# Patient Record
Sex: MALE | Race: WHITE | Employment: OTHER | URBAN - METROPOLITAN AREA
[De-identification: names, ages, dates, MRNs, and addresses within clinical notes are randomized per-mention and may not be internally consistent; named-entity substitution may affect disease eponyms.]

---

## 2017-01-31 ENCOUNTER — OFFICE VISIT (OUTPATIENT)
Dept: FAMILY MEDICINE CLINIC | Age: 57
End: 2017-01-31

## 2017-01-31 VITALS
WEIGHT: 223.8 LBS | SYSTOLIC BLOOD PRESSURE: 126 MMHG | DIASTOLIC BLOOD PRESSURE: 84 MMHG | HEIGHT: 67 IN | HEART RATE: 70 BPM | BODY MASS INDEX: 35.12 KG/M2

## 2017-01-31 DIAGNOSIS — F32.1 MODERATE SINGLE CURRENT EPISODE OF MAJOR DEPRESSIVE DISORDER (HCC): Primary | ICD-10-CM

## 2017-01-31 DIAGNOSIS — K21.9 GASTROESOPHAGEAL REFLUX DISEASE, ESOPHAGITIS PRESENCE NOT SPECIFIED: ICD-10-CM

## 2017-01-31 PROCEDURE — 99214 OFFICE O/P EST MOD 30 MIN: CPT | Performed by: FAMILY MEDICINE

## 2017-01-31 RX ORDER — CIMETIDINE 800 MG
800 TABLET ORAL 2 TIMES DAILY
Qty: 60 TABLET | Refills: 0 | Status: SHIPPED | OUTPATIENT
Start: 2017-01-31 | End: 2017-02-14

## 2017-02-14 ENCOUNTER — PATIENT MESSAGE (OUTPATIENT)
Dept: FAMILY MEDICINE CLINIC | Age: 57
End: 2017-02-14

## 2017-02-14 ENCOUNTER — OFFICE VISIT (OUTPATIENT)
Dept: FAMILY MEDICINE CLINIC | Age: 57
End: 2017-02-14

## 2017-02-14 VITALS
HEART RATE: 60 BPM | BODY MASS INDEX: 34.91 KG/M2 | SYSTOLIC BLOOD PRESSURE: 100 MMHG | DIASTOLIC BLOOD PRESSURE: 60 MMHG | WEIGHT: 222.4 LBS | HEIGHT: 67 IN

## 2017-02-14 DIAGNOSIS — F32.1 MODERATE SINGLE CURRENT EPISODE OF MAJOR DEPRESSIVE DISORDER (HCC): ICD-10-CM

## 2017-02-14 DIAGNOSIS — I25.10 CORONARY ARTERY DISEASE INVOLVING NATIVE CORONARY ARTERY OF NATIVE HEART, ANGINA PRESENCE UNSPECIFIED: ICD-10-CM

## 2017-02-14 DIAGNOSIS — K21.9 GASTROESOPHAGEAL REFLUX DISEASE, ESOPHAGITIS PRESENCE NOT SPECIFIED: ICD-10-CM

## 2017-02-14 DIAGNOSIS — I10 ESSENTIAL HYPERTENSION: ICD-10-CM

## 2017-02-14 DIAGNOSIS — I25.2 HISTORY OF MYOCARDIAL INFARCTION: ICD-10-CM

## 2017-02-14 PROCEDURE — 99214 OFFICE O/P EST MOD 30 MIN: CPT | Performed by: FAMILY MEDICINE

## 2017-02-14 RX ORDER — QUINAPRIL 40 MG/1
40 TABLET ORAL DAILY
Qty: 90 TABLET | Refills: 1 | Status: SHIPPED | OUTPATIENT
Start: 2017-02-14 | End: 2018-02-01 | Stop reason: DRUGHIGH

## 2017-02-14 RX ORDER — AMLODIPINE BESYLATE 10 MG/1
10 TABLET ORAL DAILY
Qty: 90 TABLET | Refills: 1 | Status: SHIPPED | OUTPATIENT
Start: 2017-02-14 | End: 2018-02-01

## 2017-02-14 RX ORDER — CLOPIDOGREL BISULFATE 75 MG/1
75 TABLET ORAL DAILY
Qty: 90 TABLET | Refills: 1 | Status: SHIPPED | OUTPATIENT
Start: 2017-02-14 | End: 2018-02-01 | Stop reason: SDUPTHER

## 2017-02-14 RX ORDER — ISOSORBIDE MONONITRATE 60 MG/1
60 TABLET, EXTENDED RELEASE ORAL EVERY MORNING
Qty: 90 TABLET | Refills: 1 | Status: SHIPPED | OUTPATIENT
Start: 2017-02-14 | End: 2018-02-01

## 2017-02-14 RX ORDER — METOPROLOL TARTRATE 50 MG/1
50 TABLET, FILM COATED ORAL 2 TIMES DAILY
Qty: 180 TABLET | Refills: 1 | Status: SHIPPED | OUTPATIENT
Start: 2017-02-14 | End: 2018-02-01 | Stop reason: SDUPTHER

## 2017-02-14 RX ORDER — PANTOPRAZOLE SODIUM 40 MG/1
40 TABLET, DELAYED RELEASE ORAL DAILY
Qty: 90 TABLET | Refills: 1 | Status: SHIPPED | OUTPATIENT
Start: 2017-02-14 | End: 2018-02-01 | Stop reason: SDUPTHER

## 2017-02-14 RX ORDER — ATORVASTATIN CALCIUM 40 MG/1
40 TABLET, FILM COATED ORAL DAILY
Qty: 90 TABLET | Refills: 1 | Status: SHIPPED | OUTPATIENT
Start: 2017-02-14 | End: 2018-02-01 | Stop reason: SDUPTHER

## 2017-02-14 ASSESSMENT — ENCOUNTER SYMPTOMS: SHORTNESS OF BREATH: 0

## 2017-04-18 ENCOUNTER — OFFICE VISIT (OUTPATIENT)
Dept: FAMILY MEDICINE CLINIC | Age: 57
End: 2017-04-18

## 2017-04-18 VITALS
HEIGHT: 67 IN | SYSTOLIC BLOOD PRESSURE: 134 MMHG | HEART RATE: 70 BPM | DIASTOLIC BLOOD PRESSURE: 82 MMHG | BODY MASS INDEX: 33.93 KG/M2 | WEIGHT: 216.2 LBS

## 2017-04-18 DIAGNOSIS — I25.10 CORONARY ARTERY DISEASE INVOLVING NATIVE CORONARY ARTERY OF NATIVE HEART, ANGINA PRESENCE UNSPECIFIED: ICD-10-CM

## 2017-04-18 DIAGNOSIS — K21.9 GASTROESOPHAGEAL REFLUX DISEASE, ESOPHAGITIS PRESENCE NOT SPECIFIED: ICD-10-CM

## 2017-04-18 DIAGNOSIS — R20.2 PARESTHESIA OF LEFT LEG: ICD-10-CM

## 2017-04-18 DIAGNOSIS — R68.89 EXERCISE INTOLERANCE: ICD-10-CM

## 2017-04-18 DIAGNOSIS — R20.0 NUMBNESS IN LEFT LEG: ICD-10-CM

## 2017-04-18 DIAGNOSIS — R29.898 WEAKNESS OF LEFT LEG: ICD-10-CM

## 2017-04-18 DIAGNOSIS — M54.32 SCIATICA, LEFT SIDE: Primary | ICD-10-CM

## 2017-04-18 DIAGNOSIS — G61.0 GUILLAIN BARRÉ SYNDROME (HCC): ICD-10-CM

## 2017-04-18 PROCEDURE — 93000 ELECTROCARDIOGRAM COMPLETE: CPT | Performed by: FAMILY MEDICINE

## 2017-04-18 PROCEDURE — 99214 OFFICE O/P EST MOD 30 MIN: CPT | Performed by: FAMILY MEDICINE

## 2017-04-18 RX ORDER — GABAPENTIN 400 MG/1
1 CAPSULE ORAL 3 TIMES DAILY
COMMUNITY
Start: 2017-03-28 | End: 2018-02-01

## 2017-04-18 ASSESSMENT — ENCOUNTER SYMPTOMS: SHORTNESS OF BREATH: 1

## 2017-04-19 PROBLEM — K21.9 GASTROESOPHAGEAL REFLUX DISEASE: Status: ACTIVE | Noted: 2017-04-19

## 2017-04-19 ASSESSMENT — ENCOUNTER SYMPTOMS: WHEEZING: 0

## 2017-04-27 ENCOUNTER — HOSPITAL ENCOUNTER (OUTPATIENT)
Dept: MRI IMAGING | Age: 57
Discharge: HOME | End: 2017-04-27
Attending: FAMILY MEDICINE | Admitting: FAMILY MEDICINE

## 2017-04-27 DIAGNOSIS — R20.0 NUMBNESS IN LEFT LEG: ICD-10-CM

## 2017-04-27 DIAGNOSIS — G61.0 GUILLAIN BARRÉ SYNDROME (HCC): ICD-10-CM

## 2017-04-27 DIAGNOSIS — R20.2 PARESTHESIA OF LEFT LEG: ICD-10-CM

## 2017-04-27 DIAGNOSIS — M54.32 SCIATICA, LEFT SIDE: ICD-10-CM

## 2017-04-27 DIAGNOSIS — R29.898 WEAKNESS OF LEFT LEG: ICD-10-CM

## 2017-04-27 DIAGNOSIS — M51.36 BULGING LUMBAR DISC: Primary | ICD-10-CM

## 2017-05-02 ENCOUNTER — OFFICE VISIT (OUTPATIENT)
Dept: FAMILY MEDICINE CLINIC | Age: 57
End: 2017-05-02

## 2017-05-02 VITALS
SYSTOLIC BLOOD PRESSURE: 140 MMHG | WEIGHT: 219 LBS | BODY MASS INDEX: 34.37 KG/M2 | HEART RATE: 68 BPM | DIASTOLIC BLOOD PRESSURE: 90 MMHG | HEIGHT: 67 IN

## 2017-05-02 DIAGNOSIS — E53.8 B12 DEFICIENCY: ICD-10-CM

## 2017-05-02 DIAGNOSIS — M54.17 LUMBOSACRAL RADICULOPATHY AT S1: Primary | ICD-10-CM

## 2017-05-02 PROCEDURE — 99213 OFFICE O/P EST LOW 20 MIN: CPT | Performed by: FAMILY MEDICINE

## 2017-05-02 RX ORDER — LANOLIN ALCOHOL/MO/W.PET/CERES
1000 CREAM (GRAM) TOPICAL DAILY
Qty: 90 TABLET | Refills: 3 | Status: SHIPPED | OUTPATIENT
Start: 2017-05-02 | End: 2018-04-12 | Stop reason: ALTCHOICE

## 2017-05-02 RX ORDER — TRAMADOL HYDROCHLORIDE 50 MG/1
50 TABLET ORAL EVERY 6 HOURS PRN
Qty: 25 TABLET | Refills: 0 | Status: SHIPPED | OUTPATIENT
Start: 2017-05-02 | End: 2018-02-01

## 2017-05-02 RX ORDER — METHOCARBAMOL 500 MG/1
500 TABLET, FILM COATED ORAL 4 TIMES DAILY
Qty: 60 TABLET | Refills: 1 | Status: SHIPPED | OUTPATIENT
Start: 2017-05-02 | End: 2018-02-01

## 2017-05-02 ASSESSMENT — ENCOUNTER SYMPTOMS: BACK PAIN: 1

## 2017-05-05 ENCOUNTER — HOSPITAL ENCOUNTER (OUTPATIENT)
Dept: OTHER | Age: 57
Discharge: HOME | End: 2017-05-31
Attending: FAMILY MEDICINE | Admitting: FAMILY MEDICINE

## 2017-05-05 ASSESSMENT — PAIN SCALES - GENERAL: PAINLEVEL_OUTOF10: 4

## 2017-05-05 ASSESSMENT — PAIN DESCRIPTION - ORIENTATION: ORIENTATION: LEFT

## 2017-05-05 ASSESSMENT — PAIN DESCRIPTION - PAIN TYPE: TYPE: CHRONIC PAIN

## 2017-05-05 ASSESSMENT — PAIN DESCRIPTION - FREQUENCY: FREQUENCY: CONTINUOUS

## 2017-05-10 ENCOUNTER — HOSPITAL ENCOUNTER (OUTPATIENT)
Dept: PHYSICAL THERAPY | Age: 57
Discharge: HOME | End: 2017-05-10
Admitting: FAMILY MEDICINE

## 2017-05-12 ENCOUNTER — HOSPITAL ENCOUNTER (OUTPATIENT)
Dept: PHYSICAL THERAPY | Age: 57
Discharge: HOME | End: 2017-05-12
Admitting: FAMILY MEDICINE

## 2017-05-15 ENCOUNTER — HOSPITAL ENCOUNTER (OUTPATIENT)
Dept: PHYSICAL THERAPY | Age: 57
Discharge: HOME | End: 2017-05-15
Admitting: FAMILY MEDICINE

## 2017-06-01 ENCOUNTER — HOSPITAL ENCOUNTER (OUTPATIENT)
Dept: OTHER | Age: 57
Discharge: HOME | End: 2017-06-30
Attending: FAMILY MEDICINE | Admitting: FAMILY MEDICINE

## 2017-06-14 ENCOUNTER — TELEPHONE (OUTPATIENT)
Dept: FAMILY MEDICINE CLINIC | Age: 57
End: 2017-06-14

## 2017-08-24 LAB — DIABETIC RETINOPATHY: NORMAL

## 2018-02-01 ENCOUNTER — OFFICE VISIT (OUTPATIENT)
Dept: FAMILY MEDICINE CLINIC | Age: 58
End: 2018-02-01

## 2018-02-01 VITALS
BODY MASS INDEX: 33.43 KG/M2 | HEART RATE: 74 BPM | HEIGHT: 67 IN | SYSTOLIC BLOOD PRESSURE: 152 MMHG | DIASTOLIC BLOOD PRESSURE: 98 MMHG | WEIGHT: 213 LBS | RESPIRATION RATE: 15 BRPM

## 2018-02-01 DIAGNOSIS — K21.9 GASTROESOPHAGEAL REFLUX DISEASE, ESOPHAGITIS PRESENCE NOT SPECIFIED: ICD-10-CM

## 2018-02-01 DIAGNOSIS — I25.2 HISTORY OF MYOCARDIAL INFARCTION: ICD-10-CM

## 2018-02-01 DIAGNOSIS — I10 ESSENTIAL HYPERTENSION: Primary | ICD-10-CM

## 2018-02-01 DIAGNOSIS — R73.02 IMPAIRED GLUCOSE TOLERANCE: ICD-10-CM

## 2018-02-01 DIAGNOSIS — F32.1 MODERATE SINGLE CURRENT EPISODE OF MAJOR DEPRESSIVE DISORDER (HCC): ICD-10-CM

## 2018-02-01 DIAGNOSIS — I25.10 CORONARY ARTERY DISEASE INVOLVING NATIVE CORONARY ARTERY OF NATIVE HEART, ANGINA PRESENCE UNSPECIFIED: ICD-10-CM

## 2018-02-01 PROCEDURE — G8427 DOCREV CUR MEDS BY ELIG CLIN: HCPCS | Performed by: FAMILY MEDICINE

## 2018-02-01 PROCEDURE — G8483 FLU IMM NO ADMIN DOC REA: HCPCS | Performed by: FAMILY MEDICINE

## 2018-02-01 PROCEDURE — 1036F TOBACCO NON-USER: CPT | Performed by: FAMILY MEDICINE

## 2018-02-01 PROCEDURE — G8417 CALC BMI ABV UP PARAM F/U: HCPCS | Performed by: FAMILY MEDICINE

## 2018-02-01 PROCEDURE — 99214 OFFICE O/P EST MOD 30 MIN: CPT | Performed by: FAMILY MEDICINE

## 2018-02-01 PROCEDURE — G8598 ASA/ANTIPLAT THER USED: HCPCS | Performed by: FAMILY MEDICINE

## 2018-02-01 PROCEDURE — 3017F COLORECTAL CA SCREEN DOC REV: CPT | Performed by: FAMILY MEDICINE

## 2018-02-01 RX ORDER — PANTOPRAZOLE SODIUM 40 MG/1
40 TABLET, DELAYED RELEASE ORAL DAILY
Qty: 30 TABLET | Refills: 0 | Status: SHIPPED | OUTPATIENT
Start: 2018-02-01 | End: 2019-01-22 | Stop reason: SDUPTHER

## 2018-02-01 RX ORDER — CLOPIDOGREL BISULFATE 75 MG/1
75 TABLET ORAL DAILY
Qty: 30 TABLET | Refills: 0 | Status: ON HOLD | OUTPATIENT
Start: 2018-02-01 | End: 2018-03-17 | Stop reason: HOSPADM

## 2018-02-01 RX ORDER — ATORVASTATIN CALCIUM 40 MG/1
40 TABLET, FILM COATED ORAL DAILY
Qty: 30 TABLET | Refills: 0 | Status: SHIPPED | OUTPATIENT
Start: 2018-02-01 | End: 2018-04-12 | Stop reason: ALTCHOICE

## 2018-02-01 RX ORDER — METOPROLOL TARTRATE 50 MG/1
50 TABLET, FILM COATED ORAL 2 TIMES DAILY
Qty: 60 TABLET | Refills: 0 | Status: SHIPPED | OUTPATIENT
Start: 2018-02-01 | End: 2018-04-12 | Stop reason: ALTCHOICE

## 2018-02-01 RX ORDER — QUINAPRIL 10 MG/1
10 TABLET ORAL DAILY
Qty: 30 TABLET | Refills: 0 | Status: ON HOLD | OUTPATIENT
Start: 2018-02-01 | End: 2018-03-17 | Stop reason: HOSPADM

## 2018-02-01 ASSESSMENT — PATIENT HEALTH QUESTIONNAIRE - PHQ9
2. FEELING DOWN, DEPRESSED OR HOPELESS: 1
1. LITTLE INTEREST OR PLEASURE IN DOING THINGS: 1
SUM OF ALL RESPONSES TO PHQ9 QUESTIONS 1 & 2: 2
SUM OF ALL RESPONSES TO PHQ QUESTIONS 1-9: 2

## 2018-02-01 ASSESSMENT — ENCOUNTER SYMPTOMS: SHORTNESS OF BREATH: 0

## 2018-02-01 NOTE — PROGRESS NOTES
Left Arm Left Arm   Position: Sitting Sitting   Cuff Size: Large Adult Large Adult   Pulse: 74    Resp: 15    Weight: 213 lb (96.6 kg)    Height: 5' 7\" (1.702 m)      Body mass index is 33.36 kg/m². Wt Readings from Last 3 Encounters:   02/01/18 213 lb (96.6 kg)   05/02/17 219 lb (99.3 kg)   04/18/17 216 lb 3.2 oz (98.1 kg)     BP Readings from Last 3 Encounters:   02/01/18 (!) 152/98   05/02/17 (!) 140/90   04/18/17 134/82          No results found for this visit on 02/01/18. The ASCVD Risk score (Rock Nevarez, et al., 2013) failed to calculate for the following reasons: The patient has a prior MI or stroke diagnosis  Lab Review   Abstract on 08/28/2017   Component Date Value    Diabetic Retinopathy 08/24/2017 none            Assessment:      1. Essential hypertension  metoprolol tartrate (LOPRESSOR) 50 MG tablet    quinapril (ACCUPRIL) 10 MG tablet   2. Moderate single current episode of major depressive disorder (HCC)  sertraline (ZOLOFT) 50 MG tablet   3. Gastroesophageal reflux disease, esophagitis presence not specified  pantoprazole (PROTONIX) 40 MG tablet   4. History of myocardial infarction  metoprolol tartrate (LOPRESSOR) 50 MG tablet    atorvastatin (LIPITOR) 40 MG tablet    clopidogrel (PLAVIX) 75 MG tablet   5. Impaired glucose tolerance     6. Coronary artery disease involving native coronary artery of native heart, angina presence unspecified  atorvastatin (LIPITOR) 40 MG tablet           Plan:      Exercise compliance and returning for his visits. We could've set up a payment program for him so he would not at the missing his medication    I'm restarting him on 2 of his blood pressure pills since his blood pressure is not that high. We'll recheck in 3-4 weeks. Restarting Lipitor for his history of having a myocardial infarction    We'll check labs and blood sugar next time.

## 2018-02-26 ENCOUNTER — TELEPHONE (OUTPATIENT)
Dept: FAMILY MEDICINE CLINIC | Age: 58
End: 2018-02-26

## 2018-02-27 ENCOUNTER — OFFICE VISIT (OUTPATIENT)
Dept: FAMILY MEDICINE CLINIC | Age: 58
End: 2018-02-27

## 2018-02-27 VITALS
HEART RATE: 86 BPM | BODY MASS INDEX: 33.64 KG/M2 | WEIGHT: 214.8 LBS | DIASTOLIC BLOOD PRESSURE: 90 MMHG | SYSTOLIC BLOOD PRESSURE: 150 MMHG

## 2018-02-27 DIAGNOSIS — G61.0 GUILLAIN BARRÉ SYNDROME (HCC): ICD-10-CM

## 2018-02-27 DIAGNOSIS — M51.36 BULGING LUMBAR DISC: ICD-10-CM

## 2018-02-27 DIAGNOSIS — I10 ESSENTIAL HYPERTENSION: ICD-10-CM

## 2018-02-27 DIAGNOSIS — I69.351 HEMIPARESIS AFFECTING RIGHT SIDE AS LATE EFFECT OF CEREBROVASCULAR ACCIDENT (CVA) (HCC): Primary | ICD-10-CM

## 2018-02-27 PROCEDURE — 3017F COLORECTAL CA SCREEN DOC REV: CPT | Performed by: FAMILY MEDICINE

## 2018-02-27 PROCEDURE — G8417 CALC BMI ABV UP PARAM F/U: HCPCS | Performed by: FAMILY MEDICINE

## 2018-02-27 PROCEDURE — G8427 DOCREV CUR MEDS BY ELIG CLIN: HCPCS | Performed by: FAMILY MEDICINE

## 2018-02-27 PROCEDURE — 1036F TOBACCO NON-USER: CPT | Performed by: FAMILY MEDICINE

## 2018-02-27 PROCEDURE — 99214 OFFICE O/P EST MOD 30 MIN: CPT | Performed by: FAMILY MEDICINE

## 2018-02-27 PROCEDURE — G8598 ASA/ANTIPLAT THER USED: HCPCS | Performed by: FAMILY MEDICINE

## 2018-02-27 PROCEDURE — G8483 FLU IMM NO ADMIN DOC REA: HCPCS | Performed by: FAMILY MEDICINE

## 2018-02-27 RX ORDER — QUINAPRIL 20 MG/1
20 TABLET ORAL DAILY
Qty: 30 TABLET | Refills: 3 | Status: SHIPPED | OUTPATIENT
Start: 2018-02-27 | End: 2018-07-25 | Stop reason: DRUGHIGH

## 2018-02-27 NOTE — PROGRESS NOTES
medical therapy and risk factor modicfication, LV dysfunction will be managed medically, 12/07, 5/03    H/O cardiovascular stress test 7/10    7/2/10-EF44%, sev LV dilation w/multiple extensive wall motion abnormalities involving the inferolateral, anteroseptal, anteroapical, and apical regions of LV, changes suggest old MI w/ a mod area of lateral ischemia, 4/09-EF44%    H/O Doppler ultrasound 6/15/09    carotid - 6/15/09-Left ICA stenosis 50%, bilateral ECA stenosis 50%, bilat vertebral flows are norm    H/O Doppler ultrasound 6/15/09    lower extremity doppler - 6/15/09-Rt PAULY 1.18, Lt PAULY 1.17, normal ABIs    H/O Doppler ultrasound 3/08    abdominal aorta - 3/6/08-no aneurysmal dilation present, abd ao is free of atherosclerotic changes    H/O echocardiogram 7/30/13, 6/16/10    7/13-EF 50-55% WNL. 6/10-EF53%, mod LVH, MARCI, RVE, trace PI, mild MR/TR, 6/09    Heart murmur     History of myocardial infarction 2003    History of PTCA 6/03;2/03 6/03-PTCA w/stents x2, 2/03-PTCA w/stent x3    Hyperlipidemia     Hypertension     Impaired glucose tolerance 6/3/2016    Renal cyst 5/23/2014    S/P cardiac catheterization 7/12/13 7/13-EF=55%. PATENT LAD AND RCA STENTS, OM2 50% stenosis, & Normal wall motion.  Stroke (cerebrum) [facility])     May 2016       Past Surgical History:   Procedure Laterality Date    CARDIAC CATHETERIZATION  11/3/2010    CARDIAC CATHETERIZATION  7/13 7/13-EF=55%. PATENT LAD AND RCA STENTS, OM2 50% stenosis, & Normal wall motion.     COLONOSCOPY  8/21/14    colon polyp, diverticulosis, internal hemorrhoids    CORONARY ANGIOPLASTY  6/03 6/14/03- PTCA w stents x2 mid LAD, 2/03-PTCA w stents x3, RCA    ENDOSCOPY, COLON, DIAGNOSTIC  8/21/14    irregular GE junction, gastritis,hiatal hernia, gastric polyps    HAND SURGERY      rt knuckle    SHOULDER SURGERY Right 2011    bone spurs       Family History   Problem Relation Age of Onset    Hypertension Mother    [facility] High Cholesterol Mother     Alcohol Abuse Mother     Depression Mother     Alcohol Abuse Father     Stomach Cancer Father     Depression Sister     Diabetes Brother     Depression Sister     Stomach Cancer Brother     Alcohol Abuse Brother        Current Outpatient Prescriptions on File Prior to Visit   Medication Sig Dispense Refill    sertraline (ZOLOFT) 50 MG tablet Take 1 tablet by mouth daily 30 tablet 0    pantoprazole (PROTONIX) 40 MG tablet Take 1 tablet by mouth daily 30 tablet 0    metoprolol tartrate (LOPRESSOR) 50 MG tablet Take 1 tablet by mouth 2 times daily 60 tablet 0    quinapril (ACCUPRIL) 10 MG tablet Take 1 tablet by mouth daily 30 tablet 0    atorvastatin (LIPITOR) 40 MG tablet Take 1 tablet by mouth daily 30 tablet 0    clopidogrel (PLAVIX) 75 MG tablet Take 1 tablet by mouth daily 30 tablet 0    vitamin B-12 (CYANOCOBALAMIN) 1000 MCG tablet Take 1 tablet by mouth daily 90 tablet 3    Blood Pressure KIT Use prn 1 kit 0     No current facility-administered medications on file prior to visit. Objective:   Physical Exam   Constitutional: He appears well-developed and well-nourished. Obese   HENT:   Head: Normocephalic and atraumatic. Right Ear: No decreased hearing is noted. Left Ear: No decreased hearing is noted. Eyes: Pupils are equal, round, and reactive to light. Left eye exhibits no nystagmus. Cardiovascular: Normal rate, regular rhythm and normal heart sounds. Pulmonary/Chest: Breath sounds normal. No respiratory distress. He has no wheezes. He has no rales. Musculoskeletal:        Right lower leg: He exhibits no edema. Left lower leg: He exhibits no edema.    Right upper and lower extremity about 4/5 strength forward flexion and extension    Left upper and lower extremity abobut 3/5 strength forward flexion and extension   Neurological: Gait abnormal.   Reflex Scores:       Patellar reflexes are 2+ on the right side and 2+ on the left

## 2018-02-28 RX ORDER — GABAPENTIN 300 MG/1
300 CAPSULE ORAL NIGHTLY
Qty: 30 CAPSULE | Refills: 2 | Status: SHIPPED | OUTPATIENT
Start: 2018-02-28 | End: 2019-01-22

## 2018-02-28 ASSESSMENT — ENCOUNTER SYMPTOMS
BACK PAIN: 1
CHEST TIGHTNESS: 0

## 2018-03-14 PROBLEM — I21.3 STEMI (ST ELEVATION MYOCARDIAL INFARCTION) (HCC): Status: ACTIVE | Noted: 2018-03-14

## 2018-03-14 PROBLEM — I16.0 HYPERTENSIVE URGENCY: Status: ACTIVE | Noted: 2018-03-14

## 2018-03-14 PROBLEM — I16.1 HYPERTENSIVE EMERGENCY: Status: ACTIVE | Noted: 2018-03-14

## 2018-03-15 PROBLEM — I20.0 UNSTABLE ANGINA (HCC): Status: ACTIVE | Noted: 2018-03-15

## 2018-03-15 PROBLEM — I21.4 NSTEMI (NON-ST ELEVATED MYOCARDIAL INFARCTION) (HCC): Status: ACTIVE | Noted: 2018-03-15

## 2018-03-19 ENCOUNTER — TELEPHONE (OUTPATIENT)
Dept: CARDIOLOGY CLINIC | Age: 58
End: 2018-03-19

## 2018-03-19 ENCOUNTER — TELEPHONE (OUTPATIENT)
Dept: FAMILY MEDICINE CLINIC | Age: 58
End: 2018-03-19

## 2018-03-19 NOTE — TELEPHONE ENCOUNTER
Called pt to schedule ov with Dr Christa Sullivan when he is back in office, per Dr Christa Sullivan order in patient call back work que.  Left message for pt to call to schedule 3/19/18

## 2018-04-12 ENCOUNTER — OFFICE VISIT (OUTPATIENT)
Dept: CARDIOLOGY CLINIC | Age: 58
End: 2018-04-12

## 2018-04-12 ENCOUNTER — TELEPHONE (OUTPATIENT)
Dept: CARDIOLOGY CLINIC | Age: 58
End: 2018-04-12

## 2018-04-12 VITALS
HEIGHT: 67 IN | WEIGHT: 216.2 LBS | SYSTOLIC BLOOD PRESSURE: 128 MMHG | BODY MASS INDEX: 33.93 KG/M2 | HEART RATE: 64 BPM | DIASTOLIC BLOOD PRESSURE: 88 MMHG

## 2018-04-12 DIAGNOSIS — Z98.61 HISTORY OF PTCA: Primary | ICD-10-CM

## 2018-04-12 DIAGNOSIS — I25.10 CORONARY ARTERY DISEASE INVOLVING NATIVE CORONARY ARTERY OF NATIVE HEART, ANGINA PRESENCE UNSPECIFIED: ICD-10-CM

## 2018-04-12 PROCEDURE — 99214 OFFICE O/P EST MOD 30 MIN: CPT | Performed by: INTERNAL MEDICINE

## 2018-04-12 RX ORDER — CLOPIDOGREL BISULFATE 75 MG/1
75 TABLET ORAL DAILY
COMMUNITY
Start: 2018-03-30 | End: 2018-04-25 | Stop reason: ALTCHOICE

## 2018-04-20 ENCOUNTER — TELEPHONE (OUTPATIENT)
Dept: CARDIOLOGY CLINIC | Age: 58
End: 2018-04-20

## 2018-04-25 ENCOUNTER — PROCEDURE VISIT (OUTPATIENT)
Dept: CARDIOLOGY CLINIC | Age: 58
End: 2018-04-25

## 2018-04-25 DIAGNOSIS — Z98.61 HISTORY OF PTCA: ICD-10-CM

## 2018-04-25 DIAGNOSIS — R07.89 OTHER CHEST PAIN: ICD-10-CM

## 2018-04-25 DIAGNOSIS — I25.10 CORONARY ARTERY DISEASE INVOLVING NATIVE CORONARY ARTERY OF NATIVE HEART, ANGINA PRESENCE UNSPECIFIED: ICD-10-CM

## 2018-04-25 DIAGNOSIS — R06.02 SHORTNESS OF BREATH: Primary | ICD-10-CM

## 2018-04-25 LAB
LV EF: 28 %
LVEF MODALITY: NORMAL

## 2018-04-25 PROCEDURE — 93018 CV STRESS TEST I&R ONLY: CPT | Performed by: INTERNAL MEDICINE

## 2018-04-25 PROCEDURE — A9500 TC99M SESTAMIBI: HCPCS | Performed by: INTERNAL MEDICINE

## 2018-04-25 PROCEDURE — 93017 CV STRESS TEST TRACING ONLY: CPT | Performed by: INTERNAL MEDICINE

## 2018-04-25 PROCEDURE — 78452 HT MUSCLE IMAGE SPECT MULT: CPT | Performed by: INTERNAL MEDICINE

## 2018-04-25 PROCEDURE — 93016 CV STRESS TEST SUPVJ ONLY: CPT | Performed by: INTERNAL MEDICINE

## 2018-04-25 RX ORDER — AMLODIPINE BESYLATE 5 MG/1
10 TABLET ORAL DAILY
Qty: 30 TABLET | Refills: 3 | Status: SHIPPED | OUTPATIENT
Start: 2018-04-25 | End: 2019-01-22 | Stop reason: SDUPTHER

## 2018-04-27 ENCOUNTER — TELEPHONE (OUTPATIENT)
Dept: CARDIOLOGY CLINIC | Age: 58
End: 2018-04-27

## 2018-06-15 ENCOUNTER — TELEPHONE (OUTPATIENT)
Dept: CARDIOLOGY CLINIC | Age: 58
End: 2018-06-15

## 2018-06-20 ENCOUNTER — TELEPHONE (OUTPATIENT)
Dept: CARDIOLOGY CLINIC | Age: 58
End: 2018-06-20

## 2018-06-26 ENCOUNTER — TELEPHONE (OUTPATIENT)
Dept: CARDIOLOGY CLINIC | Age: 58
End: 2018-06-26

## 2018-06-26 ENCOUNTER — OFFICE VISIT (OUTPATIENT)
Dept: CARDIOLOGY CLINIC | Age: 58
End: 2018-06-26

## 2018-06-26 VITALS
WEIGHT: 213.6 LBS | HEIGHT: 67 IN | DIASTOLIC BLOOD PRESSURE: 90 MMHG | SYSTOLIC BLOOD PRESSURE: 160 MMHG | BODY MASS INDEX: 33.53 KG/M2 | HEART RATE: 58 BPM

## 2018-06-26 DIAGNOSIS — R07.9 CHEST PAIN, UNSPECIFIED TYPE: Primary | ICD-10-CM

## 2018-06-26 DIAGNOSIS — I25.5 ISCHEMIC CARDIOMYOPATHY: ICD-10-CM

## 2018-06-26 PROCEDURE — 99214 OFFICE O/P EST MOD 30 MIN: CPT | Performed by: INTERNAL MEDICINE

## 2018-06-26 PROCEDURE — 93000 ELECTROCARDIOGRAM COMPLETE: CPT | Performed by: INTERNAL MEDICINE

## 2018-07-02 ENCOUNTER — TELEPHONE (OUTPATIENT)
Dept: CARDIOLOGY CLINIC | Age: 58
End: 2018-07-02

## 2018-07-02 NOTE — TELEPHONE ENCOUNTER
Called the patient and left a message that Jonh Walker will call her to trouble shoot what is going on with the life vest. If you have any questions, please call our office at [phone]

## 2018-07-03 ENCOUNTER — TELEPHONE (OUTPATIENT)
Dept: CARDIOLOGY CLINIC | Age: 58
End: 2018-07-03

## 2018-07-09 ENCOUNTER — PROCEDURE VISIT (OUTPATIENT)
Dept: CARDIOLOGY CLINIC | Age: 58
End: 2018-07-09

## 2018-07-09 DIAGNOSIS — R07.9 CHEST PAIN, UNSPECIFIED TYPE: ICD-10-CM

## 2018-07-09 DIAGNOSIS — I25.5 ISCHEMIC CARDIOMYOPATHY: ICD-10-CM

## 2018-07-09 LAB
LV EF: 47 %
LVEF MODALITY: NORMAL

## 2018-07-09 PROCEDURE — 78472 GATED HEART PLANAR SINGLE: CPT | Performed by: INTERNAL MEDICINE

## 2018-07-09 PROCEDURE — A9560 TC99M LABELED RBC: HCPCS | Performed by: INTERNAL MEDICINE

## 2018-07-11 ENCOUNTER — TELEPHONE (OUTPATIENT)
Dept: CARDIOLOGY CLINIC | Age: 58
End: 2018-07-11

## 2018-07-25 ENCOUNTER — OFFICE VISIT (OUTPATIENT)
Dept: CARDIOLOGY CLINIC | Age: 58
End: 2018-07-25

## 2018-07-25 VITALS
BODY MASS INDEX: 33.27 KG/M2 | SYSTOLIC BLOOD PRESSURE: 188 MMHG | HEIGHT: 67 IN | WEIGHT: 212 LBS | HEART RATE: 82 BPM | DIASTOLIC BLOOD PRESSURE: 102 MMHG

## 2018-07-25 DIAGNOSIS — I10 ESSENTIAL HYPERTENSION: ICD-10-CM

## 2018-07-25 PROCEDURE — 99214 OFFICE O/P EST MOD 30 MIN: CPT | Performed by: INTERNAL MEDICINE

## 2018-07-25 RX ORDER — QUINAPRIL 20 MG/1
40 TABLET ORAL DAILY
Qty: 30 TABLET | Refills: 3 | Status: SHIPPED
Start: 2018-07-25 | End: 2019-01-22 | Stop reason: SDUPTHER

## 2018-08-01 ENCOUNTER — NURSE ONLY (OUTPATIENT)
Dept: CARDIOLOGY CLINIC | Age: 58
End: 2018-08-01

## 2018-08-01 VITALS
SYSTOLIC BLOOD PRESSURE: 168 MMHG | HEART RATE: 72 BPM | BODY MASS INDEX: 32.93 KG/M2 | WEIGHT: 209.8 LBS | HEIGHT: 67 IN | DIASTOLIC BLOOD PRESSURE: 106 MMHG

## 2018-08-01 DIAGNOSIS — I10 HYPERTENSION, UNSPECIFIED TYPE: ICD-10-CM

## 2018-08-01 PROCEDURE — 99211 OFF/OP EST MAY X REQ PHY/QHP: CPT | Performed by: NURSE PRACTITIONER

## 2018-08-08 ENCOUNTER — TELEPHONE (OUTPATIENT)
Dept: GASTROENTEROLOGY | Age: 58
End: 2018-08-08

## 2018-08-17 ENCOUNTER — NURSE ONLY (OUTPATIENT)
Dept: CARDIOLOGY CLINIC | Age: 58
End: 2018-08-17

## 2018-08-17 VITALS
SYSTOLIC BLOOD PRESSURE: 128 MMHG | DIASTOLIC BLOOD PRESSURE: 86 MMHG | HEIGHT: 67 IN | WEIGHT: 206.4 LBS | BODY MASS INDEX: 32.39 KG/M2 | HEART RATE: 86 BPM

## 2018-08-17 NOTE — PROGRESS NOTES
Dr. Elinor Guzman reviewed Blood pressure and stated the patient should continue present medical management.

## 2018-08-30 ENCOUNTER — TELEPHONE (OUTPATIENT)
Dept: CARDIOLOGY CLINIC | Age: 58
End: 2018-08-30

## 2018-08-30 NOTE — TELEPHONE ENCOUNTER
Faxed records release request to [phone] on 8/30/18 for  [address]  Faxed to [phone]. Call [phone]  Option 2 to check on progress.

## 2018-10-02 ENCOUNTER — TELEPHONE (OUTPATIENT)
Dept: FAMILY MEDICINE CLINIC | Age: 58
End: 2018-10-02

## 2018-10-09 ENCOUNTER — TELEPHONE (OUTPATIENT)
Dept: CARDIOLOGY CLINIC | Age: 58
End: 2018-10-09

## 2018-10-26 ENCOUNTER — HOSPITAL ENCOUNTER (EMERGENCY)
Age: 58
Discharge: HOME | End: 2018-10-26
Attending: EMERGENCY MEDICINE
Payer: COMMERCIAL

## 2018-10-26 VITALS
RESPIRATION RATE: 18 BRPM | DIASTOLIC BLOOD PRESSURE: 94 MMHG | TEMPERATURE: 97.8 F | SYSTOLIC BLOOD PRESSURE: 185 MMHG | OXYGEN SATURATION: 98 % | HEART RATE: 73 BPM

## 2018-10-26 DIAGNOSIS — L03.115 CELLULITIS OF RIGHT LOWER EXTREMITY: Primary | ICD-10-CM

## 2018-10-26 PROCEDURE — 99282 EMERGENCY DEPT VISIT SF MDM: CPT

## 2018-10-26 RX ORDER — DOXYCYCLINE HYCLATE 100 MG
100 TABLET ORAL 2 TIMES DAILY
Qty: 20 TABLET | Refills: 0 | Status: SHIPPED | OUTPATIENT
Start: 2018-10-26 | End: 2018-11-05

## 2018-10-26 ASSESSMENT — PAIN DESCRIPTION - LOCATION: LOCATION: GROIN

## 2018-10-26 ASSESSMENT — PAIN SCALES - GENERAL: PAINLEVEL_OUTOF10: 3

## 2018-10-26 ASSESSMENT — PAIN DESCRIPTION - ORIENTATION: ORIENTATION: RIGHT

## 2018-10-26 ASSESSMENT — PAIN DESCRIPTION - PAIN TYPE: TYPE: ACUTE PAIN

## 2018-10-26 NOTE — ED PROVIDER NOTES
tablet Take 1 tablet by mouth daily (with breakfast) 30 tablet 3    potassium chloride (KLOR-CON M) 20 MEQ extended release tablet Take 1 tablet by mouth daily 60 tablet 3    gabapentin (NEURONTIN) 300 MG capsule Take 1 capsule by mouth nightly for 30 doses. 30 capsule 2    sertraline (ZOLOFT) 50 MG tablet Take 1 tablet by mouth daily 30 tablet 0    pantoprazole (PROTONIX) 40 MG tablet Take 1 tablet by mouth daily 30 tablet 0    Blood Pressure KIT Use prn 1 kit 0     Allergies   Allergen Reactions    Ibuprofen Other (See Comments)     Stomach irritation    Augmentin [Amoxicillin-Pot Clavulanate]     Codeine        Nursing Notes Reviewed    Physical Exam:  ED Triage Vitals [10/26/18 1244]   Enc Vitals Group      BP (!) 185/94      Pulse 73      Resp 18      Temp 97.8 °F (36.6 °C)      Temp Source Oral      SpO2 98 %      Weight       Height       Head Circumference       Peak Flow       Pain Score       Pain Loc       Pain Edu? Excl. in [address]? My pulse ox interpretation is - normal    General appearance:  No acute distress. Skin:  Warm. Dry. Eye:  Extraocular movements intact. Ears, nose, mouth and throat:  Oral mucosa moist   Neck:  Trachea midline. Extremity:  No swelling. Normal ROM     Heart:  Regular rate and rhythm  Perfusion:  intact  Respiratory:    Respirations nonlabored. Abdominal:  Soft. Nontender. Non distended. : right inguinal crease with approximately 3cm area of mild erythema and induration, no other discoloration, there is some skin breakdown in the crease with no open wounds, no drainage or bleeding. No yeast. The area is approximately 2cm wide, does not track further into the groin, No testicular or scrotal involvement, no induration or erythema of these areas. There is no tracking onto the thigh or over the right hip. No tracking onto the suprapubic area. No crepitus.   Very mild tenderness to palpation,    Neurological:  Alert and oriented, normal gait Psychiatric:  Appropriate    I have reviewed and interpreted all of the currently available lab results from this visit (if applicable):  No results found for this visit on 10/26/18. Radiographs (if obtained):  [] The following radiograph was interpreted by myself in the absence of a radiologist:   [] Radiologist's Report Reviewed:  No orders to display         EKG (if obtained): (All EKG's are interpreted by myself in the absence of a cardiologist)    Chart review shows recent radiographs:  No results found. MDM:  70-year-old male history of the present concern for right groin \"knot\". Appears like small area of cellulitis, there is no fluctuance to suggest abscess, he noted it this morning, there is no pain out of proportion with exam, there is no crepitus, there is no discoloration other than some mild erythema with induration. At this time appears like a very localized cellulitis, unclear Celestina Surrett is a 50 colliculi to her may be from the small amount of skin breakdown in the inguinal region. Does not appear to extend onto the scrotum or testicles. Plan for doxycycline for home has he does have an Augmentin allergy that caused tongue swelling and so will not use Keflex at this time. Advised he needs a 24-hour recheck if not improving. He is comfortable with this plan. Given strict return precautions. He will be discharged in stable condition    Clinical Impression:  1. Cellulitis of right lower extremity      Disposition referral (if applicable):  Mellisa Forman MD  [address] [facility],  [address] [phone]    Schedule an appointment as soon as possible for a visit       [facility] Emergency Department  [address] [place] [address]  [phone]    If symptoms worsen    Disposition medications (if applicable):  New Prescriptions    DOXYCYCLINE HYCLATE (VIBRA-TABS) 100 MG TABLET    Take 1 tablet by mouth 2 times daily for

## 2019-01-10 ENCOUNTER — TELEPHONE (OUTPATIENT)
Dept: CARDIOLOGY CLINIC | Age: 59
End: 2019-01-10

## 2019-01-14 ENCOUNTER — OFFICE VISIT (OUTPATIENT)
Dept: CARDIOLOGY CLINIC | Age: 59
End: 2019-01-14
Payer: MEDICARE

## 2019-01-14 VITALS
HEART RATE: 70 BPM | SYSTOLIC BLOOD PRESSURE: 154 MMHG | DIASTOLIC BLOOD PRESSURE: 90 MMHG | WEIGHT: 210.4 LBS | BODY MASS INDEX: 31.89 KG/M2 | HEIGHT: 68 IN

## 2019-01-14 DIAGNOSIS — Z01.818 PRE-OP TESTING: Primary | ICD-10-CM

## 2019-01-14 PROCEDURE — G8427 DOCREV CUR MEDS BY ELIG CLIN: HCPCS | Performed by: INTERNAL MEDICINE

## 2019-01-14 PROCEDURE — G8599 NO ASA/ANTIPLAT THER USE RNG: HCPCS | Performed by: INTERNAL MEDICINE

## 2019-01-14 PROCEDURE — 99214 OFFICE O/P EST MOD 30 MIN: CPT | Performed by: INTERNAL MEDICINE

## 2019-01-14 PROCEDURE — G8417 CALC BMI ABV UP PARAM F/U: HCPCS | Performed by: INTERNAL MEDICINE

## 2019-01-14 PROCEDURE — 93000 ELECTROCARDIOGRAM COMPLETE: CPT | Performed by: INTERNAL MEDICINE

## 2019-01-14 PROCEDURE — 3017F COLORECTAL CA SCREEN DOC REV: CPT | Performed by: INTERNAL MEDICINE

## 2019-01-14 PROCEDURE — G8483 FLU IMM NO ADMIN DOC REA: HCPCS | Performed by: INTERNAL MEDICINE

## 2019-01-14 PROCEDURE — 1036F TOBACCO NON-USER: CPT | Performed by: INTERNAL MEDICINE

## 2019-01-21 ENCOUNTER — TELEPHONE (OUTPATIENT)
Dept: FAMILY MEDICINE CLINIC | Age: 59
End: 2019-01-21

## 2019-01-22 ENCOUNTER — TELEPHONE (OUTPATIENT)
Dept: FAMILY MEDICINE CLINIC | Age: 59
End: 2019-01-22

## 2019-01-22 ENCOUNTER — OFFICE VISIT (OUTPATIENT)
Dept: FAMILY MEDICINE CLINIC | Age: 59
End: 2019-01-22
Payer: MEDICARE

## 2019-01-22 VITALS
DIASTOLIC BLOOD PRESSURE: 110 MMHG | HEIGHT: 67 IN | WEIGHT: 209.4 LBS | TEMPERATURE: 97.9 F | BODY MASS INDEX: 32.87 KG/M2 | SYSTOLIC BLOOD PRESSURE: 170 MMHG | HEART RATE: 72 BPM

## 2019-01-22 DIAGNOSIS — E11.9 TYPE 2 DIABETES MELLITUS WITHOUT COMPLICATION, UNSPECIFIED WHETHER LONG TERM INSULIN USE (HCC): Primary | ICD-10-CM

## 2019-01-22 DIAGNOSIS — I10 ESSENTIAL HYPERTENSION: ICD-10-CM

## 2019-01-22 DIAGNOSIS — J20.9 ACUTE BRONCHITIS, UNSPECIFIED ORGANISM: ICD-10-CM

## 2019-01-22 DIAGNOSIS — K21.9 GASTROESOPHAGEAL REFLUX DISEASE, ESOPHAGITIS PRESENCE NOT SPECIFIED: ICD-10-CM

## 2019-01-22 LAB
CREATININE URINE: 175.7 MG/DL (ref 39–259)
HBA1C MFR BLD: 7.2 %
MICROALBUMIN UR-MCNC: <1.2 MG/DL
MICROALBUMIN/CREAT UR-RTO: NORMAL MG/G (ref 0–30)

## 2019-01-22 PROCEDURE — G8483 FLU IMM NO ADMIN DOC REA: HCPCS | Performed by: FAMILY MEDICINE

## 2019-01-22 PROCEDURE — G8427 DOCREV CUR MEDS BY ELIG CLIN: HCPCS | Performed by: FAMILY MEDICINE

## 2019-01-22 PROCEDURE — 99214 OFFICE O/P EST MOD 30 MIN: CPT | Performed by: FAMILY MEDICINE

## 2019-01-22 PROCEDURE — 3045F PR MOST RECENT HEMOGLOBIN A1C LEVEL 7.0-9.0%: CPT | Performed by: FAMILY MEDICINE

## 2019-01-22 PROCEDURE — 2022F DILAT RTA XM EVC RTNOPTHY: CPT | Performed by: FAMILY MEDICINE

## 2019-01-22 PROCEDURE — 1036F TOBACCO NON-USER: CPT | Performed by: FAMILY MEDICINE

## 2019-01-22 PROCEDURE — 83036 HEMOGLOBIN GLYCOSYLATED A1C: CPT | Performed by: FAMILY MEDICINE

## 2019-01-22 PROCEDURE — G8599 NO ASA/ANTIPLAT THER USE RNG: HCPCS | Performed by: FAMILY MEDICINE

## 2019-01-22 PROCEDURE — 3017F COLORECTAL CA SCREEN DOC REV: CPT | Performed by: FAMILY MEDICINE

## 2019-01-22 PROCEDURE — G8417 CALC BMI ABV UP PARAM F/U: HCPCS | Performed by: FAMILY MEDICINE

## 2019-01-22 RX ORDER — QUINAPRIL 20 MG/1
40 TABLET ORAL DAILY
Qty: 30 TABLET | Refills: 0 | Status: SHIPPED | OUTPATIENT
Start: 2019-01-22 | End: 2019-01-24 | Stop reason: SDUPTHER

## 2019-01-22 RX ORDER — DIPHENHYDRAMINE HYDROCHLORIDE 25 MG/1
CAPSULE, LIQUID FILLED ORAL
Qty: 1 KIT | Refills: 0 | Status: ON HOLD | OUTPATIENT
Start: 2019-01-22 | End: 2022-03-14 | Stop reason: SDUPTHER

## 2019-01-22 RX ORDER — SULFAMETHOXAZOLE AND TRIMETHOPRIM 800; 160 MG/1; MG/1
1 TABLET ORAL 2 TIMES DAILY
Qty: 14 TABLET | Refills: 0 | Status: SHIPPED | OUTPATIENT
Start: 2019-01-22 | End: 2019-01-29

## 2019-01-22 RX ORDER — PANTOPRAZOLE SODIUM 40 MG/1
40 TABLET, DELAYED RELEASE ORAL DAILY
Qty: 30 TABLET | Refills: 0 | Status: SHIPPED | OUTPATIENT
Start: 2019-01-22 | End: 2019-01-22 | Stop reason: SDUPTHER

## 2019-01-22 RX ORDER — PANTOPRAZOLE SODIUM 40 MG/1
40 TABLET, DELAYED RELEASE ORAL DAILY
Qty: 90 TABLET | Refills: 0 | Status: SHIPPED | OUTPATIENT
Start: 2019-01-22 | End: 2019-04-16 | Stop reason: SDUPTHER

## 2019-01-22 RX ORDER — LANCETS 30 GAUGE
1 EACH MISCELLANEOUS 2 TIMES DAILY
Qty: 180 EACH | Refills: 3 | Status: SHIPPED | OUTPATIENT
Start: 2019-01-22 | End: 2021-02-25 | Stop reason: SDUPTHER

## 2019-01-22 RX ORDER — GLUCOSAMINE HCL/CHONDROITIN SU 500-400 MG
1 CAPSULE ORAL 2 TIMES DAILY
Qty: 180 STRIP | Refills: 3 | Status: SHIPPED | OUTPATIENT
Start: 2019-01-22 | End: 2021-02-25 | Stop reason: SDUPTHER

## 2019-01-22 RX ORDER — BLOOD SUGAR DIAGNOSTIC
1 STRIP MISCELLANEOUS 2 TIMES DAILY
Qty: 180 EACH | Refills: 3 | Status: SHIPPED | OUTPATIENT
Start: 2019-01-22 | End: 2021-02-25 | Stop reason: SDUPTHER

## 2019-01-22 RX ORDER — AMLODIPINE BESYLATE 5 MG/1
10 TABLET ORAL DAILY
Qty: 30 TABLET | Refills: 0 | Status: SHIPPED | OUTPATIENT
Start: 2019-01-22 | End: 2019-01-24 | Stop reason: SDUPTHER

## 2019-01-22 ASSESSMENT — ENCOUNTER SYMPTOMS
COUGH: 1
DIARRHEA: 1
BLOOD IN STOOL: 0
CONSTIPATION: 0
SHORTNESS OF BREATH: 0

## 2019-01-23 ENCOUNTER — TELEPHONE (OUTPATIENT)
Dept: FAMILY MEDICINE CLINIC | Age: 59
End: 2019-01-23

## 2019-01-24 DIAGNOSIS — I10 ESSENTIAL HYPERTENSION: ICD-10-CM

## 2019-01-24 RX ORDER — AMLODIPINE BESYLATE 10 MG/1
10 TABLET ORAL DAILY
Qty: 90 TABLET | Refills: 3 | Status: SHIPPED | OUTPATIENT
Start: 2019-01-24 | End: 2019-07-16 | Stop reason: SDUPTHER

## 2019-01-24 RX ORDER — QUINAPRIL 40 MG/1
40 TABLET ORAL DAILY
Qty: 90 TABLET | Refills: 3 | Status: SHIPPED | OUTPATIENT
Start: 2019-01-24 | End: 2020-01-15 | Stop reason: SDUPTHER

## 2019-03-08 ENCOUNTER — OFFICE VISIT (OUTPATIENT)
Dept: FAMILY MEDICINE CLINIC | Age: 59
End: 2019-03-08
Payer: MEDICARE

## 2019-03-08 VITALS
DIASTOLIC BLOOD PRESSURE: 88 MMHG | HEIGHT: 67 IN | SYSTOLIC BLOOD PRESSURE: 138 MMHG | TEMPERATURE: 98.2 F | HEART RATE: 73 BPM | RESPIRATION RATE: 16 BRPM | BODY MASS INDEX: 32.68 KG/M2 | WEIGHT: 208.2 LBS | OXYGEN SATURATION: 97 %

## 2019-03-08 DIAGNOSIS — Z86.69 HISTORY OF GUILLAIN-BARRE SYNDROME: ICD-10-CM

## 2019-03-08 DIAGNOSIS — J10.1 INFLUENZA A: ICD-10-CM

## 2019-03-08 DIAGNOSIS — R68.89 FLU-LIKE SYMPTOMS: Primary | ICD-10-CM

## 2019-03-08 DIAGNOSIS — R06.02 SOB (SHORTNESS OF BREATH): ICD-10-CM

## 2019-03-08 DIAGNOSIS — R05.9 COUGH: ICD-10-CM

## 2019-03-08 LAB
INFLUENZA VIRUS A RNA: POSITIVE
INFLUENZA VIRUS B RNA: NEGATIVE

## 2019-03-08 PROCEDURE — 94640 AIRWAY INHALATION TREATMENT: CPT | Performed by: FAMILY MEDICINE

## 2019-03-08 PROCEDURE — G8417 CALC BMI ABV UP PARAM F/U: HCPCS | Performed by: FAMILY MEDICINE

## 2019-03-08 PROCEDURE — 99213 OFFICE O/P EST LOW 20 MIN: CPT | Performed by: FAMILY MEDICINE

## 2019-03-08 PROCEDURE — G8483 FLU IMM NO ADMIN DOC REA: HCPCS | Performed by: FAMILY MEDICINE

## 2019-03-08 PROCEDURE — G8599 NO ASA/ANTIPLAT THER USE RNG: HCPCS | Performed by: FAMILY MEDICINE

## 2019-03-08 PROCEDURE — G8427 DOCREV CUR MEDS BY ELIG CLIN: HCPCS | Performed by: FAMILY MEDICINE

## 2019-03-08 PROCEDURE — 1036F TOBACCO NON-USER: CPT | Performed by: FAMILY MEDICINE

## 2019-03-08 PROCEDURE — 87502 INFLUENZA DNA AMP PROBE: CPT | Performed by: FAMILY MEDICINE

## 2019-03-08 PROCEDURE — 3017F COLORECTAL CA SCREEN DOC REV: CPT | Performed by: FAMILY MEDICINE

## 2019-03-08 RX ORDER — BENZONATATE 200 MG/1
200 CAPSULE ORAL 3 TIMES DAILY PRN
Qty: 20 CAPSULE | Refills: 0 | Status: SHIPPED | OUTPATIENT
Start: 2019-03-08 | End: 2019-04-16

## 2019-03-08 RX ORDER — AZITHROMYCIN 500 MG/1
500 TABLET, FILM COATED ORAL DAILY
Qty: 1 PACKET | Refills: 0 | Status: SHIPPED | OUTPATIENT
Start: 2019-03-08 | End: 2019-03-11

## 2019-03-08 RX ORDER — ALBUTEROL SULFATE 2.5 MG/3ML
2.5 SOLUTION RESPIRATORY (INHALATION) ONCE
Status: COMPLETED | OUTPATIENT
Start: 2019-03-08 | End: 2019-03-08

## 2019-03-08 RX ADMIN — ALBUTEROL SULFATE 2.5 MG: 2.5 SOLUTION RESPIRATORY (INHALATION) at 12:19

## 2019-03-08 ASSESSMENT — PATIENT HEALTH QUESTIONNAIRE - PHQ9
2. FEELING DOWN, DEPRESSED OR HOPELESS: 0
SUM OF ALL RESPONSES TO PHQ QUESTIONS 1-9: 0
1. LITTLE INTEREST OR PLEASURE IN DOING THINGS: 0
SUM OF ALL RESPONSES TO PHQ9 QUESTIONS 1 & 2: 0
SUM OF ALL RESPONSES TO PHQ QUESTIONS 1-9: 0

## 2019-04-16 ENCOUNTER — OFFICE VISIT (OUTPATIENT)
Dept: FAMILY MEDICINE CLINIC | Age: 59
End: 2019-04-16
Payer: MEDICARE

## 2019-04-16 VITALS
SYSTOLIC BLOOD PRESSURE: 110 MMHG | HEIGHT: 67 IN | WEIGHT: 208.6 LBS | BODY MASS INDEX: 32.74 KG/M2 | DIASTOLIC BLOOD PRESSURE: 70 MMHG | HEART RATE: 66 BPM

## 2019-04-16 DIAGNOSIS — E66.9 OBESITY (BMI 30-39.9): ICD-10-CM

## 2019-04-16 DIAGNOSIS — I69.351 HEMIPARESIS AFFECTING RIGHT SIDE AS LATE EFFECT OF CEREBROVASCULAR ACCIDENT (CVA) (HCC): ICD-10-CM

## 2019-04-16 DIAGNOSIS — E11.9 TYPE 2 DIABETES MELLITUS WITHOUT COMPLICATION, WITHOUT LONG-TERM CURRENT USE OF INSULIN (HCC): Primary | ICD-10-CM

## 2019-04-16 DIAGNOSIS — I25.10 CORONARY ARTERY DISEASE INVOLVING NATIVE CORONARY ARTERY OF NATIVE HEART, ANGINA PRESENCE UNSPECIFIED: ICD-10-CM

## 2019-04-16 DIAGNOSIS — I10 ESSENTIAL HYPERTENSION: ICD-10-CM

## 2019-04-16 DIAGNOSIS — K21.9 GASTROESOPHAGEAL REFLUX DISEASE, ESOPHAGITIS PRESENCE NOT SPECIFIED: ICD-10-CM

## 2019-04-16 LAB
A/G RATIO: 1.7 (ref 1.1–2.2)
ALBUMIN SERPL-MCNC: 4.5 G/DL (ref 3.4–5)
ALP BLD-CCNC: 60 U/L (ref 40–129)
ALT SERPL-CCNC: 19 U/L (ref 10–40)
ANION GAP SERPL CALCULATED.3IONS-SCNC: 16 MMOL/L (ref 3–16)
AST SERPL-CCNC: 17 U/L (ref 15–37)
BILIRUB SERPL-MCNC: 0.3 MG/DL (ref 0–1)
BUN BLDV-MCNC: 16 MG/DL (ref 7–20)
CALCIUM SERPL-MCNC: 9.1 MG/DL (ref 8.3–10.6)
CHLORIDE BLD-SCNC: 106 MMOL/L (ref 99–110)
CHOLESTEROL, TOTAL: 177 MG/DL (ref 0–199)
CO2: 23 MMOL/L (ref 21–32)
CREAT SERPL-MCNC: 1.1 MG/DL (ref 0.9–1.3)
GFR AFRICAN AMERICAN: >60
GFR NON-AFRICAN AMERICAN: >60
GLOBULIN: 2.7 G/DL
GLUCOSE BLD-MCNC: 133 MG/DL (ref 70–99)
HBA1C MFR BLD: 7.1 %
HDLC SERPL-MCNC: 36 MG/DL (ref 40–60)
LDL CHOLESTEROL CALCULATED: 114 MG/DL
POTASSIUM SERPL-SCNC: 4.9 MMOL/L (ref 3.5–5.1)
SODIUM BLD-SCNC: 145 MMOL/L (ref 136–145)
TOTAL PROTEIN: 7.2 G/DL (ref 6.4–8.2)
TRIGL SERPL-MCNC: 135 MG/DL (ref 0–150)
VLDLC SERPL CALC-MCNC: 27 MG/DL

## 2019-04-16 PROCEDURE — 3017F COLORECTAL CA SCREEN DOC REV: CPT | Performed by: FAMILY MEDICINE

## 2019-04-16 PROCEDURE — G8417 CALC BMI ABV UP PARAM F/U: HCPCS | Performed by: FAMILY MEDICINE

## 2019-04-16 PROCEDURE — 2022F DILAT RTA XM EVC RTNOPTHY: CPT | Performed by: FAMILY MEDICINE

## 2019-04-16 PROCEDURE — 83036 HEMOGLOBIN GLYCOSYLATED A1C: CPT | Performed by: FAMILY MEDICINE

## 2019-04-16 PROCEDURE — 99214 OFFICE O/P EST MOD 30 MIN: CPT | Performed by: FAMILY MEDICINE

## 2019-04-16 PROCEDURE — 1036F TOBACCO NON-USER: CPT | Performed by: FAMILY MEDICINE

## 2019-04-16 PROCEDURE — 3045F PR MOST RECENT HEMOGLOBIN A1C LEVEL 7.0-9.0%: CPT | Performed by: FAMILY MEDICINE

## 2019-04-16 PROCEDURE — G8599 NO ASA/ANTIPLAT THER USE RNG: HCPCS | Performed by: FAMILY MEDICINE

## 2019-04-16 PROCEDURE — 36415 COLL VENOUS BLD VENIPUNCTURE: CPT | Performed by: FAMILY MEDICINE

## 2019-04-16 PROCEDURE — G8427 DOCREV CUR MEDS BY ELIG CLIN: HCPCS | Performed by: FAMILY MEDICINE

## 2019-04-16 RX ORDER — PANTOPRAZOLE SODIUM 40 MG/1
40 TABLET, DELAYED RELEASE ORAL DAILY
Qty: 90 TABLET | Refills: 3 | Status: SHIPPED | OUTPATIENT
Start: 2019-04-16 | End: 2020-02-28

## 2019-04-16 ASSESSMENT — ENCOUNTER SYMPTOMS
SHORTNESS OF BREATH: 0
CHEST TIGHTNESS: 0

## 2019-04-16 NOTE — PROGRESS NOTES
 Cerebrovascular accident (CVA) due to thrombosis of left middle cerebral artery (HCC)    Gait disturbance    Essential hypertension    Impaired glucose tolerance    Hemiparesis affecting right side as late effect of cerebrovascular accident (CVA) (HCC)    Shortness of breath    Gastroesophageal reflux disease    [facility]    Hypertensive emergency    Unstable angina ([facility]    NSTEMI (non-ST elevated myocardial infarction) ([facility]    Hypertensive urgency    Pneumonia due to organism    Frequent PVCs    Ischemic cardiomyopathy    History of Guillain-[place] syndrome       Past Medical History:   Diagnosis Date    B12 deficiency     CAD (coronary artery disease)     GERD (gastroesophageal reflux disease)     Guillain Barré syndrome ([facility]     H/O echocardiogram 7/30/13, 6/16/10    7/13-EF 50-55% WNL. 6/10-EF53%, mod LVH, MARCI, RVE, trace PI, mild MR/TR, 6/09    Heart murmur     History of myocardial infarction 2003    History of PTCA 6/03;2/03 6/03-PTCA w/stents x2, 2/03-PTCA w/stent x3    History of PTCA 03/15/2018    LAD EF25%    Hx of cardiovascular stress test 04/25/2018    EF 28% Nuclear scintigraphy demonstartes inferior wall infarct.  HX OTHER MEDICAL 03/14/2018    lifevest  d/c 7/2/2018    Hyperlipidemia     Hypertension     Impaired glucose tolerance 6/3/2016    MUGA 07/09/2018    EF 47%    NSTEMI (non-ST elevated myocardial infarction) ([facility] 03/15/2018    Patient in clinical research study 05/2018    3 year study dalcitripib vs placebo [phone]    Pneumonia due to organism     Renal cyst 5/23/2014    S/P cardiac catheterization 7/12/13 7/13-EF=55%. PATENT LAD AND RCA STENTS, OM2 50% stenosis, & Normal wall motion.  Stroke [facility]rebrum[facility])     May 2016       Past Surgical History:   Procedure Laterality Date    CARDIAC CATHETERIZATION  7/13 7/13-EF=55%.  PATENT LAD AND RCA STENTS, OM2 50% stenosis, & Normal wall 60 tablet 3     No current facility-administered medications on file prior to visit. Objective:     Physical Exam   Constitutional: He is oriented to person, place, and time. He appears well-developed and well-nourished. No distress. Obese   HENT:   Head: Normocephalic. Neck: No tracheal deviation present. No thyromegaly present. Cardiovascular: Normal rate, regular rhythm, S1 normal, S2 normal and intact distal pulses. Exam reveals no gallop. No murmur heard. Pulses:       Dorsalis pedis pulses are 2+ on the right side, and 2+ on the left side. Posterior tibial pulses are 2+ on the right side, and 2+ on the left side. Carotids without bruits   Pulmonary/Chest: Effort normal and breath sounds normal. No respiratory distress. He has no wheezes. He has no rales. Abdominal: Soft. Normal aorta. He exhibits no distension and no mass. There is no tenderness. Musculoskeletal: He exhibits no edema. Right foot: There is normal range of motion and no deformity. Left foot: There is normal range of motion and no deformity. Feet:    Feet without lesions     Feet:   Right Foot:   Protective Sensation: 5 sites tested. 5 sites sensed. Skin Integrity: Positive for callus. Negative for ulcer, blister, skin breakdown, erythema, warmth or dry skin. Left Foot:   Protective Sensation: 5 sites tested. 5 sites sensed. Skin Integrity: Positive for callus. Negative for ulcer, blister, skin breakdown, erythema, warmth or dry skin. Neurological: He is alert and oriented to person, place, and time. Skin: Skin is warm, dry and intact. Psychiatric: He has a normal mood and affect. His behavior is normal.   Nursing note and vitals reviewed. Vitals:    04/16/19 1025   BP: 110/70   Site: Left Upper Arm   Position: Sitting   Cuff Size: Medium Adult   Pulse: 66   Weight: 208 lb 9.6 oz (94.6 kg)   Height: 5' 7\" (1.702 m)     Body mass index is 32.67 kg/m².      Wt Readings

## 2019-05-09 ENCOUNTER — TELEPHONE (OUTPATIENT)
Dept: FAMILY MEDICINE CLINIC | Age: 59
End: 2019-05-09

## 2019-05-09 DIAGNOSIS — I25.2 HISTORY OF MYOCARDIAL INFARCTION: ICD-10-CM

## 2019-05-09 DIAGNOSIS — I25.10 CORONARY ARTERY DISEASE INVOLVING NATIVE CORONARY ARTERY OF NATIVE HEART, ANGINA PRESENCE UNSPECIFIED: ICD-10-CM

## 2019-05-09 RX ORDER — ATORVASTATIN CALCIUM 40 MG/1
40 TABLET, FILM COATED ORAL DAILY
Qty: 90 TABLET | Refills: 0 | Status: SHIPPED | OUTPATIENT
Start: 2019-05-09 | End: 2019-07-16 | Stop reason: SDUPTHER

## 2019-05-09 NOTE — TELEPHONE ENCOUNTER
Let him know that I got a notification from the pharmacy. It looks like he should be on his Lipitor. We somehow missed that. I have sent the Lipitor to his mail away pharmacy. This will help to reduce chance of heart attack or stroke.

## 2019-07-16 ENCOUNTER — OFFICE VISIT (OUTPATIENT)
Dept: CARDIOLOGY CLINIC | Age: 59
End: 2019-07-16
Payer: MEDICARE

## 2019-07-16 VITALS
WEIGHT: 209.6 LBS | BODY MASS INDEX: 32.9 KG/M2 | SYSTOLIC BLOOD PRESSURE: 176 MMHG | HEART RATE: 64 BPM | DIASTOLIC BLOOD PRESSURE: 110 MMHG | HEIGHT: 67 IN

## 2019-07-16 DIAGNOSIS — I25.2 HISTORY OF MYOCARDIAL INFARCTION: ICD-10-CM

## 2019-07-16 DIAGNOSIS — I25.10 CORONARY ARTERY DISEASE INVOLVING NATIVE CORONARY ARTERY OF NATIVE HEART, ANGINA PRESENCE UNSPECIFIED: ICD-10-CM

## 2019-07-16 PROCEDURE — G8417 CALC BMI ABV UP PARAM F/U: HCPCS | Performed by: INTERNAL MEDICINE

## 2019-07-16 PROCEDURE — G8599 NO ASA/ANTIPLAT THER USE RNG: HCPCS | Performed by: INTERNAL MEDICINE

## 2019-07-16 PROCEDURE — 99214 OFFICE O/P EST MOD 30 MIN: CPT | Performed by: INTERNAL MEDICINE

## 2019-07-16 PROCEDURE — G8428 CUR MEDS NOT DOCUMENT: HCPCS | Performed by: INTERNAL MEDICINE

## 2019-07-16 PROCEDURE — 1036F TOBACCO NON-USER: CPT | Performed by: INTERNAL MEDICINE

## 2019-07-16 PROCEDURE — 3017F COLORECTAL CA SCREEN DOC REV: CPT | Performed by: INTERNAL MEDICINE

## 2019-07-16 RX ORDER — AMLODIPINE BESYLATE 10 MG/1
10 TABLET ORAL DAILY
Qty: 90 TABLET | Refills: 3 | Status: SHIPPED | OUTPATIENT
Start: 2019-07-16 | End: 2020-01-15 | Stop reason: SDUPTHER

## 2019-07-16 RX ORDER — FUROSEMIDE 40 MG/1
40 TABLET ORAL DAILY
Qty: 60 TABLET | Refills: 3 | Status: SHIPPED | OUTPATIENT
Start: 2019-07-16 | End: 2020-01-15 | Stop reason: SDUPTHER

## 2019-07-16 RX ORDER — ATORVASTATIN CALCIUM 40 MG/1
40 TABLET, FILM COATED ORAL DAILY
Qty: 90 TABLET | Refills: 0 | Status: SHIPPED | OUTPATIENT
Start: 2019-07-16 | End: 2020-01-15 | Stop reason: SDUPTHER

## 2019-07-16 NOTE — PROGRESS NOTES
or gallop. Extremities - No cyanosis, clubbing, or significant edema. Pulmonary - No respiratory distress. No wheezes or rales. Abdomen - No masses, tenderness, or organomegaly. Musculoskeletal - No significant edema. No joint deformities. No muscle wasting. Neurologic - Cranial nerves II through XII are grossly intact. There were no gross focal neurologic abnormalities. Lab Review   Lab Results   Component Value Date    CKTOTAL 113 05/07/2016    TROPONINT <0.010 04/09/2018     BNP:  No results found for: BNP  PT/INR:    Lab Results   Component Value Date    INR 1.05 03/14/2018     Lab Results   Component Value Date    LABA1C 7.1 04/16/2019    LABA1C 7.2 01/22/2019     Lab Results   Component Value Date    WBC 7.9 04/09/2018    HCT 44.2 04/09/2018    MCV 95.1 04/09/2018     04/09/2018     Lab Results   Component Value Date    CHOL 177 04/16/2019    TRIG 135 04/16/2019    HDL 36 (L) 04/16/2019    LDLCALC 114 (H) 04/16/2019    LDLDIRECT 114 (H) 03/15/2018     Lab Results   Component Value Date    ALT 19 04/16/2019    AST 17 04/16/2019     BMP:    Lab Results   Component Value Date     04/16/2019    K 4.9 04/16/2019    K 3.8 03/16/2018     04/16/2019    CO2 23 04/16/2019    BUN 16 04/16/2019    CREATININE 1.1 04/16/2019     CMP:   Lab Results   Component Value Date     04/16/2019    K 4.9 04/16/2019    K 3.8 03/16/2018     04/16/2019    CO2 23 04/16/2019    BUN 16 04/16/2019    PROT 7.2 04/16/2019     TSH:    Lab Results   Component Value Date    TSHHS 0.723 05/07/2016         Impression:    1. Coronary artery disease involving native coronary artery of native heart, angina presence unspecified    2.  History of myocardial infarction       Patient Active Problem List   Diagnosis Code    CAD (coronary artery disease) I25.10    History of PTCA Z98.61    History of acute inferior wall myocardial infarction I25.2    B12 deficiency E53.8    Fatty liver K76.0    Colon,

## 2019-07-17 ENCOUNTER — TELEPHONE (OUTPATIENT)
Dept: CARDIOLOGY CLINIC | Age: 59
End: 2019-07-17

## 2019-07-17 NOTE — TELEPHONE ENCOUNTER
Late entry   Patient returned IP for Francisco Pena Kit# [id number] on 07/16/19. Educated on the importance of returning the IP when he comes in for his research visits. Patient verbalized understanding.

## 2019-08-12 ENCOUNTER — OFFICE VISIT (OUTPATIENT)
Dept: FAMILY MEDICINE CLINIC | Age: 59
End: 2019-08-12
Payer: MEDICARE

## 2019-08-12 VITALS
HEIGHT: 67 IN | SYSTOLIC BLOOD PRESSURE: 170 MMHG | BODY MASS INDEX: 32.24 KG/M2 | TEMPERATURE: 98.1 F | WEIGHT: 205.4 LBS | HEART RATE: 80 BPM | DIASTOLIC BLOOD PRESSURE: 100 MMHG

## 2019-08-12 DIAGNOSIS — R59.9 SWELLING OF LYMPH NODE: ICD-10-CM

## 2019-08-12 DIAGNOSIS — I10 ESSENTIAL HYPERTENSION: Primary | ICD-10-CM

## 2019-08-12 PROCEDURE — G8427 DOCREV CUR MEDS BY ELIG CLIN: HCPCS | Performed by: FAMILY MEDICINE

## 2019-08-12 PROCEDURE — G8417 CALC BMI ABV UP PARAM F/U: HCPCS | Performed by: FAMILY MEDICINE

## 2019-08-12 PROCEDURE — 1036F TOBACCO NON-USER: CPT | Performed by: FAMILY MEDICINE

## 2019-08-12 PROCEDURE — G8599 NO ASA/ANTIPLAT THER USE RNG: HCPCS | Performed by: FAMILY MEDICINE

## 2019-08-12 PROCEDURE — 3017F COLORECTAL CA SCREEN DOC REV: CPT | Performed by: FAMILY MEDICINE

## 2019-08-12 PROCEDURE — 99213 OFFICE O/P EST LOW 20 MIN: CPT | Performed by: FAMILY MEDICINE

## 2019-08-12 RX ORDER — DOXYCYCLINE HYCLATE 100 MG
100 TABLET ORAL 2 TIMES DAILY
Qty: 14 TABLET | Refills: 0 | Status: SHIPPED | OUTPATIENT
Start: 2019-08-12 | End: 2019-08-19

## 2019-08-12 NOTE — PROGRESS NOTES
Patient ID: Isaura Parikh 1960    Chief Complaint   Patient presents with    Adenopathy     left side of neck    Insect Bite     top of head         HPI     Swollen neck: Ongoing for 3 days. Is getting larger and larger. Is not sure where it is from. Has scratch on top of his head. Nothing is making it better or worse. Review of Systems   Constitutional: Negative for chills, diaphoresis and fever. Patient Active Problem List   Diagnosis    CAD (coronary artery disease)    History of PTCA    History of acute inferior wall myocardial infarction    B12 deficiency    Fatty liver    Colon, diverticulosis    Renal cyst    Adrenal adenoma    Hyperlipidemia LDL goal <70    Obesity (BMI 30-39. 9)    Cerebrovascular accident (CVA) due to thrombosis of left middle cerebral artery (HCC)    Gait disturbance    Essential hypertension    Impaired glucose tolerance    Hemiparesis affecting right side as late effect of [facility]vascular [facility]) ([facility]    Shortness of breath    Gastroesophageal reflux disease    Acute ST elevation myocardial infarction ([facility]I) ([facility] [address]    Hypertensive emergency    Unstable angina (HCC)    NSTEMI (non-ST elevated myocardial infarction) ([facility] [facility]    Hypertensive urgency    Pneumonia due to organism    Frequent PVCs    Ischemic cardiomyopathy    History of Guillain-[place] syndrome       Past Medical History:   Diagnosis Date    B12 deficiency     CAD (coronary artery disease)     GERD (gastroesophageal reflux disease)     Guillain Barré syndrome ([facility]     H/O echocardiogram 7/30/13, 6/16/10    7/13-EF 50-55% WNL.  6/10-EF53%, mod LVH, MARCI, RVE, trace PI, mild MR/TR, 6/09    Heart murmur     History of myocardial infarction 2003    History of PTCA 6/03;2/03 6/03-PTCA w/stents x2, 2/03-PTCA w/stent x3    History of PTCA 03/15/2018    LAD EF25%    Hx of cardiovascular stress test 04/25/2018    EF 28% Nuclear scintigraphy demonstartes inferior wall metFORMIN (GLUCOPHAGE) 500 MG tablet Take 1 tablet by mouth 2 times daily (with meals) 180 tablet 3    Blood Glucose Monitoring Suppl (BLOOD GLUCOSE MONITOR SYSTEM) w/Device KIT Use twice per day 1 kit 0    Alcohol Swabs (ALCOHOL PADS) 70 % PADS 1 Units by Does not apply route 2 times daily 180 each 3    aspirin 81 MG chewable tablet Take 1 tablet by mouth daily 30 tablet 3    Blood Pressure KIT Use prn 1 kit 0    furosemide (LASIX) 40 MG tablet Take 1 tablet by mouth daily (Patient not taking: Reported on 8/12/2019) 60 tablet 3    blood glucose monitor strips 1 strip by Other route 2 times daily for 180 doses 180 strip 3    Lancets MISC 1 each by Does not apply route 2 times daily for 180 doses 180 each 3    nitroGLYCERIN (NITROSTAT) 0.4 MG SL tablet up to max of 3 total doses. If no relief after 1 dose, call 911. (Patient not taking: Reported on 8/12/2019) 25 tablet 3     No current facility-administered medications on file prior to visit. Objective:           Physical Exam   Constitutional: He appears well-developed and well-nourished. No distress. HENT:   Head: Normocephalic and atraumatic. Right Ear: Hearing, tympanic membrane and external ear normal.   Left Ear: Hearing, tympanic membrane and external ear normal.   Nose: No mucosal edema, rhinorrhea or sinus tenderness. Right sinus exhibits no maxillary sinus tenderness and no frontal sinus tenderness. Left sinus exhibits no maxillary sinus tenderness and no frontal sinus tenderness. Mouth/Throat: Oropharynx is clear and moist and mucous membranes are normal. No oropharyngeal exudate, posterior oropharyngeal edema, posterior oropharyngeal erythema or tonsillar abscesses. Neck: No tracheal deviation present. No thyromegaly present. Cardiovascular: Normal rate, regular rhythm, S1 normal, S2 normal and normal heart sounds. Exam reveals no gallop. No murmur heard.   Pulmonary/Chest: Effort normal and breath sounds normal. No

## 2019-08-14 ENCOUNTER — OFFICE VISIT (OUTPATIENT)
Dept: FAMILY MEDICINE CLINIC | Age: 59
End: 2019-08-14
Payer: MEDICARE

## 2019-08-14 VITALS
HEART RATE: 69 BPM | DIASTOLIC BLOOD PRESSURE: 90 MMHG | SYSTOLIC BLOOD PRESSURE: 140 MMHG | WEIGHT: 206 LBS | BODY MASS INDEX: 32.33 KG/M2 | HEIGHT: 67 IN | TEMPERATURE: 98 F

## 2019-08-14 DIAGNOSIS — R59.1 LYMPHADENOPATHY OF HEAD AND NECK: Primary | ICD-10-CM

## 2019-08-14 PROCEDURE — 1036F TOBACCO NON-USER: CPT | Performed by: FAMILY MEDICINE

## 2019-08-14 PROCEDURE — G8428 CUR MEDS NOT DOCUMENT: HCPCS | Performed by: FAMILY MEDICINE

## 2019-08-14 PROCEDURE — 99213 OFFICE O/P EST LOW 20 MIN: CPT | Performed by: FAMILY MEDICINE

## 2019-08-14 PROCEDURE — 3017F COLORECTAL CA SCREEN DOC REV: CPT | Performed by: FAMILY MEDICINE

## 2019-08-14 PROCEDURE — G8599 NO ASA/ANTIPLAT THER USE RNG: HCPCS | Performed by: FAMILY MEDICINE

## 2019-08-14 PROCEDURE — G8417 CALC BMI ABV UP PARAM F/U: HCPCS | Performed by: FAMILY MEDICINE

## 2019-08-15 NOTE — PROGRESS NOTES
Patient ID: Elsy Patel 1960    Chief Complaint   Patient presents with   Eliu Galeazzi     still there and now has one on right side    Hypertension         HPI swollen lymph node: Not any better since getting antibiotics. Has noticed the development of another lump behind his right ear. Denies any fevers chills or sweats. Feels fine. Review of Systems   Constitutional: Negative for activity change. Musculoskeletal: Positive for gait problem. Patient Active Problem List   Diagnosis    CAD (coronary artery disease)    History of PTCA    History of acute inferior wall myocardial infarction    B12 deficiency    Fatty liver    Colon, diverticulosis    Renal cyst    Adrenal adenoma    Hyperlipidemia LDL goal <70    Obesity (BMI 30-39. 9)    Cerebrovascular accident (CVA) due to thrombosis of left middle cerebral artery (HCC)    Gait disturbance    Essential hypertension    Impaired glucose tolerance    Hemiparesis affecting right side as late effect of cerebrovascular accident (CVA) (HCC)    Shortness of breath    Gastroesophageal reflux disease    [facility]    Hypertensive emergency    Unstable angina (HCC)    NSTEMI (non-ST elevated myocardial infarction) ([facility]    Hypertensive urgency    Pneumonia due to organism    Frequent PVCs    Ischemic cardiomyopathy    History of Guillain-[place] syndrome       Past Medical History:   Diagnosis Date    B12 deficiency     CAD (coronary artery disease)     GERD (gastroesophageal reflux disease)     Guillain Barré syndrome ([facility]     H/O echocardiogram 7/30/13, 6/16/10    7/13-EF 50-55% WNL.  6/10-EF53%, mod LVH, MARCI, RVE, trace PI, mild MR/TR, 6/09    Heart murmur     History of myocardial infarction 2003    History of PTCA 6/03;2/03 6/03-PTCA w/stents x2, 2/03-PTCA w/stent x3    History of PTCA 03/15/2018    LAD EF25%    Hx of cardiovascular stress test 04/25/2018    EF 28%

## 2019-08-19 ENCOUNTER — TELEPHONE (OUTPATIENT)
Dept: FAMILY MEDICINE CLINIC | Age: 59
End: 2019-08-19

## 2019-08-19 DIAGNOSIS — R59.1 LYMPHADENOPATHY OF HEAD AND NECK: Primary | ICD-10-CM

## 2019-08-19 NOTE — TELEPHONE ENCOUNTER
[facility] called [phone] the tech would like the CT w/o contrast to be changed to Ct with contrast and please fax to [phone]. Patient appointment is tomorrow.

## 2019-08-20 ENCOUNTER — HOSPITAL ENCOUNTER (OUTPATIENT)
Dept: CT IMAGING | Age: 59
Discharge: HOME | End: 2019-08-20
Payer: MEDICARE

## 2019-08-20 ENCOUNTER — HOSPITAL ENCOUNTER (OUTPATIENT)
Age: 59
Discharge: HOME | End: 2019-08-20
Payer: MEDICARE

## 2019-08-20 DIAGNOSIS — R59.1 LYMPHADENOPATHY OF HEAD AND NECK: ICD-10-CM

## 2019-08-20 LAB
ALBUMIN SERPL-MCNC: 4.4 GM/DL (ref 3.4–5)
ALP BLD-CCNC: 61 IU/L (ref 40–128)
ALT SERPL-CCNC: 16 U/L (ref 10–40)
ANION GAP SERPL CALCULATED.3IONS-SCNC: 12 MMOL/L (ref 4–16)
AST SERPL-CCNC: 18 IU/L (ref 15–37)
BASOPHILS ABSOLUTE: 0.1 K/CU MM
BASOPHILS RELATIVE PERCENT: 0.6 % (ref 0–1)
BILIRUB SERPL-MCNC: 0.5 MG/DL (ref 0–1)
BUN BLDV-MCNC: 13 MG/DL (ref 6–23)
CALCIUM SERPL-MCNC: 9.4 MG/DL (ref 8.3–10.6)
CHLORIDE BLD-SCNC: 101 MMOL/L (ref 99–110)
CO2: 26 MMOL/L (ref 21–32)
CREAT SERPL-MCNC: 1.2 MG/DL (ref 0.9–1.3)
DIFFERENTIAL TYPE: ABNORMAL
EOSINOPHILS ABSOLUTE: 0.2 K/CU MM
EOSINOPHILS RELATIVE PERCENT: 2.5 % (ref 0–3)
ERYTHROCYTE SEDIMENTATION RATE: 10 MM/HR (ref 0–20)
GFR AFRICAN AMERICAN: >60 ML/MIN/1.73M2
GFR AFRICAN AMERICAN: >60 ML/MIN/1.73M2
GFR NON-AFRICAN AMERICAN: >60 ML/MIN/1.73M2
GFR NON-AFRICAN AMERICAN: >60 ML/MIN/1.73M2
GLUCOSE BLD-MCNC: 148 MG/DL (ref 70–99)
HCT VFR BLD CALC: 50.8 % (ref 42–52)
HEMOGLOBIN: 16.5 GM/DL (ref 13.5–18)
IMMATURE NEUTROPHIL %: 0.6 % (ref 0–0.43)
LYMPHOCYTES ABSOLUTE: 2.4 K/CU MM
LYMPHOCYTES RELATIVE PERCENT: 30.1 % (ref 24–44)
MCH RBC QN AUTO: 32.5 PG (ref 27–31)
MCHC RBC AUTO-ENTMCNC: 32.5 % (ref 32–36)
MCV RBC AUTO: 100 FL (ref 78–100)
MONOCYTES ABSOLUTE: 0.5 K/CU MM
MONOCYTES RELATIVE PERCENT: 6.3 % (ref 0–4)
NUCLEATED RBC %: 0 %
PDW BLD-RTO: 13.1 % (ref 11.7–14.9)
PLATELET # BLD: 169 K/CU MM (ref 140–440)
PMV BLD AUTO: 12.9 FL (ref 7.5–11.1)
POC CREATININE: 1.1 MG/DL (ref 0.9–1.3)
POTASSIUM SERPL-SCNC: 4.6 MMOL/L (ref 3.5–5.1)
RBC # BLD: 5.08 M/CU MM (ref 4.6–6.2)
SEGMENTED NEUTROPHILS ABSOLUTE COUNT: 4.8 K/CU MM
SEGMENTED NEUTROPHILS RELATIVE PERCENT: 59.9 % (ref 36–66)
SODIUM BLD-SCNC: 139 MMOL/L (ref 135–145)
TOTAL IMMATURE NEUTOROPHIL: 0.05 K/CU MM
TOTAL NUCLEATED RBC: 0 K/CU MM
TOTAL PROTEIN: 6.9 GM/DL (ref 6.4–8.2)
WBC # BLD: 8 K/CU MM (ref 4–10.5)

## 2019-08-20 PROCEDURE — 80053 COMPREHEN METABOLIC PANEL: CPT

## 2019-08-20 PROCEDURE — 36415 COLL VENOUS BLD VENIPUNCTURE: CPT

## 2019-08-20 PROCEDURE — 70491 CT SOFT TISSUE NECK W/DYE: CPT

## 2019-08-20 PROCEDURE — 6360000004 HC RX CONTRAST MEDICATION: Performed by: FAMILY MEDICINE

## 2019-08-20 PROCEDURE — 85025 COMPLETE CBC W/AUTO DIFF WBC: CPT

## 2019-08-20 PROCEDURE — 2580000003 HC RX 258: Performed by: FAMILY MEDICINE

## 2019-08-20 PROCEDURE — 70460 CT HEAD/BRAIN W/DYE: CPT

## 2019-08-20 PROCEDURE — 71260 CT THORAX DX C+: CPT

## 2019-08-20 PROCEDURE — 85652 RBC SED RATE AUTOMATED: CPT

## 2019-08-20 RX ORDER — SODIUM CHLORIDE 0.9 % (FLUSH) 0.9 %
10 SYRINGE (ML) INJECTION ONCE
Status: COMPLETED | OUTPATIENT
Start: 2019-08-20 | End: 2019-08-20

## 2019-08-20 RX ORDER — SODIUM CHLORIDE 0.9 % (FLUSH) 0.9 %
80 SYRINGE (ML) INJECTION PRN
Status: DISCONTINUED | OUTPATIENT
Start: 2019-08-20 | End: 2019-08-21 | Stop reason: HOSPADM

## 2019-08-20 RX ADMIN — IOPAMIDOL 75 ML: 755 INJECTION, SOLUTION INTRAVENOUS at 13:43

## 2019-08-20 RX ADMIN — Medication 80 ML: at 13:44

## 2019-08-20 RX ADMIN — Medication 10 ML: at 13:43

## 2019-08-23 ENCOUNTER — OFFICE VISIT (OUTPATIENT)
Dept: FAMILY MEDICINE CLINIC | Age: 59
End: 2019-08-23
Payer: MEDICARE

## 2019-08-23 VITALS
SYSTOLIC BLOOD PRESSURE: 122 MMHG | WEIGHT: 204.6 LBS | HEART RATE: 62 BPM | DIASTOLIC BLOOD PRESSURE: 80 MMHG | BODY MASS INDEX: 32.11 KG/M2 | HEIGHT: 67 IN

## 2019-08-23 DIAGNOSIS — Z87.898 HISTORY OF LYMPHADENOPATHY: Primary | ICD-10-CM

## 2019-08-23 PROCEDURE — G8417 CALC BMI ABV UP PARAM F/U: HCPCS | Performed by: FAMILY MEDICINE

## 2019-08-23 PROCEDURE — 99213 OFFICE O/P EST LOW 20 MIN: CPT | Performed by: FAMILY MEDICINE

## 2019-08-23 PROCEDURE — G8427 DOCREV CUR MEDS BY ELIG CLIN: HCPCS | Performed by: FAMILY MEDICINE

## 2019-08-23 PROCEDURE — 3017F COLORECTAL CA SCREEN DOC REV: CPT | Performed by: FAMILY MEDICINE

## 2019-08-23 PROCEDURE — G8599 NO ASA/ANTIPLAT THER USE RNG: HCPCS | Performed by: FAMILY MEDICINE

## 2019-08-23 PROCEDURE — 1036F TOBACCO NON-USER: CPT | Performed by: FAMILY MEDICINE

## 2019-09-12 LAB
DIABETIC RETINOPATHY: NEGATIVE
DIABETIC RETINOPATHY: NEGATIVE

## 2019-12-20 ENCOUNTER — OFFICE VISIT (OUTPATIENT)
Dept: FAMILY MEDICINE CLINIC | Age: 59
End: 2019-12-20
Payer: MEDICARE

## 2019-12-20 VITALS
RESPIRATION RATE: 15 BRPM | DIASTOLIC BLOOD PRESSURE: 80 MMHG | WEIGHT: 213.6 LBS | BODY MASS INDEX: 33.45 KG/M2 | OXYGEN SATURATION: 97 % | HEART RATE: 65 BPM | TEMPERATURE: 97.9 F | SYSTOLIC BLOOD PRESSURE: 140 MMHG

## 2019-12-20 DIAGNOSIS — R45.89 MOODINESS: ICD-10-CM

## 2019-12-20 DIAGNOSIS — L08.9 INFECTED ABRASION OF GROIN, INITIAL ENCOUNTER: ICD-10-CM

## 2019-12-20 DIAGNOSIS — S30.811A INFECTED ABRASION OF GROIN, INITIAL ENCOUNTER: ICD-10-CM

## 2019-12-20 DIAGNOSIS — J20.9 ACUTE BRONCHITIS, UNSPECIFIED ORGANISM: Primary | ICD-10-CM

## 2019-12-20 PROCEDURE — G8427 DOCREV CUR MEDS BY ELIG CLIN: HCPCS | Performed by: FAMILY MEDICINE

## 2019-12-20 PROCEDURE — G8483 FLU IMM NO ADMIN DOC REA: HCPCS | Performed by: FAMILY MEDICINE

## 2019-12-20 PROCEDURE — G8599 NO ASA/ANTIPLAT THER USE RNG: HCPCS | Performed by: FAMILY MEDICINE

## 2019-12-20 PROCEDURE — 1036F TOBACCO NON-USER: CPT | Performed by: FAMILY MEDICINE

## 2019-12-20 PROCEDURE — 99214 OFFICE O/P EST MOD 30 MIN: CPT | Performed by: FAMILY MEDICINE

## 2019-12-20 PROCEDURE — G8417 CALC BMI ABV UP PARAM F/U: HCPCS | Performed by: FAMILY MEDICINE

## 2019-12-20 PROCEDURE — 3017F COLORECTAL CA SCREEN DOC REV: CPT | Performed by: FAMILY MEDICINE

## 2019-12-20 RX ORDER — DOXYCYCLINE HYCLATE 100 MG
100 TABLET ORAL 2 TIMES DAILY
Qty: 20 TABLET | Refills: 0 | Status: SHIPPED | OUTPATIENT
Start: 2019-12-20 | End: 2019-12-30

## 2019-12-20 ASSESSMENT — ENCOUNTER SYMPTOMS
SHORTNESS OF BREATH: 0
COUGH: 1

## 2020-01-15 ENCOUNTER — TELEPHONE (OUTPATIENT)
Dept: CARDIOLOGY CLINIC | Age: 60
End: 2020-01-15

## 2020-01-15 RX ORDER — ATORVASTATIN CALCIUM 40 MG/1
40 TABLET, FILM COATED ORAL DAILY
Qty: 90 TABLET | Refills: 3 | Status: ON HOLD | OUTPATIENT
Start: 2020-01-15 | End: 2020-05-29 | Stop reason: HOSPADM

## 2020-01-15 RX ORDER — QUINAPRIL 40 MG/1
40 TABLET ORAL DAILY
Qty: 90 TABLET | Refills: 3 | Status: ON HOLD | OUTPATIENT
Start: 2020-01-15 | End: 2020-05-29 | Stop reason: HOSPADM

## 2020-01-15 RX ORDER — FUROSEMIDE 40 MG/1
40 TABLET ORAL DAILY
Qty: 90 TABLET | Refills: 3 | Status: ON HOLD | OUTPATIENT
Start: 2020-01-15 | End: 2020-01-29 | Stop reason: HOSPADM

## 2020-01-15 RX ORDER — AMLODIPINE BESYLATE 10 MG/1
10 TABLET ORAL DAILY
Qty: 90 TABLET | Refills: 3 | Status: ON HOLD | OUTPATIENT
Start: 2020-01-15 | End: 2020-01-29 | Stop reason: HOSPADM

## 2020-01-20 ENCOUNTER — OFFICE VISIT (OUTPATIENT)
Dept: FAMILY MEDICINE CLINIC | Age: 60
End: 2020-01-20
Payer: MEDICARE

## 2020-01-20 VITALS
WEIGHT: 208 LBS | HEIGHT: 68 IN | HEART RATE: 72 BPM | DIASTOLIC BLOOD PRESSURE: 96 MMHG | BODY MASS INDEX: 31.52 KG/M2 | SYSTOLIC BLOOD PRESSURE: 158 MMHG

## 2020-01-20 LAB — HBA1C MFR BLD: 7 %

## 2020-01-20 PROCEDURE — 99214 OFFICE O/P EST MOD 30 MIN: CPT | Performed by: FAMILY MEDICINE

## 2020-01-20 PROCEDURE — 83036 HEMOGLOBIN GLYCOSYLATED A1C: CPT | Performed by: FAMILY MEDICINE

## 2020-01-20 RX ORDER — TRIAMTERENE AND HYDROCHLOROTHIAZIDE 37.5; 25 MG/1; MG/1
1 TABLET ORAL DAILY
Qty: 30 TABLET | Refills: 0 | Status: SHIPPED | OUTPATIENT
Start: 2020-01-20 | End: 2020-02-20 | Stop reason: SDUPTHER

## 2020-01-20 ASSESSMENT — ENCOUNTER SYMPTOMS: SHORTNESS OF BREATH: 0

## 2020-01-20 NOTE — PROGRESS NOTES
left middle cerebral artery (HCC)    Hemiparesis of right dominant side as late effect of cerebral infarction (HCC)    Gait disturbance    Essential hypertension    Hemiparesis affecting right side as late effect of cerebrovascular accident (CVA) ([facility])    Shortness of breath    Gastroesophageal reflux disease    Acute [facility] elevation myocardial infarction (STEMI) [facility]    Hypertensive emergency    Unstable angina (HCC)    Non-ST elevation myocardial infarction (NSTEMI) ([facility]    Hypertensive urgency    Frequent PVCs    Ischemic cardiomyopathy    History of Guillain-Buckner syndrome    Acute pain of left shoulder    CVA (cerebral vascular accident) ([facility]    Acute cerebrovascular accident (CVA) ([facility]    Dysarthria due to recent stroke    Aphasia due to acute stroke ([facility]    Complication of coronary artery bypass graft surgery    Uncontrolled pain    Uncontrolled type 2 diabetes mellitus with hyperglycemia ([facility])    S/P CABG (coronary artery bypass graft)       Past Surgical History:   Procedure Laterality Date    CARDIAC CATHETERIZATION  7/13 7/13-EF=55%. PATENT LAD AND RCA STENTS, OM2 50% stenosis, & Normal wall motion.     COLONOSCOPY  8/21/14    colon polyp, diverticulosis, internal hemorrhoids    CORONARY ANGIOPLASTY  6/03 6/14/03- PTCA w stents x2 mid LAD, 2/03-PTCA w stents x3, RCA    CORONARY ARTERY BYPASS GRAFT N/A 5/18/2020    CABG CORONARY ARTERY BYPASS x 2, INTRAOPERATIVE NGA, INDUCED HYPOTHERMIA, LEFT LEG ENDOSCOPIC VEIN HARVEST performed by Bobby Nichols MD at [address] [place] [address] Rd, COLON, DIAGNOSTIC  8/21/14    irregular GE junction, gastritis,hiatal hernia, gastric polyps    HAND SURGERY      rt knuckle    PTCA  03/2018    LAD    SHOULDER SURGERY Right 2011    bone spurs       Family History   Problem Relation Age of Onset    Hypertension Mother     High Cholesterol Mother     Alcohol Abuse Mother     Depression Mother     Alcohol Abuse Father     Stomach Cancer Father     Depression Sister     Diabetes Brother     Depression Sister     Stomach Cancer Brother     Alcohol Abuse Brother        Current Outpatient Medications on File Prior to Visit   Medication Sig Dispense Refill    Blood Glucose Monitoring Suppl (BLOOD GLUCOSE MONITOR SYSTEM) w/Device KIT Use twice per day 1 kit 0    aspirin 81 MG chewable tablet Take 1 tablet by mouth daily 30 tablet 3    Blood Pressure KIT Use prn 1 kit 0    blood glucose monitor strips 1 strip by Other route 2 times daily for 180 doses 180 strip 3    Lancets MISC 1 each by Does not apply route 2 times daily for 180 doses 180 each 3    Alcohol Swabs (ALCOHOL PADS) 70 % PADS 1 Units by Does not apply route 2 times daily 180 each 3     No current facility-administered medications on file prior to visit. Objective:         Physical Exam  Vitals signs and nursing note reviewed. Constitutional:       General: He is not in acute distress. Appearance: He is well-developed. He is obese. HENT:      Head: Normocephalic. Neck:      Thyroid: No thyromegaly. Trachea: No tracheal deviation. Cardiovascular:      Rate and Rhythm: Normal rate and regular rhythm. Pulses:           Dorsalis pedis pulses are 2+ on the right side and 2+ on the left side. Posterior tibial pulses are 2+ on the right side and 2+ on the left side. Heart sounds: S1 normal and S2 normal. No murmur. No gallop. Comments: Carotids without bruits  Pulmonary:      Effort: Pulmonary effort is normal. No respiratory distress. Breath sounds: Normal breath sounds. No wheezing or rales. Abdominal:      General: There is no distension. Palpations: Abdomen is soft. There is no mass. Tenderness: There is no tenderness. Musculoskeletal:      Right foot: Normal range of motion. No deformity. Left foot: Normal range of motion. No deformity.       Comments: Feet without lesions     Feet:      Right foot: not controlled. Adding on Maxide. Recheck in 2 weeks for hypertension    Patient is not taking his Lasix prescribed by his cardiologist    Reminded that he needed to get anoscopy done order placed.     Weight loss recommended

## 2020-01-21 LAB
CREATININE URINE: 107.2 MG/DL (ref 39–259)
MICROALBUMIN UR-MCNC: 18.7 MG/DL
MICROALBUMIN/CREAT UR-RTO: 174.4 MG/G (ref 0–30)

## 2020-01-26 ENCOUNTER — APPOINTMENT (OUTPATIENT)
Dept: ULTRASOUND IMAGING | Age: 60
DRG: 280 | End: 2020-01-26
Payer: MEDICARE

## 2020-01-26 ENCOUNTER — HOSPITAL ENCOUNTER (INPATIENT)
Age: 60
LOS: 1 days | Discharge: HOME | DRG: 280 | End: 2020-01-29
Attending: EMERGENCY MEDICINE | Admitting: INTERNAL MEDICINE
Payer: MEDICARE

## 2020-01-26 ENCOUNTER — APPOINTMENT (OUTPATIENT)
Dept: GENERAL RADIOLOGY | Age: 60
DRG: 280 | End: 2020-01-26
Payer: MEDICARE

## 2020-01-26 ENCOUNTER — APPOINTMENT (OUTPATIENT)
Dept: CT IMAGING | Age: 60
DRG: 280 | End: 2020-01-26
Payer: MEDICARE

## 2020-01-26 LAB
ALBUMIN SERPL-MCNC: 4.2 GM/DL (ref 3.4–5)
ALP BLD-CCNC: 58 IU/L (ref 40–129)
ALT SERPL-CCNC: 28 U/L (ref 10–40)
ANION GAP SERPL CALCULATED.3IONS-SCNC: 14 MMOL/L (ref 4–16)
APTT: 36.5 SECONDS (ref 25.1–37.1)
AST SERPL-CCNC: 26 IU/L (ref 15–37)
BASOPHILS ABSOLUTE: 0.1 K/CU MM
BASOPHILS RELATIVE PERCENT: 0.8 % (ref 0–1)
BILIRUB SERPL-MCNC: 0.6 MG/DL (ref 0–1)
BUN BLDV-MCNC: 44 MG/DL (ref 6–23)
CALCIUM SERPL-MCNC: 9.6 MG/DL (ref 8.3–10.6)
CHLORIDE BLD-SCNC: 98 MMOL/L (ref 99–110)
CO2: 23 MMOL/L (ref 21–32)
CREAT SERPL-MCNC: 2.3 MG/DL (ref 0.9–1.3)
CREATININE URINE: 103 MG/DL
DIFFERENTIAL TYPE: ABNORMAL
EKG ATRIAL RATE: 55 BPM
EKG DIAGNOSIS: NORMAL
EKG P AXIS: 5 DEGREES
EKG P-R INTERVAL: 176 MS
EKG Q-T INTERVAL: 456 MS
EKG QRS DURATION: 112 MS
EKG QTC CALCULATION (BAZETT): 436 MS
EKG R AXIS: 24 DEGREES
EKG T AXIS: 128 DEGREES
EKG VENTRICULAR RATE: 55 BPM
EOSINOPHILS ABSOLUTE: 0.2 K/CU MM
EOSINOPHILS RELATIVE PERCENT: 1.5 % (ref 0–3)
GFR AFRICAN AMERICAN: 35 ML/MIN/1.73M2
GFR NON-AFRICAN AMERICAN: 29 ML/MIN/1.73M2
GLUCOSE BLD-MCNC: 122 MG/DL (ref 70–99)
GLUCOSE BLD-MCNC: 175 MG/DL (ref 70–99)
GLUCOSE BLD-MCNC: 193 MG/DL (ref 70–99)
HCT VFR BLD CALC: 49.6 % (ref 42–52)
HEMOGLOBIN: 16.1 GM/DL (ref 13.5–18)
IMMATURE NEUTROPHIL %: 0.5 % (ref 0–0.43)
LYMPHOCYTES ABSOLUTE: 2 K/CU MM
LYMPHOCYTES RELATIVE PERCENT: 18.2 % (ref 24–44)
MCH RBC QN AUTO: 33.3 PG (ref 27–31)
MCHC RBC AUTO-ENTMCNC: 32.5 % (ref 32–36)
MCV RBC AUTO: 102.7 FL (ref 78–100)
MONOCYTES ABSOLUTE: 0.8 K/CU MM
MONOCYTES RELATIVE PERCENT: 7.1 % (ref 0–4)
NUCLEATED RBC %: 0 %
PDW BLD-RTO: 12.8 % (ref 11.7–14.9)
PLATELET # BLD: 170 K/CU MM (ref 140–440)
PMV BLD AUTO: 12.7 FL (ref 7.5–11.1)
POTASSIUM SERPL-SCNC: 4.5 MMOL/L (ref 3.5–5.1)
RBC # BLD: 4.83 M/CU MM (ref 4.6–6.2)
SEGMENTED NEUTROPHILS ABSOLUTE COUNT: 7.8 K/CU MM
SEGMENTED NEUTROPHILS RELATIVE PERCENT: 71.9 % (ref 36–66)
SODIUM BLD-SCNC: 135 MMOL/L (ref 135–145)
SODIUM URINE: 61 MMOL/L (ref 35–167)
TOTAL IMMATURE NEUTOROPHIL: 0.05 K/CU MM
TOTAL NUCLEATED RBC: 0 K/CU MM
TOTAL PROTEIN: 7.4 GM/DL (ref 6.4–8.2)
TROPONIN T: 0.01 NG/ML
TROPONIN T: 0.01 NG/ML
TROPONIN T: <0.01 NG/ML
WBC # BLD: 10.8 K/CU MM (ref 4–10.5)

## 2020-01-26 PROCEDURE — 93005 ELECTROCARDIOGRAM TRACING: CPT | Performed by: EMERGENCY MEDICINE

## 2020-01-26 PROCEDURE — G0378 HOSPITAL OBSERVATION PER HR: HCPCS

## 2020-01-26 PROCEDURE — 2500000003 HC RX 250 WO HCPCS

## 2020-01-26 PROCEDURE — 96372 THER/PROPH/DIAG INJ SC/IM: CPT

## 2020-01-26 PROCEDURE — 6360000004 HC RX CONTRAST MEDICATION

## 2020-01-26 PROCEDURE — 2709999900 HC NON-CHARGEABLE SUPPLY

## 2020-01-26 PROCEDURE — 2580000003 HC RX 258: Performed by: EMERGENCY MEDICINE

## 2020-01-26 PROCEDURE — 6360000002 HC RX W HCPCS: Performed by: NURSE PRACTITIONER

## 2020-01-26 PROCEDURE — 36415 COLL VENOUS BLD VENIPUNCTURE: CPT

## 2020-01-26 PROCEDURE — 99222 1ST HOSP IP/OBS MODERATE 55: CPT | Performed by: INTERNAL MEDICINE

## 2020-01-26 PROCEDURE — 99285 EMERGENCY DEPT VISIT HI MDM: CPT

## 2020-01-26 PROCEDURE — 82570 ASSAY OF URINE CREATININE: CPT

## 2020-01-26 PROCEDURE — 84300 ASSAY OF URINE SODIUM: CPT

## 2020-01-26 PROCEDURE — 71045 X-RAY EXAM CHEST 1 VIEW: CPT

## 2020-01-26 PROCEDURE — C1894 INTRO/SHEATH, NON-LASER: HCPCS

## 2020-01-26 PROCEDURE — 70450 CT HEAD/BRAIN W/O DYE: CPT

## 2020-01-26 PROCEDURE — 81001 URINALYSIS AUTO W/SCOPE: CPT

## 2020-01-26 PROCEDURE — 84484 ASSAY OF TROPONIN QUANT: CPT

## 2020-01-26 PROCEDURE — 94761 N-INVAS EAR/PLS OXIMETRY MLT: CPT

## 2020-01-26 PROCEDURE — 2580000003 HC RX 258: Performed by: NURSE PRACTITIONER

## 2020-01-26 PROCEDURE — C1769 GUIDE WIRE: HCPCS

## 2020-01-26 PROCEDURE — 6360000002 HC RX W HCPCS

## 2020-01-26 PROCEDURE — 80053 COMPREHEN METABOLIC PANEL: CPT

## 2020-01-26 PROCEDURE — 96361 HYDRATE IV INFUSION ADD-ON: CPT

## 2020-01-26 PROCEDURE — 6370000000 HC RX 637 (ALT 250 FOR IP): Performed by: PHYSICIAN ASSISTANT

## 2020-01-26 PROCEDURE — 82962 GLUCOSE BLOOD TEST: CPT

## 2020-01-26 PROCEDURE — 93010 ELECTROCARDIOGRAM REPORT: CPT | Performed by: INTERNAL MEDICINE

## 2020-01-26 PROCEDURE — 84156 ASSAY OF PROTEIN URINE: CPT

## 2020-01-26 PROCEDURE — 96375 TX/PRO/DX INJ NEW DRUG ADDON: CPT

## 2020-01-26 PROCEDURE — 76775 US EXAM ABDO BACK WALL LIM: CPT

## 2020-01-26 PROCEDURE — 96374 THER/PROPH/DIAG INJ IV PUSH: CPT

## 2020-01-26 PROCEDURE — 6360000002 HC RX W HCPCS: Performed by: EMERGENCY MEDICINE

## 2020-01-26 PROCEDURE — 85730 THROMBOPLASTIN TIME PARTIAL: CPT

## 2020-01-26 PROCEDURE — 85025 COMPLETE CBC W/AUTO DIFF WBC: CPT

## 2020-01-26 RX ORDER — ONDANSETRON 2 MG/ML
4 INJECTION INTRAMUSCULAR; INTRAVENOUS EVERY 6 HOURS PRN
Status: DISCONTINUED | OUTPATIENT
Start: 2020-01-26 | End: 2020-01-29 | Stop reason: HOSPADM

## 2020-01-26 RX ORDER — ASPIRIN 81 MG/1
81 TABLET, CHEWABLE ORAL DAILY
Status: DISCONTINUED | OUTPATIENT
Start: 2020-01-26 | End: 2020-01-29 | Stop reason: HOSPADM

## 2020-01-26 RX ORDER — SODIUM CHLORIDE 9 MG/ML
INJECTION, SOLUTION INTRAVENOUS CONTINUOUS
Status: DISCONTINUED | OUTPATIENT
Start: 2020-01-26 | End: 2020-01-27

## 2020-01-26 RX ORDER — HEPARIN SODIUM 1000 [USP'U]/ML
4000 INJECTION, SOLUTION INTRAVENOUS; SUBCUTANEOUS PRN
Status: DISCONTINUED | OUTPATIENT
Start: 2020-01-26 | End: 2020-01-27

## 2020-01-26 RX ORDER — HEPARIN SODIUM 1000 [USP'U]/ML
2000 INJECTION, SOLUTION INTRAVENOUS; SUBCUTANEOUS PRN
Status: DISCONTINUED | OUTPATIENT
Start: 2020-01-26 | End: 2020-01-27

## 2020-01-26 RX ORDER — SODIUM CHLORIDE 0.9 % (FLUSH) 0.9 %
10 SYRINGE (ML) INJECTION PRN
Status: DISCONTINUED | OUTPATIENT
Start: 2020-01-26 | End: 2020-01-29 | Stop reason: HOSPADM

## 2020-01-26 RX ORDER — HEPARIN SODIUM 10000 [USP'U]/100ML
10 INJECTION, SOLUTION INTRAVENOUS CONTINUOUS
Status: DISCONTINUED | OUTPATIENT
Start: 2020-01-26 | End: 2020-01-27

## 2020-01-26 RX ORDER — ATORVASTATIN CALCIUM 40 MG/1
40 TABLET, FILM COATED ORAL DAILY
Status: DISCONTINUED | OUTPATIENT
Start: 2020-01-26 | End: 2020-01-29 | Stop reason: HOSPADM

## 2020-01-26 RX ORDER — ACETAMINOPHEN 325 MG/1
650 TABLET ORAL EVERY 4 HOURS PRN
Status: DISCONTINUED | OUTPATIENT
Start: 2020-01-26 | End: 2020-01-29 | Stop reason: HOSPADM

## 2020-01-26 RX ORDER — AMLODIPINE BESYLATE 10 MG/1
10 TABLET ORAL DAILY
Status: DISCONTINUED | OUTPATIENT
Start: 2020-01-26 | End: 2020-01-26

## 2020-01-26 RX ORDER — DEXTROSE MONOHYDRATE 50 MG/ML
100 INJECTION, SOLUTION INTRAVENOUS PRN
Status: DISCONTINUED | OUTPATIENT
Start: 2020-01-26 | End: 2020-01-29 | Stop reason: HOSPADM

## 2020-01-26 RX ORDER — NICOTINE POLACRILEX 4 MG
15 LOZENGE BUCCAL PRN
Status: DISCONTINUED | OUTPATIENT
Start: 2020-01-26 | End: 2020-01-29 | Stop reason: HOSPADM

## 2020-01-26 RX ORDER — PANTOPRAZOLE SODIUM 40 MG/1
40 TABLET, DELAYED RELEASE ORAL DAILY
Status: DISCONTINUED | OUTPATIENT
Start: 2020-01-26 | End: 2020-01-29 | Stop reason: HOSPADM

## 2020-01-26 RX ORDER — HEPARIN SODIUM 1000 [USP'U]/ML
60 INJECTION, SOLUTION INTRAVENOUS; SUBCUTANEOUS ONCE
Status: DISCONTINUED | OUTPATIENT
Start: 2020-01-26 | End: 2020-01-26 | Stop reason: ALTCHOICE

## 2020-01-26 RX ORDER — MORPHINE SULFATE 4 MG/ML
4 INJECTION, SOLUTION INTRAMUSCULAR; INTRAVENOUS ONCE
Status: COMPLETED | OUTPATIENT
Start: 2020-01-26 | End: 2020-01-26

## 2020-01-26 RX ORDER — SODIUM CHLORIDE 9 MG/ML
INJECTION, SOLUTION INTRAVENOUS CONTINUOUS PRN
Status: COMPLETED | OUTPATIENT
Start: 2020-01-26 | End: 2020-01-26

## 2020-01-26 RX ORDER — NITROGLYCERIN 0.4 MG/1
0.4 TABLET SUBLINGUAL EVERY 5 MIN PRN
Status: DISCONTINUED | OUTPATIENT
Start: 2020-01-26 | End: 2020-01-29 | Stop reason: HOSPADM

## 2020-01-26 RX ORDER — SODIUM CHLORIDE 0.9 % (FLUSH) 0.9 %
10 SYRINGE (ML) INJECTION EVERY 12 HOURS SCHEDULED
Status: DISCONTINUED | OUTPATIENT
Start: 2020-01-26 | End: 2020-01-29 | Stop reason: HOSPADM

## 2020-01-26 RX ORDER — DEXTROSE MONOHYDRATE 25 G/50ML
12.5 INJECTION, SOLUTION INTRAVENOUS PRN
Status: DISCONTINUED | OUTPATIENT
Start: 2020-01-26 | End: 2020-01-29 | Stop reason: HOSPADM

## 2020-01-26 RX ADMIN — SODIUM CHLORIDE: 9 INJECTION, SOLUTION INTRAVENOUS at 18:30

## 2020-01-26 RX ADMIN — ACETAMINOPHEN 650 MG: 325 TABLET ORAL at 22:56

## 2020-01-26 RX ADMIN — SODIUM CHLORIDE 1000 ML: 9 INJECTION, SOLUTION INTRAVENOUS at 16:04

## 2020-01-26 RX ADMIN — INSULIN LISPRO 1 UNITS: 100 INJECTION, SOLUTION INTRAVENOUS; SUBCUTANEOUS at 21:18

## 2020-01-26 RX ADMIN — SODIUM CHLORIDE, PRESERVATIVE FREE 10 ML: 5 INJECTION INTRAVENOUS at 21:19

## 2020-01-26 RX ADMIN — HEPARIN SODIUM AND DEXTROSE 10 UNITS/KG/HR: 10000; 5 INJECTION INTRAVENOUS at 21:14

## 2020-01-26 RX ADMIN — ENOXAPARIN SODIUM 90 MG: 100 INJECTION SUBCUTANEOUS at 16:18

## 2020-01-26 RX ADMIN — MORPHINE SULFATE 4 MG: 4 INJECTION, SOLUTION INTRAMUSCULAR; INTRAVENOUS at 16:57

## 2020-01-26 ASSESSMENT — ENCOUNTER SYMPTOMS
SHORTNESS OF BREATH: 0
COUGH: 0
ABDOMINAL PAIN: 0
SORE THROAT: 0
NAUSEA: 0
EYE DISCHARGE: 0
BACK PAIN: 0
VOMITING: 0
RHINORRHEA: 0
EYE PAIN: 0

## 2020-01-26 ASSESSMENT — PAIN DESCRIPTION - LOCATION
LOCATION: ARM
LOCATION: SHOULDER

## 2020-01-26 ASSESSMENT — PAIN DESCRIPTION - PAIN TYPE
TYPE: ACUTE PAIN
TYPE: ACUTE PAIN

## 2020-01-26 ASSESSMENT — PAIN SCALES - GENERAL
PAINLEVEL_OUTOF10: 8
PAINLEVEL_OUTOF10: 7
PAINLEVEL_OUTOF10: 0
PAINLEVEL_OUTOF10: 8
PAINLEVEL_OUTOF10: 0

## 2020-01-26 ASSESSMENT — PAIN DESCRIPTION - DESCRIPTORS: DESCRIPTORS: ACHING;DISCOMFORT;SORE

## 2020-01-26 ASSESSMENT — PAIN DESCRIPTION - FREQUENCY: FREQUENCY: CONTINUOUS

## 2020-01-26 ASSESSMENT — PAIN DESCRIPTION - ONSET: ONSET: ON-GOING

## 2020-01-26 ASSESSMENT — PAIN DESCRIPTION - ORIENTATION
ORIENTATION: LEFT
ORIENTATION: LEFT

## 2020-01-26 NOTE — H&P
of Systems   Ten point ROS reviewed and negative, unless as noted below. GENERAL:  Denies fever, chills, night sweats, or changes in weight. EYES:  Denies recent visual changes. ENT:  Denies ear pain, hearing loss or tinnitus  RESP:  Denies any cough, dyspnea, or wheezing. CV:  Denies any chest pain with exertion or at rest, palpitations, syncope, or edema. GI:  Denies any dysphagia, nausea, vomiting, abdominal pain, heartburn, changes in bowel habit, melena or rectal bleeding  MUSCULOSKELETAL:  Denies any joint swelling, Shoulder pain, or loss of range of motion. NEURO:  Denies any headaches, tremors, dizziness, vertigo, memory loss, confusion, weakness,left arm numbness and tingling. PSYCH:  Denies any sleeping problems, history of abuse, marital discord. HEME/LYMPHATIC/IMMUNO:  Denies , bruising, bleeding abnormalities   ENDO:  Denies any heat or cold intolerance, panemiaolyuria or polydipsia. Objective:   No intake or output data in the 24 hours ending 01/26/20 1654   Vitals:   Vitals:    01/26/20 1636   BP: 115/68   Pulse: 55   Resp: 15   Temp:    SpO2: 97%     Physical Exam:   Gen:  awake, alert, cooperative, moderate distress due to pain  EYES:Lids and lashes normal, pupils equal, round ,extra ocular muscles intact, sclera clear, conjunctiva normal  ENT:  Normocephalic, oral pharynx with dry mucus membranes  NECK:  Supple, symmetrical, trachea midline, no adenopathy,  LUNGS:  Clear to auscultate bilaterally, no rales ronchi or wheezing noted. CARDIOVASCULAR:  Bradycardic, normal S1 and S2,no murmur noted, peripheral pulses 2+, no pitting edema  ABDOMEN: Normal BS, Non tender, non distended, no HSM noted. obese  MUSCULOSKELETAL:  ROM of all extremities grossly wnl  NEUROLOGIC: AOx 3,  Cranial nerves II-XII are grossly intact. Motor is 5 out of 5 bilaterally. Sensory is intact, no lateralizing findings.    SKIN:  no bruising or bleeding, normal skin color, turgor, no redness, warmth, or swelling      Past Medical History:      Past Medical History:   Diagnosis Date    B12 deficiency     CAD (coronary artery disease)     GERD (gastroesophageal reflux disease)     Guillain Barr[facility] syndrome ([facility]     H/O echocardiogram 7/30/13, 6/16/10    7/13-EF 50-55% WNL. 6/10-EF53%, mod LVH, MARCI, RVE, trace PI, mild MR/TR, 6/09    Heart murmur     History of myocardial infarction 2003    History of PTCA 6/03;2/03 6/03-PTCA w/stents x2, 2/03-PTCA w/stent x3    History of PTCA 03/15/2018    LAD EF25%    Hx of cardiovascular stress test 04/25/2018    EF 28% Nuclear scintigraphy demonstartes inferior wall infarct.  HX OTHER MEDICAL 03/14/2018    lifevest  d/c 7/2/2018    Hyperlipidemia     Hypertension     Impaired glucose tolerance 6/3/2016    MUGA 07/09/2018    EF 47%    NSTEMI (non-ST elevated myocardial infarction) ([facility] 03/15/2018    Patient in clinical research study 05/2018    3 year study dalcitripib vs placebo [phone]    Pneumonia due to organism     Renal cyst 5/23/2014    S/P cardiac catheterization 7/12/13 7/13-EF=55%. PATENT LAD AND RCA STENTS, OM2 50% stenosis, & Normal wall motion.  Stroke (cerebrum) [facility])     May 2016     PSHX:  has a past surgical history that includes Hand surgery; Coronary angioplasty (6/03); shoulder surgery (Right, 2011); Colonoscopy (8/21/14); Endoscopy, colon, diagnostic (8/21/14); and Cardiac catheterization (7/13). Allergies: Allergies   Allergen Reactions    Ibuprofen Other (See Comments)     Stomach irritation    Augmentin [Amoxicillin-Pot Clavulanate]     Codeine        FAM HX: family history includes Alcohol Abuse in his brother, father, and mother; Depression in his mother, sister, and sister; Diabetes in his brother; High Cholesterol in his mother; Hypertension in his mother; Stomach Cancer in his brother and father. Family history reviewed and is essentially negative unless as stated above.      Soc HX:   Social History

## 2020-01-26 NOTE — CONSULTS
(LOPRESSOR) tablet 25 mg  25 mg Oral BID Keturah Ivory, APRN - CNP        sodium chloride flush 0.9 % injection 10 mL  10 mL Intravenous 2 times per day Keturah Ivory, APRN - CNP        sodium chloride flush 0.9 % injection 10 mL  10 mL Intravenous PRN Keturah Ivory, APRN - CNP        magnesium hydroxide (MILK OF MAGNESIA) 400 MG/5ML suspension 30 mL  30 mL Oral Daily PRN Keturah Ivory, APRN - CNP        ondansetron (ZOFRAN) injection 4 mg  4 mg Intravenous Q6H PRN Keturah Ivory, APRN - CNP        0.9 % sodium chloride infusion   Intravenous Continuous Keturah Ivory, APRN -  mL/hr at 01/26/20 1830      insulin lispro (HUMALOG) injection vial 0-6 Units  0-6 Units Subcutaneous TID WC Cheryl Gutierrezomana, APRN - CNP        insulin lispro (HUMALOG) injection vial 0-3 Units  0-3 Units Subcutaneous Nightly Cheryl Barillas APRN - CNP        glucose (GLUTOSE) 40 % oral gel 15 g  15 g Oral PRN Keturah Ivory, APRN - CNP        dextrose 50 % IV solution  12.5 g Intravenous PRN Keturah Ivory, APRN - CNP        glucagon (rDNA) injection 1 mg  1 mg Intramuscular PRN Keturah Ivory, APRN - CNP        dextrose 5 % solution  100 mL/hr Intravenous PRN Keturah Ivory, APRN - CNP        heparin (porcine) injection 5,660 Units  60 Units/kg Intravenous Once Keturah Ivory, APRN - CNP        heparin (porcine) injection 5,660 Units  60 Units/kg Intravenous PRN Keturah Ivory, APRN - CNP        heparin (porcine) injection 2,830 Units  30 Units/kg Intravenous PRN Keturah Ivory, APRN - CNP        heparin 25,000 units in dextrose 5% 250 mL infusion  12 Units/kg/hr Intravenous Continuous Keturah Ivory, APRN - CNP         Review of Systems:  All 14 systems reviewed, all negative except for  Lt shoulderpain    Physical Examination:    /64   Pulse 59   Temp 97.8 °F (36.6 °C) (Oral)   Resp 9   Ht 5' 7\" (1.702 m)   Wt 208 lb (94.3 kg)   SpO2 96%   BMI 32.58 kg/m²      Wt Readings from Last 3

## 2020-01-26 NOTE — ED PROVIDER NOTES
Negative for abdominal pain, nausea and vomiting. Endocrine: Negative for polydipsia and polyuria. Genitourinary: Negative for dysuria and flank pain. Musculoskeletal: Negative for back pain and neck pain. Left shoulder pain   Skin: Negative for pallor and wound. Neurological: Positive for dizziness and light-headedness. Negative for facial asymmetry, numbness and headaches. Psychiatric/Behavioral: Negative for confusion. Except as noted above the remainder of the review of systems was reviewed and negative. PAST MEDICAL HISTORY     Past Medical History:   Diagnosis Date    B12 deficiency     CAD (coronary artery disease)     GERD (gastroesophageal reflux disease)     [facility]illain Barr[facility]     H/O echocardiogram 7/30/13, 6/16/10    7/13-EF 50-55% WNL. 6/10-EF53%, mod LVH, MARCI, RVE, trace PI, mild MR/TR, 6/09    Heart murmur     History of myocardial infarction 2003    History of PTCA 6/03;2/03 6/03-PTCA w/stents x2, 2/03-PTCA w/stent x3    History of PTCA 03/15/2018    LAD EF25%    Hx of cardiovascular stress test 04/25/2018    EF 28% Nuclear scintigraphy demonstartes inferior wall infarct.  HX OTHER MEDICAL 03/14/2018    lifevest  d/c 7/2/2018    Hyperlipidemia     Hypertension     Impaired glucose tolerance 6/3/2016    MUGA 07/09/2018    EF 47%    NSTEMI (non-ST elevated myocardial infarction) ([facility] 03/15/2018    Patient in clinical research study 05/2018    3 year study dalcitripib vs placebo [phone]    Pneumonia due to organism     Renal cyst 5/23/2014    S/P cardiac catheterization 7/12/13 7/13-EF=55%. PATENT LAD AND RCA STENTS, OM2 50% stenosis, & Normal wall motion.  Stroke (cerebrum) [facility])     May 2016       Prior to Admission medications    Medication Sig Start Date End Date Taking?  Authorizing Provider   triamterene-hydrochlorothiazide (MAXZIDE-25) 37.5-25 MG per tablet Take 1 tablet by mouth daily 1/20/20   Abbey Fortune MD metFORMIN (GLUCOPHAGE) 500 MG tablet Take 1 tablet by mouth 2 times daily (with meals) 1/20/20   Abbey Fortune MD   amLODIPine (NORVASC) 10 MG tablet Take 1 tablet by mouth daily 1/15/20   Kristal Aparicio MD   atorvastatin (LIPITOR) 40 MG tablet Take 1 tablet by mouth daily 1/15/20   Kristal Aparicio MD   metoprolol tartrate (LOPRESSOR) 25 MG tablet Take 1 tablet by mouth 2 times daily 1/15/20   Kristal Aparicio MD   quinapril (ACCUPRIL) 40 MG tablet Take 1 tablet by mouth daily 1/15/20   Kristal Aparicio MD   furosemide (LASIX) 40 MG tablet Take 1 tablet by mouth daily  Patient not taking: Reported on 1/20/2020 1/15/20   Kristal Aparicio MD   pantoprazole (PROTONIX) 40 MG tablet Take 1 tablet by mouth daily 4/16/19   Abbey Fortune MD   Blood Glucose Monitoring Suppl (BLOOD GLUCOSE MONITOR SYSTEM) w/Device KIT Use twice per day 1/22/19   Abbey Fortune MD   blood glucose monitor strips 1 strip by Other route 2 times daily for 180 doses 1/22/19 4/22/19  Abbey Fortune MD   Lancets MISC 1 each by Does not apply route 2 times daily for 180 doses 1/22/19 4/22/19  Abbey Fortune MD   Alcohol Swabs (ALCOHOL PADS) 70 % PADS 1 Units by Does not apply route 2 times daily 1/22/19   Abbey Fortune MD   aspirin 81 MG chewable tablet Take 1 tablet by mouth daily 3/18/18   Tee Melgoza MD   nitroGLYCERIN (NITROSTAT) 0.4 MG SL tablet up to max of 3 total doses. If no relief after 1 dose, call 911. Patient not taking: Reported on 1/20/2020 3/17/18   Tee Melgoza MD   Blood Pressure KIT Use prn 6/3/16   Abbey Fortune MD        Patient Active Problem List   Diagnosis    CAD (coronary artery disease)    History of PTCA    History of acute inferior wall myocardial infarction    B12 deficiency    Fatty liver    Colon, diverticulosis    Renal cyst    Adrenal adenoma    Hyperlipidemia LDL goal <70    Obesity (BMI 30-39. 9)    Cerebrovascular accident (CVA) due to thrombosis of left middle cerebral artery (HCC)    Gait disturbance  Essential hypertension    Hemiparesis affecting right side as late effect of cerebrovascular accident (CVA) ([facility]    Shortness of breath    Gastroesophageal reflux disease    Acute ST elevation myocardial infarction (STEMI) (HCC)    Hypertensive emergency    Unstable angina ([facility]    [facility]    Hypertensive urgency    Pneumonia due to organism    Frequent PVCs    Ischemic cardiomyopathy    History of Guillain-Axson syndrome         SURGICAL HISTORY       Past Surgical History:   Procedure Laterality Date    CARDIAC CATHETERIZATION  7/13 7/13-EF=55%. PATENT LAD AND RCA STENTS, OM2 50% stenosis, & Normal wall motion.     COLONOSCOPY  8/21/14    colon polyp, diverticulosis, internal hemorrhoids    CORONARY ANGIOPLASTY  6/03 6/14/03- PTCA w stents x2 mid LAD, 2/03-PTCA w stents x3, RCA    ENDOSCOPY, COLON, DIAGNOSTIC  8/21/14    irregular GE junction, gastritis,hiatal hernia, gastric polyps    HAND SURGERY      rt knuckle    SHOULDER SURGERY Right 2011    bone spurs         CURRENT MEDICATIONS       Previous Medications    ALCOHOL SWABS (ALCOHOL PADS) 70 % PADS    1 Units by Does not apply route 2 times daily    AMLODIPINE (NORVASC) 10 MG TABLET    Take 1 tablet by mouth daily    ASPIRIN 81 MG CHEWABLE TABLET    Take 1 tablet by mouth daily    ATORVASTATIN (LIPITOR) 40 MG TABLET    Take 1 tablet by mouth daily    BLOOD GLUCOSE MONITOR STRIPS    1 strip by Other route 2 times daily for 180 doses    BLOOD GLUCOSE MONITORING SUPPL (BLOOD GLUCOSE MONITOR SYSTEM) W/DEVICE KIT    Use twice per day    BLOOD PRESSURE KIT    Use prn    FUROSEMIDE (LASIX) 40 MG TABLET    Take 1 tablet by mouth daily    LANCETS MISC    1 each by Does not apply route 2 times daily for 180 doses    METFORMIN (GLUCOPHAGE) 500 MG TABLET    Take 1 tablet by mouth 2 times daily (with meals)    METOPROLOL TARTRATE (LOPRESSOR) 25 MG TABLET    Take 1 tablet by mouth 2 times daily NITROGLYCERIN (NITROSTAT) 0.4 MG SL TABLET    up to max of 3 total doses. If no relief after 1 dose, call 911.     PANTOPRAZOLE (PROTONIX) 40 MG TABLET    Take 1 tablet by mouth daily    QUINAPRIL (ACCUPRIL) 40 MG TABLET    Take 1 tablet by mouth daily    TRIAMTERENE-HYDROCHLOROTHIAZIDE (MAXZIDE-25) 37.5-25 MG PER TABLET    Take 1 tablet by mouth daily       ALLERGIES     Ibuprofen; Augmentin [amoxicillin-pot clavulanate]; and Codeine    FAMILY HISTORY       Family History   Problem Relation Age of Onset    Hypertension Mother     High Cholesterol Mother     Alcohol Abuse Mother     Depression Mother     Alcohol Abuse Father     Stomach Cancer Father     Depression Sister     Diabetes Brother     Depression Sister     Stomach Cancer Brother     Alcohol Abuse Brother           SOCIAL HISTORY       Social History     Socioeconomic History    Marital status:      Spouse name: None    Number of children: None    Years of education: None    Highest education level: None   Occupational History    Occupation:      Comment: full time   Social Needs    Financial resource strain: None    Food insecurity:     Worry: None     Inability: None    Transportation needs:     Medical: None     Non-medical: None   Tobacco Use    Smoking status: Former Smoker     Packs/day: 2.00     Years: 45.00     Pack years: 90.00     Types: Cigarettes     Last attempt to quit: 5/5/2016     Years since quitting: 3.7    Smokeless tobacco: Never Used   Substance and Sexual Activity    Alcohol use: No     Alcohol/week: 0.0 standard drinks    Drug use: No    Sexual activity: Yes     Partners: Female   Lifestyle    Physical activity:     Days per week: None     Minutes per session: None    Stress: None   Relationships    Social connections:     Talks on phone: None     Gets together: None     Attends Hinduism service: None     Active member of club or organization: None     Attends meetings of clubs or

## 2020-01-27 ENCOUNTER — APPOINTMENT (OUTPATIENT)
Dept: NUCLEAR MEDICINE | Age: 60
DRG: 280 | End: 2020-01-27
Payer: MEDICARE

## 2020-01-27 LAB
ANION GAP SERPL CALCULATED.3IONS-SCNC: 10 MMOL/L (ref 4–16)
APTT: 28.2 SECONDS (ref 25.1–37.1)
APTT: 59.8 SECONDS (ref 25.1–37.1)
APTT: 67.1 SECONDS (ref 25.1–37.1)
APTT: 88.1 SECONDS (ref 25.1–37.1)
BACTERIA: NEGATIVE /HPF
BILIRUBIN URINE: NEGATIVE MG/DL
BLOOD, URINE: NEGATIVE
BUN BLDV-MCNC: 41 MG/DL (ref 6–23)
CALCIUM SERPL-MCNC: 8.6 MG/DL (ref 8.3–10.6)
CHLORIDE BLD-SCNC: 103 MMOL/L (ref 99–110)
CHOLESTEROL: 114 MG/DL
CLARITY: ABNORMAL
CO2: 25 MMOL/L (ref 21–32)
COLOR: YELLOW
CREAT SERPL-MCNC: 2.3 MG/DL (ref 0.9–1.3)
CREATININE URINE: 102.8 MG/DL (ref 39–259)
EKG ATRIAL RATE: 55 BPM
EKG DIAGNOSIS: NORMAL
EKG P AXIS: 8 DEGREES
EKG P-R INTERVAL: 176 MS
EKG Q-T INTERVAL: 462 MS
EKG QRS DURATION: 120 MS
EKG QTC CALCULATION (BAZETT): 441 MS
EKG R AXIS: 37 DEGREES
EKG T AXIS: 129 DEGREES
EKG VENTRICULAR RATE: 55 BPM
GFR AFRICAN AMERICAN: 35 ML/MIN/1.73M2
GFR NON-AFRICAN AMERICAN: 29 ML/MIN/1.73M2
GLUCOSE BLD-MCNC: 133 MG/DL (ref 70–99)
GLUCOSE BLD-MCNC: 145 MG/DL (ref 70–99)
GLUCOSE BLD-MCNC: 145 MG/DL (ref 70–99)
GLUCOSE BLD-MCNC: 155 MG/DL (ref 70–99)
GLUCOSE BLD-MCNC: 169 MG/DL (ref 70–99)
GLUCOSE, URINE: NEGATIVE MG/DL
HCT VFR BLD CALC: 46 % (ref 42–52)
HDLC SERPL-MCNC: 28 MG/DL
HEMOGLOBIN: 14.8 GM/DL (ref 13.5–18)
HYALINE CASTS: 0 /LPF
KETONES, URINE: NEGATIVE MG/DL
LDL CHOLESTEROL DIRECT: 74 MG/DL
LEUKOCYTE ESTERASE, URINE: NEGATIVE
LV EF: 23 %
LV EF: 48 %
LVEF MODALITY: NORMAL
LVEF MODALITY: NORMAL
MCH RBC QN AUTO: 32.4 PG (ref 27–31)
MCHC RBC AUTO-ENTMCNC: 32.2 % (ref 32–36)
MCV RBC AUTO: 100.7 FL (ref 78–100)
MUCUS: ABNORMAL HPF
NITRITE URINE, QUANTITATIVE: NEGATIVE
PDW BLD-RTO: 12.8 % (ref 11.7–14.9)
PH, URINE: 5 (ref 5–8)
PLATELET # BLD: 142 K/CU MM (ref 140–440)
PLATELET # BLD: 146 K/CU MM (ref 140–440)
PMV BLD AUTO: 12 FL (ref 7.5–11.1)
POTASSIUM SERPL-SCNC: 4.1 MMOL/L (ref 3.5–5.1)
PRO-BNP: 121.1 PG/ML
PROT/CREAT RATIO, UR: ABNORMAL
PROTEIN UA: NEGATIVE MG/DL
RBC # BLD: 4.57 M/CU MM (ref 4.6–6.2)
RBC URINE: <1 /HPF (ref 0–3)
SODIUM BLD-SCNC: 138 MMOL/L (ref 135–145)
SPECIFIC GRAVITY UA: 1.01 (ref 1–1.03)
SQUAMOUS EPITHELIAL: 1 /HPF
TRICHOMONAS: ABNORMAL /HPF
TRIGL SERPL-MCNC: 113 MG/DL
URINE TOTAL PROTEIN: 24.9 MG/DL
UROBILINOGEN, URINE: NORMAL MG/DL (ref 0.2–1)
WBC # BLD: 5.7 K/CU MM (ref 4–10.5)
WBC UA: 1 /HPF (ref 0–2)

## 2020-01-27 PROCEDURE — 85027 COMPLETE CBC AUTOMATED: CPT

## 2020-01-27 PROCEDURE — 85049 AUTOMATED PLATELET COUNT: CPT

## 2020-01-27 PROCEDURE — 83880 ASSAY OF NATRIURETIC PEPTIDE: CPT

## 2020-01-27 PROCEDURE — 83721 ASSAY OF BLOOD LIPOPROTEIN: CPT

## 2020-01-27 PROCEDURE — 80061 LIPID PANEL: CPT

## 2020-01-27 PROCEDURE — APPSS60 APP SPLIT SHARED TIME 46-60 MINUTES: Performed by: NURSE PRACTITIONER

## 2020-01-27 PROCEDURE — 93010 ELECTROCARDIOGRAM REPORT: CPT | Performed by: INTERNAL MEDICINE

## 2020-01-27 PROCEDURE — 85730 THROMBOPLASTIN TIME PARTIAL: CPT

## 2020-01-27 PROCEDURE — 6360000002 HC RX W HCPCS: Performed by: NURSE PRACTITIONER

## 2020-01-27 PROCEDURE — 99232 SBSQ HOSP IP/OBS MODERATE 35: CPT | Performed by: INTERNAL MEDICINE

## 2020-01-27 PROCEDURE — 96365 THER/PROPH/DIAG IV INF INIT: CPT

## 2020-01-27 PROCEDURE — 96361 HYDRATE IV INFUSION ADD-ON: CPT

## 2020-01-27 PROCEDURE — G0378 HOSPITAL OBSERVATION PER HR: HCPCS

## 2020-01-27 PROCEDURE — 2580000003 HC RX 258: Performed by: NURSE PRACTITIONER

## 2020-01-27 PROCEDURE — 78452 HT MUSCLE IMAGE SPECT MULT: CPT

## 2020-01-27 PROCEDURE — 6360000002 HC RX W HCPCS: Performed by: INTERNAL MEDICINE

## 2020-01-27 PROCEDURE — 36415 COLL VENOUS BLD VENIPUNCTURE: CPT

## 2020-01-27 PROCEDURE — 80048 BASIC METABOLIC PNL TOTAL CA: CPT

## 2020-01-27 PROCEDURE — 82962 GLUCOSE BLOOD TEST: CPT

## 2020-01-27 PROCEDURE — 93306 TTE W/DOPPLER COMPLETE: CPT

## 2020-01-27 PROCEDURE — A9500 TC99M SESTAMIBI: HCPCS | Performed by: NURSE PRACTITIONER

## 2020-01-27 PROCEDURE — 3430000000 HC RX DIAGNOSTIC RADIOPHARMACEUTICAL: Performed by: NURSE PRACTITIONER

## 2020-01-27 PROCEDURE — 93005 ELECTROCARDIOGRAM TRACING: CPT | Performed by: NURSE PRACTITIONER

## 2020-01-27 PROCEDURE — 93017 CV STRESS TEST TRACING ONLY: CPT

## 2020-01-27 PROCEDURE — 96366 THER/PROPH/DIAG IV INF ADDON: CPT

## 2020-01-27 PROCEDURE — 6370000000 HC RX 637 (ALT 250 FOR IP): Performed by: NURSE PRACTITIONER

## 2020-01-27 RX ORDER — AMINOPHYLLINE DIHYDRATE 25 MG/ML
75 INJECTION, SOLUTION INTRAVENOUS ONCE
Status: COMPLETED | OUTPATIENT
Start: 2020-01-27 | End: 2020-01-27

## 2020-01-27 RX ADMIN — AMINOPHYLLINE 75 MG: 25 INJECTION, SOLUTION INTRAVENOUS at 12:43

## 2020-01-27 RX ADMIN — INSULIN LISPRO 1 UNITS: 100 INJECTION, SOLUTION INTRAVENOUS; SUBCUTANEOUS at 22:02

## 2020-01-27 RX ADMIN — METOPROLOL TARTRATE 25 MG: 25 TABLET ORAL at 21:56

## 2020-01-27 RX ADMIN — PANTOPRAZOLE SODIUM 40 MG: 40 TABLET, DELAYED RELEASE ORAL at 09:06

## 2020-01-27 RX ADMIN — ASPIRIN 81 MG 81 MG: 81 TABLET ORAL at 09:06

## 2020-01-27 RX ADMIN — SODIUM CHLORIDE: 9 INJECTION, SOLUTION INTRAVENOUS at 06:19

## 2020-01-27 RX ADMIN — Medication 10 MILLICURIE: at 14:10

## 2020-01-27 RX ADMIN — Medication 30 MILLICURIE: at 14:10

## 2020-01-27 RX ADMIN — ATORVASTATIN CALCIUM 40 MG: 40 TABLET, FILM COATED ORAL at 14:12

## 2020-01-27 RX ADMIN — SODIUM CHLORIDE, PRESERVATIVE FREE 10 ML: 5 INJECTION INTRAVENOUS at 21:58

## 2020-01-27 RX ADMIN — REGADENOSON 0.4 MG: 0.08 INJECTION, SOLUTION INTRAVENOUS at 12:38

## 2020-01-27 RX ADMIN — METOPROLOL TARTRATE 25 MG: 25 TABLET ORAL at 09:06

## 2020-01-27 RX ADMIN — HEPARIN SODIUM AND DEXTROSE 12 UNITS/KG/HR: 10000; 5 INJECTION INTRAVENOUS at 16:55

## 2020-01-27 ASSESSMENT — PAIN SCALES - GENERAL
PAINLEVEL_OUTOF10: 0

## 2020-01-27 NOTE — PROGRESS NOTES
preliminary results of stress test: EF-23% inferior MI, apical /lateral wall MI non-reversable ischemia. Will evaluate the needed for heart catheterization with patient.

## 2020-01-27 NOTE — PROGRESS NOTES
Cardiology Progress Note     Today's Plan: Stress test    Admit Date:  1/26/2020    Consult reason/ Seen today for: Chest pain    Subjective and  Overnight Events:  No chest pain at this time. Assessment / Plan / Recommendation:     1. Chest pain:  Troponin trend negative- Check NM stress. Check echo. Further treatment and testing pending clinical course. 2. HFrEF :Acute on chronic decompensated heart failure. Appears to be volume overloaded. Fluid status is +341. Not on home Lasix 40 mg will check BNP in am.Please continue Gudelines recommended medical thearpy including b- blocker, no ACEI or Aldactone due to CKD. Daily weights, strict Is and Os   3. HTN:controlled, continue (lopressor 25 mg) can titrate accordingly   4. Dyslipidemia: continue statins   5. CKD nephrology following   6. DVT prophylaxis if not contraindicated. History of Presenting Illness:    Chief complain on admission : 60 y. o.year old who is admitted for  Chief Complaint   Patient presents with    Shoulder Pain     left shoulder numbness/ pain         Past medical history:    has a past medical history of B12 deficiency, CAD (coronary artery disease), GERD (gastroesophageal reflux disease), Guillain Barré syndrome ([facility] H/O echocardiogram, Heart murmur, History of myocardial infarction, History of PTCA, History of PTCA, Hx of cardiovascular stress test, HX OTHER MEDICAL, Hyperlipidemia, Hypertension, Impaired glucose tolerance, MUGA, NSTEMI (non-ST elevated myocardial infarction) [facility]), Patient in clinical research study, Pneumonia due to organism, Renal cyst, S/P cardiac catheterization, and Stroke (cerebrum) ([facility] Past surgical history:   has a past surgical history that includes Hand surgery; Coronary angioplasty (6/03); shoulder surgery (Right, 2011); Colonoscopy (8/21/14);  Endoscopy, colon, diagnostic (8/21/14); and Cardiac catheterization (7/13). Social History:   reports that he quit smoking about 3 years ago. His smoking use included cigarettes. He has a 90.00 pack-year smoking history. He has never used smokeless tobacco. He reports that he does not drink alcohol or use drugs. Family history:  family history includes Alcohol Abuse in his brother, father, and mother; Depression in his mother, sister, and sister; Diabetes in his brother; High Cholesterol in his mother; Hypertension in his mother; Stomach Cancer in his brother and father. Allergies   Allergen Reactions    Ibuprofen Other (See Comments)     Stomach irritation    Augmentin [Amoxicillin-Pot Clavulanate]     Codeine        Review of Systems:   All 14 systems were reviewed and are negative  Except for the positive findings which are documented     /71   Pulse 56   Temp 97.9 °F (36.6 °C) (Oral)   Resp 15   Ht 5' 7\" (1.702 m)   Wt 208 lb (94.3 kg)   SpO2 95%   BMI 32.58 kg/m²       Intake/Output Summary (Last 24 hours) at 1/27/2020 9489  Last data filed at 1/27/2020 7336  Gross per 24 hour   Intake 1241.92 ml   Output 300 ml   Net 941.92 ml       Physical Exam:  Constitutional:  Well developed, Well nourished, No acute distress  HENT:  Normocephalic, Atraumatic, Bilateral external ears normal,  Nose normal.   Neck- trachea is midline  Eyes:  PERRL, Conjunctiva normal  Respiratory:  Normal breath sounds, No respiratory distress, No wheezing, No chest tenderness. Cardiovascular:  Normal heart rate, Normal rhythm, yes murmurs appreciated, No rubs appreciated, No gallops appreciated, JVP not elevated  Abdomen/GI:  Soft, No tenderness  Musculoskeletal:  Intact distal pulses, no edema, No tenderness, No cyanosis, No clubbing. Integument:  Warm, Dry  Lymphatic:  No lymphadenopathy noted.    Neurologic:  Alert & oriented  Psychiatric:  Affect and Mood :pleasant     Medications:    aspirin  81 mg Oral Daily    atorvastatin  40 mg Oral Daily    pantoprazole  40 mg Oral JERED Arenas - CNP on 1/27/2020 at 9:04 AM   I have seen ,spoken to  and examined this patient personally, independently of the nurse practitioner. I have reviewed the hospital care given to date and reviewed all pertinent labs and imaging. The plan was developed mutually at the time of the visit with the patient, Clinical Nurse Practitioner  and myself. I have spoken with patient, nursing staff and provided written and verbal instructions . The above note has been reviewed and I agree with the assessment, diagnosis, and treatment plan with changes made by me as follows     CARDIOLOGY ATTENDING ADDENDUM    HPI:  I have reviewed the above HPI  And agree with above   Liza De La Cruz is a 60 y. o.year old who and presents with had concerns including Shoulder Pain (left shoulder numbness/ pain ). Chief Complaint   Patient presents with    Shoulder Pain     left shoulder numbness/ pain      Interval history:  No significant change    Physical Exam:  General:  Awake, alert, NAD  Head:normal  Eye:normal  Neck:  No JVD   Chest:  Clear to auscultation, respiration easy  Cardiovascular:  RRR S1S2  Abdomen:   nontender  Extremities:  1+ edema  Pulses; palpable  Neuro: grossly normal      MEDICAL DECISION MAKING;    I agree with the above plan, which was planned by myself and discussed with NP.     Ivan Villatoro MD [facility] [place]

## 2020-01-27 NOTE — CONSULTS
from  underlying diuretic therapy and cardiomyopathy. Otherwise, kidney  function at least by creatinine criteria had been stable since 2010. He denied taking any antiinflammatory over-the-counter and I did check  his outpatient medication. Does not look like there is any overt  nephrotoxic drug. He was of course on ACE inhibitors which is an  appropriate treatment along with Maxzide. As I mentioned, he is also on  furosemide at home 40 mg daily. PAST MEDICAL HISTORY:  1.  CKD stage 3A/A2 looks he had little more than 150 mg albumin in the  urine that was done not too long ago. 2.  Coronary artery disease. He claimed that he has seven stents. His  last intervention was about three years ago when he apparently had an  acute decompensated heart failure and his EF is also low about 35% or  so. 3.  He also had Guillain-Barré syndrome apparently three years ago. Details is unknown to me, but I can review the chart and see. He  remained weak since then and on disability. 4.  He also claims that he has CVA, again I need to figure out what  exactly he has. 5.  Diabetes mellitus diagnosed about four years ago type 2. No known  proliferative retinopathy. 6.  Hypertension. He was on amlodipine as well as quinapril, Maxzide,  furosemide, and of course beta blocker. 7.  Gastroesophageal reflux disorder. Although he does not have any  symptoms, but he is on PPI and this is my assumption though. 8.  Hyperlipidemia. HABITS:  He quit smoking four years ago. Has 40-pack-year history. Used to drink a lot, but quit. No history of illicit drug abuse. PAST SURGICAL HISTORY:  Seven stenting of coronary arteries apparently  and also had some kind of orthopedic surgery in the past for his finger  injury. SOCIAL HISTORY:  The patient is  for 19 years, living with his  wife. He does have three biological children. He is on disability for  the last three years, used to be a .   He does have some  activity of daily living. He can drive _____. He cannot do too much  strenuous physical activity though. FAMILY MEDICAL HISTORY:  Apparently cancer runs in the family. Stomach  cancer in the family. ALLERGIES:  He is listed allergic to IBUPROFEN, AUGMENTIN, and CODEINE. Precise reaction is unknown, it could have been adverse effect. HOME MEDICATIONS:  As I mentioned, he was on quinapril 40 mg daily along  with amlodipine 10 mg a day, and metoprolol 25 mg b.i.d. Those are his  antihypertensive medication. He was also on Maxzide 37.5/25 mg once a  day and Lasix 40 mg daily along with atorvastatin 40 mg at bedtime. Protonix 40 mg daily, aspirin 81 mg daily. He was on metformin 500 mg  twice a day. CURRENT MEDICATIONS:  He is on normal saline, of course his  antihypertensive is on hold. He was given one dose of morphine. He is  on heparin drip and also on insulin just a sliding scale and beta  blocker along with statins and aspirin. REVIEW OF SYSTEMS:  Left shoulder pain radiating to the arm associated  with nausea about two to three hours it lasted. No shortness of breath  now. No urinary symptoms. Rest of the review of systems is negative  other than previous paragraph. PHYSICAL EXAMINATION:  VITAL SIGNS:  At the time of examination reveal, his temperature,  afebrile. Blood pressure did come up to 136/71, saturating 95%, pulse  in 50s, respiratory rate is 14. GENERAL:  The patient is without any acute distress. He is lying flat. HEAD AND NECK:  Normocephalic, atraumatic. EYES:  No conjunctival pallor. EAR, MOUTH, AND THROAT:  Dry oral mucosa. CARDIOVASCULAR:  Bradycardia. RESPIRATORY:  I did not hear any crackles. ABDOMEN:  Soft. No abdominal bruits. EXTREMITIES:  No edema. LABORATORY VALUES AND ANCILLARY SERVICES:  As I mentioned earlier. IMPRESSION:  A 71-year-old male with acute kidney injury. 1.  Acute kidney injury on top of CKD stage G3/A2.   Available data  suggest secondary to cardiorenal syndrome type 1 and he was rather  hypertensive all things consider. Probably accentuated by his ACE  inhibitors therapy. We will make sure there is no intrinsic additional  disease by plain UA and his ultrasound does not show any obstruction. 2.  Coronary artery disease, ischemic cardiomyopathy that has a high  risk for acute decompensated heart failure. 3.  History of hypertension, but blood pressure is rather low which  raise a suspicion for cardiac event. 4.  Diabetes mellitus, history of Guillain-Barré syndrome, stroke etc.    PLAN:  Start with plain UA. It was sent but it is pending. We will  review when it is done to make sure there is any active sediment. If so  then we will broaden the differential diagnosis. I will go ahead and  stop the IV fluid after one liter. Should he needs cath we will only  give periprocedural IV fluid. We will discuss and coordinate with the  Cardiology. Watch for acute decompensated heart failure. Since his  blood pressure come up, we probably do not have to look for any other  etiology, but we will keep our eyes open just in case if there are other  additional etiology for hypertension. Further plan will be dictated by  his clinical course.         John Bui MD    D: 01/27/2020 7:45:11       T: 01/27/2020 9:16:35     [email or web address]  Job#: [id number]     Doc#: [id number]    CC:

## 2020-01-28 LAB
ANION GAP SERPL CALCULATED.3IONS-SCNC: 10 MMOL/L (ref 4–16)
BUN BLDV-MCNC: 33 MG/DL (ref 6–23)
CALCIUM SERPL-MCNC: 9 MG/DL (ref 8.3–10.6)
CHLORIDE BLD-SCNC: 100 MMOL/L (ref 99–110)
CO2: 28 MMOL/L (ref 21–32)
CREAT SERPL-MCNC: 2 MG/DL (ref 0.9–1.3)
GFR AFRICAN AMERICAN: 41 ML/MIN/1.73M2
GFR NON-AFRICAN AMERICAN: 34 ML/MIN/1.73M2
GLUCOSE BLD-MCNC: 135 MG/DL (ref 70–99)
GLUCOSE BLD-MCNC: 138 MG/DL (ref 70–99)
GLUCOSE BLD-MCNC: 164 MG/DL (ref 70–99)
GLUCOSE BLD-MCNC: 183 MG/DL (ref 70–99)
GLUCOSE BLD-MCNC: 224 MG/DL (ref 70–99)
POTASSIUM SERPL-SCNC: 4.4 MMOL/L (ref 3.5–5.1)
SODIUM BLD-SCNC: 138 MMOL/L (ref 135–145)

## 2020-01-28 PROCEDURE — 99221 1ST HOSP IP/OBS SF/LOW 40: CPT | Performed by: INTERNAL MEDICINE

## 2020-01-28 PROCEDURE — 36415 COLL VENOUS BLD VENIPUNCTURE: CPT

## 2020-01-28 PROCEDURE — 6370000000 HC RX 637 (ALT 250 FOR IP): Performed by: NURSE PRACTITIONER

## 2020-01-28 PROCEDURE — 96366 THER/PROPH/DIAG IV INF ADDON: CPT

## 2020-01-28 PROCEDURE — 80048 BASIC METABOLIC PNL TOTAL CA: CPT

## 2020-01-28 PROCEDURE — 2140000000 HC CCU INTERMEDIATE R&B

## 2020-01-28 PROCEDURE — APPNB45 APP NON BILLABLE 31-45 MINUTES: Performed by: NURSE PRACTITIONER

## 2020-01-28 PROCEDURE — 94761 N-INVAS EAR/PLS OXIMETRY MLT: CPT

## 2020-01-28 PROCEDURE — 99233 SBSQ HOSP IP/OBS HIGH 50: CPT | Performed by: INTERNAL MEDICINE

## 2020-01-28 PROCEDURE — 82962 GLUCOSE BLOOD TEST: CPT

## 2020-01-28 PROCEDURE — 6370000000 HC RX 637 (ALT 250 FOR IP): Performed by: INTERNAL MEDICINE

## 2020-01-28 PROCEDURE — 2580000003 HC RX 258: Performed by: NURSE PRACTITIONER

## 2020-01-28 PROCEDURE — APPSS60 APP SPLIT SHARED TIME 46-60 MINUTES: Performed by: NURSE PRACTITIONER

## 2020-01-28 RX ORDER — HYDRALAZINE HYDROCHLORIDE 10 MG/1
10 TABLET, FILM COATED ORAL EVERY 8 HOURS SCHEDULED
Status: DISCONTINUED | OUTPATIENT
Start: 2020-01-28 | End: 2020-01-29 | Stop reason: HOSPADM

## 2020-01-28 RX ORDER — METOPROLOL SUCCINATE 25 MG/1
25 TABLET, EXTENDED RELEASE ORAL DAILY
Status: DISCONTINUED | OUTPATIENT
Start: 2020-01-28 | End: 2020-01-29 | Stop reason: HOSPADM

## 2020-01-28 RX ORDER — ISOSORBIDE MONONITRATE 30 MG/1
30 TABLET, EXTENDED RELEASE ORAL DAILY
Status: DISCONTINUED | OUTPATIENT
Start: 2020-01-28 | End: 2020-01-29 | Stop reason: HOSPADM

## 2020-01-28 RX ADMIN — ISOSORBIDE MONONITRATE 30 MG: 30 TABLET, EXTENDED RELEASE ORAL at 11:58

## 2020-01-28 RX ADMIN — HYDRALAZINE HYDROCHLORIDE 10 MG: 10 TABLET ORAL at 14:22

## 2020-01-28 RX ADMIN — HYDRALAZINE HYDROCHLORIDE 10 MG: 10 TABLET ORAL at 20:08

## 2020-01-28 RX ADMIN — ATORVASTATIN CALCIUM 40 MG: 40 TABLET, FILM COATED ORAL at 08:59

## 2020-01-28 RX ADMIN — SODIUM CHLORIDE, PRESERVATIVE FREE 10 ML: 5 INJECTION INTRAVENOUS at 09:48

## 2020-01-28 RX ADMIN — PANTOPRAZOLE SODIUM 40 MG: 40 TABLET, DELAYED RELEASE ORAL at 09:00

## 2020-01-28 RX ADMIN — ASPIRIN 81 MG 81 MG: 81 TABLET ORAL at 08:58

## 2020-01-28 RX ADMIN — METOPROLOL SUCCINATE 25 MG: 25 TABLET, FILM COATED, EXTENDED RELEASE ORAL at 11:58

## 2020-01-28 RX ADMIN — INSULIN LISPRO 1 UNITS: 100 INJECTION, SOLUTION INTRAVENOUS; SUBCUTANEOUS at 20:10

## 2020-01-28 ASSESSMENT — ENCOUNTER SYMPTOMS
NAUSEA: 0
VOMITING: 0
CHEST TIGHTNESS: 0
CONSTIPATION: 0
DIARRHEA: 0
PHOTOPHOBIA: 0
WHEEZING: 0
ABDOMINAL PAIN: 0
SHORTNESS OF BREATH: 0
COLOR CHANGE: 0
BACK PAIN: 0
COUGH: 0
EYE PAIN: 0
BLOOD IN STOOL: 0

## 2020-01-28 ASSESSMENT — PAIN SCALES - GENERAL
PAINLEVEL_OUTOF10: 0

## 2020-01-28 NOTE — PROGRESS NOTES
Nephrology Progress Note  1/28/2020 10:10 AM        Subjective:   Admit Date: 1/26/2020  PCP: Destiny Adame MD    Interval History: no CP_     Diet: eating breakfast    ROS:  No edema , no sob    Data:     Current med's:    metoprolol succinate  25 mg Oral Daily    aspirin  81 mg Oral Daily    atorvastatin  40 mg Oral Daily    pantoprazole  40 mg Oral Daily    sodium chloride flush  10 mL Intravenous 2 times per day    insulin lispro  0-6 Units Subcutaneous TID WC    insulin lispro  0-3 Units Subcutaneous Nightly      dextrose           I/O last 3 completed shifts: In: 120 [P.O.:120]  Out: 2000 [Urine:2000]    CBC:   Recent Labs     01/26/20  1411 01/27/20  0734 01/27/20  2219   WBC 10.8* 5.7  --    HGB 16.1 14.8  --     142 146          Recent Labs     01/26/20  1411 01/27/20  0734 01/28/20  0851    138 138   K 4.5 4.1 4.4   CL 98* 103 100   CO2 23 25 28   BUN 44* 41* 33*   CREATININE 2.3* 2.3* 2.0*   GLUCOSE 193* 155* 224*       Lab Results   Component Value Date    CALCIUM 9.0 01/28/2020    PHOS 3.4 05/11/2016       Objective:     Vitals: BP (!) 150/79   Pulse 51   Temp 98 °F (36.7 °C) (Oral)   Resp 19   Ht 5' 7\" (1.702 m)   Wt 207 lb (93.9 kg)   SpO2 97%   BMI 32.42 kg/m²     General appearance:  No ac distress  HEENT:  No conj pallor  Neck:  supple  Lungs:  No gross crackles  Heart:  Seems RRr  Abdomen: soft  Extremities:  No gross LE edema       Problem List :         Impression :     1. KARUNA/ CKD stage G 3 a - his urine was bland - so likely all from hemodynamic changes  2. CAD no ac MI-  3. His LVEF better  4. underlying DM/HTn    Recommendation/Plan  :     1. Need manual BP  2. May d/cancer from renal standing T  3. Low salt  4. May restart acei/ thiazide in 2-3 days as an out pt   5. Goal /70-80  6. BMP in 1-2 wk's  7. F/U with me in 2-3 wk's   8. No loop for now   9.  Good BS control       Thad Adorno MD

## 2020-01-28 NOTE — PROGRESS NOTES
Hospitalist Progress Note      Name:  Avni Sheth /Age/Sex: 1960  (60 y.o. male)   MRN & CSN:  [id number] & [id number] Admission Date/Time: 2020  2:06 PM   [facility] PCP: Lesli Salinas MD         Hospital Day: 3    Assessment and Plan:     Avni Sheth is a 60 y.o.  male  who presents with chest pain     1) NSTEMI  -Troponin mildly elevated  -EKG: TWI in lateral leads (V4-V6)  -Last Echo with EF 35-40%, grade II DD, Moderate mitral regurgitation (3/2018)  -Cardiology on board; for NM stress test done revealed large area of inferior and apical-lateral infarction with no significant reversible ischemia and LVEF of 23%  -Will need AICD eval  -Continue ASA, statin, Nitro, IV heparin and BB        2) KARUNA on CKD 3 likely prerenal  -Creat 2.3 and GFR 29 (baseline creat 1.1-1.2)  -Nephrology on board; dced IVF after 1L of NS     3) DMII  -Hold oral medications while inpatient  -Monitor FSBS and start SSI     4) Chronic HFpEF & HFrEF, Compensated  -Hold Lasix  -Continue Metoprolol     5) HTN  -Continue Amlodipine  -Hold Quinapril     6) GERD  -Continue Protonix     7) CAD s/p Stents  -Continue ASA, statin, Nitro, and BB     8) Obesity class 1 with BMI 32.6  -Educated about lifestyle modifications    Diet DIET CARDIAC; Low Sodium (2 GM); No Caffeine   DVT Prophylaxis [] Lovenox, []  Heparin, [] SCDs, [] Ambulation   GI Prophylaxis [] PPI,  [] H2 Blocker,  [] Carafate,  [] Diet/Tube Feeds   Code Status Full Code   Disposition Home   MDM      History of Present Illness:     Patient was seen and examined  Denied any further CP  No SOB, palpitations, dizziness  No fever, chills, N/V/D       Ten point ROS reviewed negative, unless as noted above    Objective:        Intake/Output Summary (Last 24 hours) at 2020 0901  Last data filed at 2020 0809  Gross per 24 hour   Intake 300 ml   Output 2000 ml   Net -1700 ml      Vitals:   Vitals:    20 0445   BP: (!) 150/79   Pulse: 58 Resp: 18   Temp: 98.5 °F (36.9 °C)   SpO2: 94%     Physical Exam:   GEN Awake male, sitting upright in bed in no apparent distress. Appears given age. EYES Pupils are equally round. No scleral erythema, discharge, or conjunctivitis. HENT Mucous membranes are moist. Oral pharynx without exudates, no evidence of thrush. NECK Supple, no apparent thyromegaly or masses. RESP Clear to auscultation, no wheezes, rales or rhonchi. Symmetric chest movement while on room air. CARDIO/VASC S1/S2 auscultated. Regular rate without appreciable murmurs, rubs, or gallops. No JVD or carotid bruits. Peripheral pulses equal bilaterally and palpable. No peripheral edema. GI Abdomen is soft without significant tenderness, masses, or guarding. Bowel sounds are normoactive. Rectal exam deferred.  No costovertebral angle tenderness. Normal appearing external genitalia. York catheter is not present. HEME/LYMPH No palpable cervical lymphadenopathy and no hepatosplenomegaly. No petechiae or ecchymoses. MSK No gross joint deformities. SKIN Normal coloration, warm, dry. NEURO Cranial nerves appear grossly intact, normal speech, no lateralizing weakness. PSYCH Awake, alert, oriented x 4. Affect appropriate.     Medications:   Medications:    aspirin  81 mg Oral Daily    atorvastatin  40 mg Oral Daily    pantoprazole  40 mg Oral Daily    metoprolol tartrate  25 mg Oral BID    sodium chloride flush  10 mL Intravenous 2 times per day    insulin lispro  0-6 Units Subcutaneous TID WC    insulin lispro  0-3 Units Subcutaneous Nightly      Infusions:    dextrose       PRN Meds: nitroGLYCERIN, 0.4 mg, Q5 Min PRN  sodium chloride flush, 10 mL, PRN  magnesium hydroxide, 30 mL, Daily PRN  ondansetron, 4 mg, Q6H PRN  glucose, 15 g, PRN  dextrose, 12.5 g, PRN  glucagon (rDNA), 1 mg, PRN  dextrose, 100 mL/hr, PRN  acetaminophen, 650 mg, Q4H PRN          Electronically signed by Adilene Severino MD on 1/28/2020 at 9:01 AM

## 2020-01-28 NOTE — PROGRESS NOTES
Cardiology Progress Note     Today's Plan: add hydralazine and Imdur    Admit Date:  1/26/2020    Consult reason/ Seen today for: Chest pain     Subjective and  Overnight Events:  No chest pain at this time. Patient up to chair, fatigue noted. Assessment / Plan / Recommendation:     1. Chest pain:  Resolved. Stress test showed non-reversible ischemia. Further treatment and testing pending clinical course. 2. HFrEF :Acute on chronic decompensated heart failure. Appears to be euvolemic. Fluid status is -878. BNP WNL. Please continue Gudelines recommended medical thearpy including b- blocker, no ACEI or Aldactone due to CKD. Daily weights, strict Is and Os Will start hydralazine and Imdur. Will get MUGA as outpatient, stress test EF 23% and echo-EF 45 to  50%  3. HTN:controlled, continue (lopressor 25 mg) can titrate accordingly   4. Dyslipidemia: continue statins   5. CKD nephrology following   6. DVT prophylaxis if not contraindicated. History of Presenting Illness:    Chief complain on admission : 60 y. o.year old who is admitted for  Chief Complaint   Patient presents with    Shoulder Pain     left shoulder numbness/ pain         Past medical history:    has a past medical history of B12 deficiency, CAD (coronary artery disease), GERD (gastroesophageal reflux disease), Guillain Barré syndrome ([facility] [address] H/O echocardiogram, Heart murmur, History of myocardial infarction, History of PTCA, History of PTCA, Hx of cardiovascular stress test, HX OTHER MEDICAL, Hyperlipidemia, Hypertension, Impaired glucose tolerance, MUGA, NSTEMI (non-ST elevated myocardial infarction) [facility]), Patient in clinical research study, Pneumonia due to organism, Renal cyst, S/P cardiac catheterization, and Stroke (cerebrum) ([facility] [address]   Past surgical history:   has a past surgical history that includes Hand surgery; Coronary angioplasty (6/03); shoulder surgery leathargic    Medications:    metoprolol succinate  25 mg Oral Daily    hydrALAZINE  10 mg Oral 3 times per day    isosorbide mononitrate  30 mg Oral Daily    aspirin  81 mg Oral Daily    atorvastatin  40 mg Oral Daily    pantoprazole  40 mg Oral Daily    sodium chloride flush  10 mL Intravenous 2 times per day    insulin lispro  0-6 Units Subcutaneous TID WC    insulin lispro  0-3 Units Subcutaneous Nightly      dextrose       nitroGLYCERIN, sodium chloride flush, magnesium hydroxide, ondansetron, glucose, dextrose, glucagon (rDNA), dextrose, acetaminophen    Lab Data:  CBC:   Recent Labs     01/26/20  1411 01/27/20  0734 01/27/20  2219   WBC 10.8* 5.7  --    HGB 16.1 14.8  --    HCT 49.6 46.0  --    .7* 100.7*  --     142 146     BMP:   Recent Labs     01/26/20  1411 01/27/20  0734 01/28/20  0851    138 138   K 4.5 4.1 4.4   CL 98* 103 100   CO2 23 25 28   BUN 44* 41* 33*   CREATININE 2.3* 2.3* 2.0*     PT/INR: No results for input(s): PROTIME, INR in the last 72 hours. BNP:    Recent Labs     01/27/20  2219   PROBNP 121.1     TROPONIN:   Recent Labs     01/26/20  1411 01/26/20  1847 01/26/20  2139   TROPONINT 0.011* <0.010 0.012*        ECHO : 3/15/18   Left ventricular systolic function is moderately to severely depressed. Ejection fraction is visually estimated at 35-40%. Moderate concentric left ventricular hypertrophy. Grade II diastolic dysfunction. No regional wall motion abnormalities were detected. Mildly dilated left atrium. Sclerotic, but non-stenotic aortic valve   Moderate mitral regurgitation is present. Normal tricuspid valve structure and function. No evidence of any pericardial effusion. NM Myocardial Spect:  1/27/20  Large area of Inferior & apical-lateral infarction.    No significant reversible ischemia.    Severely reduced LV function, LVEF is 23 %.            Impression:  Active Problems:    NSTEMI (non-ST elevated myocardial infarction) ([facility] [address]    Acute pain of left shoulder  Resolved Problems:    * No resolved hospital problems. *       All labs, medications and tests reviewed by myself, continue all other medications of all above medical condition listed as is except for changes mentioned above. Thank you   Please call with questions. Electronically signed by Ilene Willard. Roman SrideviJERED CNP on 1/28/2020 at 1:45 PM   I have seen ,spoken to  and examined this patient personally, independently of the nurse practitioner. I have reviewed the hospital care given to date and reviewed all pertinent labs and imaging. The plan was developed mutually at the time of the visit with the patient, Clinical Nurse Practitioner  and myself. I have spoken with patient, nursing staff and provided written and verbal instructions . The above note has been reviewed and I agree with the assessment, diagnosis, and treatment plan with changes made by me as follows     CARDIOLOGY ATTENDING ADDENDUM    HPI:  I have reviewed the above HPI  And agree with above   Daquan Becerra is a 60 y. o.year old who and presents with had concerns including Shoulder Pain (left shoulder numbness/ pain ). Chief Complaint   Patient presents with    Shoulder Pain     left shoulder numbness/ pain      Interval history:  Very sleepy    Physical Exam:  General:  Awake, alert, NAD  Head:normal  Eye:normal  Neck:  No JVD   Chest:  Clear to auscultation, respiration easy  Cardiovascular:  RRR S1S2  Abdomen:   nontender  Extremities:  1+ edema  Pulses; palpable  Neuro: grossly normal      MEDICAL DECISION MAKING;    I agree with the above plan, which was planned by myself and discussed with NP.       Hakeem Ruelas MD [facility]

## 2020-01-28 NOTE — CONSULTS
Electrophysiology Consult Note      Reason for consultation:  Cardiomyopathy    Chief complaint : Left arm pain    Referring physician:       Primary care physician: Andria Salinas MD      History of Present Illness:     Patient is a 27-year-old male with a history of CVA, hypertension, coronary artery disease, hyperlipidemia, ischemic cardiomyopathy presents with complaints of left shoulder pain. He reports that the symptoms started suddenly on the Saturday night. Given his history of previously had a heart attack so he wanted to make sure this is not something another heart attack. Did not have any chest pain or palpitations. Denies shortness of breath at rest denies edema dizziness or syncope    Patient had a stress test this admission showing large area of inferior and apical lateral infarction with no reversible ischemia. EF on the stress test was 23% but on the echo it was 45%    He also was noted to have acute kidney injury with chronic kidney disease stage III. Nephrology is following the patient    Patient is now on hydralazine and Imdur this admission. Also on metoprolol XL. Patient reports his symptoms have improved and he does not have any symptoms currently      Pastmedical history:   Past Medical History:   Diagnosis Date    B12 deficiency     CAD (coronary artery disease)     GERD (gastroesophageal reflux disease)     Guillain Barré syndrome ([facility] [address]     H/O echocardiogram 7/30/13, 6/16/10    7/13-EF 50-55% WNL. 6/10-EF53%, mod LVH, MARCI, RVE, trace PI, mild MR/TR, 6/09    Heart murmur     History of myocardial infarction 2003    History of PTCA 6/03;2/03 6/03-PTCA w/stents x2, 2/03-PTCA w/stent x3    History of PTCA 03/15/2018    LAD EF25%    Hx of cardiovascular stress test 04/25/2018    EF 28% Nuclear scintigraphy demonstartes inferior wall infarct.     HX OTHER MEDICAL 03/14/2018    lifevest  d/c 7/2/2018    Hyperlipidemia  Hypertension     Impaired glucose tolerance 6/3/2016    MUGA 07/09/2018    EF 47%    NSTEMI (non-ST elevated myocardial infarction) ([facility] [address] 03/15/2018    Patient in clinical research study 05/2018    3 year study dalcitripib vs placebo [phone]    Pneumonia due to organism     Renal cyst 5/23/2014    S/P cardiac catheterization 7/12/13 7/13-EF=55%. PATENT LAD AND RCA STENTS, OM2 50% stenosis, & Normal wall motion.  Stroke (cerebrum) [facility])     May 2016       Surgical history :   Past Surgical History:   Procedure Laterality Date    CARDIAC CATHETERIZATION  7/13 7/13-EF=55%. PATENT LAD AND RCA STENTS, OM2 50% stenosis, & Normal wall motion.  COLONOSCOPY  8/21/14    colon polyp, diverticulosis, internal hemorrhoids    CORONARY ANGIOPLASTY  6/03 6/14/03- PTCA w stents x2 mid LAD, 2/03-PTCA w stents x3, RCA    ENDOSCOPY, COLON, DIAGNOSTIC  8/21/14    irregular GE junction, gastritis,hiatal hernia, gastric polyps    HAND SURGERY      rt knuckle    SHOULDER SURGERY Right 2011    bone spurs       Family history:   Family History   Problem Relation Age of Onset    Hypertension Mother     High Cholesterol Mother     Alcohol Abuse Mother     Depression Mother     Alcohol Abuse Father     Stomach Cancer Father     Depression Sister     Diabetes Brother     Depression Sister     Stomach Cancer Brother     Alcohol Abuse Brother        Social history :  reports that he quit smoking about 3 years ago. His smoking use included cigarettes. He has a 90.00 pack-year smoking history. He has never used smokeless tobacco. He reports that he does not drink alcohol or use drugs. Allergies   Allergen Reactions    Ibuprofen Other (See Comments)     Stomach irritation    Augmentin [Amoxicillin-Pot Clavulanate]     Codeine        No current facility-administered medications on file prior to encounter.       Current Outpatient Medications on File Prior to Encounter   Medication Sig Dispense Refill intolerance. Genitourinary: Negative for dysuria, flank pain and hematuria. Musculoskeletal: Negative for arthralgias, back pain, myalgias and neck stiffness. Skin: Negative for color change and rash. Allergic/Immunologic: Negative for food allergies. Neurological: Negative for dizziness, light-headedness, numbness and headaches. Hematological: Does not bruise/bleed easily. Psychiatric/Behavioral: Negative for agitation, behavioral problems and confusion. Examination:      Vitals:    01/28/20 1402 01/28/20 1422 01/28/20 1502 01/28/20 1506   BP: 125/64 125/64 129/64 (!) 123/57   Pulse: (!) 49  50 52   Resp: 16  14 19   Temp:    98.1 °F (36.7 °C)   TempSrc:    Oral   SpO2:    95%   Weight:       Height:            Body mass index is 32.42 kg/m². Physical Exam  Constitutional:       General: He is not in acute distress. Appearance: He is well-developed. HENT:      Head: Normocephalic and atraumatic. Eyes:      Pupils: Pupils are equal, round, and reactive to light. Neck:      Musculoskeletal: Normal range of motion. Vascular: No JVD. Cardiovascular:      Rate and Rhythm: Normal rate and regular rhythm. Heart sounds: Murmur (grade 2/6 systolic murmur) present. No friction rub. Pulmonary:      Effort: Pulmonary effort is normal. No respiratory distress. Breath sounds: Normal breath sounds. No wheezing or rales. Abdominal:      General: Bowel sounds are normal. There is no distension. Palpations: Abdomen is soft. Tenderness: There is no abdominal tenderness. Musculoskeletal:         General: No tenderness. Skin:     General: Skin is warm and dry. Neurological:      Mental Status: He is alert and oriented to person, place, and time. Cranial Nerves: No cranial nerve deficit.          CBC:   Lab Results   Component Value Date    WBC 5.7 01/27/2020    HGB 14.8 01/27/2020    HCT 46.0 01/27/2020     01/27/2020     Lipids:  Lab Results

## 2020-01-29 VITALS
OXYGEN SATURATION: 96 % | SYSTOLIC BLOOD PRESSURE: 155 MMHG | DIASTOLIC BLOOD PRESSURE: 72 MMHG | BODY MASS INDEX: 32.49 KG/M2 | TEMPERATURE: 97.7 F | HEART RATE: 48 BPM | HEIGHT: 67 IN | RESPIRATION RATE: 16 BRPM | WEIGHT: 207 LBS

## 2020-01-29 LAB
ANION GAP SERPL CALCULATED.3IONS-SCNC: 11 MMOL/L (ref 4–16)
BUN BLDV-MCNC: 29 MG/DL (ref 6–23)
CALCIUM SERPL-MCNC: 9 MG/DL (ref 8.3–10.6)
CHLORIDE BLD-SCNC: 101 MMOL/L (ref 99–110)
CO2: 29 MMOL/L (ref 21–32)
CREAT SERPL-MCNC: 1.9 MG/DL (ref 0.9–1.3)
GFR AFRICAN AMERICAN: 44 ML/MIN/1.73M2
GFR NON-AFRICAN AMERICAN: 36 ML/MIN/1.73M2
GLUCOSE BLD-MCNC: 141 MG/DL (ref 70–99)
GLUCOSE BLD-MCNC: 143 MG/DL (ref 70–99)
POTASSIUM SERPL-SCNC: 4.4 MMOL/L (ref 3.5–5.1)
SODIUM BLD-SCNC: 141 MMOL/L (ref 135–145)

## 2020-01-29 PROCEDURE — 82962 GLUCOSE BLOOD TEST: CPT

## 2020-01-29 PROCEDURE — 80048 BASIC METABOLIC PNL TOTAL CA: CPT

## 2020-01-29 PROCEDURE — 6370000000 HC RX 637 (ALT 250 FOR IP): Performed by: NURSE PRACTITIONER

## 2020-01-29 PROCEDURE — 6370000000 HC RX 637 (ALT 250 FOR IP): Performed by: INTERNAL MEDICINE

## 2020-01-29 PROCEDURE — 36415 COLL VENOUS BLD VENIPUNCTURE: CPT

## 2020-01-29 PROCEDURE — 2580000003 HC RX 258: Performed by: NURSE PRACTITIONER

## 2020-01-29 RX ORDER — ISOSORBIDE MONONITRATE 30 MG/1
30 TABLET, EXTENDED RELEASE ORAL DAILY
Qty: 30 TABLET | Refills: 3 | Status: ON HOLD | OUTPATIENT
Start: 2020-01-30 | End: 2020-05-29 | Stop reason: HOSPADM

## 2020-01-29 RX ORDER — HYDRALAZINE HYDROCHLORIDE 10 MG/1
10 TABLET, FILM COATED ORAL EVERY 8 HOURS SCHEDULED
Qty: 90 TABLET | Refills: 3 | Status: ON HOLD | OUTPATIENT
Start: 2020-01-29 | End: 2020-05-29 | Stop reason: HOSPADM

## 2020-01-29 RX ORDER — METOPROLOL SUCCINATE 25 MG/1
25 TABLET, EXTENDED RELEASE ORAL DAILY
Qty: 30 TABLET | Refills: 3 | Status: ON HOLD | OUTPATIENT
Start: 2020-01-30 | End: 2020-05-29 | Stop reason: HOSPADM

## 2020-01-29 RX ADMIN — METOPROLOL SUCCINATE 25 MG: 25 TABLET, FILM COATED, EXTENDED RELEASE ORAL at 08:44

## 2020-01-29 RX ADMIN — ATORVASTATIN CALCIUM 40 MG: 40 TABLET, FILM COATED ORAL at 08:40

## 2020-01-29 RX ADMIN — SODIUM CHLORIDE, PRESERVATIVE FREE 10 ML: 5 INJECTION INTRAVENOUS at 08:44

## 2020-01-29 RX ADMIN — ASPIRIN 81 MG 81 MG: 81 TABLET ORAL at 08:39

## 2020-01-29 RX ADMIN — ISOSORBIDE MONONITRATE 30 MG: 30 TABLET, EXTENDED RELEASE ORAL at 08:40

## 2020-01-29 RX ADMIN — HYDRALAZINE HYDROCHLORIDE 10 MG: 10 TABLET ORAL at 06:33

## 2020-01-29 RX ADMIN — PANTOPRAZOLE SODIUM 40 MG: 40 TABLET, DELAYED RELEASE ORAL at 08:40

## 2020-01-29 ASSESSMENT — PAIN SCALES - GENERAL: PAINLEVEL_OUTOF10: 0

## 2020-01-29 NOTE — DISCHARGE SUMMARY
physician:  within 2 weeks    Diet:  cardiac diet   Activity: activity as tolerated  Disposition: Discharged to:   [x]Home, []C, []SNF, []Acute Rehab, []Hospice   Condition on discharge: Stable    Discharge Medications:      [facility] Medication Instructions MERLIN:[id number]    Printed on:01/29/20 1771   Medication Information                      Alcohol Swabs (ALCOHOL PADS) 70 % PADS  1 Units by Does not apply route 2 times daily             aspirin 81 MG chewable tablet  Take 1 tablet by mouth daily             atorvastatin (LIPITOR) 40 MG tablet  Take 1 tablet by mouth daily             blood glucose monitor strips  1 strip by Other route 2 times daily for 180 doses             Blood Glucose Monitoring Suppl (BLOOD GLUCOSE MONITOR SYSTEM) w/Device KIT  Use twice per day             Blood Pressure KIT  Use prn             hydrALAZINE (APRESOLINE) 10 MG tablet  Take 1 tablet by mouth every 8 hours             isosorbide mononitrate (IMDUR) 30 MG extended release tablet  Take 1 tablet by mouth daily             Lancets MISC  1 each by Does not apply route 2 times daily for 180 doses             metFORMIN (GLUCOPHAGE) 500 MG tablet  Take 1 tablet by mouth 2 times daily (with meals)             metoprolol succinate (TOPROL XL) 25 MG extended release tablet  Take 1 tablet by mouth daily             nitroGLYCERIN (NITROSTAT) 0.4 MG SL tablet  up to max of 3 total doses. If no relief after 1 dose, call 911.              pantoprazole (PROTONIX) 40 MG tablet  Take 1 tablet by mouth daily             quinapril (ACCUPRIL) 40 MG tablet  Take 1 tablet by mouth daily             triamterene-hydrochlorothiazide (MAXZIDE-25) 37.5-25 MG per tablet  Take 1 tablet by mouth daily                 Objective Findings at Discharge:   BP (!) 155/72   Pulse (!) 48   Temp 97.7 °F (36.5 °C) (Oral)   Resp 16   Ht 5' 7\" (1.702 m)   Wt 207 lb (93.9 kg)   SpO2 96%   BMI 32.42 kg/m²            PHYSICAL EXAM   GEN Awake male,

## 2020-01-30 ENCOUNTER — TELEPHONE (OUTPATIENT)
Dept: CARDIOLOGY CLINIC | Age: 60
End: 2020-01-30

## 2020-01-30 ENCOUNTER — TELEPHONE (OUTPATIENT)
Dept: FAMILY MEDICINE CLINIC | Age: 60
End: 2020-01-30

## 2020-01-31 NOTE — TELEPHONE ENCOUNTER
Left message on voice mail for patient tor return call. Stated that he did not need a follow up with Dr. Josef Sauer at this time. Stated that he should schedule a follow up with Dr. Roseline Wade.

## 2020-02-04 ENCOUNTER — OFFICE VISIT (OUTPATIENT)
Dept: FAMILY MEDICINE CLINIC | Age: 60
End: 2020-02-04
Payer: MEDICARE

## 2020-02-04 VITALS
HEART RATE: 72 BPM | BODY MASS INDEX: 32.24 KG/M2 | OXYGEN SATURATION: 98 % | DIASTOLIC BLOOD PRESSURE: 80 MMHG | SYSTOLIC BLOOD PRESSURE: 128 MMHG | WEIGHT: 205.4 LBS | HEIGHT: 67 IN

## 2020-02-04 PROCEDURE — 36415 COLL VENOUS BLD VENIPUNCTURE: CPT | Performed by: FAMILY MEDICINE

## 2020-02-04 PROCEDURE — 1111F DSCHRG MED/CURRENT MED MERGE: CPT | Performed by: FAMILY MEDICINE

## 2020-02-04 PROCEDURE — 99496 TRANSJ CARE MGMT HIGH F2F 7D: CPT | Performed by: FAMILY MEDICINE

## 2020-02-04 PROCEDURE — 99214 OFFICE O/P EST MOD 30 MIN: CPT | Performed by: FAMILY MEDICINE

## 2020-02-04 RX ORDER — TAMSULOSIN HYDROCHLORIDE 0.4 MG/1
0.4 CAPSULE ORAL DAILY
Qty: 30 CAPSULE | Refills: 0 | Status: SHIPPED | OUTPATIENT
Start: 2020-02-04 | End: 2020-02-20 | Stop reason: SDUPTHER

## 2020-02-04 NOTE — PROGRESS NOTES
Post-Discharge Transitional Care Management Services or Hospital Follow Up      Celi Garay   YOB: 1960    Date of Office Visit:  2/4/2020  Date of Hospital Admission: 1/26/20  Date of Hospital Discharge: 1/29/20  Risk of hospital readmission (high >=14%. Medium >=10%) :Readmission Risk Score: 13      Care management risk score Rising risk (score 2-5) and Complex Care (Scores >=6): 5     Non face to face  following discharge, date last encounter closed (first attempt may have been earlier): 1/30/2020  5:28 PM    Call initiated 2 business days of discharge: Yes    Patient Active Problem List   Diagnosis    CAD (coronary artery disease)    History of PTCA    History of acute inferior wall myocardial infarction    B12 deficiency    Fatty liver    Colon, diverticulosis    Renal cyst    Adrenal adenoma    Hyperlipidemia LDL goal <70    Obesity (BMI 30-39. 9)    Cerebrovascular accident (CVA) due to thrombosis of left middle cerebral artery (HCC)    Gait disturbance    Essential hypertension    Hemiparesis affecting right side as late effect of cerebrovascular accident (CVA) (HCC)    Shortness of breath    Gastroesophageal reflux disease    Acute ST elevation myocardial infarction (STEMI) (HCC)    Hypertensive emergency    Unstable angina ([facility] [address]    NSTEMI (non-ST elevated myocardial infarction) ([facility] [address]    Hypertensive urgency    Pneumonia due to organism    Frequent PVCs    Ischemic cardiomyopathy    History of Guillain-[place] syndrome    Acute pain of left shoulder       Allergies   Allergen Reactions    Ibuprofen Other (See Comments)     Stomach irritation    Augmentin [Amoxicillin-Pot Clavulanate]     Codeine        Medications listed as ordered at the time of discharge from hospital   Michael, [address] Medication Instructions MERLIN:    Printed on:02/04/20 6447   Medication Information                      Alcohol Swabs (ALCOHOL PADS) 70 % PADS  1 Units by Does not apply route 2 times daily             aspirin 81 MG chewable tablet  Take 1 tablet by mouth daily             atorvastatin (LIPITOR) 40 MG tablet  Take 1 tablet by mouth daily             blood glucose monitor strips  1 strip by Other route 2 times daily for 180 doses             Blood Glucose Monitoring Suppl (BLOOD GLUCOSE MONITOR SYSTEM) w/Device KIT  Use twice per day             Blood Pressure KIT  Use prn             hydrALAZINE (APRESOLINE) 10 MG tablet  Take 1 tablet by mouth every 8 hours             isosorbide mononitrate (IMDUR) 30 MG extended release tablet  Take 1 tablet by mouth daily             Lancets MISC  1 each by Does not apply route 2 times daily for 180 doses             metFORMIN (GLUCOPHAGE) 500 MG tablet  Take 1 tablet by mouth 2 times daily (with meals)             metoprolol succinate (TOPROL XL) 25 MG extended release tablet  Take 1 tablet by mouth daily             nitroGLYCERIN (NITROSTAT) 0.4 MG SL tablet  up to max of 3 total doses. If no relief after 1 dose, call 911.              pantoprazole (PROTONIX) 40 MG tablet  Take 1 tablet by mouth daily             quinapril (ACCUPRIL) 40 MG tablet  Take 1 tablet by mouth daily             sertraline (ZOLOFT) 50 MG tablet  Take 1 tablet by mouth daily             tamsulosin (FLOMAX) 0.4 MG capsule  Take 1 capsule by mouth daily             triamterene-hydrochlorothiazide (MAXZIDE-25) 37.5-25 MG per tablet  Take 1 tablet by mouth daily                   Medications marked \"taking\" at this time  Outpatient Medications Marked as Taking for the 2/4/20 encounter (Office Visit) with Cortez Craig MD   Medication Sig Dispense Refill    sertraline (ZOLOFT) 50 MG tablet Take 1 tablet by mouth daily 30 tablet 0    tamsulosin (FLOMAX) 0.4 MG capsule Take 1 capsule by mouth daily 30 capsule 0    isosorbide mononitrate (IMDUR) 30 MG extended release tablet Take 1 tablet by mouth daily 30 tablet 3    hydrALAZINE (APRESOLINE) 10 MG tablet Take 1 tablet by mouth every 8 hours 90 tablet 3    metoprolol succinate (TOPROL XL) 25 MG extended release tablet Take 1 tablet by mouth daily 30 tablet 3    triamterene-hydrochlorothiazide (MAXZIDE-25) 37.5-25 MG per tablet Take 1 tablet by mouth daily 30 tablet 0    metFORMIN (GLUCOPHAGE) 500 MG tablet Take 1 tablet by mouth 2 times daily (with meals) 180 tablet 1    atorvastatin (LIPITOR) 40 MG tablet Take 1 tablet by mouth daily 90 tablet 3    quinapril (ACCUPRIL) 40 MG tablet Take 1 tablet by mouth daily 90 tablet 3    pantoprazole (PROTONIX) 40 MG tablet Take 1 tablet by mouth daily 90 tablet 3    Blood Glucose Monitoring Suppl (BLOOD GLUCOSE MONITOR SYSTEM) w/Device KIT Use twice per day 1 kit 0    blood glucose monitor strips 1 strip by Other route 2 times daily for 180 doses 180 strip 3    Alcohol Swabs (ALCOHOL PADS) 70 % PADS 1 Units by Does not apply route 2 times daily 180 each 3    aspirin 81 MG chewable tablet Take 1 tablet by mouth daily 30 tablet 3    Blood Pressure KIT Use prn 1 kit 0        Medications patient taking as of now reconciled against medications ordered at time of hospital discharge: Yes    Chief Complaint   Patient presents with    Follow-Up from Hospital     non stemi and acute kidney injury       History of Present illness - Follow up of Hospital diagnosis(es): NSTEMI, KARUNA, low EF    Inpatient course: Discharge summary reviewed- see chart. Interval history/Current status:arm pain is better. He and wife are concerned about his mood. He is very irritable and short with his wife. He thinks it is because of depression. He also sleeps a lot. He is frustrated because he is not able to do things that he used to do. He is not able to work. He also has to take care of his wife who is very ill. Urinary frequency: Frequently gets up at night to use the restroom. His wife hears him urinating and then it stops quickly and then he has to return to the bathroom to urinate again.   Denies any blood in the urine. Does not have a difficult time getting the urine started. Does not have any dribbling. Erectile dysfunction: this does not bother him--perhaps more his wife. He has no desire. No morning erections      A comprehensive review of systems was negative except for what was noted in the HPI. Vitals:    02/04/20 1130   BP: 128/80   Site: Left Upper Arm   Position: Sitting   Cuff Size: Medium Adult   Pulse: 72   SpO2: 98%   Weight: 205 lb 6.4 oz (93.2 kg)   Height: 5' 7\" (1.702 m)     Body mass index is 32.17 kg/m². Wt Readings from Last 3 Encounters:   02/04/20 205 lb 6.4 oz (93.2 kg)   01/28/20 207 lb (93.9 kg)   01/20/20 208 lb (94.3 kg)     BP Readings from Last 3 Encounters:   02/04/20 128/80   01/29/20 (!) 155/72   01/20/20 (!) 158/96        Physical Exam:  General Appearance: alert and oriented to person, place and time, well developed and well- nourished, in no acute distress  Skin: warm and dry, no rash or erythema  Head: normocephalic and atraumatic  Eyes: pupils equal, round, and reactive to light, extraocular eye movements intact, conjunctivae normal  ENT: tympanic membrane, external ear and ear canal normal bilaterally, nose without deformity  Neck: supple and non-tender without mass, no thyromegaly or thyroid nodules, no cervical lymphadenopathy  Pulmonary/Chest: clear to auscultation bilaterally- no wheezes, rales or rhonchi, normal air movement, no respiratory distress  Cardiovascular: normal rate, regular rhythm, normal S1 and S2, no murmurs, rubs, clicks, or gallops, distal pulses intact, no carotid bruits  Abdomen: soft, non-tender, non-distended, normal bowel sounds, no masses or organomegaly  Extremities: no cyanosis, clubbing or edema  Musculoskeletal: antalgic gait  Neurologic: reflexes normal and symmetric, no cranial nerve deficit, antalgic gait, peech normal  Rectal exam: prostate smooth, not hard. Slightly enlarged  Mood: poor eye contact.   Seems

## 2020-02-05 LAB
ANION GAP SERPL CALCULATED.3IONS-SCNC: 15 MMOL/L (ref 3–16)
BUN BLDV-MCNC: 29 MG/DL (ref 7–20)
CALCIUM SERPL-MCNC: 9.7 MG/DL (ref 8.3–10.6)
CHLORIDE BLD-SCNC: 98 MMOL/L (ref 99–110)
CO2: 27 MMOL/L (ref 21–32)
CREAT SERPL-MCNC: 2.5 MG/DL (ref 0.8–1.3)
FOLATE: 5.68 NG/ML (ref 4.78–24.2)
GFR AFRICAN AMERICAN: 32
GFR NON-AFRICAN AMERICAN: 26
GLUCOSE BLD-MCNC: 230 MG/DL (ref 70–99)
LUTEINIZING HORMONE: 13.2 MIU/ML
POTASSIUM SERPL-SCNC: 4.7 MMOL/L (ref 3.5–5.1)
SODIUM BLD-SCNC: 140 MMOL/L (ref 136–145)
TSH REFLEX: 0.98 UIU/ML (ref 0.27–4.2)
VITAMIN B-12: 595 PG/ML (ref 211–911)

## 2020-02-06 LAB
SEX HORMONE BINDING GLOBULIN: 36 NMOL/L (ref 11–80)
TESTOSTERONE FREE-NONMALE: 99.9 PG/ML (ref 47–244)
TESTOSTERONE TOTAL: 501 NG/DL (ref 220–1000)

## 2020-02-07 LAB
PROSTATE SPECIFIC ANTIGEN FREE: 0.3 UG/L
PROSTATE SPECIFIC ANTIGEN PERCENT FREE: 33.3 %
PROSTATE SPECIFIC ANTIGEN: 0.9 UG/L (ref 0–4)

## 2020-02-07 RX ORDER — GLIMEPIRIDE 2 MG/1
2 TABLET ORAL EVERY MORNING
Qty: 90 TABLET | Refills: 3 | Status: SHIPPED | OUTPATIENT
Start: 2020-02-07 | End: 2020-11-25 | Stop reason: SDUPTHER

## 2020-02-20 ENCOUNTER — TELEPHONE (OUTPATIENT)
Dept: CARDIOLOGY CLINIC | Age: 60
End: 2020-02-20

## 2020-02-20 ENCOUNTER — OFFICE VISIT (OUTPATIENT)
Dept: FAMILY MEDICINE CLINIC | Age: 60
End: 2020-02-20
Payer: MEDICARE

## 2020-02-20 VITALS
BODY MASS INDEX: 32.02 KG/M2 | SYSTOLIC BLOOD PRESSURE: 128 MMHG | HEIGHT: 67 IN | WEIGHT: 204 LBS | DIASTOLIC BLOOD PRESSURE: 80 MMHG | HEART RATE: 70 BPM

## 2020-02-20 PROCEDURE — 99213 OFFICE O/P EST LOW 20 MIN: CPT | Performed by: FAMILY MEDICINE

## 2020-02-20 RX ORDER — TAMSULOSIN HYDROCHLORIDE 0.4 MG/1
0.4 CAPSULE ORAL DAILY
Qty: 90 CAPSULE | Refills: 3 | Status: ON HOLD | OUTPATIENT
Start: 2020-02-20 | End: 2020-10-14

## 2020-02-20 RX ORDER — TRIAMTERENE AND HYDROCHLOROTHIAZIDE 37.5; 25 MG/1; MG/1
1 TABLET ORAL DAILY
Qty: 90 TABLET | Refills: 3 | Status: SHIPPED | OUTPATIENT
Start: 2020-02-20 | End: 2020-02-28

## 2020-02-20 NOTE — PROGRESS NOTES
Patient ID: Jewell Holt 1960    Chief Complaint   Patient presents with    Depression    Urinary Frequency         Urinary Frequency    The problem has been gradually improving. Associated symptoms include frequency. Treatments tried: flomax. The treatment provided mild relief. Mental Health Problem   The primary symptoms include dysphoric mood. Primary symptoms comment: irritable, angry. much better since taking the zoloft. is willing to continue taking. Review of Systems   Genitourinary: Positive for frequency. Psychiatric/Behavioral: Positive for dysphoric mood. Patient Active Problem List   Diagnosis    CAD (coronary artery disease)    History of PTCA    History of acute inferior wall myocardial infarction    B12 deficiency    Fatty liver    Colon, diverticulosis    Renal cyst    Adrenal adenoma    Hyperlipidemia LDL goal <70    Obesity (BMI 30-39. 9)    Cerebrovascular accident (CVA) due to thrombosis of left middle cerebral artery ([facility])    Gait disturbance    Essential hypertension    Hemiparesis affecting right side as late effect of cerebrovascular accident (CVA) ([facility])    Shortness of breath    Gastroesophageal reflux disease    Acute ST elevation myocardial infarction (STEMI) ([facility])    Hypertensive emergency    Unstable angina ([facility] [address]    NSTEMI (non-ST elevated myocardial infarction) ([facility] [address]    Hypertensive urgency    Pneumonia due to organism    Frequent PVCs    Ischemic cardiomyopathy    History of Guillain-[place] syndrome    Acute pain of left shoulder       Past Surgical History:   Procedure Laterality Date    CARDIAC CATHETERIZATION  7/13 7/13-EF=55%. PATENT LAD AND RCA STENTS, OM2 50% stenosis, & Normal wall motion.     COLONOSCOPY  8/21/14    colon polyp, diverticulosis, internal hemorrhoids    CORONARY ANGIOPLASTY  6/03 6/14/03- PTCA w stents x2 mid LAD, 2/03-PTCA w stents x3, RCA    ENDOSCOPY, COLON, DIAGNOSTIC  8/21/14    irregular GE

## 2020-02-20 NOTE — LETTER
[facility]  [address]  [facility], [address]  Phone: [phone]    Fax [phone]                  Serge Alves MD, Alec Cisneros MD, [address], MD, Carlota Lefevre, MD Sherie Latino, MD Belva Mutters, MD Doreen Hodgkin, APRN-CNP   Esperanza Pham, APRN-CNP  Rona Nava, APRN-CNP    Manuel Lauren, APRN-CNP    Cardiac Risk Assessment   3/4/2020        Surgery Date: 3/27/20  Surgery: Colonosocpy  Anesthesia Type: Propofol  Fax Number: [phone]    To: Dr. Darby Hernandez  From : Serge Alves MD, [facility]    Patient Name: Michael Meade     YOB: 1960  MRN: [id number]    Michael Meade was evaluated from a cardiac standpoint for his surgery or procedure and based on his history, diagnosis, recent cardiac testing, he is considered a moderate risk candidate for any freddie-operative cardiac complications. Antiplatelet/anticoagulant therapy can be held prior to the surgery or procedure at the discretion of the surgeon to be resumed as soon as possible if held. Please call with any further questions.     Respectfully,     Serge Alves MD, [facility]  FA/cjs

## 2020-02-20 NOTE — PATIENT INSTRUCTIONS
VOTE ON MARCH [facility]    PLEASE BRING YOUR MEDICATIONS TO ALL APPOINTMENTS    The diagnoses and medications listed in this after visit summary may not be accurate at the time of check out. Please check MY CHART in 28-48 hours for possible corrections. Late cancellation policy: So that we can better accommodate people who are sick, please give our office 24 hour notice for an appointment cancellation. Thank you. Missed appointments: Your care is very important to us. It is important that you keep your scheduled appointments. Multiple missed appointments may lead to a dismissal from the office. Later arrival policy: If you are more than 10 minutes late for your appointment, you will be asked to reschedule. Please allow 5-7 business days for paperwork to be processed. It is important that you check your MY Chart messages, as they include appointment reminders, test results, and other important information. If you have forgotten your password, please call [phone].

## 2020-02-24 ENCOUNTER — TELEPHONE (OUTPATIENT)
Dept: CARDIOLOGY CLINIC | Age: 60
End: 2020-02-24

## 2020-02-24 NOTE — TELEPHONE ENCOUNTER
Cardiologist: Dr. Sawant Brain  Surgeon: Dr. Hawk Bourne  Surgery: Colonoscopy  Anesthesia: Propofol  Date: 3/27/2020  FAX# [phone]  # [phone]    Last OV 7/19/2019 w/Raymond    Needs appt-OV and order for a MUGA to check EF. (per Jamar Silveira)        Echo- 1/26/20   Left ventricular systolic function is abnormal.   Ejection fraction is visually estimated at 45 to 50%. Mild inferior hypokinesis. Mild to moderate eccentric mitral regurgitation. No evidence of any pericardial effusion.       NM 1/26/20  Abnormal Study.   Karly Sabot area of Inferior & apical-lateral infarction.    No significant reversible ischemia.    Severely reduced LV function, LVEF is 23 %.           CATH-7/2013    Aspirin

## 2020-02-26 ENCOUNTER — TELEPHONE (OUTPATIENT)
Dept: CARDIOLOGY CLINIC | Age: 60
End: 2020-02-26

## 2020-03-03 ENCOUNTER — PROCEDURE VISIT (OUTPATIENT)
Dept: CARDIOLOGY CLINIC | Age: 60
End: 2020-03-03
Payer: MEDICARE

## 2020-03-03 LAB
LV EF: 53 %
LVEF MODALITY: NORMAL

## 2020-03-03 PROCEDURE — 78472 GATED HEART PLANAR SINGLE: CPT | Performed by: INTERNAL MEDICINE

## 2020-03-03 PROCEDURE — A9560 TC99M LABELED RBC: HCPCS | Performed by: INTERNAL MEDICINE

## 2020-03-04 ENCOUNTER — TELEPHONE (OUTPATIENT)
Dept: CARDIOLOGY CLINIC | Age: 60
End: 2020-03-04

## 2020-05-15 ENCOUNTER — APPOINTMENT (OUTPATIENT)
Dept: GENERAL RADIOLOGY | Age: 60
DRG: 233 | End: 2020-05-15
Payer: MEDICARE

## 2020-05-15 ENCOUNTER — HOSPITAL ENCOUNTER (INPATIENT)
Age: 60
LOS: 6 days | Discharge: NURSING, REHAB OR LONG-TERM CARE | DRG: 233 | End: 2020-05-22
Attending: EMERGENCY MEDICINE | Admitting: INTERNAL MEDICINE
Payer: MEDICARE

## 2020-05-15 LAB
ALBUMIN SERPL-MCNC: 3.3 GM/DL (ref 3.4–5)
ALP BLD-CCNC: 64 IU/L (ref 40–129)
ALT SERPL-CCNC: 16 U/L (ref 10–40)
ANION GAP SERPL CALCULATED.3IONS-SCNC: 12 MMOL/L (ref 4–16)
AST SERPL-CCNC: 20 IU/L (ref 15–37)
BASOPHILS ABSOLUTE: 0.1 K/CU MM
BASOPHILS RELATIVE PERCENT: 1.4 % (ref 0–1)
BILIRUB SERPL-MCNC: 0.1 MG/DL (ref 0–1)
BUN BLDV-MCNC: 17 MG/DL (ref 6–23)
CALCIUM SERPL-MCNC: 7.6 MG/DL (ref 8.3–10.6)
CHLORIDE BLD-SCNC: 105 MMOL/L (ref 99–110)
CO2: 22 MMOL/L (ref 21–32)
CREAT SERPL-MCNC: 1.2 MG/DL (ref 0.9–1.3)
DIFFERENTIAL TYPE: ABNORMAL
EOSINOPHILS ABSOLUTE: 0.2 K/CU MM
EOSINOPHILS RELATIVE PERCENT: 3.4 % (ref 0–3)
GFR AFRICAN AMERICAN: >60 ML/MIN/1.73M2
GFR NON-AFRICAN AMERICAN: >60 ML/MIN/1.73M2
GLUCOSE BLD-MCNC: 215 MG/DL (ref 70–99)
HCT VFR BLD CALC: 45.2 % (ref 42–52)
HEMOGLOBIN: 15.2 GM/DL (ref 13.5–18)
IMMATURE NEUTROPHIL %: 0.2 % (ref 0–0.43)
LYMPHOCYTES ABSOLUTE: 2.6 K/CU MM
LYMPHOCYTES RELATIVE PERCENT: 40.5 % (ref 24–44)
MCH RBC QN AUTO: 33.4 PG (ref 27–31)
MCHC RBC AUTO-ENTMCNC: 33.6 % (ref 32–36)
MCV RBC AUTO: 99.3 FL (ref 78–100)
MONOCYTES ABSOLUTE: 0.5 K/CU MM
MONOCYTES RELATIVE PERCENT: 7.1 % (ref 0–4)
NUCLEATED RBC %: 0 %
PDW BLD-RTO: 13.1 % (ref 11.7–14.9)
PLATELET # BLD: 153 K/CU MM (ref 140–440)
PMV BLD AUTO: 11.9 FL (ref 7.5–11.1)
POTASSIUM SERPL-SCNC: 3.1 MMOL/L (ref 3.5–5.1)
PRO-BNP: 1962 PG/ML
RBC # BLD: 4.55 M/CU MM (ref 4.6–6.2)
SEGMENTED NEUTROPHILS ABSOLUTE COUNT: 3.1 K/CU MM
SEGMENTED NEUTROPHILS RELATIVE PERCENT: 47.4 % (ref 36–66)
SODIUM BLD-SCNC: 139 MMOL/L (ref 135–145)
TOTAL IMMATURE NEUTOROPHIL: 0.01 K/CU MM
TOTAL NUCLEATED RBC: 0 K/CU MM
TOTAL PROTEIN: 5.6 GM/DL (ref 6.4–8.2)
WBC # BLD: 6.5 K/CU MM (ref 4–10.5)

## 2020-05-15 PROCEDURE — 85025 COMPLETE CBC W/AUTO DIFF WBC: CPT

## 2020-05-15 PROCEDURE — 2580000003 HC RX 258: Performed by: EMERGENCY MEDICINE

## 2020-05-15 PROCEDURE — 83735 ASSAY OF MAGNESIUM: CPT

## 2020-05-15 PROCEDURE — 80053 COMPREHEN METABOLIC PANEL: CPT

## 2020-05-15 PROCEDURE — 84484 ASSAY OF TROPONIN QUANT: CPT

## 2020-05-15 PROCEDURE — 83880 ASSAY OF NATRIURETIC PEPTIDE: CPT

## 2020-05-15 PROCEDURE — 93005 ELECTROCARDIOGRAM TRACING: CPT | Performed by: INTERNAL MEDICINE

## 2020-05-15 PROCEDURE — 71045 X-RAY EXAM CHEST 1 VIEW: CPT

## 2020-05-15 PROCEDURE — 99285 EMERGENCY DEPT VISIT HI MDM: CPT

## 2020-05-15 RX ORDER — 0.9 % SODIUM CHLORIDE 0.9 %
1000 INTRAVENOUS SOLUTION INTRAVENOUS ONCE
Status: COMPLETED | OUTPATIENT
Start: 2020-05-15 | End: 2020-05-16

## 2020-05-15 RX ADMIN — SODIUM CHLORIDE 1000 ML: 9 INJECTION, SOLUTION INTRAVENOUS at 23:23

## 2020-05-16 ENCOUNTER — APPOINTMENT (OUTPATIENT)
Dept: ULTRASOUND IMAGING | Age: 60
DRG: 233 | End: 2020-05-16
Payer: MEDICARE

## 2020-05-16 LAB
ANION GAP SERPL CALCULATED.3IONS-SCNC: 9 MMOL/L (ref 4–16)
APTT: 30.2 SECONDS (ref 25.1–37.1)
APTT: 55.5 SECONDS (ref 25.1–37.1)
BASE EXCESS: 2 (ref 0–3.3)
BUN BLDV-MCNC: 18 MG/DL (ref 6–23)
CALCIUM SERPL-MCNC: 8.4 MG/DL (ref 8.3–10.6)
CARBON MONOXIDE, BLOOD: 2.3 % (ref 0–5)
CHLORIDE BLD-SCNC: 108 MMOL/L (ref 99–110)
CHOLESTEROL: 150 MG/DL
CO2 CONTENT: 23.5 MMOL/L (ref 19–24)
CO2: 24 MMOL/L (ref 21–32)
COMMENT: ABNORMAL
CREAT SERPL-MCNC: 1.4 MG/DL (ref 0.9–1.3)
EKG ATRIAL RATE: 78 BPM
EKG DIAGNOSIS: NORMAL
EKG P AXIS: 64 DEGREES
EKG P-R INTERVAL: 166 MS
EKG Q-T INTERVAL: 396 MS
EKG QRS DURATION: 120 MS
EKG QTC CALCULATION (BAZETT): 451 MS
EKG R AXIS: 65 DEGREES
EKG T AXIS: 152 DEGREES
EKG VENTRICULAR RATE: 78 BPM
ESTIMATED AVERAGE GLUCOSE: 146 MG/DL
GFR AFRICAN AMERICAN: >60 ML/MIN/1.73M2
GFR NON-AFRICAN AMERICAN: 52 ML/MIN/1.73M2
GLUCOSE BLD-MCNC: 106 MG/DL (ref 70–99)
GLUCOSE BLD-MCNC: 116 MG/DL (ref 70–99)
GLUCOSE BLD-MCNC: 121 MG/DL (ref 70–99)
GLUCOSE BLD-MCNC: 128 MG/DL (ref 70–99)
GLUCOSE BLD-MCNC: 128 MG/DL (ref 70–99)
HBA1C MFR BLD: 6.7 % (ref 4.2–6.3)
HCO3 ARTERIAL: 22.4 MMOL/L (ref 18–23)
HCT VFR BLD CALC: 44.1 % (ref 42–52)
HDLC SERPL-MCNC: 33 MG/DL
HEMOGLOBIN: 14.2 GM/DL (ref 13.5–18)
LDL CHOLESTEROL DIRECT: 111 MG/DL
MAGNESIUM: 1.8 MG/DL (ref 1.8–2.4)
MAGNESIUM: 2.2 MG/DL (ref 1.8–2.4)
MCH RBC QN AUTO: 32.8 PG (ref 27–31)
MCHC RBC AUTO-ENTMCNC: 32.2 % (ref 32–36)
MCV RBC AUTO: 101.8 FL (ref 78–100)
METHEMOGLOBIN ARTERIAL: 1 %
O2 SATURATION: 92.7 % (ref 96–97)
PCO2 ARTERIAL: 37 MMHG (ref 32–45)
PDW BLD-RTO: 13 % (ref 11.7–14.9)
PH BLOOD: 7.39 (ref 7.34–7.45)
PLATELET # BLD: 146 K/CU MM (ref 140–440)
PMV BLD AUTO: 12.1 FL (ref 7.5–11.1)
PO2 ARTERIAL: 64 MMHG (ref 75–100)
POTASSIUM SERPL-SCNC: 4.3 MMOL/L (ref 3.5–5.1)
RBC # BLD: 4.33 M/CU MM (ref 4.6–6.2)
SODIUM BLD-SCNC: 141 MMOL/L (ref 135–145)
TRIGL SERPL-MCNC: 104 MG/DL
TROPONIN T: 0.11 NG/ML
TROPONIN T: 0.13 NG/ML
TROPONIN T: 0.15 NG/ML
TROPONIN T: 0.2 NG/ML
WBC # BLD: 6.4 K/CU MM (ref 4–10.5)

## 2020-05-16 PROCEDURE — 83735 ASSAY OF MAGNESIUM: CPT

## 2020-05-16 PROCEDURE — B2151ZZ FLUOROSCOPY OF LEFT HEART USING LOW OSMOLAR CONTRAST: ICD-10-PCS | Performed by: INTERNAL MEDICINE

## 2020-05-16 PROCEDURE — 2709999900 HC NON-CHARGEABLE SUPPLY

## 2020-05-16 PROCEDURE — C1769 GUIDE WIRE: HCPCS

## 2020-05-16 PROCEDURE — 93458 L HRT ARTERY/VENTRICLE ANGIO: CPT

## 2020-05-16 PROCEDURE — C1887 CATHETER, GUIDING: HCPCS

## 2020-05-16 PROCEDURE — 93971 EXTREMITY STUDY: CPT

## 2020-05-16 PROCEDURE — 80061 LIPID PANEL: CPT

## 2020-05-16 PROCEDURE — 6360000002 HC RX W HCPCS: Performed by: INTERNAL MEDICINE

## 2020-05-16 PROCEDURE — 6370000000 HC RX 637 (ALT 250 FOR IP): Performed by: SURGERY

## 2020-05-16 PROCEDURE — 85730 THROMBOPLASTIN TIME PARTIAL: CPT

## 2020-05-16 PROCEDURE — 6370000000 HC RX 637 (ALT 250 FOR IP): Performed by: INTERNAL MEDICINE

## 2020-05-16 PROCEDURE — 4A023N7 MEASUREMENT OF CARDIAC SAMPLING AND PRESSURE, LEFT HEART, PERCUTANEOUS APPROACH: ICD-10-PCS | Performed by: INTERNAL MEDICINE

## 2020-05-16 PROCEDURE — 93010 ELECTROCARDIOGRAM REPORT: CPT | Performed by: INTERNAL MEDICINE

## 2020-05-16 PROCEDURE — 85610 PROTHROMBIN TIME: CPT

## 2020-05-16 PROCEDURE — 83721 ASSAY OF BLOOD LIPOPROTEIN: CPT

## 2020-05-16 PROCEDURE — 2000000000 HC ICU R&B

## 2020-05-16 PROCEDURE — C1894 INTRO/SHEATH, NON-LASER: HCPCS

## 2020-05-16 PROCEDURE — 80048 BASIC METABOLIC PNL TOTAL CA: CPT

## 2020-05-16 PROCEDURE — 36415 COLL VENOUS BLD VENIPUNCTURE: CPT

## 2020-05-16 PROCEDURE — 93458 L HRT ARTERY/VENTRICLE ANGIO: CPT | Performed by: INTERNAL MEDICINE

## 2020-05-16 PROCEDURE — 82962 GLUCOSE BLOOD TEST: CPT

## 2020-05-16 PROCEDURE — 83036 HEMOGLOBIN GLYCOSYLATED A1C: CPT

## 2020-05-16 PROCEDURE — 6360000002 HC RX W HCPCS

## 2020-05-16 PROCEDURE — 94761 N-INVAS EAR/PLS OXIMETRY MLT: CPT

## 2020-05-16 PROCEDURE — 82803 BLOOD GASES ANY COMBINATION: CPT

## 2020-05-16 PROCEDURE — 85027 COMPLETE CBC AUTOMATED: CPT

## 2020-05-16 PROCEDURE — 6360000004 HC RX CONTRAST MEDICATION

## 2020-05-16 PROCEDURE — 93880 EXTRACRANIAL BILAT STUDY: CPT

## 2020-05-16 PROCEDURE — B2111ZZ FLUOROSCOPY OF MULTIPLE CORONARY ARTERIES USING LOW OSMOLAR CONTRAST: ICD-10-PCS | Performed by: INTERNAL MEDICINE

## 2020-05-16 PROCEDURE — 2500000003 HC RX 250 WO HCPCS

## 2020-05-16 PROCEDURE — 99223 1ST HOSP IP/OBS HIGH 75: CPT | Performed by: INTERNAL MEDICINE

## 2020-05-16 PROCEDURE — 84484 ASSAY OF TROPONIN QUANT: CPT

## 2020-05-16 RX ORDER — ATORVASTATIN CALCIUM 40 MG/1
40 TABLET, FILM COATED ORAL DAILY
Status: DISCONTINUED | OUTPATIENT
Start: 2020-05-16 | End: 2020-05-16 | Stop reason: DRUGHIGH

## 2020-05-16 RX ORDER — METOPROLOL SUCCINATE 25 MG/1
25 TABLET, EXTENDED RELEASE ORAL DAILY
Status: DISCONTINUED | OUTPATIENT
Start: 2020-05-16 | End: 2020-05-18

## 2020-05-16 RX ORDER — NICOTINE POLACRILEX 4 MG
15 LOZENGE BUCCAL PRN
Status: DISCONTINUED | OUTPATIENT
Start: 2020-05-16 | End: 2020-05-18

## 2020-05-16 RX ORDER — SODIUM CHLORIDE 0.9 % (FLUSH) 0.9 %
10 SYRINGE (ML) INJECTION PRN
Status: DISCONTINUED | OUTPATIENT
Start: 2020-05-16 | End: 2020-05-18

## 2020-05-16 RX ORDER — POTASSIUM CHLORIDE 20 MEQ/1
40 TABLET, EXTENDED RELEASE ORAL ONCE
Status: COMPLETED | OUTPATIENT
Start: 2020-05-16 | End: 2020-05-16

## 2020-05-16 RX ORDER — CHLORTHALIDONE 25 MG/1
25 TABLET ORAL DAILY
Status: DISCONTINUED | OUTPATIENT
Start: 2020-05-16 | End: 2020-05-18

## 2020-05-16 RX ORDER — QUINAPRIL 40 MG/1
40 TABLET ORAL DAILY
Status: DISCONTINUED | OUTPATIENT
Start: 2020-05-16 | End: 2020-05-16 | Stop reason: CLARIF

## 2020-05-16 RX ORDER — SODIUM CHLORIDE 0.9 % (FLUSH) 0.9 %
10 SYRINGE (ML) INJECTION EVERY 12 HOURS SCHEDULED
Status: DISCONTINUED | OUTPATIENT
Start: 2020-05-16 | End: 2020-05-18

## 2020-05-16 RX ORDER — TAMSULOSIN HYDROCHLORIDE 0.4 MG/1
0.8 CAPSULE ORAL DAILY
Status: DISCONTINUED | OUTPATIENT
Start: 2020-05-16 | End: 2020-05-22 | Stop reason: HOSPADM

## 2020-05-16 RX ORDER — SODIUM CHLORIDE 0.9 % (FLUSH) 0.9 %
10 SYRINGE (ML) INJECTION EVERY 12 HOURS SCHEDULED
Status: DISCONTINUED | OUTPATIENT
Start: 2020-05-16 | End: 2020-05-16 | Stop reason: SDUPTHER

## 2020-05-16 RX ORDER — MORPHINE SULFATE 4 MG/ML
4 INJECTION, SOLUTION INTRAMUSCULAR; INTRAVENOUS
Status: DISCONTINUED | OUTPATIENT
Start: 2020-05-16 | End: 2020-05-18

## 2020-05-16 RX ORDER — HEPARIN SODIUM 1000 [USP'U]/ML
4000 INJECTION, SOLUTION INTRAVENOUS; SUBCUTANEOUS ONCE
Status: COMPLETED | OUTPATIENT
Start: 2020-05-16 | End: 2020-05-16

## 2020-05-16 RX ORDER — LISINOPRIL 20 MG/1
20 TABLET ORAL DAILY
Status: DISCONTINUED | OUTPATIENT
Start: 2020-05-16 | End: 2020-05-18

## 2020-05-16 RX ORDER — MORPHINE SULFATE 4 MG/ML
2 INJECTION, SOLUTION INTRAMUSCULAR; INTRAVENOUS
Status: DISCONTINUED | OUTPATIENT
Start: 2020-05-16 | End: 2020-05-18

## 2020-05-16 RX ORDER — ASPIRIN 81 MG/1
81 TABLET, CHEWABLE ORAL DAILY
Status: DISCONTINUED | OUTPATIENT
Start: 2020-05-17 | End: 2020-05-16 | Stop reason: SDUPTHER

## 2020-05-16 RX ORDER — HEPARIN SODIUM 1000 [USP'U]/ML
2000 INJECTION, SOLUTION INTRAVENOUS; SUBCUTANEOUS PRN
Status: DISCONTINUED | OUTPATIENT
Start: 2020-05-16 | End: 2020-05-18

## 2020-05-16 RX ORDER — ACETAMINOPHEN 325 MG/1
650 TABLET ORAL EVERY 4 HOURS PRN
Status: DISCONTINUED | OUTPATIENT
Start: 2020-05-16 | End: 2020-05-18

## 2020-05-16 RX ORDER — PROMETHAZINE HYDROCHLORIDE 25 MG/1
12.5 TABLET ORAL EVERY 6 HOURS PRN
Status: DISCONTINUED | OUTPATIENT
Start: 2020-05-16 | End: 2020-05-18

## 2020-05-16 RX ORDER — SODIUM CHLORIDE 0.9 % (FLUSH) 0.9 %
10 SYRINGE (ML) INJECTION PRN
Status: DISCONTINUED | OUTPATIENT
Start: 2020-05-16 | End: 2020-05-16 | Stop reason: SDUPTHER

## 2020-05-16 RX ORDER — HYDRALAZINE HYDROCHLORIDE 20 MG/ML
10 INJECTION INTRAMUSCULAR; INTRAVENOUS EVERY 10 MIN PRN
Status: DISCONTINUED | OUTPATIENT
Start: 2020-05-16 | End: 2020-05-18

## 2020-05-16 RX ORDER — HEPARIN SODIUM 1000 [USP'U]/ML
4000 INJECTION, SOLUTION INTRAVENOUS; SUBCUTANEOUS PRN
Status: DISCONTINUED | OUTPATIENT
Start: 2020-05-16 | End: 2020-05-18

## 2020-05-16 RX ORDER — POLYETHYLENE GLYCOL 3350 17 G/17G
17 POWDER, FOR SOLUTION ORAL DAILY PRN
Status: DISCONTINUED | OUTPATIENT
Start: 2020-05-16 | End: 2020-05-18

## 2020-05-16 RX ORDER — 0.9 % SODIUM CHLORIDE 0.9 %
500 INTRAVENOUS SOLUTION INTRAVENOUS ONCE
Status: DISCONTINUED | OUTPATIENT
Start: 2020-05-16 | End: 2020-05-18

## 2020-05-16 RX ORDER — DEXTROSE MONOHYDRATE 25 G/50ML
12.5 INJECTION, SOLUTION INTRAVENOUS PRN
Status: DISCONTINUED | OUTPATIENT
Start: 2020-05-16 | End: 2020-05-18

## 2020-05-16 RX ORDER — ISOSORBIDE MONONITRATE 30 MG/1
30 TABLET, EXTENDED RELEASE ORAL DAILY
Status: DISCONTINUED | OUTPATIENT
Start: 2020-05-16 | End: 2020-05-18

## 2020-05-16 RX ORDER — HEPARIN SODIUM 10000 [USP'U]/100ML
10 INJECTION, SOLUTION INTRAVENOUS CONTINUOUS
Status: DISCONTINUED | OUTPATIENT
Start: 2020-05-16 | End: 2020-05-18

## 2020-05-16 RX ORDER — ATORVASTATIN CALCIUM 40 MG/1
80 TABLET, FILM COATED ORAL NIGHTLY
Status: DISCONTINUED | OUTPATIENT
Start: 2020-05-16 | End: 2020-05-18

## 2020-05-16 RX ORDER — ACETAMINOPHEN 650 MG/1
650 SUPPOSITORY RECTAL EVERY 6 HOURS PRN
Status: DISCONTINUED | OUTPATIENT
Start: 2020-05-16 | End: 2020-05-18

## 2020-05-16 RX ORDER — ACETAMINOPHEN 325 MG/1
650 TABLET ORAL EVERY 6 HOURS PRN
Status: DISCONTINUED | OUTPATIENT
Start: 2020-05-16 | End: 2020-05-16 | Stop reason: SDUPTHER

## 2020-05-16 RX ORDER — DEXTROSE MONOHYDRATE 50 MG/ML
100 INJECTION, SOLUTION INTRAVENOUS PRN
Status: DISCONTINUED | OUTPATIENT
Start: 2020-05-16 | End: 2020-05-18

## 2020-05-16 RX ORDER — ONDANSETRON 2 MG/ML
4 INJECTION INTRAMUSCULAR; INTRAVENOUS EVERY 6 HOURS PRN
Status: DISCONTINUED | OUTPATIENT
Start: 2020-05-16 | End: 2020-05-18

## 2020-05-16 RX ORDER — ASPIRIN 81 MG/1
81 TABLET, CHEWABLE ORAL DAILY
Status: DISCONTINUED | OUTPATIENT
Start: 2020-05-16 | End: 2020-05-18

## 2020-05-16 RX ADMIN — ISOSORBIDE MONONITRATE 30 MG: 30 TABLET, EXTENDED RELEASE ORAL at 10:20

## 2020-05-16 RX ADMIN — HEPARIN SODIUM AND DEXTROSE 12 UNITS/KG/HR: 10000; 5 INJECTION INTRAVENOUS at 05:18

## 2020-05-16 RX ADMIN — HEPARIN SODIUM 4000 UNITS: 1000 INJECTION, SOLUTION INTRAVENOUS; SUBCUTANEOUS at 05:19

## 2020-05-16 RX ADMIN — POTASSIUM CHLORIDE 40 MEQ: 1500 TABLET, EXTENDED RELEASE ORAL at 03:47

## 2020-05-16 RX ADMIN — TAMSULOSIN HYDROCHLORIDE 0.8 MG: 0.4 CAPSULE ORAL at 10:20

## 2020-05-16 RX ADMIN — ATORVASTATIN CALCIUM 40 MG: 40 TABLET, FILM COATED ORAL at 10:16

## 2020-05-16 RX ADMIN — ASPIRIN 81 MG 81 MG: 81 TABLET ORAL at 10:20

## 2020-05-16 RX ADMIN — HEPARIN SODIUM AND DEXTROSE 14.98 UNITS/KG/HR: 10000; 5 INJECTION INTRAVENOUS at 18:05

## 2020-05-16 RX ADMIN — CHLORTHALIDONE 25 MG: 25 TABLET ORAL at 10:20

## 2020-05-16 RX ADMIN — LISINOPRIL 20 MG: 20 TABLET ORAL at 10:15

## 2020-05-16 RX ADMIN — METOPROLOL SUCCINATE 25 MG: 25 TABLET, EXTENDED RELEASE ORAL at 10:15

## 2020-05-16 RX ADMIN — ATORVASTATIN CALCIUM 80 MG: 40 TABLET, FILM COATED ORAL at 21:03

## 2020-05-16 RX ADMIN — SERTRALINE HYDROCHLORIDE 50 MG: 50 TABLET ORAL at 10:16

## 2020-05-16 ASSESSMENT — ENCOUNTER SYMPTOMS
VOMITING: 0
CONSTIPATION: 0
COLOR CHANGE: 0
SHORTNESS OF BREATH: 1
WHEEZING: 0
CHEST TIGHTNESS: 0
EYE PAIN: 0
ABDOMINAL PAIN: 0
NAUSEA: 0
BACK PAIN: 0
PHOTOPHOBIA: 0
COUGH: 0
BLOOD IN STOOL: 0
DIARRHEA: 0

## 2020-05-16 NOTE — ED PROVIDER NOTES
Normal      Alcohol Swabs (ALCOHOL PADS) 70 % PADS 2 TIMES DAILY Starting 2019, Disp-180 each, R-3, Normal      nitroGLYCERIN (NITROSTAT) 0.4 MG SL tablet up to max of 3 total doses.  If no relief after 1 dose, call 911., Disp-25 tablet, R-3Normal      Blood Pressure KIT Disp-1 kit, R-0, NormalUse prn             ALLERGIES     Ibuprofen; Augmentin [amoxicillin-pot clavulanate]; and Codeine    FAMILY HISTORY       Family History   Problem Relation Age of Onset    Hypertension Mother     High Cholesterol Mother     Alcohol Abuse Mother     Depression Mother     Alcohol Abuse Father     Stomach Cancer Father     Depression Sister     Diabetes Brother     Depression Sister     Stomach Cancer Brother     Alcohol Abuse Brother           SOCIAL HISTORY       Social History     Socioeconomic History    Marital status:      Spouse name: None    Number of children: None    Years of education: None    Highest education level: None   Occupational History    Occupation:      Comment: full time   Social Needs    Financial resource strain: None    Food insecurity     Worry: None     Inability: None    Transportation needs     Medical: None     Non-medical: None   Tobacco Use    Smoking status: Former Smoker     Packs/day: 2.00     Years: 45.00     Pack years: 90.00     Types: Cigarettes     Last attempt to quit: 2016     Years since quittin.0    Smokeless tobacco: Never Used   Substance and Sexual Activity    Alcohol use: No     Alcohol/week: 0.0 standard drinks    Drug use: No    Sexual activity: Yes     Partners: Female   Lifestyle    Physical activity     Days per week: None     Minutes per session: None    Stress: None   Relationships    Social connections     Talks on phone: None     Gets together: None     Attends Advent service: None     Active member of club or organization: None     Attends meetings of clubs or organizations: None     Relationship status: None    Intimate partner violence     Fear of current or ex partner: None     Emotionally abused: None     Physically abused: None     Forced sexual activity: None   Other Topics Concern    None   Social History Narrative    Wears glasses       SCREENINGS   NIH Stroke Scale  Interval: Reassessment  Level of Consciousness (1a. ): Alert  LOC Questions (1b. ): Answers both correctly  LOC Commands (1c. ): Performs both tasks correctly  Best Gaze (2. ): Normal  Visual (3. ): No visual loss  Facial Palsy (4. ): (!) Minor paralysis  Motor Arm, Left (5a. ): Some effort against gravity  Motor Arm, Right (5b. ): Some effort against gravity  Motor Leg, Left (6a. ): Some effort against gravity  Motor Leg, Right (6b. ): Some effort against gravity  Limb Ataxia (7. ): (!) Present in two limbs  Sensory (8. ): Normal  Best Language (9. ): Mild to moderate aphasia  Dysarthria (10. ): Normal  Extinction and Inattention (11): No abnormality  Total: 12Glasgow Coma Scale  Eye Opening: Spontaneous  Best Verbal Response: Oriented  Best Motor Response: Obeys commands  Chuy Coma Scale Score: 15          PHYSICAL EXAM    (up to 7 for level 4, 8 or more for level 5)     ED Triage Vitals [05/15/20 2250]   BP Temp Temp Source Pulse Resp SpO2 Height Weight   (!) 182/101 97.9 °F (36.6 °C) Oral 84 18 99 % 5' 7\" (1.702 m) 206 lb (93.4 kg)       Physical Exam  Vitals signs reviewed. Constitutional:       Appearance: He is not ill-appearing or toxic-appearing. HENT:      Head: Normocephalic and atraumatic. Nose: No congestion or rhinorrhea. Mouth/Throat:      Mouth: Mucous membranes are moist.      Pharynx: No oropharyngeal exudate or posterior oropharyngeal erythema. Eyes:      General:         Right eye: No discharge. Left eye: No discharge. Extraocular Movements: Extraocular movements intact. Pupils: Pupils are equal, round, and reactive to light. Neck:      Musculoskeletal: Normal range of motion.  No muscular tenderness. Cardiovascular:      Rate and Rhythm: Normal rate. Heart sounds: No friction rub. No gallop. Pulmonary:      Effort: Pulmonary effort is normal. No respiratory distress. Comments: Respirations unlabored  No retractions, no increased work of breathing  No reproducible chest wall tenderness to palpaiton  Chest:      Chest wall: No tenderness. Abdominal:      Palpations: Abdomen is soft. Tenderness: There is no abdominal tenderness. There is no guarding. Comments: Abdomen soft, non-tender, non-peritoneal  No guarding on abdominal exam   Musculoskeletal:         General: No signs of injury. Right lower leg: No edema. Left lower leg: No edema. Comments: Extremities atraumatic   Lymphadenopathy:      Cervical: No cervical adenopathy. Skin:     General: Skin is warm. Capillary Refill: Capillary refill takes less than 2 seconds. Findings: No erythema, lesion or rash. Neurological:      General: No focal deficit present. Mental Status: He is alert and oriented to person, place, and time.          DIAGNOSTIC RESULTS     Labs Reviewed   COMPREHENSIVE METABOLIC PANEL W/ REFLEX TO MG FOR LOW K - Abnormal; Notable for the following components:       Result Value    Potassium 3.1 (*)     Glucose 215 (*)     Calcium 7.6 (*)     Alb 3.3 (*)     Total Protein 5.6 (*)     All other components within normal limits   CBC WITH AUTO DIFFERENTIAL - Abnormal; Notable for the following components:    RBC 4.55 (*)     MCH 33.4 (*)     MPV 11.9 (*)     Monocytes % 7.1 (*)     Eosinophils % 3.4 (*)     Basophils % 1.4 (*)     All other components within normal limits   TROPONIN - Abnormal; Notable for the following components:    Troponin T 0.127 (*)     All other components within normal limits   BRAIN NATRIURETIC PEPTIDE - Abnormal; Notable for the following components:    Pro-BNP 1,962 (*)     All other components within normal limits   TROPONIN - Abnormal; HCO3, Arterial 23.5 (*)     Base Exc, Mixed 3.0 (*)     O2 Sat 97.4 (*)     POC Glucose 170 (*)     Hematocrit 36.0 (*)     Hemoglobin 12.3 (*)     POC Creatinine 1.4 (*)     All other components within normal limits   POC CRITICAL CARE PROFILE - Abnormal; Notable for the following components:    pH, Bld 7.30 (*)     pCO2, Arterial 47.4 (*)     pO2, Arterial 69.5 (*)     HCO3, Arterial 23.3 (*)     Base Exc, Mixed 3.4 (*)     O2 Sat 91.6 (*)     POC Glucose 160 (*)     Hematocrit 39.0 (*)     Hemoglobin 13.1 (*)     POC Creatinine 1.4 (*)     All other components within normal limits   POC CRITICAL CARE PROFILE - Abnormal; Notable for the following components:    pH, Bld 7.30 (*)     pCO2, Arterial 47.6 (*)     pO2, Arterial 100.3 (*)     HCO3, Arterial 23.2 (*)     Base Exc, Mixed 3.7 (*)     POC Glucose 132 (*)     POC Chloride 110 (*)     POC Creatinine 1.4 (*)     All other components within normal limits   BASIC METABOLIC PANEL - Abnormal; Notable for the following components:    CREATININE 1.7 (*)     Glucose 134 (*)     GFR Non- 41 (*)     GFR  50 (*)     All other components within normal limits   CBC - Abnormal; Notable for the following components:    WBC 12.1 (*)     RBC 4.09 (*)     Hemoglobin 13.4 (*)     Hematocrit 41.9 (*)     .4 (*)     MCH 32.8 (*)     Platelets 033 (*)     MPV 12.3 (*)     All other components within normal limits   POC CRITICAL CARE PROFILE - Abnormal; Notable for the following components:    pH, Bld 7.33 (*)     pO2, Arterial 126.5 (*)     HCO3, Arterial 23.2 (*)     Base Exc, Mixed 2.8 (*)     O2 Sat 98.6 (*)     POC Glucose 137 (*)     POC Chloride 110 (*)     POC Creatinine 1.4 (*)     All other components within normal limits   POC CRITICAL CARE PROFILE - Abnormal; Notable for the following components:    pO2, Arterial 111.4 (*)     Base Exc, Mixed 2.8 (*)     O2 Sat 98.0 (*)     POC Glucose 141 (*)     Hematocrit 40.0 (*)     POC Creatinine 1.4 (*)     All other components within normal limits   CBC - Abnormal; Notable for the following components:    WBC 12.1 (*)     RBC 3.93 (*)     Hemoglobin 13.0 (*)     Hematocrit 40.5 (*)     .1 (*)     MCH 33.1 (*)     Platelets 346 (*)     MPV 12.9 (*)     All other components within normal limits   COMPREHENSIVE METABOLIC PANEL - Abnormal; Notable for the following components:    CREATININE 1.6 (*)     Glucose 129 (*)     Alb 3.3 (*)     Total Protein 5.0 (*)      (*)     Alkaline Phosphatase 33 (*)     GFR Non- 44 (*)     GFR  54 (*)     All other components within normal limits   BLOOD GAS, ARTERIAL - Abnormal; Notable for the following components:    pH, Bld 7.33 (*)     pO2, Arterial 60 (*)     HCO3, Arterial 23.2 (*)     CO2 Content 24.6 (*)     O2 Sat 91.3 (*)     All other components within normal limits   POC CRITICAL CARE PROFILE - Abnormal; Notable for the following components:    pCO2, Arterial 45.9 (*)     pO2, Arterial 74.0 (*)     HCO3, Arterial 24.8 (*)     Base Exc, Mixed 1.3 (*)     O2 Sat 93.6 (*)     POC CALCIUM 1.36 (*)     POC Glucose 127 (*)     Hematocrit 40.0 (*)     POC Creatinine 1.9 (*)     All other components within normal limits   POC CRITICAL CARE PROFILE - Abnormal; Notable for the following components:    pH, Bld 7.31 (*)     pCO2, Arterial 47.6 (*)     pO2, Arterial 132.9 (*)     HCO3, Arterial 24.1 (*)     Base Exc, Mixed 2.5 (*)     O2 Sat 98.7 (*)     POC CALCIUM 1.35 (*)     POC Glucose 133 (*)     Hematocrit 40.0 (*)     POC Creatinine 1.8 (*)     All other components within normal limits   BASIC METABOLIC PANEL - Abnormal; Notable for the following components:    BUN 24 (*)     CREATININE 1.6 (*)     Glucose 127 (*)     Calcium 8.2 (*)     GFR Non- 44 (*)     GFR  54 (*)     All other components within normal limits   CBC - Abnormal; Notable for the following components:    WBC 13.1 components:    POC Glucose 121 (*)     All other components within normal limits   POCT GLUCOSE - Abnormal; Notable for the following components:    POC Glucose 128 (*)     All other components within normal limits   POCT GLUCOSE - Abnormal; Notable for the following components:    POC Glucose 116 (*)     All other components within normal limits   POCT GLUCOSE - Abnormal; Notable for the following components:    POC Glucose 106 (*)     All other components within normal limits   POCT GLUCOSE - Abnormal; Notable for the following components:    POC Glucose 169 (*)     All other components within normal limits   POCT GLUCOSE - Abnormal; Notable for the following components:    POC Glucose 180 (*)     All other components within normal limits   POCT GLUCOSE - Abnormal; Notable for the following components:    POC Glucose 123 (*)     All other components within normal limits   POCT GLUCOSE - Abnormal; Notable for the following components:    POC Glucose 128 (*)     All other components within normal limits   POCT GLUCOSE - Abnormal; Notable for the following components:    POC Glucose 158 (*)     All other components within normal limits   POCT GLUCOSE - Abnormal; Notable for the following components:    POC Glucose 162 (*)     All other components within normal limits   POCT GLUCOSE - Abnormal; Notable for the following components:    POC Glucose 170 (*)     All other components within normal limits   POCT GLUCOSE - Abnormal; Notable for the following components:    POC Glucose 157 (*)     All other components within normal limits   POCT GLUCOSE - Abnormal; Notable for the following components:    POC Glucose 159 (*)     All other components within normal limits   POCT GLUCOSE - Abnormal; Notable for the following components:    POC Glucose 163 (*)     All other components within normal limits   POCT GLUCOSE - Abnormal; Notable for the following components:    POC Glucose 135 (*)     All other components within normal POCT GLUCOSE - Abnormal; Notable for the following components:    POC Glucose 106 (*)     All other components within normal limits   POCT GLUCOSE - Abnormal; Notable for the following components:    POC Glucose 125 (*)     All other components within normal limits   POCT GLUCOSE - Abnormal; Notable for the following components:    POC Glucose 138 (*)     All other components within normal limits   POCT GLUCOSE - Abnormal; Notable for the following components:    POC Glucose 218 (*)     All other components within normal limits   MAGNESIUM   MAGNESIUM   APTT   MAGNESIUM   COVID-19   MAGNESIUM   PLATELET COUNT   PROTIME-INR   APTT   MAGNESIUM   CALCIUM, IONIZED   PROTIME/INR & PTT   MAGNESIUM   CALCIUM, IONIZED   HOMOCYSTEINE, SERUM   MAGNESIUM   CALCIUM, IONIZED   MAGNESIUM   POC ACT PLUS   POC ACT PLUS   POC ACT PLUS   POC ACT PLUS   POC ACT PLUS   POCT GLUCOSE   POCT GLUCOSE   POCT GLUCOSE   POCT GLUCOSE   POCT GLUCOSE   POCT GLUCOSE   POCT GLUCOSE   POCT GLUCOSE   POCT GLUCOSE   POCT GLUCOSE   POCT GLUCOSE   POCT GLUCOSE   POCT GLUCOSE   POCT GLUCOSE   POCT GLUCOSE   POCT GLUCOSE   POCT GLUCOSE   POCT GLUCOSE   POCT GLUCOSE   POCT GLUCOSE   POCT GLUCOSE   POCT GLUCOSE   POCT GLUCOSE   POCT GLUCOSE   POCT GLUCOSE   POCT GLUCOSE   POCT GLUCOSE   POCT GLUCOSE   POCT GLUCOSE   POCT GLUCOSE   POCT GLUCOSE   POCT GLUCOSE   POCT GLUCOSE   POCT GLUCOSE   POCT GLUCOSE   POCT GLUCOSE   POCT GLUCOSE   POCT GLUCOSE   POCT GLUCOSE   POCT GLUCOSE   POCT GLUCOSE   POCT GLUCOSE   POCT GLUCOSE   POCT GLUCOSE   POCT GLUCOSE   POCT GLUCOSE   POCT GLUCOSE   POCT GLUCOSE   POCT GLUCOSE   POCT GLUCOSE   POCT GLUCOSE   POCT GLUCOSE   POCT GLUCOSE   POCT GLUCOSE   POCT GLUCOSE   TYPE AND SCREEN            RADIOLOGY:     Non-plain film images such as CT, Ultrasound and MRI are read by the radiologist. Plain radiographic images are visualized and preliminarily interpreted by the emergency physician.        Interpretation per the Trace right apical pneumothorax. CT HEAD WO CONTRAST   Final Result   1. No acute intracranial abnormality. These findings were discussed with Dr. Roly Herrera at 10:05 a.m. 05/19/2020.   2. Chronic small vessel ischemic disease. XR CHEST PORTABLE   Final Result   Pulmonary vascular congestion. XR CHEST PORTABLE   Final Result   Mild bilateral perihilar opacification, likely mild edema. No pneumothorax. Satisfactory position of endotracheal tube. XR CHEST STANDARD (2 VW)   Final Result   No acute process. US LOWER EXTREMITY UNILATERAL VEIN MAPPING   Final Result   Left greater saphenous vein is patent with measurements as detailed above. Vascular carotid duplex bilateral   Final Result   The right internal carotid artery demonstrates 0-50% stenosis . The left internal carotid artery demonstrates 0-50% stenosis . Bilateral vertebral arteries are patent with flow in the normal direction. Stable greater than 50% stenosis of the right external carotid artery. No significant change from the prior study of 05/10/2016. XR CHEST 1 VW   Final Result   No acute abnormality.                ED BEDSIDE ULTRASOUND:   Performed by ED Physician Bri Chandler MD       LABS:  Labs Reviewed   COMPREHENSIVE METABOLIC PANEL W/ REFLEX TO MG FOR LOW K - Abnormal; Notable for the following components:       Result Value    Potassium 3.1 (*)     Glucose 215 (*)     Calcium 7.6 (*)     Alb 3.3 (*)     Total Protein 5.6 (*)     All other components within normal limits   CBC WITH AUTO DIFFERENTIAL - Abnormal; Notable for the following components:    RBC 4.55 (*)     MCH 33.4 (*)     MPV 11.9 (*)     Monocytes % 7.1 (*)     Eosinophils % 3.4 (*)     Basophils % 1.4 (*)     All other components within normal limits   TROPONIN - Abnormal; Notable for the following components:    Troponin T 0.127 (*)     All other components within normal limits   BRAIN normal limits   PROTIME/INR & PTT - Abnormal; Notable for the following components:    aPTT 62.9 (*)     All other components within normal limits   URINALYSIS - Abnormal; Notable for the following components:    Mucus, UA RARE (*)     All other components within normal limits   APTT - Abnormal; Notable for the following components:    aPTT 81.5 (*)     All other components within normal limits   BASIC METABOLIC PANEL W/ REFLEX TO MG FOR LOW K - Abnormal; Notable for the following components:    CREATININE 1.4 (*)     Glucose 130 (*)     GFR Non- 52 (*)     All other components within normal limits   PROTIME/INR & PTT - Abnormal; Notable for the following components:    aPTT 56.8 (*)     All other components within normal limits   POC CRITICAL CARE PROFILE - Abnormal; Notable for the following components:    pO2, Arterial 113.0 (*)     HCO3, Arterial 25.1 (*)     O2 Sat 98.2 (*)     POC Glucose 124 (*)     Hematocrit 37.0 (*)     Hemoglobin 12.6 (*)     POC Creatinine 1.5 (*)     All other components within normal limits   POC CRITICAL CARE PROFILE - Abnormal; Notable for the following components:    pO2, Arterial 619.3 (*)     HCO3, Arterial 25.1 (*)     O2 Sat 100.0 (*)     POC CALCIUM 1.08 (*)     POC Glucose 127 (*)     Hematocrit 33.0 (*)     Hemoglobin 11.2 (*)     All other components within normal limits   BASIC METABOLIC PANEL - Abnormal; Notable for the following components:    CREATININE 1.4 (*)     Glucose 156 (*)     Calcium 8.1 (*)     GFR Non- 52 (*)     All other components within normal limits   CBC - Abnormal; Notable for the following components:    WBC 15.2 (*)     RBC 3.89 (*)     Hemoglobin 13.0 (*)     Hematocrit 39.9 (*)     .6 (*)     MCH 33.4 (*)     Platelets 206 (*)     MPV 12.4 (*)     All other components within normal limits   MAGNESIUM - Abnormal; Notable for the following components:    Magnesium 2.9 (*)     All other components within normal

## 2020-05-16 NOTE — PLAN OF CARE
Problem: Infection:  Goal: Will remain free from infection  Description: Will remain free from infection  Outcome: Ongoing     Problem: Safety:  Goal: Free from accidental physical injury  Description: Free from accidental physical injury  Outcome: Ongoing  Goal: Free from intentional harm  Description: Free from intentional harm  Outcome: Ongoing     Problem: Daily Care:  Goal: Daily care needs are met  Description: Daily care needs are met  Outcome: Ongoing     Problem: Pain:  Goal: Patient's pain/discomfort is manageable  Description: Patient's pain/discomfort is manageable  Outcome: Ongoing     Problem: Skin Integrity:  Goal: Skin integrity will stabilize  Description: Skin integrity will stabilize  Outcome: Ongoing     Problem: Discharge Planning:  Goal: Patients continuum of care needs are met  Description: Patients continuum of care needs are met  Outcome: Ongoing

## 2020-05-16 NOTE — CONSULTS
Cardiology Consult Note      Reason for consultation: Chest pain    Chief complaint : Chest pain    Referring physician: Humphrey Hernandez      Primary care physician: Bao Cesar MD      History of Present Illness:     Patient is a 79-year-old male with history of ischemic cardiomyopathy, hypertension, hyperlipidemia presented to the hospital by EMS with chest pain. Patient reports that while mowing the yard yesterday he developed chest pain and it was midsternal crushing kind of chest pain which was similar to his previous episodes of chest pain when he needed intervention, and he had to stop mowing. Chest pain relieved with rest.  So he did not come to the hospital.  He went to bed last night and he woke up again with chest pain with more severe intensity 8 out of 10 crushing radiating to both arms with diaphoresis and shortness of breath. At this time his wife called the EMS and when EMS came his blood pressure was high per patient. Patient was given sublingual nitro and aspirin which relieved the chest pain and then patient was brought to the hospital.  Patient denies any active chest pain now. He is currently on heparin. Patient was fairly his normal self until yesterday when all of his symptoms started    Patient denies any dizziness, syncope, palpitations  He has some shortness of breath on exertion but that is chronic    Patient reports that he is on blood pressure medication which he forgot to take over last 3 days    Patient denies any bleeding, nausea or vomiting  Patient reports he is maintaining physical distancing and has no recent sick contacts  Patient has no cough or fever    Pastmedical history:   Past Medical History:   Diagnosis Date    B12 deficiency     CAD (coronary artery disease)     GERD (gastroesophageal reflux disease)     [facility] syndrome ([facility]     H/O echocardiogram 7/30/13, 6/16/10    7/13-EF 50-55% WNL.  6/10-EF53%, mod LVH,  Depression Sister     Diabetes Brother     Depression Sister     Stomach Cancer Brother     Alcohol Abuse Brother        Social history :  reports that he quit smoking about 4 years ago. His smoking use included cigarettes. He has a 90.00 pack-year smoking history. He has never used smokeless tobacco. He reports that he does not drink alcohol or use drugs. Allergies   Allergen Reactions    Ibuprofen Other (See Comments)     Stomach irritation    Augmentin [Amoxicillin-Pot Clavulanate]     Codeine        No current facility-administered medications on file prior to encounter.       Current Outpatient Medications on File Prior to Encounter   Medication Sig Dispense Refill    chlorthalidone (HYGROTON) 25 MG tablet Take 1 tablet by mouth daily 30 tablet 3    sertraline (ZOLOFT) 50 MG tablet Take 1 tablet by mouth daily 90 tablet 3    tamsulosin (FLOMAX) 0.4 MG capsule Take 1 capsule by mouth daily (Patient taking differently: Take 0.8 mg by mouth daily ) 90 capsule 3    glimepiride (AMARYL) 2 MG tablet Take 1 tablet by mouth every morning 90 tablet 3    isosorbide mononitrate (IMDUR) 30 MG extended release tablet Take 1 tablet by mouth daily 30 tablet 3    hydrALAZINE (APRESOLINE) 10 MG tablet Take 1 tablet by mouth every 8 hours 90 tablet 3    metoprolol succinate (TOPROL XL) 25 MG extended release tablet Take 1 tablet by mouth daily 30 tablet 3    atorvastatin (LIPITOR) 40 MG tablet Take 1 tablet by mouth daily 90 tablet 3    quinapril (ACCUPRIL) 40 MG tablet Take 1 tablet by mouth daily 90 tablet 3    Blood Glucose Monitoring Suppl (BLOOD GLUCOSE MONITOR SYSTEM) w/Device KIT Use twice per day 1 kit 0    blood glucose monitor strips 1 strip by Other route 2 times daily for 180 doses 180 strip 3    Lancets MISC 1 each by Does not apply route 2 times daily for 180 doses 180 each 3    Alcohol Swabs (ALCOHOL PADS) 70 % PADS 1 Units by Does not apply route 2 times daily 180 each 3    aspirin 81

## 2020-05-16 NOTE — PROGRESS NOTES
05/16/20 0406   BP: (!) 154/95   Pulse: 60   Resp: 16   Temp: 98.3 °F (36.8 °C)   SpO2: 99%     Physical Exam:   GEN: Awake male, laying flat in bed. Nontoxic-appearing. Pleasant. Answers questions appropriate. EYES: No eye discharge. HENT: Membranes moist.  NECK:  RESP: Clear to auscultation bilaterally. No wheezing. CV: RRR. No murmur. No peripheral edema. GI: Obese abdomen. Soft. Nontender. : No [place] in place. MSK: No bony fractures. No gross deformities. SKIN: warm, dry, no rashes  NEURO: Cranial nerves appear grossly intact, normal speech, no lateralizing weakness.   PSYCH: Awake, alert, oriented     Medications:   Medications:    sodium chloride flush  10 mL Intravenous 2 times per day    aspirin  81 mg Oral Daily    atorvastatin  40 mg Oral Daily    chlorthalidone  25 mg Oral Daily    isosorbide mononitrate  30 mg Oral Daily    metoprolol succinate  25 mg Oral Daily    sertraline  50 mg Oral Daily    tamsulosin  0.8 mg Oral Daily    insulin lispro  0-6 Units Subcutaneous TID WC    insulin lispro  0-3 Units Subcutaneous Nightly    insulin lispro  0-6 Units Subcutaneous Q4H    lisinopril  20 mg Oral Daily      Infusions:    heparin (porcine) 12 Units/kg/hr (05/16/20 0518)    dextrose       PRN Meds: sodium chloride flush, 10 mL, PRN  acetaminophen, 650 mg, Q6H PRN    Or  acetaminophen, 650 mg, Q6H PRN  polyethylene glycol, 17 g, Daily PRN  promethazine, 12.5 mg, Q6H PRN    Or  ondansetron, 4 mg, Q6H PRN  heparin (porcine), 4,000 Units, PRN  heparin (porcine), 2,000 Units, PRN  glucose, 15 g, PRN  dextrose, 12.5 g, PRN  glucagon (rDNA), 1 mg, PRN  dextrose, 100 mL/hr, PRN  morphine, 2 mg, Q2H PRN    Or  morphine, 4 mg, Q2H PRN      Electronically signed by Megan Mora MD on 5/16/2020 at 8:03 AM

## 2020-05-16 NOTE — H&P
nitroglycerin and aspirin by EMS with subsequent improvement of chest pain. By time he arrived ER the chest pain was almost gone. He did not take his medications including aspirin over the past 2 days. Ten point ROS reviewed negative, unless as noted above    Objective:   No intake or output data in the 24 hours ending 05/16/20 0256   Vitals:   Vitals:    05/15/20 2323   BP: (!) 168/99   Pulse: 75   Resp: 14   Temp:    SpO2: 98%     Physical Exam:   GEN Awake male, sitting upright in bed in no apparent distress. Appears given age. EYES Pupils are equally round. No scleral erythema, discharge, or conjunctivitis. HENT Mucous membranes are moist. Oral pharynx without exudates, no evidence of thrush. NECK Supple, no apparent thyromegaly or masses. RESP Clear to auscultation, no wheezes, rales or rhonchi. Symmetric chest movement while on room air. CARDIO/VASC S1/S2 auscultated. Regular rate without appreciable murmurs, rubs, or gallops. No JVD or carotid bruits. Peripheral pulses equal bilaterally and palpable. No peripheral edema. GI Abdomen is soft without significant tenderness, masses, or guarding. Bowel sounds are normoactive. Rectal exam deferred.  No costovertebral angle tenderness. York catheter is not present. HEME/LYMPH No palpable cervical lymphadenopathy and no hepatosplenomegaly. No petechiae or ecchymoses. MSK No gross joint deformities. SKIN Normal coloration, warm, dry. NEURO Cranial nerves appear grossly intact, normal speech, no lateralizing weakness. PSYCH Awake, alert, oriented x 4. Affect appropriate. Past Medical History:      Past Medical History:   Diagnosis Date    B12 deficiency     CAD (coronary artery disease)     GERD (gastroesophageal reflux disease)     [facility]illain Barr[facility] syndrome ([facility]     H/O echocardiogram 7/30/13, 6/16/10    7/13-EF 50-55% WNL.  6/10-EF53%, mod LVH, MARCI, RVE, trace PI, mild MR/TR, 6/09    Heart murmur     History of myocardial infarction 2003    History of nuclear MUGA test 03/03/2020    EF 53%, Normal, No ICD needed    History of PTCA 6/03;2/03 6/03-PTCA w/stents x2, 2/03-PTCA w/stent x3    History of PTCA 03/15/2018    LAD EF25%    Hx of cardiovascular stress test 04/25/2018    EF 28% Nuclear scintigraphy demonstartes inferior wall infarct.  Hx of cardiovascular stress test 01/27/2020    Abnormal Study. Large area of inferior & apical-lateral infarct. No significant reversible ischemia. Severly reduced LVEF 23%    HX OTHER MEDICAL 03/14/2018    lifevest  d/c 7/2/2018    Hyperlipidemia     Hypertension     Impaired glucose tolerance 6/3/2016    MUGA 07/09/2018    EF 47%    NSTEMI (non-ST elevated myocardial infarction) ([facility] [facility] 03/15/2018    Patient in clinical research study 05/2018    3 year study dalcitripib vs placebo [phone]    Pneumonia due to organism     Renal cyst 5/23/2014    S/P cardiac catheterization 7/12/13 7/13-EF=55%. PATENT LAD AND RCA STENTS, OM2 50% stenosis, & Normal wall motion.  Stroke (cerebrum) [facility])     May 2016     PSHX:  has a past surgical history that includes Hand surgery; Coronary angioplasty (6/03); shoulder surgery (Right, 2011); Colonoscopy (8/21/14); Endoscopy, colon, diagnostic (8/21/14); Cardiac catheterization (7/13); and Percutaneous Transluminal Coronary Angio (03/2018). Allergies: Allergies   Allergen Reactions    Ibuprofen Other (See Comments)     Stomach irritation    Augmentin [Amoxicillin-Pot Clavulanate]     Codeine        FAM HX: family history includes Alcohol Abuse in his brother, father, and mother; Depression in his mother, sister, and sister; Diabetes in his brother; High Cholesterol in his mother; Hypertension in his mother; Stomach Cancer in his brother and father.   Soc HX:   Social History     Socioeconomic History    Marital status:      Spouse name: None    Number of children: None    Years of education: None    Highest education level: None Occupational History    Occupation:      Comment: full time   Social Needs    Financial resource strain: None    Food insecurity     Worry: None     Inability: None    Transportation needs     Medical: None     Non-medical: None   Tobacco Use    Smoking status: Former Smoker     Packs/day: 2.00     Years: 45.00     Pack years: 90.00     Types: Cigarettes     Last attempt to quit: 2016     Years since quittin.0    Smokeless tobacco: Never Used   Substance and Sexual Activity    Alcohol use: No     Alcohol/week: 0.0 standard drinks    Drug use: No    Sexual activity: Yes     Partners: Female   Lifestyle    Physical activity     Days per week: None     Minutes per session: None    Stress: None   Relationships    Social connections     Talks on phone: None     Gets together: None     Attends Jehovah's witness service: None     Active member of club or organization: None     Attends meetings of clubs or organizations: None     Relationship status: None    Intimate partner violence     Fear of current or ex partner: None     Emotionally abused: None     Physically abused: None     Forced sexual activity: None   Other Topics Concern    None   Social History Narrative    Wears glasses       Medications:   Medications:    Infusions:   PRN Meds:   Prior to Admission medications    Medication Sig Start Date End Date Taking?  Authorizing Provider   chlorthalidone (HYGROTON) 25 MG tablet Take 1 tablet by mouth daily 20   Randall Osei MD   sertraline (ZOLOFT) 50 MG tablet Take 1 tablet by mouth daily 20   Terrance Tracey MD   tamsulosin [facility]) 0.4 MG capsule Take 1 capsule by mouth daily  Patient taking differently: Take 0.8 mg by mouth daily  20   Terrance Tracey MD   glimepiride (AMARYL) 2 MG tablet Take 1 tablet by mouth every morning 20   Terrance Tracey MD   isosorbide mononitrate (IMDUR) 30 MG extended release tablet Take 1 tablet by mouth daily 20   [facility] MD JC   hydrALAZINE (APRESOLINE) 10 MG tablet Take 1 tablet by mouth every 8 hours 1/29/20   Radha Renee MD   metoprolol succinate (TOPROL XL) 25 MG extended release tablet Take 1 tablet by mouth daily 1/30/20   Radha Renee MD   atorvastatin (LIPITOR) 40 MG tablet Take 1 tablet by mouth daily 1/15/20   Kane Delacruz MD   quinapril (ACCUPRIL) 40 MG tablet Take 1 tablet by mouth daily 1/15/20   Kane Delacruz MD   Blood Glucose Monitoring Suppl (BLOOD GLUCOSE MONITOR SYSTEM) w/Device KIT Use twice per day 1/22/19   Nadia Wisdom MD   blood glucose monitor strips 1 strip by Other route 2 times daily for 180 doses 1/22/19 2/4/20  Nadia Wisdom MD   Lancets MISC 1 each by Does not apply route 2 times daily for 180 doses 1/22/19 4/22/19  Nadia Wisdom MD   Alcohol Swabs (ALCOHOL PADS) 70 % PADS 1 Units by Does not apply route 2 times daily 1/22/19   Nadia Wisdom MD   aspirin 81 MG chewable tablet Take 1 tablet by mouth daily 3/18/18   Bryan Camara MD   nitroGLYCERIN (NITROSTAT) 0.4 MG SL tablet up to max of 3 total doses.  If no relief after 1 dose, call 911. 3/17/18   Bryan Camara MD   Blood Pressure KIT Use prn 6/3/16   Nadia Wisdom MD         Electronically signed by Salima Mann MD on 5/16/2020 at 2:56 AM

## 2020-05-16 NOTE — ED NOTES
Report received from [facility], care assumed at this time     Ijeoma Chiang, [facility]  05/16/20 2243

## 2020-05-17 ENCOUNTER — APPOINTMENT (OUTPATIENT)
Dept: GENERAL RADIOLOGY | Age: 60
DRG: 233 | End: 2020-05-17
Payer: MEDICARE

## 2020-05-17 LAB
ALBUMIN SERPL-MCNC: 3.6 GM/DL (ref 3.4–5)
ALP BLD-CCNC: 47 IU/L (ref 40–129)
ALT SERPL-CCNC: 16 U/L (ref 10–40)
ANION GAP SERPL CALCULATED.3IONS-SCNC: 10 MMOL/L (ref 4–16)
APTT: 62.9 SECONDS (ref 25.1–37.1)
APTT: 81.5 SECONDS (ref 25.1–37.1)
AST SERPL-CCNC: 21 IU/L (ref 15–37)
BACTERIA: NEGATIVE /HPF
BASOPHILS ABSOLUTE: 0.1 K/CU MM
BASOPHILS RELATIVE PERCENT: 1 % (ref 0–1)
BILIRUB SERPL-MCNC: 0.3 MG/DL (ref 0–1)
BILIRUBIN URINE: NEGATIVE MG/DL
BLOOD, URINE: NEGATIVE
BUN BLDV-MCNC: 15 MG/DL (ref 6–23)
CALCIUM SERPL-MCNC: 8.2 MG/DL (ref 8.3–10.6)
CHLORIDE BLD-SCNC: 106 MMOL/L (ref 99–110)
CLARITY: CLEAR
CO2: 22 MMOL/L (ref 21–32)
COLOR: YELLOW
CREAT SERPL-MCNC: 1.3 MG/DL (ref 0.9–1.3)
DIFFERENTIAL TYPE: ABNORMAL
EOSINOPHILS ABSOLUTE: 0.3 K/CU MM
EOSINOPHILS RELATIVE PERCENT: 4.3 % (ref 0–3)
GFR AFRICAN AMERICAN: >60 ML/MIN/1.73M2
GFR NON-AFRICAN AMERICAN: 56 ML/MIN/1.73M2
GLUCOSE BLD-MCNC: 123 MG/DL (ref 70–99)
GLUCOSE BLD-MCNC: 128 MG/DL (ref 70–99)
GLUCOSE BLD-MCNC: 129 MG/DL (ref 70–99)
GLUCOSE BLD-MCNC: 169 MG/DL (ref 70–99)
GLUCOSE BLD-MCNC: 180 MG/DL (ref 70–99)
GLUCOSE, URINE: NEGATIVE MG/DL
HCT VFR BLD CALC: 45.5 % (ref 42–52)
HEMOGLOBIN: 14.6 GM/DL (ref 13.5–18)
IMMATURE NEUTROPHIL %: 0.2 % (ref 0–0.43)
INR BLD: 1 INDEX
KETONES, URINE: NEGATIVE MG/DL
LEUKOCYTE ESTERASE, URINE: NEGATIVE
LV EF: 38 %
LVEF MODALITY: NORMAL
LYMPHOCYTES ABSOLUTE: 2.4 K/CU MM
LYMPHOCYTES RELATIVE PERCENT: 39.1 % (ref 24–44)
MAGNESIUM: 2.1 MG/DL (ref 1.8–2.4)
MCH RBC QN AUTO: 32.9 PG (ref 27–31)
MCHC RBC AUTO-ENTMCNC: 32.1 % (ref 32–36)
MCV RBC AUTO: 102.5 FL (ref 78–100)
MONOCYTES ABSOLUTE: 0.5 K/CU MM
MONOCYTES RELATIVE PERCENT: 8.2 % (ref 0–4)
MUCUS: ABNORMAL HPF
NITRITE URINE, QUANTITATIVE: NEGATIVE
NUCLEATED RBC %: 0 %
PDW BLD-RTO: 13.1 % (ref 11.7–14.9)
PH, URINE: 5 (ref 5–8)
PLATELET # BLD: 146 K/CU MM (ref 140–440)
PMV BLD AUTO: 12 FL (ref 7.5–11.1)
POTASSIUM SERPL-SCNC: 4.2 MMOL/L (ref 3.5–5.1)
PROTEIN UA: NEGATIVE MG/DL
PROTHROMBIN TIME: 12.1 SECONDS (ref 11.7–14.5)
RBC # BLD: 4.44 M/CU MM (ref 4.6–6.2)
RBC URINE: ABNORMAL /HPF (ref 0–3)
SARS-COV-2, NAAT: NOT DETECTED
SEGMENTED NEUTROPHILS ABSOLUTE COUNT: 2.9 K/CU MM
SEGMENTED NEUTROPHILS RELATIVE PERCENT: 47.2 % (ref 36–66)
SODIUM BLD-SCNC: 138 MMOL/L (ref 135–145)
SOURCE: NORMAL
SPECIFIC GRAVITY UA: 1.02 (ref 1–1.03)
SQUAMOUS EPITHELIAL: <1 /HPF
TOTAL IMMATURE NEUTOROPHIL: 0.01 K/CU MM
TOTAL NUCLEATED RBC: 0 K/CU MM
TOTAL PROTEIN: 5.8 GM/DL (ref 6.4–8.2)
TOTAL RETICULOCYTE COUNT: 0.08 K/CU MM
TRICHOMONAS: ABNORMAL /HPF
UROBILINOGEN, URINE: NORMAL MG/DL (ref 0.2–1)
WBC # BLD: 6.1 K/CU MM (ref 4–10.5)
WBC UA: 2 /HPF (ref 0–2)

## 2020-05-17 PROCEDURE — 85730 THROMBOPLASTIN TIME PARTIAL: CPT

## 2020-05-17 PROCEDURE — 71046 X-RAY EXAM CHEST 2 VIEWS: CPT

## 2020-05-17 PROCEDURE — 99231 SBSQ HOSP IP/OBS SF/LOW 25: CPT | Performed by: INTERNAL MEDICINE

## 2020-05-17 PROCEDURE — P9045 ALBUMIN (HUMAN), 5%, 250 ML: HCPCS

## 2020-05-17 PROCEDURE — 86900 BLOOD TYPING SEROLOGIC ABO: CPT

## 2020-05-17 PROCEDURE — 2000000000 HC ICU R&B

## 2020-05-17 PROCEDURE — 6360000002 HC RX W HCPCS: Performed by: INTERNAL MEDICINE

## 2020-05-17 PROCEDURE — 6370000000 HC RX 637 (ALT 250 FOR IP): Performed by: SURGERY

## 2020-05-17 PROCEDURE — P9016 RBC LEUKOCYTES REDUCED: HCPCS

## 2020-05-17 PROCEDURE — 94761 N-INVAS EAR/PLS OXIMETRY MLT: CPT

## 2020-05-17 PROCEDURE — 83735 ASSAY OF MAGNESIUM: CPT

## 2020-05-17 PROCEDURE — 85610 PROTHROMBIN TIME: CPT

## 2020-05-17 PROCEDURE — 80053 COMPREHEN METABOLIC PANEL: CPT

## 2020-05-17 PROCEDURE — 86901 BLOOD TYPING SEROLOGIC RH(D): CPT

## 2020-05-17 PROCEDURE — 86850 RBC ANTIBODY SCREEN: CPT

## 2020-05-17 PROCEDURE — 85025 COMPLETE CBC W/AUTO DIFF WBC: CPT

## 2020-05-17 PROCEDURE — 6360000002 HC RX W HCPCS

## 2020-05-17 PROCEDURE — 86922 COMPATIBILITY TEST ANTIGLOB: CPT

## 2020-05-17 PROCEDURE — 93306 TTE W/DOPPLER COMPLETE: CPT

## 2020-05-17 PROCEDURE — 81001 URINALYSIS AUTO W/SCOPE: CPT

## 2020-05-17 PROCEDURE — U0002 COVID-19 LAB TEST NON-CDC: HCPCS

## 2020-05-17 PROCEDURE — 6370000000 HC RX 637 (ALT 250 FOR IP): Performed by: INTERNAL MEDICINE

## 2020-05-17 PROCEDURE — 82962 GLUCOSE BLOOD TEST: CPT

## 2020-05-17 PROCEDURE — 2500000003 HC RX 250 WO HCPCS

## 2020-05-17 RX ORDER — CHLORHEXIDINE GLUCONATE 0.12 MG/ML
15 RINSE ORAL 2 TIMES DAILY
Status: DISCONTINUED | OUTPATIENT
Start: 2020-05-17 | End: 2020-05-18

## 2020-05-17 RX ORDER — CHLORHEXIDINE GLUCONATE 4 G/100ML
SOLUTION TOPICAL SEE ADMIN INSTRUCTIONS
Status: DISCONTINUED | OUTPATIENT
Start: 2020-05-17 | End: 2020-05-18 | Stop reason: HOSPADM

## 2020-05-17 RX ORDER — CARVEDILOL 6.25 MG/1
6.25 TABLET ORAL ONCE
Status: DISCONTINUED | OUTPATIENT
Start: 2020-05-18 | End: 2020-05-18

## 2020-05-17 RX ORDER — CEFAZOLIN SODIUM 2 G/50ML
2 SOLUTION INTRAVENOUS
Status: COMPLETED | OUTPATIENT
Start: 2020-05-18 | End: 2020-05-18

## 2020-05-17 RX ORDER — METOPROLOL TARTRATE 5 MG/5ML
5 INJECTION INTRAVENOUS ONCE
Status: DISCONTINUED | OUTPATIENT
Start: 2020-05-17 | End: 2020-05-18 | Stop reason: HOSPADM

## 2020-05-17 RX ORDER — SODIUM PHOSPHATE, DIBASIC AND SODIUM PHOSPHATE, MONOBASIC 7; 19 G/133ML; G/133ML
1 ENEMA RECTAL ONCE
Status: COMPLETED | OUTPATIENT
Start: 2020-05-17 | End: 2020-05-17

## 2020-05-17 RX ADMIN — ATORVASTATIN CALCIUM 80 MG: 40 TABLET, FILM COATED ORAL at 20:44

## 2020-05-17 RX ADMIN — LISINOPRIL 20 MG: 20 TABLET ORAL at 08:31

## 2020-05-17 RX ADMIN — Medication: at 20:44

## 2020-05-17 RX ADMIN — ASPIRIN 81 MG 81 MG: 81 TABLET ORAL at 08:31

## 2020-05-17 RX ADMIN — METOPROLOL TARTRATE 12.5 MG: 25 TABLET, FILM COATED ORAL at 20:44

## 2020-05-17 RX ADMIN — TAMSULOSIN HYDROCHLORIDE 0.8 MG: 0.4 CAPSULE ORAL at 08:30

## 2020-05-17 RX ADMIN — SODIUM PHOSPHATE, DIBASIC AND SODIUM PHOSPHATE, MONOBASIC 1 ENEMA: 7; 19 ENEMA RECTAL at 20:45

## 2020-05-17 RX ADMIN — METOPROLOL SUCCINATE 25 MG: 25 TABLET, EXTENDED RELEASE ORAL at 08:31

## 2020-05-17 RX ADMIN — PROMETHAZINE HYDROCHLORIDE 12.5 MG: 25 TABLET ORAL at 20:51

## 2020-05-17 RX ADMIN — SERTRALINE HYDROCHLORIDE 50 MG: 50 TABLET ORAL at 08:31

## 2020-05-17 RX ADMIN — ISOSORBIDE MONONITRATE 30 MG: 30 TABLET, EXTENDED RELEASE ORAL at 08:31

## 2020-05-17 RX ADMIN — HEPARIN SODIUM AND DEXTROSE 14.88 UNITS/KG/HR: 10000; 5 INJECTION INTRAVENOUS at 13:00

## 2020-05-17 RX ADMIN — CHLORTHALIDONE 25 MG: 25 TABLET ORAL at 08:31

## 2020-05-17 RX ADMIN — CHLORHEXIDINE GLUCONATE 0.12% ORAL RINSE 15 ML: 1.2 LIQUID ORAL at 20:44

## 2020-05-17 RX ADMIN — INSULIN LISPRO 1 UNITS: 100 INJECTION, SOLUTION INTRAVENOUS; SUBCUTANEOUS at 12:49

## 2020-05-17 ASSESSMENT — PAIN SCALES - GENERAL
PAINLEVEL_OUTOF10: 0

## 2020-05-17 NOTE — PROGRESS NOTES
questions. Thank you      Chana Abad MD, 5/17/2020 10:45 AM     Please note this report has been partially produced using speech recognition software and may contain errors related to that system including errors in grammar, punctuation, and spelling, as well as words and phrases that may be inappropriate. If there are any questions or concerns please feel free to contact the dictating provider for clarification.

## 2020-05-17 NOTE — PROGRESS NOTES
Spoke to [facility] regarding PFT testing. Pt surgery second case. She will attempt to do testing today. If unable to complete today she will page staff and have pt done first case in morning. PFT come in at 0700.

## 2020-05-17 NOTE — PROGRESS NOTES
Dr. Samantha Heaton sent perfect serve message regarding rapid covid testing. Need approval for test to be done.  Order placed in [address] per MD.

## 2020-05-17 NOTE — CONSULTS
PRN  dextrose 5 % solution, 100 mL/hr, Intravenous, PRN  insulin lispro (HUMALOG) injection vial 0-6 Units, 0-6 Units, Subcutaneous, TID WC  insulin lispro (HUMALOG) injection vial 0-3 Units, 0-3 Units, Subcutaneous, Nightly  morphine injection 2 mg, 2 mg, Intravenous, Q2H PRN **OR** morphine injection 4 mg, 4 mg, Intravenous, Q2H PRN  lisinopril (PRINIVIL;ZESTRIL) tablet 20 mg, 20 mg, Oral, Daily  0.9 % sodium chloride bolus, 500 mL, Intravenous, Once  sodium chloride flush 0.9 % injection 10 mL, 10 mL, Intravenous, 2 times per day  sodium chloride flush 0.9 % injection 10 mL, 10 mL, Intravenous, PRN  acetaminophen (TYLENOL) tablet 650 mg, 650 mg, Oral, Q4H PRN  magnesium hydroxide (MILK OF MAGNESIA) 400 MG/5ML suspension 30 mL, 30 mL, Oral, Daily PRN  hydrALAZINE (APRESOLINE) injection 10 mg, 10 mg, Intravenous, Q10 Min PRN  sodium chloride flush 0.9 % injection 10 mL, 10 mL, Intravenous, 2 times per day  sodium chloride flush 0.9 % injection 10 mL, 10 mL, Intravenous, PRN  atorvastatin (LIPITOR) tablet 80 mg, 80 mg, Oral, Nightly  Allergies:     Allergies   Allergen Reactions    Ibuprofen Other (See Comments)     Stomach irritation    Augmentin [Amoxicillin-Pot Clavulanate]     Codeine        Social History:   Social History     Socioeconomic History    Marital status:      Spouse name: Not on file    Number of children: Not on file    Years of education: Not on file    Highest education level: Not on file   Occupational History    Occupation:      Comment: full time   Social Needs    Financial resource strain: Not on file    Food insecurity     Worry: Not on file     Inability: Not on file   [employer] needs     Medical: Not on file     Non-medical: Not on file   Tobacco Use    Smoking status: Former Smoker     Packs/day: 2.00     Years: 45.00     Pack years: 90.00     Types: Cigarettes     Last attempt to quit: 2016     Years since quittin.0    Smokeless tobacco: Never Used   Substance and Sexual Activity    Alcohol use: No     Alcohol/week: 0.0 standard drinks    Drug use: No    Sexual activity: Yes     Partners: Female   Lifestyle    Physical activity     Days per week: Not on file     Minutes per session: Not on file    Stress: Not on file   Relationships    Social connections     Talks on phone: Not on file     Gets together: Not on file     Attends [employer] service: Not on file     Active member of club or organization: Not on file     Attends meetings of clubs or organizations: Not on file     Relationship status: Not on file    Intimate partner violence     Fear of current or ex partner: Not on file     Emotionally abused: Not on file     Physically abused: Not on file     Forced sexual activity: Not on file   Other Topics Concern    Not on file   Social History Narrative    Wears glasses       Family History:        Problem Relation Age of Onset    Hypertension Mother     High Cholesterol Mother     Alcohol Abuse Mother     Depression Mother     Alcohol Abuse Father     Stomach Cancer Father     Depression Sister     Diabetes Brother     Depression Sister     Stomach Cancer Brother     Alcohol Abuse Brother        REVIEW OF SYSTEMS:  Constitutional: - fatigue, - fever, - chills, - night sweats  Eyes: - vision loss  Cardiovascular: -  chest pain, - palpitations, - leg swelling, - leg pain   Respiratory: - cough, - shortness of breath, - wheezing   GI: - nausea, - vomiting, - abdominal pain, - constipation, - diarrhea   : - dysuria   MSK: - joint pain, - muscle pain  Integument: - rash, - skin color change   Heme: - easy bruising or bleeding  Neurologic: - headache, - weakness, - dizziness, - paresthesias       EXAM:  Constitutional: Blood pressure (!) 161/91, pulse 61, temperature 97.9 °F (36.6 °C), temperature source Oral, resp. rate 18, height 5' 7\" (1.702 m), weight 207 lb 11.2 oz (94.2 kg), SpO2 97 %.  No apparent distress, appears stated age and cooperative. Neurologic: follows commands, no focal weakness noted   Lungs: Good respiratory effort. Clear to auscultation,   CV: Regular rate/ rhythm , no peripheral edema, feet warm and well perfused  GI: Soft, non-tender in all four quadrants, non-distended, + bowel sounds, liver and spleen no palpable masses  : bladder nondistended   MSK: no obvious deformity   Skin: warm, pink and dry       DATA:  Cardiac catheterization films reviewed independently  The right coronary arteries occluded with a faintly collateralizing a small posterior descending artery  LAD and circumflex have multiple significant stenosis including in-stent stenosis of the LAD  Left renal function moderately depressed with probable old inferior wall infarct    IMPRESSION  Patient Active Problem List   Diagnosis    CAD (coronary artery disease)    History of PTCA    History of acute inferior wall myocardial infarction    Chest pain    B12 deficiency    Fatty liver    Colon, diverticulosis    Renal cyst    Adrenal adenoma    Hyperlipidemia LDL goal <70    Obesity (BMI 30-39. 9)    Cerebrovascular accident (CVA) due to thrombosis of left middle cerebral artery (HCC)    Gait disturbance    Essential hypertension    Hemiparesis affecting right side as late effect of cerebrovascular accident (CVA) (HCC)    Shortness of breath    Gastroesophageal reflux disease    Acute ST elevation myocardial infarction (STEMI) (HCC)    Hypertensive emergency    Unstable angina ([address]    NSTEMI (non-ST elevated myocardial infarction) ([address]    Hypertensive urgency    Pneumonia due to organism    Frequent PVCs    Ischemic cardiomyopathy    History of Guillain-Buckfield syndrome    Acute pain of left shoulder             RECOMMENDATIONS:  Per review of these clinical presentation and cardiac cath and findings discussed option of coronary revascularization by surgery in extended detail with the patient  Upon review of his options and review of

## 2020-05-17 NOTE — PROGRESS NOTES
Anesthesia called via perfect serve. Dr. Lorenza Briseno taking call. VM left informing MD of consult for CABG: Date 5/17/20, second case. [address] Dr. Lorenza Briseno returned call. Will see patient in morning.

## 2020-05-17 NOTE — PROGRESS NOTES
Hospitalist Progress Note      Name:  Aaliyah Sousa /Age/Sex: 1960  (60 y.o. male)   MRN & CSN:  [id number] & [id number] Admission Date/Time: 5/15/2020 10:48 PM   Location:  -A PCP: Scott Gomez MD         Hospital Day: 3    Assessment and Plan:   Aaliyah Sousa is a 60 y.o.  male  who presents with <principal problem not specified>    >Non-ST elevation MI had typical anginal pain elevated troponin T wave inversion lateral leads twelve-lead EKG. - Cardiology consulted, [facility] with LAD has multiple tandem lesions of 80-90   % each of in stent Restenosis in proximal and mid, distal LAD, OM 2 has 90 % disease, RCA is  100 % occluded  - IV, Heparin drip, ASA, statin, B-Blocker  - CABG recommended, CTS consulted, CABG planned tomorrow    >Diabetes mellitus type 2 poorly controlled-Insulin sliding scale  >Hypertension poorly controlled; chronic.- chlorthalidone, lisinopril, toprol  >Hyperlipidemia  >Morbid obesity    Diet Diet NPO Time Specified Exceptions are: Ice Chips, Sips with Meds  DIET CARDIAC; Carb Control: 4 carb choices (60 gms)/meal; Low Sodium (2 GM)   DVT Prophylaxis ? Heparin drip   Code Status Full Code   Disposition  pending completing treatment      History of Present Illness:     Pt S&E. No chest pain, no dyspnea, no abd pain, no N/V, no F/C.     10-14 point ROS reviewed negative, unless as noted above    Objective: Intake/Output Summary (Last 24 hours) at 2020 0906  Last data filed at 2020 0835  Gross per 24 hour   Intake 959 ml   Output 1275 ml   Net -316 ml      Vitals:   Vitals:    20 0835   BP:    Pulse: 61   Resp:    Temp:    SpO2:      Physical Exam:      GEN Awake male, cooperative, no apparent distress. RESP Clear to auscultation, no wheezes, rales or rhonchi. Symmetric chest movement . CARDIO/VASC S1/S2 auscultated. Regular rate. GI Abdomen is soft without significant tenderness, Bowel sounds are normoactive.    MSK No gross joint

## 2020-05-17 NOTE — PROGRESS NOTES
Lab called regarding missing thermal amplitude result. Lab confirmed order but thermal was not done. Will need purple top tube and lab will process thermals.

## 2020-05-18 ENCOUNTER — ANESTHESIA EVENT (OUTPATIENT)
Dept: OPERATING ROOM | Age: 60
DRG: 233 | End: 2020-05-18
Payer: MEDICARE

## 2020-05-18 ENCOUNTER — APPOINTMENT (OUTPATIENT)
Dept: GENERAL RADIOLOGY | Age: 60
DRG: 233 | End: 2020-05-18
Payer: MEDICARE

## 2020-05-18 ENCOUNTER — ANESTHESIA (OUTPATIENT)
Dept: OPERATING ROOM | Age: 60
DRG: 233 | End: 2020-05-18
Payer: MEDICARE

## 2020-05-18 VITALS
TEMPERATURE: 96.8 F | SYSTOLIC BLOOD PRESSURE: 158 MMHG | OXYGEN SATURATION: 96 % | RESPIRATION RATE: 10 BRPM | DIASTOLIC BLOOD PRESSURE: 88 MMHG

## 2020-05-18 LAB
ANION GAP SERPL CALCULATED.3IONS-SCNC: 10 MMOL/L (ref 4–16)
ANION GAP SERPL CALCULATED.3IONS-SCNC: 11 MMOL/L (ref 4–16)
APTT: 27.2 SECONDS (ref 25.1–37.1)
APTT: 56.8 SECONDS (ref 25.1–37.1)
BASE EXCESS MIXED: 0.4 (ref 0–1.2)
BASE EXCESS MIXED: 0.4 (ref 0–1.2)
BASE EXCESS MIXED: 0.6 (ref 0–1.2)
BASE EXCESS MIXED: 2.8 (ref 0–1.2)
BASE EXCESS MIXED: 2.8 (ref 0–1.2)
BASE EXCESS MIXED: 3 (ref 0–1.2)
BASE EXCESS MIXED: 3.4 (ref 0–1.2)
BASE EXCESS MIXED: 3.5 (ref 0–1.2)
BASE EXCESS MIXED: 3.7 (ref 0–1.2)
BASE EXCESS MIXED: 4 (ref 0–1.2)
BASE EXCESS: ABNORMAL (ref 0–3.3)
BUN BLDV-MCNC: 15 MG/DL (ref 6–23)
BUN BLDV-MCNC: 16 MG/DL (ref 6–23)
CALCIUM SERPL-MCNC: 8.1 MG/DL (ref 8.3–10.6)
CALCIUM SERPL-MCNC: 8.6 MG/DL (ref 8.3–10.6)
CHLORIDE BLD-SCNC: 104 MMOL/L (ref 99–110)
CHLORIDE BLD-SCNC: 110 MMOL/L (ref 99–110)
CO2: 22 MMOL/L (ref 21–32)
CO2: 23 MMOL/L (ref 21–32)
CO2: 24 MMOL/L (ref 21–32)
CO2: 24 MMOL/L (ref 21–32)
CO2: 25 MMOL/L (ref 21–32)
CO2: 27 MMOL/L (ref 21–32)
CO2: 27 MMOL/L (ref 21–32)
CREAT SERPL-MCNC: 1.4 MG/DL (ref 0.9–1.3)
CREAT SERPL-MCNC: 1.4 MG/DL (ref 0.9–1.3)
GFR AFRICAN AMERICAN: >60 ML/MIN/1.73M2
GFR AFRICAN AMERICAN: >60 ML/MIN/1.73M2
GFR NON-AFRICAN AMERICAN: 52 ML/MIN/1.73M2
GFR NON-AFRICAN AMERICAN: 52 ML/MIN/1.73M2
GLUCOSE BLD-MCNC: 108 MG/DL (ref 70–99)
GLUCOSE BLD-MCNC: 115 MG/DL (ref 70–99)
GLUCOSE BLD-MCNC: 124 MG/DL (ref 70–99)
GLUCOSE BLD-MCNC: 127 MG/DL (ref 70–99)
GLUCOSE BLD-MCNC: 130 MG/DL (ref 70–99)
GLUCOSE BLD-MCNC: 132 MG/DL (ref 70–99)
GLUCOSE BLD-MCNC: 137 MG/DL (ref 70–99)
GLUCOSE BLD-MCNC: 141 MG/DL (ref 70–99)
GLUCOSE BLD-MCNC: 156 MG/DL (ref 70–99)
GLUCOSE BLD-MCNC: 158 MG/DL (ref 70–99)
GLUCOSE BLD-MCNC: 160 MG/DL (ref 70–99)
GLUCOSE BLD-MCNC: 162 MG/DL (ref 70–99)
GLUCOSE BLD-MCNC: 170 MG/DL (ref 70–99)
HCO3 ARTERIAL: 22.7 MMOL/L (ref 18–23)
HCO3 ARTERIAL: 23 MMOL/L (ref 18–23)
HCO3 ARTERIAL: 23 MMOL/L (ref 18–23)
HCO3 ARTERIAL: 23.2 MMOL/L (ref 18–23)
HCO3 ARTERIAL: 23.2 MMOL/L (ref 18–23)
HCO3 ARTERIAL: 23.3 MMOL/L (ref 18–23)
HCO3 ARTERIAL: 23.5 MMOL/L (ref 18–23)
HCO3 ARTERIAL: 24 MMOL/L (ref 18–23)
HCO3 ARTERIAL: 25.1 MMOL/L (ref 18–23)
HCO3 ARTERIAL: 25.1 MMOL/L (ref 18–23)
HCT VFR BLD CALC: 31 % (ref 42–52)
HCT VFR BLD CALC: 32 % (ref 42–52)
HCT VFR BLD CALC: 33 % (ref 42–52)
HCT VFR BLD CALC: 36 % (ref 42–52)
HCT VFR BLD CALC: 37 % (ref 42–52)
HCT VFR BLD CALC: 39 % (ref 42–52)
HCT VFR BLD CALC: 39.9 % (ref 42–52)
HCT VFR BLD CALC: 40 % (ref 42–52)
HCT VFR BLD CALC: 42 % (ref 42–52)
HCT VFR BLD CALC: 42 % (ref 42–52)
HCT VFR BLD CALC: 46 % (ref 42–52)
HEMOGLOBIN: 10.4 GM/DL (ref 13.5–18)
HEMOGLOBIN: 10.9 GM/DL (ref 13.5–18)
HEMOGLOBIN: 11.2 GM/DL (ref 13.5–18)
HEMOGLOBIN: 12.3 GM/DL (ref 13.5–18)
HEMOGLOBIN: 12.6 GM/DL (ref 13.5–18)
HEMOGLOBIN: 13 GM/DL (ref 13.5–18)
HEMOGLOBIN: 13.1 GM/DL (ref 13.5–18)
HEMOGLOBIN: 13.7 GM/DL (ref 13.5–18)
HEMOGLOBIN: 14.2 GM/DL (ref 13.5–18)
HEMOGLOBIN: 14.3 GM/DL (ref 13.5–18)
HEMOGLOBIN: 15.5 GM/DL (ref 13.5–18)
INR BLD: 0.99 INDEX
INR BLD: 1.12 INDEX
MAGNESIUM: 2.1 MG/DL (ref 1.8–2.4)
MAGNESIUM: 2.9 MG/DL (ref 1.8–2.4)
MCH RBC QN AUTO: 33.4 PG (ref 27–31)
MCHC RBC AUTO-ENTMCNC: 32.6 % (ref 32–36)
MCV RBC AUTO: 102.6 FL (ref 78–100)
O2 SATURATION: 100 % (ref 96–97)
O2 SATURATION: 100 % (ref 96–97)
O2 SATURATION: 91.6 % (ref 96–97)
O2 SATURATION: 94.7 % (ref 96–97)
O2 SATURATION: 96.9 % (ref 96–97)
O2 SATURATION: 97.4 % (ref 96–97)
O2 SATURATION: 98 % (ref 96–97)
O2 SATURATION: 98.2 % (ref 96–97)
O2 SATURATION: 98.6 % (ref 96–97)
O2 SATURATION: 99.4 % (ref 96–97)
PCO2 ARTERIAL: 38.3 MMHG (ref 32–45)
PCO2 ARTERIAL: 42.9 MMHG (ref 32–45)
PCO2 ARTERIAL: 43.7 MMHG (ref 32–45)
PCO2 ARTERIAL: 43.8 MMHG (ref 32–45)
PCO2 ARTERIAL: 43.9 MMHG (ref 32–45)
PCO2 ARTERIAL: 44.7 MMHG (ref 32–45)
PCO2 ARTERIAL: 47 MMHG (ref 32–45)
PCO2 ARTERIAL: 47.4 MMHG (ref 32–45)
PCO2 ARTERIAL: 47.6 MMHG (ref 32–45)
PCO2 ARTERIAL: 48.1 MMHG (ref 32–45)
PDW BLD-RTO: 13 % (ref 11.7–14.9)
PH BLOOD: 7.29 (ref 7.34–7.45)
PH BLOOD: 7.3 (ref 7.34–7.45)
PH BLOOD: 7.3 (ref 7.34–7.45)
PH BLOOD: 7.31 (ref 7.34–7.45)
PH BLOOD: 7.31 (ref 7.34–7.45)
PH BLOOD: 7.33 (ref 7.34–7.45)
PH BLOOD: 7.34 (ref 7.34–7.45)
PH BLOOD: 7.37 (ref 7.34–7.45)
PH BLOOD: 7.37 (ref 7.34–7.45)
PH BLOOD: 7.4 (ref 7.34–7.45)
PLATELET # BLD: 112 K/CU MM (ref 140–440)
PLATELET # BLD: 145 K/CU MM (ref 140–440)
PMV BLD AUTO: 12.4 FL (ref 7.5–11.1)
PO2 ARTERIAL: 100.3 MMHG (ref 75–100)
PO2 ARTERIAL: 105 MMHG (ref 75–100)
PO2 ARTERIAL: 111.4 MMHG (ref 75–100)
PO2 ARTERIAL: 113 MMHG (ref 75–100)
PO2 ARTERIAL: 126.5 MMHG (ref 75–100)
PO2 ARTERIAL: 174.7 MMHG (ref 75–100)
PO2 ARTERIAL: 543.4 MMHG (ref 75–100)
PO2 ARTERIAL: 619.3 MMHG (ref 75–100)
PO2 ARTERIAL: 69.5 MMHG (ref 75–100)
PO2 ARTERIAL: 83.1 MMHG (ref 75–100)
POC ACT PLUS: 103 SEC
POC ACT PLUS: 114 SEC
POC ACT PLUS: 419 SEC
POC ACT PLUS: 427 SEC
POC ACT PLUS: 483 SEC
POC CALCIUM: 1.08 MMOL/L (ref 1.12–1.32)
POC CALCIUM: 1.1 MMOL/L (ref 1.12–1.32)
POC CALCIUM: 1.17 MMOL/L (ref 1.12–1.32)
POC CALCIUM: 1.17 MMOL/L (ref 1.12–1.32)
POC CALCIUM: 1.2 MMOL/L (ref 1.12–1.32)
POC CALCIUM: 1.22 MMOL/L (ref 1.12–1.32)
POC CALCIUM: 1.23 MMOL/L (ref 1.12–1.32)
POC CALCIUM: 1.23 MMOL/L (ref 1.12–1.32)
POC CALCIUM: 1.25 MMOL/L (ref 1.12–1.32)
POC CALCIUM: 1.37 MMOL/L (ref 1.12–1.32)
POC CHLORIDE: 103 MMOL/L (ref 98–109)
POC CHLORIDE: 104 MMOL/L (ref 98–109)
POC CHLORIDE: 106 MMOL/L (ref 98–109)
POC CHLORIDE: 107 MMOL/L (ref 98–109)
POC CHLORIDE: 108 MMOL/L (ref 98–109)
POC CHLORIDE: 108 MMOL/L (ref 98–109)
POC CHLORIDE: 109 MMOL/L (ref 98–109)
POC CHLORIDE: 109 MMOL/L (ref 98–109)
POC CHLORIDE: 110 MMOL/L (ref 98–109)
POC CHLORIDE: 110 MMOL/L (ref 98–109)
POC CREATININE: 1.1 MG/DL (ref 0.9–1.3)
POC CREATININE: 1.1 MG/DL (ref 0.9–1.3)
POC CREATININE: 1.4 MG/DL (ref 0.9–1.3)
POC CREATININE: 1.5 MG/DL (ref 0.9–1.3)
POC CREATININE: 1.5 MG/DL (ref 0.9–1.3)
POTASSIUM SERPL-SCNC: 3.6 MMOL/L (ref 3.5–4.5)
POTASSIUM SERPL-SCNC: 3.8 MMOL/L (ref 3.5–4.5)
POTASSIUM SERPL-SCNC: 3.9 MMOL/L (ref 3.5–4.5)
POTASSIUM SERPL-SCNC: 4 MMOL/L (ref 3.5–4.5)
POTASSIUM SERPL-SCNC: 4 MMOL/L (ref 3.5–4.5)
POTASSIUM SERPL-SCNC: 4.1 MMOL/L (ref 3.5–4.5)
POTASSIUM SERPL-SCNC: 4.2 MMOL/L (ref 3.5–4.5)
POTASSIUM SERPL-SCNC: 4.3 MMOL/L (ref 3.5–4.5)
POTASSIUM SERPL-SCNC: 4.3 MMOL/L (ref 3.5–5.1)
POTASSIUM SERPL-SCNC: 4.3 MMOL/L (ref 3.5–5.1)
POTASSIUM SERPL-SCNC: 4.4 MMOL/L (ref 3.5–4.5)
POTASSIUM SERPL-SCNC: 4.6 MMOL/L (ref 3.5–4.5)
PROTHROMBIN TIME: 12 SECONDS (ref 11.7–14.5)
PROTHROMBIN TIME: 13.6 SECONDS (ref 11.7–14.5)
RBC # BLD: 3.89 M/CU MM (ref 4.6–6.2)
SODIUM BLD-SCNC: 138 MMOL/L (ref 135–145)
SODIUM BLD-SCNC: 141 MMOL/L (ref 138–146)
SODIUM BLD-SCNC: 142 MMOL/L (ref 135–145)
SODIUM BLD-SCNC: 142 MMOL/L (ref 138–146)
SOURCE, BLOOD GAS: ABNORMAL
WBC # BLD: 15.2 K/CU MM (ref 4–10.5)

## 2020-05-18 PROCEDURE — 5A1935Z RESPIRATORY VENTILATION, LESS THAN 24 CONSECUTIVE HOURS: ICD-10-PCS | Performed by: SURGERY

## 2020-05-18 PROCEDURE — 3700000000 HC ANESTHESIA ATTENDED CARE: Performed by: SURGERY

## 2020-05-18 PROCEDURE — 85027 COMPLETE CBC AUTOMATED: CPT

## 2020-05-18 PROCEDURE — 85610 PROTHROMBIN TIME: CPT

## 2020-05-18 PROCEDURE — 2580000003 HC RX 258

## 2020-05-18 PROCEDURE — 02100Z9 BYPASS CORONARY ARTERY, ONE ARTERY FROM LEFT INTERNAL MAMMARY, OPEN APPROACH: ICD-10-PCS | Performed by: SURGERY

## 2020-05-18 PROCEDURE — 2700000000 HC OXYGEN THERAPY PER DAY

## 2020-05-18 PROCEDURE — 71045 X-RAY EXAM CHEST 1 VIEW: CPT

## 2020-05-18 PROCEDURE — 2580000003 HC RX 258: Performed by: NURSE ANESTHETIST, CERTIFIED REGISTERED

## 2020-05-18 PROCEDURE — 2500000003 HC RX 250 WO HCPCS

## 2020-05-18 PROCEDURE — 3600000018 HC SURGERY OHS ADDTL 15MIN: Performed by: SURGERY

## 2020-05-18 PROCEDURE — 85347 COAGULATION TIME ACTIVATED: CPT

## 2020-05-18 PROCEDURE — 2580000003 HC RX 258: Performed by: SURGERY

## 2020-05-18 PROCEDURE — 5A1221Z PERFORMANCE OF CARDIAC OUTPUT, CONTINUOUS: ICD-10-PCS | Performed by: SURGERY

## 2020-05-18 PROCEDURE — 2500000003 HC RX 250 WO HCPCS: Performed by: SURGERY

## 2020-05-18 PROCEDURE — C1751 CATH, INF, PER/CENT/MIDLINE: HCPCS | Performed by: SURGERY

## 2020-05-18 PROCEDURE — 6360000002 HC RX W HCPCS: Performed by: SURGERY

## 2020-05-18 PROCEDURE — 2500000003 HC RX 250 WO HCPCS: Performed by: PHYSICIAN ASSISTANT

## 2020-05-18 PROCEDURE — 3600000008 HC SURGERY OHS BASE: Performed by: SURGERY

## 2020-05-18 PROCEDURE — 80048 BASIC METABOLIC PNL TOTAL CA: CPT

## 2020-05-18 PROCEDURE — 6360000002 HC RX W HCPCS: Performed by: INTERNAL MEDICINE

## 2020-05-18 PROCEDURE — 2500000003 HC RX 250 WO HCPCS: Performed by: NURSE ANESTHETIST, CERTIFIED REGISTERED

## 2020-05-18 PROCEDURE — 6370000000 HC RX 637 (ALT 250 FOR IP): Performed by: SURGERY

## 2020-05-18 PROCEDURE — C1894 INTRO/SHEATH, NON-LASER: HCPCS | Performed by: SURGERY

## 2020-05-18 PROCEDURE — 6360000002 HC RX W HCPCS

## 2020-05-18 PROCEDURE — 06BQ3ZZ EXCISION OF LEFT SAPHENOUS VEIN, PERCUTANEOUS APPROACH: ICD-10-PCS | Performed by: SURGERY

## 2020-05-18 PROCEDURE — 021009W BYPASS CORONARY ARTERY, ONE ARTERY FROM AORTA WITH AUTOLOGOUS VENOUS TISSUE, OPEN APPROACH: ICD-10-PCS | Performed by: SURGERY

## 2020-05-18 PROCEDURE — 6360000002 HC RX W HCPCS: Performed by: PHYSICIAN ASSISTANT

## 2020-05-18 PROCEDURE — 6370000000 HC RX 637 (ALT 250 FOR IP)

## 2020-05-18 PROCEDURE — 7100000000 HC PACU RECOVERY - FIRST 15 MIN

## 2020-05-18 PROCEDURE — 2709999900 HC NON-CHARGEABLE SUPPLY: Performed by: SURGERY

## 2020-05-18 PROCEDURE — 6370000000 HC RX 637 (ALT 250 FOR IP): Performed by: PHYSICIAN ASSISTANT

## 2020-05-18 PROCEDURE — 94010 BREATHING CAPACITY TEST: CPT

## 2020-05-18 PROCEDURE — 94002 VENT MGMT INPAT INIT DAY: CPT

## 2020-05-18 PROCEDURE — 83735 ASSAY OF MAGNESIUM: CPT

## 2020-05-18 PROCEDURE — P9047 ALBUMIN (HUMAN), 25%, 50ML: HCPCS

## 2020-05-18 PROCEDURE — 85730 THROMBOPLASTIN TIME PARTIAL: CPT

## 2020-05-18 PROCEDURE — 94761 N-INVAS EAR/PLS OXIMETRY MLT: CPT

## 2020-05-18 PROCEDURE — 85049 AUTOMATED PLATELET COUNT: CPT

## 2020-05-18 PROCEDURE — 2720000010 HC SURG SUPPLY STERILE: Performed by: SURGERY

## 2020-05-18 PROCEDURE — 6360000002 HC RX W HCPCS: Performed by: NURSE ANESTHETIST, CERTIFIED REGISTERED

## 2020-05-18 PROCEDURE — C1729 CATH, DRAINAGE: HCPCS | Performed by: SURGERY

## 2020-05-18 PROCEDURE — 2580000003 HC RX 258: Performed by: PHYSICIAN ASSISTANT

## 2020-05-18 PROCEDURE — 2000000000 HC ICU R&B

## 2020-05-18 PROCEDURE — 82962 GLUCOSE BLOOD TEST: CPT

## 2020-05-18 PROCEDURE — 3700000001 HC ADD 15 MINUTES (ANESTHESIA): Performed by: SURGERY

## 2020-05-18 RX ORDER — HYDROCODONE BITARTRATE AND ACETAMINOPHEN 5; 325 MG/1; MG/1
1 TABLET ORAL EVERY 4 HOURS PRN
Status: DISCONTINUED | OUTPATIENT
Start: 2020-05-18 | End: 2020-05-19

## 2020-05-18 RX ORDER — GLYCOPYRROLATE 1 MG/5 ML
SYRINGE (ML) INTRAVENOUS PRN
Status: DISCONTINUED | OUTPATIENT
Start: 2020-05-18 | End: 2020-05-18 | Stop reason: SDUPTHER

## 2020-05-18 RX ORDER — FENTANYL CITRATE 50 UG/ML
25 INJECTION, SOLUTION INTRAMUSCULAR; INTRAVENOUS
Status: DISCONTINUED | OUTPATIENT
Start: 2020-05-18 | End: 2020-05-19

## 2020-05-18 RX ORDER — AMIODARONE HYDROCHLORIDE 200 MG/1
200 TABLET ORAL DAILY
Status: DISCONTINUED | OUTPATIENT
Start: 2020-05-22 | End: 2020-05-22 | Stop reason: HOSPADM

## 2020-05-18 RX ORDER — NITROGLYCERIN 20 MG/100ML
INJECTION INTRAVENOUS CONTINUOUS PRN
Status: DISCONTINUED | OUTPATIENT
Start: 2020-05-18 | End: 2020-05-18 | Stop reason: SDUPTHER

## 2020-05-18 RX ORDER — DEXTROSE MONOHYDRATE 25 G/50ML
12.5 INJECTION, SOLUTION INTRAVENOUS PRN
Status: DISCONTINUED | OUTPATIENT
Start: 2020-05-18 | End: 2020-05-22 | Stop reason: HOSPADM

## 2020-05-18 RX ORDER — HYDRALAZINE HYDROCHLORIDE 20 MG/ML
5 INJECTION INTRAMUSCULAR; INTRAVENOUS EVERY 5 MIN PRN
Status: DISCONTINUED | OUTPATIENT
Start: 2020-05-18 | End: 2020-05-22 | Stop reason: HOSPADM

## 2020-05-18 RX ORDER — PHENYLEPHRINE HYDROCHLORIDE 10 MG/ML
INJECTION INTRAVENOUS PRN
Status: DISCONTINUED | OUTPATIENT
Start: 2020-05-18 | End: 2020-05-18 | Stop reason: SDUPTHER

## 2020-05-18 RX ORDER — NITROGLYCERIN 20 MG/100ML
10 INJECTION INTRAVENOUS CONTINUOUS PRN
Status: DISCONTINUED | OUTPATIENT
Start: 2020-05-18 | End: 2020-05-22 | Stop reason: HOSPADM

## 2020-05-18 RX ORDER — SODIUM CHLORIDE 9 MG/ML
INJECTION, SOLUTION INTRAVENOUS CONTINUOUS PRN
Status: DISCONTINUED | OUTPATIENT
Start: 2020-05-18 | End: 2020-05-18 | Stop reason: SDUPTHER

## 2020-05-18 RX ORDER — ROCURONIUM BROMIDE 10 MG/ML
INJECTION, SOLUTION INTRAVENOUS PRN
Status: DISCONTINUED | OUTPATIENT
Start: 2020-05-18 | End: 2020-05-18 | Stop reason: SDUPTHER

## 2020-05-18 RX ORDER — FENTANYL CITRATE 50 UG/ML
INJECTION, SOLUTION INTRAMUSCULAR; INTRAVENOUS
Status: DISCONTINUED
Start: 2020-05-18 | End: 2020-05-19

## 2020-05-18 RX ORDER — NICOTINE POLACRILEX 4 MG
15 LOZENGE BUCCAL PRN
Status: DISCONTINUED | OUTPATIENT
Start: 2020-05-18 | End: 2020-05-22 | Stop reason: HOSPADM

## 2020-05-18 RX ORDER — SODIUM CHLORIDE 450 MG/100ML
INJECTION, SOLUTION INTRAVENOUS CONTINUOUS
Status: DISCONTINUED | OUTPATIENT
Start: 2020-05-18 | End: 2020-05-20

## 2020-05-18 RX ORDER — SODIUM PHOSPHATE, DIBASIC AND SODIUM PHOSPHATE, MONOBASIC 7; 19 G/133ML; G/133ML
1 ENEMA RECTAL DAILY PRN
Status: DISCONTINUED | OUTPATIENT
Start: 2020-05-18 | End: 2020-05-22 | Stop reason: HOSPADM

## 2020-05-18 RX ORDER — LIDOCAINE HYDROCHLORIDE 20 MG/ML
INJECTION, SOLUTION INTRAVENOUS PRN
Status: DISCONTINUED | OUTPATIENT
Start: 2020-05-18 | End: 2020-05-18 | Stop reason: SDUPTHER

## 2020-05-18 RX ORDER — IPRATROPIUM BROMIDE AND ALBUTEROL SULFATE 2.5; .5 MG/3ML; MG/3ML
1 SOLUTION RESPIRATORY (INHALATION)
Status: DISCONTINUED | OUTPATIENT
Start: 2020-05-18 | End: 2020-05-21

## 2020-05-18 RX ORDER — CEFAZOLIN SODIUM 2 G/100ML
2 INJECTION, SOLUTION INTRAVENOUS EVERY 8 HOURS
Status: COMPLETED | OUTPATIENT
Start: 2020-05-18 | End: 2020-05-19

## 2020-05-18 RX ORDER — M-VIT,TX,IRON,MINS/CALC/FOLIC 27MG-0.4MG
1 TABLET ORAL
Status: DISCONTINUED | OUTPATIENT
Start: 2020-05-19 | End: 2020-05-22 | Stop reason: HOSPADM

## 2020-05-18 RX ORDER — CARVEDILOL 3.12 MG/1
3.12 TABLET ORAL ONCE
Status: COMPLETED | OUTPATIENT
Start: 2020-05-18 | End: 2020-05-18

## 2020-05-18 RX ORDER — ONDANSETRON 2 MG/ML
4 INJECTION INTRAMUSCULAR; INTRAVENOUS EVERY 8 HOURS PRN
Status: DISCONTINUED | OUTPATIENT
Start: 2020-05-18 | End: 2020-05-22 | Stop reason: HOSPADM

## 2020-05-18 RX ORDER — PROPOFOL 10 MG/ML
INJECTION, EMULSION INTRAVENOUS PRN
Status: DISCONTINUED | OUTPATIENT
Start: 2020-05-18 | End: 2020-05-18 | Stop reason: SDUPTHER

## 2020-05-18 RX ORDER — SODIUM CHLORIDE, SODIUM LACTATE, POTASSIUM CHLORIDE, CALCIUM CHLORIDE 600; 310; 30; 20 MG/100ML; MG/100ML; MG/100ML; MG/100ML
INJECTION, SOLUTION INTRAVENOUS CONTINUOUS PRN
Status: DISCONTINUED | OUTPATIENT
Start: 2020-05-18 | End: 2020-05-18 | Stop reason: SDUPTHER

## 2020-05-18 RX ORDER — ACETAMINOPHEN 325 MG/1
650 TABLET ORAL EVERY 4 HOURS PRN
Status: DISCONTINUED | OUTPATIENT
Start: 2020-05-18 | End: 2020-05-19

## 2020-05-18 RX ORDER — SODIUM CHLORIDE 0.9 % (FLUSH) 0.9 %
10 SYRINGE (ML) INJECTION EVERY 12 HOURS SCHEDULED
Status: DISCONTINUED | OUTPATIENT
Start: 2020-05-18 | End: 2020-05-22 | Stop reason: HOSPADM

## 2020-05-18 RX ORDER — MAGNESIUM SULFATE IN WATER 40 MG/ML
2 INJECTION, SOLUTION INTRAVENOUS PRN
Status: DISCONTINUED | OUTPATIENT
Start: 2020-05-18 | End: 2020-05-22 | Stop reason: HOSPADM

## 2020-05-18 RX ORDER — NITROGLYCERIN 20 MG/100ML
INJECTION INTRAVENOUS PRN
Status: DISCONTINUED | OUTPATIENT
Start: 2020-05-18 | End: 2020-05-18 | Stop reason: SDUPTHER

## 2020-05-18 RX ORDER — AMIODARONE HYDROCHLORIDE 200 MG/1
200 TABLET ORAL 3 TIMES DAILY
Status: DISPENSED | OUTPATIENT
Start: 2020-05-18 | End: 2020-05-22

## 2020-05-18 RX ORDER — ASPIRIN 81 MG/1
81 TABLET ORAL DAILY
Status: DISCONTINUED | OUTPATIENT
Start: 2020-05-18 | End: 2020-05-22 | Stop reason: HOSPADM

## 2020-05-18 RX ORDER — DEXTROSE MONOHYDRATE 50 MG/ML
100 INJECTION, SOLUTION INTRAVENOUS PRN
Status: DISCONTINUED | OUTPATIENT
Start: 2020-05-18 | End: 2020-05-22 | Stop reason: HOSPADM

## 2020-05-18 RX ORDER — EPHEDRINE SULFATE 50 MG/ML
INJECTION INTRAVENOUS PRN
Status: DISCONTINUED | OUTPATIENT
Start: 2020-05-18 | End: 2020-05-18 | Stop reason: SDUPTHER

## 2020-05-18 RX ORDER — PANTOPRAZOLE SODIUM 40 MG/1
40 TABLET, DELAYED RELEASE ORAL DAILY
Status: DISCONTINUED | OUTPATIENT
Start: 2020-05-18 | End: 2020-05-22 | Stop reason: HOSPADM

## 2020-05-18 RX ORDER — FENTANYL CITRATE 0.05 MG/ML
INJECTION, SOLUTION INTRAMUSCULAR; INTRAVENOUS PRN
Status: DISCONTINUED | OUTPATIENT
Start: 2020-05-18 | End: 2020-05-18 | Stop reason: SDUPTHER

## 2020-05-18 RX ORDER — POLYETHYLENE GLYCOL 3350 17 G/17G
17 POWDER, FOR SOLUTION ORAL DAILY
Status: DISCONTINUED | OUTPATIENT
Start: 2020-05-18 | End: 2020-05-22 | Stop reason: HOSPADM

## 2020-05-18 RX ORDER — ONDANSETRON 2 MG/ML
INJECTION INTRAMUSCULAR; INTRAVENOUS PRN
Status: DISCONTINUED | OUTPATIENT
Start: 2020-05-18 | End: 2020-05-18 | Stop reason: SDUPTHER

## 2020-05-18 RX ORDER — FUROSEMIDE 10 MG/ML
20 INJECTION INTRAMUSCULAR; INTRAVENOUS
Status: ACTIVE | OUTPATIENT
Start: 2020-05-18 | End: 2020-05-18

## 2020-05-18 RX ORDER — HEPARIN SODIUM 1000 [USP'U]/ML
INJECTION, SOLUTION INTRAVENOUS; SUBCUTANEOUS PRN
Status: DISCONTINUED | OUTPATIENT
Start: 2020-05-18 | End: 2020-05-18 | Stop reason: SDUPTHER

## 2020-05-18 RX ORDER — VECURONIUM BROMIDE 1 MG/ML
INJECTION, POWDER, LYOPHILIZED, FOR SOLUTION INTRAVENOUS PRN
Status: DISCONTINUED | OUTPATIENT
Start: 2020-05-18 | End: 2020-05-18 | Stop reason: SDUPTHER

## 2020-05-18 RX ORDER — METOPROLOL TARTRATE 5 MG/5ML
2.5 INJECTION INTRAVENOUS EVERY 10 MIN PRN
Status: DISCONTINUED | OUTPATIENT
Start: 2020-05-18 | End: 2020-05-22 | Stop reason: HOSPADM

## 2020-05-18 RX ORDER — ATORVASTATIN CALCIUM 20 MG/1
20 TABLET, FILM COATED ORAL NIGHTLY
Status: DISCONTINUED | OUTPATIENT
Start: 2020-05-19 | End: 2020-05-22 | Stop reason: HOSPADM

## 2020-05-18 RX ORDER — POTASSIUM CHLORIDE 29.8 MG/ML
20 INJECTION INTRAVENOUS PRN
Status: DISCONTINUED | OUTPATIENT
Start: 2020-05-18 | End: 2020-05-22 | Stop reason: HOSPADM

## 2020-05-18 RX ORDER — MIDAZOLAM HYDROCHLORIDE 5 MG/ML
INJECTION INTRAMUSCULAR; INTRAVENOUS PRN
Status: DISCONTINUED | OUTPATIENT
Start: 2020-05-18 | End: 2020-05-18 | Stop reason: SDUPTHER

## 2020-05-18 RX ORDER — CARVEDILOL 3.12 MG/1
3.12 TABLET ORAL 2 TIMES DAILY
Status: DISCONTINUED | OUTPATIENT
Start: 2020-05-18 | End: 2020-05-19

## 2020-05-18 RX ORDER — ALBUMIN, HUMAN INJ 5% 5 %
25 SOLUTION INTRAVENOUS PRN
Status: DISCONTINUED | OUTPATIENT
Start: 2020-05-18 | End: 2020-05-22 | Stop reason: HOSPADM

## 2020-05-18 RX ORDER — AMIODARONE HYDROCHLORIDE 200 MG/1
200 TABLET ORAL 3 TIMES DAILY
Status: DISCONTINUED | OUTPATIENT
Start: 2020-05-18 | End: 2020-05-18 | Stop reason: SDUPTHER

## 2020-05-18 RX ORDER — SODIUM CHLORIDE 0.9 % (FLUSH) 0.9 %
10 SYRINGE (ML) INJECTION PRN
Status: DISCONTINUED | OUTPATIENT
Start: 2020-05-18 | End: 2020-05-22 | Stop reason: HOSPADM

## 2020-05-18 RX ORDER — BISACODYL 10 MG
10 SUPPOSITORY, RECTAL RECTAL DAILY PRN
Status: DISCONTINUED | OUTPATIENT
Start: 2020-05-18 | End: 2020-05-22 | Stop reason: HOSPADM

## 2020-05-18 RX ORDER — ACETAMINOPHEN 650 MG/1
650 SUPPOSITORY RECTAL ONCE
Status: COMPLETED | OUTPATIENT
Start: 2020-05-18 | End: 2020-05-18

## 2020-05-18 RX ORDER — HYDROCODONE BITARTRATE AND ACETAMINOPHEN 5; 325 MG/1; MG/1
2 TABLET ORAL EVERY 4 HOURS PRN
Status: DISCONTINUED | OUTPATIENT
Start: 2020-05-18 | End: 2020-05-19

## 2020-05-18 RX ORDER — PROTAMINE SULFATE 10 MG/ML
INJECTION, SOLUTION INTRAVENOUS PRN
Status: DISCONTINUED | OUTPATIENT
Start: 2020-05-18 | End: 2020-05-18 | Stop reason: SDUPTHER

## 2020-05-18 RX ADMIN — EPHEDRINE SULFATE 20 MG: 50 INJECTION INTRAVENOUS at 11:21

## 2020-05-18 RX ADMIN — AMINOCAPROIC ACID 1 G/HR: 250 INJECTION, SOLUTION INTRAVENOUS at 11:20

## 2020-05-18 RX ADMIN — PROPOFOL 10 MG: 10 INJECTION, EMULSION INTRAVENOUS at 10:54

## 2020-05-18 RX ADMIN — MIDAZOLAM 2 MG: 5 INJECTION INTRAMUSCULAR; INTRAVENOUS at 11:34

## 2020-05-18 RX ADMIN — NITROGLYCERIN 40 MCG: 20 INJECTION INTRAVENOUS at 13:41

## 2020-05-18 RX ADMIN — FENTANYL CITRATE 100 MCG: 50 INJECTION, SOLUTION INTRAMUSCULAR; INTRAVENOUS at 11:40

## 2020-05-18 RX ADMIN — SODIUM CHLORIDE: 900 INJECTION INTRAVENOUS at 11:08

## 2020-05-18 RX ADMIN — SODIUM CHLORIDE, POTASSIUM CHLORIDE, SODIUM LACTATE AND CALCIUM CHLORIDE: 600; 310; 30; 20 INJECTION, SOLUTION INTRAVENOUS at 10:43

## 2020-05-18 RX ADMIN — SODIUM CHLORIDE, PRESERVATIVE FREE 10 ML: 5 INJECTION INTRAVENOUS at 21:16

## 2020-05-18 RX ADMIN — LIDOCAINE HYDROCHLORIDE 100 MG: 20 INJECTION, SOLUTION INTRAVENOUS at 11:00

## 2020-05-18 RX ADMIN — FENTANYL CITRATE 25 MCG: 50 INJECTION INTRAMUSCULAR; INTRAVENOUS at 15:29

## 2020-05-18 RX ADMIN — EPINEPHRINE 5 MCG/MIN: 1 INJECTION, SOLUTION, CONCENTRATE INTRAVENOUS at 13:27

## 2020-05-18 RX ADMIN — PROPOFOL 20 MG: 10 INJECTION, EMULSION INTRAVENOUS at 11:34

## 2020-05-18 RX ADMIN — Medication: at 21:31

## 2020-05-18 RX ADMIN — FENTANYL CITRATE 100 MCG: 50 INJECTION, SOLUTION INTRAMUSCULAR; INTRAVENOUS at 14:28

## 2020-05-18 RX ADMIN — HEPARIN SODIUM 30000 UNITS: 1000 INJECTION INTRAVENOUS; SUBCUTANEOUS at 12:10

## 2020-05-18 RX ADMIN — FENTANYL CITRATE 150 MCG: 50 INJECTION, SOLUTION INTRAMUSCULAR; INTRAVENOUS at 11:00

## 2020-05-18 RX ADMIN — VECURONIUM BROMIDE FOR INJECTION 5 MG: 1 INJECTION, POWDER, LYOPHILIZED, FOR SOLUTION INTRAVENOUS at 12:22

## 2020-05-18 RX ADMIN — ONDANSETRON 4 MG: 2 INJECTION INTRAMUSCULAR; INTRAVENOUS at 10:55

## 2020-05-18 RX ADMIN — PROTAMINE SULFATE 350 MG: 10 INJECTION, SOLUTION INTRAVENOUS at 13:39

## 2020-05-18 RX ADMIN — ACETAMINOPHEN 650 MG: 650 SUPPOSITORY RECTAL at 17:54

## 2020-05-18 RX ADMIN — DEXMEDETOMIDINE HYDROCHLORIDE 0.2 MCG/KG/HR: 100 INJECTION, SOLUTION, CONCENTRATE INTRAVENOUS at 21:43

## 2020-05-18 RX ADMIN — SODIUM CHLORIDE 1 UNITS/HR: 9 INJECTION, SOLUTION INTRAVENOUS at 23:11

## 2020-05-18 RX ADMIN — NITROGLYCERIN 20 MCG/MIN: 20 INJECTION INTRAVENOUS at 11:50

## 2020-05-18 RX ADMIN — FENTANYL CITRATE 250 MCG: 50 INJECTION, SOLUTION INTRAMUSCULAR; INTRAVENOUS at 11:34

## 2020-05-18 RX ADMIN — PROPOFOL 20 MG: 10 INJECTION, EMULSION INTRAVENOUS at 11:40

## 2020-05-18 RX ADMIN — NITROGLYCERIN 5 MCG/MIN: 20 INJECTION INTRAVENOUS at 15:28

## 2020-05-18 RX ADMIN — Medication 50 MEQ: at 21:11

## 2020-05-18 RX ADMIN — VECURONIUM BROMIDE FOR INJECTION 5 MG: 1 INJECTION, POWDER, LYOPHILIZED, FOR SOLUTION INTRAVENOUS at 11:36

## 2020-05-18 RX ADMIN — SODIUM CHLORIDE, PRESERVATIVE FREE 10 ML: 5 INJECTION INTRAVENOUS at 08:03

## 2020-05-18 RX ADMIN — MIDAZOLAM 2 MG: 5 INJECTION INTRAMUSCULAR; INTRAVENOUS at 10:46

## 2020-05-18 RX ADMIN — AMINOCAPROIC ACID 5 G: 250 INJECTION, SOLUTION INTRAVENOUS at 11:18

## 2020-05-18 RX ADMIN — CEFAZOLIN SODIUM 2 G: 2 INJECTION, SOLUTION INTRAVENOUS at 18:28

## 2020-05-18 RX ADMIN — CARVEDILOL 3.12 MG: 3.12 TABLET, FILM COATED ORAL at 05:46

## 2020-05-18 RX ADMIN — SODIUM CHLORIDE: 4.5 INJECTION, SOLUTION INTRAVENOUS at 15:22

## 2020-05-18 RX ADMIN — PHENYLEPHRINE HYDROCHLORIDE 100 MCG: 10 INJECTION INTRAVENOUS at 11:12

## 2020-05-18 RX ADMIN — Medication 35 MEQ: at 14:00

## 2020-05-18 RX ADMIN — ONDANSETRON HYDROCHLORIDE 4 MG: 2 SOLUTION INTRAMUSCULAR; INTRAVENOUS at 22:49

## 2020-05-18 RX ADMIN — CEFAZOLIN SODIUM 2 G: 2 SOLUTION INTRAVENOUS at 11:15

## 2020-05-18 RX ADMIN — ROCURONIUM BROMIDE 50 MG: 10 INJECTION INTRAVENOUS at 11:00

## 2020-05-18 RX ADMIN — FENTANYL CITRATE 100 MCG: 50 INJECTION, SOLUTION INTRAMUSCULAR; INTRAVENOUS at 11:46

## 2020-05-18 RX ADMIN — Medication: at 05:47

## 2020-05-18 RX ADMIN — EPHEDRINE SULFATE 10 MG: 50 INJECTION INTRAVENOUS at 12:02

## 2020-05-18 RX ADMIN — PHENYLEPHRINE HYDROCHLORIDE 200 MCG: 10 INJECTION INTRAVENOUS at 11:09

## 2020-05-18 RX ADMIN — HEPARIN SODIUM AND DEXTROSE 14.88 UNITS/KG/HR: 10000; 5 INJECTION INTRAVENOUS at 05:48

## 2020-05-18 RX ADMIN — FENTANYL CITRATE 100 MCG: 50 INJECTION, SOLUTION INTRAMUSCULAR; INTRAVENOUS at 14:39

## 2020-05-18 RX ADMIN — PROPOFOL 90 MG: 10 INJECTION, EMULSION INTRAVENOUS at 11:00

## 2020-05-18 RX ADMIN — Medication 0.2 MG: at 11:57

## 2020-05-18 RX ADMIN — CHLORHEXIDINE GLUCONATE 0.12% ORAL RINSE 15 ML: 1.2 LIQUID ORAL at 05:47

## 2020-05-18 RX ADMIN — SODIUM CHLORIDE: 900 INJECTION INTRAVENOUS at 13:58

## 2020-05-18 RX ADMIN — PHENYLEPHRINE HYDROCHLORIDE 100 MCG: 10 INJECTION INTRAVENOUS at 11:08

## 2020-05-18 RX ADMIN — Medication 0.2 MG: at 12:02

## 2020-05-18 RX ADMIN — SODIUM CHLORIDE, PRESERVATIVE FREE 10 ML: 5 INJECTION INTRAVENOUS at 22:52

## 2020-05-18 RX ADMIN — SODIUM CHLORIDE, PRESERVATIVE FREE 50 ML: 5 INJECTION INTRAVENOUS at 16:38

## 2020-05-18 RX ADMIN — EPHEDRINE SULFATE 20 MG: 50 INJECTION INTRAVENOUS at 11:12

## 2020-05-18 RX ADMIN — FENTANYL CITRATE 100 MCG: 50 INJECTION, SOLUTION INTRAMUSCULAR; INTRAVENOUS at 13:57

## 2020-05-18 RX ADMIN — MIDAZOLAM 2 MG: 5 INJECTION INTRAMUSCULAR; INTRAVENOUS at 12:22

## 2020-05-18 ASSESSMENT — PULMONARY FUNCTION TESTS
PIF_VALUE: 1
PIF_VALUE: 21
PIF_VALUE: 19
PIF_VALUE: 0
PIF_VALUE: 16
PIF_VALUE: 19
PIF_VALUE: 0
PIF_VALUE: 0
PIF_VALUE: 1
PIF_VALUE: 1
PIF_VALUE: 19
PIF_VALUE: 13
PIF_VALUE: 18
PIF_VALUE: 20
PIF_VALUE: 19
PIF_VALUE: 20
PIF_VALUE: 20
PIF_VALUE: 12
PIF_VALUE: 18
PIF_VALUE: 20
PIF_VALUE: 17
PIF_VALUE: 19
PIF_VALUE: 0
PIF_VALUE: 20
PIF_VALUE: 16
PIF_VALUE: 0
PIF_VALUE: 20
PIF_VALUE: 0
PIF_VALUE: 12
PIF_VALUE: 13
PIF_VALUE: 16
PIF_VALUE: 12
PIF_VALUE: 0
PIF_VALUE: 0
PIF_VALUE: 18
PIF_VALUE: 17
PIF_VALUE: 13
PIF_VALUE: 19
PIF_VALUE: 18
PIF_VALUE: 0
PIF_VALUE: 18
PIF_VALUE: 20
PIF_VALUE: 0
PIF_VALUE: 19
PIF_VALUE: 20
PIF_VALUE: 25
PIF_VALUE: 17
PIF_VALUE: 3
PIF_VALUE: 13
PIF_VALUE: 16
PIF_VALUE: 20
PIF_VALUE: 0
PIF_VALUE: 0
PIF_VALUE: 19
PIF_VALUE: 18
PIF_VALUE: 18
PIF_VALUE: 0
PIF_VALUE: 20
PIF_VALUE: 18
PIF_VALUE: 16
PIF_VALUE: 20
PIF_VALUE: 0
PIF_VALUE: 17
PIF_VALUE: 13
PIF_VALUE: 20
PIF_VALUE: 19
PIF_VALUE: 1
PIF_VALUE: 15
PIF_VALUE: 20
PIF_VALUE: 0
PIF_VALUE: 19
PIF_VALUE: 15
PIF_VALUE: 13
PIF_VALUE: 19
PIF_VALUE: 0
PIF_VALUE: 18
PIF_VALUE: 20
PIF_VALUE: 1
PIF_VALUE: 20
PIF_VALUE: 23
PIF_VALUE: 20
PIF_VALUE: 18
PIF_VALUE: 18
PIF_VALUE: 19
PIF_VALUE: 20
PIF_VALUE: 20
PIF_VALUE: 16
PIF_VALUE: 1
PIF_VALUE: 20
PIF_VALUE: 19
PIF_VALUE: 18
PIF_VALUE: 20
PIF_VALUE: 11
PIF_VALUE: 20
PIF_VALUE: 18
PIF_VALUE: 18
PIF_VALUE: 20
PIF_VALUE: 0
PIF_VALUE: 18
PIF_VALUE: 18
PIF_VALUE: 0
PIF_VALUE: 0
PIF_VALUE: 15
PIF_VALUE: 19
PIF_VALUE: 4
PIF_VALUE: 0
PIF_VALUE: 0
PIF_VALUE: 17
PIF_VALUE: 19
PIF_VALUE: 16
PIF_VALUE: 20
PIF_VALUE: 0
PIF_VALUE: 12
PIF_VALUE: 15
PIF_VALUE: 2
PIF_VALUE: 18
PIF_VALUE: 19
PIF_VALUE: 19
PIF_VALUE: 1
PIF_VALUE: 1
PIF_VALUE: 17
PIF_VALUE: 0
PIF_VALUE: 19
PIF_VALUE: 0
PIF_VALUE: 19
PIF_VALUE: 20
PIF_VALUE: 1
PIF_VALUE: 17
PIF_VALUE: 16
PIF_VALUE: 19
PIF_VALUE: 0
PIF_VALUE: 15
PIF_VALUE: 20
PIF_VALUE: 19
PIF_VALUE: 1
PIF_VALUE: 18
PIF_VALUE: 20
PIF_VALUE: 19
PIF_VALUE: 19
PIF_VALUE: 0
PIF_VALUE: 0
PIF_VALUE: 13
PIF_VALUE: 0
PIF_VALUE: 17
PIF_VALUE: 19
PIF_VALUE: 19
PIF_VALUE: 18
PIF_VALUE: 13
PIF_VALUE: 19
PIF_VALUE: 1
PIF_VALUE: 13
PIF_VALUE: 17
PIF_VALUE: 19
PIF_VALUE: 0
PIF_VALUE: 16
PIF_VALUE: 13
PIF_VALUE: 2
PIF_VALUE: 17
PIF_VALUE: 16
PIF_VALUE: 21
PIF_VALUE: 18
PIF_VALUE: 0
PIF_VALUE: 15
PIF_VALUE: 20
PIF_VALUE: 19
PIF_VALUE: 2
PIF_VALUE: 18
PIF_VALUE: 1
PIF_VALUE: 13
PIF_VALUE: 19
PIF_VALUE: 20
PIF_VALUE: 16
PIF_VALUE: 12
PIF_VALUE: 17
PIF_VALUE: 2
PIF_VALUE: 13
PIF_VALUE: 17
PIF_VALUE: 18
PIF_VALUE: 0
PIF_VALUE: 20
PIF_VALUE: 18
PIF_VALUE: 13
PIF_VALUE: 19
PIF_VALUE: 12
PIF_VALUE: 19
PIF_VALUE: 0
PIF_VALUE: 0
PIF_VALUE: 16
PIF_VALUE: 22
PIF_VALUE: 19
PIF_VALUE: 0
PIF_VALUE: 20
PIF_VALUE: 18
PIF_VALUE: 19
PIF_VALUE: 0
PIF_VALUE: 0
PIF_VALUE: 23
PIF_VALUE: 18
PIF_VALUE: 13
PIF_VALUE: 14
PIF_VALUE: 18
PIF_VALUE: 19
PIF_VALUE: 13
PIF_VALUE: 0
PIF_VALUE: 18
PIF_VALUE: 0
PIF_VALUE: 19
PIF_VALUE: 23
PIF_VALUE: 19
PIF_VALUE: 0
PIF_VALUE: 19
PIF_VALUE: 18
PIF_VALUE: 13
PIF_VALUE: 18
PIF_VALUE: 19
PIF_VALUE: 16
PIF_VALUE: 19
PIF_VALUE: 19
PIF_VALUE: 18
PIF_VALUE: 19
PIF_VALUE: 0
PIF_VALUE: 17
PIF_VALUE: 19
PIF_VALUE: 0
PIF_VALUE: 19
PIF_VALUE: 2
PIF_VALUE: 18
PIF_VALUE: 18
PIF_VALUE: 0
PIF_VALUE: 0
PIF_VALUE: 18
PIF_VALUE: 0
PIF_VALUE: 1
PIF_VALUE: 1
PIF_VALUE: 18
PIF_VALUE: 16
PIF_VALUE: 12
PIF_VALUE: 0
PIF_VALUE: 12
PIF_VALUE: 19
PIF_VALUE: 17
PIF_VALUE: 18
PIF_VALUE: 2
PIF_VALUE: 19
PIF_VALUE: 18
PIF_VALUE: 1
PIF_VALUE: 1
PIF_VALUE: 0
PIF_VALUE: 16
PIF_VALUE: 12
PIF_VALUE: 17
PIF_VALUE: 0
PIF_VALUE: 20
PIF_VALUE: 15
PIF_VALUE: 0
PIF_VALUE: 18

## 2020-05-18 ASSESSMENT — PAIN SCALES - GENERAL
PAINLEVEL_OUTOF10: 0

## 2020-05-18 ASSESSMENT — ENCOUNTER SYMPTOMS
VOMITING: 0
RHINORRHEA: 0
COUGH: 0
NAUSEA: 0
SHORTNESS OF BREATH: 0
SHORTNESS OF BREATH: 1
EYE PAIN: 0
SORE THROAT: 0
ABDOMINAL PAIN: 0
EYE DISCHARGE: 0
BACK PAIN: 0

## 2020-05-18 ASSESSMENT — LIFESTYLE VARIABLES: SMOKING_STATUS: 0

## 2020-05-18 NOTE — PROGRESS NOTES
Placed a scale, 2nd pair of Rodrigo Hose and Dial Soap in a bag in pt's room for him to take home for discharge.

## 2020-05-18 NOTE — ANESTHESIA POSTPROCEDURE EVALUATION
Department of Anesthesiology  Postprocedure Note    Patient: Jorge Alberto Newton  MRN: [id number]  YOB: 1960  Date of evaluation: 5/18/2020  Time:  2:45 PM     Procedure Summary     Date:  05/18/20 Room / Location:  [address]    Anesthesia Start:  3292 Anesthesia Stop:  8371    Procedure:  CABG CORONARY ARTERY BYPASS x 2, INTRAOPERATIVE NGA, INDUCED HYPOTHERMIA, LEFT LEG ENDOSCOPIC VEIN HARVEST (N/A Chest) Diagnosis:  (stemi)    Surgeon:  Davey Arellano MD Responsible Provider:  JERED Vides - CRNA    Anesthesia Type:  Not recorded ASA Status:  Not recorded          Anesthesia Type: No value filed. Suri Phase I:      Suri Phase II:      Last vitals: Reviewed and per EMR flowsheets.        Anesthesia Post Evaluation    Patient location during evaluation: ICU  Patient participation: waiting for patient participation  Level of consciousness: sedated and ventilated  Airway patency: patent  Nausea & Vomiting: no vomiting and no nausea  Complications: no  Cardiovascular status: hemodynamically stable and blood pressure returned to baseline  Respiratory status: ventilator and intubated  Hydration status: stable

## 2020-05-18 NOTE — OP NOTE
Operative Note      Patient: Li Mason  YOB: 1960  MRN: [id number]    Date: 05/18/20  Patient:  Li Mason     Op Note    Pre-op Diagnosis:    Active Hospital Problems    Diagnosis Date Noted    NSTEMI (non-ST elevated myocardial infarction) ([facility] [I21.4] 03/15/2018     Priority: High    Chest pain [R07.9] 07/30/2013       Past Medical History:   Diagnosis Date    B12 deficiency     CAD (coronary artery disease)     GERD (gastroesophageal reflux disease)     Guillain Barré syndrome ([facility]     H/O echocardiogram 7/30/13, 6/16/10    7/13-EF 50-55% WNL. 6/10-EF53%, mod LVH, MARCI, RVE, trace PI, mild MR/TR, 6/09    Heart murmur     History of myocardial infarction 2003    History of nuclear MUGA test 03/03/2020    EF 53%, Normal, No ICD needed    History of PTCA 6/03;2/03 6/03-PTCA w/stents x2, 2/03-PTCA w/stent x3    History of PTCA 03/15/2018    LAD EF25%    Hx of cardiovascular stress test 04/25/2018    EF 28% Nuclear scintigraphy demonstartes inferior wall infarct.  Hx of cardiovascular stress test 01/27/2020    Abnormal Study. Large area of inferior & apical-lateral infarct. No significant reversible ischemia. Severly reduced LVEF 23%    HX OTHER MEDICAL 03/14/2018    lifevest  d/c 7/2/2018    Hyperlipidemia     Hypertension     Impaired glucose tolerance 6/3/2016    MUGA 07/09/2018    EF 47%    NSTEMI (non-ST elevated myocardial infarction) ([facility] 03/15/2018    Patient in clinical research study 05/2018    3 year study dalcitripib vs placebo [phone]    Pneumonia due to organism     Renal cyst 5/23/2014    S/P cardiac catheterization 7/12/13 7/13-EF=55%. PATENT LAD AND RCA STENTS, OM2 50% stenosis, & Normal wall motion.     Stroke (cerebrum) [facility])     May 2016    Mild to Mod MR  Inferior and Septal Hypokinesis        Post-op Diagnosis and Findings: As above     Active Hospital Problems    Diagnosis Date Noted    [facility]STEMI (non-ST elevated [facility]) [facility] [I21.4] 03/15/2018     Priority: High    Chest pain [R07.9] 07/30/2013     Inferior Infarct   SEVERE LVH quality of the coronaries small OM,  No Right target,   Quality of POLLY excellent  Quality of saphanous vein thick but acceptable  Septal hypokinesis improved    Procedure:  CABG X 2  LIMA to LAD  SVG to OM2    Additional procedure:  Triple lumen CVP  Tanja Raymon catheter  Vein Mapping    Surgeon: Ozzie Jc MD/Fran Pineda MD    Assistant(s):  Jovita Prince PA-C    Anesthesia:  General      Drains:  Two chest tubes and Pacer wires      Complications:  None    Details of the Procedure:     Patient brought to the operating room after discussion with patient, family and consultation with cardiology. Patient was prepared for surgery and risk stratification was performed. Ultrasound was used to identify and map the course of GSV. Time out per check list confirmed    Placement of CVP and [place] Ale:  Patient was prepared and draped exposing the left subclavian area with complete barrier precautions. A needle was placed in the subclavian vein and a guidewire was placed through the needle. The needle was placed a second time in the subclavian vein and a subsequent guidewire was placed. The first guidewire was dilated and a triple lumen catheter was placed over the guidewire. The catheter was flushed and secured with suture. An introductory sheath was placed over the second guidewire and then a Tanja Raymon catheter was floated with pressure monitoring guidance until wedged. Hemodynamic obtained and catheter was anchored to the skin with suture. CABG:  Patient was prepared exposing chest and both lower extremities. Midline chest incision was made and sternum was opened at midline. Pericardium was opened and coronary arteries were evaluated. The left mammary artery was harvested. Simultaneously the left great saphenous vein was harvested by endoscopic technique.      Patient was placed

## 2020-05-18 NOTE — PROGRESS NOTES
extremities  SKIN Normal coloration, warm, dry. NEURO normal speech, no lateralizing weakness. PSYCH Awake, alert, oriented x 4. Affect appropriate.      Medications:   Medications:    ceFAZolin (ANCEF) IVPB  2 g Intravenous On Call to OR    metoprolol  5 mg Intravenous Once    chlorhexidine   Topical See Admin Instructions    aminocaproic acid (AMICAR) IV infusion  1 g/hr Intravenous On Call to OR    heparin (porcine)  30,000 Units Intravenous On Call to OR    norepinephrine  2 mcg/min Intravenous On Call to [address] [place] [address] IV infusion builder   Other On Call to OR    chlorhexidine  15 mL Mouth/Throat BID    aspirin  81 mg Oral Daily    chlorthalidone  25 mg Oral Daily    isosorbide mononitrate  30 mg Oral Daily    metoprolol succinate  25 mg Oral Daily    sertraline  50 mg Oral Daily    tamsulosin  0.8 mg Oral Daily    insulin lispro  0-6 Units Subcutaneous TID WC    insulin lispro  0-3 Units Subcutaneous Nightly    lisinopril  20 mg Oral Daily    sodium chloride  500 mL Intravenous Once    sodium chloride flush  10 mL Intravenous 2 times per day    sodium chloride flush  10 mL Intravenous 2 times per day    atorvastatin  80 mg Oral Nightly      Infusions:    EPINEPHrine infusion      insulin (HUMAN R) non-weight based infusion      heparin (porcine) 14.882 Units/kg/hr (05/18/20 0548)    dextrose       PRN Meds: acetaminophen, 650 mg, Q6H PRN  polyethylene glycol, 17 g, Daily PRN  promethazine, 12.5 mg, Q6H PRN    Or  ondansetron, 4 mg, Q6H PRN  heparin (porcine), 4,000 Units, PRN  heparin (porcine), 2,000 Units, PRN  glucose, 15 g, PRN  dextrose, 12.5 g, PRN  glucagon (rDNA), 1 mg, PRN  dextrose, 100 mL/hr, PRN  morphine, 2 mg, Q2H PRN    Or  morphine, 4 mg, Q2H PRN  sodium chloride flush, 10 mL, PRN  acetaminophen, 650 mg, Q4H PRN  magnesium hydroxide, 30 mL, Daily PRN  hydrALAZINE, 10 mg, Q10 Min PRN  sodium chloride flush, 10 mL, PRN          Electronically signed by Awilda Bernard MD on 5/18/2020 at 7:26 AM

## 2020-05-18 NOTE — ANESTHESIA PRE PROCEDURE
mg Oral Q6H PRN Marly Winslow MD   12.5 mg at 05/17/20 2051    Or    ondansetron (ZOFRAN) injection 4 mg  4 mg Intravenous Q6H PRN Marly Winslow MD        heparin (porcine) injection 4,000 Units  4,000 Units Intravenous PRN Marly Winslow MD        heparin (porcine) injection 2,000 Units  2,000 Units Intravenous PRN Marly Winslow MD        aspirin chewable tablet 81 mg  81 mg Oral Daily Marly Winlsow MD   81 mg at 05/17/20 0831    chlorthalidone (HYGROTON) tablet 25 mg  25 mg Oral Daily Marly Winslow MD   25 mg at 05/17/20 0831    isosorbide mononitrate (IMDUR) extended release tablet 30 mg  30 mg Oral Daily Marly Winslow MD   30 mg at 05/17/20 0831    metoprolol succinate (TOPROL XL) extended release tablet 25 mg  25 mg Oral Daily Marly Winslow MD   25 mg at 05/17/20 0831    sertraline (ZOLOFT) tablet 50 mg  50 mg Oral Daily Marly Winslow MD   50 mg at 05/17/20 0831    tamsulosin (FLOMAX) capsule 0.8 mg  0.8 mg Oral Daily Marly Winslow MD   0.8 mg at 05/17/20 0830    glucose (GLUTOSE) 40 % oral gel 15 g  15 g Oral PRN Marly Winslow MD        dextrose 50 % IV solution  12.5 g Intravenous PRN Marly Winslow MD        glucagon (rDNA) injection 1 mg  1 mg Intramuscular PRN Marly Winslow MD        dextrose 5 % solution  100 mL/hr Intravenous PRN Marly Winslow MD        insulin lispro (HUMALOG) injection vial 0-6 Units  0-6 Units Subcutaneous TID WC Marly Winslow MD   1 Units at 05/17/20 1249    insulin lispro (HUMALOG) injection vial 0-3 Units  0-3 Units Subcutaneous Nightly Marly Winslow MD        morphine injection 2 mg  2 mg Intravenous Q2H PRN Marly Winslow MD        Or    morphine injection 4 mg  4 mg Intravenous Q2H PRN Marly Winslow MD        lisinopril (PRINIVIL;ZESTRIL) tablet 20 mg  20 mg Oral Daily Marly Winslow MD   20 mg at 05/17/20 0831    0.9 % sodium chloride bolus  500 mL Intravenous Once Matt Mckeon MD        sodium chloride flush 0.9 % injection 10 mL  10 mL Intravenous 2 times per day Matt Mckeon MD        sodium chloride flush 0.9 % injection 10 mL  10 mL Intravenous PRN Matt Mckeon MD        acetaminophen (TYLENOL) tablet 650 mg  650 mg Oral Q4H PRN Matt Mckeon MD        magnesium hydroxide (MILK OF MAGNESIA) 400 MG/5ML suspension 30 mL  30 mL Oral Daily PRN Matt Mckeon MD        hydrALAZINE (APRESOLINE) injection 10 mg  10 mg Intravenous Q10 Min PRN Matt Mckeon MD        sodium chloride flush 0.9 % injection 10 mL  10 mL Intravenous 2 times per day Fran Langley MD        sodium chloride flush 0.9 % injection 10 mL  10 mL Intravenous PRN Fran Langley MD        atorvastatin (LIPITOR) tablet 80 mg  80 mg Oral Nightly Fran Langley MD   80 mg at 05/17/20 2044       Allergies: Allergies   Allergen Reactions    Ibuprofen Other (See Comments)     Stomach irritation    Augmentin [Amoxicillin-Pot Clavulanate]     Codeine        Problem List:    Patient Active Problem List   Diagnosis Code    CAD (coronary artery disease) I25.10    History of PTCA Z98.61    History of acute inferior wall myocardial infarction I25.2    Chest pain R07.9    B12 deficiency E53.8    Fatty liver K76.0    Colon, diverticulosis K57.30    Renal cyst N28.1    Adrenal adenoma D35.00    Hyperlipidemia LDL goal <70 E78.5    Obesity (BMI 30-39. 9) E66.9    Cerebrovascular accident (CVA) due to thrombosis of left middle cerebral artery (HCC) I63.312    Gait disturbance R26.9    Essential hypertension I10    Hemiparesis affecting right side as late effect of cerebrovascular accident (CVA) ([facility]) I69.351    Shortness of breath R06.02    Gastroesophageal reflux disease K21.9    Acute ST elevation myocardial infarction (STEMI) ([facility]) I21.3    Hypertensive emergency I16.1    Unstable angina ([facility]) I20.0    NSTEMI (non-ST elevated myocardial w stents x2 mid LAD, -PTCA w stents x3, RCA    ENDOSCOPY, COLON, DIAGNOSTIC  14    irregular GE junction, gastritis,hiatal hernia, gastric polyps    HAND SURGERY      rt knuckle    PTCA  2018    LAD    SHOULDER SURGERY Right 2011    bone spurs       Social History:    Social History     Tobacco Use    Smoking status: Former Smoker     Packs/day: 2.00     Years: 45.00     Pack years: 90.00     Types: Cigarettes     Last attempt to quit: 2016     Years since quittin.0    Smokeless tobacco: Never Used   Substance Use Topics    Alcohol use: No     Alcohol/week: 0.0 standard drinks                                Counseling given: Not Answered      Vital Signs (Current):   Vitals:    20 0300 20 0400 20 0500 20 0600   BP: (!) 143/82 (!) 140/88 (!) 146/89 (!) 150/98   Pulse: 51 54 51 59   Resp: 16 16 15 (!) 7   Temp:  36.6 °C (97.8 °F)     TempSrc:  Oral     SpO2: 97% 96% 95% 97%   Weight:  207 lb 3.2 oz (94 kg)     Height:                                                  BP Readings from Last 3 Encounters:   20 (!) 150/98   20 (!) 150/80   20 128/80       NPO Status: Time of last liquid consumption:                         Time of last solid consumption: 1800                        Date of last liquid consumption: 20                        Date of last solid food consumption: 20    BMI:   Wt Readings from Last 3 Encounters:   20 207 lb 3.2 oz (94 kg)   20 206 lb 12.8 oz (93.8 kg)   20 204 lb (92.5 kg)     Body mass index is 32.45 kg/m².     CBC:   Lab Results   Component Value Date    WBC 6.1 2020    RBC 4.44 2020    HGB 14.6 2020    HCT 45.5 2020    .5 2020    RDW 13.1 2020     2020       CMP:   Lab Results   Component Value Date     2020    K 4.3 2020    K 3.8 2018     2020    CO2 23 2020    BUN 16 2020    CREATININE multiple tandem lesions of 80-90 % each of in stent Restenosis in proximal and mid, distal LAD   2. OM 2 has 90 % disease   3. RCA is  100 % occluded   4. LVEDP was 15 mmHG      Echo 5/15/2020  EF 35-40%   Left ventricular function is moderately reduced,    Mild left ventricular hypertrophy. Grade II diastolic dysfunction. Mild inferior hypokinesis noted. Mild mitral and tricuspid regurgitation is present. No evidence of pericardial effusion. MUGA scan  3/2020  Left ventricular ejection fraction of 53 %. EK/15/2020  Normal sinus rhythm   Inferior infarct (cited on or before 14-MAR-2018)   ST & T wave abnormality, consider lateral ischemia   Abnormal ECG   When compared with ECG of 2020 06:58,   ST now depressed in Lateral leads   Nonspecific T wave abnormality now evident in Inferior leads      Neuro/Psych:   (+) CVA ( 2016, 2/2 thrombosis of left middle cerebral artery. Residucal Hemiparesis affecting right side ):, neuromuscular disease (hx Guillain-Monroe syndrome,peripheral neuropathy):, depression/anxiety             GI/Hepatic/Renal:   (+) GERD:, PUD, liver disease (hepatic steatosis):, renal disease (Adrenal adenoma, BPH, on flomax):,          ROS comment:  diverticulosis. Endo/Other:    (+) Diabetes, . Pt had no PAT visit       Abdominal:           Vascular:           ROS comment: Carotid US 2020  The right internal carotid artery demonstrates 0-50% stenosis .     The left internal carotid artery demonstrates 0-50% stenosis .     Bilateral vertebral arteries are patent with flow in the normal direction.     Stable greater than 50% stenosis of the right external carotid artery.     No significant change from the prior study of 05/10/2016. Syl Mckenzie Anesthesia Plan      general     ASA 4       Induction: intravenous.   arterial line, BIS, central line, PA catheter, NGA and CVP  MIPS: Postoperative opioids intended and Postoperative

## 2020-05-18 NOTE — PROGRESS NOTES
2nd bath done with Chlorhexidene Wipes. Bed Linens changed, pt ready for OR- scheduled for 11 AM. Pt denies any questions at present time, stated he was just ready for it to be over with. Emotional support given.

## 2020-05-19 ENCOUNTER — APPOINTMENT (OUTPATIENT)
Dept: CT IMAGING | Age: 60
DRG: 233 | End: 2020-05-19
Payer: MEDICARE

## 2020-05-19 ENCOUNTER — APPOINTMENT (OUTPATIENT)
Dept: GENERAL RADIOLOGY | Age: 60
DRG: 233 | End: 2020-05-19
Payer: MEDICARE

## 2020-05-19 LAB
ALBUMIN SERPL-MCNC: 3.3 GM/DL (ref 3.4–5)
ALP BLD-CCNC: 33 IU/L (ref 40–129)
ALT SERPL-CCNC: 33 U/L (ref 10–40)
ANION GAP SERPL CALCULATED.3IONS-SCNC: 10 MMOL/L (ref 4–16)
ANION GAP SERPL CALCULATED.3IONS-SCNC: 10 MMOL/L (ref 4–16)
APTT: 29.3 SECONDS (ref 25.1–37.1)
AST SERPL-CCNC: 147 IU/L (ref 15–37)
BASE EXCESS MIXED: 1.1 (ref 0–1.2)
BASE EXCESS MIXED: 1.1 (ref 0–1.2)
BASE EXCESS MIXED: 1.3 (ref 0–1.2)
BASE EXCESS MIXED: 2.5 (ref 0–1.2)
BASE EXCESS: 3 (ref 0–3.3)
BASE EXCESS: ABNORMAL (ref 0–3.3)
BILIRUB SERPL-MCNC: 0.4 MG/DL (ref 0–1)
BUN BLDV-MCNC: 21 MG/DL (ref 6–23)
BUN BLDV-MCNC: 21 MG/DL (ref 6–23)
CALCIUM IONIZED: 4.72 MG/DL (ref 4.48–5.28)
CALCIUM SERPL-MCNC: 8.7 MG/DL (ref 8.3–10.6)
CALCIUM SERPL-MCNC: 8.8 MG/DL (ref 8.3–10.6)
CARBON MONOXIDE, BLOOD: 2.3 % (ref 0–5)
CHLORIDE BLD-SCNC: 103 MMOL/L (ref 99–110)
CHLORIDE BLD-SCNC: 107 MMOL/L (ref 99–110)
CO2 CONTENT: 24.6 MMOL/L (ref 19–24)
CO2: 22 MMOL/L (ref 21–32)
CO2: 23 MMOL/L (ref 21–32)
CO2: 26 MMOL/L (ref 21–32)
COMMENT: ABNORMAL
CREAT SERPL-MCNC: 1.6 MG/DL (ref 0.9–1.3)
CREAT SERPL-MCNC: 1.7 MG/DL (ref 0.9–1.3)
EKG ATRIAL RATE: 92 BPM
EKG DIAGNOSIS: NORMAL
EKG P AXIS: 52 DEGREES
EKG P-R INTERVAL: 170 MS
EKG Q-T INTERVAL: 366 MS
EKG QRS DURATION: 122 MS
EKG QTC CALCULATION (BAZETT): 452 MS
EKG R AXIS: 1 DEGREES
EKG T AXIS: 107 DEGREES
EKG VENTRICULAR RATE: 92 BPM
GFR AFRICAN AMERICAN: 50 ML/MIN/1.73M2
GFR AFRICAN AMERICAN: 54 ML/MIN/1.73M2
GFR NON-AFRICAN AMERICAN: 41 ML/MIN/1.73M2
GFR NON-AFRICAN AMERICAN: 44 ML/MIN/1.73M2
GLUCOSE BLD-MCNC: 104 MG/DL (ref 70–99)
GLUCOSE BLD-MCNC: 111 MG/DL (ref 70–99)
GLUCOSE BLD-MCNC: 115 MG/DL (ref 70–99)
GLUCOSE BLD-MCNC: 121 MG/DL (ref 70–99)
GLUCOSE BLD-MCNC: 127 MG/DL (ref 70–99)
GLUCOSE BLD-MCNC: 129 MG/DL (ref 70–99)
GLUCOSE BLD-MCNC: 131 MG/DL (ref 70–99)
GLUCOSE BLD-MCNC: 133 MG/DL (ref 70–99)
GLUCOSE BLD-MCNC: 134 MG/DL (ref 70–99)
GLUCOSE BLD-MCNC: 135 MG/DL (ref 70–99)
GLUCOSE BLD-MCNC: 135 MG/DL (ref 70–99)
GLUCOSE BLD-MCNC: 140 MG/DL (ref 70–99)
GLUCOSE BLD-MCNC: 142 MG/DL (ref 70–99)
GLUCOSE BLD-MCNC: 144 MG/DL (ref 70–99)
GLUCOSE BLD-MCNC: 145 MG/DL (ref 70–99)
GLUCOSE BLD-MCNC: 147 MG/DL (ref 70–99)
GLUCOSE BLD-MCNC: 150 MG/DL (ref 70–99)
GLUCOSE BLD-MCNC: 154 MG/DL (ref 70–99)
GLUCOSE BLD-MCNC: 157 MG/DL (ref 70–99)
GLUCOSE BLD-MCNC: 159 MG/DL (ref 70–99)
GLUCOSE BLD-MCNC: 163 MG/DL (ref 70–99)
GLUCOSE BLD-MCNC: 185 MG/DL (ref 70–99)
GLUCOSE BLD-MCNC: 195 MG/DL (ref 70–99)
HCO3 ARTERIAL: 23.2 MMOL/L (ref 18–23)
HCO3 ARTERIAL: 24.1 MMOL/L (ref 18–23)
HCO3 ARTERIAL: 24.4 MMOL/L (ref 18–23)
HCO3 ARTERIAL: 24.8 MMOL/L (ref 18–23)
HCO3 ARTERIAL: 25 MMOL/L (ref 18–23)
HCT VFR BLD CALC: 40 % (ref 42–52)
HCT VFR BLD CALC: 40 % (ref 42–52)
HCT VFR BLD CALC: 40.5 % (ref 42–52)
HCT VFR BLD CALC: 41.9 % (ref 42–52)
HCT VFR BLD CALC: 42 % (ref 42–52)
HCT VFR BLD CALC: 43 % (ref 42–52)
HEMOGLOBIN: 13 GM/DL (ref 13.5–18)
HEMOGLOBIN: 13.4 GM/DL (ref 13.5–18)
HEMOGLOBIN: 13.7 GM/DL (ref 13.5–18)
HEMOGLOBIN: 13.8 GM/DL (ref 13.5–18)
HEMOGLOBIN: 14.2 GM/DL (ref 13.5–18)
HEMOGLOBIN: 14.5 GM/DL (ref 13.5–18)
INR BLD: 1.13 INDEX
IONIZED CA: 1.18 MMOL/L (ref 1.12–1.32)
MAGNESIUM: 2.3 MG/DL (ref 1.8–2.4)
MCH RBC QN AUTO: 32.8 PG (ref 27–31)
MCH RBC QN AUTO: 33.1 PG (ref 27–31)
MCHC RBC AUTO-ENTMCNC: 32 % (ref 32–36)
MCHC RBC AUTO-ENTMCNC: 32.1 % (ref 32–36)
MCV RBC AUTO: 102.4 FL (ref 78–100)
MCV RBC AUTO: 103.1 FL (ref 78–100)
METHEMOGLOBIN ARTERIAL: 1.1 %
O2 SATURATION: 91.3 % (ref 96–97)
O2 SATURATION: 93.4 % (ref 96–97)
O2 SATURATION: 93.6 % (ref 96–97)
O2 SATURATION: 94.5 % (ref 96–97)
O2 SATURATION: 98.7 % (ref 96–97)
PCO2 ARTERIAL: 42.8 MMHG (ref 32–45)
PCO2 ARTERIAL: 44 MMHG (ref 32–45)
PCO2 ARTERIAL: 45.4 MMHG (ref 32–45)
PCO2 ARTERIAL: 45.9 MMHG (ref 32–45)
PCO2 ARTERIAL: 47.6 MMHG (ref 32–45)
PDW BLD-RTO: 13.2 % (ref 11.7–14.9)
PDW BLD-RTO: 13.2 % (ref 11.7–14.9)
PH BLOOD: 7.31 (ref 7.34–7.45)
PH BLOOD: 7.33 (ref 7.34–7.45)
PH BLOOD: 7.34 (ref 7.34–7.45)
PH BLOOD: 7.35 (ref 7.34–7.45)
PH BLOOD: 7.36 (ref 7.34–7.45)
PLATELET # BLD: 103 K/CU MM (ref 140–440)
PLATELET # BLD: 120 K/CU MM (ref 140–440)
PMV BLD AUTO: 12.3 FL (ref 7.5–11.1)
PMV BLD AUTO: 12.9 FL (ref 7.5–11.1)
PO2 ARTERIAL: 132.9 MMHG (ref 75–100)
PO2 ARTERIAL: 60 MMHG (ref 75–100)
PO2 ARTERIAL: 71 MMHG (ref 75–100)
PO2 ARTERIAL: 74 MMHG (ref 75–100)
PO2 ARTERIAL: 77.2 MMHG (ref 75–100)
POC CALCIUM: 1.17 MMOL/L (ref 1.12–1.32)
POC CALCIUM: 1.29 MMOL/L (ref 1.12–1.32)
POC CALCIUM: 1.35 MMOL/L (ref 1.12–1.32)
POC CALCIUM: 1.36 MMOL/L (ref 1.12–1.32)
POC CHLORIDE: 105 MMOL/L (ref 98–109)
POC CHLORIDE: 106 MMOL/L (ref 98–109)
POC CHLORIDE: 106 MMOL/L (ref 98–109)
POC CHLORIDE: 107 MMOL/L (ref 98–109)
POC CREATININE: 1.6 MG/DL (ref 0.9–1.3)
POC CREATININE: 1.8 MG/DL (ref 0.9–1.3)
POC CREATININE: 1.8 MG/DL (ref 0.9–1.3)
POC CREATININE: 1.9 MG/DL (ref 0.9–1.3)
POTASSIUM SERPL-SCNC: 3.8 MMOL/L (ref 3.5–4.5)
POTASSIUM SERPL-SCNC: 4 MMOL/L (ref 3.5–4.5)
POTASSIUM SERPL-SCNC: 4.1 MMOL/L (ref 3.5–4.5)
POTASSIUM SERPL-SCNC: 4.1 MMOL/L (ref 3.5–5.1)
POTASSIUM SERPL-SCNC: 4.1 MMOL/L (ref 3.5–5.1)
POTASSIUM SERPL-SCNC: 4.4 MMOL/L (ref 3.5–4.5)
PROTHROMBIN TIME: 13.7 SECONDS (ref 11.7–14.5)
RBC # BLD: 3.93 M/CU MM (ref 4.6–6.2)
RBC # BLD: 4.09 M/CU MM (ref 4.6–6.2)
SODIUM BLD-SCNC: 136 MMOL/L (ref 135–145)
SODIUM BLD-SCNC: 139 MMOL/L (ref 135–145)
SODIUM BLD-SCNC: 139 MMOL/L (ref 138–146)
SODIUM BLD-SCNC: 140 MMOL/L (ref 138–146)
SODIUM BLD-SCNC: 141 MMOL/L (ref 138–146)
SODIUM BLD-SCNC: 142 MMOL/L (ref 138–146)
SOURCE, BLOOD GAS: ABNORMAL
TOTAL PROTEIN: 5 GM/DL (ref 6.4–8.2)
WBC # BLD: 12.1 K/CU MM (ref 4–10.5)
WBC # BLD: 12.1 K/CU MM (ref 4–10.5)

## 2020-05-19 PROCEDURE — 2000000000 HC ICU R&B

## 2020-05-19 PROCEDURE — 71045 X-RAY EXAM CHEST 1 VIEW: CPT

## 2020-05-19 PROCEDURE — 6370000000 HC RX 637 (ALT 250 FOR IP): Performed by: PHYSICIAN ASSISTANT

## 2020-05-19 PROCEDURE — 93010 ELECTROCARDIOGRAM REPORT: CPT | Performed by: INTERNAL MEDICINE

## 2020-05-19 PROCEDURE — 80053 COMPREHEN METABOLIC PANEL: CPT

## 2020-05-19 PROCEDURE — 82962 GLUCOSE BLOOD TEST: CPT

## 2020-05-19 PROCEDURE — 83735 ASSAY OF MAGNESIUM: CPT

## 2020-05-19 PROCEDURE — 2580000003 HC RX 258: Performed by: PHYSICIAN ASSISTANT

## 2020-05-19 PROCEDURE — 36620 INSERTION CATHETER ARTERY: CPT

## 2020-05-19 PROCEDURE — 94761 N-INVAS EAR/PLS OXIMETRY MLT: CPT

## 2020-05-19 PROCEDURE — 85027 COMPLETE CBC AUTOMATED: CPT

## 2020-05-19 PROCEDURE — 80048 BASIC METABOLIC PNL TOTAL CA: CPT

## 2020-05-19 PROCEDURE — 84132 ASSAY OF SERUM POTASSIUM: CPT

## 2020-05-19 PROCEDURE — 6360000002 HC RX W HCPCS: Performed by: PHYSICIAN ASSISTANT

## 2020-05-19 PROCEDURE — C1894 INTRO/SHEATH, NON-LASER: HCPCS

## 2020-05-19 PROCEDURE — 82330 ASSAY OF CALCIUM: CPT

## 2020-05-19 PROCEDURE — 82803 BLOOD GASES ANY COMBINATION: CPT

## 2020-05-19 PROCEDURE — 2500000003 HC RX 250 WO HCPCS: Performed by: INTERNAL MEDICINE

## 2020-05-19 PROCEDURE — 70450 CT HEAD/BRAIN W/O DYE: CPT

## 2020-05-19 PROCEDURE — 85730 THROMBOPLASTIN TIME PARTIAL: CPT

## 2020-05-19 PROCEDURE — 70498 CT ANGIOGRAPHY NECK: CPT

## 2020-05-19 PROCEDURE — 94640 AIRWAY INHALATION TREATMENT: CPT

## 2020-05-19 PROCEDURE — 6360000004 HC RX CONTRAST MEDICATION: Performed by: INTERNAL MEDICINE

## 2020-05-19 PROCEDURE — 93005 ELECTROCARDIOGRAM TRACING: CPT | Performed by: SURGERY

## 2020-05-19 PROCEDURE — 2500000003 HC RX 250 WO HCPCS

## 2020-05-19 PROCEDURE — 85610 PROTHROMBIN TIME: CPT

## 2020-05-19 PROCEDURE — 2700000000 HC OXYGEN THERAPY PER DAY

## 2020-05-19 PROCEDURE — 2500000003 HC RX 250 WO HCPCS: Performed by: SURGERY

## 2020-05-19 RX ORDER — ACETAMINOPHEN 650 MG/1
650 SUPPOSITORY RECTAL EVERY 4 HOURS PRN
Status: DISCONTINUED | OUTPATIENT
Start: 2020-05-19 | End: 2020-05-22 | Stop reason: HOSPADM

## 2020-05-19 RX ORDER — LABETALOL 20 MG/4 ML (5 MG/ML) INTRAVENOUS SYRINGE
10 ONCE
Status: COMPLETED | OUTPATIENT
Start: 2020-05-19 | End: 2020-05-19

## 2020-05-19 RX ORDER — ACETAMINOPHEN 650 MG/1
650 SUPPOSITORY RECTAL ONCE
Status: COMPLETED | OUTPATIENT
Start: 2020-05-19 | End: 2020-05-19

## 2020-05-19 RX ORDER — CARVEDILOL 6.25 MG/1
6.25 TABLET ORAL 2 TIMES DAILY
Status: DISCONTINUED | OUTPATIENT
Start: 2020-05-19 | End: 2020-05-21

## 2020-05-19 RX ORDER — LABETALOL 20 MG/4 ML (5 MG/ML) INTRAVENOUS SYRINGE
5 ONCE
Status: COMPLETED | OUTPATIENT
Start: 2020-05-19 | End: 2020-05-19

## 2020-05-19 RX ADMIN — CARVEDILOL 3.12 MG: 3.12 TABLET, FILM COATED ORAL at 07:00

## 2020-05-19 RX ADMIN — SODIUM CHLORIDE, PRESERVATIVE FREE 10 ML: 5 INJECTION INTRAVENOUS at 09:00

## 2020-05-19 RX ADMIN — LABETALOL 20 MG/4 ML (5 MG/ML) INTRAVENOUS SYRINGE 5 MG: at 09:14

## 2020-05-19 RX ADMIN — ACETAMINOPHEN 650 MG: 650 SUPPOSITORY RECTAL at 13:14

## 2020-05-19 RX ADMIN — HYDROCODONE BITARTRATE AND ACETAMINOPHEN 2 TABLET: 5; 325 TABLET ORAL at 07:01

## 2020-05-19 RX ADMIN — LABETALOL HYDROCHLORIDE 5 MG: 5 INJECTION, SOLUTION INTRAVENOUS at 09:40

## 2020-05-19 RX ADMIN — CEFAZOLIN SODIUM 2 G: 2 INJECTION, SOLUTION INTRAVENOUS at 03:30

## 2020-05-19 RX ADMIN — IPRATROPIUM BROMIDE AND ALBUTEROL SULFATE 1 AMPULE: .5; 3 SOLUTION RESPIRATORY (INHALATION) at 11:41

## 2020-05-19 RX ADMIN — Medication: at 20:58

## 2020-05-19 RX ADMIN — ACETAMINOPHEN 650 MG: 650 SUPPOSITORY RECTAL at 20:58

## 2020-05-19 RX ADMIN — IPRATROPIUM BROMIDE AND ALBUTEROL SULFATE 1 AMPULE: .5; 3 SOLUTION RESPIRATORY (INHALATION) at 07:43

## 2020-05-19 RX ADMIN — CALCIUM GLUCONATE 2 G: 98 INJECTION, SOLUTION INTRAVENOUS at 01:40

## 2020-05-19 RX ADMIN — SODIUM CHLORIDE, PRESERVATIVE FREE 10 ML: 5 INJECTION INTRAVENOUS at 20:58

## 2020-05-19 RX ADMIN — CALCIUM GLUCONATE 2 G: 98 INJECTION, SOLUTION INTRAVENOUS at 08:31

## 2020-05-19 RX ADMIN — IPRATROPIUM BROMIDE AND ALBUTEROL SULFATE 1 AMPULE: .5; 3 SOLUTION RESPIRATORY (INHALATION) at 15:32

## 2020-05-19 RX ADMIN — AMIODARONE HYDROCHLORIDE 200 MG: 200 TABLET ORAL at 07:00

## 2020-05-19 RX ADMIN — IPRATROPIUM BROMIDE AND ALBUTEROL SULFATE 1 AMPULE: .5; 3 SOLUTION RESPIRATORY (INHALATION) at 20:05

## 2020-05-19 RX ADMIN — IOPAMIDOL 80 ML: 755 INJECTION, SOLUTION INTRAVENOUS at 10:01

## 2020-05-19 RX ADMIN — SODIUM CHLORIDE: 4.5 INJECTION, SOLUTION INTRAVENOUS at 07:05

## 2020-05-19 ASSESSMENT — PULMONARY FUNCTION TESTS
PIF_VALUE: 16
PIF_VALUE: 16
PIF_VALUE: 15
PIF_VALUE: 16
PIF_VALUE: 16
PIF_VALUE: 15
PIF_VALUE: 16

## 2020-05-19 ASSESSMENT — PAIN DESCRIPTION - PAIN TYPE
TYPE: SURGICAL PAIN

## 2020-05-19 ASSESSMENT — PAIN DESCRIPTION - ORIENTATION
ORIENTATION: MID

## 2020-05-19 ASSESSMENT — PAIN SCALES - GENERAL
PAINLEVEL_OUTOF10: 0
PAINLEVEL_OUTOF10: 0
PAINLEVEL_OUTOF10: 8
PAINLEVEL_OUTOF10: 3
PAINLEVEL_OUTOF10: 8

## 2020-05-19 ASSESSMENT — PAIN - FUNCTIONAL ASSESSMENT
PAIN_FUNCTIONAL_ASSESSMENT: PREVENTS OR INTERFERES SOME ACTIVE ACTIVITIES AND ADLS
PAIN_FUNCTIONAL_ASSESSMENT: ACTIVITIES ARE NOT PREVENTED
PAIN_FUNCTIONAL_ASSESSMENT: ACTIVITIES ARE NOT PREVENTED

## 2020-05-19 ASSESSMENT — PAIN DESCRIPTION - LOCATION
LOCATION: STERNUM
LOCATION: STERNUM
LOCATION: CHEST

## 2020-05-19 ASSESSMENT — PAIN DESCRIPTION - FREQUENCY
FREQUENCY: CONTINUOUS
FREQUENCY: INTERMITTENT
FREQUENCY: INTERMITTENT

## 2020-05-19 ASSESSMENT — PAIN DESCRIPTION - DESCRIPTORS
DESCRIPTORS: ACHING;SORE
DESCRIPTORS: SHARP;SHOOTING
DESCRIPTORS: ACHING;SORE

## 2020-05-19 ASSESSMENT — PAIN DESCRIPTION - PROGRESSION
CLINICAL_PROGRESSION: GRADUALLY WORSENING
CLINICAL_PROGRESSION: GRADUALLY WORSENING
CLINICAL_PROGRESSION: NOT CHANGED

## 2020-05-19 ASSESSMENT — PAIN DESCRIPTION - ONSET
ONSET: GRADUAL
ONSET: GRADUAL
ONSET: ON-GOING

## 2020-05-19 NOTE — FLOWSHEET NOTE
Attempted to call patient's wife Frank Salinas for an update following rapid response, no answer, message left.      Belen Morris RN 9:35 AM

## 2020-05-19 NOTE — PROGRESS NOTES
PATIENT NAME: Geovani Pelaez    TODAY'S DATE: 05/19/20    SUBJECTIVE:    Pt is POD # 1 s/p CABG x 2. Pt had episode of speech difficulty, R sided weakness, and vision changes this am and stroke alert was called. His mental status has returned to baseline now but he still has R arm weakness     OBJECTIVE:   VITALS:    Vitals:    05/19/20 1003   BP: 131/82   Pulse: 82   Resp: 27   Temp:    SpO2: 100%     INTAKE/OUTPUT:    Date 05/19/20 0000 - 05/19/20 2359   Shift 3500-7012 5073-3596 4080-2707 24 Hour Total   INTAKE   P.O. 60   60   I. V.(mL/kg/hr) 634.3(0.8)   634.3   IV Piggyback 200   200   Shift Total(mL/kg) 894.3(9.2)   894.3(9.2)   OUTPUT   Urine(mL/kg/hr) 220(0.3)   220   Chest Tube 80   80   Shift Total(mL/kg) 300(3.1)   300(3.1)   Weight (kg) 97.3 97.3 97.3 97.3      Patient Vitals for the past 96 hrs (Last 3 readings):   Weight   05/19/20 0515 214 lb 8.1 oz (97.3 kg)   05/18/20 0400 207 lb 3.2 oz (94 kg)   05/17/20 0403 207 lb 11.2 oz (94.2 kg)       EXAM:  Blood pressure 131/82, pulse 82, temperature 99.2 °F (37.3 °C), temperature source Core, resp. rate 27, height 5' 7\" (1.702 m), weight 214 lb 8.1 oz (97.3 kg), SpO2 100 %. General appearance: No apparent distress, appears stated age and cooperative. Skin: unremarkable  HEENT Normocephalic, atraumatic without obvious deformity. Neck: Supple, Trachea midline   Lungs: Good respiratory effort.  Clear to auscultation, bilaterally  Heart: Regular rate/ rhythm inc c/d/i  Abdomen: Soft, non-tender or non-distended   Extremities: minedema warm well perfused  Neurologic: Alert, grossly intact  Mental status: normal affect      Data:  CBC:   Recent Labs     05/17/20  0530 05/18/20  0430  05/18/20  1500  05/19/20  0605 05/19/20  0929 05/19/20  1026   WBC 6.1  --   --  15.2*  --  12.1*  --   --    HGB 14.6  --    < > 13.0*  12.3*   < > 13.4* 13.8 13.7   HCT 45.5  --    < > 39.9*  36.0*   < > 41.9* 40.0* 40.0*    145  --  112*  --  120*  --   --     < > =

## 2020-05-19 NOTE — PROGRESS NOTES
Rapid response called patient has blank stare, states unable to breathe, decreased LOC. Patient continues to have delays in speech.

## 2020-05-19 NOTE — PROGRESS NOTES
Occupational Therapy    Attempted to see pt today. Stroke alert and rapid response called before this therapist entered room. Will re-attempt when pt is medically nicanor.      Traci Mendenhall OTR/L [id number]  9:40 AM,5/19/2020

## 2020-05-19 NOTE — PROGRESS NOTES
Hospitalist Progress Note      Name:  Lauren Chandler /Age/Sex: 1960  (60 y.o. male)   MRN & CSN:  [id number] & [id number] Admission Date/Time: 5/15/2020 10:48 PM   Location:  -A PCP: Terrance Tracey MD         Hospital Day: 5    Assessment and Plan:   Lauren Chandler is a 60 y.o.  male  who presents with <principal problem not specified>    --- CAD- LAD 80-90% stenosis  and OM2 90% stenosis and % .   --- NSTEMI    S/p CABG 20. LIMA to LAD and SVG to OM2. Plan   -  ASA, statin, coreg  - CTS surgery to assume primary responsibility  - Hospitalist will sign off. Please call us back if you need any help. >Diabetes mellitus type 2 poorly controlled- on insulin drip. >Hypertension - well controlled; currently on coreg  >Hyperlipidemia - statin  >Obesity (BMI 33)    Diet DIET CLEAR LIQUID; Low Sodium (2 GM)   DVT Prophylaxis ? Heparin drip   Code Status Full Code   Disposition Per primary team     History of Present Illness:     Pt S&E.      POD1 after 2V CABG (LIMA to LAD and SVG to OM2). No chest pain, no dyspnea. 10-14 point ROS reviewed negative, unless as noted above    Objective: Intake/Output Summary (Last 24 hours) at 2020 0936  Last data filed at 2020 0630  Gross per 24 hour   Intake 4052.26 ml   Output 2380 ml   Net 1672.26 ml      Vitals:   Vitals:    20 0929   BP: (!) 143/77   Pulse: 78   Resp: 27   Temp:    SpO2: 95%     Physical Exam:      GEN Awake male, cooperative. RESP Clear to auscultation, no wheezes, rales or rhonchi. Symmetric chest movement . CARDIO/VASC S1/S2 auscultated. Regular rate. Chest tube present. GI Abdomen is soft without significant tenderness, Bowel sounds are normoactive. MSK No gross joint deformities. Spontaneous movement of all extremities  SKIN Normal coloration, warm, dry. NEURO normal speech, no lateralizing weakness. PSYCH Awake, alert, oriented x 3.     Medications:   Medications:    sodium chloride flush  10 mL Intravenous 2 times per day    polyethylene glycol  17 g Oral Daily    pantoprazole  40 mg Oral Daily    carvedilol  3.125 mg Oral BID    mupirocin   Nasal BID    therapeutic multivitamin-minerals  1 tablet Oral Daily with breakfast    atorvastatin  20 mg Oral Nightly    aspirin  81 mg Oral Daily    ipratropium-albuterol  1 ampule Inhalation Q4H WA    amiodarone  200 mg Oral TID    Followed by   Mi Riley ON 5/22/2020] amiodarone  200 mg Oral Daily    sertraline  50 mg Oral Daily    tamsulosin  0.8 mg Oral Daily      Infusions:    sodium chloride 75 mL/hr at 05/19/20 0705    norepinephrine      EPINEPHrine infusion Stopped (05/18/20 1525)    nitroGLYCERIN 66.667 mcg/min (05/19/20 0919)    insulin 2.7 Units/hr (05/19/20 0824)    dextrose      dexmedetomidine (PRECEDEX) IV infusion Stopped (05/19/20 0706)     PRN Meds: sodium chloride flush, 10 mL, PRN  potassium chloride, 20 mEq, PRN  magnesium sulfate, 2 g, PRN  calcium gluconate IVPB, 2 g, PRN  acetaminophen, 650 mg, Q4H PRN  HYDROcodone 5 mg - acetaminophen, 1 tablet, Q4H PRN    Or  HYDROcodone 5 mg - acetaminophen, 2 tablet, Q4H PRN  bisacodyl, 10 mg, Daily PRN  fleet, 1 enema, Daily PRN  ondansetron, 4 mg, Q8H PRN  hydrALAZINE, 5 mg, Q5 Min PRN  metoprolol, 2.5 mg, Q10 Min PRN  albumin human, 25 g, PRN  norepinephrine, 2 mcg/min, Continuous PRN  EPINEPHrine infusion, 1 mcg/min, Continuous PRN  nitroGLYCERIN, 10 mcg/min, Continuous PRN  glucose, 15 g, PRN  dextrose, 12.5 g, PRN  glucagon (rDNA), 1 mg, PRN  dextrose, 100 mL/hr, PRN      Imaging and labs reviewed    Electronically signed by Asher Santos MD on 5/19/2020 at 9:36 AM

## 2020-05-19 NOTE — CONSULTS
Jey Bo MD.  Section of General Neurology - Adult  Consult Note        Reason for Consult:    Requesting Physician:  No referring provider defined for this encounter. Thank you for your kind referral.    CHIEF COMPLAINT:  unresponsiveness         HISTORY OF PRESENT ILLNESS:              The patient is a 60 y.o. male with a history of  Unresponsiveness. pt became unresponsive this am with .pt could not talk for a few min with return back to normal.CT brain was neg. CTA head neck was + for bilateral proximal vertebral artery stenosis. pt is on asa. pt is day 1 post CABG. Past Medical History:        Diagnosis Date    B12 deficiency     CAD (coronary artery disease)     GERD (gastroesophageal reflux disease)     Guillain Barré syndrome ([facility] [address]     H/O echocardiogram 7/30/13, 6/16/10    7/13-EF 50-55% WNL. 6/10-EF53%, mod LVH, MARCI, RVE, trace PI, mild MR/TR, 6/09    Heart murmur     History of myocardial infarction 2003    History of nuclear MUGA test 03/03/2020    EF 53%, Normal, No ICD needed    History of PTCA 6/03;2/03 6/03-PTCA w/stents x2, 2/03-PTCA w/stent x3    History of PTCA 03/15/2018    LAD EF25%    Hx of cardiovascular stress test 04/25/2018    EF 28% Nuclear scintigraphy demonstartes inferior wall infarct.  Hx of cardiovascular stress test 01/27/2020    Abnormal Study. Large area of inferior & apical-lateral infarct. No significant reversible ischemia. Severly reduced LVEF 23%    HX OTHER MEDICAL 03/14/2018    lifevest  d/c 7/2/2018    Hyperlipidemia     Hypertension     Impaired glucose tolerance 6/3/2016    MUGA 07/09/2018    EF 47%    NSTEMI (non-ST elevated myocardial infarction) ([facility] [address] 03/15/2018    Patient in clinical research study 05/2018    3 year study dalcitripib vs placebo [phone]    Pneumonia due to organism     Renal cyst 5/23/2014    S/P cardiac catheterization 7/12/13 7/13-EF=55%.  PATENT LAD AND RCA STENTS, OM2 50% stenosis, & Normal wall negative  RESPIRATORY:  negative  CARDIOVASCULAR:  negative  GASTROINTESTINAL:  negative  GENITOURINARY:  negative  MUSCULOSKELETAL:  negative  BEHAVIOR/PSYCH:  Negative    ROS neg    Family hx neg    PHYSICAL EXAM  ------------------------  Vitals:  /73   Pulse 78   Temp 97.8 °F (36.6 °C) (Core)   Resp 27   Ht 5' 7\" (1.702 m)   Wt 214 lb 8.1 oz (97.3 kg)   SpO2 92%   BMI 33.60 kg/m²      General:  Awake, alert, oriented X 3. Well developed, well nourished, well groomed. No apparent distress. HEENT:  Normocephalic, atraumatic. Pupils equal, round, reactive to light. No scleral icterus. No conjunctival injection. Normal lips, teeth, and gums. No nasal discharge. Neck:  Supple  Heart:  RRR, no murmurs, gallops, rubs  Lungs:  CTA bilaterally, bilat symmetrical expansion, no wheeze, rales, or rhonchi  Abdomen: Bowel sounds present, soft, nontender, no masses, no organomegaly, no peritoneal signs  Extremities:  No clubbing, cyanosis, or edema  Skin:  Warm and dry, no open lesions or rash  Breast: deferred  Rectal: deferred  Genitalia:  deferred    NEUROLOGICAL EXAM  ---------------------------------    Mental Status Exam:             Alert and oriented times three,follows commands,speech and language intact    Cranial [id number]-[id number]-LBQ Intact.         Cranial nerve II           Visual acuity:  normal                 Cranial nerve III           Pupils:  equal, round, reactive to light      Cranial nerves III, IV, VI           Extraocular Movements: intact      Cranial nerve V           Facial sensation:  intact      Cranial nerve VII           Facial strength: intact      Cranial nerve VIII           Hearing:  intact      Cranial nerve IX           Palate:  intact      Cranial nerve XI         Shoulder shrug:  intact      Cranial nerve XII          Tongue movement:  normal    Motor:    Drift:  absent  Motor exam is symmetrical 5 out of 5 all extremities bilaterally  Tone:  normal  Abnormal Movements: Absent    DTRs-1+ biceps,triceps,brachioradialis,knee jerks and ankle jerks bilaterally symmetrical.  Toes-downgoing bilaterally            Sensory:normal sensation              CBC with Differential:    Lab Results   Component Value Date    WBC 12.1 05/19/2020    RBC 3.93 05/19/2020    HGB 13.7 05/19/2020    HCT 40.0 05/19/2020     05/19/2020    .1 05/19/2020    MCH 33.1 05/19/2020    MCHC 32.1 05/19/2020    RDW 13.2 05/19/2020    SEGSPCT 47.2 05/17/2020    LYMPHOPCT 39.1 05/17/2020    MONOPCT 8.2 05/17/2020    BASOPCT 1.0 05/17/2020    MONOSABS 0.5 05/17/2020    LYMPHSABS 2.4 05/17/2020    EOSABS 0.3 05/17/2020    BASOSABS 0.1 05/17/2020    DIFFTYPE AUTOMATED DIFFERENTIAL 05/17/2020     CMP:    Lab Results   Component Value Date     05/19/2020    K 4.0 05/19/2020    K 3.8 03/16/2018     05/19/2020    CO2 26 05/19/2020    BUN 21 05/19/2020    CREATININE 1.8 05/19/2020    CREATININE 1.6 05/19/2020    GFRAA 54 05/19/2020    AGRATIO 1.7 04/16/2019    LABGLOM 44 05/19/2020    GLUCOSE 129 05/19/2020    PROT 5.0 05/19/2020    LABALBU 3.3 05/19/2020    CALCIUM 8.7 05/19/2020    BILITOT 0.4 05/19/2020    ALKPHOS 33 05/19/2020     05/19/2020    ALT 33 05/19/2020     BMP:    Lab Results   Component Value Date     05/19/2020    K 4.0 05/19/2020    K 3.8 03/16/2018     05/19/2020    CO2 26 05/19/2020    BUN 21 05/19/2020    LABALBU 3.3 05/19/2020    CREATININE 1.8 05/19/2020    CREATININE 1.6 05/19/2020    CALCIUM 8.7 05/19/2020    GFRAA 54 05/19/2020    LABGLOM 44 05/19/2020    GLUCOSE 129 05/19/2020     PT/INR:    Lab Results   Component Value Date    PROTIME 13.7 05/19/2020    PROTIME 11.4 10/29/2010    INR 1.13 05/19/2020     PTT:    Lab Results   Component Value Date    APTT 29.3 05/19/2020   [APTT  U/A:    Lab Results   Component Value Date    COLORU YELLOW 05/17/2020    WBCUA 2 05/17/2020    RBCUA NONE SEEN 05/17/2020    MUCUS RARE 05/17/2020    TRICHOMONAS NONE SEEN 05/17/2020    BACTERIA NEGATIVE 05/17/2020    CLARITYU CLEAR 05/17/2020    SPECGRAV 1.016 05/17/2020    LEUKOCYTESUR NEGATIVE 05/17/2020    UROBILINOGEN NORMAL 05/17/2020    BILIRUBINUR NEGATIVE 05/17/2020    BLOODU NEGATIVE 05/17/2020     TSH:  No results found for: TSH  VITAMIN B12: No components found for: B12  FOLATE:    Lab Results   Component Value Date    FOLATE 5.68 02/04/2020     RPR:  No results found for: RPR  JOSE:    Lab Results   Component Value Date    ANATITER 1:40 05/07/2016    JOSE Detected 05/07/2016     Urine Toxicology:  No components found for: IAMMENTA, IBARBIT, IBENZO, ICOCAINE, IMARTHC, IOPIATES, IPHENCYC     IMPRESSION:    Hypertensive encephalopathy    TIA r/o cardio carotid embolic event    Bilateral severe Vertebral artery stenosis-proximal    S/p CABG    Hx HTN DM    PLAN:    CT brain CTA head neck as above    Repeat Echo    Mri brain when possible post CABG    Plavix when OK with CT    Homocysteine level    Continue asa    Discussed dx prognosis meds side effects and above with pt and answered all questions. Discussed plan of care with pts nurse. Lexy Arce MD  BOARD CERTIFIED-NEUROLOGY.

## 2020-05-19 NOTE — CARE COORDINATION
Stroke Alert called; patient unable to participate in PT/OT today. CM willl follow- anticipate need for SNF after care.  Lebron Beebe RN

## 2020-05-19 NOTE — PROCEDURES
Preprocedure diagnosis: Severe hypertension and need for ABG monitoring. Postprocedure diagnosis same    Procedure: Ultrasound-guided right radial artery catheter placement    Performed by Alta JOYNER / Judson Moore MD    This was in the emergency and consent was not obtained. EBL less than 20 cc    Ultrasound was used to identify the right radial artery. The Micro-Stick needle was used to access the artery and guidewire was advanced without difficulty. The needle was removed and the Micro-Stick catheter was advanced over the wire without difficulty. There was excellent return of blood this was sutured in place. There was an excellent waveform noted. No immediate complications were identified.

## 2020-05-19 NOTE — CONSULTS
Will defer PT eval this morning d/t medical status and recent stroke alert. Intend to f/u for eval once medically stable.   Terell, [address] [facility] [address]  10:12 AM 5/19/2020

## 2020-05-20 ENCOUNTER — APPOINTMENT (OUTPATIENT)
Dept: GENERAL RADIOLOGY | Age: 60
DRG: 233 | End: 2020-05-20
Payer: MEDICARE

## 2020-05-20 LAB
ANION GAP SERPL CALCULATED.3IONS-SCNC: 9 MMOL/L (ref 4–16)
BUN BLDV-MCNC: 24 MG/DL (ref 6–23)
CALCIUM IONIZED: 4.68 MG/DL (ref 4.48–5.28)
CALCIUM SERPL-MCNC: 8.2 MG/DL (ref 8.3–10.6)
CHLORIDE BLD-SCNC: 104 MMOL/L (ref 99–110)
CO2: 25 MMOL/L (ref 21–32)
CREAT SERPL-MCNC: 1.6 MG/DL (ref 0.9–1.3)
GFR AFRICAN AMERICAN: 54 ML/MIN/1.73M2
GFR NON-AFRICAN AMERICAN: 44 ML/MIN/1.73M2
GLUCOSE BLD-MCNC: 107 MG/DL (ref 70–99)
GLUCOSE BLD-MCNC: 111 MG/DL (ref 70–99)
GLUCOSE BLD-MCNC: 127 MG/DL (ref 70–99)
GLUCOSE BLD-MCNC: 129 MG/DL (ref 70–99)
GLUCOSE BLD-MCNC: 129 MG/DL (ref 70–99)
GLUCOSE BLD-MCNC: 131 MG/DL (ref 70–99)
GLUCOSE BLD-MCNC: 131 MG/DL (ref 70–99)
GLUCOSE BLD-MCNC: 132 MG/DL (ref 70–99)
GLUCOSE BLD-MCNC: 133 MG/DL (ref 70–99)
GLUCOSE BLD-MCNC: 133 MG/DL (ref 70–99)
GLUCOSE BLD-MCNC: 135 MG/DL (ref 70–99)
GLUCOSE BLD-MCNC: 136 MG/DL (ref 70–99)
GLUCOSE BLD-MCNC: 152 MG/DL (ref 70–99)
GLUCOSE BLD-MCNC: 154 MG/DL (ref 70–99)
GLUCOSE BLD-MCNC: 162 MG/DL (ref 70–99)
GLUCOSE BLD-MCNC: 171 MG/DL (ref 70–99)
HCT VFR BLD CALC: 36.4 % (ref 42–52)
HEMOGLOBIN: 11.6 GM/DL (ref 13.5–18)
HOMOCYSTEINE: 9 UMOL/L (ref 0–10)
IONIZED CA: 1.17 MMOL/L (ref 1.12–1.32)
MAGNESIUM: 2.1 MG/DL (ref 1.8–2.4)
MCH RBC QN AUTO: 33 PG (ref 27–31)
MCHC RBC AUTO-ENTMCNC: 31.9 % (ref 32–36)
MCV RBC AUTO: 103.4 FL (ref 78–100)
PDW BLD-RTO: 13.3 % (ref 11.7–14.9)
PLATELET # BLD: 97 K/CU MM (ref 140–440)
PMV BLD AUTO: 12.8 FL (ref 7.5–11.1)
POTASSIUM SERPL-SCNC: 4.2 MMOL/L (ref 3.5–5.1)
RBC # BLD: 3.52 M/CU MM (ref 4.6–6.2)
SODIUM BLD-SCNC: 138 MMOL/L (ref 135–145)
WBC # BLD: 13.1 K/CU MM (ref 4–10.5)

## 2020-05-20 PROCEDURE — 6370000000 HC RX 637 (ALT 250 FOR IP): Performed by: PHYSICIAN ASSISTANT

## 2020-05-20 PROCEDURE — 80048 BASIC METABOLIC PNL TOTAL CA: CPT

## 2020-05-20 PROCEDURE — 2580000003 HC RX 258: Performed by: PHYSICIAN ASSISTANT

## 2020-05-20 PROCEDURE — 94761 N-INVAS EAR/PLS OXIMETRY MLT: CPT

## 2020-05-20 PROCEDURE — 93308 TTE F-UP OR LMTD: CPT

## 2020-05-20 PROCEDURE — 85027 COMPLETE CBC AUTOMATED: CPT

## 2020-05-20 PROCEDURE — 83735 ASSAY OF MAGNESIUM: CPT

## 2020-05-20 PROCEDURE — 97530 THERAPEUTIC ACTIVITIES: CPT

## 2020-05-20 PROCEDURE — 94640 AIRWAY INHALATION TREATMENT: CPT

## 2020-05-20 PROCEDURE — 71045 X-RAY EXAM CHEST 1 VIEW: CPT

## 2020-05-20 PROCEDURE — 6360000002 HC RX W HCPCS: Performed by: SURGERY

## 2020-05-20 PROCEDURE — 2580000003 HC RX 258: Performed by: SURGERY

## 2020-05-20 PROCEDURE — 97166 OT EVAL MOD COMPLEX 45 MIN: CPT

## 2020-05-20 PROCEDURE — 82962 GLUCOSE BLOOD TEST: CPT

## 2020-05-20 PROCEDURE — 2500000003 HC RX 250 WO HCPCS: Performed by: PHYSICIAN ASSISTANT

## 2020-05-20 PROCEDURE — 92610 EVALUATE SWALLOWING FUNCTION: CPT

## 2020-05-20 PROCEDURE — 97116 GAIT TRAINING THERAPY: CPT

## 2020-05-20 PROCEDURE — 94150 VITAL CAPACITY TEST: CPT

## 2020-05-20 PROCEDURE — 97162 PT EVAL MOD COMPLEX 30 MIN: CPT

## 2020-05-20 PROCEDURE — 83090 ASSAY OF HOMOCYSTEINE: CPT

## 2020-05-20 PROCEDURE — 2000000000 HC ICU R&B

## 2020-05-20 PROCEDURE — 82330 ASSAY OF CALCIUM: CPT

## 2020-05-20 PROCEDURE — 2700000000 HC OXYGEN THERAPY PER DAY

## 2020-05-20 PROCEDURE — 6360000002 HC RX W HCPCS: Performed by: PHYSICIAN ASSISTANT

## 2020-05-20 RX ORDER — TRAMADOL HYDROCHLORIDE 50 MG/1
50 TABLET ORAL EVERY 6 HOURS PRN
Status: DISCONTINUED | OUTPATIENT
Start: 2020-05-20 | End: 2020-05-22 | Stop reason: HOSPADM

## 2020-05-20 RX ORDER — FUROSEMIDE 10 MG/ML
20 INJECTION INTRAMUSCULAR; INTRAVENOUS 2 TIMES DAILY
Status: DISCONTINUED | OUTPATIENT
Start: 2020-05-20 | End: 2020-05-22 | Stop reason: HOSPADM

## 2020-05-20 RX ORDER — TRAMADOL HYDROCHLORIDE 50 MG/1
100 TABLET ORAL EVERY 6 HOURS PRN
Status: DISCONTINUED | OUTPATIENT
Start: 2020-05-20 | End: 2020-05-22 | Stop reason: HOSPADM

## 2020-05-20 RX ORDER — 0.9 % SODIUM CHLORIDE 0.9 %
500 INTRAVENOUS SOLUTION INTRAVENOUS PRN
Status: DISCONTINUED | OUTPATIENT
Start: 2020-05-20 | End: 2020-05-22 | Stop reason: HOSPADM

## 2020-05-20 RX ADMIN — TAMSULOSIN HYDROCHLORIDE 0.8 MG: 0.4 CAPSULE ORAL at 12:12

## 2020-05-20 RX ADMIN — SODIUM CHLORIDE, PRESERVATIVE FREE 10 ML: 5 INJECTION INTRAVENOUS at 20:53

## 2020-05-20 RX ADMIN — MULTIPLE VITAMINS W/ MINERALS TAB 1 TABLET: TAB at 12:12

## 2020-05-20 RX ADMIN — ATORVASTATIN CALCIUM 20 MG: 20 TABLET, FILM COATED ORAL at 20:53

## 2020-05-20 RX ADMIN — SODIUM CHLORIDE 2.8 UNITS/HR: 9 INJECTION, SOLUTION INTRAVENOUS at 16:44

## 2020-05-20 RX ADMIN — SODIUM CHLORIDE: 4.5 INJECTION, SOLUTION INTRAVENOUS at 03:47

## 2020-05-20 RX ADMIN — FUROSEMIDE 20 MG: 10 INJECTION, SOLUTION INTRAVENOUS at 17:46

## 2020-05-20 RX ADMIN — NITROGLYCERIN 140 MCG/MIN: 20 INJECTION INTRAVENOUS at 12:32

## 2020-05-20 RX ADMIN — CARVEDILOL 6.25 MG: 6.25 TABLET, FILM COATED ORAL at 20:52

## 2020-05-20 RX ADMIN — IPRATROPIUM BROMIDE AND ALBUTEROL SULFATE 1 AMPULE: .5; 3 SOLUTION RESPIRATORY (INHALATION) at 12:21

## 2020-05-20 RX ADMIN — CARVEDILOL 6.25 MG: 6.25 TABLET, FILM COATED ORAL at 12:13

## 2020-05-20 RX ADMIN — Medication: at 08:46

## 2020-05-20 RX ADMIN — IPRATROPIUM BROMIDE AND ALBUTEROL SULFATE 1 AMPULE: .5; 3 SOLUTION RESPIRATORY (INHALATION) at 07:30

## 2020-05-20 RX ADMIN — SERTRALINE HYDROCHLORIDE 50 MG: 50 TABLET ORAL at 12:12

## 2020-05-20 RX ADMIN — TRAMADOL HYDROCHLORIDE 100 MG: 50 TABLET, FILM COATED ORAL at 20:52

## 2020-05-20 RX ADMIN — TRAMADOL HYDROCHLORIDE 100 MG: 50 TABLET, FILM COATED ORAL at 14:28

## 2020-05-20 RX ADMIN — IPRATROPIUM BROMIDE AND ALBUTEROL SULFATE 1 AMPULE: .5; 3 SOLUTION RESPIRATORY (INHALATION) at 15:05

## 2020-05-20 RX ADMIN — NITROGLYCERIN 140 MCG/MIN: 20 INJECTION INTRAVENOUS at 00:39

## 2020-05-20 RX ADMIN — Medication: at 20:52

## 2020-05-20 RX ADMIN — SODIUM CHLORIDE, PRESERVATIVE FREE 10 ML: 5 INJECTION INTRAVENOUS at 08:46

## 2020-05-20 RX ADMIN — AMIODARONE HYDROCHLORIDE 200 MG: 200 TABLET ORAL at 20:53

## 2020-05-20 RX ADMIN — CALCIUM GLUCONATE 2 G: 98 INJECTION, SOLUTION INTRAVENOUS at 08:46

## 2020-05-20 RX ADMIN — AMIODARONE HYDROCHLORIDE 200 MG: 200 TABLET ORAL at 12:11

## 2020-05-20 RX ADMIN — PANTOPRAZOLE SODIUM 40 MG: 40 TABLET, DELAYED RELEASE ORAL at 12:12

## 2020-05-20 RX ADMIN — SODIUM CHLORIDE 500 ML: 9 INJECTION, SOLUTION INTRAVENOUS at 02:38

## 2020-05-20 RX ADMIN — FUROSEMIDE 20 MG: 10 INJECTION, SOLUTION INTRAVENOUS at 08:46

## 2020-05-20 ASSESSMENT — PAIN SCALES - GENERAL
PAINLEVEL_OUTOF10: 4
PAINLEVEL_OUTOF10: 7
PAINLEVEL_OUTOF10: 2
PAINLEVEL_OUTOF10: 8
PAINLEVEL_OUTOF10: 0
PAINLEVEL_OUTOF10: 3
PAINLEVEL_OUTOF10: 0

## 2020-05-20 ASSESSMENT — PAIN DESCRIPTION - PAIN TYPE
TYPE: SURGICAL PAIN
TYPE: SURGICAL PAIN

## 2020-05-20 ASSESSMENT — PAIN - FUNCTIONAL ASSESSMENT
PAIN_FUNCTIONAL_ASSESSMENT: ACTIVITIES ARE NOT PREVENTED
PAIN_FUNCTIONAL_ASSESSMENT: PREVENTS OR INTERFERES SOME ACTIVE ACTIVITIES AND ADLS

## 2020-05-20 ASSESSMENT — PAIN DESCRIPTION - FREQUENCY
FREQUENCY: CONTINUOUS
FREQUENCY: INTERMITTENT

## 2020-05-20 ASSESSMENT — PAIN DESCRIPTION - ONSET: ONSET: GRADUAL

## 2020-05-20 ASSESSMENT — PULMONARY FUNCTION TESTS: PEFR_L/MIN: 28

## 2020-05-20 ASSESSMENT — PAIN DESCRIPTION - LOCATION
LOCATION: CHEST
LOCATION: CHEST

## 2020-05-20 ASSESSMENT — PAIN DESCRIPTION - PROGRESSION
CLINICAL_PROGRESSION: GRADUALLY WORSENING
CLINICAL_PROGRESSION: GRADUALLY WORSENING

## 2020-05-20 ASSESSMENT — PAIN DESCRIPTION - DESCRIPTORS
DESCRIPTORS: ACHING
DESCRIPTORS: ACHING

## 2020-05-20 ASSESSMENT — PAIN DESCRIPTION - ORIENTATION: ORIENTATION: MID

## 2020-05-20 NOTE — PROGRESS NOTES
CREATININE 1.7*   < > 1.6* 1.8* 1.6*   GLUCOSE 134*  --  129*  --  127*    < > = values in this interval not displayed. Hepatic:   Recent Labs     05/19/20  1015   *   ALT 33   BILITOT 0.4   ALKPHOS 33*     Mag:      Recent Labs     05/18/20  1500 05/19/20  0605 05/20/20  0515   MG 2.9* 2.3 2.1      Phos:   No results for input(s): PHOS in the last 72 hours. INR:   Recent Labs     05/18/20  0430 05/18/20  1500 05/19/20  1015   INR 0.99 1.12 1.13       Radiology Review:  CXR  Impression:     Increasing interstitial edema with small bilateral pleural effusions. Interval removal of [place] catheter. ASSESSMENT AND PLAN:    Patient Active Problem List   Diagnosis    CAD (coronary artery disease)    History of PTCA    History of acute inferior wall myocardial infarction    Chest pain    B12 deficiency    Fatty liver    Colon, diverticulosis    Renal cyst    Adrenal adenoma    Hyperlipidemia LDL goal <70    Obesity (BMI 30-39. 9)    Cerebrovascular accident (CVA) due to thrombosis of left middle cerebral artery (HCC)    Gait disturbance    Essential hypertension    Hemiparesis affecting right side as late effect of cerebrovascular accident (CVA) (HCC)    Shortness of breath    Gastroesophageal reflux disease    Acute ST elevation myocardial infarction (STEMI) (HCC)    Hypertensive emergency    [facility]    NSTEMI (non-ST elevated myocardial infarction) ([facility]    Hypertensive urgency    Pneumonia due to organism    Frequent PVCs    Ischemic cardiomyopathy    History of Guillain-McGuffey syndrome    Acute pain of left shoulder       S/P CABG x 2    Cardio: HTN on nitro, start coreg and PO amio, wean nitro as tolerated. Pulm: on 5L NC, pulm edema on CXR, wean O2 as tolerated. GI: stable  Renal: creatinine stable 1.6, wt up, start lasix BID. Tubes/Lines: DC CT, anna later today. DC art line when down on nitro.    Wound: stable  Neuro: appreciate neuro input. Going for MRI today. Stable R sided weakness.      Maritza Puga PA-C

## 2020-05-20 NOTE — ADT AUTH CERT
showed sinus tachycardia.       Progressively as his blood pressure decreased he began answering questions though still was not 100% functional.       Right radial A-line was placed using ultrasound guidance.  There will be a separate note for this.       The patient continue to follow commands and was moving all 4 extremities and regained his conversational speech. [facility] was not moving his eyes when asked to follow my finger. [facility] stated that his vision was normal however he would not look at me.       Patient's creatinine is 1.7 and will start with a noncontrast CT of the head to rule out an intracranial bleed.                            Myocardial Infarction - Care Day 3 (5/18/2020) by Edilson Barrientos RN         Review Status Review Entered   Completed 5/19/2020 12:23       Criteria Review      Care Day: 3 Care Date: 5/18/2020 Level of Care:    Guideline Day 2    Level Of Care    ( ) Intermediate care or telemetry    5/19/2020 12:23 PM EDT by [facility] ICU post op CABG today    Clinical Status    (X) * Hemodynamic stability    ( ) * Chest pain, dyspnea, or anginal equivalent absent    5/19/2020 12:23 PM EDT by [facility]      CABG today    Routes    ( ) * Oral hydration, medications    (X) Parenteral medications    (X) Heart-healthy diet as tolerated    Interventions    (X) * Oxygen absent    (X) ECG, cardiac biomarkers    (X) PT/PTT and platelet count    (X) Possible noninvasive diagnostic testing (eg, stress test) [L]    (X) Possible cardiac angiography, with PCI if indicated [M]    Medications    (X) * IV nitroglycerin absent    (X) Anticoagulation    (X) Antiplatelet agents (eg, aspirin, clopidogrel, ticagrelor)    (X) Possible ACE inhibitor or angiotensin receptor blocker    (X) Statin medication    * Milestone   Additional Notes   5/18/2020- ICU Vent post op CABG      BP:   (!) 150/98   Pulse:   59   Resp:   (!) 7   Temp:    99.5   SpO2:   97%      Per IM      Hospital Day: 4     Assessment and Plan:   Trish Rodriguez is a 60 y.o.  male  who presents with <principal problem not specified>       >Non-ST elevation MI had typical anginal pain elevated troponin T wave inversion lateral leads twelve-lead EKG.     - Cardiology consulted, [facility] with LAD has multiple tandem lesions of 80-90    % each of in stent Restenosis in proximal and mid, distal LAD, OM 2 has 90 % disease, RCA is  100 % occluded   - IV, Heparin drip, ASA, statin, B-Blocker   - CABG recommended, CTS consulted, CABG today       >Diabetes mellitus type 2 poorly controlled-Insulin sliding scale   >Hypertension poorly controlled; chronic.- chlorthalidone, lisinopril, toprol   >Hyperlipidemia   >Morbid obesity       Op Note       Pre-op Diagnosis:        Active Hospital Problems     Diagnosis Date Noted   · NSTEMI (non-ST elevated myocardial infarction) [facility] [I21.4] 03/15/2018       Priority: High   · Chest pain [R07.9] 07/30/2013           Past Medical History       Past Medical History:   Diagnosis Date   · B12 deficiency     · CAD (coronary artery disease)     · GERD (gastroesophageal reflux disease)     · Guillain Barré syndrome ([facility]     · H/O echocardiogram 7/30/13, 6/16/10     7/13-EF 50-55% WNL. 6/10-EF53%, mod LVH, MARCI, RVE, trace PI, mild MR/TR, 6/09   · Heart murmur     · History of myocardial infarction 2003   · History of nuclear MUGA test 03/03/2020     EF 53%, Normal, No ICD needed   · History of PTCA 6/03;2/03 6/03-PTCA w/stents x2, 2/03-PTCA w/stent x3   · History of PTCA 03/15/2018     LAD EF25%   · Hx of cardiovascular stress test 04/25/2018     EF 28% Nuclear scintigraphy demonstartes inferior wall infarct. · Hx of cardiovascular stress test 01/27/2020     Abnormal Study.  Large area of inferior & apical-lateral infarct.  No significant reversible ischemia.  Severly reduced LVEF 23%   · HX OTHER MEDICAL 03/14/2018     lifevest  d/c 7/2/2018   · Hyperlipidemia     · Hypertension     · Impaired glucose

## 2020-05-20 NOTE — PROGRESS NOTES
Regular  Liquid Consistency Recommendation: Thin  Recommended Form of Meds: PO          Compensatory Swallowing Strategies  Compensatory Swallowing Strategies: Upright as possible for all oral intake;Assist feed    Treatment/Goals  Short-term Goals  Timeframe for Short-term Goals: N/A    General  Chart Reviewed: Yes  Behavior/Cognition: Alert; Cooperative  Follows Directions: Simple  Dentition: Adequate; Some missing teeth  Patient Positioning: Upright in bed  Baseline Vocal Quality: Dysphonic(mild, reduced volume)  Prior Dysphagia History: none known prior to admission  Consistencies Administered: Reg solid; Dysphagia Pureed (Dysphagia I); Thin - teaspoon; Thin - cup; Thin - straw; Ice Chips           Vision/Hearing  Vision  Vision: Impaired  Vision Exceptions: Wears glasses at all times  Hearing  Hearing: Within functional limits    Oral Motor Deficits  Oral/Motor  Oral Motor: Exceptions to [facility]    Oral Phase Dysfunction  Oral Phase  Oral Phase: WFL     Indicators of Pharyngeal Phase Dysfunction   Pharyngeal Phase  Pharyngeal Phase: WFL    Prognosis  Individuals consulted  Consulted and agree with results and recommendations: Patient;RN    Education  Patient Education: results, recommendations  Patient Education Response: Verbalizes understanding  Safety Devices in place: Yes  Type of devices:  All fall risk precautions in place       Therapy Time  SLP Individual Minutes  Time In: 6415  Time Out: [address]  Minutes: [address] [facility], [address] [place] [address] Road  5/20/2020 10:41 AM

## 2020-05-20 NOTE — FLOWSHEET NOTE
Chest tubes meet criteria to remove per open heart protocol. If chest tubes do not meet criteria, a specific order has been entered to remove by Vashti JOYNER. No air leak and no crepitus noted. Pt instructed on procedure. Dressings removed. Incision within normal limits. Site cleansed and prepped per protocol. Chest tubes X 2 removed without difficulty. Vaseline gauze and dry sterile dressing applied. Bilateral breath sounds audible. O2Sats 100% on 3L. V wires secured to dressing. Patient tolerated well.

## 2020-05-20 NOTE — CONSULTS
Fishing    Examination of body systems (includes body structures/functions, activity/participation limitations):  · Observation:  Supine in bed upon arrival   · Vision:  [facility]  · Hearing:  [facility]  · Cardiopulmonary: On 5L of O2  · Cognition: WFL, see OT/SLP note for further evaluation. Musculoskeletal  · ROM R/L:  WFL. · Strength R/L:  4/5, weakness in function and endurance, R>L. · Neuro:  Noted significant RUE weakness    Mobility:  · Supine to sit:  Mod A x2  · Transfers: Min A  · Sitting balance:  SBA. · Standing balance:  CGA. · Gait: CGA    [facility] 6 Clicks Inpatient Mobility:  AM-PAC Inpatient Mobility Raw Score : 17    Treatment:  Sup to sit: cues for sternal precautions, min A at RLE, mod A x2 at hips and trunk. Sitting balance: good, educated on sternal precautions. Transfers: STS from bed min A, effortful and slow for patient. Cues for upright posture, assisted with RUE with cardiac walker. Gait: ambulated x65ft with cardiac walker, cues for upright posture, demonstrates forward flexion. Limited by pain and fatigue by patient. Safety: patient left in chair with chair alarm, call light within reach, RN notified, gait belt used. Assessment:  Patient is a 59 yo who presents s/p CABG x2. Demonstrates impaired mobility and endurance, rec d/c to ARU. Complexity: Moderate  Prognosis: Good, no significant barriers to participation at this time. Plan Times per week: 6+/week, 1 week,   Discharge Recommendations: IP Rehab  Equipment: TBD    Goals:  Short term goals  Time Frame for Short term goals: 1 week  Short term goal 1: Patient will perform supine to sit with min A. Short term goal 2: Patient will perform STS with SBA and LRAD. Short term goal 3: Patient will ambulate x200ft with SBA and LRAD. Treatment plan:  Bed mobility, transfers, balance, gait, TA, TX. Recommendations for NURSING mobility: ambulate 4x per day.     Time:   Time in: 1339  Time out: 1410  Timed treatment minutes: 15  Total time: 31    Electronically signed by:    Rod Buck, PT  5/20/2020, 2:50 PM

## 2020-05-20 NOTE — CARE COORDINATION
Received referral for ARU. Will review patients clinicals and PT/OT notes.   Thank you for the referral.

## 2020-05-21 ENCOUNTER — APPOINTMENT (OUTPATIENT)
Dept: GENERAL RADIOLOGY | Age: 60
DRG: 233 | End: 2020-05-21
Payer: MEDICARE

## 2020-05-21 ENCOUNTER — APPOINTMENT (OUTPATIENT)
Dept: MRI IMAGING | Age: 60
DRG: 233 | End: 2020-05-21
Payer: MEDICARE

## 2020-05-21 LAB
ABO/RH: NORMAL
ANION GAP SERPL CALCULATED.3IONS-SCNC: 11 MMOL/L (ref 4–16)
ANTIBODY SCREEN: NEGATIVE
BUN BLDV-MCNC: 27 MG/DL (ref 6–23)
CALCIUM IONIZED: 4.52 MG/DL (ref 4.48–5.28)
CALCIUM SERPL-MCNC: 8.3 MG/DL (ref 8.3–10.6)
CHLORIDE BLD-SCNC: 101 MMOL/L (ref 99–110)
CO2: 27 MMOL/L (ref 21–32)
COMPONENT: NORMAL
CREAT SERPL-MCNC: 1.5 MG/DL (ref 0.9–1.3)
CROSSMATCH RESULT: NORMAL
GFR AFRICAN AMERICAN: 58 ML/MIN/1.73M2
GFR NON-AFRICAN AMERICAN: 48 ML/MIN/1.73M2
GLUCOSE BLD-MCNC: 103 MG/DL (ref 70–99)
GLUCOSE BLD-MCNC: 106 MG/DL (ref 70–99)
GLUCOSE BLD-MCNC: 125 MG/DL (ref 70–99)
GLUCOSE BLD-MCNC: 138 MG/DL (ref 70–99)
GLUCOSE BLD-MCNC: 86 MG/DL (ref 70–99)
GLUCOSE BLD-MCNC: 89 MG/DL (ref 70–99)
HCT VFR BLD CALC: 35.9 % (ref 42–52)
HEMOGLOBIN: 11.7 GM/DL (ref 13.5–18)
IONIZED CA: 1.13 MMOL/L (ref 1.12–1.32)
MAGNESIUM: 2.2 MG/DL (ref 1.8–2.4)
MCH RBC QN AUTO: 33.5 PG (ref 27–31)
MCHC RBC AUTO-ENTMCNC: 32.6 % (ref 32–36)
MCV RBC AUTO: 102.9 FL (ref 78–100)
PDW BLD-RTO: 13.1 % (ref 11.7–14.9)
PLATELET # BLD: 91 K/CU MM (ref 140–440)
PMV BLD AUTO: 12.8 FL (ref 7.5–11.1)
POTASSIUM SERPL-SCNC: 4 MMOL/L (ref 3.5–5.1)
RBC # BLD: 3.49 M/CU MM (ref 4.6–6.2)
SODIUM BLD-SCNC: 139 MMOL/L (ref 135–145)
STATUS: NORMAL
THERMAL AMPLITUDE STUDY: NORMAL
TRANSFUSION STATUS: NORMAL
UNIT DIVISION: 0
UNIT NUMBER: NORMAL
WBC # BLD: 9.5 K/CU MM (ref 4–10.5)

## 2020-05-21 PROCEDURE — 6370000000 HC RX 637 (ALT 250 FOR IP): Performed by: PHYSICIAN ASSISTANT

## 2020-05-21 PROCEDURE — 97116 GAIT TRAINING THERAPY: CPT

## 2020-05-21 PROCEDURE — 94761 N-INVAS EAR/PLS OXIMETRY MLT: CPT

## 2020-05-21 PROCEDURE — 2580000003 HC RX 258: Performed by: PHYSICIAN ASSISTANT

## 2020-05-21 PROCEDURE — 94640 AIRWAY INHALATION TREATMENT: CPT

## 2020-05-21 PROCEDURE — 71046 X-RAY EXAM CHEST 2 VIEWS: CPT

## 2020-05-21 PROCEDURE — 85027 COMPLETE CBC AUTOMATED: CPT

## 2020-05-21 PROCEDURE — 71045 X-RAY EXAM CHEST 1 VIEW: CPT

## 2020-05-21 PROCEDURE — 97110 THERAPEUTIC EXERCISES: CPT

## 2020-05-21 PROCEDURE — 2700000000 HC OXYGEN THERAPY PER DAY

## 2020-05-21 PROCEDURE — 94150 VITAL CAPACITY TEST: CPT

## 2020-05-21 PROCEDURE — 82962 GLUCOSE BLOOD TEST: CPT

## 2020-05-21 PROCEDURE — 2000000000 HC ICU R&B

## 2020-05-21 PROCEDURE — 6370000000 HC RX 637 (ALT 250 FOR IP): Performed by: PSYCHIATRY & NEUROLOGY

## 2020-05-21 PROCEDURE — 83735 ASSAY OF MAGNESIUM: CPT

## 2020-05-21 PROCEDURE — 70551 MRI BRAIN STEM W/O DYE: CPT

## 2020-05-21 PROCEDURE — 6360000002 HC RX W HCPCS: Performed by: SURGERY

## 2020-05-21 PROCEDURE — 6360000002 HC RX W HCPCS: Performed by: PHYSICIAN ASSISTANT

## 2020-05-21 PROCEDURE — 80048 BASIC METABOLIC PNL TOTAL CA: CPT

## 2020-05-21 PROCEDURE — 82330 ASSAY OF CALCIUM: CPT

## 2020-05-21 RX ORDER — CLOPIDOGREL BISULFATE 75 MG/1
75 TABLET ORAL DAILY
Status: DISCONTINUED | OUTPATIENT
Start: 2020-05-21 | End: 2020-05-22 | Stop reason: HOSPADM

## 2020-05-21 RX ORDER — NICOTINE POLACRILEX 4 MG
15 LOZENGE BUCCAL PRN
Status: DISCONTINUED | OUTPATIENT
Start: 2020-05-21 | End: 2020-05-22 | Stop reason: HOSPADM

## 2020-05-21 RX ORDER — DEXTROSE MONOHYDRATE 50 MG/ML
100 INJECTION, SOLUTION INTRAVENOUS PRN
Status: DISCONTINUED | OUTPATIENT
Start: 2020-05-21 | End: 2020-05-22 | Stop reason: HOSPADM

## 2020-05-21 RX ORDER — CARVEDILOL 12.5 MG/1
12.5 TABLET ORAL 2 TIMES DAILY
Status: DISCONTINUED | OUTPATIENT
Start: 2020-05-21 | End: 2020-05-22 | Stop reason: HOSPADM

## 2020-05-21 RX ORDER — CARVEDILOL 6.25 MG/1
6.25 TABLET ORAL ONCE
Status: COMPLETED | OUTPATIENT
Start: 2020-05-21 | End: 2020-05-21

## 2020-05-21 RX ORDER — DEXTROSE MONOHYDRATE 25 G/50ML
12.5 INJECTION, SOLUTION INTRAVENOUS PRN
Status: DISCONTINUED | OUTPATIENT
Start: 2020-05-21 | End: 2020-05-22 | Stop reason: HOSPADM

## 2020-05-21 RX ORDER — IPRATROPIUM BROMIDE AND ALBUTEROL SULFATE 2.5; .5 MG/3ML; MG/3ML
1 SOLUTION RESPIRATORY (INHALATION) EVERY 4 HOURS PRN
Status: DISCONTINUED | OUTPATIENT
Start: 2020-05-21 | End: 2020-05-22 | Stop reason: HOSPADM

## 2020-05-21 RX ADMIN — CALCIUM GLUCONATE 2 G: 98 INJECTION, SOLUTION INTRAVENOUS at 07:54

## 2020-05-21 RX ADMIN — FUROSEMIDE 20 MG: 10 INJECTION, SOLUTION INTRAVENOUS at 17:26

## 2020-05-21 RX ADMIN — Medication: at 09:13

## 2020-05-21 RX ADMIN — FUROSEMIDE 20 MG: 10 INJECTION, SOLUTION INTRAVENOUS at 09:12

## 2020-05-21 RX ADMIN — AMIODARONE HYDROCHLORIDE 200 MG: 200 TABLET ORAL at 09:12

## 2020-05-21 RX ADMIN — ATORVASTATIN CALCIUM 20 MG: 20 TABLET, FILM COATED ORAL at 20:00

## 2020-05-21 RX ADMIN — CARVEDILOL 6.25 MG: 6.25 TABLET, FILM COATED ORAL at 12:57

## 2020-05-21 RX ADMIN — ASPIRIN 81 MG: 81 TABLET, COATED ORAL at 09:12

## 2020-05-21 RX ADMIN — IPRATROPIUM BROMIDE AND ALBUTEROL SULFATE 1 AMPULE: .5; 3 SOLUTION RESPIRATORY (INHALATION) at 07:25

## 2020-05-21 RX ADMIN — TRAMADOL HYDROCHLORIDE 100 MG: 50 TABLET, FILM COATED ORAL at 13:48

## 2020-05-21 RX ADMIN — AMIODARONE HYDROCHLORIDE 200 MG: 200 TABLET ORAL at 20:00

## 2020-05-21 RX ADMIN — MULTIPLE VITAMINS W/ MINERALS TAB 1 TABLET: TAB at 13:00

## 2020-05-21 RX ADMIN — AMIODARONE HYDROCHLORIDE 200 MG: 200 TABLET ORAL at 12:56

## 2020-05-21 RX ADMIN — CLOPIDOGREL BISULFATE 75 MG: 75 TABLET ORAL at 17:26

## 2020-05-21 RX ADMIN — PANTOPRAZOLE SODIUM 40 MG: 40 TABLET, DELAYED RELEASE ORAL at 09:12

## 2020-05-21 RX ADMIN — POLYETHYLENE GLYCOL (3350) 17 G: 17 POWDER, FOR SOLUTION ORAL at 09:11

## 2020-05-21 RX ADMIN — SODIUM CHLORIDE, PRESERVATIVE FREE 10 ML: 5 INJECTION INTRAVENOUS at 20:00

## 2020-05-21 RX ADMIN — SODIUM CHLORIDE, PRESERVATIVE FREE 10 ML: 5 INJECTION INTRAVENOUS at 09:12

## 2020-05-21 RX ADMIN — IPRATROPIUM BROMIDE AND ALBUTEROL SULFATE 1 AMPULE: .5; 3 SOLUTION RESPIRATORY (INHALATION) at 11:15

## 2020-05-21 RX ADMIN — SERTRALINE HYDROCHLORIDE 50 MG: 50 TABLET ORAL at 09:12

## 2020-05-21 RX ADMIN — CARVEDILOL 12.5 MG: 12.5 TABLET, FILM COATED ORAL at 20:00

## 2020-05-21 RX ADMIN — CARVEDILOL 6.25 MG: 6.25 TABLET, FILM COATED ORAL at 09:12

## 2020-05-21 RX ADMIN — TAMSULOSIN HYDROCHLORIDE 0.8 MG: 0.4 CAPSULE ORAL at 09:12

## 2020-05-21 RX ADMIN — TRAMADOL HYDROCHLORIDE 50 MG: 50 TABLET, FILM COATED ORAL at 05:49

## 2020-05-21 RX ADMIN — Medication: at 20:00

## 2020-05-21 RX ADMIN — TRAMADOL HYDROCHLORIDE 100 MG: 50 TABLET, FILM COATED ORAL at 20:15

## 2020-05-21 ASSESSMENT — PAIN DESCRIPTION - DESCRIPTORS
DESCRIPTORS: ACHING;DISCOMFORT
DESCRIPTORS: DISCOMFORT
DESCRIPTORS: ACHING
DESCRIPTORS: DISCOMFORT
DESCRIPTORS: ACHING

## 2020-05-21 ASSESSMENT — PAIN SCALES - GENERAL
PAINLEVEL_OUTOF10: 3
PAINLEVEL_OUTOF10: 5
PAINLEVEL_OUTOF10: 3
PAINLEVEL_OUTOF10: 0
PAINLEVEL_OUTOF10: 3
PAINLEVEL_OUTOF10: 0
PAINLEVEL_OUTOF10: 0
PAINLEVEL_OUTOF10: 10
PAINLEVEL_OUTOF10: 10
PAINLEVEL_OUTOF10: 5
PAINLEVEL_OUTOF10: 7
PAINLEVEL_OUTOF10: 0
PAINLEVEL_OUTOF10: 3

## 2020-05-21 ASSESSMENT — PAIN DESCRIPTION - LOCATION
LOCATION: CHEST

## 2020-05-21 ASSESSMENT — PAIN DESCRIPTION - PAIN TYPE
TYPE: SURGICAL PAIN

## 2020-05-21 ASSESSMENT — PAIN DESCRIPTION - FREQUENCY
FREQUENCY: INTERMITTENT
FREQUENCY: CONTINUOUS
FREQUENCY: INTERMITTENT

## 2020-05-21 ASSESSMENT — PAIN DESCRIPTION - ORIENTATION
ORIENTATION: MID

## 2020-05-21 ASSESSMENT — PAIN DESCRIPTION - PROGRESSION
CLINICAL_PROGRESSION: NOT CHANGED
CLINICAL_PROGRESSION: NOT CHANGED
CLINICAL_PROGRESSION: GRADUALLY WORSENING
CLINICAL_PROGRESSION: NOT CHANGED
CLINICAL_PROGRESSION: GRADUALLY WORSENING
CLINICAL_PROGRESSION: NOT CHANGED
CLINICAL_PROGRESSION: NOT CHANGED
CLINICAL_PROGRESSION: GRADUALLY WORSENING
CLINICAL_PROGRESSION: NOT CHANGED

## 2020-05-21 ASSESSMENT — PAIN DESCRIPTION - ONSET
ONSET: ON-GOING
ONSET: GRADUAL
ONSET: AWAKENED FROM SLEEP
ONSET: ON-GOING
ONSET: ON-GOING

## 2020-05-21 ASSESSMENT — PAIN - FUNCTIONAL ASSESSMENT
PAIN_FUNCTIONAL_ASSESSMENT: PREVENTS OR INTERFERES SOME ACTIVE ACTIVITIES AND ADLS
PAIN_FUNCTIONAL_ASSESSMENT: PREVENTS OR INTERFERES SOME ACTIVE ACTIVITIES AND ADLS
PAIN_FUNCTIONAL_ASSESSMENT: ACTIVITIES ARE NOT PREVENTED
PAIN_FUNCTIONAL_ASSESSMENT: ACTIVITIES ARE NOT PREVENTED

## 2020-05-21 NOTE — PLAN OF CARE
Problem: Infection:  Goal: Will remain free from infection  Description: Will remain free from infection  5/21/2020 1016 by Janay Coffman. Li Calle RN  Outcome: Ongoing  5/20/2020 2032 by Mi Hua RN  Outcome: Ongoing     Problem: Safety:  Goal: Free from accidental physical injury  Description: Free from accidental physical injury  5/21/2020 1016 by Janay Coffman. Li Calle RN  Outcome: Ongoing  5/20/2020 2032 by Mi Hua RN  Outcome: Ongoing  Goal: Free from intentional harm  Description: Free from intentional harm  5/21/2020 1016 by Janay Coffman. Li Calle RN  Outcome: Ongoing  5/20/2020 2032 by Mi Hua RN  Outcome: Ongoing     Problem: Daily Care:  Goal: Daily care needs are met  Description: Daily care needs are met  5/21/2020 1016 by Janay Coffman. Li Calle RN  Outcome: Ongoing  5/20/2020 2032 by Mi Hua RN  Outcome: Ongoing     Problem: Pain:  Goal: Patient's pain/discomfort is manageable  Description: Patient's pain/discomfort is manageable  5/21/2020 1016 by Janay Coffman. Li Calle RN  Outcome: Ongoing  5/20/2020 2032 by Mi Hua RN  Outcome: Ongoing  Goal: Pain level will decrease  Description: Pain level will decrease  5/21/2020 1016 by Janay Coffman. Li aClle RN  Outcome: Ongoing  5/20/2020 2032 by Mi Hua RN  Outcome: Ongoing  Goal: Control of acute pain  Description: Control of acute pain  5/21/2020 1016 by Janay Coffman. Li Calle RN  Outcome: Ongoing  5/20/2020 2032 by Mi Hua RN  Outcome: Ongoing  Goal: Control of chronic pain  Description: Control of chronic pain  5/21/2020 1016 by Janay Coffman. Li Calle RN  Outcome: Ongoing  5/20/2020 2032 by Mi Hua RN  Outcome: Ongoing     Problem: Activity Intolerance:  Goal: Able to perform prescribed physical activity  Description: Able to perform prescribed physical activity  5/21/2020 1016 by Janay Coffman. Li Calle RN  Outcome: Ongoing  5/20/2020 2032 by Mi Sutton.  GOLDEN Hua  Outcome: Ongoing  Goal: Ability to tolerate increased activity will

## 2020-05-21 NOTE — PROGRESS NOTES
05/19/20  1015   *   ALT 33   BILITOT 0.4   ALKPHOS 33*     Mag:      Recent Labs     05/19/20  0605 05/20/20  0515 05/21/20  0436   MG 2.3 2.1 2.2      Phos:   No results for input(s): PHOS in the last 72 hours. INR:   Recent Labs     05/18/20  1500 05/19/20  1015   INR 1.12 1.13       Radiology Review:  CXR  Impression:     1. Stable pulmonary vascular congestion with small bilateral pleural  effusions and associated bibasilar atelectasis. ASSESSMENT AND PLAN:    Patient Active Problem List   Diagnosis    CAD (coronary artery disease)    History of PTCA    History of acute inferior wall myocardial infarction    Chest pain    B12 deficiency    Fatty liver    Colon, diverticulosis    Renal cyst    Adrenal adenoma    Hyperlipidemia LDL goal <70    Obesity (BMI 30-39. 9)    Cerebrovascular accident (CVA) due to thrombosis of left middle cerebral artery (HCC)    Gait disturbance    Essential hypertension    Hemiparesis affecting right side as late effect of cerebrovascular accident ([facility]A) ([facility])    Shortness of breath    Gastroesophageal reflux disease    Acute ST elevation myocardial infarction (STEMI) ([facility])    Hypertensive emergency    Unstable angina ([facility]    NSTEMI (non-ST elevated myocardial infarction) ([facility]    Hypertensive urgency    Pneumonia due to organism    Frequent PVCs    Ischemic cardiomyopathy    History of Guillain-Tappen syndrome    Acute pain of left shoulder       S/P CABG x 2    Cardio: off nitro, increase coreg. On PO amio. Pulm: on 3L NC, wean O2 as tolerated. Encourage IS. GI: suppository today  Renal: creatinine stable 1.5, wt stable, continue lasix BID. Tubes/Lines: DC pacing wires  Wound: stable  Neuro: appreciate neuro input. Going for MRI today. Okay to start plavix tomorrow.      To ARU tomorrow    Kasey Cruz PA-C

## 2020-05-21 NOTE — PROGRESS NOTES
Kaylee Fish MD.  Section of General Neurology - Adult  Consult Note        Reason for Consult:    Requesting Physician:  No referring provider defined for this encounter. Thank you for your kind referral.      Pt is doing better today and sitting in chair    Denies any new symptoms    Past Medical History:        Diagnosis Date    B12 deficiency     CAD (coronary artery disease)     GERD (gastroesophageal reflux disease)     Guillain Barré syndrome ([facility]     H/O echocardiogram 7/30/13, 6/16/10    7/13-EF 50-55% WNL. 6/10-EF53%, mod LVH, MARCI, RVE, trace PI, mild MR/TR, 6/09    Heart murmur     History of myocardial infarction 2003    History of nuclear MUGA test 03/03/2020    EF 53%, Normal, No ICD needed    History of PTCA 6/03;2/03 6/03-PTCA w/stents x2, 2/03-PTCA w/stent x3    History of PTCA 03/15/2018    LAD EF25%    Hx of cardiovascular stress test 04/25/2018    EF 28% Nuclear scintigraphy demonstartes inferior wall infarct.  Hx of cardiovascular stress test 01/27/2020    Abnormal Study. Large area of inferior & apical-lateral infarct. No significant reversible ischemia. Severly reduced LVEF 23%    HX OTHER MEDICAL 03/14/2018    lifevest  d/c 7/2/2018    Hyperlipidemia     Hypertension     Impaired glucose tolerance 6/3/2016    MUGA 07/09/2018    EF 47%    NSTEMI (non-ST elevated myocardial infarction) ([facility] 03/15/2018    Patient in clinical research study 05/2018    3 year study dalcitripib vs placebo [phone]    Pneumonia due to organism     Renal cyst 5/23/2014    S/P cardiac catheterization 7/12/13 7/13-EF=55%. PATENT LAD AND RCA STENTS, OM2 50% stenosis, & Normal wall motion.  Stroke (cerebrum) [facility])     May 2016     Past Surgical History:        Procedure Laterality Date    CARDIAC CATHETERIZATION  7/13 7/13-EF=55%. PATENT LAD AND RCA STENTS, OM2 50% stenosis, & Normal wall motion.     COLONOSCOPY  8/21/14    colon polyp, diverticulosis, internal mg, Oral, Nightly  aspirin EC tablet 81 mg, 81 mg, Oral, Daily  ipratropium-albuterol (DUONEB) nebulizer solution 1 ampule, 1 ampule, Inhalation, Q4H WA  albumin human 5 % IV solution 25 g, 25 g, Intravenous, PRN  norepinephrine (LEVOPHED) 16 mg in dextrose 5% 250 mL infusion, 2 mcg/min, Intravenous, Continuous PRN  EPINEPHrine (EPINEPHrine HCL) 5 mg in dextrose 5 % 250 mL infusion, 1 mcg/min, Intravenous, Continuous PRN  nitroGLYCERIN 50 mg in dextrose 5% 250 mL infusion, 10 mcg/min, Intravenous, Continuous PRN  insulin regular (HUMULIN R;NOVOLIN R) 100 Units in sodium chloride 0.9 % 100 mL infusion, 1 Units/hr, Intravenous, Continuous  glucose (GLUTOSE) 40 % oral gel 15 g, 15 g, Oral, PRN  dextrose 50 % IV solution, 12.5 g, Intravenous, PRN  glucagon (rDNA) injection 1 mg, 1 mg, Intramuscular, PRN  dextrose 5 % solution, 100 mL/hr, Intravenous, PRN  amiodarone (CORDARONE) tablet 200 mg, 200 mg, Oral, TID **FOLLOWED BY** [START ON 5/22/2020] amiodarone (CORDARONE) tablet 200 mg, 200 mg, Oral, Daily  sertraline (ZOLOFT) tablet 50 mg, 50 mg, Oral, Daily  tamsulosin (FLOMAX) capsule 0.8 mg, 0.8 mg, Oral, Daily  Allergies:  Ibuprofen; Augmentin [amoxicillin-pot clavulanate]; and Codeine    Social History:  TOBACCO:   reports that he quit smoking about 4 years ago. His smoking use included cigarettes. He has a 90.00 pack-year smoking history. He has never used smokeless tobacco.  ETOH:   reports no history of alcohol use. DRUGS:   reports no history of drug use.   Family History:       Problem Relation Age of Onset    Hypertension Mother     High Cholesterol Mother     Alcohol Abuse Mother     Depression Mother     Alcohol Abuse Father     Stomach Cancer Father     Depression Sister     Diabetes Brother     Depression Sister     Stomach Cancer Brother     Alcohol Abuse Brother        REVIEW OF SYSTEMS:  CONSTITUTIONAL:  negative  HEENT:  negative  RESPIRATORY:  negative  CARDIOVASCULAR: NEGATIVE 05/17/2020    UROBILINOGEN NORMAL 05/17/2020    BILIRUBINUR NEGATIVE 05/17/2020    BLOODU NEGATIVE 05/17/2020     TSH:  No results found for: TSH  VITAMIN B12: No components found for: B12  FOLATE:    Lab Results   Component Value Date    FOLATE 5.68 02/04/2020     RPR:  No results found for: RPR  JOSE:    Lab Results   Component Value Date    ANATITER 1:40 05/07/2016    JOSE Detected 05/07/2016     Urine Toxicology:  No components found for: IAMMENTA, IBARBIT, IBENZO, ICOCAINE, IMARTHC, IOPIATES, IPHENCYC     IMPRESSION:    Hypertensive encephalopathy    TIA r/o cardio carotid embolic event    Bilateral severe Vertebral artery stenosis-proximal    S/p CABG    Hx HTN DM    PLAN:    CT brain CTA head neck as above    Repeat Echo neg    Mri brain    Plavix in 1-2 days after pacemaker leads pulled out    Homocysteine level borderline high add foltx    Continue asa    Discussed dx prognosis meds side effects and above with pt and answered all questions. Discussed plan of care with pts nurse. Sydnie Skinner MD  BOARD CERTIFIED-NEUROLOGY.

## 2020-05-21 NOTE — PROGRESS NOTES
Physical Therapy    Physical Therapy Treatment Note  Name: Héctor Mcgregor MRN: [id number] :   1960   Date:  2020   Admission Date: 5/15/2020 Room:  [address] [place] [address]   Restrictions/Precautions:          Communication with other providers:  Per nurse ok to tx  Subjective:  Patient states:  Agreeable to tx. Pt reports up since 6 am and very tired requesting to return to bed at end of tx session. Pain:   Location, Type, Intensity (0/10 to 10/10): no c/o during tx session  Objective:    Observation:  Alert and oriented   Treatment, including education/measures:    Education:  Pt was instructed in Sternal Precautions, Purpose of Exercise Program, Rosalie Scale and Signs and Symptoms of Exercise Intolerance per Cardiac Protocol  Pt was given Initial Cardiac Exercises in Supine: Ankle Pumps x 10  Hip Abduction x 10  Heel Slides x 10  Shoulder Rolls x 10  Head Turns x 10  Front Arm Raises x 10   Side Arm Raises x 10  Arm Crosses x 10  Sit<=>stand min assist and cues for sternal precations  amb with cardiac walker 160' with cga and cues for safety  Sit to sup min assist and cues with bed flat  Safety  Patient left safely in the chair, with call light/phone in reach. Gait belt was used for transfers and gait. Assessment / Impression:       Patient's tolerance of treatment:  good  Adverse Reaction: na  Significant change in status and impact:  na  Barriers to improvement:  Strength and safety  Plan for Next Session:    Will cont to progress ex's and gt per cardiac protocol and educate pt on sternal precautions, S & S of ex intolerance, purpose of ex's, progression of ex's and gt at home.    Time in:  0920  Time out:  1000  Timed treatment minutes:  40  Total treatment time:  40    Previously filed items:  Social/Functional History  Lives With: Spouse(who is in a w/c)  Type of Home: House  Home Layout: One level  Home Access: Ramped entrance  Bathroom Shower/Tub: Walk-in shower  Bathroom Toilet: [address] [place] [address]

## 2020-05-21 NOTE — ACP (ADVANCE CARE PLANNING)
Transported to  [address] [place] [address] for 2 view cxr/ wheelchair. Patient on monitor ant O2. Tolerated well. Attempted to do MRI but machine is down at present.   Will try later

## 2020-05-21 NOTE — PROGRESS NOTES
Radiologist called MRI. Dr Mary London called, states will look at results.   Results showing several infarcts

## 2020-05-21 NOTE — PROGRESS NOTES
motion.     COLONOSCOPY  8/21/14    colon polyp, diverticulosis, internal hemorrhoids    CORONARY ANGIOPLASTY  6/03 6/14/03- PTCA w stents x2 mid LAD, 2/03-PTCA w stents x3, RCA    CORONARY ARTERY BYPASS GRAFT N/A 5/18/2020    CABG CORONARY ARTERY BYPASS x 2, INTRAOPERATIVE NGA, INDUCED HYPOTHERMIA, LEFT LEG ENDOSCOPIC VEIN HARVEST performed by Ozzie Jc MD at [facility], COLON, DIAGNOSTIC  8/21/14    irregular GE junction, gastritis,hiatal hernia, gastric polyps    HAND SURGERY      rt knuckle    PTCA  03/2018    LAD    SHOULDER SURGERY Right 2011    bone spurs     Current Medications:   Current Facility-Administered Medications: glucose (GLUTOSE) 40 % oral gel 15 g, 15 g, Oral, PRN  dextrose 50 % IV solution, 12.5 g, Intravenous, PRN  glucagon (rDNA) injection 1 mg, 1 mg, Intramuscular, PRN  dextrose 5 % solution, 100 mL/hr, Intravenous, PRN  insulin lispro (HUMALOG) injection vial 0-6 Units, 0-6 Units, Subcutaneous, TID WC  insulin lispro (HUMALOG) injection vial 0-3 Units, 0-3 Units, Subcutaneous, Nightly  carvedilol (COREG) tablet 12.5 mg, 12.5 mg, Oral, BID  ipratropium-albuterol (DUONEB) nebulizer solution 1 ampule, 1 ampule, Inhalation, Q4H PRN  0.9 % sodium chloride bolus, 500 mL, Intravenous, PRN  furosemide (LASIX) injection 20 mg, 20 mg, Intravenous, BID  traMADol (ULTRAM) tablet 50 mg, 50 mg, Oral, Q6H PRN **OR** traMADol (ULTRAM) tablet 100 mg, 100 mg, Oral, Q6H PRN  acetaminophen (TYLENOL) suppository 650 mg, 650 mg, Rectal, Q4H PRN  sodium chloride flush 0.9 % injection 10 mL, 10 mL, Intravenous, 2 times per day  sodium chloride flush 0.9 % injection 10 mL, 10 mL, Intravenous, PRN  potassium chloride 20 mEq/50 mL IVPB (Central Line), 20 mEq, Intravenous, PRN  magnesium sulfate 2 g in 50 mL IVPB premix, 2 g, Intravenous, PRN  calcium gluconate 2 g in dextrose 5 % 100 mL IVPB, 2 g, Intravenous, PRN  polyethylene glycol (GLYCOLAX) packet 17 g, 17 g, Oral, Daily  bisacodyl Mother     Depression Mother     Alcohol Abuse Father     Stomach Cancer Father     Depression Sister     Diabetes Brother     Depression Sister     Stomach Cancer Brother     Alcohol Abuse Brother        REVIEW OF SYSTEMS:  CONSTITUTIONAL:  negative  HEENT:  negative  RESPIRATORY:  negative  CARDIOVASCULAR:  negative  GASTROINTESTINAL:  negative  GENITOURINARY:  negative  MUSCULOSKELETAL:  negative  BEHAVIOR/PSYCH:  Negative    ROS neg    Family hx neg    PHYSICAL EXAM  ------------------------  Vitals:  BP (!) 147/81   Pulse 65   Temp 98.9 °F (37.2 °C) (Oral)   Resp 16   Ht 5' 7\" (1.702 m)   Wt 217 lb 13 oz (98.8 kg)   SpO2 99%   BMI 34.11 kg/m²      General:  Awake, alert, oriented X 3. Well developed, well nourished, well groomed. No apparent distress. HEENT:  Normocephalic, atraumatic. Pupils equal, round, reactive to light. No scleral icterus. No conjunctival injection. Normal lips, teeth, and gums. No nasal discharge. Neck:  Supple  Heart:  RRR, no murmurs, gallops, rubs  Lungs:  CTA bilaterally, bilat symmetrical expansion, no wheeze, rales, or rhonchi  Abdomen: Bowel sounds present, soft, nontender, no masses, no organomegaly, no peritoneal signs  Extremities:  No clubbing, cyanosis, or edema  Skin:  Warm and dry, no open lesions or rash  Breast: deferred  Rectal: deferred  Genitalia:  deferred    NEUROLOGICAL EXAM  ---------------------------------    Mental Status Exam:             Alert and oriented times three,follows commands,speech and language intact    Cranial [id number] Intact.         Cranial nerve II           Visual acuity:  normal                 Cranial nerve III           Pupils:  equal, round, reactive to light      Cranial nerves III, IV, VI           Extraocular Movements: intact      Cranial nerve V           Facial sensation:  intact      Cranial nerve VII           Facial strength: intact      Cranial nerve VIII           Hearing:  intact      Cranial nerve APTT 29.3 05/19/2020   [APTT  U/A:    Lab Results   Component Value Date    COLORU YELLOW 05/17/2020    WBCUA 2 05/17/2020    RBCUA NONE SEEN 05/17/2020    MUCUS RARE 05/17/2020    TRICHOMONAS NONE SEEN 05/17/2020    BACTERIA NEGATIVE 05/17/2020    CLARITYU CLEAR 05/17/2020    SPECGRAV 1.016 05/17/2020    LEUKOCYTESUR NEGATIVE 05/17/2020    UROBILINOGEN NORMAL 05/17/2020    BILIRUBINUR NEGATIVE 05/17/2020    BLOODU NEGATIVE 05/17/2020     TSH:  No results found for: TSH  VITAMIN B12: No components found for: B12  FOLATE:    Lab Results   Component Value Date    FOLATE 5.68 02/04/2020     RPR:  No results found for: RPR  JOSE:    Lab Results   Component Value Date    ANATITER 1:40 05/07/2016    JOSE Detected 05/07/2016     Urine Toxicology:  No components found for: IAMMENTA, IBARBIT, IBENZO, ICOCAINE, IMARTHC, IOPIATES, IPHENCYC     IMPRESSION:    Hypertensive encephalopathy    TIA r/o cardio carotid embolic event    Bilateral severe Vertebral artery stenosis-proximal    S/p CABG    Hx HTN DM    PLAN:    CT brain CTA head neck as above    Repeat Echo neg    Mri brain pend    Start Plavix    Homocysteine level borderline high add foltx    Continue asa    Discussed dx prognosis meds side effects and above with pt and answered all questions. Discussed plan of care with pts nurse. Fran Hdez MD  BOARD CERTIFIED-NEUROLOGY.

## 2020-05-22 ENCOUNTER — HOSPITAL ENCOUNTER (INPATIENT)
Age: 60
LOS: 7 days | Discharge: HOME HEALTH | DRG: 056 | End: 2020-05-29
Attending: PHYSICAL MEDICINE & REHABILITATION | Admitting: PHYSICAL MEDICINE & REHABILITATION
Payer: MEDICARE

## 2020-05-22 VITALS
HEART RATE: 58 BPM | SYSTOLIC BLOOD PRESSURE: 127 MMHG | WEIGHT: 217.81 LBS | OXYGEN SATURATION: 97 % | TEMPERATURE: 97.9 F | BODY MASS INDEX: 34.19 KG/M2 | RESPIRATION RATE: 11 BRPM | DIASTOLIC BLOOD PRESSURE: 73 MMHG | HEIGHT: 67 IN

## 2020-05-22 PROBLEM — I63.9 ACUTE CEREBROVASCULAR ACCIDENT (CVA) (HCC): Status: ACTIVE | Noted: 2020-05-22

## 2020-05-22 PROBLEM — I63.9 CVA (CEREBRAL VASCULAR ACCIDENT) (HCC): Status: ACTIVE | Noted: 2020-05-22

## 2020-05-22 LAB
ANION GAP SERPL CALCULATED.3IONS-SCNC: 9 MMOL/L (ref 4–16)
BUN BLDV-MCNC: 30 MG/DL (ref 6–23)
CALCIUM IONIZED: 4.4 MG/DL (ref 4.48–5.28)
CALCIUM SERPL-MCNC: 8.3 MG/DL (ref 8.3–10.6)
CHLORIDE BLD-SCNC: 97 MMOL/L (ref 99–110)
CO2: 31 MMOL/L (ref 21–32)
CREAT SERPL-MCNC: 1.5 MG/DL (ref 0.9–1.3)
GFR AFRICAN AMERICAN: 58 ML/MIN/1.73M2
GFR NON-AFRICAN AMERICAN: 48 ML/MIN/1.73M2
GLUCOSE BLD-MCNC: 124 MG/DL (ref 70–99)
GLUCOSE BLD-MCNC: 183 MG/DL (ref 70–99)
GLUCOSE BLD-MCNC: 218 MG/DL (ref 70–99)
GLUCOSE BLD-MCNC: 93 MG/DL (ref 70–99)
IONIZED CA: 1.1 MMOL/L (ref 1.12–1.32)
MAGNESIUM: 2.2 MG/DL (ref 1.8–2.4)
POTASSIUM SERPL-SCNC: 4 MMOL/L (ref 3.5–5.1)
SODIUM BLD-SCNC: 137 MMOL/L (ref 135–145)

## 2020-05-22 PROCEDURE — 97116 GAIT TRAINING THERAPY: CPT

## 2020-05-22 PROCEDURE — 99223 1ST HOSP IP/OBS HIGH 75: CPT | Performed by: PHYSICAL MEDICINE & REHABILITATION

## 2020-05-22 PROCEDURE — 97163 PT EVAL HIGH COMPLEX 45 MIN: CPT

## 2020-05-22 PROCEDURE — 6360000002 HC RX W HCPCS: Performed by: PHYSICIAN ASSISTANT

## 2020-05-22 PROCEDURE — 6360000002 HC RX W HCPCS: Performed by: SURGERY

## 2020-05-22 PROCEDURE — 6370000000 HC RX 637 (ALT 250 FOR IP): Performed by: PHYSICIAN ASSISTANT

## 2020-05-22 PROCEDURE — 83735 ASSAY OF MAGNESIUM: CPT

## 2020-05-22 PROCEDURE — 80048 BASIC METABOLIC PNL TOTAL CA: CPT

## 2020-05-22 PROCEDURE — 97542 WHEELCHAIR MNGMENT TRAINING: CPT

## 2020-05-22 PROCEDURE — 94150 VITAL CAPACITY TEST: CPT

## 2020-05-22 PROCEDURE — 2700000000 HC OXYGEN THERAPY PER DAY

## 2020-05-22 PROCEDURE — 82962 GLUCOSE BLOOD TEST: CPT

## 2020-05-22 PROCEDURE — 6370000000 HC RX 637 (ALT 250 FOR IP): Performed by: PSYCHIATRY & NEUROLOGY

## 2020-05-22 PROCEDURE — 82330 ASSAY OF CALCIUM: CPT

## 2020-05-22 PROCEDURE — 94761 N-INVAS EAR/PLS OXIMETRY MLT: CPT

## 2020-05-22 PROCEDURE — 97110 THERAPEUTIC EXERCISES: CPT

## 2020-05-22 PROCEDURE — 1280000000 HC REHAB R&B

## 2020-05-22 PROCEDURE — 6370000000 HC RX 637 (ALT 250 FOR IP): Performed by: PHYSICAL MEDICINE & REHABILITATION

## 2020-05-22 PROCEDURE — 2580000003 HC RX 258: Performed by: PHYSICIAN ASSISTANT

## 2020-05-22 PROCEDURE — 97530 THERAPEUTIC ACTIVITIES: CPT

## 2020-05-22 RX ORDER — FUROSEMIDE 20 MG/1
20 TABLET ORAL 2 TIMES DAILY
Status: CANCELLED | OUTPATIENT
Start: 2020-05-22

## 2020-05-22 RX ORDER — AMIODARONE HYDROCHLORIDE 200 MG/1
200 TABLET ORAL DAILY
Status: DISCONTINUED | OUTPATIENT
Start: 2020-05-23 | End: 2020-05-29 | Stop reason: HOSPADM

## 2020-05-22 RX ORDER — ONDANSETRON 4 MG/1
4 TABLET, ORALLY DISINTEGRATING ORAL 4 TIMES DAILY PRN
Status: DISCONTINUED | OUTPATIENT
Start: 2020-05-22 | End: 2020-05-29 | Stop reason: HOSPADM

## 2020-05-22 RX ORDER — DEXTROSE MONOHYDRATE 50 MG/ML
100 INJECTION, SOLUTION INTRAVENOUS PRN
Status: DISCONTINUED | OUTPATIENT
Start: 2020-05-22 | End: 2020-05-29 | Stop reason: HOSPADM

## 2020-05-22 RX ORDER — ASPIRIN 81 MG/1
81 TABLET ORAL DAILY
Status: DISCONTINUED | OUTPATIENT
Start: 2020-05-23 | End: 2020-05-29 | Stop reason: HOSPADM

## 2020-05-22 RX ORDER — CLOPIDOGREL BISULFATE 75 MG/1
75 TABLET ORAL DAILY
Status: DISCONTINUED | OUTPATIENT
Start: 2020-05-23 | End: 2020-05-29 | Stop reason: HOSPADM

## 2020-05-22 RX ORDER — GLIMEPIRIDE 2 MG/1
2 TABLET ORAL EVERY MORNING
Status: DISCONTINUED | OUTPATIENT
Start: 2020-05-23 | End: 2020-05-29 | Stop reason: HOSPADM

## 2020-05-22 RX ORDER — IPRATROPIUM BROMIDE AND ALBUTEROL SULFATE 2.5; .5 MG/3ML; MG/3ML
1 SOLUTION RESPIRATORY (INHALATION) EVERY 4 HOURS PRN
Status: CANCELLED | OUTPATIENT
Start: 2020-05-22

## 2020-05-22 RX ORDER — DEXTROSE MONOHYDRATE 25 G/50ML
12.5 INJECTION, SOLUTION INTRAVENOUS PRN
Status: DISCONTINUED | OUTPATIENT
Start: 2020-05-22 | End: 2020-05-29 | Stop reason: HOSPADM

## 2020-05-22 RX ORDER — ACETAMINOPHEN 325 MG/1
650 TABLET ORAL EVERY 4 HOURS PRN
Status: DISCONTINUED | OUTPATIENT
Start: 2020-05-22 | End: 2020-05-29 | Stop reason: HOSPADM

## 2020-05-22 RX ORDER — IPRATROPIUM BROMIDE AND ALBUTEROL SULFATE 2.5; .5 MG/3ML; MG/3ML
1 SOLUTION RESPIRATORY (INHALATION) EVERY 4 HOURS PRN
Status: DISCONTINUED | OUTPATIENT
Start: 2020-05-22 | End: 2020-05-29 | Stop reason: HOSPADM

## 2020-05-22 RX ORDER — ATORVASTATIN CALCIUM 20 MG/1
20 TABLET, FILM COATED ORAL NIGHTLY
Status: DISCONTINUED | OUTPATIENT
Start: 2020-05-22 | End: 2020-05-29 | Stop reason: HOSPADM

## 2020-05-22 RX ORDER — FUROSEMIDE 20 MG/1
20 TABLET ORAL 2 TIMES DAILY
Status: DISCONTINUED | OUTPATIENT
Start: 2020-05-22 | End: 2020-05-29 | Stop reason: HOSPADM

## 2020-05-22 RX ORDER — CLOPIDOGREL BISULFATE 75 MG/1
75 TABLET ORAL DAILY
Status: CANCELLED | OUTPATIENT
Start: 2020-05-23

## 2020-05-22 RX ORDER — ACETAMINOPHEN 325 MG/1
650 TABLET ORAL EVERY 4 HOURS PRN
Status: DISCONTINUED | OUTPATIENT
Start: 2020-05-22 | End: 2020-05-22

## 2020-05-22 RX ORDER — NICOTINE POLACRILEX 4 MG
15 LOZENGE BUCCAL PRN
Status: DISCONTINUED | OUTPATIENT
Start: 2020-05-22 | End: 2020-05-29 | Stop reason: HOSPADM

## 2020-05-22 RX ORDER — LISINOPRIL 2.5 MG/1
2.5 TABLET ORAL DAILY
Status: DISCONTINUED | OUTPATIENT
Start: 2020-05-22 | End: 2020-05-29 | Stop reason: HOSPADM

## 2020-05-22 RX ORDER — TRAMADOL HYDROCHLORIDE 50 MG/1
50 TABLET ORAL EVERY 6 HOURS PRN
Status: DISCONTINUED | OUTPATIENT
Start: 2020-05-22 | End: 2020-05-29 | Stop reason: HOSPADM

## 2020-05-22 RX ORDER — ASPIRIN 81 MG/1
81 TABLET ORAL DAILY
Status: CANCELLED | OUTPATIENT
Start: 2020-05-23

## 2020-05-22 RX ORDER — TAMSULOSIN HYDROCHLORIDE 0.4 MG/1
0.8 CAPSULE ORAL DAILY
Status: DISCONTINUED | OUTPATIENT
Start: 2020-05-23 | End: 2020-05-29 | Stop reason: HOSPADM

## 2020-05-22 RX ORDER — ATORVASTATIN CALCIUM 20 MG/1
20 TABLET, FILM COATED ORAL NIGHTLY
Status: CANCELLED | OUTPATIENT
Start: 2020-05-22

## 2020-05-22 RX ORDER — BISACODYL 10 MG
10 SUPPOSITORY, RECTAL RECTAL DAILY PRN
Status: DISCONTINUED | OUTPATIENT
Start: 2020-05-22 | End: 2020-05-29 | Stop reason: HOSPADM

## 2020-05-22 RX ORDER — TAMSULOSIN HYDROCHLORIDE 0.4 MG/1
0.8 CAPSULE ORAL DAILY
Status: CANCELLED | OUTPATIENT
Start: 2020-05-23

## 2020-05-22 RX ORDER — POLYETHYLENE GLYCOL 3350 17 G/17G
17 POWDER, FOR SOLUTION ORAL DAILY PRN
Status: DISCONTINUED | OUTPATIENT
Start: 2020-05-22 | End: 2020-05-29 | Stop reason: HOSPADM

## 2020-05-22 RX ORDER — GLIMEPIRIDE 2 MG/1
2 TABLET ORAL EVERY MORNING
Status: CANCELLED | OUTPATIENT
Start: 2020-05-22

## 2020-05-22 RX ORDER — ACETAMINOPHEN 325 MG/1
650 TABLET ORAL EVERY 4 HOURS PRN
Status: CANCELLED | OUTPATIENT
Start: 2020-05-22

## 2020-05-22 RX ORDER — CARVEDILOL 12.5 MG/1
12.5 TABLET ORAL 2 TIMES DAILY
Status: CANCELLED | OUTPATIENT
Start: 2020-05-22

## 2020-05-22 RX ORDER — LISINOPRIL 2.5 MG/1
2.5 TABLET ORAL DAILY
Status: CANCELLED | OUTPATIENT
Start: 2020-05-22

## 2020-05-22 RX ORDER — DOCUSATE SODIUM 100 MG/1
100 CAPSULE, LIQUID FILLED ORAL DAILY
Status: DISCONTINUED | OUTPATIENT
Start: 2020-05-22 | End: 2020-05-29 | Stop reason: HOSPADM

## 2020-05-22 RX ORDER — CARVEDILOL 12.5 MG/1
12.5 TABLET ORAL 2 TIMES DAILY
Status: DISCONTINUED | OUTPATIENT
Start: 2020-05-22 | End: 2020-05-29 | Stop reason: HOSPADM

## 2020-05-22 RX ADMIN — LISINOPRIL 2.5 MG: 2.5 TABLET ORAL at 17:56

## 2020-05-22 RX ADMIN — ATORVASTATIN CALCIUM 20 MG: 20 TABLET, FILM COATED ORAL at 22:00

## 2020-05-22 RX ADMIN — FUROSEMIDE 20 MG: 10 INJECTION, SOLUTION INTRAVENOUS at 08:18

## 2020-05-22 RX ADMIN — DOCUSATE SODIUM 100 MG: 100 CAPSULE, LIQUID FILLED ORAL at 17:56

## 2020-05-22 RX ADMIN — TRAMADOL HYDROCHLORIDE 50 MG: 50 TABLET, FILM COATED ORAL at 08:27

## 2020-05-22 RX ADMIN — AMIODARONE HYDROCHLORIDE 200 MG: 200 TABLET ORAL at 08:21

## 2020-05-22 RX ADMIN — SERTRALINE HYDROCHLORIDE 50 MG: 50 TABLET ORAL at 08:19

## 2020-05-22 RX ADMIN — SODIUM CHLORIDE, PRESERVATIVE FREE 10 ML: 5 INJECTION INTRAVENOUS at 08:25

## 2020-05-22 RX ADMIN — ASPIRIN 81 MG: 81 TABLET, COATED ORAL at 08:18

## 2020-05-22 RX ADMIN — PANTOPRAZOLE SODIUM 40 MG: 40 TABLET, DELAYED RELEASE ORAL at 08:18

## 2020-05-22 RX ADMIN — TAMSULOSIN HYDROCHLORIDE 0.8 MG: 0.4 CAPSULE ORAL at 08:19

## 2020-05-22 RX ADMIN — Medication: at 08:19

## 2020-05-22 RX ADMIN — CLOPIDOGREL BISULFATE 75 MG: 75 TABLET ORAL at 08:19

## 2020-05-22 RX ADMIN — SODIUM CHLORIDE, PRESERVATIVE FREE 10 ML: 5 INJECTION INTRAVENOUS at 08:22

## 2020-05-22 RX ADMIN — MULTIPLE VITAMINS W/ MINERALS TAB 1 TABLET: TAB at 08:27

## 2020-05-22 RX ADMIN — CALCIUM GLUCONATE 2 G: 98 INJECTION, SOLUTION INTRAVENOUS at 09:50

## 2020-05-22 RX ADMIN — FUROSEMIDE 20 MG: 20 TABLET ORAL at 17:56

## 2020-05-22 RX ADMIN — POLYETHYLENE GLYCOL (3350) 17 G: 17 POWDER, FOR SOLUTION ORAL at 08:27

## 2020-05-22 ASSESSMENT — PAIN DESCRIPTION - PAIN TYPE: TYPE: SURGICAL PAIN

## 2020-05-22 ASSESSMENT — PAIN DESCRIPTION - PROGRESSION: CLINICAL_PROGRESSION: NOT CHANGED

## 2020-05-22 ASSESSMENT — PAIN DESCRIPTION - ORIENTATION
ORIENTATION: MID
ORIENTATION: MID

## 2020-05-22 ASSESSMENT — PAIN SCALES - GENERAL
PAINLEVEL_OUTOF10: 0
PAINLEVEL_OUTOF10: 4
PAINLEVEL_OUTOF10: 0
PAINLEVEL_OUTOF10: 0
PAINLEVEL_OUTOF10: 3
PAINLEVEL_OUTOF10: 0

## 2020-05-22 ASSESSMENT — PAIN DESCRIPTION - ONSET
ONSET: SUDDEN
ONSET: ON-GOING

## 2020-05-22 ASSESSMENT — PAIN DESCRIPTION - LOCATION
LOCATION: CHEST
LOCATION: CHEST

## 2020-05-22 ASSESSMENT — PAIN - FUNCTIONAL ASSESSMENT: PAIN_FUNCTIONAL_ASSESSMENT: ACTIVITIES ARE NOT PREVENTED

## 2020-05-22 ASSESSMENT — PAIN DESCRIPTION - DESCRIPTORS
DESCRIPTORS: ACHING
DESCRIPTORS: ACHING

## 2020-05-22 ASSESSMENT — PAIN DESCRIPTION - FREQUENCY
FREQUENCY: INTERMITTENT
FREQUENCY: INTERMITTENT

## 2020-05-22 NOTE — PROGRESS NOTES
Updated daughter with security code [phone]. Informed of possible move to ARU today. Patient speaks to wife and lets her know of move sometime today.

## 2020-05-22 NOTE — PROGRESS NOTES
2 nurse skin assessment completed with Carline Mcmahan. Pt had blanchable redness to buttocks, glued surgical sites to chest and left thigh open to air.

## 2020-05-22 NOTE — CARE COORDINATION
Contacted ARU liaison [employer] RN. Patient willl be discharged to ARU today. ARU staff will call ICU RN Dorina Patel when bed is available. Spoke to patient; he remains agreeable to ARU stay.  Isaiah Cohen RN

## 2020-05-22 NOTE — PLAN OF CARE
Problem: Infection:  Goal: Will remain free from infection  Description: Will remain free from infection  5/21/2020 2132 by Wade Rodas RN  Outcome: Ongoing  5/21/2020 1016 by Jonathan Block. GOLDEN Felix  Outcome: Ongoing     Problem: Safety:  Goal: Free from accidental physical injury  Description: Free from accidental physical injury  5/21/2020 2132 by Wade Rodas RN  Outcome: Ongoing  5/21/2020 1016 by Jonathan Block. GOLDEN Felix  Outcome: Ongoing  Goal: Free from intentional harm  Description: Free from intentional harm  5/21/2020 2132 by Wade Rodas, RN  Outcome: Ongoing  5/21/2020 1016 by Jonathan Block. GOLDEN Felix  Outcome: Ongoing     Problem: Daily Care:  Goal: Daily care needs are met  Description: Daily care needs are met  5/21/2020 2132 by Wade Rodas RN  Outcome: Ongoing  5/21/2020 1016 by Jonathan Block. GOLDEN Felix  Outcome: Ongoing     Problem: Pain:  Goal: Patient's pain/discomfort is manageable  Description: Patient's pain/discomfort is manageable  5/21/2020 2132 by Wade Rodas RN  Outcome: Ongoing  5/21/2020 1016 by Jonathan Block. GOLDEN Felix  Outcome: Ongoing  Goal: Pain level will decrease  Description: Pain level will decrease  5/21/2020 2132 by Wade Rodas, RN  Outcome: Ongoing  5/21/2020 1016 by Jonathan Block. GOLDEN Felix  Outcome: Ongoing  Goal: Control of acute pain  Description: Control of acute pain  5/21/2020 2132 by Wade Rodas, RN  Outcome: Ongoing  5/21/2020 1016 by Jonathan Block. GOLDEN Felix  Outcome: Ongoing  Goal: Control of chronic pain  Description: Control of chronic pain  5/21/2020 2132 by Wade Rodas, RN  Outcome: Ongoing  5/21/2020 1016 by Jonathan Block. GOLDEN Felix  Outcome: Ongoing     Problem: Skin Integrity:  Goal: Skin integrity will stabilize  Description: Skin integrity will stabilize  5/21/2020 2132 by Wade Rodas, RN  Outcome: Ongoing  5/21/2020 1016 by Jonathan Block.  GOLDEN Felix  Outcome: Ongoing     Problem: Discharge Planning:  Goal: Patients continuum of care needs are met  Description: Patients continuum of care needs are met  5/21/2020 2132 by Yobany Kent RN  Outcome: Ongoing  5/21/2020 1016 by Ana Felix RN  Outcome: Ongoing     Problem: Discharge Planning:  Goal: Discharged to appropriate level of care  Description: Discharged to appropriate level of care  5/21/2020 2132 by Yobany Kent RN  Outcome: Ongoing  5/21/2020 1016 by Ana Felix RN  Outcome: Ongoing     Problem: Activity Intolerance:  Goal: Able to perform prescribed physical activity  Description: Able to perform prescribed physical activity  5/21/2020 2132 by Yobany Kent RN  Outcome: Ongoing  5/21/2020 1016 by Ana Felix RN  Outcome: Ongoing  Goal: Ability to tolerate increased activity will improve  Description: Ability to tolerate increased activity will improve  5/21/2020 2132 by Yobany Kent RN  Outcome: Ongoing  5/21/2020 1016 by Ana Felix RN  Outcome: Ongoing     Problem: Anxiety:  Goal: Level of anxiety will decrease  Description: Level of anxiety will decrease  5/21/2020 2132 by Yobany Kent RN  Outcome: Ongoing  5/21/2020 1016 by Ana Park. GOLDEN Felix  Outcome: Ongoing     Problem: Cardiac Output - Decreased:  Goal: Cardiac output within specified parameters  Description: Cardiac output within specified parameters  5/21/2020 2132 by Yobany Kent RN  Outcome: Ongoing  5/21/2020 1016 by Ana Park. GOLDEN Felix  Outcome: Ongoing  Goal: Hemodynamic stability will improve  Description: Hemodynamic stability will improve  5/21/2020 2132 by Yobany Kent RN  Outcome: Ongoing  5/21/2020 1016 by Ana Park. GOLDEN Felix  Outcome: Ongoing     Problem: Fluid Volume - Imbalance:  Goal: Ability to achieve a balanced intake and output will improve  Description: Ability to achieve a balanced intake and output will improve  5/21/2020 2132 by Yobany Kent RN  Outcome: Ongoing  5/21/2020 1016 by Ana Park.  GOLDEN Felix  Outcome: Ongoing  Goal: Chest tube drainage is within specified parameters  Description: Chest tube drainage is within specified parameters  5/21/2020 2132 by Wade Rodas RN  Outcome: Ongoing  5/21/2020 1016 by Jonathan Block. GOLDEN Felix  Outcome: Ongoing     Problem: Gas Exchange - Impaired:  Goal: Levels of oxygenation will improve  Description: Levels of oxygenation will improve  5/21/2020 2132 by Wade Rodas RN  Outcome: Ongoing  5/21/2020 1016 by Jonathan Block. GOLDEN Felix  Outcome: Ongoing  Goal: Ability to maintain adequate ventilation will improve  Description: Ability to maintain adequate ventilation will improve  5/21/2020 2132 by Wade Rodas RN  Outcome: Ongoing  5/21/2020 1016 by Jonathan Block. GOLDEN Felix  Outcome: Ongoing     Problem: Pain:  Goal: Pain level will decrease  Description: Pain level will decrease  5/21/2020 2132 by Wade Rodas RN  Outcome: Ongoing  5/21/2020 1016 by Jonathan Block. GOLDEN Felix  Outcome: Ongoing  Goal: Control of acute pain  Description: Control of acute pain  5/21/2020 2132 by Wade Rodas RN  Outcome: Ongoing  5/21/2020 1016 by Jonathan Block. GOLDEN Felix  Outcome: Ongoing  Goal: Control of chronic pain  Description: Control of chronic pain  5/21/2020 2132 by Wade Rodas, RN  Outcome: Ongoing  5/21/2020 1016 by Jonathan Block. GOLDEN Felix  Outcome: Ongoing     Problem: Tissue Perfusion - Cardiopulmonary, Altered:  Goal: Absence of angina  Description: Absence of angina  5/21/2020 2132 by Wade Rodas RN  Outcome: Ongoing  5/21/2020 1016 by Jonathan Block. GOLDEN Felix  Outcome: Ongoing  Goal: Hemodynamic stability will improve  Description: Hemodynamic stability will improve  5/21/2020 2132 by Wade Rodas RN  Outcome: Ongoing  5/21/2020 1016 by Jonathan Block. GOLDEN Felix  Outcome: Ongoing  Goal: Will show no evidence of cardiac arrhythmias  Description: Will show no evidence of cardiac arrhythmias  5/21/2020 2132 by Wade Rodas RN  Outcome: Ongoing  5/21/2020 1016 by Jonathan Block.  Elvira RN  Outcome: Ongoing     Problem: Tobacco Use:  Goal: Will participate in inpatient tobacco-use cessation counseling  Description: Will participate in inpatient tobacco-use cessation counseling  5/21/2020 2132 by Mihaela Locke RN  Outcome: Ongoing  5/21/2020 1016 by Haris House.  Romel Garcia RN  Outcome: Ongoing

## 2020-05-22 NOTE — PROGRESS NOTES
expectations for participation, plan of care, safety training,  Mobility training, adaptive device training  Treatment Initiated:  Functional mobility training, gait training, patient education, Stair and balance training, T Act  Barriers to Improvement:  Safety, multiple complexities  Discharge Recommendations:  Home with HHA  Equipment Recommendations:  TBD    Goals:  Patient Goals   Patient goals : Return to home with wife and HHA  Short term goals  Time Frame for Short term goals: 7 days  Short term goal 1: Patient to perform supine<>Sit, Rolling L<>R I  Short term goal 2: Patient to perform sit<>Stand 2ww I  Short term goal 3: Patient to pickup object off ground I with 2ww  Short term goal 4: I wc mobility >150ft 2 turns  Short term goal 5: Patient to ambulate even/uneven surfaces >350ft 2ww I   Short term goal 6: Patient to descend/ascend 12 steps I with rails for return to community      Plan:    REQUIRES PT FOLLOW UP: Yes  Pt will be seen at least 60 minutes per day for a minimum of 5 days per week, plus group therapy as appropriate       PT Individual Minutes  Time In: 0416  Time Out: 0531  Minutes: 75        Timed Code Treatment Minutes: 75 Minutes    Number of Minutes/Billable Intervention  PT Evaluation 15   Gait Training 25   Therapeutic Exercise    Neuro Re-Ed    Therapeutic Activity 20   Wheelchair Propulsion 15   Group    Other:    TOTAL 75       Electronically signed by:    Gera Adler PT   5/22/2020, 6:43 PM

## 2020-05-22 NOTE — PLAN OF CARE
ARU Interdisciplinary Plan of Care (IPOC)  [facility] Dr[facility] Mara Keith [facility], [address]  [phone]  Fax: [phone]        Jennifer Darlingix    : 1960  Acct #: [[id number]]  MRN: [id number]   PHYSICIAN:  Martinez Riley MD  Primary Active Problems:   Active Hospital Problems    Diagnosis Date Noted    Acute cerebrovascular accident (CVA) [facility]) [I63.9] 2020       Rehabilitation Diagnosis:     Acute cerebrovascular accident (CVA) [facility]) [I63.9]       ADMIT DATE:2020   CARE PLAN     NURSING:  Jennifer Palencia while on this unit will:      Bowel and Bladder   [x] Be continent of bowel and bladder      [x] Have an adequate number of bowel movements   [] Urinate with no urinary retention >300ml in bladder   [] Bladder Scan: (details)   [] Complete bladder protocol with anna removal   [] Initiate Bladder Program to toilet every ___ hours   [] Initiate Bowel Program to toilet every ___hours   [] Bladder training    [] Bowel training  Pulmonary   [x] Maintain O2 SATs at 92% or greater  Pain Management   [x] Have pain managed while on ARU        [x] Be pain free by discharge    [x] Medication Management and Education  Maintenance of Skin Integrity/Wound Management   [x] Have no skin breakdown while on ARU   [x] Have improved skin integrity via wound measurements   [x] Have no signs/symptoms of infection via infection protection and monitoring at the          wound site  Fall Prevention   [x] Be free from injury during hospitalization via fall prevention measures     [x] Disease management and Education  Precautions   [] Weight Bearing Precautions   [] Swallowing Precautions   [x] Monitoring of Risks of Complications   [x] DVT Prophylaxis    [x] Fluid/electrolyte/Nutrition Management    [x] Complete education with patient/family with understanding demonstrated for          in-room safety with transfers to bed, toilet, wheelchair, shower as well as                bathroom activities and hygiene. [] Adjustment   [] Other:   Nursing interventions may include bowel/bladder training, education for medical assistive devices, medication education, O2 saturation management, energy conservation, stress management techniques, fall prevention, alarms protocol, seating and positioning, skin/wound care, pressure relief instruction,dressing changes,  infection protection, DVT prophylaxis, and/or assistance with in room safety with transfers to bed, toilet, wheelchair, shower as well as bathroom activities and hygiene. Patient/caregiver education for:   [x] Disease/sustained injury/management      [x] Medication Use   [] Surgical intervention   [x] Safety/Precautions   [x] Body mechanics and or joint protection   [x] Health maintenance         PHYSICAL THERAPY:  Goals:                  Short term goals  Time Frame for Short term goals: 7 days  Short term goal 1: Patient to perform supine<>Sit, Rolling L<>R I  Short term goal 2: Patient to perform sit<>Stand 2ww I  Short term goal 3: Patient to pickup object off ground I with 2ww  Short term goal 4: I wc mobility >150ft 2 turns  Short term goal 5: Patient to ambulate even/uneven surfaces >350ft 2ww I   Short term goal 6: Patient to descend/ascend 12 steps I with rails for return to community                These goals were reviewed with this patient at the time of assessment and Marcus Child is in agreement. Plan of Care: Pt to be seen 5 days per week for a minimum of 60 minutes for 14 days. community reintegration,animal assisted therapy, and concurrent/group therapy.       OCCUPATIONAL THERAPY:  Goals:             Short term goals  Time Frame for Short term goals: STG=LTG :  Long term goals  Time Frame for Long term goals : 5-7 days  Long term goal 1: Pt will complete grooming tasks with Mod I.  Long term goal 2: Pt will complete bathing tasks with Mod I.  Long term goal 3: Pt will

## 2020-05-23 LAB
GLUCOSE BLD-MCNC: 104 MG/DL (ref 70–99)
GLUCOSE BLD-MCNC: 129 MG/DL (ref 70–99)
GLUCOSE BLD-MCNC: 133 MG/DL (ref 70–99)
GLUCOSE BLD-MCNC: 149 MG/DL (ref 70–99)

## 2020-05-23 PROCEDURE — 94664 DEMO&/EVAL PT USE INHALER: CPT

## 2020-05-23 PROCEDURE — 2700000000 HC OXYGEN THERAPY PER DAY

## 2020-05-23 PROCEDURE — 97166 OT EVAL MOD COMPLEX 45 MIN: CPT

## 2020-05-23 PROCEDURE — 97530 THERAPEUTIC ACTIVITIES: CPT

## 2020-05-23 PROCEDURE — 6370000000 HC RX 637 (ALT 250 FOR IP): Performed by: PHYSICIAN ASSISTANT

## 2020-05-23 PROCEDURE — 94150 VITAL CAPACITY TEST: CPT

## 2020-05-23 PROCEDURE — 97535 SELF CARE MNGMENT TRAINING: CPT

## 2020-05-23 PROCEDURE — 97116 GAIT TRAINING THERAPY: CPT

## 2020-05-23 PROCEDURE — 1280000000 HC REHAB R&B

## 2020-05-23 PROCEDURE — 6370000000 HC RX 637 (ALT 250 FOR IP): Performed by: PHYSICAL MEDICINE & REHABILITATION

## 2020-05-23 PROCEDURE — 94761 N-INVAS EAR/PLS OXIMETRY MLT: CPT

## 2020-05-23 PROCEDURE — 97110 THERAPEUTIC EXERCISES: CPT

## 2020-05-23 PROCEDURE — 82962 GLUCOSE BLOOD TEST: CPT

## 2020-05-23 RX ADMIN — CARVEDILOL 12.5 MG: 12.5 TABLET, FILM COATED ORAL at 08:08

## 2020-05-23 RX ADMIN — LISINOPRIL 2.5 MG: 2.5 TABLET ORAL at 08:08

## 2020-05-23 RX ADMIN — GLIMEPIRIDE 2 MG: 2 TABLET ORAL at 08:08

## 2020-05-23 RX ADMIN — DOCUSATE SODIUM 100 MG: 100 CAPSULE, LIQUID FILLED ORAL at 08:08

## 2020-05-23 RX ADMIN — FUROSEMIDE 20 MG: 20 TABLET ORAL at 08:08

## 2020-05-23 RX ADMIN — AMIODARONE HYDROCHLORIDE 200 MG: 200 TABLET ORAL at 08:08

## 2020-05-23 RX ADMIN — CLOPIDOGREL BISULFATE 75 MG: 75 TABLET ORAL at 08:08

## 2020-05-23 RX ADMIN — FUROSEMIDE 20 MG: 20 TABLET ORAL at 17:15

## 2020-05-23 RX ADMIN — ATORVASTATIN CALCIUM 20 MG: 20 TABLET, FILM COATED ORAL at 21:58

## 2020-05-23 RX ADMIN — ASPIRIN 81 MG: 81 TABLET, COATED ORAL at 08:08

## 2020-05-23 RX ADMIN — TAMSULOSIN HYDROCHLORIDE 0.8 MG: 0.4 CAPSULE ORAL at 08:08

## 2020-05-23 RX ADMIN — SERTRALINE HYDROCHLORIDE 50 MG: 50 TABLET ORAL at 08:08

## 2020-05-23 ASSESSMENT — PAIN SCALES - GENERAL
PAINLEVEL_OUTOF10: 0
PAINLEVEL_OUTOF10: 0

## 2020-05-23 NOTE — PATIENT CARE CONFERENCE
ACUTE REHAB TEAM CONFERENCE SUMMARY   [address]    NAME: Jennifer Palencia  : 1960 ADMIT DATE: 2020    Rehab Admitting Dx:Acute cerebrovascular accident (CVA) [facility]) [I63.9]  Patient Comorbid Conditions: Active Hospital Problems    Diagnosis Date Noted    Non-ST elevation myocardial infarction (NSTEMI) ([facility] [address] [I21.4] 03/15/2018     Priority: High    Dysarthria due to recent stroke [I69.322]     Aphasia due to acute stroke ([facility] [I63.9, L10.21]     Complication of coronary artery bypass graft surgery [T82. 9XXA]     Uncontrolled pain [R52]     Uncontrolled type 2 diabetes mellitus with hyperglycemia (HCC) [E11.65]     Acute cerebrovascular accident (CVA) ([facility] [I63.9] 2020    Hemiparesis of right dominant side as late effect of cerebral infarction [facility]) [I69.351] 2016     Date: 2020    CASE MANAGEMENT  Current issues/needs regarding patient and family discharge status: Patient lives with his wife in a single level home. 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 A daughter is staying with her while he is in the hospital.      PHYSICAL THERAPY   Short term goals  Time Frame for Short term goals: 7 days  Short term goal 1: Patient to perform supine<>Sit, Rolling L<>R I Part met: CG   Short term goal 2: Patient to perform sit<>Stand 2ww I Part met: supervision due to need for one cue for sternal precautions over several transfers  Short term goal 3: Patient to pickup object off ground I with 2ww Part met: supervision  Short term goal 4: I wc mobility >150ft 2 turns Part met: could not perform distance on eval and has not attempted since due to focus on gait  Short term goal 5: Patient to ambulate even/uneven surfaces >350ft 2ww I Part met: 56' with 2ww and supervision  Short term goal 6: Patient to descend/ascend 12 steps I with rails for return to community Part met: 12 steps with CG with B rails          Impairments/deficits, barriers:     Body structures, Functions, Activity limitations: Decreased functional mobility , Decreased strength, Decreased endurance, Decreased coordination, Decreased ADL status, Decreased high-level IADLs, Decreased balance  Treatment Diagnosis: CVA, Right sided weakness, unsteady gait     Decision Making: High Complexity(multiple medical complexities, unstable with fall risk due to CVA with CABG)     Equipment needed at discharge:Pt has rolling walker      PT IRF-MARGARITA scores and goals since initial assessment:   Roll Left and Right  Assistance Needed: Partial/moderate assistance  CARE Score: 3  Discharge Goal: Independent  Sit to Lying  Assistance Needed: Partial/moderate assistance  CARE Score: 3  Discharge Goal: Independent  Lying to Sitting on Side of Bed  Assistance Needed: Partial/moderate assistance  CARE Score: 3  Discharge Goal: Independent  Sit to Stand  Assistance Needed: Supervision or touching assistance  CARE Score: 4  Discharge Goal: Independent  Chair/Bed-to-Chair Transfer  Assistance Needed: Supervision or touching assistance  CARE Score: 4  Discharge Goal: Independent  Toilet Transfer  Assistance Needed: Supervision or touching assistance  Reason if not Attempted: Not attempted due to medical condition or safety concerns  CARE Score: 4  Discharge Goal: Independent  Car Transfer  Assistance Needed: Supervision or touching assistance  CARE Score: 4  Discharge Goal: Independent  Walk 10 Feet?   Walk 10 Feet?: Yes  1 Step  1 Step?: Yes  Picking Up Object  Assistance Needed: Supervision or touching assistance  CARE Score: 4  Discharge Goal: Independent  Wheelchair Ability  Uses a Wheelchair and/or Scooter?: Yes  Wheel 50 Feet with Two Turns  Assistance Needed: Supervision or touching assistance  CARE Score: 4  Discharge Goal: Independent  Wheel 150 Feet  Assistance Needed: Substantial/maximal assistance  CARE Score: 2  Discharge Goal: Independent            Fall Risk: [x]  Yes  []  No    OCCUPATIONAL THERAPY   Short balance, Decreased high-level IADLs, Decreased ADL status, Decreased strength  Prognosis: Good  Decision Making: Medium Complexity  Discharge Recommendations: Home with assist PRN  Equipment needed at discharge: None      COGNITIVE FUNCTION/SPEECH THERAPY (AS INDICATED)  LTG                         Short-term Goals  Timeframe for Short-term Goals: No therapy recommended. Nursing Current Medical Status:   [x] Is continent of bowel and bladder     [] Is incontinent of bowel and bladder    [x] Has had an adequate number of bowel movements   [] Urinates with no urinary retention >300ml in bladder   [] Targeting bladder protocol with anna removal   [x] Maintaining O2 SATs at 92% or greater   [x] Has pain managed while on ARU         [x] Has had no skin breakdown while on ARU   [x] Has improved skin integrity via wound measurements   [] Has no signs/symptoms of infection at the wound site    [] Pressure wounds Stage/Location:         [] Arrived on unit with pressure wound  [] Has been free from injury during hospitalization   [x] Has experienced a fall during hospitalization  [] Ongoing education with patient/family with understanding demonstrated for:  [] Receives IV Fluids  [] Other:        NUTRITION  Weight: 196 lb (88.9 kg) / Body mass index is 30.24 kg/m². Current diet: DIET LOW SODIUM 2 GM;  Dietary Nutrition Supplements: Low Calorie High Protein Supplement  Intake: Meal intake slowly improving, recent meals %, agreeable to try ensure. Medical improvements/barriers: Cardiac precautions, rhonchi    Team goals for next treatment period/Intervention for current barriers:   [x] Pt will increase activity tolerance for daily tasks.   [x] Pt will improve bed mobility with reduced assist.  [] Pt will improve safety in fx tasks with reduced cues/assist  [x] Pt will improve transfers with reduced assist  [x] Pt will improve toileting with reduced assist  [x] Pt will improve ADL's with use

## 2020-05-23 NOTE — PROGRESS NOTES
BYPASS x 2, INTRAOPERATIVE NGA, INDUCED HYPOTHERMIA, LEFT LEG ENDOSCOPIC VEIN HARVEST performed by Sangeetha Calvo MD at [facility], COLON, DIAGNOSTIC  8/21/14    irregular GE junction, gastritis,hiatal hernia, gastric polyps    HAND SURGERY      rt knuckle    PTCA  03/2018    LAD    SHOULDER SURGERY Right 2011    bone spurs     Social History:  Social/Functional History  Lives With: Spouse  Type of Home: House  Home Layout: One level  Home Access: Ramped entrance  Bathroom Shower/Tub: Walk-in shower, Shower chair with back  Bathroom Toilet: Standard  Bathroom Equipment: Grab bars in shower, Grab bars around toilet, Shower chair  Bathroom Accessibility: Accessible  Home Equipment: Grab bars, Rolling walker, Cane(Does not use walker/cane for ambulation)  ADL Assistance: [address] [place] [address] Avenue: Independent  Homemaking Responsibilities: Yes  Ambulation Assistance: Independent  Transfer Assistance: Independent  Active : Yes  Mode of Transportation: Truck  Education: High school  Occupation: Retired  Type of occupation: , built cranes  Leisure & Hobbies: [address],   IADL Comments: Pt manages own medications. Additional Comments: Pt sleeps on flat bed. 2 falls in past year. Restrictions:  Restrictions/Precautions  Restrictions/Precautions: General Precautions, Fall Risk  Required Braces or Orthoses?: No        Position Activity Restriction  Sternal Precautions: No pushing/pulling, internal rotation, lifting arms above 90, lifting >10 lb    Subjective  Subjective: Pt states he is \"feeling good\" this AM    Pain Level: 0  Pain Location: Chest    Objective:  Observation/Palpation  Posture: Good    Orientation  Overall Orientation Status: Within Normal Limits        Vision  Vision: Impaired  Vision Exceptions: Wears glasses at all times  Vision - Basic Assessment  Prior Vision: Wears glasses all the time  Patient Visual Report: No visual complaint reported.   Hearing  Hearing: Exceptions to [facility]  Hearing Exceptions: Bilateral hearing aid    ROM:      LUE AROM (degrees)  LUE AROM : WNL  LUE General AROM: Within sternal precautions     Left Hand AROM (degrees)  Left Hand AROM: WNL     RUE AROM (degrees)  RUE AROM : WNL  RUE General AROM: Within sternal precautions     Right Hand AROM (degrees)  Right Hand AROM: WNL    Strength:    LUE Strength  Gross LUE Strength: Exceptions to [facility]  L Shoulder Flex: 4-/5  L Elbow Flex: 4-/5  L Elbow Ext: 4-/5  L Hand General: 3+/5  RUE Strength  Gross RUE Strength: Exceptions to [facility]  R Shoulder Flex: 4-/5  R Elbow Flex: 4-/5  R Elbow Ext: 4-/5  R Hand General: 3+/5    Quality of Movement: Tone RUE  RUE Tone: Normotonic  Tone LUE  LUE Tone: Normotonic  Coordination  Movements Are Fluid And Coordinated: No  Coordination and Movement description: Decreased speed       Sensation:    Sensation  Overall Sensation Status: WNL    ADL's:    ADL  Feeding: Modified independent (Per pt report)  Grooming: Stand by assistance, Supervision(Pt completed grooming in stance with SUP/SBA)  UE Bathing: Supervision(Pt washed UB seated with SUP.)  LE Bathing: Contact guard assistance(CGA to wash freddie area in stance)  UE Dressing: Contact guard assistance(CGA to don shirt in stance)  LE Dressing: Contact guard assistance, Minimal assistance(Thread BLE and don socks using figure four method. Pt Req Min A to adjust R sock. CGA for stance to pull up pants.)  Toileting: Contact guard assistance(CGA for freddie care in stance)    Bed Mobility:    Bed mobility  Rolling to Left: Minimal assistance  Rolling to Right: Minimal assistance  Supine to Sit: Minimal assistance  Sit to Supine: Minimal assistance  Comment: Pt received OOB    Transfers:    Transfers  Stand Step Transfers: Contact guard assistance  Stand Pivot Transfers: Contact guard assistance  Sit to stand: Contact guard assistance  Stand to sit: Contact guard assistance  Transfer Comments: Provided min verbal cues for technique. Pt used RW for stability with good adherence to sternal precautions. Toilet Transfers  Toilet - Technique: Stand step  Equipment Used: Standard toilet  Toilet Transfer: Contact guard assistance     Shower Transfers  Shower - Transfer From: Other(Toilet)  Shower - Transfer Type: To  Shower - Transfer To: Shower seat with back  Shower - Technique: To left, Ambulating  Shower Transfers: Contact Guard  Shower Transfers Comments: Pt used RW       Functional Mobility:    Balance  Sitting Balance: Modified independent   Standing Balance: Contact guard assistance(SUP/SBA static stance. CGA dynamic stance.)     Functional Mobility  Functional - Mobility Device: Standard Walker  Activity: To/from bathroom  Assist Level: Contact guard assistance  Functional Mobility Comments: Pt req CGA during fxl mobility with RW for balance and safety. No LOB.      Cognition:  Cognition  Overall Cognitive Status: WNL    Perception:  Perception  Overall Perceptual Status: WFL      OT IRF-MARGARITA scores and goals for initial assessment:    Eating  Assistance Needed: Independent  CARE Score: 6  Discharge Goal: Independent  Oral Hygiene  Assistance Needed: Supervision or touching assistance  CARE Score: 4  Discharge Goal: Independent  Toileting Hygiene  Assistance Needed: Supervision or touching assistance  CARE Score: 4  Discharge Goal: Independent  Shower/Bathe Self  Assistance Needed: Supervision or touching assistance  CARE Score: 4  Discharge Goal: Independent  Upper Body Dressing  Assistance Needed: Supervision or touching assistance  CARE Score: 4  Discharge Goal: Independent  Lower Body Dressing  Assistance Needed: Supervision or touching assistance  CARE Score: 4  Discharge Goal: Independent  Putting On/Taking Off Footwear  Assistance Needed: Partial/moderate assistance  CARE Score: 3  Discharge Goal: Independent    Assessment:         The patient is a 60year old male admitted onto ARU after hospitalization on 5/18 for Non-STEMI and underwent CABGx2. On 5/19 pt had elevated /101, right sided weakness, and speech difficulties. Pt found to have hypertensive encephalopathy. Pt was living with wife and Ind with ADLs, IADLs, and fxl transfers PTA. Pt now requires Min-CGA for ADLs in stance and fxl mobility using a walker. Pt would benefit from skilled OT services at an acute rehab level to maximize safety and Ind with ADLs/IADLs and transfers to return to least restrictive environment. Prognosis: Good  Decision Making: Medium Complexity  Clinical Presentation:  Performance deficits / Impairments: Decreased functional mobility , Decreased ROM, Decreased endurance, Decreased safe awareness, Decreased balance, Decreased high-level IADLs, Decreased ADL status, Decreased strength  History:  See \"Social/Functional\" sections for complete Occupational Profile. Pt's co-morbidities include Guillan Boyd, HTN, CAD, prior CVA which all impact function and ability to progress through plan of care. Assistance/Modification:  Use of DME, providing verbal cues, providing physical assistance for mobility and ADL's, providing set-up of supplies during ADL's  Patient education:   ARU procotol, Role of O.T., O.T. plan of care, sternal precautions  []   Patient goal was established and reviewed in Rehabtracker with patient and/or family this date. Treatment Initiated:  Patient education, ADL re-training, transfer training  Barriers to Improvement:  Endurance, strength, balance, sternal precautions     Discharge Recommendations:  Home with assist PRN in 5-7 days  Equipment Recommendations:  TBD pending progress    Goals:  Patient Goals   Patient goals :  \"To get home\"  Short term goals  Time Frame for Short term goals: STG=LTG  Long term goals  Time Frame for Long term goals : 5-7 days  Long term goal 1: Pt will complete grooming tasks with Mod I.  Long term goal 2: Pt will complete bathing tasks with Mod I.  Long term goal 3: Pt will complete UB dressing with Mod I.  Long term goal 4: Pt will complete LB dressing with Mod I, AE PRN. Long term goal 5: Pt will don/doff footwear with Mod I, AE PRN. Long term goals 6: Pt will complete toileting tasks with Mod I, DME PRN. Long term goal 7: Pt will complete all fxl transfers (shower, toilet) with Mod I, DME PRN. Long term goal 8: Pt will complete ther ex/ther act with emphasis on dynamic stance >15 min for improved endurance and balance needed for ADLs/IADLs.     Plan:    Pt will be seen at least 60 minutes per day for a minimum of 5 days per week, plus group therapy as appropriate  Current Treatment Recommendations: Strengthening, Balance Training, Endurance Training, Gait Training, Patient/Caregiver Education & Training, Pain Management, Home Management Training, ROM, Functional Mobility Training, Stair training, Safety Education & Training, Equipment Evaluation, Education, & procurement, Self-Care / ADL                     Number of Minutes/Billable Intervention  OT Evaluation 15   Therapeutic Exercise    ADL Self-care 60   Neuro Re-Ed    Therapeutic Activity    Group    Other:    TOTAL 75     Electronically signed by:    SARA Bedolla, OTR/L  5/23/2020, 9:37 AM

## 2020-05-23 NOTE — PLAN OF CARE
Problem: Pain:  Goal: Pain level will decrease  Description: Pain level will decrease  5/23/2020 1328 by Nohemy Chandler RN  Outcome: Ongoing  5/23/2020 0221 by Karla Yancey RN  Outcome: Ongoing  Goal: Control of acute pain  Description: Control of acute pain  5/23/2020 1328 by Nohemy Chandler RN  Outcome: Ongoing  5/23/2020 0221 by Karla Yancey RN  Outcome: Ongoing  Goal: Control of chronic pain  Description: Control of chronic pain  5/23/2020 1328 by Nohemy Chandler RN  Outcome: Ongoing  5/23/2020 0221 by Karla Yancey RN  Outcome: Ongoing     Problem: Falls - Risk of:  Goal: Will remain free from falls  Description: Will remain free from falls  5/23/2020 1328 by Nohemy Chandler RN  Outcome: Ongoing  5/23/2020 0221 by Karla Yancey RN  Outcome: Ongoing  Goal: Absence of physical injury  Description: Absence of physical injury  5/23/2020 1328 by Nohemy Chandler RN  Outcome: Ongoing  5/23/2020 0221 by Karla Yancey RN  Outcome: Ongoing     Problem: Infection:  Goal: Will remain free from infection  Description: Will remain free from infection  5/23/2020 1328 by Nohemy Chandler RN  Outcome: Ongoing  5/23/2020 0221 by Karla Yancey RN  Outcome: Ongoing     Problem: Daily Care:  Goal: Daily care needs are met  Description: Daily care needs are met  5/23/2020 1328 by Nohemy Chandler RN  Outcome: Ongoing  5/23/2020 0221 by Karla Yancey RN  Outcome: Ongoing     Problem: Skin Integrity:  Goal: Skin integrity will stabilize  Description: Skin integrity will stabilize  5/23/2020 1328 by Nohemy Chandler RN  Outcome: Ongoing  5/23/2020 0221 by Karla Yancey RN  Outcome: Ongoing     Problem: Discharge Planning:  Goal: Patients continuum of care needs are met  Description: Patients continuum of care needs are met  5/23/2020 1328 by Nohemy Chandler RN  Outcome: Ongoing  5/23/2020 0221 by Karla Yancey RN  Outcome: Ongoing

## 2020-05-23 NOTE — H&P
Janes Arango    : 1960  [facility]  [facility]N: [id number]              History and physical      Admitting diagnosis: Cerebrovascular accident    Comorbid diagnoses impacting rehabilitation: Right hemiparesis, non-ST elevation myocardial infarction, status post coronary artery bypass graft surgery on 2020, uncontrolled pain, dysphagia, essential hypertension, uncontrolled diabetes with hyperglycemia    Chief complaint: Sternal pain and fatigue. History of present illness: Patient is a 18-year-old right-hand-dominant male who developed acute chest pain while mowing the grass on 2020. His symptoms were helped by rest but recurred when he went back to finish the mowing. Through the night he had further chest pain and was urgently brought to our ED. His history, physical exam and troponins identified a non-ST elevation myocardial infarction. He also had significant hypertension. He underwent a cardiac catheterization which identified surgical disease. On 2020 he underwent a coronary artery bypass graft surgery. The next morning he was extubated. He developed significant hypertension and mental status changes. He had slurred speech, right-sided weakness and clumsy swallowing. Stroke alert was called but he did not qualify for thrombolytics due to the recent surgery. Initial brain imaging was negative for acute hemorrhage, mass or edema. A follow-up MRI showed significant left cerebral infarctions and some right-sided changes as well. His clinical presentation was that of right hemiparesis with dysarthria and some word finding difficulties. His swallowing screening did not show aspiration. He has been following directions and participating in therapies but struggles with pain, right-sided weakness and quick fatigue. He has not able to do his own toileting, transfers or ambulation cannot return home. He requires inpatient rehabilitation.     Review of systems: Chest wall stress test 01/27/2020    Abnormal Study. Large area of inferior & apical-lateral infarct. No significant reversible ischemia. Severly reduced LVEF 23%    HX OTHER MEDICAL 03/14/2018    lifevest  d/c 7/2/2018    Hyperlipidemia     Hypertension     Impaired glucose tolerance 6/3/2016    MUGA 07/09/2018    EF 47%    NSTEMI (non-ST elevated myocardial infarction) ([facility] [address] 03/15/2018    Patient in clinical research study 05/2018    3 year study dalcitripib vs placebo [phone]    Pneumonia due to organism     Renal cyst 5/23/2014    S/P cardiac catheterization 7/12/13 7/13-EF=55%. PATENT LAD AND RCA STENTS, OM2 50% stenosis, & Normal wall motion.  Stroke (cerebrum) [facility])     May 2016        Past Surgical History:     Past Surgical History:   Procedure Laterality Date    CARDIAC CATHETERIZATION  7/13 7/13-EF=55%. PATENT LAD AND RCA STENTS, OM2 50% stenosis, & Normal wall motion.     COLONOSCOPY  8/21/14    colon polyp, diverticulosis, internal hemorrhoids    CORONARY ANGIOPLASTY  6/03 6/14/03- PTCA w stents x2 mid LAD, 2/03-PTCA w stents x3, RCA    CORONARY ARTERY BYPASS GRAFT N/A 5/18/2020    CABG CORONARY ARTERY BYPASS x 2, INTRAOPERATIVE NGA, INDUCED HYPOTHERMIA, LEFT LEG ENDOSCOPIC VEIN HARVEST performed by Nate Jesus MD at [address] [place] [address] Rd, COLON, DIAGNOSTIC  8/21/14    irregular GE junction, gastritis,hiatal hernia, gastric polyps    HAND SURGERY      rt knuckle    PTCA  03/2018    LAD    SHOULDER SURGERY Right 2011    bone spurs       Current Medications:     Current Facility-Administered Medications:     aspirin EC tablet 81 mg, 81 mg, Oral, Daily, Alma Mandy, PA-C, 81 mg at 05/24/20 0825    atorvastatin (LIPITOR) tablet 20 mg, 20 mg, Oral, Nightly, Alma Mandy, PA-C, 20 mg at 05/23/20 2158    carvedilol (COREG) tablet 12.5 mg, 12.5 mg, Oral, BID, Alma Mandy, PA-C, 12.5 mg at 05/24/20 0825    ipratropium-albuterol (DUONEB) nebulizer solution 1 ampule, 1 ampule,

## 2020-05-24 LAB
GLUCOSE BLD-MCNC: 147 MG/DL (ref 70–99)
GLUCOSE BLD-MCNC: 177 MG/DL (ref 70–99)
GLUCOSE BLD-MCNC: 179 MG/DL (ref 70–99)
GLUCOSE BLD-MCNC: 91 MG/DL (ref 70–99)

## 2020-05-24 PROCEDURE — 97110 THERAPEUTIC EXERCISES: CPT

## 2020-05-24 PROCEDURE — 97530 THERAPEUTIC ACTIVITIES: CPT

## 2020-05-24 PROCEDURE — 6370000000 HC RX 637 (ALT 250 FOR IP): Performed by: PHYSICAL MEDICINE & REHABILITATION

## 2020-05-24 PROCEDURE — 6370000000 HC RX 637 (ALT 250 FOR IP): Performed by: PHYSICIAN ASSISTANT

## 2020-05-24 PROCEDURE — 82962 GLUCOSE BLOOD TEST: CPT

## 2020-05-24 PROCEDURE — 97535 SELF CARE MNGMENT TRAINING: CPT

## 2020-05-24 PROCEDURE — 94761 N-INVAS EAR/PLS OXIMETRY MLT: CPT

## 2020-05-24 PROCEDURE — 94150 VITAL CAPACITY TEST: CPT

## 2020-05-24 PROCEDURE — 1280000000 HC REHAB R&B

## 2020-05-24 PROCEDURE — 97116 GAIT TRAINING THERAPY: CPT

## 2020-05-24 RX ADMIN — CARVEDILOL 12.5 MG: 12.5 TABLET, FILM COATED ORAL at 08:25

## 2020-05-24 RX ADMIN — FUROSEMIDE 20 MG: 20 TABLET ORAL at 08:25

## 2020-05-24 RX ADMIN — LISINOPRIL 2.5 MG: 2.5 TABLET ORAL at 08:25

## 2020-05-24 RX ADMIN — INSULIN LISPRO 1 UNITS: 100 INJECTION, SOLUTION INTRAVENOUS; SUBCUTANEOUS at 12:51

## 2020-05-24 RX ADMIN — GLIMEPIRIDE 2 MG: 2 TABLET ORAL at 08:25

## 2020-05-24 RX ADMIN — AMIODARONE HYDROCHLORIDE 200 MG: 200 TABLET ORAL at 08:25

## 2020-05-24 RX ADMIN — SERTRALINE HYDROCHLORIDE 50 MG: 50 TABLET ORAL at 08:25

## 2020-05-24 RX ADMIN — ASPIRIN 81 MG: 81 TABLET, COATED ORAL at 08:25

## 2020-05-24 RX ADMIN — DOCUSATE SODIUM 100 MG: 100 CAPSULE, LIQUID FILLED ORAL at 08:25

## 2020-05-24 RX ADMIN — FUROSEMIDE 20 MG: 20 TABLET ORAL at 17:22

## 2020-05-24 RX ADMIN — INSULIN LISPRO 1 UNITS: 100 INJECTION, SOLUTION INTRAVENOUS; SUBCUTANEOUS at 08:26

## 2020-05-24 RX ADMIN — ATORVASTATIN CALCIUM 20 MG: 20 TABLET, FILM COATED ORAL at 22:29

## 2020-05-24 RX ADMIN — TAMSULOSIN HYDROCHLORIDE 0.8 MG: 0.4 CAPSULE ORAL at 08:25

## 2020-05-24 RX ADMIN — CLOPIDOGREL BISULFATE 75 MG: 75 TABLET ORAL at 08:25

## 2020-05-24 ASSESSMENT — PAIN SCALES - GENERAL
PAINLEVEL_OUTOF10: 0
PAINLEVEL_OUTOF10: 0

## 2020-05-24 NOTE — PROGRESS NOTES
Physical Therapy  [x] daily progress note       [] discharge       Patient Name:  Angel Arredondo   :  1960 MRN: [id number]  Room:  [address] [place] [address] Date of Admission: 2020  Rehabilitation Diagnosis:   Acute cerebrovascular accident (CVA) [facility]) [I63.9]       Date 2020       Day of ARU Week:  3   Time IN/OUT 4343-9900, [id number]   Individual Tx Minutes 120   Group Tx Minutes    Co-Treat Minutes    Concurrent Tx Minutes    TOTAL Tx Time Mins 120   Variance Time    Variance Time []   Refusal due to:     []   Medical hold/reason:    []   Illness   []   Off Unit for test/procedure  []   Extra time needed to complete task  []   Therapeutic need  []   Other (specify):   Restrictions Restrictions/Precautions  Restrictions/Precautions: General Precautions, Fall Risk  Required Braces or Orthoses?: No  Position Activity Restriction  Sternal Precautions: No pushing/pulling, internal rotation, lifting arms above 90, lifting >10 lb   Interdisciplinary communication [x]   Cleared for therapy per nursing     []   RN notified about issues during session  []   RN updated on pt performance  []   Spoke with   []   Spoke with OT  []   Spoke with MD  []   Other:    Subjective observations and cognitive status: Fatigue with functional mobility     Pain level/location:   0 /10       Location:    Discharge recommendations  Anticipated discharge date:  TBD  Destination: []home alone   []home alone with assist PRN     [x] home w/ family      [] Continuous supervision  []SNF    [] Assisted living     [] Other:  Continued therapy: [x]HHC PT  []OUTPATIENT  PT   [] No Further PT  []SNF PT  Caregiver training recommended: []Yes  [] No   Equipment needs: TBD     Bed Mobility:           []   Pt received out of bed   Rolling R/L:  cga  Scooting:  cga  Lying --> Sit:  cga  Sit --> lying:  DNT  Bed features used: [x] Yes  [] No       Transfers:    Sit--> Stand:  Patient sit<>Stand with 2ww and verbal cues for hand placement and safety  Stand --> Sit: Patient sit<>Stand with 2ww and verbal cues for hand placement and safety    Toilet Transfer (if applicable): cga for verbal cues Bue placement with 2ww    Assistive device required for transfer:   2ww    Gait:    Distance:  166ft, 116ft, 10ft, 10ft   Assistance:  cga  Device:  2ww  Gait Quality:  Patient amb 2ww decreased bulmaro and knee/hip flexion with fatigue during functional mobility requiring verbal cues to rest    Stairs   # Completed:  12  Assistance:  Close CGA for verbal cueing for hand placement and safety  Supportive Device:  B hand rails          Additional Therapeutic activities/exercises completed this date:     [x]   Nu-step:  Time:    15min/15min    Level:     1    #Steps:  DNT/681     []   Rebounder:    []  Seated     []  Standing        [x]   Balance training         [x]   Postural training    []   Supine ther ex (reps/sets):     []   Seated ther ex (reps/sets):     []   Standing ther ex (reps/sets):     []   Picking up object from floor (standing):                   []   Reacher used   [x]   Other:Patient required verbal cueing to use BLE only and not UEs with Nu Step per precautions   []   Other:    Comments:  Nustep performed to increase strength and endurance for safe transfers and functional mobility.     Patient/Caregiver Education and Training:   [x]   Role of PT  [x]   Education about Dx  [x]   Use of call light for assist   []   Wheelchair mobility/management  []   HEP provided and explained   []   Treatment plan reviewed  []   Home safety  [x]   Body mechanics  []   Positioning  [x]   Bed Mobility/Transfer technique  [x]   Gait technique/sequencing  [x]   Proper use of assistive device/adaptive equipment  [x]   Stair training/Advanced mobility safety and technique  [x]   Reinforced patient's precautions/mobility while maintaining precautions  [x]   Postural awareness  []   Family/caregiver training  []   Progress was updated and reviewed in Rehabtracker with built [place]: [place],   IADL Comments: Pt manages own medications. Additional Comments: Pt sleeps on flat bed. 2 falls in past year. Objective                                                                                    Goals:  (Update in navigator)  Short term goals  Time Frame for Short term goals: 7 days  Short term goal 1: Patient to perform supine<>Sit, Rolling L<>R I  Short term goal 2: Patient to perform sit<>Stand 2ww I  Short term goal 3: Patient to pickup object off ground I with 2ww  Short term goal 4: I wc mobility >150ft 2 turns  Short term goal 5: Patient to ambulate even/uneven surfaces >350ft 2ww I   Short term goal 6: Patient to descend/ascend 12 steps I with rails for return to community :   :        Plan of Care                                                                              Times per week: 5 days per week for a minimum of 60 minutes/day plus group as appropriate for 60 minutes.   Treatment to include      Electronically signed by   Hiwot Go,  5/24/2020, 10:49 AM

## 2020-05-24 NOTE — PROGRESS NOTES
Occupational Therapy  Physical Rehabilitation: OCCUPATIONAL THERAPY     [x] daily progress note       [] discharge       Patient Name:  Cong Tovar   :  1960 MRN: [id number]  Room:  [address] [place] [address] Date of Admission: 2020  Rehabilitation Diagnosis:   Acute cerebrovascular accident (CVA) ([facility] [address] [I63.9]       Date 2020       Day of ARU Week:  3   Time IN/OUT [id number]   Individual Tx Minutes 60   Group Tx Minutes    Co-Treat Minutes    Concurrent Tx Minutes    TOTAL Tx Time Mins 60   Variance Time    Variance Time []   Refusal due to:     []   Medical hold/reason:    []   Illness   []   Off Unit for test/procedure  []   Extra time needed to complete task  []   Therapeutic need  []   Other (specify):   Restrictions Restrictions/Precautions: General Precautions, Fall Risk         Communication with other providers: [x]   OK to see per nursing:     []   Spoke with team member regarding:      Subjective observations and cognitive status: Pt agreeable to OT therapy session.       Pain level/location:   1 /10       Location: however after PT session pt reported pain up 8/10    Discharge recommendations  Anticipated discharge date:  TBD  Destination: []home alone   []home alone w assist prn   [] home w/ family    [] Continuous supervision       []SNF    [] Assisted living     [] Other:   Continued therapy: []HHC OT  []OUTPATIENT  OT   [] No Further OT  Equipment needs: TBD     Toileting:   CGA with clothing mgmt       Toilet Transfers:   CGA  Device Used:    [x]   Standard Toilet         []   Grab Bars           []  Bedside Commode       []   Elevated Toilet          []   Other:        Bed Mobility:           [x]   Pt received out of bed   Rolling R/L:    Scooting:    Supine --> Sit:    Sit --> Supine:      Transfers:    Sit--> Stand:  CGA  Stand --> Sit:   CGA  Stand-Pivot:   CGA  Other:    Assistive device required for transfer:   RW      Functional Mobility:  Within room <> bathroom,   Assistance:  CGA emphasis on dynamic stance >15 min for improved endurance and balance needed for ADLs/IADLs. :        Plan of Care                                                                              Times per week: 5 days per week for a minimum of 60 minutes/day plus group as appropriate for 60 minutes.   Treatment to include Plan  Times per week: 6x  Times per day: Daily  Plan weeks: 5-7 days  Current Treatment Recommendations: Strengthening, Balance Training, Endurance Training, Gait Training, Patient/Caregiver Education & Training, Pain Management, Home Management Training, ROM, Functional Mobility Training, Stair training, Safety Education & Training, Equipment Evaluation, Education, & procurement, Self-Care / ADL    Electronically signed by   [employer], ORELLANA/L  5/24/2020, 8:21 AM

## 2020-05-25 LAB
GLUCOSE BLD-MCNC: 113 MG/DL (ref 70–99)
GLUCOSE BLD-MCNC: 137 MG/DL (ref 70–99)
GLUCOSE BLD-MCNC: 148 MG/DL (ref 70–99)
GLUCOSE BLD-MCNC: 93 MG/DL (ref 70–99)

## 2020-05-25 PROCEDURE — 99232 SBSQ HOSP IP/OBS MODERATE 35: CPT | Performed by: PHYSICAL MEDICINE & REHABILITATION

## 2020-05-25 PROCEDURE — 6370000000 HC RX 637 (ALT 250 FOR IP): Performed by: PHYSICAL MEDICINE & REHABILITATION

## 2020-05-25 PROCEDURE — 82962 GLUCOSE BLOOD TEST: CPT

## 2020-05-25 PROCEDURE — 94761 N-INVAS EAR/PLS OXIMETRY MLT: CPT

## 2020-05-25 PROCEDURE — 94150 VITAL CAPACITY TEST: CPT

## 2020-05-25 PROCEDURE — 2700000000 HC OXYGEN THERAPY PER DAY

## 2020-05-25 PROCEDURE — 6370000000 HC RX 637 (ALT 250 FOR IP): Performed by: PHYSICIAN ASSISTANT

## 2020-05-25 PROCEDURE — 1280000000 HC REHAB R&B

## 2020-05-25 RX ADMIN — CARVEDILOL 12.5 MG: 12.5 TABLET, FILM COATED ORAL at 07:45

## 2020-05-25 RX ADMIN — ATORVASTATIN CALCIUM 20 MG: 20 TABLET, FILM COATED ORAL at 22:47

## 2020-05-25 RX ADMIN — CARVEDILOL 12.5 MG: 12.5 TABLET, FILM COATED ORAL at 22:47

## 2020-05-25 RX ADMIN — LISINOPRIL 2.5 MG: 2.5 TABLET ORAL at 07:44

## 2020-05-25 RX ADMIN — SERTRALINE HYDROCHLORIDE 50 MG: 50 TABLET ORAL at 07:45

## 2020-05-25 RX ADMIN — AMIODARONE HYDROCHLORIDE 200 MG: 200 TABLET ORAL at 07:45

## 2020-05-25 RX ADMIN — GLIMEPIRIDE 2 MG: 2 TABLET ORAL at 07:45

## 2020-05-25 RX ADMIN — FUROSEMIDE 20 MG: 20 TABLET ORAL at 17:43

## 2020-05-25 RX ADMIN — FUROSEMIDE 20 MG: 20 TABLET ORAL at 07:44

## 2020-05-25 RX ADMIN — DOCUSATE SODIUM 100 MG: 100 CAPSULE, LIQUID FILLED ORAL at 07:45

## 2020-05-25 RX ADMIN — ASPIRIN 81 MG: 81 TABLET, COATED ORAL at 07:44

## 2020-05-25 RX ADMIN — CLOPIDOGREL BISULFATE 75 MG: 75 TABLET ORAL at 07:44

## 2020-05-25 RX ADMIN — TAMSULOSIN HYDROCHLORIDE 0.8 MG: 0.4 CAPSULE ORAL at 07:45

## 2020-05-25 ASSESSMENT — PAIN SCALES - GENERAL: PAINLEVEL_OUTOF10: 0

## 2020-05-25 NOTE — PROGRESS NOTES
Tolerance, Pertinent Labs, Weight      Electronically signed by Yudelka Blair RD, AMEE on 5/25/20 at 4:45 PM EDT    Contact Number: [phone]

## 2020-05-25 NOTE — PROGRESS NOTES
Needed: Partial/moderate assistance  CARE Score: 3  Discharge Goal: Independent  Sit to Stand  Assistance Needed: Supervision or touching assistance  CARE Score: 4  Discharge Goal: Independent  Chair/Bed-to-Chair Transfer  Assistance Needed: Supervision or touching assistance  CARE Score: 4  Discharge Goal: Independent  Toilet Transfer  Assistance Needed: Supervision or touching assistance  Reason if not Attempted: Not attempted due to medical condition or safety concerns  CARE Score: 4  Discharge Goal: Independent  Car Transfer  Assistance Needed: Supervision or touching assistance  CARE Score: 4  Discharge Goal: Independent   Walk 10 Feet? Walk 10 Feet?: Yes  1 Step  1 Step?: Yes  Picking Up Object  Assistance Needed: Supervision or touching assistance  CARE Score: 4  Discharge Goal: Independent  Wheelchair Ability  Uses a Wheelchair and/or Scooter?: Yes  Wheel 50 Feet with Two Turns  Assistance Needed: Supervision or touching assistance  CARE Score: 4  Discharge Goal: Independent  Wheel 150 Feet  Assistance Needed: Substantial/maximal assistance  CARE Score: 2  Discharge Goal: Independent            I      Exam:    Blood pressure (!) 162/88, pulse 66, temperature 98.7 °F (37.1 °C), temperature source Oral, resp. rate 16, height 5' 7.5\" (1.715 m), weight 200 lb 12.8 oz (91.1 kg), SpO2 92 %. General: Sitting up in bed. Alert and talkative. In good spirits. HEENT: Full visual field. Minor right facial droop. Minor dysarthria. No word finding difficulties. Neck supple. No JVD.  MMM. Pulmonary: Blunted breath sounds in the bases. No wheezes or rales. Cardiac: Sternal incision was tender but not nonswollen and dry. Regular rate and rhythm. Abdomen: Patient's abdomen is soft and nondistended. Bowel sounds were present throughout. There was no rebound, guarding or masses noted. Upper extremities: Slowly raise his arms up overhead.   Right shoulder abduction and power  lags behind the

## 2020-05-26 ENCOUNTER — APPOINTMENT (OUTPATIENT)
Dept: GENERAL RADIOLOGY | Age: 60
DRG: 056 | End: 2020-05-26
Attending: PHYSICAL MEDICINE & REHABILITATION
Payer: MEDICARE

## 2020-05-26 LAB
GLUCOSE BLD-MCNC: 104 MG/DL (ref 70–99)
GLUCOSE BLD-MCNC: 147 MG/DL (ref 70–99)
GLUCOSE BLD-MCNC: 162 MG/DL (ref 70–99)
GLUCOSE BLD-MCNC: 169 MG/DL (ref 70–99)
GLUCOSE BLD-MCNC: 94 MG/DL (ref 70–99)

## 2020-05-26 PROCEDURE — 94640 AIRWAY INHALATION TREATMENT: CPT

## 2020-05-26 PROCEDURE — 1280000000 HC REHAB R&B

## 2020-05-26 PROCEDURE — 97116 GAIT TRAINING THERAPY: CPT

## 2020-05-26 PROCEDURE — 97530 THERAPEUTIC ACTIVITIES: CPT

## 2020-05-26 PROCEDURE — 92523 SPEECH SOUND LANG COMPREHEN: CPT

## 2020-05-26 PROCEDURE — 99233 SBSQ HOSP IP/OBS HIGH 50: CPT | Performed by: PHYSICAL MEDICINE & REHABILITATION

## 2020-05-26 PROCEDURE — 97110 THERAPEUTIC EXERCISES: CPT

## 2020-05-26 PROCEDURE — 6370000000 HC RX 637 (ALT 250 FOR IP): Performed by: PHYSICIAN ASSISTANT

## 2020-05-26 PROCEDURE — 71045 X-RAY EXAM CHEST 1 VIEW: CPT

## 2020-05-26 PROCEDURE — 6370000000 HC RX 637 (ALT 250 FOR IP): Performed by: SURGERY

## 2020-05-26 PROCEDURE — 82962 GLUCOSE BLOOD TEST: CPT

## 2020-05-26 PROCEDURE — 6370000000 HC RX 637 (ALT 250 FOR IP): Performed by: PHYSICAL MEDICINE & REHABILITATION

## 2020-05-26 PROCEDURE — 94761 N-INVAS EAR/PLS OXIMETRY MLT: CPT

## 2020-05-26 RX ORDER — GUAIFENESIN 600 MG/1
600 TABLET, EXTENDED RELEASE ORAL 2 TIMES DAILY
Status: DISCONTINUED | OUTPATIENT
Start: 2020-05-26 | End: 2020-05-29 | Stop reason: HOSPADM

## 2020-05-26 RX ADMIN — GUAIFENESIN 600 MG: 600 TABLET, EXTENDED RELEASE ORAL at 13:55

## 2020-05-26 RX ADMIN — Medication 2 PUFF: at 19:53

## 2020-05-26 RX ADMIN — ATORVASTATIN CALCIUM 20 MG: 20 TABLET, FILM COATED ORAL at 21:03

## 2020-05-26 RX ADMIN — FUROSEMIDE 20 MG: 20 TABLET ORAL at 08:46

## 2020-05-26 RX ADMIN — CLOPIDOGREL BISULFATE 75 MG: 75 TABLET ORAL at 08:47

## 2020-05-26 RX ADMIN — LISINOPRIL 2.5 MG: 2.5 TABLET ORAL at 08:46

## 2020-05-26 RX ADMIN — AMIODARONE HYDROCHLORIDE 200 MG: 200 TABLET ORAL at 08:47

## 2020-05-26 RX ADMIN — GLIMEPIRIDE 2 MG: 2 TABLET ORAL at 08:45

## 2020-05-26 RX ADMIN — TRAMADOL HYDROCHLORIDE 50 MG: 50 TABLET, FILM COATED ORAL at 18:20

## 2020-05-26 RX ADMIN — TAMSULOSIN HYDROCHLORIDE 0.8 MG: 0.4 CAPSULE ORAL at 08:47

## 2020-05-26 RX ADMIN — GUAIFENESIN 600 MG: 600 TABLET, EXTENDED RELEASE ORAL at 21:03

## 2020-05-26 RX ADMIN — CARVEDILOL 12.5 MG: 12.5 TABLET, FILM COATED ORAL at 08:45

## 2020-05-26 RX ADMIN — CARVEDILOL 12.5 MG: 12.5 TABLET, FILM COATED ORAL at 21:03

## 2020-05-26 RX ADMIN — FUROSEMIDE 20 MG: 20 TABLET ORAL at 16:47

## 2020-05-26 RX ADMIN — INSULIN LISPRO 1 UNITS: 100 INJECTION, SOLUTION INTRAVENOUS; SUBCUTANEOUS at 07:46

## 2020-05-26 RX ADMIN — DOCUSATE SODIUM 100 MG: 100 CAPSULE, LIQUID FILLED ORAL at 08:47

## 2020-05-26 RX ADMIN — SERTRALINE HYDROCHLORIDE 50 MG: 50 TABLET ORAL at 08:47

## 2020-05-26 RX ADMIN — ASPIRIN 81 MG: 81 TABLET, COATED ORAL at 08:46

## 2020-05-26 ASSESSMENT — PAIN SCALES - GENERAL: PAINLEVEL_OUTOF10: 5

## 2020-05-26 NOTE — PROGRESS NOTES
Occupational Therapy  Physical Rehabilitation: OCCUPATIONAL THERAPY     [x] daily progress note       [] discharge       Patient Name:  Domo Levi   :  1960 MRN: [id number]  Room:  [address] [place] [address] Date of Admission: 2020  Rehabilitation Diagnosis:   Acute cerebrovascular accident (CVA) ([facility] [I63.9]       Date 2020       Day of ARU Week:  5   Time IN/OUT 1410/1510   Individual Tx Minutes 60   Group Tx Minutes    Co-Treat Minutes    Concurrent Tx Minutes    TOTAL Tx Time Mins 60   Variance Time    Variance Time []   Refusal due to:     []   Medical hold/reason:    []   Illness   []   Off Unit for test/procedure  []   Extra time needed to complete task  []   Therapeutic need  []   Other (specify):   Restrictions Restrictions/Precautions: General Precautions, Fall Risk         Communication with other providers: [x]   OK to see per nursing:     []   Spoke with team member regarding:      Subjective observations and cognitive status: Pt in recliner on approach, agreeable to tx session. Pain level/location:    210       Location: Chest incisional, also triggered by cough.   Cued to splint chest when coughing; pt likes to have cardiac pillow secured to chest at all times also   Discharge recommendations  Anticipated discharge date:  2020  Destination: []home alone   []home alone w assist prn   [x] home w/ family    [] Continuous supervision       []SNF    [] Assisted living     [] Other:   Continued therapy: [x]HHC OT  []OUTPATIENT  OT   [] No Further OT  Equipment needs: None     Toileting:   Denied need    Bed Mobility:           [x]   Pt received out of bed     Transfers:    Sit--> Stand:  Supervision  Stand --> Sit:   Supervision, 1 cue for hand placement  Stand-Pivot:   Supervision  Other:    Assistive device required for transfer:   RW      Functional Mobility:    Assistance:  Supervision ~325 ft + ~50 ft  Device:   [x]   Rolling Walker     []   Standard Walker []   Wheelchair        [] Postural Awareness  [x]   Safety During Functional Activities  []   Reinforced Patient's Precautions   []   Progress was updated and reviewed in Rehabtracker with patient and/or family this         date. Treatment Plan for Next Session: Continue OT POC, R shoulder strengthening/overhead work    Assessment:  Pt demo'ed good coordination during RUE neuro re-ed ax; R shoulder does fatigue quickly during overhead therex. Treatment/Activity Tolerance:   [x] Tolerated treatment with no adverse effects    [] Patient limited by fatigue  [] Patient limited by pain   [] Patient limited by medical complications:    [] Adverse reaction to Tx:   [] Significant change in status    Safety:       []  bed alarm set    [x]  chair alarm set    []  Pt refused alarms                []  Telesitter activated      [x]  Gait belt used during tx session      []other:       Number of Minutes/Billable Intervention  Therapeutic Exercise 15   ADL Self-care    Neuro Re-Ed    Therapeutic Activity 45   Group    Other:    TOTAL 60       Social History  Social/Functional History  Lives With: Spouse  Type of Home: House  Home Layout: One level  Home Access: Ramped entrance  Bathroom Shower/Tub: Walk-in shower, Shower chair with back  Bathroom Toilet: Standard  Bathroom Equipment: Grab bars in shower, Grab bars around toilet, Shower chair  Bathroom Accessibility: Accessible  Home Equipment: Grab bars, Rolling walker, Cane(Does not use walker/cane for ambulation)  ADL Assistance: Independent  Homemaking Assistance: Independent  Homemaking Responsibilities: Yes  Ambulation Assistance: Independent  Transfer Assistance: Independent  Active : Yes  Mode of Transportation: Truck  Education: High school  Occupation: Retired  Type of occupation: , built cranes  Leisure & Hobbies: [address],   IADL Comments: Pt manages own medications. Additional Comments: Pt sleeps on flat bed. 2 falls in past year.     Objective

## 2020-05-26 NOTE — PROGRESS NOTES
Physical Therapy  [x] daily progress note       [] discharge       Patient Name:  Di Dudley   :  1960 MRN: [id number]  Room:  [address] [place] [address] Date of Admission: 2020  Rehabilitation Diagnosis:   Acute cerebrovascular accident (CVA) ([facility] [address] [I63.9]       Date 2020       Day of ARU Week:  5   Time IN/OUT 1514/1544   Individual Tx Minutes 30   Group Tx Minutes    Co-Treat Minutes    Concurrent Tx Minutes    TOTAL Tx Time Mins 30   Variance Time    Variance Time []   Refusal due to:     []   Medical hold/reason:    []   Illness   []   Off Unit for test/procedure  []   Extra time needed to complete task  []   Therapeutic need  []   Other (specify):   Restrictions Restrictions/Precautions  Restrictions/Precautions: General Precautions, Fall Risk  Required Braces or Orthoses?: No  Position Activity Restriction  Sternal Precautions: No pushing/pulling, internal rotation, lifting arms above 90, lifting >10 lb   Interdisciplinary communication [x]   Cleared for therapy per nursing     []   RN notified about issues during session  []   RN updated on pt performance  []   Spoke with   []   Spoke with OT  []   Spoke with MD  []   Other:    Subjective observations and cognitive status: Pt had just finished with OT and was very tired   Pain level/location:  0  /10       Location:    Discharge recommendations  Anticipated discharge date:    Destination: []home alone   []home alone with assist PRN     [x] home w/ family      [] Continuous supervision  []SNF    [] Assisted living     [] Other:  Continued therapy: [x]HHC PT  []OUTPATIENT  PT   [] No Further PT  []SNF PT  Caregiver training recommended: []Yes  [x] No   Equipment needs: Pt has needed equipment     Bed Mobility:           [x]   Pt received out of bed       Transfers:    Sit--> Stand:  SBA from recliner x2  Stand --> Sit:   SBA    Gait:    Distance:  48' including turns  Assistance:  CGA  Device:  Instructed in use of straight cane  Gait Quality: fair consistency with gait pattern and placement of cane, but no LOB          Additional Therapeutic activities/exercises completed this date:     []   Nu-step:  Time:        Level:         #Steps:       []   Rebounder:    []  Seated     []  Standing        []   Balance training         []   Postural training    []   Supine ther ex (reps/sets):     []   Seated ther ex (reps/sets):     []   Standing ther ex (reps/sets):     []   Picking up object from floor (standing):                   []   Reacher used   [x]   Other: Pt very fatigued. After gait, focus on post cardiac surgery education of KELLY level, signs of activity intolerance, energy conservation including use of rw vs cane . Pt verbalized understanding and has written handouts   []   Other:    Comments:      Patient/Caregiver Education and Training:   []   Role of PT  [x]   Education about Dx  []   Use of call light for assist   []   Wheelchair mobility/management  []   HEP provided and explained   []   Treatment plan reviewed  []   Home safety  []   Body mechanics  []   Positioning  [x]   Bed Mobility/Transfer technique  [x]   Gait technique/sequencing  []   Proper use of assistive device/adaptive equipment  []   Stair training/Advanced mobility safety and technique  [x]   Reinforced patient's precautions/mobility while maintaining precautions  []   Postural awareness  []   Family/caregiver training  []   Progress was updated and reviewed in Rehabtracker with patient and/or family this date. []   Other:      Treatment Plan for Next Session: continue training with cane, cardiopulmonary ex      Assessment: Pt progressing in balance and gait, though fatigued.      Treatment/Activity Tolerance:   [] Tolerated treatment with no adverse effects    [x] Patient limited by fatigue  [] Patient limited by pain   [] Patient limited by medical complications:    [] Adverse reaction to Tx:   [] Significant change in status    Barrier/s to progress/learning:   [] None  [x]   Cognition  []   Hearing deficit  []   Pre-morbid mental/psychological status   []   Motivation  []   Communication  []   Anxiety  []   Vision deficit  []   Attention  []   Other:      Safety:       []  bed alarm set    []  chair alarm set    []  Pt refused alarms                []  Telesitter activated      [x]  Gait belt used during tx session      []other:         Number of Minutes/Billable Intervention  Gait Training 15   Therapeutic Exercise    Neuro Re-Ed    Therapeutic Activity 15   Wheelchair Propulsion    Group    Other:    TOTAL 30         Social History  Social/Functional History  Lives With: Spouse  Type of Home: House  Home Layout: One level  Home Access: Ramped entrance  Bathroom Shower/Tub: Walk-in shower, Shower chair with back  Bathroom Toilet: Standard  Bathroom Equipment: Grab bars in shower, Grab bars around toilet, Shower chair  Bathroom Accessibility: Accessible  Home Equipment: Grab bars, Rolling walker, Cane(Does not use walker/cane for ambulation)  ADL Assistance: Independent  Homemaking Assistance: Independent  Homemaking Responsibilities: Yes  Ambulation Assistance: Independent  Transfer Assistance: Independent  Active : Yes  Mode of Transportation: Truck  Education: High school  Occupation: On disability  Type of occupation: , built cranes  Leisure & Hobbies: [address], [address], [facility] [address]. Wife sets up pill box and manages finances. 2 dogs. Out to eat. IADL Comments: Pt manages own medications. Additional Comments: Pt sleeps on flat bed. 2 falls in past year with elbow scraped up.     Objective                                                                                    Goals:  (Update in navigator)  Short term goals  Time Frame for Short term goals: 7 days  Short term goal 1: Patient to perform supine<>Sit, Rolling L<>R I  Short term goal 2: Patient to perform sit<>Stand 2ww I  Short term goal 3: Patient to pickup object off ground I with 2ww  Short term goal

## 2020-05-26 NOTE — PROGRESS NOTES
Speech Language Pathology  Facility/Department: [facility] ARU  Initial Speech/Language/Cognitive Assessment    NAME: Elba Schneider  : 1960   MRN: [id number]  ADMISSION DATE: 2020  ADMITTING DIAGNOSIS: has CAD (coronary artery disease); History of PTCA; History of acute inferior wall myocardial infarction; Chest pain; B12 deficiency; Fatty liver; Colon, diverticulosis; Renal cyst; Adrenal adenoma; Hyperlipidemia LDL goal <70; Obesity (BMI 30-39.9); Cerebrovascular accident (CVA) due to thrombosis of left middle cerebral artery ([address] Hemiparesis of right dominant side as late effect of cerebral infarction ([address] Gait disturbance; Essential hypertension; Hemiparesis affecting right side as late effect of cerebrovascular accident (CVA) ([address] Shortness of breath; Gastroesophageal reflux disease; Acute ST elevation myocardial infarction (STEMI) ([address] Hypertensive emergency; Unstable angina ([address] Non-ST elevation myocardial infarction (NSTEMI) ([address] Hypertensive urgency; Pneumonia due to organism; Frequent PVCs; Ischemic cardiomyopathy; History of Guillain-Boston syndrome; Acute pain of left shoulder; CVA (cerebral vascular accident) ([address] Acute cerebrovascular accident (CVA) ([address] Dysarthria due to recent stroke; Aphasia due to acute stroke ([address] Complication of coronary artery bypass graft surgery; Uncontrolled pain; and Uncontrolled type 2 diabetes mellitus with hyperglycemia ([address] on their problem list.  DATE ONSET: 2020    Date of Eval: 2020   Evaluating Therapist: BK Douglas    RECENT RESULTS  CT OF HEAD/MRI: Radiology Findings  MRI of Brain: Impression 1. Acute 1.5 cm infarct within the deep and subcortical white matter of the left frontal lobe. 2. No acute intracranial hemorrhage. 3. Remote lacunar infarcts within the left basal ganglia. 4. Suspected punctate subacute infarct within the subcortical white matter of the right frontal lobe.     Primary Complaint: I can't wait to see my family. Pain:  Pain Assessment  Pain Assessment: 0-10  Pain Level: 0  Pain Location: Chest  Pain Orientation: Mid  Pain Descriptors: Aching  Pain Frequency: Intermittent  Pain Onset: On-going    Assessment:History of Present Illness  Per PAS 61 yo male with h/o DM, HTN, obesity, and Tia Mike (within the last couple of years per daughter)who presented on 5/18 with non-STEMI and underwent CABGx2. On 5/19 patient had high BP (204/101), right sided weakness, and speech difficulties. Per neuro hypertensive encephalopathy. MRI showed \"Acute 1.5 cm infarct within the deep and subcortical white matter of the left frontal lobe. \" Patient tested negative for COVID on 5/17. Lives with spouse and is IND with adls and ambulation. Currently is min-maxA with adls and Emeka with CW for ambulation. Medical/Surgical History   B12 deficiency    CAD (coronary artery disease)    GERD (gastroesophageal reflux disease)    Guillain Barré syndrome ([facility]    H/O echocardiogram 7/30/13, 6/16/10   7/13-EF 50-55% WNL. 6/10-EF53%, mod LVH, MARCI, RVE, trace PI, mild MR/TR, 6/09  Heart murmur    History of myocardial infarction 2003  History of nuclear MUGA test 03/03/2020   EF 53%, Normal, No ICD needed  History of PTCA 6/03;2/03 6/03-PTCA w/stents x2, 2/03-PTCA w/stent x3  History of PTCA 03/15/2018   LAD EF25%  Hx of cardiovascular stress test 04/25/2018   EF 28% Nuclear scintigraphy demonstartes inferior wall infarct.  Hx of cardiovascular stress test 01/27/2020   Abnormal Study. Large area of inferior & apical-lateral infarct. No significant reversible ischemia.  Severly reduced LVEF 23%  HX OTHER MEDICAL 03/14/2018   lifevest d/c 7/2/2018  Hyperlipidemia    Hypertension    Impaired glucose tolerance 6/3/2016  MUGA 07/09/2018   EF 47%  NSTEMI (non-ST elevated myocardial infarction) ([facility] 03/15/2018  Patient in clinical research study 05/2018   3 year study dalcitripib vs placebo [phone]  Pneumonia due Concurrent Group Co-treatment   Time In 1005         Time Out 1035         Minutes [address], [address] [place], CCC-SLP  5/26/2020 10:40 AM

## 2020-05-26 NOTE — PROGRESS NOTES
[] Continuous supervision  []SNF    [] Assisted living     [] Other:  Continued therapy: []HHC PT  []OUTPATIENT  PT   [] No Further PT  []SNF PT  Caregiver training recommended: []Yes  [] No   Equipment needs: none anticipated, pt has RW and cane     Bed Mobility:           [x]   Pt received out of bed       Transfers:      Sit--> Stand:  Supervision  Stand --> Sit:   Supervision  Req cue only 1 trial for sternal precautions with stand-sit  Toilet Transfer (if applicable): Supervision; Toileting Supervision  Other:    Assistive device required for transfer:   RW     Gait:    Distance:  860'+868'+503', 20' x 2   Assistance:  SB-close supervision  Device:  RW  Gait Quality:  recip pattern, decreased stance time R with slight L lateral lean with WB. Additional Therapeutic activities/exercises completed this date:     []   Nu-step:  Time:        Level:         #Steps:       []   Rebounder:    []  Seated     []  Standing        []   Balance training         []   Postural training    []   Supine ther ex (reps/sets):     [x]   Seated/standing ther ex (reps/sets):  S/p CABG therx x 10 reps each, able to demo correct technique using handout.  Min cues for PLB;  performed 4/8 ex's in standing   []   Standing ther ex (reps/sets):     []   Picking up object from floor (standing):                  []   Reacher used   []   Other:   []   Other:      Patient/Caregiver Education and Training:   [x]   Role of PT  [x]   Education about Dx  [x]   Use of call light for assist   []   Wheelchair mobility/management  [x]   HEP provided and explained   [x]   Treatment plan reviewed  [x]   Home safety  []   Body mechanics  []   Positioning  []   Bed Mobility/Transfer technique  [x]   Gait technique/sequencing  [x]   Proper use of assistive device/adaptive equipment  []   Stair training/Advanced mobility safety and technique  [x]   Reinforced patient's precautions/mobility while maintaining precautions  []   Postural awareness  [] Family/caregiver training  [x]   Progress was updated and reviewed in Rehabtracker with patient and/or family this date. []   Other:      Treatment Plan for Next Session: balance ax's, amb short distances without AD or with SC       Treatment/Activity Tolerance:   [x] Tolerated treatment with no adverse effects    [] Patient limited by fatigue  [] Patient limited by pain   [] Patient limited by medical complications:    [] Adverse reaction to Tx:   [] Significant change in status    Barrier/s to progress/learning:   [x]   None  []   Cognition  []   Hearing deficit  []   Pre-morbid mental/psychological status   []   Motivation  []   Communication  []   Anxiety  []   Vision deficit  []   Attention  []   Other:      Safety:       []  bed alarm set    [x]  chair alarm set    []  Pt refused alarms                []  Telesitter activated      [x]  Gait belt used during tx session      []other:         Number of Minutes/Billable Intervention  Gait Training 25   Therapeutic Exercise 20   Neuro Re-Ed    Therapeutic Activity 15   Wheelchair Propulsion    Group    Other:    TOTAL 60         Social History  Social/Functional History  Lives With: Spouse  Type of Home: House  Home Layout: One level  Home Access: Ramped entrance  Bathroom Shower/Tub: Walk-in shower, Shower chair with back  Bathroom Toilet: Standard  Bathroom Equipment: Grab bars in shower, Grab bars around toilet, Shower chair  Bathroom Accessibility: Accessible  Home Equipment: Grab bars, Rolling walker, Cane(Does not use walker/cane for ambulation)  ADL Assistance: Independent  Homemaking Assistance: Independent  Homemaking Responsibilities: Yes  Ambulation Assistance: Independent  Transfer Assistance: Independent  Active : Yes  Mode of Transportation: Truck  Education: High school  Occupation: Retired  Type of occupation: , built cranes  Leisure & Hobbies: [address],   IADL Comments: Pt manages own medications.   Additional Comments: Pt sleeps on flat bed. 2 falls in past year. Objective                                                                                    Goals:  (Update in navigator)  Short term goals  Time Frame for Short term goals: 7 days  Short term goal 1: Patient to perform supine<>Sit, Rolling L<>R I  Short term goal 2: Patient to perform sit<>Stand 2ww I  Short term goal 3: Patient to pickup object off ground I with 2ww  Short term goal 4: I wc mobility >150ft 2 turns  Short term goal 5: Patient to ambulate even/uneven surfaces >350ft 2ww I   Short term goal 6: Patient to descend/ascend 12 steps I with rails for return to community :   :        Plan of Care                                                                              Times per week: 5 days per week for a minimum of 60 minutes/day plus group as appropriate for 60 minutes.   Treatment to include      Electronically signed by   Ash Lerner, PTA #[id number]  5/26/2020, 8:36 AM

## 2020-05-27 LAB
GLUCOSE BLD-MCNC: 111 MG/DL (ref 70–99)
GLUCOSE BLD-MCNC: 139 MG/DL (ref 70–99)
GLUCOSE BLD-MCNC: 154 MG/DL (ref 70–99)
GLUCOSE BLD-MCNC: 63 MG/DL (ref 70–99)
GLUCOSE BLD-MCNC: 77 MG/DL (ref 70–99)

## 2020-05-27 PROCEDURE — 6370000000 HC RX 637 (ALT 250 FOR IP): Performed by: PHYSICAL MEDICINE & REHABILITATION

## 2020-05-27 PROCEDURE — 94640 AIRWAY INHALATION TREATMENT: CPT

## 2020-05-27 PROCEDURE — 97116 GAIT TRAINING THERAPY: CPT

## 2020-05-27 PROCEDURE — 6370000000 HC RX 637 (ALT 250 FOR IP): Performed by: SURGERY

## 2020-05-27 PROCEDURE — 94761 N-INVAS EAR/PLS OXIMETRY MLT: CPT

## 2020-05-27 PROCEDURE — 82962 GLUCOSE BLOOD TEST: CPT

## 2020-05-27 PROCEDURE — 97110 THERAPEUTIC EXERCISES: CPT

## 2020-05-27 PROCEDURE — 1280000000 HC REHAB R&B

## 2020-05-27 PROCEDURE — 97535 SELF CARE MNGMENT TRAINING: CPT

## 2020-05-27 PROCEDURE — 99232 SBSQ HOSP IP/OBS MODERATE 35: CPT | Performed by: PHYSICAL MEDICINE & REHABILITATION

## 2020-05-27 PROCEDURE — 6370000000 HC RX 637 (ALT 250 FOR IP): Performed by: PHYSICIAN ASSISTANT

## 2020-05-27 PROCEDURE — 94150 VITAL CAPACITY TEST: CPT

## 2020-05-27 PROCEDURE — 97530 THERAPEUTIC ACTIVITIES: CPT

## 2020-05-27 RX ADMIN — SERTRALINE HYDROCHLORIDE 50 MG: 50 TABLET ORAL at 07:47

## 2020-05-27 RX ADMIN — FUROSEMIDE 20 MG: 20 TABLET ORAL at 17:22

## 2020-05-27 RX ADMIN — Medication 2 PUFF: at 11:47

## 2020-05-27 RX ADMIN — GLIMEPIRIDE 2 MG: 2 TABLET ORAL at 07:46

## 2020-05-27 RX ADMIN — DOCUSATE SODIUM 100 MG: 100 CAPSULE, LIQUID FILLED ORAL at 07:46

## 2020-05-27 RX ADMIN — GUAIFENESIN 600 MG: 600 TABLET, EXTENDED RELEASE ORAL at 20:05

## 2020-05-27 RX ADMIN — TAMSULOSIN HYDROCHLORIDE 0.8 MG: 0.4 CAPSULE ORAL at 07:46

## 2020-05-27 RX ADMIN — CARVEDILOL 12.5 MG: 12.5 TABLET, FILM COATED ORAL at 07:46

## 2020-05-27 RX ADMIN — Medication 2 PUFF: at 20:06

## 2020-05-27 RX ADMIN — Medication 2 PUFF: at 15:25

## 2020-05-27 RX ADMIN — FUROSEMIDE 20 MG: 20 TABLET ORAL at 07:46

## 2020-05-27 RX ADMIN — CLOPIDOGREL BISULFATE 75 MG: 75 TABLET ORAL at 07:46

## 2020-05-27 RX ADMIN — GUAIFENESIN 600 MG: 600 TABLET, EXTENDED RELEASE ORAL at 07:46

## 2020-05-27 RX ADMIN — AMIODARONE HYDROCHLORIDE 200 MG: 200 TABLET ORAL at 07:46

## 2020-05-27 RX ADMIN — LISINOPRIL 2.5 MG: 2.5 TABLET ORAL at 07:46

## 2020-05-27 RX ADMIN — ASPIRIN 81 MG: 81 TABLET, COATED ORAL at 07:46

## 2020-05-27 RX ADMIN — INSULIN LISPRO 1 UNITS: 100 INJECTION, SOLUTION INTRAVENOUS; SUBCUTANEOUS at 12:39

## 2020-05-27 RX ADMIN — ATORVASTATIN CALCIUM 20 MG: 20 TABLET, FILM COATED ORAL at 20:05

## 2020-05-27 ASSESSMENT — PAIN SCALES - GENERAL: PAINLEVEL_OUTOF10: 2

## 2020-05-27 NOTE — PROGRESS NOTES
Occupational Therapy   Physical Rehabilitation: OCCUPATIONAL THERAPY     [x] daily progress note       [] discharge       Patient Name:  Sue Pillai   :  1960 MRN: [id number]  Room:  [address] [place], [place] [address] Date of Admission: 2020  Rehabilitation Diagnosis:   Acute cerebrovascular accident (CVA) [facility]) [I63.9]       Date     WED 2020       Day of ARU Week:  6   Time IN/OUT 6476-0884   Individual Tx Minutes 60   Group Tx Minutes    Co-Treat Minutes    Concurrent Tx Minutes    TOTAL Tx Time Mins 60   Variance Time    Variance Time []   Refusal due to:     []   Medical hold/reason:    []   Illness   []   Off Unit for test/procedure  []   Extra time needed to complete task  []   Therapeutic need  []   Other (specify):   Restrictions Restrictions/Precautions  Restrictions/Precautions: General Precautions, Fall Risk  Required Braces or Orthoses?: No  Position Activity Restriction  Sternal Precautions: No pushing/pulling, internal rotation, lifting arms above 90, lifting >10 lb   Communication with other providers: [x]   OK to see per nursing:     []   Spoke with team member regarding:      Subjective observations and cognitive status: Cooperative, good SP   Pain level/location:    /10       Location:   No c/o   Discharge recommendations  Anticipated discharge date:  2020  Destination: []?home alone   []?home alone w assist prn   [x]? home w/ family    []? Continuous supervision       []? SNF    []? Assisted living     []? Other:   Continued therapy: [x]? Silvino 77 OT  []? OUTPATIENT  OT   []? No Further OT  Equipment needs: None   (HIT F2 to transition between stars)    Eating/Feeding:    IND   Extremity Used:        [x]    R UE [x]    L UE         Dentures: []   N/A    []    Partial []    Full  Adaptive Equipment Used:        Grooming: Mod I sinkside  Oral Hygiene:   Mod I       Bathing:   Showered c Mod I, seated and in stance c LHS      Dressing:      Upper Body Dressing:  IND  Lower Body Dressing:  MOD Tolerance:   [x] Tolerated treatment with no adverse effects    [] Patient limited by fatigue  [] Patient limited by pain   [] Patient limited by medical complications:    [] Adverse reaction to Tx:   [] Significant change in status    Safety:       []  bed alarm set    []  chair alarm set    []  Pt refused alarms                []  Telesitter activated      [x]  Gait belt used during tx session      []other:       Number of Minutes/Billable Intervention  Therapeutic Exercise    ADL Self-care 60   Neuro Re-Ed    Therapeutic Activity    Group    Other:    TOTAL 60       Social History  Social/Functional History  Lives With: Spouse  Type of Home: House  Home Layout: One level  Home Access: Ramped entrance  Bathroom Shower/Tub: Walk-in shower, Shower chair with back  Bathroom Toilet: Standard  Bathroom Equipment: Grab bars in shower, Grab bars around toilet, Shower chair  Bathroom Accessibility: Accessible  Home Equipment: Grab bars, Rolling walker, Cane(Does not use walker/cane for ambulation)  ADL Assistance: Independent  Homemaking Assistance: Independent  Homemaking Responsibilities: Yes  Ambulation Assistance: Independent  Transfer Assistance: Independent  Active : Yes  Mode of Transportation: Truck  Education: High school  Occupation: On disability  Type of occupation: , built cranes  Leisure & Hobbies: [address], [address], [facility] [address]. Wife sets up pill box and manages finances. 2 dogs. Out to eat. IADL Comments: Pt manages own medications. Additional Comments: Pt sleeps on flat bed. 2 falls in past year with elbow scraped up.     Objective                                                                                    Goals:  (Update in navigator)  Short term goals  Time Frame for Short term goals: STG=LTG:  Long term goals  Time Frame for Long term goals : 5-7 days  Long term goal 1: Pt will complete grooming tasks with Mod I.  Long term goal 2: Pt will complete bathing tasks with Mod I.  Long term

## 2020-05-27 NOTE — PROGRESS NOTES
requires a diet modified for carbohydrates.  He requires oral Amaryl and a Humalog sliding scale.  Blood sugars are checked at mealtime and bedtime.  Consistent oral intake is encouraged. 5. Uncontrolled pain: Acetaminophen and tramadol.  Oral bowel intervention.  Distraction therapy.   6. Uncontrolled hypertension: Coreg, lisinopril and Lasix are required to control his systolic blood pressure.  Target systolic blood pressure is 120-140.

## 2020-05-27 NOTE — PROGRESS NOTES
Independent  Sit to Stand  Assistance Needed: Supervision or touching assistance  CARE Score: 4  Discharge Goal: Independent  Chair/Bed-to-Chair Transfer  Assistance Needed: Supervision or touching assistance  CARE Score: 4  Discharge Goal: Independent  Toilet Transfer  Assistance Needed: Supervision or touching assistance  Reason if not Attempted: Not attempted due to medical condition or safety concerns  CARE Score: 4  Discharge Goal: Independent  Car Transfer  Assistance Needed: Supervision or touching assistance  CARE Score: 4  Discharge Goal: Independent   Walk 10 Feet? Walk 10 Feet?: Yes  1 Step  1 Step?: Yes  Picking Up Object  Assistance Needed: Supervision or touching assistance  CARE Score: 4  Discharge Goal: Independent  Wheelchair Ability  Uses a Wheelchair and/or Scooter?: Yes  Wheel 50 Feet with Two Turns  Assistance Needed: Supervision or touching assistance  CARE Score: 4  Discharge Goal: Independent  Wheel 150 Feet  Assistance Needed: Substantial/maximal assistance  CARE Score: 2  Discharge Goal: Independent            I      Exam:    Blood pressure (!) 146/69, pulse 64, temperature 97.8 °F (36.6 °C), temperature source Oral, resp. rate 16, height 5' 7.5\" (1.715 m), weight 196 lb (88.9 kg), SpO2 98 %. General: Sitting up in a bedside chair. Soft-spoken. Oriented x2 only. HEENT: MMM. Neck supple. No dysarthria. Pulmonary: Shallow respirations with some rhonchi in the bases. Cardiac: Regular rate and rhythm. Abdomen: Patient's abdomen is soft and nondistended. Bowel sounds were present throughout. There was no rebound, guarding or masses noted. Upper extremities: Clumsy right arm and hand movements with weaker  on the right than left. No edema. Lower extremities: Coarse movements of the right ankle. Heels are clear. Trace edema. No signs of DVT. Sitting balance was good.   Standing balance was fair-.    Lab Results   Component Value Date    WBC 9.5 05/21/2020 HGB 11.7 (L) 05/21/2020    HCT 35.9 (L) 05/21/2020    .9 (H) 05/21/2020    PLT 91 (L) 05/21/2020     Lab Results   Component Value Date    INR 1.13 05/19/2020    INR 1.12 05/18/2020    INR 0.99 05/18/2020    PROTIME 13.7 05/19/2020    PROTIME 13.6 05/18/2020    PROTIME 12.0 05/18/2020     Lab Results   Component Value Date    CREATININE 1.5 (H) 05/22/2020    BUN 30 (H) 05/22/2020     05/22/2020    K 4.0 05/22/2020    CL 97 (L) 05/22/2020    CO2 31 05/22/2020     Lab Results   Component Value Date    ALT 33 05/19/2020     (H) 05/19/2020    ALKPHOS 33 (L) 05/19/2020    BILITOT 0.4 05/19/2020       Expected length of stay  prior to a supervised level of function for discharge home with a walker and Silvino 78 OT/PT is 5/29/2020. Recommendations:    1. Ischemic stroke with right hemiparesis:  He has been very good about engaging in the daily occupational and physical therapy with speech-language pathology.     Verbal cues still needed in order for him to follow sternal precautions for all personal care and mobility. Ongoing pulmonary hygiene, nutritional support and bowel bladder retraining. Jhon Wilde is tolerating the cautious pain management, management of his blood sugar and regulation of his blood pressure.  DVT prophylaxis.    Seems to be tolerating the antiplatelet therapy with a baby aspirin and Plavix and a statin (Lipitor). Ambulation with a front wheeled walker is the short-term goal.  2. DVT prophylaxis: The surgeons do not want him on chemoprophylaxis.  SCDs when in bed.  Weightbearing activities are pursued daily. No new swelling. 3. Non-ST elevation myocardial infarction with coronary bypass graft surgery: Sternal precautions.    Downward trend to his daily weight.  Ongoing blood pressure control, blood sugar regulation and cautious diuresis.  Monitoring his blood counts and chemistries regularly.   4. Uncontrolled diabetes with hyperglycemia: Patient requires a diet modified for carbohydrates.  He requires oral Amaryl and a Humalog sliding scale.  Blood sugars are checked at mealtime and bedtime.  Consistent oral intake is encouraged. 5. Uncontrolled pain: Acetaminophen and tramadol.  Oral bowel intervention.  Distraction therapy. 6. Uncontrolled hypertension: Coreg, lisinopril and Lasix are required to control his systolic blood pressure.  Target systolic blood pressure is 120-140.          Counseling and Coordination of Care: In care conference today I met with the patient's OT, PT, RN and . We discussed the patient's problems, progress and prognosis. Disposition issues were clarified and plans were established for ongoing rehabilitation efforts beyond the ARU stay. I reviewed this information with the patient during a second distinct visit with the patient. More than half of the total time of 33 minutes spent with the patient involved counseling and coordination of care.

## 2020-05-27 NOTE — PROGRESS NOTES
Physical Therapy  [x] daily progress note       [] discharge       Patient Name:  Ramón Melgoza   :  1960 MRN: [id number]  Room:  [address] [place] [address] Date of Admission: 2020  Rehabilitation Diagnosis:   Acute cerebrovascular accident (CVA) ([facility] [address] [I63.9]       Date 2020       Day of ARU Week:  6   Time IN/OUT 1051/1151   Individual Tx Minutes 60   Group Tx Minutes    Co-Treat Minutes    Concurrent Tx Minutes    TOTAL Tx Time Mins 60   Variance Time    Variance Time []   Refusal due to:     []   Medical hold/reason:    []   Illness   []   Off Unit for test/procedure  []   Extra time needed to complete task  []   Therapeutic need  []   Other (specify):   Restrictions Restrictions/Precautions  Restrictions/Precautions: General Precautions, Fall Risk  Required Braces or Orthoses?: No  Position Activity Restriction  Sternal Precautions: No pushing/pulling, internal rotation, lifting arms above 90, lifting >10 lb   Interdisciplinary communication [x]   Cleared for therapy per nursing     []   RN notified about issues during session  []   RN updated on pt performance  []   Spoke with   []   Spoke with OT  []   Spoke with MD  []   Other:    Subjective observations and cognitive status: Pt states he is feeling better today, no coughing noted during session, but pt felt like he needed to, but could not   Pain level/location:  0  /10       Location:    Discharge recommendations  Anticipated discharge date:    Destination: []home alone   []home alone with assist PRN     [x] home w/ family      [] Continuous supervision  []SNF    [] Assisted living     [] Other:  Continued therapy: [x]C PT  []OUTPATIENT  PT   [] No Further PT  []SNF PT  Caregiver training recommended: []Yes  [] No   Equipment needs: pt has equipment     Bed Mobility:           [x]   Pt received out of bed     Transfers:    Sit--> Stand:   Mod I with no cues for sternal precautions  Stand --> Sit:   Mod I      Gait:    Distance:  50'+

## 2020-05-28 ENCOUNTER — TELEPHONE (OUTPATIENT)
Dept: FAMILY MEDICINE CLINIC | Age: 60
End: 2020-05-28

## 2020-05-28 LAB
GLUCOSE BLD-MCNC: 103 MG/DL (ref 70–99)
GLUCOSE BLD-MCNC: 106 MG/DL (ref 70–99)
GLUCOSE BLD-MCNC: 150 MG/DL (ref 70–99)
GLUCOSE BLD-MCNC: 153 MG/DL (ref 70–99)

## 2020-05-28 PROCEDURE — 1280000000 HC REHAB R&B

## 2020-05-28 PROCEDURE — 97530 THERAPEUTIC ACTIVITIES: CPT

## 2020-05-28 PROCEDURE — 97542 WHEELCHAIR MNGMENT TRAINING: CPT

## 2020-05-28 PROCEDURE — 97116 GAIT TRAINING THERAPY: CPT

## 2020-05-28 PROCEDURE — 6370000000 HC RX 637 (ALT 250 FOR IP): Performed by: PHYSICAL MEDICINE & REHABILITATION

## 2020-05-28 PROCEDURE — 97535 SELF CARE MNGMENT TRAINING: CPT

## 2020-05-28 PROCEDURE — 99232 SBSQ HOSP IP/OBS MODERATE 35: CPT | Performed by: PHYSICAL MEDICINE & REHABILITATION

## 2020-05-28 PROCEDURE — 94640 AIRWAY INHALATION TREATMENT: CPT

## 2020-05-28 PROCEDURE — 82962 GLUCOSE BLOOD TEST: CPT

## 2020-05-28 PROCEDURE — 6370000000 HC RX 637 (ALT 250 FOR IP): Performed by: SURGERY

## 2020-05-28 PROCEDURE — 97110 THERAPEUTIC EXERCISES: CPT

## 2020-05-28 PROCEDURE — 94761 N-INVAS EAR/PLS OXIMETRY MLT: CPT

## 2020-05-28 PROCEDURE — 6370000000 HC RX 637 (ALT 250 FOR IP): Performed by: PHYSICIAN ASSISTANT

## 2020-05-28 RX ADMIN — CARVEDILOL 12.5 MG: 12.5 TABLET, FILM COATED ORAL at 08:34

## 2020-05-28 RX ADMIN — Medication 2 PUFF: at 20:23

## 2020-05-28 RX ADMIN — ATORVASTATIN CALCIUM 20 MG: 20 TABLET, FILM COATED ORAL at 21:09

## 2020-05-28 RX ADMIN — FUROSEMIDE 20 MG: 20 TABLET ORAL at 08:34

## 2020-05-28 RX ADMIN — FUROSEMIDE 20 MG: 20 TABLET ORAL at 17:51

## 2020-05-28 RX ADMIN — GLIMEPIRIDE 2 MG: 2 TABLET ORAL at 08:34

## 2020-05-28 RX ADMIN — GUAIFENESIN 600 MG: 600 TABLET, EXTENDED RELEASE ORAL at 08:34

## 2020-05-28 RX ADMIN — DOCUSATE SODIUM 100 MG: 100 CAPSULE, LIQUID FILLED ORAL at 08:34

## 2020-05-28 RX ADMIN — LISINOPRIL 2.5 MG: 2.5 TABLET ORAL at 08:34

## 2020-05-28 RX ADMIN — Medication 2 PUFF: at 10:35

## 2020-05-28 RX ADMIN — TAMSULOSIN HYDROCHLORIDE 0.8 MG: 0.4 CAPSULE ORAL at 08:34

## 2020-05-28 RX ADMIN — GUAIFENESIN 600 MG: 600 TABLET, EXTENDED RELEASE ORAL at 21:09

## 2020-05-28 RX ADMIN — CLOPIDOGREL BISULFATE 75 MG: 75 TABLET ORAL at 08:34

## 2020-05-28 RX ADMIN — ASPIRIN 81 MG: 81 TABLET, COATED ORAL at 08:34

## 2020-05-28 RX ADMIN — TRAMADOL HYDROCHLORIDE 50 MG: 50 TABLET, FILM COATED ORAL at 17:52

## 2020-05-28 RX ADMIN — SERTRALINE HYDROCHLORIDE 50 MG: 50 TABLET ORAL at 08:34

## 2020-05-28 RX ADMIN — Medication 2 PUFF: at 14:54

## 2020-05-28 RX ADMIN — AMIODARONE HYDROCHLORIDE 200 MG: 200 TABLET ORAL at 08:33

## 2020-05-28 RX ADMIN — Medication 2 PUFF: at 07:13

## 2020-05-28 ASSESSMENT — PAIN SCALES - GENERAL: PAINLEVEL_OUTOF10: 4

## 2020-05-28 NOTE — PROGRESS NOTES
Referral made to [facility] via [facility] to Emily.  RENETTA Silva/DEVANG  5/28/2020, 4:14 PM

## 2020-05-28 NOTE — PROGRESS NOTES
Physical Therapy  [x] daily progress note       [x] discharge       Patient Name:  Sue Pillai   :  1960 MRN: [id number]  Room:  [address] [place], [place] [address] Date of Admission: 2020  Rehabilitation Diagnosis:   Acute cerebrovascular accident (CVA) ([facility] [address] [I63.9]       Date 2020       Day of ARU Week:  7   Time IN//931   Individual Tx Minutes 60   Group Tx Minutes    Co-Treat Minutes    Concurrent Tx Minutes    TOTAL Tx Time Mins 60   Variance Time    Variance Time []   Refusal due to:     []   Medical hold/reason:    []   Illness   []   Off Unit for test/procedure  []   Extra time needed to complete task  []   Therapeutic need  []   Other (specify):   Restrictions Restrictions/Precautions  Restrictions/Precautions: General Precautions, Fall Risk  Required Braces or Orthoses?: No  Position Activity Restriction  Sternal Precautions: No pushing/pulling, internal rotation, lifting arms above 90, lifting >10 lb   Interdisciplinary communication [x]   Cleared for therapy per nursing     []   RN notified about issues during session  []   RN updated on pt performance  []   Spoke with   []   Spoke with OT  []   Spoke with MD  []   Other:    Subjective observations and cognitive status: Pt states he is feeling well and agreeable to therapy   Pain level/location:   3 /10       Location: L hip   Discharge recommendations  Anticipated discharge date:    Destination: []home alone   []home alone with assist PRN     [x] home w/ family      [] Continuous supervision  []SNF    [] Assisted living     [] Other:  Continued therapy: [x]HHC PT  []OUTPATIENT  PT   [] No Further PT  []SNF PT  Caregiver training recommended: []Yes  [] No   Equipment needs: pt has needed equipment     Bed Mobility:   All mod I with good understanding of precautions           Transfers:    Sit--> Stand: Mod I  Stand --> Sit:   Mod I  Chair-->Bed/Bed --> Chair:   Mod I  Car Transfers:   Mod I  Toilet Transfer (if applicable): mod

## 2020-05-28 NOTE — PROGRESS NOTES
minutes.   Treatment to include      Electronically signed by   Arthur Whaley, PTA #[id number]  5/28/2020, 8:52 AM

## 2020-05-29 VITALS
OXYGEN SATURATION: 94 % | HEART RATE: 59 BPM | HEIGHT: 68 IN | WEIGHT: 196 LBS | TEMPERATURE: 97.9 F | BODY MASS INDEX: 29.7 KG/M2 | DIASTOLIC BLOOD PRESSURE: 65 MMHG | RESPIRATION RATE: 19 BRPM | SYSTOLIC BLOOD PRESSURE: 135 MMHG

## 2020-05-29 LAB
GLUCOSE BLD-MCNC: 112 MG/DL (ref 70–99)
GLUCOSE BLD-MCNC: 172 MG/DL (ref 70–99)

## 2020-05-29 PROCEDURE — 6370000000 HC RX 637 (ALT 250 FOR IP): Performed by: PHYSICAL MEDICINE & REHABILITATION

## 2020-05-29 PROCEDURE — 94640 AIRWAY INHALATION TREATMENT: CPT

## 2020-05-29 PROCEDURE — 99239 HOSP IP/OBS DSCHRG MGMT >30: CPT | Performed by: PHYSICAL MEDICINE & REHABILITATION

## 2020-05-29 PROCEDURE — 6370000000 HC RX 637 (ALT 250 FOR IP): Performed by: SURGERY

## 2020-05-29 PROCEDURE — 6370000000 HC RX 637 (ALT 250 FOR IP): Performed by: PHYSICIAN ASSISTANT

## 2020-05-29 PROCEDURE — 82962 GLUCOSE BLOOD TEST: CPT

## 2020-05-29 PROCEDURE — 94761 N-INVAS EAR/PLS OXIMETRY MLT: CPT

## 2020-05-29 RX ORDER — PSEUDOEPHEDRINE HCL 30 MG
100 TABLET ORAL DAILY
COMMUNITY
Start: 2020-05-30 | End: 2021-02-12

## 2020-05-29 RX ORDER — GUAIFENESIN 600 MG/1
600 TABLET, EXTENDED RELEASE ORAL 2 TIMES DAILY
COMMUNITY
Start: 2020-05-29 | End: 2020-06-03 | Stop reason: SDUPTHER

## 2020-05-29 RX ORDER — LISINOPRIL 2.5 MG/1
2.5 TABLET ORAL DAILY
Qty: 30 TABLET | Refills: 0 | Status: SHIPPED | OUTPATIENT
Start: 2020-05-30 | End: 2020-06-08 | Stop reason: SDUPTHER

## 2020-05-29 RX ORDER — CARVEDILOL 12.5 MG/1
12.5 TABLET ORAL 2 TIMES DAILY
Qty: 60 TABLET | Refills: 0 | Status: SHIPPED | OUTPATIENT
Start: 2020-05-29 | End: 2020-06-08 | Stop reason: SDUPTHER

## 2020-05-29 RX ORDER — TRAMADOL HYDROCHLORIDE 50 MG/1
50 TABLET ORAL EVERY 6 HOURS PRN
Qty: 10 TABLET | Refills: 0 | Status: SHIPPED | OUTPATIENT
Start: 2020-05-29 | End: 2020-06-05

## 2020-05-29 RX ORDER — FUROSEMIDE 20 MG/1
20 TABLET ORAL 2 TIMES DAILY
Qty: 60 TABLET | Refills: 0 | Status: SHIPPED | OUTPATIENT
Start: 2020-05-29 | End: 2020-06-23 | Stop reason: SDUPTHER

## 2020-05-29 RX ORDER — CLOPIDOGREL BISULFATE 75 MG/1
75 TABLET ORAL DAILY
Qty: 30 TABLET | Refills: 0 | Status: SHIPPED | OUTPATIENT
Start: 2020-05-30 | End: 2020-06-08 | Stop reason: SDUPTHER

## 2020-05-29 RX ORDER — ATORVASTATIN CALCIUM 20 MG/1
20 TABLET, FILM COATED ORAL NIGHTLY
Qty: 30 TABLET | Refills: 0 | Status: SHIPPED | OUTPATIENT
Start: 2020-05-29 | End: 2020-06-08 | Stop reason: DRUGHIGH

## 2020-05-29 RX ORDER — AMIODARONE HYDROCHLORIDE 200 MG/1
200 TABLET ORAL DAILY
Qty: 30 TABLET | Refills: 0 | Status: SHIPPED | OUTPATIENT
Start: 2020-05-30 | End: 2020-06-24 | Stop reason: ALTCHOICE

## 2020-05-29 RX ADMIN — Medication 2 PUFF: at 10:42

## 2020-05-29 RX ADMIN — FUROSEMIDE 20 MG: 20 TABLET ORAL at 08:57

## 2020-05-29 RX ADMIN — AMIODARONE HYDROCHLORIDE 200 MG: 200 TABLET ORAL at 08:54

## 2020-05-29 RX ADMIN — CLOPIDOGREL BISULFATE 75 MG: 75 TABLET ORAL at 08:56

## 2020-05-29 RX ADMIN — SERTRALINE HYDROCHLORIDE 50 MG: 50 TABLET ORAL at 08:58

## 2020-05-29 RX ADMIN — ASPIRIN 81 MG: 81 TABLET, COATED ORAL at 08:55

## 2020-05-29 RX ADMIN — GLIMEPIRIDE 2 MG: 2 TABLET ORAL at 08:57

## 2020-05-29 RX ADMIN — LISINOPRIL 2.5 MG: 2.5 TABLET ORAL at 08:57

## 2020-05-29 RX ADMIN — DOCUSATE SODIUM 100 MG: 100 CAPSULE, LIQUID FILLED ORAL at 08:56

## 2020-05-29 RX ADMIN — GUAIFENESIN 600 MG: 600 TABLET, EXTENDED RELEASE ORAL at 08:57

## 2020-05-29 RX ADMIN — Medication 2 PUFF: at 07:02

## 2020-05-29 RX ADMIN — TAMSULOSIN HYDROCHLORIDE 0.8 MG: 0.4 CAPSULE ORAL at 08:58

## 2020-05-29 RX ADMIN — INSULIN LISPRO 1 UNITS: 100 INJECTION, SOLUTION INTRAVENOUS; SUBCUTANEOUS at 11:43

## 2020-05-29 ASSESSMENT — PAIN SCALES - GENERAL: PAINLEVEL_OUTOF10: 0

## 2020-05-29 NOTE — PROGRESS NOTES
Héctor Filter    : 1960  Acct #: [de-identified]  MRN: [id number]              PM&R Progress Note      Admitting diagnosis: Cerebrovascular accident     Comorbid diagnoses impacting rehabilitation: Right hemiparesis, non-ST elevation myocardial infarction, status post coronary artery bypass graft surgery on 2020, uncontrolled pain, dysphagia, essential hypertension, uncontrolled diabetes with hyperglycemia     Chief complaint: Looking forward to discharge. Denies new double vision or choking. Same chest wall pain as before. Prior (baseline) level of function: Independent. Current level of function:         Current  IRF-MARGARITA and Goals:   Hearing, Speech, and Vision  Expression of Ideas and Wants: Without difficulty  Understanding Verbal and Non-Verbal Content: Understands  Prior Functioning: Everyday Activities  Self Care: Independent  Indoor Mobility (Ambulation):  Independent  Stairs: Independent  Functional Cognition: Independent  Prior Device Use: None of the given options    Eating  Assistance Needed: Independent  CARE Score: 6  Discharge Goal: Independent  Oral Hygiene  Assistance Needed: Independent  CARE Score: 6  Discharge Goal: Independent  Toileting Hygiene  Assistance Needed: Independent  CARE Score: 6  Discharge Goal: Independent  Shower/Bathe Self  Assistance Needed: Independent  CARE Score: 6  Discharge Goal: Independent  Upper Body Dressing  Assistance Needed: Independent  CARE Score: 6  Discharge Goal: Independent  Lower Body Dressing  Assistance Needed: Independent  CARE Score: 6  Discharge Goal: Independent  Putting On/Taking Off Footwear  Assistance Needed: Independent  CARE Score: 6  Discharge Goal: Independent    Roll Left and Right  Assistance Needed: Independent  CARE Score: 6  Discharge Goal: Independent  Sit to Lying  Assistance Needed: Independent  CARE Score: 6  Discharge Goal: Independent  Sit to Stand  Assistance Needed: Independent  CARE Score: 6  Discharge Goal: tramadol.  Oral bowel intervention.  Distraction therapy.   6. Uncontrolled hypertension: Coreg, lisinopril and Lasix are required to control his systolic blood pressure.  Target systolic blood pressure is 120-140.

## 2020-05-30 ENCOUNTER — CARE COORDINATION (OUTPATIENT)
Dept: CASE MANAGEMENT | Age: 60
End: 2020-05-30

## 2020-06-01 ENCOUNTER — CARE COORDINATION (OUTPATIENT)
Dept: CASE MANAGEMENT | Age: 60
End: 2020-06-01

## 2020-06-03 ENCOUNTER — VIRTUAL VISIT (OUTPATIENT)
Dept: FAMILY MEDICINE CLINIC | Age: 60
End: 2020-06-03
Payer: MEDICARE

## 2020-06-03 PROCEDURE — 99496 TRANSJ CARE MGMT HIGH F2F 7D: CPT | Performed by: FAMILY MEDICINE

## 2020-06-03 PROCEDURE — 1111F DSCHRG MED/CURRENT MED MERGE: CPT | Performed by: FAMILY MEDICINE

## 2020-06-03 RX ORDER — GUAIFENESIN 600 MG/1
600 TABLET, EXTENDED RELEASE ORAL 2 TIMES DAILY
Qty: 20 TABLET | Refills: 0 | Status: SHIPPED | OUTPATIENT
Start: 2020-06-03 | End: 2020-07-16

## 2020-06-03 NOTE — PROGRESS NOTES
Ibuprofen Other (See Comments)     Stomach irritation    Augmentin [Amoxicillin-Pot Clavulanate]     Codeine        Medications listed as ordered at the time of discharge from hospital   Rodriguez, [address] Medication Instructions MERLIN:    Printed on:06/03/20 0847   Medication Information                      albuterol-ipratropium (COMBIVENT RESPIMAT)  MCG/ACT AERS inhaler  Inhale 1 puff into the lungs every 6 hours Dispense if cheaper than atrovent inhaler             Alcohol Swabs (ALCOHOL PADS) 70 % PADS  1 Units by Does not apply route 2 times daily             amiodarone (CORDARONE) 200 MG tablet  Take 1 tablet by mouth daily             aspirin 81 MG chewable tablet  Take 1 tablet by mouth daily             atorvastatin (LIPITOR) 20 MG tablet  Take 1 tablet by mouth nightly             blood glucose monitor strips  1 strip by Other route 2 times daily for 180 doses             Blood Glucose Monitoring Suppl (BLOOD GLUCOSE MONITOR SYSTEM) w/Device KIT  Use twice per day             Blood Pressure KIT  Use prn             carvedilol (COREG) 12.5 MG tablet  Take 1 tablet by mouth 2 times daily             clopidogrel (PLAVIX) 75 MG tablet  Take 1 tablet by mouth daily             docusate sodium (COLACE, DULCOLAX) 100 MG CAPS  Take 100 mg by mouth daily             furosemide (LASIX) 20 MG tablet  Take 1 tablet by mouth 2 times daily             glimepiride (AMARYL) 2 MG tablet  Take 1 tablet by mouth every morning             guaiFENesin (MUCINEX) 600 MG extended release tablet  Take 1 tablet by mouth 2 times daily             Hospital Bed MISC  by Does not apply route             ipratropium (ATROVENT HFA) 17 MCG/ACT inhaler  Inhale 2 puffs into the lungs every 4 hours (while awake) for 14 days             Lancets MISC  1 each by Does not apply route 2 times daily for 180 doses             lisinopril (PRINIVIL;ZESTRIL) 2.5 MG tablet  Take 1 tablet by mouth daily             sertraline (ZOLOFT) 50 MG albuterol-ipratropium (COMBIVENT RESPIMAT)  MCG/ACT AERS inhaler    Hospital Bed MISC   3. Non-ST elevation myocardial infarction (NSTEMI) [facility])  [facility] Bed MISC   4. Acute cerebrovascular accident (CVA) [facility])  [facility] Bed MISC   5. Aphasia due to acute stroke [facility])           Return in about 2 weeks (around 6/17/2020). Li Mason is a 60 y.o. male being evaluated by a Virtual Visit (video visit) encounter to address concerns as mentioned above. A caregiver was present when appropriate. Due to this being a TeleHealth encounter (During JIHAK-15 public health emergency), evaluation of the following organ systems was limited: Vitals/Constitutional/EENT/Resp/CV/GI//MS/Neuro/Skin/Heme-Lymph-Imm. Pursuant to the emergency declaration under the [address] [place] [address] and the [employer] and [employer] Act, this Virtual Visit was conducted with patient's (and/or legal guardian's) consent, to reduce the patient's risk of exposure to COVID-19 and provide necessary medical care. The patient (and/or legal guardian) has also been advised to contact this office for worsening conditions or problems, and seek emergency medical treatment and/or call 911 if deemed necessary. Patient identification was verified at the start of the visit: Yes    Total time spent on this encounter: Not billed by time    Services were provided through a video synchronous discussion virtually to substitute for in-person clinic visit. Patient and provider were located at their individual homes. --Sandra Moss MD on 6/3/2020 at 5:08 PM    An electronic signature was used to authenticate this note.

## 2020-06-08 RX ORDER — ATORVASTATIN CALCIUM 80 MG/1
80 TABLET, FILM COATED ORAL DAILY
Qty: 90 TABLET | Refills: 3 | Status: SHIPPED | OUTPATIENT
Start: 2020-06-08 | End: 2021-06-04 | Stop reason: SDUPTHER

## 2020-06-08 RX ORDER — CARVEDILOL 12.5 MG/1
12.5 TABLET ORAL 2 TIMES DAILY
Qty: 180 TABLET | Refills: 0 | Status: SHIPPED | OUTPATIENT
Start: 2020-06-08 | End: 2020-06-23 | Stop reason: SDUPTHER

## 2020-06-08 RX ORDER — CLOPIDOGREL BISULFATE 75 MG/1
75 TABLET ORAL DAILY
Qty: 90 TABLET | Refills: 0 | Status: SHIPPED | OUTPATIENT
Start: 2020-06-08 | End: 2020-06-23 | Stop reason: SDUPTHER

## 2020-06-08 RX ORDER — LISINOPRIL 2.5 MG/1
2.5 TABLET ORAL DAILY
Qty: 90 TABLET | Refills: 0 | Status: SHIPPED | OUTPATIENT
Start: 2020-06-08 | End: 2020-06-23 | Stop reason: SDUPTHER

## 2020-06-08 RX ORDER — FUROSEMIDE 20 MG/1
20 TABLET ORAL 2 TIMES DAILY
Qty: 60 TABLET | Refills: 0 | Status: CANCELLED | OUTPATIENT
Start: 2020-06-08

## 2020-06-08 RX ORDER — ATORVASTATIN CALCIUM 20 MG/1
20 TABLET, FILM COATED ORAL NIGHTLY
Qty: 30 TABLET | Refills: 0 | Status: CANCELLED | OUTPATIENT
Start: 2020-06-08

## 2020-06-23 ENCOUNTER — OFFICE VISIT (OUTPATIENT)
Dept: CARDIOLOGY CLINIC | Age: 60
End: 2020-06-23
Payer: MEDICARE

## 2020-06-23 ENCOUNTER — PROCEDURE VISIT (OUTPATIENT)
Dept: CARDIOLOGY CLINIC | Age: 60
End: 2020-06-23
Payer: MEDICARE

## 2020-06-23 VITALS
WEIGHT: 199.4 LBS | DIASTOLIC BLOOD PRESSURE: 68 MMHG | HEART RATE: 60 BPM | BODY MASS INDEX: 31.3 KG/M2 | HEIGHT: 67 IN | SYSTOLIC BLOOD PRESSURE: 114 MMHG

## 2020-06-23 LAB
LV EF: 30 %
LVEF MODALITY: NORMAL

## 2020-06-23 PROCEDURE — 93015 CV STRESS TEST SUPVJ I&R: CPT | Performed by: INTERNAL MEDICINE

## 2020-06-23 PROCEDURE — A9500 TC99M SESTAMIBI: HCPCS | Performed by: INTERNAL MEDICINE

## 2020-06-23 PROCEDURE — 99214 OFFICE O/P EST MOD 30 MIN: CPT | Performed by: INTERNAL MEDICINE

## 2020-06-23 PROCEDURE — 93000 ELECTROCARDIOGRAM COMPLETE: CPT | Performed by: INTERNAL MEDICINE

## 2020-06-23 PROCEDURE — 78452 HT MUSCLE IMAGE SPECT MULT: CPT | Performed by: INTERNAL MEDICINE

## 2020-06-23 RX ORDER — LISINOPRIL 2.5 MG/1
2.5 TABLET ORAL DAILY
Qty: 90 TABLET | Refills: 3 | Status: ON HOLD | OUTPATIENT
Start: 2020-06-23 | End: 2020-10-14 | Stop reason: SDUPTHER

## 2020-06-23 RX ORDER — FUROSEMIDE 20 MG/1
20 TABLET ORAL 2 TIMES DAILY
Qty: 60 TABLET | Refills: 1 | Status: SHIPPED | OUTPATIENT
Start: 2020-06-23 | End: 2020-11-04

## 2020-06-23 RX ORDER — CARVEDILOL 12.5 MG/1
12.5 TABLET ORAL 2 TIMES DAILY
Qty: 180 TABLET | Refills: 3 | Status: SHIPPED | OUTPATIENT
Start: 2020-06-23 | End: 2021-02-08

## 2020-06-23 RX ORDER — FUROSEMIDE 20 MG/1
20 TABLET ORAL 2 TIMES DAILY
Qty: 180 TABLET | Refills: 3 | Status: ON HOLD | OUTPATIENT
Start: 2020-06-23 | End: 2020-10-13

## 2020-06-23 RX ORDER — AMIODARONE HYDROCHLORIDE 200 MG/1
200 TABLET ORAL DAILY
Qty: 90 TABLET | Refills: 1 | Status: CANCELLED | OUTPATIENT
Start: 2020-06-23

## 2020-06-23 RX ORDER — CLOPIDOGREL BISULFATE 75 MG/1
75 TABLET ORAL DAILY
Qty: 90 TABLET | Refills: 3 | Status: SHIPPED | OUTPATIENT
Start: 2020-06-23 | End: 2022-02-25 | Stop reason: SDUPTHER

## 2020-06-23 NOTE — PROGRESS NOTES
effect of cerebral infarction ([facility] 05/14/2016     Priority: Low    Gait disturbance 05/14/2016     Priority: Low    Hyperlipidemia LDL goal <70 12/19/2014     Priority: Low    Fatty liver 05/23/2014     Priority: Low    Colon, diverticulosis 05/23/2014     Priority: Low    Renal cyst 05/23/2014     Priority: Low    B12 deficiency 01/10/2014     Priority: Low    Chest pain 07/30/2013     Priority: Low    History of PTCA      Priority: Low    Dysarthria due to recent stroke     Aphasia due to acute stroke ([facility]     Complication of coronary artery bypass graft surgery     Uncontrolled pain     Uncontrolled type 2 diabetes mellitus with hyperglycemia ([facility]     CVA (cerebral vascular accident) ([facility] 05/22/2020    Acute cerebrovascular accident (CVA) ([facility] 05/22/2020    Acute pain of left shoulder     Ischemic cardiomyopathy 06/26/2018     Current Outpatient Medications   Medication Sig Dispense Refill    lisinopril (PRINIVIL;ZESTRIL) 2.5 MG tablet Take 1 tablet by mouth daily 90 tablet 3    furosemide (LASIX) 20 MG tablet Take 1 tablet by mouth 2 times daily 180 tablet 3    clopidogrel (PLAVIX) 75 MG tablet Take 1 tablet by mouth daily 90 tablet 3    carvedilol (COREG) 12.5 MG tablet Take 1 tablet by mouth 2 times daily 180 tablet 3    guaiFENesin (MUCINEX) 600 MG extended release tablet Take 1 tablet by mouth 2 times daily 20 tablet 0    albuterol-ipratropium (COMBIVENT RESPIMAT)  MCG/ACT AERS inhaler Inhale 1 puff into the lungs every 6 hours Dispense if cheaper than atrovent inhaler 1 Inhaler 2    amiodarone (CORDARONE) 200 MG tablet Take 1 tablet by mouth daily 30 tablet 0    docusate sodium (COLACE, DULCOLAX) 100 MG CAPS Take 100 mg by mouth daily      sertraline (ZOLOFT) 50 MG tablet Take 1 tablet by mouth daily 90 tablet 3    tamsulosin (FLOMAX) 0.4 MG capsule Take 1 capsule by mouth daily (Patient taking differently: Take 0.8 mg by mouth daily ) 90 capsule 3    glimepiride TROPONINT 0.198 05/16/2020     BNP:  No results found for: BNP  PT/INR:    Lab Results   Component Value Date    INR 1.13 05/19/2020     Lab Results   Component Value Date    LABA1C 6.7 (H) 05/16/2020    LABA1C 7.0 01/20/2020     Lab Results   Component Value Date    WBC 9.5 05/21/2020    HCT 35.9 (L) 05/21/2020    .9 (H) 05/21/2020    PLT 91 (L) 05/21/2020     Lab Results   Component Value Date    CHOL 150 05/16/2020    TRIG 104 05/16/2020    HDL 33 (L) 05/16/2020    LDLCALC 114 (H) 04/16/2019    LDLDIRECT 111 (H) 05/16/2020     Lab Results   Component Value Date    ALT 33 05/19/2020     (H) 05/19/2020     BMP:    Lab Results   Component Value Date     05/22/2020    K 4.0 05/22/2020    K 3.8 03/16/2018    CL 97 05/22/2020    CO2 31 05/22/2020    BUN 30 05/22/2020    CREATININE 1.5 05/22/2020     CMP:   Lab Results   Component Value Date     05/22/2020    K 4.0 05/22/2020    K 3.8 03/16/2018    CL 97 05/22/2020    CO2 31 05/22/2020    BUN 30 05/22/2020    PROT 5.0 05/19/2020     TSH:    Lab Results   Component Value Date    TSHHS 0.723 05/07/2016         Impression:    1. S/P CABG x 2       Patient Active Problem List   Diagnosis Code    CAD (coronary artery disease) I25.10    History of PTCA Z98.61    History of acute inferior wall myocardial infarction I25.2    Chest pain R07.9    B12 deficiency E53.8    Fatty liver K76.0    Colon, diverticulosis K57.30    Renal cyst N28.1    Adrenal adenoma D35.00    Hyperlipidemia LDL goal <70 E78.5    Obesity (BMI 30-39. 9) E66.9    Cerebrovascular accident (CVA) due to thrombosis of left middle cerebral artery (HCC) I63.312    Hemiparesis of right dominant side as late effect of cerebral infarction (HCC) I69.351    Gait disturbance R26.9    Essential hypertension I10    Hemiparesis affecting right side as late effect of cerebrovascular accident (CVA) (HCC) I69.351    Shortness of breath R06.02    Gastroesophageal reflux disease K21.9  Acute ST elevation myocardial infarction (STEMI) ([facility]) I21.3    Hypertensive emergency I16.1    Unstable angina ([facility]) I20.0    Non-ST elevation myocardial infarction (NSTEMI) ([facility]) I21.4    Hypertensive urgency I16.0    Frequent PVCs I49.3    Ischemic cardiomyopathy I25.5    History of Guillain-[place] syndrome Z86.69    Acute pain of left shoulder M25.512    CVA (cerebral vascular accident) ([facility] I63.9    Acute cerebrovascular accident (CVA) ([facility] I63.9    Dysarthria due to recent stroke I69.322    Aphasia due to acute stroke ([facility]) I63.9, Y48.52    Complication of coronary artery bypass graft surgery T82. 9XXA    Uncontrolled pain R52    Uncontrolled type 2 diabetes mellitus with hyperglycemia ([facility]) E11.65       Assessment & Plan:        - post cabg ett and rehab     Procedure:  CABG X 2  LIMA to LAD  SVG to OM2      -     CORONARY ARTERY DISEASE:  asymptomatic     All available  tests in chart reviewed. Management discussed . Testing ordered  STRESS  CARDIOLITE , CANT WALK                                 -  Hypertension: Patients blood pressure is abnormal. Patient is advised about low sodium diet. Present medical regimen will not be changed. -  LIPID MANAGEMENT:  Available lipid  lab data reviewed  and patient was given dietary advice. NCEP- ATP III guidelines reviewed with patient. -   Changes  in medicines made:  No                  - Low EF on meds  CPM    ABN EKG  LOW EF  CAD  Multiple Cardiac risk factors          Natalie Schumacher MA  [facility] [place]

## 2020-06-23 NOTE — LETTER
Trudell Rabon Dr. Artemus Schilder  1960  [id number]    Pt denies having any cardiac complaints        Do you have a surgery or procedure scheduled in the near future - No  If Yes- DO EKG    Ask patient if they want to sign up for MyChart if they are not already signed up    Check to see if we have an E-MAIL on file for the patient    Check medication list thoroughly!!!  BE SURE TO ASK PATIENT IF THEY NEED MEDICATION REFILLS

## 2020-06-23 NOTE — DISCHARGE SUMMARY
tamsulosin (FLOMAX) 0.4 MG capsule  Take 1 capsule by mouth daily                 CONDITION ON DISCHARGE: Stable. The prognosis is fair for further improvements in ADL's and safety with adapted gait/transfers. Record review, patient exam, discharge instructions, medication reconciliation and summary for this discharge visit took more than 30 minutes.

## 2020-06-24 PROBLEM — Z95.1 S/P CABG (CORONARY ARTERY BYPASS GRAFT): Status: ACTIVE | Noted: 2020-06-24

## 2020-06-30 ENCOUNTER — TELEPHONE (OUTPATIENT)
Dept: CARDIOLOGY CLINIC | Age: 60
End: 2020-06-30

## 2020-06-30 NOTE — TELEPHONE ENCOUNTER
Notified pt of results and that he will be called to set up F/U.         Anitra Raimundo physician Dr. New Jones . Abnormal Lexiscan nuclear scintigraphic    study suggestive of abnormal myocardial perfusion. Gated images demonstrate    abnormal left ventricular systolic function with EF of 30 %. Nuclear    scintigraphy demonstartes inferior wall infarct.        Recommendation    Suggest office visit to discuss results .

## 2020-07-02 ENCOUNTER — OFFICE VISIT (OUTPATIENT)
Dept: CARDIOLOGY CLINIC | Age: 60
End: 2020-07-02
Payer: MEDICARE

## 2020-07-02 VITALS
HEIGHT: 67 IN | DIASTOLIC BLOOD PRESSURE: 84 MMHG | HEART RATE: 64 BPM | SYSTOLIC BLOOD PRESSURE: 128 MMHG | WEIGHT: 200.2 LBS | BODY MASS INDEX: 31.42 KG/M2

## 2020-07-02 PROCEDURE — 99214 OFFICE O/P EST MOD 30 MIN: CPT | Performed by: INTERNAL MEDICINE

## 2020-07-02 NOTE — PROGRESS NOTES
CARDIOLOGY NOTE      7/2/2020    RE: Sadaf Faulkner  (1960)                               TO:  Dr. Greta Aleman MD            Sadaf Rothman is a 63 y.o. male who was seen today for management of  cad                  Here     Post cardiolite          HPI:   Patient is here for    - Coronary artery disease, does not have chest pain. Patient is  compliant with prescribed medicines. - Hypertension,isnot  well controlled, pt is  compliant with medicines  In pain from dental issues  - Hyperlipidimea, lipids are in acceptable range.  Pt  is  compliant with medicines  - Low EF  Compliant with meds                The patient does not have cardiac complaints    Sadaf Faulkner has the following history recorded in care path:  Patient Active Problem List    Diagnosis Date Noted    Non-ST elevation myocardial infarction (NSTEMI) ([facility] 03/15/2018     Priority: High    Hypertensive urgency      Priority: High    Hemiparesis affecting right side as late effect of cerebrovascular accident (CVA) ([facility] 07/07/2016     Priority: High    Essential hypertension 05/24/2016     Priority: High    Cerebrovascular accident (CVA) due to thrombosis of left middle cerebral artery ([address] 05/14/2016     Priority: High    Obesity (BMI 30-39.9) 06/26/2015     Priority: High    History of acute inferior wall myocardial infarction      Priority: High    History of Guillain-[place] syndrome 03/08/2019     Priority: Medium    Adrenal adenoma 05/23/2014     Priority: Medium    CAD (coronary artery disease)      Priority: Medium    Frequent PVCs      Priority: Low    Unstable angina ([address] 03/15/2018     Priority: Low    Acute ST elevation myocardial infarction (STEMI) ([facility] 03/14/2018     Priority: Low    Hypertensive emergency 03/14/2018     Priority: Low    Gastroesophageal reflux disease 04/19/2017     Priority: Low    Shortness of breath 10/25/2016     Priority: Low    Hemiparesis of right dominant side as MG CAPS Take 100 mg by mouth daily      sertraline (ZOLOFT) 50 MG tablet Take 1 tablet by mouth daily 90 tablet 3    tamsulosin (FLOMAX) 0.4 MG capsule Take 1 capsule by mouth daily (Patient taking differently: Take 0.8 mg by mouth daily ) 90 capsule 3    glimepiride (AMARYL) 2 MG tablet Take 1 tablet by mouth every morning 90 tablet 3    Blood Glucose Monitoring Suppl (BLOOD GLUCOSE MONITOR SYSTEM) w/Device KIT Use twice per day 1 kit 0    blood glucose monitor strips 1 strip by Other route 2 times daily for 180 doses 180 strip 3    Lancets MISC 1 each by Does not apply route 2 times daily for 180 doses 180 each 3    Alcohol Swabs (ALCOHOL PADS) 70 % PADS 1 Units by Does not apply route 2 times daily 180 each 3    aspirin 81 MG chewable tablet Take 1 tablet by mouth daily 30 tablet 3    Blood Pressure KIT Use prn 1 kit [address] by Does not apply route (Patient not taking: Reported on 7/2/2020) 1 each 0     No current facility-administered medications for this visit. Allergies: Ibuprofen; Augmentin [amoxicillin-pot clavulanate]; and Codeine  Past Medical History:   Diagnosis Date    B12 deficiency     CAD (coronary artery disease)     GERD (gastroesophageal reflux disease)     Guillain Barr[place] syndrome ([facility]     H/O echocardiogram 7/30/13, 6/16/10    7/13-EF 50-55% WNL. 6/10-EF53%, mod LVH, MARCI, RVE, trace PI, mild MR/TR, 6/09    Heart murmur     History of myocardial infarction 2003    History of nuclear MUGA test 03/03/2020    EF 53%, Normal, No ICD needed    History of nuclear stress test 06/23/2020    EF 30%, study suggestive of abnormal myocardial perfusion.  History of PTCA 6/03;2/03 6/03-PTCA w/stents x2, 2/03-PTCA w/stent x3    History of PTCA 03/15/2018    LAD EF25%    Hx of cardiovascular stress test 04/25/2018    EF 28% Nuclear scintigraphy demonstartes inferior wall infarct.  Hx of cardiovascular stress test 01/27/2020    Abnormal Study.  Large area of inferior & apical-lateral infarct. No significant reversible ischemia. Severly reduced LVEF 23%    HX OTHER MEDICAL 03/14/2018    lifevest  d/c 7/2/2018    Hyperlipidemia     Hypertension     Impaired glucose tolerance 6/3/2016    MUGA 07/09/2018    EF 47%    NSTEMI (non-ST elevated myocardial infarction) ([facility] 03/15/2018    Patient in clinical research study 05/2018    3 year study dalcitripib vs placebo [phone]    Pneumonia due to organism     Renal cyst 5/23/2014    S/P cardiac catheterization 7/12/13 7/13-EF=55%. PATENT LAD AND RCA STENTS, OM2 50% stenosis, & Normal wall motion.  Stroke (cerebrum) [facility])     May 2016     Past Surgical History:   Procedure Laterality Date    CARDIAC CATHETERIZATION  7/13 7/13-EF=55%. PATENT LAD AND RCA STENTS, OM2 50% stenosis, & Normal wall motion.     COLONOSCOPY  8/21/14    colon polyp, diverticulosis, internal hemorrhoids    CORONARY ANGIOPLASTY  6/03 6/14/03- PTCA w stents x2 mid LAD, 2/03-PTCA w stents x3, RCA    CORONARY ARTERY BYPASS GRAFT N/A 5/18/2020    CABG CORONARY ARTERY BYPASS x 2, INTRAOPERATIVE NGA, INDUCED HYPOTHERMIA, LEFT LEG ENDOSCOPIC VEIN HARVEST performed by Heidi Hollis MD at [address] [place] [address], [place], DIAGNOSTIC  8/21/14    irregular GE junction, gastritis,hiatal hernia, gastric polyps    HAND SURGERY      rt knuckle    PTCA  03/2018    LAD    SHOULDER SURGERY Right 2011    bone spurs      As reviewed   Family History   Problem Relation Age of Onset    Hypertension Mother     High Cholesterol Mother     Alcohol Abuse Mother     Depression Mother     Alcohol Abuse Father     Stomach Cancer Father     Depression Sister     Diabetes Brother     Depression Sister     Stomach Cancer Brother     Alcohol Abuse Brother      Social History     Tobacco Use    Smoking status: Former Smoker     Packs/day: 2.00     Years: 45.00     Pack years: 90.00     Types: Cigarettes     Last attempt to quit: 5/5/2016 Years since quittin.1    Smokeless tobacco: Never Used   Substance Use Topics    Alcohol use: No     Alcohol/week: 0.0 standard drinks      Review of Systems:    Constitutional: Negative for diaphoresis and fatigue  Psychological:Negative for anxiety or depression  HENT: Negative for headaches, nasal congestion, sinus pain or vertigo  Eyes: Negative for visual disturbance. Endocrine: Negative for polydipsia/polyuria  Respiratory: Negative for shortness of breath  Cardiovascular: Negative for chest pain, dyspnea on exertion, claudication, edema, irregular heartbeat, murmur, palpitations or shortness of breath  Gastrointestinal: Negative for abdominal pain or heartburn  Genito-Urinary: Negative for urinary frequency/urgency  Musculoskeletal: Negative for muscle pain, muscular weakness, negative for pain in arm and leg or swelling in foot and leg  Neurological: Negative for dizziness, headaches, memory loss, numbness/tingling, visual changes, syncope  Dermatological: Negative for rash    Objective:  /84   Pulse 64   Ht 5' 7\" (1.702 m)   Wt 200 lb 3.2 oz (90.8 kg)   BMI 31.36 kg/m²   Wt Readings from Last 3 Encounters:   20 200 lb 3.2 oz (90.8 kg)   20 198 lb (89.8 kg)   20 199 lb 6.4 oz (90.4 kg)     Body mass index is 31.36 kg/m². GENERAL - Alert, oriented, pleasant, in no apparent distress. EYES: No jaundice, no conjunctival pallor. SKIN: It is warm & dry. No rashes. No Echhymosis    HEENT - No clinically significant abnormalities seen. Neck - Supple. No jugular venous distention noted. No carotid bruits. Cardiovascular - Normal S1 and S2 without obvious murmur or gallop. Extremities - No cyanosis, clubbing, or significant edema. Pulmonary - No respiratory distress. No wheezes or rales. Abdomen - No masses, tenderness, or organomegaly. Musculoskeletal - No significant edema. No joint deformities. No muscle wasting.   Neurologic - Cranial nerves II through XII are grossly intact. There were no gross focal neurologic abnormalities. Lab Review   Lab Results   Component Value Date    CKTOTAL 113 05/07/2016    TROPONINT 0.198 05/16/2020     BNP:  No results found for: BNP  PT/INR:    Lab Results   Component Value Date    INR 1.13 05/19/2020     Lab Results   Component Value Date    LABA1C 6.7 (H) 05/16/2020    LABA1C 7.0 01/20/2020     Lab Results   Component Value Date    WBC 9.5 05/21/2020    HCT 35.9 (L) 05/21/2020    .9 (H) 05/21/2020    PLT 91 (L) 05/21/2020     Lab Results   Component Value Date    CHOL 150 05/16/2020    TRIG 104 05/16/2020    HDL 33 (L) 05/16/2020    LDLCALC 114 (H) 04/16/2019    LDLDIRECT 111 (H) 05/16/2020     Lab Results   Component Value Date    ALT 33 05/19/2020     (H) 05/19/2020     BMP:    Lab Results   Component Value Date     05/22/2020    K 4.0 05/22/2020    K 3.8 03/16/2018    CL 97 05/22/2020    CO2 31 05/22/2020    BUN 30 05/22/2020    CREATININE 1.5 05/22/2020     CMP:   Lab Results   Component Value Date     05/22/2020    K 4.0 05/22/2020    K 3.8 03/16/2018    CL 97 05/22/2020    CO2 31 05/22/2020    BUN 30 05/22/2020    PROT 5.0 05/19/2020     TSH:    Lab Results   Component Value Date    TSHHS 0.723 05/07/2016         Impression:    No diagnosis found. Patient Active Problem List   Diagnosis Code    CAD (coronary artery disease) I25.10    History of PTCA Z98.61    History of acute inferior wall myocardial infarction I25.2    Chest pain R07.9    B12 deficiency E53.8    Fatty liver K76.0    Colon, diverticulosis K57.30    Renal cyst N28.1    Adrenal adenoma D35.00    Hyperlipidemia LDL goal <70 E78.5    Obesity (BMI 30-39. 9) E66.9    Cerebrovascular accident (CVA) due to thrombosis of left middle cerebral artery (HCC) I63.312    Hemiparesis of right dominant side as late effect of cerebral infarction (HCC) I69.351    Gait disturbance R26.9    Essential hypertension I10    Hemiparesis

## 2020-07-16 ENCOUNTER — OFFICE VISIT (OUTPATIENT)
Dept: FAMILY MEDICINE CLINIC | Age: 60
End: 2020-07-16
Payer: MEDICARE

## 2020-07-16 VITALS
OXYGEN SATURATION: 98 % | WEIGHT: 203.4 LBS | TEMPERATURE: 97.9 F | BODY MASS INDEX: 31.86 KG/M2 | HEART RATE: 68 BPM | SYSTOLIC BLOOD PRESSURE: 128 MMHG | DIASTOLIC BLOOD PRESSURE: 90 MMHG

## 2020-07-16 LAB — HBA1C MFR BLD: 6.4 %

## 2020-07-16 PROCEDURE — 90732 PPSV23 VACC 2 YRS+ SUBQ/IM: CPT | Performed by: FAMILY MEDICINE

## 2020-07-16 PROCEDURE — G0009 ADMIN PNEUMOCOCCAL VACCINE: HCPCS | Performed by: FAMILY MEDICINE

## 2020-07-16 PROCEDURE — 99214 OFFICE O/P EST MOD 30 MIN: CPT | Performed by: FAMILY MEDICINE

## 2020-07-16 RX ORDER — PANTOPRAZOLE SODIUM 40 MG/1
40 TABLET, DELAYED RELEASE ORAL
Qty: 90 TABLET | Refills: 3 | Status: ON HOLD | OUTPATIENT
Start: 2020-07-16 | End: 2020-10-14 | Stop reason: SDUPTHER

## 2020-07-16 ASSESSMENT — ENCOUNTER SYMPTOMS
SHORTNESS OF BREATH: 0
ABDOMINAL PAIN: 0
COUGH: 0
DIARRHEA: 0

## 2020-07-16 NOTE — PATIENT INSTRUCTIONS
October Fifth is the DEADLINE for voter registration for the November Third GENERAL ELECTION. Don't forget to vote!!!        [address] TO ALL APPOINTMENTS    The diagnoses and medications listed in this after visit summary may not be accurate at the time of check out. Please check MY CHART in 28-48 hours for possible corrections. Late cancellation policy: So that we can better accommodate people who are sick, please give our office 24 hour notice for an appointment cancellation. Thank you. Missed appointments: Your care is very important to us. It is important that you keep your scheduled appointments. Multiple missed appointments will lead to a dismissal from the office. Later arrival policy: If you are more than 10 minutes late for your appointment, you will be asked to reschedule. Please allow 5-7 business days for paperwork to be processed. It is important that you check your MY Chart messages, as they include appointment reminders, test results, and other important information. If you have forgotten your password, please call [phone]. Recombinant Zoster (Shingles) Vaccine, RZV: What you need to know  Why get vaccinated? Shingles (also called herpes zoster, or just zoster) is a painful skin rash, often with blisters. Shingles is caused by the varicella zoster virus, the same virus that causes chickenpox. After you have chickenpox, the virus stays in your body and can cause shingles later in life. You can't catch shingles from another person. However, a person who has never had chickenpox (or chickenpox vaccine) could get chickenpox from someone with shingles. A shingles rash usually appears on one side of the face or body and heals within 2 to 4 weeks. Its main symptom is pain, which can be severe. Other symptoms can include fever, headache, chills, and upset stomach.  Very rarely, a shingles infection can lead to pneumonia, hearing problems, blindness, brain inflammation (encephalitis), or death. For about 1 person in 5, severe pain can continue even long after the rash has cleared up. This long-lasting pain is called post-herpetic neuralgia (PHN). Shingles is far more common in people 48years of age and older than in younger people, and the risk increases with age. It is also more common in people whose immune system is weakened because of a disease such as cancer, or by drugs such as steroids or chemotherapy. At least 1 million people a year in the [facility] get shingles. Shingles vaccine (recombinant)  Recombinant shingles vaccine was approved by FDA in 2017 for the prevention of shingles. In clinical trials, it was more than 90% effective in preventing shingles. It can also reduce the likelihood of PHN. Two doses, 2 to 6 months apart, are recommended for adults 48 and older. This vaccine is also recommended for people who have already gotten the live shingles vaccine (Zostavax). There is no live virus in this vaccine. Some people should not get this vaccine  Tell your vaccine provider if you:  · Have any severe, life-threatening allergies. A person who has ever had a life-threatening allergic reaction after a dose of recombinant shingles vaccine, or has a severe allergy to any component of this vaccine, may be advised not to be vaccinated. Ask your health care provider if you want information about vaccine components. · Are pregnant or breastfeeding. There is not much information about use of recombinant shingles vaccine in pregnant or nursing women. Your healthcare provider might recommend delaying vaccination. · Are not feeling well. If you have a mild illness, such as a cold, you can probably get the vaccine today. If you are moderately or severely ill, you should probably wait until you recover. Your doctor can advise you. Risks of a vaccine reaction  With any medicine, including vaccines, there is a chance of reactions.   After recombinant shingles vaccination, a person might experience:  · Pain, redness, soreness, or swelling at the site of the injection  · Headache, muscle aches, fever, shivering, fatigue  In clinical trials, most people got a sore arm with mild or moderate pain after vaccination, and some also had redness and swelling where they got the shot. Some people felt tired, had muscle pain, a headache, shivering, fever, stomach pain, or nausea. About 1 out of 6 people who got recombinant zoster vaccine experienced side effects that prevented them from doing regular activities. Symptoms went away on their own in about 2 to 3 days. Side effects were more common in younger people. You should still get the second dose of recombinant zoster vaccine even if you had one of these reactions after the first dose. Other things that could happen after this vaccine:  · People sometimes faint after medical procedures, including vaccination. Sitting or lying down for about 15 minutes can help prevent fainting and injuries caused by a fall. Tell your provider if you feel dizzy or have vision changes or ringing in the ears. · Some people get shoulder pain that can be more severe and longer-lasting than routine soreness that can follow injections. This happens very rarely. · Any medication can cause a severe allergic reaction. Such reactions to a vaccine are estimated at about 1 in a million doses, and would happen within a few minutes to a few hours after the vaccination. As with any medicine, there is a very remote chance of a vaccine causing a serious injury or death. The safety of vaccines is always being monitored. For more information, visit: www.cdc.gov/vaccinesafety/  What if there is a serious problem? What should I look for? · Look for anything that concerns you, such as signs of a severe allergic reaction, very high fever, or unusual behavior.   Signs of a severe allergic reaction can include hives, swelling of the face and

## 2020-07-16 NOTE — PROGRESS NOTES
Patient ID: Michelle Pozo 1960    . Chief Complaint   Patient presents with    Diabetes    Hyperlipidemia    Hypertension         Diabetes   He presents for his follow-up diabetic visit. He has type 2 diabetes mellitus. His disease course has been stable. Pertinent negatives for hypoglycemia include no headaches. Associated symptoms include chest pain and weakness. Diabetic complications include a CVA. Risk factors for coronary artery disease include male sex and obesity. Current diabetic treatments: Has not been seen for almost a year. Out of all medication. He is compliant with treatment some of the time. When asked about meal planning, he reported none. He rarely participates in exercise. His overall blood glucose range is 130-140 mg/dl. Hypertension   This is a chronic problem. The current episode started more than 1 year ago. The problem is controlled. Associated symptoms include chest pain. Pertinent negatives include no headaches, palpitations or shortness of breath. Compliance problems include psychosocial issues. Hypertensive end-organ damage includes CVA. Gastroesophageal Reflux   He complains of chest pain. He reports no abdominal pain or no coughing (no unusual). This is a chronic problem. The problem occurs frequently. The problem has been unchanged. Risk factors include lack of exercise, obesity and caffeine use. He has tried a PPI for the symptoms. The treatment provided mild (ran out of all medication. feels that he needs his PPI again) relief. CVA hx: Still recovering from a stroke. He is still sleeping in his wife's hospital bed because he has difficulty getting up out of a regular bed. See last visit note for details. He has not heard back about a hospital bed yet. Chest discomfort: Across his entire chest.  He has followed up with his cardiologist and his cardiothoracic surgeon. Heart failure:   The cardiologist is thinking about placing an ICD if the EF remains at 30.  He will go to rehab and if it is no better after rehab then will get ICD. Review of Systems   Constitutional: Negative for chills, diaphoresis and fever. HENT:        No change in taste or smell sensation   Respiratory: Negative for cough (no unusual) and shortness of breath. Cardiovascular: Positive for chest pain. Negative for palpitations. Gastrointestinal: Negative for abdominal pain and diarrhea. Musculoskeletal: Negative for myalgias. Neurological: Positive for weakness. Negative for headaches. Patient Active Problem List   Diagnosis    CAD (coronary artery disease)    History of PTCA    History of acute inferior wall myocardial infarction    Chest pain    B12 deficiency    Type 2 diabetes mellitus without complication ([address]    Fatty liver    Colon, diverticulosis    Renal cyst    Adrenal adenoma    Hyperlipidemia LDL goal <70    Obesity (BMI 30-39. 9)    Cerebrovascular accident (CVA) due to thrombosis of left middle cerebral artery (HCC)    Hemiparesis of right dominant side as late effect of cerebral infarction ([address]    Gait disturbance    Essential hypertension    Hemiparesis affecting right side as late effect of cerebrovascular accident (CVA) ([address]    Shortness of breath    Gastroesophageal reflux disease    Acute ST elevation myocardial infarction (STEMI) ([address]    Hypertensive emergency    Unstable angina (HCC)    Non-ST elevation myocardial infarction (NSTEMI) ([address]    Hypertensive urgency    Frequent PVCs    Ischemic cardiomyopathy    History of Guillain-[place] syndrome    Acute pain of left shoulder    CVA (cerebral vascular accident) ([address]    Acute cerebrovascular accident (CVA) ([address]    Dysarthria due to recent stroke    Aphasia due to acute stroke ([address]    Complication of coronary artery bypass graft surgery    Uncontrolled pain    Uncontrolled type 2 diabetes mellitus with hyperglycemia (HCC)    S/P CABG (coronary artery bypass 3    tamsulosin (FLOMAX) 0.4 MG capsule Take 1 capsule by mouth daily (Patient taking differently: Take 0.8 mg by mouth daily ) 90 capsule 3    glimepiride (AMARYL) 2 MG tablet Take 1 tablet by mouth every morning 90 tablet 3    Blood Glucose Monitoring Suppl (BLOOD GLUCOSE MONITOR SYSTEM) w/Device KIT Use twice per day 1 kit 0    blood glucose monitor strips 1 strip by Other route 2 times daily for 180 doses 180 strip 3    Alcohol Swabs (ALCOHOL PADS) 70 % PADS 1 Units by Does not apply route 2 times daily 180 each 3    aspirin 81 MG chewable tablet Take 1 tablet by mouth daily 30 tablet 3    Blood Pressure KIT Use prn 1 kit 0    guaiFENesin (MUCINEX) 600 MG extended release tablet Take 1 tablet by mouth 2 times daily (Patient not taking: Reported on 7/16/2020) 20 tablet [address] by Does not apply route (Patient not taking: Reported on 7/2/2020) 1 each 0    ipratropium (ATROVENT HFA) 17 MCG/ACT inhaler Inhale 2 puffs into the lungs every 4 hours (while awake) for 14 days 1 Inhaler 0    docusate sodium (COLACE, DULCOLAX) 100 MG CAPS Take 100 mg by mouth daily      Lancets MISC 1 each by Does not apply route 2 times daily for 180 doses 180 each 3     No current facility-administered medications on file prior to visit. Objective:         Physical Exam  Vitals signs and nursing note reviewed. Constitutional:       Appearance: He is well-developed. HENT:      Head: Normocephalic and atraumatic. Cardiovascular:      Rate and Rhythm: Normal rate and regular rhythm. Heart sounds: Normal heart sounds, S1 normal and S2 normal.   Pulmonary:      Effort: Pulmonary effort is normal. No respiratory distress. Breath sounds: Normal breath sounds. No wheezing or rales. Chest:       Abdominal:      Palpations: Abdomen is soft. There is no mass. Tenderness: There is no abdominal tenderness.    Musculoskeletal:      Comments: 4/5 strength both lower extremities 05/27/2020 111*    POC Glucose 05/27/2020 154*    POC Glucose 05/27/2020 63*    POC Glucose 05/27/2020 77     POC Glucose 05/27/2020 139*    POC Glucose 05/28/2020 103*    POC Glucose 05/28/2020 106*    POC Glucose 05/28/2020 150*    POC Glucose 05/28/2020 153*    POC Glucose 05/29/2020 112*    POC Glucose 05/29/2020 172*   No results displayed because visit has over 200 results. Assessment:       Diagnosis Orders   1. Type 2 diabetes mellitus without complication, unspecified whether long term insulin use ([facility]     2. Need for 23-polyvalent pneumococcal polysaccharide vaccine  PNEUMOVAX 23 subcutaneous/IM (Pneumococcal polysaccharide vaccine 23-valent >= 3yo)   3. Gastroesophageal reflux disease, esophagitis presence not specified  pantoprazole (PROTONIX) 40 MG tablet   4. History of stroke             Plan:      Started Protonix for his GERD. Diabetes well controlled. Continue current medication      We will have staff check on the status of his hospital bed. Speech much improved since the last time he was seen. He is progressing well. Diabetes well controlled. Continue current medication recheck in 4 to 5 months      He is due for colonoscopy but he will have to check with his cardiologist to see if he can get that done. Return in about 4 months (around 11/16/2020) for Medicare check up, DM, COPD, HTN, Hyperlipid.

## 2020-08-03 RX ORDER — CARVEDILOL 12.5 MG/1
12.5 TABLET ORAL 2 TIMES DAILY
Qty: 180 TABLET | Refills: 3 | OUTPATIENT
Start: 2020-08-03

## 2020-08-03 RX ORDER — LISINOPRIL 2.5 MG/1
2.5 TABLET ORAL DAILY
Qty: 90 TABLET | Refills: 3 | OUTPATIENT
Start: 2020-08-03

## 2020-08-03 RX ORDER — CLOPIDOGREL BISULFATE 75 MG/1
75 TABLET ORAL DAILY
Qty: 90 TABLET | Refills: 3 | OUTPATIENT
Start: 2020-08-03

## 2020-08-03 NOTE — TELEPHONE ENCOUNTER
Change pharm to [facility] for all meds.   90 day with refills  Ov 9/20

## 2020-08-12 ENCOUNTER — HOSPITAL ENCOUNTER (OUTPATIENT)
Dept: CARDIAC REHAB | Age: 60
Setting detail: THERAPIES SERIES
Discharge: HOME | End: 2020-08-12
Payer: MEDICARE

## 2020-08-12 PROCEDURE — 93798 PHYS/QHP OP CAR RHAB W/ECG: CPT

## 2020-08-17 ENCOUNTER — APPOINTMENT (OUTPATIENT)
Dept: CARDIAC REHAB | Age: 60
End: 2020-08-17
Payer: MEDICARE

## 2020-08-18 ENCOUNTER — APPOINTMENT (OUTPATIENT)
Dept: CARDIAC REHAB | Age: 60
End: 2020-08-18
Payer: MEDICARE

## 2020-08-20 ENCOUNTER — APPOINTMENT (OUTPATIENT)
Dept: CARDIAC REHAB | Age: 60
End: 2020-08-20
Payer: MEDICARE

## 2020-08-24 ENCOUNTER — APPOINTMENT (OUTPATIENT)
Dept: CARDIAC REHAB | Age: 60
End: 2020-08-24
Payer: MEDICARE

## 2020-08-25 ENCOUNTER — APPOINTMENT (OUTPATIENT)
Dept: CARDIAC REHAB | Age: 60
End: 2020-08-25
Payer: MEDICARE

## 2020-08-27 ENCOUNTER — APPOINTMENT (OUTPATIENT)
Dept: CARDIAC REHAB | Age: 60
End: 2020-08-27
Payer: MEDICARE

## 2020-08-31 ENCOUNTER — APPOINTMENT (OUTPATIENT)
Dept: CARDIAC REHAB | Age: 60
End: 2020-08-31
Payer: MEDICARE

## 2020-09-01 ENCOUNTER — APPOINTMENT (OUTPATIENT)
Dept: CARDIAC REHAB | Age: 60
End: 2020-09-01
Payer: MEDICARE

## 2020-09-03 ENCOUNTER — APPOINTMENT (OUTPATIENT)
Dept: CARDIAC REHAB | Age: 60
End: 2020-09-03
Payer: MEDICARE

## 2020-09-07 ENCOUNTER — APPOINTMENT (OUTPATIENT)
Dept: CARDIAC REHAB | Age: 60
End: 2020-09-07
Payer: MEDICARE

## 2020-09-08 ENCOUNTER — APPOINTMENT (OUTPATIENT)
Dept: CARDIAC REHAB | Age: 60
End: 2020-09-08
Payer: MEDICARE

## 2020-09-10 ENCOUNTER — APPOINTMENT (OUTPATIENT)
Dept: CARDIAC REHAB | Age: 60
End: 2020-09-10
Payer: MEDICARE

## 2020-09-14 ENCOUNTER — APPOINTMENT (OUTPATIENT)
Dept: CARDIAC REHAB | Age: 60
End: 2020-09-14
Payer: MEDICARE

## 2020-09-15 ENCOUNTER — APPOINTMENT (OUTPATIENT)
Dept: CARDIAC REHAB | Age: 60
End: 2020-09-15
Payer: MEDICARE

## 2020-09-15 RX ORDER — CARVEDILOL 12.5 MG/1
TABLET ORAL
Qty: 180 TABLET | Refills: 0 | OUTPATIENT
Start: 2020-09-15

## 2020-09-15 RX ORDER — CLOPIDOGREL BISULFATE 75 MG/1
TABLET ORAL
Qty: 90 TABLET | Refills: 0 | OUTPATIENT
Start: 2020-09-15

## 2020-09-15 RX ORDER — LISINOPRIL 2.5 MG/1
TABLET ORAL
Qty: 90 TABLET | Refills: 0 | OUTPATIENT
Start: 2020-09-15

## 2020-09-17 ENCOUNTER — HOSPITAL ENCOUNTER (OUTPATIENT)
Dept: CARDIAC REHAB | Age: 60
Setting detail: THERAPIES SERIES
Discharge: HOME | End: 2020-09-17
Payer: MEDICARE

## 2020-09-17 ENCOUNTER — APPOINTMENT (OUTPATIENT)
Dept: CARDIAC REHAB | Age: 60
End: 2020-09-17
Payer: MEDICARE

## 2020-09-17 LAB
GLUCOSE BLD-MCNC: 126 MG/DL (ref 70–99)
GLUCOSE BLD-MCNC: 234 MG/DL (ref 70–99)

## 2020-09-17 PROCEDURE — 82962 GLUCOSE BLOOD TEST: CPT

## 2020-09-17 PROCEDURE — 93798 PHYS/QHP OP CAR RHAB W/ECG: CPT

## 2020-09-21 ENCOUNTER — APPOINTMENT (OUTPATIENT)
Dept: CARDIAC REHAB | Age: 60
End: 2020-09-21
Payer: MEDICARE

## 2020-09-22 ENCOUNTER — HOSPITAL ENCOUNTER (OUTPATIENT)
Dept: CARDIAC REHAB | Age: 60
Setting detail: THERAPIES SERIES
Discharge: HOME | End: 2020-09-22
Payer: MEDICARE

## 2020-09-22 ENCOUNTER — APPOINTMENT (OUTPATIENT)
Dept: CARDIAC REHAB | Age: 60
End: 2020-09-22
Payer: MEDICARE

## 2020-09-22 PROCEDURE — 93798 PHYS/QHP OP CAR RHAB W/ECG: CPT

## 2020-09-24 ENCOUNTER — APPOINTMENT (OUTPATIENT)
Dept: CARDIAC REHAB | Age: 60
End: 2020-09-24
Payer: MEDICARE

## 2020-09-24 ENCOUNTER — HOSPITAL ENCOUNTER (OUTPATIENT)
Dept: CARDIAC REHAB | Age: 60
Setting detail: THERAPIES SERIES
Discharge: HOME | End: 2020-09-24
Payer: MEDICARE

## 2020-09-24 ENCOUNTER — OFFICE VISIT (OUTPATIENT)
Dept: CARDIOLOGY CLINIC | Age: 60
End: 2020-09-24
Payer: MEDICARE

## 2020-09-24 VITALS
HEIGHT: 67 IN | BODY MASS INDEX: 32.3 KG/M2 | DIASTOLIC BLOOD PRESSURE: 84 MMHG | WEIGHT: 205.8 LBS | HEART RATE: 70 BPM | SYSTOLIC BLOOD PRESSURE: 126 MMHG

## 2020-09-24 LAB — GLUCOSE BLD-MCNC: 133 MG/DL (ref 70–99)

## 2020-09-24 PROCEDURE — 99214 OFFICE O/P EST MOD 30 MIN: CPT | Performed by: INTERNAL MEDICINE

## 2020-09-24 PROCEDURE — 93798 PHYS/QHP OP CAR RHAB W/ECG: CPT

## 2020-09-24 PROCEDURE — 82962 GLUCOSE BLOOD TEST: CPT

## 2020-09-24 NOTE — PROGRESS NOTES
CARDIOLOGY NOTE      9/24/2020    RE: Jud Muse  (1960)                               TO:  Dr. Patricia Romero MD            Jordan Gomez is a 63 y.o. male who was seen today for management of  CAD                                    HPI:                   The patient does not have cardiac complaints  H/o CABG, HTN, hyperlipidimea  Patient also seen  for    - Coronary artery disease, does not have chest pain. Patient is  compliant with prescribed medicines. - Hypertension,is  well controlled, pt is  compliant with medicines  - Hyperlipidimea, importance of hyperlipidimea discussed with pt.   - Diabetes mellitus, blood glucose level is  well controlled.  Pt is compliant with meds and diet  - hfRef  On meds    Jud Muse has the following history recorded in care path:  Patient Active Problem List    Diagnosis Date Noted    Non-ST elevation myocardial infarction (NSTEMI) ([facility] 03/15/2018     Priority: High    Hypertensive urgency      Priority: High    Hemiparesis affecting right side as late effect of cerebrovascular accident (CVA) ([facility] 07/07/2016     Priority: High    Essential hypertension 05/24/2016     Priority: High    Cerebrovascular accident (CVA) due to thrombosis of left middle cerebral artery ([facility] 05/14/2016     Priority: High    Obesity (BMI 30-39.9) 06/26/2015     Priority: High    Type 2 diabetes mellitus without complication (HCC)      Priority: High    History of acute inferior wall myocardial infarction      Priority: High    History of Guillain-[place] syndrome 03/08/2019     Priority: Medium    Adrenal adenoma 05/23/2014     Priority: Medium    CAD (coronary artery disease)      Priority: Medium    Frequent PVCs      Priority: Low    Unstable angina ([facility] 03/15/2018     Priority: Low    Acute ST elevation myocardial infarction (STEMI) ([facility] 03/14/2018     Priority: Low    Hypertensive emergency 03/14/2018     Priority: Low    Gastroesophageal reflux disease 04/19/2017     Priority: Low    Shortness of breath 10/25/2016     Priority: Low    Hemiparesis of right dominant side as late effect of cerebral infarction ([facility] 05/14/2016     Priority: Low    Gait disturbance 05/14/2016     Priority: Low    Hyperlipidemia LDL goal <70 12/19/2014     Priority: Low    Fatty liver 05/23/2014     Priority: Low    Colon, diverticulosis 05/23/2014     Priority: Low    Renal cyst 05/23/2014     Priority: Low    B12 deficiency 01/10/2014     Priority: Low    Chest pain 07/30/2013     Priority: Low    History of PTCA      Priority: Low    S/P CABG (coronary artery bypass graft) 06/24/2020    Dysarthria due to recent stroke     Aphasia due to acute stroke [facility]     Complication of coronary artery bypass graft surgery     Uncontrolled pain     Uncontrolled type 2 diabetes mellitus with hyperglycemia ([facility]     CVA (cerebral vascular accident) ([facility] 05/22/2020    Acute cerebrovascular accident (CVA) ([facility] 05/22/2020    Acute pain of left shoulder     Ischemic cardiomyopathy 06/26/2018     Current Outpatient Medications   Medication Sig Dispense Refill    pantoprazole (PROTONIX) 40 MG tablet Take 1 tablet by mouth 2 times daily (before meals) 90 tablet 3    lisinopril (PRINIVIL;ZESTRIL) 2.5 MG tablet Take 1 tablet by mouth daily 90 tablet 3    furosemide (LASIX) 20 MG tablet Take 1 tablet by mouth 2 times daily 180 tablet 3    clopidogrel (PLAVIX) 75 MG tablet Take 1 tablet by mouth daily 90 tablet 3    carvedilol (COREG) 12.5 MG tablet Take 1 tablet by mouth 2 times daily 180 tablet 3    furosemide (LASIX) 20 MG tablet Take 1 tablet by mouth 2 times daily 60 tablet 1    atorvastatin (LIPITOR) 80 MG tablet Take 1 tablet by mouth daily 90 tablet 3    albuterol-ipratropium (COMBIVENT RESPIMAT)  MCG/ACT AERS inhaler Inhale 1 puff into the lungs every 6 hours Dispense if cheaper than atrovent inhaler 1 Inhaler 2   [address] by Does not apply route 1 each 0    docusate sodium (COLACE, DULCOLAX) 100 MG CAPS Take 100 mg by mouth daily      sertraline (ZOLOFT) 50 MG tablet Take 1 tablet by mouth daily 90 tablet 3    tamsulosin (FLOMAX) 0.4 MG capsule Take 1 capsule by mouth daily (Patient taking differently: Take 0.8 mg by mouth daily ) 90 capsule 3    glimepiride (AMARYL) 2 MG tablet Take 1 tablet by mouth every morning 90 tablet 3    Blood Glucose Monitoring Suppl (BLOOD GLUCOSE MONITOR SYSTEM) w/Device KIT Use twice per day 1 kit 0    Alcohol Swabs (ALCOHOL PADS) 70 % PADS 1 Units by Does not apply route 2 times daily 180 each 3    aspirin 81 MG chewable tablet Take 1 tablet by mouth daily 30 tablet 3    Blood Pressure KIT Use prn 1 kit 0    ipratropium (ATROVENT HFA) 17 MCG/ACT inhaler Inhale 2 puffs into the lungs every 4 hours (while awake) for 14 days 1 Inhaler 0    blood glucose monitor strips 1 strip by Other route 2 times daily for 180 doses 180 strip 3    Lancets MISC 1 each by Does not apply route 2 times daily for 180 doses 180 each 3     No current facility-administered medications for this visit. Allergies: Ibuprofen; Augmentin [amoxicillin-pot clavulanate]; and Codeine  Past Medical History:   Diagnosis Date    B12 deficiency     CAD (coronary artery disease)     GERD (gastroesophageal reflux disease)     [facility]illain [place] syndrome [facility]     H/O echocardiogram 7/30/13, 6/16/10    7/13-EF 50-55% WNL. 6/10-EF53%, mod LVH, MARCI, RVE, trace PI, mild MR/TR, 6/09    Heart murmur     History of myocardial infarction 2003    History of nuclear MUGA test 03/03/2020    EF 53%, Normal, No ICD needed    History of nuclear stress test 06/23/2020    EF 30%, study suggestive of abnormal myocardial perfusion.     History of PTCA 6/03;2/03 6/03-PTCA w/stents x2, 2/03-PTCA w/stent x3    History of PTCA 03/15/2018    LAD EF25%    Hx of cardiovascular stress test 04/25/2018    EF 28% Nuclear scintigraphy demonstartes inferior wall infarct.  Hx of cardiovascular stress test 01/27/2020    Abnormal Study. Large area of inferior & apical-lateral infarct. No significant reversible ischemia. Severly reduced LVEF 23%    HX OTHER MEDICAL 03/14/2018    lifevest  d/c 7/2/2018    Hyperlipidemia     Hypertension     Impaired glucose tolerance 6/3/2016    MUGA 07/09/2018    EF 47%    NSTEMI (non-ST elevated myocardial infarction) ([address] 03/15/2018    Patient in clinical research study 05/2018    3 year study dalcitripib vs placebo [phone]    Pneumonia due to organism     Renal cyst 5/23/2014    S/P cardiac catheterization 7/12/13 7/13-EF=55%. PATENT LAD AND RCA STENTS, OM2 50% stenosis, & Normal wall motion.  Stroke (cerebrum) [facility])     May 2016     Past Surgical History:   Procedure Laterality Date    CARDIAC CATHETERIZATION  7/13 7/13-EF=55%. PATENT LAD AND RCA STENTS, OM2 50% stenosis, & Normal wall motion.     COLONOSCOPY  8/21/14    colon polyp, diverticulosis, internal hemorrhoids    CORONARY ANGIOPLASTY  6/03 6/14/03- PTCA w stents x2 mid LAD, 2/03-PTCA w stents x3, RCA    CORONARY ARTERY BYPASS GRAFT N/A 5/18/2020    CABG CORONARY ARTERY BYPASS x 2, INTRAOPERATIVE NGA, INDUCED HYPOTHERMIA, LEFT LEG ENDOSCOPIC VEIN HARVEST performed by Dang Decker MD at [address], COLON, DIAGNOSTIC  8/21/14    irregular GE junction, gastritis,hiatal hernia, gastric polyps    HAND SURGERY      rt knuckle    PTCA  03/2018    LAD    SHOULDER SURGERY Right 2011    bone spurs      As reviewed   Family History   Problem Relation Age of Onset    Hypertension Mother     High Cholesterol Mother     Alcohol Abuse Mother     Depression Mother     Alcohol Abuse Father     Stomach Cancer Father     Depression Sister     Diabetes Brother     Depression Sister     Stomach Cancer Brother     Alcohol Abuse Brother      Social History     Tobacco Use    Smoking status: Former Smoker     Packs/day: 2.00     Years: 45.00     Pack years: 90.00     Types: Cigarettes     Last attempt to quit: 2016     Years since quittin.3    Smokeless tobacco: Never Used   Substance Use Topics    Alcohol use: No     Alcohol/week: 0.0 standard drinks      Review of Systems:    Constitutional: Negative for diaphoresis and fatigue  Psychological:Negative for anxiety or depression  HENT: Negative for headaches, nasal congestion, sinus pain or vertigo  Eyes: Negative for visual disturbance. Endocrine: Negative for polydipsia/polyuria  Respiratory: Negative for shortness of breath  Cardiovascular: Negative for chest pain, dyspnea on exertion, claudication, edema, irregular heartbeat, murmur, palpitations or shortness of breath  Gastrointestinal: Negative for abdominal pain or heartburn  Genito-Urinary: Negative for urinary frequency/urgency  Musculoskeletal: Negative for muscle pain, muscular weakness, negative for pain in arm and leg or swelling in foot and leg  Neurological: Negative for dizziness, headaches, memory loss, numbness/tingling, visual changes, syncope  Dermatological: Negative for rash    Objective:    Vitals:    20 1319   BP: 126/84   Site: Right Upper Arm   Position: Sitting   Cuff Size: Medium Adult   Pulse: 70   Weight: 205 lb 12.8 oz (93.4 kg)   Height: 5' 7\" (1.702 m)     /84 (Site: Right Upper Arm, Position: Sitting, Cuff Size: Medium Adult)   Pulse 70   Ht 5' 7\" (1.702 m)   Wt 205 lb 12.8 oz (93.4 kg)   BMI 32.23 kg/m²     No flowsheet data found. Wt Readings from Last 3 Encounters:   20 205 lb 12.8 oz (93.4 kg)   20 203 lb 6.4 oz (92.3 kg)   20 200 lb 3.2 oz (90.8 kg)     Body mass index is 32.23 kg/m². GENERAL - Alert, oriented, pleasant, in no apparent distress. EYES: No jaundice, no conjunctival pallor. SKIN: It is warm & dry. No rashes. No Echhymosis    HEENT - No clinically significant abnormalities seen. Neck - Supple. No jugular venous distention noted.  No carotid bruits. Cardiovascular - Normal S1 and S2 without obvious murmur or gallop. Extremities - No cyanosis, clubbing, or significant edema. Pulmonary - No respiratory distress. No wheezes or rales. Abdomen - No masses, tenderness, or organomegaly. Musculoskeletal - No significant edema. No joint deformities. No muscle wasting. Neurologic - Cranial nerves II through XII are grossly intact. There were no gross focal neurologic abnormalities. Lab Review   Lab Results   Component Value Date    CKTOTAL 113 05/07/2016    TROPONINT 0.198 05/16/2020     BNP:  No results found for: BNP  PT/INR:    Lab Results   Component Value Date    INR 1.13 05/19/2020     Lab Results   Component Value Date    LABA1C 6.4 07/16/2020    LABA1C 6.7 (H) 05/16/2020     Lab Results   Component Value Date    WBC 9.5 05/21/2020    HCT 35.9 (L) 05/21/2020    .9 (H) 05/21/2020    PLT 91 (L) 05/21/2020     Lab Results   Component Value Date    CHOL 150 05/16/2020    TRIG 104 05/16/2020    HDL 33 (L) 05/16/2020    LDLCALC 114 (H) 04/16/2019    LDLDIRECT 111 (H) 05/16/2020     Lab Results   Component Value Date    ALT 33 05/19/2020     (H) 05/19/2020     BMP:    Lab Results   Component Value Date     05/22/2020    K 4.0 05/22/2020    K 3.8 03/16/2018    CL 97 05/22/2020    CO2 31 05/22/2020    BUN 30 05/22/2020    CREATININE 1.5 05/22/2020     CMP:   Lab Results   Component Value Date     05/22/2020    K 4.0 05/22/2020    K 3.8 03/16/2018    CL 97 05/22/2020    CO2 31 05/22/2020    BUN 30 05/22/2020    PROT 5.0 05/19/2020     TSH:    Lab Results   Component Value Date    TSHHS 0.723 05/07/2016         Impression:    No diagnosis found.    Patient Active Problem List   Diagnosis Code    CAD (coronary artery disease) I25.10    History of PTCA Z98.61    History of acute inferior wall myocardial infarction I25.2    Chest pain R07.9    B12 deficiency E53.8    Type 2 diabetes mellitus without complication ([facility] E11.9    Fatty liver K76.0    Colon, diverticulosis K57.30    Renal cyst N28.1    Adrenal adenoma D35.00    Hyperlipidemia LDL goal <70 E78.5    Obesity (BMI 30-39. 9) E66.9    Cerebrovascular accident (CVA) due to thrombosis of left middle cerebral artery (HCC) I63.312    Hemiparesis of right dominant side as late effect of cerebral infarction ([facility]) I69.351    Gait disturbance R26.9    Essential hypertension I10    Hemiparesis affecting right side as late effect of cerebrovascular accident (CVA) ([address] I69.351    Shortness of breath R06.02    Gastroesophageal reflux disease K21.9    Acute ST elevation myocardial infarction (STEMI) ([facility]) I21.3    Hypertensive emergency I16.1    Unstable angina ([facility]) I20.0    Non-ST elevation myocardial infarction (NSTEMI) ([facility]) I21.4    Hypertensive urgency I16.0    Frequent PVCs I49.3    Ischemic cardiomyopathy I25.5    History of Guillain-[place] syndrome Z86.69    Acute pain of left shoulder M25.512    CVA (cerebral vascular accident) ([facility] I63.9    Acute cerebrovascular accident (CVA) ([address] I63.9    Dysarthria due to recent stroke I69.322    Aphasia due to acute stroke ([facility]) I63.9, F76.01    Complication of coronary artery bypass graft surgery T82. 9XXA    Uncontrolled pain R52    Uncontrolled type 2 diabetes mellitus with hyperglycemia ([facility]) E11.65    S/P CABG (coronary artery bypass graft) Z95.1       Assessment & Plan:               -     CORONARY ARTERY DISEASE:  asymptomatic     All available  tests in chart reviewed. Management discussed . Testing ordered  no                                 -  Hypertension: Patients blood pressure is stable. Patient is advised about low sodium diet. Present medical regimen will not be changed. -   DIABETES MELLITUS: Available pertinent lab data reviewed   and  patient was given dietary advice . Advised to check blood glucose level on a regular basis. -   Changes  in medicines made:  No

## 2020-09-24 NOTE — LETTER
Jonah Reyes  1960  [id number]    Have you had any Chest Pain - No     And did it relieve the pain - No    Have you had any Shortness of Breath - No       Have you had any dizziness - No    How long does it last ***    Have you had any palpitations - No    If Yes DO EKG    Do you have any edema - swelling in None      Check Venous \"LEG PROBLEM Questionnaire\"    Do you have a surgery or procedure scheduled in the near future - No    ***

## 2020-09-24 NOTE — PATIENT INSTRUCTIONS
Please hold on to these instructions the  will call you within 1-9 business days when we receive authorization from your insurance. Echocardiogram    WHAT TO EXPECT:   ? This test will take approximately 45 minutes. ? It is an ultrasound of the heart. ? It can look at the valves and chambers inside the heart   ? There is no special instructions for this test.     If you are unable to keep this appointment, please notify us 24 hours prior to test at [phone].

## 2020-09-28 ENCOUNTER — APPOINTMENT (OUTPATIENT)
Dept: CARDIAC REHAB | Age: 60
End: 2020-09-28
Payer: MEDICARE

## 2020-09-28 ENCOUNTER — HOSPITAL ENCOUNTER (OUTPATIENT)
Dept: CARDIAC REHAB | Age: 60
Setting detail: THERAPIES SERIES
Discharge: HOME | End: 2020-09-28
Payer: MEDICARE

## 2020-09-29 ENCOUNTER — HOSPITAL ENCOUNTER (OUTPATIENT)
Dept: CARDIAC REHAB | Age: 60
Setting detail: THERAPIES SERIES
Discharge: HOME | End: 2020-09-29
Payer: MEDICARE

## 2020-09-29 ENCOUNTER — APPOINTMENT (OUTPATIENT)
Dept: CARDIAC REHAB | Age: 60
End: 2020-09-29
Payer: MEDICARE

## 2020-09-29 PROCEDURE — 93798 PHYS/QHP OP CAR RHAB W/ECG: CPT

## 2020-10-01 ENCOUNTER — APPOINTMENT (OUTPATIENT)
Dept: CARDIAC REHAB | Age: 60
End: 2020-10-01
Payer: MEDICARE

## 2020-10-01 LAB — DIABETIC RETINOPATHY: NEGATIVE

## 2020-10-05 ENCOUNTER — APPOINTMENT (OUTPATIENT)
Dept: CARDIAC REHAB | Age: 60
End: 2020-10-05
Payer: MEDICARE

## 2020-10-05 ENCOUNTER — HOSPITAL ENCOUNTER (OUTPATIENT)
Dept: CARDIAC REHAB | Age: 60
Setting detail: THERAPIES SERIES
Discharge: HOME | End: 2020-10-05
Payer: MEDICARE

## 2020-10-05 PROCEDURE — 93798 PHYS/QHP OP CAR RHAB W/ECG: CPT

## 2020-10-06 ENCOUNTER — APPOINTMENT (OUTPATIENT)
Dept: CARDIAC REHAB | Age: 60
End: 2020-10-06
Payer: MEDICARE

## 2020-10-06 ENCOUNTER — HOSPITAL ENCOUNTER (OUTPATIENT)
Dept: CARDIAC REHAB | Age: 60
Setting detail: THERAPIES SERIES
Discharge: HOME | End: 2020-10-06
Payer: MEDICARE

## 2020-10-06 PROCEDURE — 93798 PHYS/QHP OP CAR RHAB W/ECG: CPT

## 2020-10-07 ENCOUNTER — PROCEDURE VISIT (OUTPATIENT)
Dept: CARDIOLOGY CLINIC | Age: 60
End: 2020-10-07
Payer: MEDICARE

## 2020-10-07 LAB
LV EF: 40 %
LVEF MODALITY: NORMAL

## 2020-10-07 PROCEDURE — 93306 TTE W/DOPPLER COMPLETE: CPT | Performed by: INTERNAL MEDICINE

## 2020-10-08 ENCOUNTER — HOSPITAL ENCOUNTER (OUTPATIENT)
Dept: CARDIAC REHAB | Age: 60
Setting detail: THERAPIES SERIES
Discharge: HOME | End: 2020-10-08
Payer: MEDICARE

## 2020-10-08 ENCOUNTER — APPOINTMENT (OUTPATIENT)
Dept: CARDIAC REHAB | Age: 60
End: 2020-10-08
Payer: MEDICARE

## 2020-10-08 PROCEDURE — 93798 PHYS/QHP OP CAR RHAB W/ECG: CPT

## 2020-10-12 ENCOUNTER — HOSPITAL ENCOUNTER (OUTPATIENT)
Dept: CARDIAC REHAB | Age: 60
Setting detail: THERAPIES SERIES
Discharge: HOME | End: 2020-10-12
Payer: MEDICARE

## 2020-10-12 ENCOUNTER — APPOINTMENT (OUTPATIENT)
Dept: CARDIAC REHAB | Age: 60
End: 2020-10-12
Payer: MEDICARE

## 2020-10-12 PROCEDURE — 93798 PHYS/QHP OP CAR RHAB W/ECG: CPT

## 2020-10-13 ENCOUNTER — APPOINTMENT (OUTPATIENT)
Dept: GENERAL RADIOLOGY | Age: 60
End: 2020-10-13
Payer: MEDICARE

## 2020-10-13 ENCOUNTER — HOSPITAL ENCOUNTER (OUTPATIENT)
Age: 60
Setting detail: OBSERVATION
Discharge: HOME | End: 2020-10-14
Attending: EMERGENCY MEDICINE | Admitting: HOSPITALIST
Payer: MEDICARE

## 2020-10-13 ENCOUNTER — APPOINTMENT (OUTPATIENT)
Dept: CARDIAC REHAB | Age: 60
End: 2020-10-13
Payer: MEDICARE

## 2020-10-13 ENCOUNTER — TELEPHONE (OUTPATIENT)
Dept: CARDIOLOGY CLINIC | Age: 60
End: 2020-10-13

## 2020-10-13 ENCOUNTER — TELEPHONE (OUTPATIENT)
Dept: FAMILY MEDICINE CLINIC | Age: 60
End: 2020-10-13

## 2020-10-13 PROBLEM — I21.3 STEMI (ST ELEVATION MYOCARDIAL INFARCTION) (HCC): Status: ACTIVE | Noted: 2020-10-13

## 2020-10-13 LAB
ALBUMIN SERPL-MCNC: 4.2 GM/DL (ref 3.4–5)
ALP BLD-CCNC: 67 IU/L (ref 40–129)
ALT SERPL-CCNC: 12 U/L (ref 10–40)
ANION GAP SERPL CALCULATED.3IONS-SCNC: 12 MMOL/L (ref 4–16)
APTT: 28.9 SECONDS (ref 25.1–37.1)
AST SERPL-CCNC: 15 IU/L (ref 15–37)
BASOPHILS ABSOLUTE: 0.1 K/CU MM
BASOPHILS RELATIVE PERCENT: 0.7 % (ref 0–1)
BILIRUB SERPL-MCNC: 0.5 MG/DL (ref 0–1)
BUN BLDV-MCNC: 18 MG/DL (ref 6–23)
CALCIUM SERPL-MCNC: 9 MG/DL (ref 8.3–10.6)
CHLORIDE BLD-SCNC: 102 MMOL/L (ref 99–110)
CO2: 27 MMOL/L (ref 21–32)
CREAT SERPL-MCNC: 1.3 MG/DL (ref 0.9–1.3)
DIFFERENTIAL TYPE: ABNORMAL
EKG ATRIAL RATE: 64 BPM
EKG ATRIAL RATE: 71 BPM
EKG DIAGNOSIS: NORMAL
EKG DIAGNOSIS: NORMAL
EKG P AXIS: 53 DEGREES
EKG P AXIS: 73 DEGREES
EKG P-R INTERVAL: 164 MS
EKG P-R INTERVAL: 168 MS
EKG Q-T INTERVAL: 428 MS
EKG Q-T INTERVAL: 432 MS
EKG QRS DURATION: 116 MS
EKG QRS DURATION: 80 MS
EKG QTC CALCULATION (BAZETT): 441 MS
EKG QTC CALCULATION (BAZETT): 469 MS
EKG R AXIS: 24 DEGREES
EKG R AXIS: 4 DEGREES
EKG T AXIS: 115 DEGREES
EKG T AXIS: 116 DEGREES
EKG VENTRICULAR RATE: 64 BPM
EKG VENTRICULAR RATE: 71 BPM
EOSINOPHILS ABSOLUTE: 0.3 K/CU MM
EOSINOPHILS RELATIVE PERCENT: 2.8 % (ref 0–3)
ESTIMATED AVERAGE GLUCOSE: 137 MG/DL
GFR AFRICAN AMERICAN: >60 ML/MIN/1.73M2
GFR NON-AFRICAN AMERICAN: 56 ML/MIN/1.73M2
GLUCOSE BLD-MCNC: 106 MG/DL (ref 70–99)
GLUCOSE BLD-MCNC: 146 MG/DL (ref 70–99)
GLUCOSE BLD-MCNC: 93 MG/DL (ref 70–99)
HBA1C MFR BLD: 6.4 % (ref 4.2–6.3)
HCT VFR BLD CALC: 48 % (ref 42–52)
HEMOGLOBIN: 15.5 GM/DL (ref 13.5–18)
IMMATURE NEUTROPHIL %: 0.3 % (ref 0–0.43)
INR BLD: 1.07 INDEX
LYMPHOCYTES ABSOLUTE: 2.3 K/CU MM
LYMPHOCYTES RELATIVE PERCENT: 24.8 % (ref 24–44)
MCH RBC QN AUTO: 32.3 PG (ref 27–31)
MCHC RBC AUTO-ENTMCNC: 32.3 % (ref 32–36)
MCV RBC AUTO: 100 FL (ref 78–100)
MONOCYTES ABSOLUTE: 0.9 K/CU MM
MONOCYTES RELATIVE PERCENT: 9.5 % (ref 0–4)
NUCLEATED RBC %: 0 %
PDW BLD-RTO: 13.1 % (ref 11.7–14.9)
PLATELET # BLD: 179 K/CU MM (ref 140–440)
PMV BLD AUTO: 12 FL (ref 7.5–11.1)
POTASSIUM SERPL-SCNC: 4.2 MMOL/L (ref 3.5–5.1)
PRO-BNP: 868.1 PG/ML
PROTHROMBIN TIME: 12.9 SECONDS (ref 11.7–14.5)
RBC # BLD: 4.8 M/CU MM (ref 4.6–6.2)
SEGMENTED NEUTROPHILS ABSOLUTE COUNT: 5.7 K/CU MM
SEGMENTED NEUTROPHILS RELATIVE PERCENT: 61.9 % (ref 36–66)
SODIUM BLD-SCNC: 141 MMOL/L (ref 135–145)
TOTAL IMMATURE NEUTOROPHIL: 0.03 K/CU MM
TOTAL NUCLEATED RBC: 0 K/CU MM
TOTAL PROTEIN: 7.1 GM/DL (ref 6.4–8.2)
TROPONIN T: <0.01 NG/ML
WBC # BLD: 9.2 K/CU MM (ref 4–10.5)

## 2020-10-13 PROCEDURE — C1725 CATH, TRANSLUMIN NON-LASER: HCPCS

## 2020-10-13 PROCEDURE — 2580000003 HC RX 258: Performed by: EMERGENCY MEDICINE

## 2020-10-13 PROCEDURE — 94761 N-INVAS EAR/PLS OXIMETRY MLT: CPT

## 2020-10-13 PROCEDURE — C1769 GUIDE WIRE: HCPCS

## 2020-10-13 PROCEDURE — 2500000003 HC RX 250 WO HCPCS

## 2020-10-13 PROCEDURE — 99214 OFFICE O/P EST MOD 30 MIN: CPT | Performed by: INTERNAL MEDICINE

## 2020-10-13 PROCEDURE — 93010 ELECTROCARDIOGRAM REPORT: CPT | Performed by: INTERNAL MEDICINE

## 2020-10-13 PROCEDURE — C1894 INTRO/SHEATH, NON-LASER: HCPCS

## 2020-10-13 PROCEDURE — 96375 TX/PRO/DX INJ NEW DRUG ADDON: CPT

## 2020-10-13 PROCEDURE — 6360000002 HC RX W HCPCS

## 2020-10-13 PROCEDURE — 80053 COMPREHEN METABOLIC PANEL: CPT

## 2020-10-13 PROCEDURE — 93005 ELECTROCARDIOGRAM TRACING: CPT | Performed by: PHYSICIAN ASSISTANT

## 2020-10-13 PROCEDURE — 83880 ASSAY OF NATRIURETIC PEPTIDE: CPT

## 2020-10-13 PROCEDURE — 2709999900 HC NON-CHARGEABLE SUPPLY

## 2020-10-13 PROCEDURE — 6360000002 HC RX W HCPCS: Performed by: PHYSICIAN ASSISTANT

## 2020-10-13 PROCEDURE — 85025 COMPLETE CBC W/AUTO DIFF WBC: CPT

## 2020-10-13 PROCEDURE — 93459 L HRT ART/GRFT ANGIO: CPT

## 2020-10-13 PROCEDURE — 85610 PROTHROMBIN TIME: CPT

## 2020-10-13 PROCEDURE — 85730 THROMBOPLASTIN TIME PARTIAL: CPT

## 2020-10-13 PROCEDURE — G0378 HOSPITAL OBSERVATION PER HR: HCPCS

## 2020-10-13 PROCEDURE — 6370000000 HC RX 637 (ALT 250 FOR IP): Performed by: PHYSICIAN ASSISTANT

## 2020-10-13 PROCEDURE — 6360000002 HC RX W HCPCS: Performed by: EMERGENCY MEDICINE

## 2020-10-13 PROCEDURE — 6360000004 HC RX CONTRAST MEDICATION

## 2020-10-13 PROCEDURE — 96374 THER/PROPH/DIAG INJ IV PUSH: CPT

## 2020-10-13 PROCEDURE — 71045 X-RAY EXAM CHEST 1 VIEW: CPT

## 2020-10-13 PROCEDURE — 2580000003 HC RX 258: Performed by: HOSPITALIST

## 2020-10-13 PROCEDURE — 93458 L HRT ARTERY/VENTRICLE ANGIO: CPT

## 2020-10-13 PROCEDURE — 82962 GLUCOSE BLOOD TEST: CPT

## 2020-10-13 PROCEDURE — 93459 L HRT ART/GRFT ANGIO: CPT | Performed by: INTERNAL MEDICINE

## 2020-10-13 PROCEDURE — 2580000003 HC RX 258

## 2020-10-13 PROCEDURE — 84484 ASSAY OF TROPONIN QUANT: CPT

## 2020-10-13 PROCEDURE — 83036 HEMOGLOBIN GLYCOSYLATED A1C: CPT

## 2020-10-13 PROCEDURE — 99285 EMERGENCY DEPT VISIT HI MDM: CPT

## 2020-10-13 PROCEDURE — 6370000000 HC RX 637 (ALT 250 FOR IP): Performed by: HOSPITALIST

## 2020-10-13 PROCEDURE — 93005 ELECTROCARDIOGRAM TRACING: CPT | Performed by: EMERGENCY MEDICINE

## 2020-10-13 RX ORDER — FUROSEMIDE 20 MG/1
20 TABLET ORAL 2 TIMES DAILY
Status: DISCONTINUED | OUTPATIENT
Start: 2020-10-13 | End: 2020-10-13

## 2020-10-13 RX ORDER — DEXTROSE MONOHYDRATE 50 MG/ML
100 INJECTION, SOLUTION INTRAVENOUS PRN
Status: DISCONTINUED | OUTPATIENT
Start: 2020-10-13 | End: 2020-10-14 | Stop reason: HOSPADM

## 2020-10-13 RX ORDER — PROMETHAZINE HYDROCHLORIDE 25 MG/1
12.5 TABLET ORAL EVERY 6 HOURS PRN
Status: DISCONTINUED | OUTPATIENT
Start: 2020-10-13 | End: 2020-10-14 | Stop reason: HOSPADM

## 2020-10-13 RX ORDER — ASPIRIN 81 MG/1
243 TABLET, CHEWABLE ORAL ONCE
Status: COMPLETED | OUTPATIENT
Start: 2020-10-13 | End: 2020-10-13

## 2020-10-13 RX ORDER — 0.9 % SODIUM CHLORIDE 0.9 %
1000 INTRAVENOUS SOLUTION INTRAVENOUS ONCE
Status: COMPLETED | OUTPATIENT
Start: 2020-10-13 | End: 2020-10-13

## 2020-10-13 RX ORDER — NICOTINE POLACRILEX 4 MG
15 LOZENGE BUCCAL PRN
Status: DISCONTINUED | OUTPATIENT
Start: 2020-10-13 | End: 2020-10-14 | Stop reason: HOSPADM

## 2020-10-13 RX ORDER — PANTOPRAZOLE SODIUM 40 MG/1
40 TABLET, DELAYED RELEASE ORAL
Status: DISCONTINUED | OUTPATIENT
Start: 2020-10-13 | End: 2020-10-14 | Stop reason: HOSPADM

## 2020-10-13 RX ORDER — TAMSULOSIN HYDROCHLORIDE 0.4 MG/1
0.8 CAPSULE ORAL DAILY
Status: DISCONTINUED | OUTPATIENT
Start: 2020-10-13 | End: 2020-10-14 | Stop reason: HOSPADM

## 2020-10-13 RX ORDER — ASPIRIN 81 MG/1
TABLET, CHEWABLE ORAL
Status: DISCONTINUED
Start: 2020-10-13 | End: 2020-10-13 | Stop reason: SDUPTHER

## 2020-10-13 RX ORDER — ATORVASTATIN CALCIUM 80 MG/1
80 TABLET, FILM COATED ORAL DAILY
Status: DISCONTINUED | OUTPATIENT
Start: 2020-10-13 | End: 2020-10-14 | Stop reason: HOSPADM

## 2020-10-13 RX ORDER — DEXTROSE MONOHYDRATE 25 G/50ML
12.5 INJECTION, SOLUTION INTRAVENOUS PRN
Status: DISCONTINUED | OUTPATIENT
Start: 2020-10-13 | End: 2020-10-14 | Stop reason: HOSPADM

## 2020-10-13 RX ORDER — FENTANYL CITRATE 50 UG/ML
50 INJECTION, SOLUTION INTRAMUSCULAR; INTRAVENOUS
Status: DISCONTINUED | OUTPATIENT
Start: 2020-10-13 | End: 2020-10-13

## 2020-10-13 RX ORDER — AMLODIPINE BESYLATE 5 MG/1
2.5 TABLET ORAL DAILY
Status: DISCONTINUED | OUTPATIENT
Start: 2020-10-13 | End: 2020-10-14 | Stop reason: HOSPADM

## 2020-10-13 RX ORDER — CARVEDILOL 12.5 MG/1
12.5 TABLET ORAL 2 TIMES DAILY
Status: DISCONTINUED | OUTPATIENT
Start: 2020-10-13 | End: 2020-10-13

## 2020-10-13 RX ORDER — MORPHINE SULFATE 2 MG/ML
2 INJECTION, SOLUTION INTRAMUSCULAR; INTRAVENOUS ONCE
Status: COMPLETED | OUTPATIENT
Start: 2020-10-13 | End: 2020-10-13

## 2020-10-13 RX ORDER — ACETAMINOPHEN 650 MG/1
650 SUPPOSITORY RECTAL EVERY 6 HOURS PRN
Status: DISCONTINUED | OUTPATIENT
Start: 2020-10-13 | End: 2020-10-14 | Stop reason: HOSPADM

## 2020-10-13 RX ORDER — ONDANSETRON 2 MG/ML
4 INJECTION INTRAMUSCULAR; INTRAVENOUS EVERY 6 HOURS PRN
Status: DISCONTINUED | OUTPATIENT
Start: 2020-10-13 | End: 2020-10-14 | Stop reason: HOSPADM

## 2020-10-13 RX ORDER — CLOPIDOGREL BISULFATE 75 MG/1
75 TABLET ORAL DAILY
Status: DISCONTINUED | OUTPATIENT
Start: 2020-10-13 | End: 2020-10-14 | Stop reason: HOSPADM

## 2020-10-13 RX ORDER — DOCUSATE SODIUM 100 MG/1
100 CAPSULE, LIQUID FILLED ORAL DAILY
Status: DISCONTINUED | OUTPATIENT
Start: 2020-10-13 | End: 2020-10-14 | Stop reason: HOSPADM

## 2020-10-13 RX ORDER — LISINOPRIL 2.5 MG/1
2.5 TABLET ORAL DAILY
Status: DISCONTINUED | OUTPATIENT
Start: 2020-10-13 | End: 2020-10-13

## 2020-10-13 RX ORDER — ACETAMINOPHEN 325 MG/1
650 TABLET ORAL EVERY 6 HOURS PRN
Status: DISCONTINUED | OUTPATIENT
Start: 2020-10-13 | End: 2020-10-14 | Stop reason: HOSPADM

## 2020-10-13 RX ORDER — IPRATROPIUM BROMIDE AND ALBUTEROL SULFATE 2.5; .5 MG/3ML; MG/3ML
1 SOLUTION RESPIRATORY (INHALATION) EVERY 4 HOURS PRN
Status: DISCONTINUED | OUTPATIENT
Start: 2020-10-13 | End: 2020-10-14 | Stop reason: HOSPADM

## 2020-10-13 RX ORDER — ASPIRIN 81 MG/1
81 TABLET, CHEWABLE ORAL DAILY
Status: DISCONTINUED | OUTPATIENT
Start: 2020-10-14 | End: 2020-10-14 | Stop reason: HOSPADM

## 2020-10-13 RX ORDER — SODIUM CHLORIDE 0.9 % (FLUSH) 0.9 %
10 SYRINGE (ML) INJECTION PRN
Status: DISCONTINUED | OUTPATIENT
Start: 2020-10-13 | End: 2020-10-14 | Stop reason: HOSPADM

## 2020-10-13 RX ORDER — SODIUM CHLORIDE 0.9 % (FLUSH) 0.9 %
10 SYRINGE (ML) INJECTION EVERY 12 HOURS SCHEDULED
Status: DISCONTINUED | OUTPATIENT
Start: 2020-10-13 | End: 2020-10-14 | Stop reason: HOSPADM

## 2020-10-13 RX ORDER — ONDANSETRON 2 MG/ML
4 INJECTION INTRAMUSCULAR; INTRAVENOUS EVERY 30 MIN PRN
Status: DISCONTINUED | OUTPATIENT
Start: 2020-10-13 | End: 2020-10-13

## 2020-10-13 RX ADMIN — SODIUM CHLORIDE 1000 ML: 9 INJECTION, SOLUTION INTRAVENOUS at 10:16

## 2020-10-13 RX ADMIN — SODIUM CHLORIDE, PRESERVATIVE FREE 10 ML: 5 INJECTION INTRAVENOUS at 21:31

## 2020-10-13 RX ADMIN — FENTANYL CITRATE 50 MCG: 50 INJECTION INTRAMUSCULAR; INTRAVENOUS at 10:09

## 2020-10-13 RX ADMIN — SODIUM CHLORIDE 1000 ML: 9 INJECTION, SOLUTION INTRAVENOUS at 10:17

## 2020-10-13 RX ADMIN — ASPIRIN 243 MG: 81 TABLET, CHEWABLE ORAL at 10:09

## 2020-10-13 RX ADMIN — MORPHINE SULFATE 2 MG: 2 INJECTION, SOLUTION INTRAMUSCULAR; INTRAVENOUS at 19:57

## 2020-10-13 RX ADMIN — ATORVASTATIN CALCIUM 80 MG: 80 TABLET, FILM COATED ORAL at 21:30

## 2020-10-13 RX ADMIN — AMLODIPINE BESYLATE 2.5 MG: 5 TABLET ORAL at 18:36

## 2020-10-13 RX ADMIN — PANTOPRAZOLE SODIUM 40 MG: 40 TABLET, DELAYED RELEASE ORAL at 18:36

## 2020-10-13 ASSESSMENT — PAIN DESCRIPTION - LOCATION
LOCATION: CHEST

## 2020-10-13 ASSESSMENT — PAIN DESCRIPTION - PAIN TYPE
TYPE: ACUTE PAIN

## 2020-10-13 ASSESSMENT — PAIN SCALES - GENERAL
PAINLEVEL_OUTOF10: 4
PAINLEVEL_OUTOF10: 8
PAINLEVEL_OUTOF10: 8
PAINLEVEL_OUTOF10: 4
PAINLEVEL_OUTOF10: 0
PAINLEVEL_OUTOF10: 4
PAINLEVEL_OUTOF10: 0

## 2020-10-13 ASSESSMENT — PAIN DESCRIPTION - FREQUENCY: FREQUENCY: CONTINUOUS

## 2020-10-13 ASSESSMENT — PAIN DESCRIPTION - ORIENTATION
ORIENTATION: LEFT

## 2020-10-13 NOTE — CARE COORDINATION
.CM has reviewed pt's chart for needs. CM screening shows that pt has PCP, insurance and is independent PTA. If needs arise please contact [facility].   TE

## 2020-10-13 NOTE — ED TRIAGE NOTES
Patient to ER for c/o chest pain. ... upon arrival to ED, EKG completed and showed a STEMI. stemi alert called, dr Devin Krause came to bedside. Patient reports taking 81 mg aspirin this morning. Patient also c/o sob.

## 2020-10-13 NOTE — PROGRESS NOTES
Pt has complaints of chest pain which he refers to as lung pain on the left side. This nurse assessed this pt, lungs are clear and present in all fields, heart rate is WDL, regular, clear s1 and s2, and in NSR. This nurse asked Redd Alexis to come and assess this pt.

## 2020-10-13 NOTE — ED PROVIDER NOTES
EMERGENCY DEPARTMENT ENCOUNTER        PCP: Alek Cornelius MD    CHIEF COMPLAINT    Chief Complaint   Patient presents with    Chest Pain     left sided; started last night; starts lower chest area and radiates into the upper chest    Shortness of Breath     started yesterday    Hypertension     states that he was a rehab today and sent to ED for elevated blood pressure     Patient staffed with supervising physician Dr. Padma Guerra    HPI    Basilia Nickerson is a 63 y.o. male who presents with left-sided chest pain which radiates across chest since last night. Patient states he is generally felt fatigued and short of breath. Patient describes chest pain as heaviness. Patient has history of 7 stents. Patient states he had a CABG earlier this year in May. Patient denies abdominal pain, vomiting, diarrhea. Patient has lower extremity pain or swelling. REVIEW OF SYSTEMS    Constitutional:  Denies fever, chills. HENT:  Denies sore throat or ear pain   Cardiovascular:  See HPI. Denies syncope   Respiratory:    See HPI. Denies hemoptysis    GI:  Denies abdominal pain, vomiting, or diarrhea  :  Denies any urinary symptoms   Musculoskeletal:  Denies lower extremity pain or swelling  Skin:  Denies rash  Neurologic:  Denies focal weakness or sensory changes    Lymphatic:  Denies swollen glands     All other review of systems are negative  See HPI and nursing notes for additional information     PAST MEDICAL & SURGICAL HISTORY    Past Medical History:   Diagnosis Date    B12 deficiency     CAD (coronary artery disease)     GERD (gastroesophageal reflux disease)     Guillain Barré syndrome ([facility] [address]     H/O echocardiogram 7/30/13, 6/16/10    7/13-EF 50-55% WNL.  6/10-EF53%, mod LVH, MARCI, RVE, trace PI, mild MR/TR, 6/09    Heart murmur     History of myocardial infarction 2003    History of nuclear MUGA test 03/03/2020    EF 53%, Normal, No ICD needed    History of nuclear stress test 06/23/2020    EF 30%, study suggestive of abnormal myocardial perfusion.  History of PTCA 6/03;2/03 6/03-PTCA w/stents x2, 2/03-PTCA w/stent x3    History of PTCA 03/15/2018    LAD EF25%    Hx of cardiovascular stress test 04/25/2018    EF 28% Nuclear scintigraphy demonstartes inferior wall infarct.  Hx of cardiovascular stress test 01/27/2020    Abnormal Study. Large area of inferior & apical-lateral infarct. No significant reversible ischemia. Severly reduced LVEF 23%    HX OTHER MEDICAL 03/14/2018    lifevest  d/c 7/2/2018    Hyperlipidemia     Hypertension     Impaired glucose tolerance 6/3/2016    MUGA 07/09/2018    EF 47%    NSTEMI (non-ST elevated myocardial infarction) ([facility] [address] 03/15/2018    Patient in clinical research study 05/2018    3 year study dalcitripib vs placebo [phone]    Pneumonia due to organism     Renal cyst 5/23/2014    S/P cardiac catheterization 7/12/13 7/13-EF=55%. PATENT LAD AND RCA STENTS, OM2 50% stenosis, & Normal wall motion.  Stroke (cerebrum) [facility])     May 2016     Past Surgical History:   Procedure Laterality Date    CARDIAC CATHETERIZATION  7/13 7/13-EF=55%. PATENT LAD AND RCA STENTS, OM2 50% stenosis, & Normal wall motion.     COLONOSCOPY  8/21/14    colon polyp, diverticulosis, internal hemorrhoids    CORONARY ANGIOPLASTY  6/03 6/14/03- PTCA w stents x2 mid LAD, 2/03-PTCA w stents x3, RCA    CORONARY ARTERY BYPASS GRAFT N/A 5/18/2020    CABG CORONARY ARTERY BYPASS x 2, INTRAOPERATIVE NGA, INDUCED HYPOTHERMIA, LEFT LEG ENDOSCOPIC VEIN HARVEST performed by Anastacio Magaña MD at [facility], COLON, DIAGNOSTIC  8/21/14    irregular GE junction, gastritis,hiatal hernia, gastric polyps    HAND SURGERY      rt knuckle    PTCA  03/2018    LAD    SHOULDER SURGERY Right 2011    bone spurs       CURRENT MEDICATIONS    Current Outpatient Rx   Medication Sig Dispense Refill    pantoprazole (PROTONIX) 40 MG tablet Take 1 tablet by mouth 2 times daily (before meals) 90 tablet 3    lisinopril (PRINIVIL;ZESTRIL) 2.5 MG tablet Take 1 tablet by mouth daily 90 tablet 3    furosemide (LASIX) 20 MG tablet Take 1 tablet by mouth 2 times daily 180 tablet 3    clopidogrel (PLAVIX) 75 MG tablet Take 1 tablet by mouth daily 90 tablet 3    carvedilol (COREG) 12.5 MG tablet Take 1 tablet by mouth 2 times daily 180 tablet 3    furosemide (LASIX) 20 MG tablet Take 1 tablet by mouth 2 times daily 60 tablet 1    atorvastatin (LIPITOR) 80 MG tablet Take 1 tablet by mouth daily 90 tablet 3    albuterol-ipratropium (COMBIVENT RESPIMAT)  MCG/ACT AERS inhaler Inhale 1 puff into the lungs every 6 hours Dispense if cheaper than atrovent inhaler 1 Inhaler 2   [facility] by Does not apply route 1 each 0    ipratropium (ATROVENT HFA) 17 MCG/ACT inhaler Inhale 2 puffs into the lungs every 4 hours (while awake) for 14 days 1 Inhaler 0    docusate sodium (COLACE, DULCOLAX) 100 MG CAPS Take 100 mg by mouth daily      sertraline (ZOLOFT) 50 MG tablet Take 1 tablet by mouth daily 90 tablet 3    tamsulosin (FLOMAX) 0.4 MG capsule Take 1 capsule by mouth daily (Patient taking differently: Take 0.8 mg by mouth daily ) 90 capsule 3    glimepiride (AMARYL) 2 MG tablet Take 1 tablet by mouth every morning 90 tablet 3    Blood Glucose Monitoring Suppl (BLOOD GLUCOSE MONITOR SYSTEM) w/Device KIT Use twice per day 1 kit 0    blood glucose monitor strips 1 strip by Other route 2 times daily for 180 doses 180 strip 3    Lancets MISC 1 each by Does not apply route 2 times daily for 180 doses 180 each 3    Alcohol Swabs (ALCOHOL PADS) 70 % PADS 1 Units by Does not apply route 2 times daily 180 each 3    aspirin 81 MG chewable tablet Take 1 tablet by mouth daily 30 tablet 3    Blood Pressure KIT Use prn 1 kit 0       ALLERGIES    Allergies   Allergen Reactions    Ibuprofen Other (See Comments)     Stomach irritation    Augmentin [Amoxicillin-Pot Clavulanate]     Codeine        SOCIAL & FAMILY HISTORY    Social History     Socioeconomic History    Marital status:      Spouse name: None    Number of children: None    Years of education: None    Highest education level: None   Occupational History    Occupation:      Comment: full time   Social Needs    Financial resource strain: None    Food insecurity     Worry: None     Inability: None    Transportation needs     Medical: None     Non-medical: None   Tobacco Use    Smoking status: Former Smoker     Packs/day: 2.00     Years: 45.00     Pack years: 90.00     Types: Cigarettes     Last attempt to quit: 2016     Years since quittin.4    Smokeless tobacco: Never Used   Substance and Sexual Activity    Alcohol use: No     Alcohol/week: 0.0 standard drinks    Drug use: No    Sexual activity: Yes     Partners: Female   Lifestyle    Physical activity     Days per week: None     Minutes per session: None    Stress: None   Relationships    Social connections     Talks on phone: None     Gets together: None     Attends Yarsanism service: None     Active member of club or organization: None     Attends meetings of clubs or organizations: None     Relationship status: None    Intimate partner violence     Fear of current or ex partner: None     Emotionally abused: None     Physically abused: None     Forced sexual activity: None   Other Topics Concern    None   Social History Narrative    Wears glasses     Family History   Problem Relation Age of Onset    Hypertension Mother     High Cholesterol Mother     Alcohol Abuse Mother     Depression Mother     Alcohol Abuse Father     Stomach Cancer Father     Depression Sister     Diabetes Brother     Depression Sister     Stomach Cancer Brother     Alcohol Abuse Brother        PHYSICAL EXAM    VITAL SIGNS: BP (!) 167/98   Pulse 64   Temp 97.5 °F (36.4 °C) (Oral)   Resp 18   Ht 5' 7\" (1.702 m)   Wt 204 lb (92.5 kg)   SpO2 98%   BMI 31.95 kg/m² in grammar, punctuation, and spelling, as well as words and phrases that may be inappropriate. If there are any questions or concerns please feel free to contact the dictating provider for clarification.         Erin Sunshine PA-C  10/13/20 3829

## 2020-10-13 NOTE — ED NOTES
Report given to [facility] in cath lab     [employer], [address] [facility] [place] [place]  10/13/20 1012

## 2020-10-13 NOTE — CONSULTS
CARDIOLOGY CONSULT NOTE    Reason for consultation: Chest pain     Referring physician: Ivanna Palacios MD      Primary care physician: Lila Arceo MD        Dear Ivanna Palacios MD   Thanks for the consult.     History of present illness:Hilaria is a 63 y. o.year old who  presents with  chest pain since last night, intermittent for 15 to 20 mins and aggravated with activity substernal also,reproducible with palpation, radiated to shoulder, 6/10. tender to touch,associated with shortness of breath, + sweating, nausea, did not get NTG in ED. No fever, no chills, no nausea no vomiting. Blood pressure, cholesterol, blood glucose and weight are well controlled. STEMI PAGE ACTIVATED IN ED, PATIENT HAD RECENT CABG IN [place] TO LAD AND SVG TO LCX, RCA is  with collaterals from left circulation, stress test post PCI has moderate to large inferior wall infarction     Chief Complaint   Patient presents with    Chest Pain     left sided; started last night; starts lower chest area and radiates into the upper chest    Shortness of Breath     started yesterday    Hypertension     states that he was a rehab today and sent to ED for elevated blood pressure       Past medical history:    has a past medical history of B12 deficiency, CAD (coronary artery disease), GERD (gastroesophageal reflux disease), Guillain Barré syndrome ([facility] [address] H/O echocardiogram, Heart murmur, History of myocardial infarction, History of nuclear MUGA test, History of nuclear stress test, History of PTCA, History of PTCA, Hx of cardiovascular stress test, Hx of cardiovascular stress test, HX OTHER MEDICAL, Hyperlipidemia, Hypertension, Impaired glucose tolerance, MUGA, NSTEMI (non-ST elevated myocardial infarction) [facility]), Patient in clinical research study, Pneumonia due to organism, Renal cyst, S/P cardiac catheterization, and Stroke (cerebrum) ([facility] [address]   Past surgical history:   has a past surgical history that includes Hand surgery; Coronary angioplasty (6/03); shoulder surgery (Right, 2011); Colonoscopy (8/21/14); Endoscopy, colon, diagnostic (8/21/14); Cardiac catheterization (7/13); Percutaneous Transluminal Coronary Angio (03/2018); and Coronary artery bypass graft (N/A, 5/18/2020). Social History:   reports that he quit smoking about 4 years ago. His smoking use included cigarettes. He has a 90.00 pack-year smoking history. He has never used smokeless tobacco. He reports that he does not drink alcohol or use drugs.   Family history:   no family history of CAD, STROKE of DM    0.9 % sodium chloride bolus, Once  0.9 % sodium chloride bolus, Once  ondansetron (ZOFRAN) injection 4 mg, Q30 Min PRN  fentaNYL (SUBLIMAZE) injection 50 mcg, Q1H PRN      Current Facility-Administered Medications   Medication Dose Route Frequency Provider Last Rate Last Dose    0.9 % sodium chloride bolus  1,000 mL Intravenous Once [employer], DO 1,000 mL/hr at 10/13/20 1017 1,000 mL at 10/13/20 1017    0.9 % sodium chloride bolus  1,000 mL Intravenous Once [employer], DO 1,000 mL/hr at 10/13/20 1016 1,000 mL at 10/13/20 1016    ondansetron (ZOFRAN) injection 4 mg  4 mg Intravenous Q30 Min PRN [employer], DO        fentaNYL (SUBLIMAZE) injection 50 mcg  50 mcg Intravenous Q1H PRN [employer], DO   50 mcg at 10/13/20 1009          Review of Systems:    · Constitutional: No Fever or Weight Loss    · Eyes: No Decreased Vision  · ENT: No Headaches, Hearing Loss or Vertigo  · Cardiovascular: + chest pain, dyspnea on exertion, palpitations or loss of consciousness  · Respiratory: +cough or wheezing    · Gastrointestinal: No abdominal pain, appetite loss, blood in stools, constipation, diarrhea or heartburn  · Genitourinary: No dysuria, trouble voiding, or hematuria  · Musculoskeletal: No gait disturbance, weakness or joint complaints  · Integumentary: No rash or pruritis  · Neurological: No TIA or stroke symptoms  · Psychiatric: No anxiety or depression  · Endocrine: No malaise, fatigue or temperature intolerance  · Hematologic/Lymphatic: No bleeding problems, blood clots or swollen lymph nodes  · Allergic/Immunologic: No nasal congestion or hives  All systems negative except as marked.      ·    ·    ·      Physical Examination:    Vitals:    10/13/20 1016   BP: (!) 167/98   Pulse: 64   Resp: 18   Temp:    SpO2: 98%      Wt Readings from Last 3 Encounters:   10/13/20 204 lb (92.5 kg)   09/24/20 205 lb 12.8 oz (93.4 kg)   07/16/20 203 lb 6.4 oz (92.3 kg)     Body mass index is 31.95 kg/m².        General Appearance: No distress, conversant     Constitutional: Well developed, Well nourished, No acute distress, Non-toxic appearance.    HENT:  Normocephalic, Atraumatic, Bilateral external ears normal, Oropharynx moist, No oral exudates, Nose normal. Neck- Normal range of motion, No tenderness, Supple, No stridor,no apical-carotid delay, no carotid bruit  Eyes: PERRL, EOMI, Conjunctiva normal, No discharge.    Respiratory:  Normal breath sounds, No respiratory distress, No wheezing, No chest tenderness. ,no use of accessory muscles, diaphragm movement is normal  Cardiovascular: (PMI) apex non displaced,no lifts no thrills, no s3,no s4, Normal heart rate, Normal rhythm, No murmurs, No rubs, No gallops. Carotid arteries pulse and amplitude are normal no bruit, no abdominal bruit noted ( normal abdominal aorta ausculation), femoral arteries pulse and amplitude are normal no bruit, pedal pulses are normal.  GI: Bowel sounds normal, Soft, No tenderness, No masses, No pulsatile masses, no hepatosplenomegally, no bruits  : External genitalia appear normal, No masses or lesions. No discharge. No CVA tenderness.    Musculoskeletal: Intact distal pulses, No edema, No tenderness, No cyanosis, No clubbing. Good range of motion in all major joints. No tenderness to palpation or major deformities noted. Back- No tenderness. Integument: Warm, Dry, No erythema, No rash.

## 2020-10-13 NOTE — ED NOTES
Patient's clothing and belongings placed into bag, to be transported upstairs to cath lab 4 at this time     Abdirizak Daniel RN  10/13/20 1016

## 2020-10-13 NOTE — TELEPHONE ENCOUNTER
Advised patient of results. ECHO order placed for 6 months. Patient voiced understanding. Left ventricular function is moderately abnormal, EF is estimated at 40%. (No   change since previous echo on 5/17/2020 )   Mid inferior hypokinesis noted. Grade I diastolic dysfunction. Moderate mitral, mild tricuspid regurgitation is present. Dilatation of the aortic root. No evidence of pericardial effusion.    monty in 6 mos

## 2020-10-13 NOTE — ED PROVIDER NOTES
I independently examined and evaluated Veronique Newell. In brief their history revealed a 78-year-old male history of CAD open heart surgery in May of this year presents with chest pain since last night. Pain currently a 3-4 out of 10. He denies any other associated symptoms. He took 1 baby aspirin before arrival.  Initial EKG upon arrival shows ST elevation MI, STEMI alert called on arrival cardiology saw patient immediately in ER. Their focused exam revealed alert which male resting in bed no acute distress normocephalic atraumatic sclera clear lungs clear heart regular rhythm 2+ pulses throughout abdomen soft obese nontender bowel sounds present. No hernia no pulsatile mass cranial nerves intact skin has no rash or swelling. No diaphoresis. ED course: Patient seen with PA please see his note. Patient here with chest pain started last night. History of CAD with open heart surgery in May of this year. He took 1 baby aspirin before arrival.  On arrival EKG shows ST elevation MI in the inferior leads. Alert called on arrival cardiology saw patient immediately in trauma room for Cath Lab team alerted will be taken to Cath Lab for urgent intervention. Patient given 3 additional aspirin fentanyl fluids. Patient otherwise stable will be to hospital medicine. 12 lead EKG per my interpretation #1:  Normal Sinus Rhythm 64 STEMI  Axis is   Normal  QTc is  441  There is specific T wave changes appreciated. Inverted T wave in aVL, I  There is specific ST wave changes appreciated. ST elevation 2 3 aVF depression aVL lead I    Prior EKG to compare with was not available         12 lead EKG per my interpretation #2:  Normal Sinus Rhythm 71 STEMI  Axis is   Normal  QTc is  469  There is specific T wave changes appreciated. Inverted T wave V1, aVL  There is specific ST wave changes appreciated.   ST elevation lead to 3 aVF depression aVL lead I  Prior EKG to compare with was available and similar to

## 2020-10-13 NOTE — ED NOTES
Bed: 04TR-04  Expected date:   Expected time:   Means of arrival:   Comments:  STEMI     Cathi Marin RN  10/13/20 1002

## 2020-10-13 NOTE — TELEPHONE ENCOUNTER
Issac Mcneil should go to the ED. It looks like there are no morning slots for appointment and this afternoon is flu clinic for students. He can go to the [facility] which has moved and is right behind [facility]. Thanks.

## 2020-10-13 NOTE — H&P
History and Physical      Name:  Qiana Alcantara /Age/Sex: 1960  (63 y.o. male)   MRN & CSN:  [id number] & [id number] Admission Date/Time: 10/13/2020 10:04 AM   Location:  [address]- PCP: Paramjit Castro MD       Hospital Day: 1    Assessment and Plan:   Qiana Alcantara is a 63 y.o.  male  who presents with Chest Pain    > Chest Pain  > CAD s/p CABG   - ECG was concerning for STEMI  - observation, Cardiac tele, Cardio consulted, LHC  - ASA, plavix, statin, Hold coreg due to Bradycardia     > CKD 3   - LHC today with contrast,  Hold lasix, Lisinopril, Monitor Cr , consult nephrology     > DMII  -Hold oral medications while inpatient  -Monitor FSBS and start SSI     > Chronic HFpEF & HFrEF, Compensated  -Held lasix, monitor Cr     > Essential HTN  - Hold lisinopril/Lasix due to CKD s/p contrast, hold coreg due to bradycardia, start amlodipine.      > GERD  -Continue Protonix     > Obesity class 1 with BMI 32.6  - lifestyle modifications    Diet No diet orders on file   DVT Prophylaxis [] Lovenox   Code Status Prior   Disposition  Pending Cardiac eval and rec. History of Present Illness:     Chief Complaint: Chest Pain  Qiana Alcantara is a 63 y.o.  male  who presents with Chest Pain    Pt presented with substernal , sharp chest pain started last night, better with lying down, associated with diaphoresis and palpitations. No abd pain, no N/V, no F/C, no Cough. Pt  S/p CABG 2020. Been taking medications as prescribed. 10-14 point ROS reviewed negative, unless as noted above     Objective:   No intake or output data in the 24 hours ending 10/13/20 1021   Vitals:   Vitals:    10/13/20 1016   BP: (!) 167/98   Pulse: 64   Resp: 18   Temp:    SpO2: 98%     Physical Exam:    GEN Awake male, sitting upright in bed in no apparent distress. Appears given age. Obese  EYES Pupils are equally round. No scleral erythema, discharge, or conjunctivitis.   HENT Mucous membranes are moist.   NECK No by mouth daily  2/20/20   Shelia Maldonado MD   glimepiride (AMARYL) 2 MG tablet Take 1 tablet by mouth every morning 2/7/20   Shelia Maldonado MD   Blood Glucose Monitoring Suppl (BLOOD GLUCOSE MONITOR SYSTEM) w/Device KIT Use twice per day 1/22/19   Shelia Maldonado MD   blood glucose monitor strips 1 strip by Other route 2 times daily for 180 doses 1/22/19 7/16/20  Shelia Maldonado MD   Lancets MISC 1 each by Does not apply route 2 times daily for 180 doses 1/22/19 7/2/20  Shelia Maldonado MD   Alcohol Swabs (ALCOHOL PADS) 70 % PADS 1 Units by Does not apply route 2 times daily 1/22/19   Shelia Maldonado MD   aspirin 81 MG chewable tablet Take 1 tablet by mouth daily 3/18/18   MD Vineet   Blood Pressure KIT Use prn 6/3/16   Shelia Maldonado MD      Infusions:   PRN Meds: ondansetron, 4 mg, Q30 Min PRN  fentanNYL, 50 mcg, Q1H PRN          Electronically signed by Anna Erwin MD on 10/13/2020 at 10:21 AM

## 2020-10-14 VITALS
DIASTOLIC BLOOD PRESSURE: 71 MMHG | HEIGHT: 67 IN | RESPIRATION RATE: 18 BRPM | HEART RATE: 69 BPM | OXYGEN SATURATION: 97 % | TEMPERATURE: 98.9 F | WEIGHT: 204 LBS | SYSTOLIC BLOOD PRESSURE: 150 MMHG | BODY MASS INDEX: 32.02 KG/M2

## 2020-10-14 LAB
ANION GAP SERPL CALCULATED.3IONS-SCNC: 10 MMOL/L (ref 4–16)
BUN BLDV-MCNC: 19 MG/DL (ref 6–23)
CALCIUM SERPL-MCNC: 8.4 MG/DL (ref 8.3–10.6)
CHLORIDE BLD-SCNC: 105 MMOL/L (ref 99–110)
CHOLESTEROL: 88 MG/DL
CO2: 23 MMOL/L (ref 21–32)
CREAT SERPL-MCNC: 1.3 MG/DL (ref 0.9–1.3)
EKG ATRIAL RATE: 69 BPM
EKG DIAGNOSIS: NORMAL
EKG P AXIS: 19 DEGREES
EKG P-R INTERVAL: 178 MS
EKG Q-T INTERVAL: 410 MS
EKG QRS DURATION: 118 MS
EKG QTC CALCULATION (BAZETT): 439 MS
EKG R AXIS: 25 DEGREES
EKG T AXIS: 115 DEGREES
EKG VENTRICULAR RATE: 69 BPM
GFR AFRICAN AMERICAN: >60 ML/MIN/1.73M2
GFR NON-AFRICAN AMERICAN: 56 ML/MIN/1.73M2
GLUCOSE BLD-MCNC: 134 MG/DL (ref 70–99)
GLUCOSE BLD-MCNC: 91 MG/DL (ref 70–99)
GLUCOSE BLD-MCNC: 94 MG/DL (ref 70–99)
HDLC SERPL-MCNC: 33 MG/DL
LDL CHOLESTEROL DIRECT: 47 MG/DL
POTASSIUM SERPL-SCNC: 4 MMOL/L (ref 3.5–5.1)
SODIUM BLD-SCNC: 138 MMOL/L (ref 135–145)
TRIGL SERPL-MCNC: 71 MG/DL

## 2020-10-14 PROCEDURE — 6370000000 HC RX 637 (ALT 250 FOR IP): Performed by: INTERNAL MEDICINE

## 2020-10-14 PROCEDURE — 99214 OFFICE O/P EST MOD 30 MIN: CPT | Performed by: INTERNAL MEDICINE

## 2020-10-14 PROCEDURE — 6370000000 HC RX 637 (ALT 250 FOR IP): Performed by: NURSE PRACTITIONER

## 2020-10-14 PROCEDURE — 96375 TX/PRO/DX INJ NEW DRUG ADDON: CPT

## 2020-10-14 PROCEDURE — 2580000003 HC RX 258: Performed by: HOSPITALIST

## 2020-10-14 PROCEDURE — G0378 HOSPITAL OBSERVATION PER HR: HCPCS

## 2020-10-14 PROCEDURE — 81001 URINALYSIS AUTO W/SCOPE: CPT

## 2020-10-14 PROCEDURE — 94761 N-INVAS EAR/PLS OXIMETRY MLT: CPT

## 2020-10-14 PROCEDURE — 93010 ELECTROCARDIOGRAM REPORT: CPT | Performed by: INTERNAL MEDICINE

## 2020-10-14 PROCEDURE — 80061 LIPID PANEL: CPT

## 2020-10-14 PROCEDURE — 36415 COLL VENOUS BLD VENIPUNCTURE: CPT

## 2020-10-14 PROCEDURE — 80048 BASIC METABOLIC PNL TOTAL CA: CPT

## 2020-10-14 PROCEDURE — 6370000000 HC RX 637 (ALT 250 FOR IP): Performed by: HOSPITALIST

## 2020-10-14 PROCEDURE — 83721 ASSAY OF BLOOD LIPOPROTEIN: CPT

## 2020-10-14 PROCEDURE — 6360000002 HC RX W HCPCS: Performed by: INTERNAL MEDICINE

## 2020-10-14 PROCEDURE — 82962 GLUCOSE BLOOD TEST: CPT

## 2020-10-14 PROCEDURE — APPSS45 APP SPLIT SHARED TIME 31-45 MINUTES: Performed by: NURSE PRACTITIONER

## 2020-10-14 RX ORDER — PANTOPRAZOLE SODIUM 40 MG/1
40 TABLET, DELAYED RELEASE ORAL DAILY
Qty: 30 TABLET | Refills: 1 | Status: SHIPPED | OUTPATIENT
Start: 2020-10-14 | End: 2021-01-06 | Stop reason: SDUPTHER

## 2020-10-14 RX ORDER — CARVEDILOL 6.25 MG/1
6.25 TABLET ORAL 2 TIMES DAILY WITH MEALS
Status: DISCONTINUED | OUTPATIENT
Start: 2020-10-14 | End: 2020-10-14 | Stop reason: HOSPADM

## 2020-10-14 RX ORDER — FUROSEMIDE 10 MG/ML
20 INJECTION INTRAMUSCULAR; INTRAVENOUS 2 TIMES DAILY
Status: DISCONTINUED | OUTPATIENT
Start: 2020-10-14 | End: 2020-10-14 | Stop reason: HOSPADM

## 2020-10-14 RX ORDER — SPIRONOLACTONE 25 MG/1
25 TABLET ORAL DAILY
Status: DISCONTINUED | OUTPATIENT
Start: 2020-10-14 | End: 2020-10-14 | Stop reason: HOSPADM

## 2020-10-14 RX ORDER — LISINOPRIL 20 MG/1
20 TABLET ORAL DAILY
Qty: 30 TABLET | Refills: 2 | Status: SHIPPED | OUTPATIENT
Start: 2020-10-14 | End: 2020-10-19 | Stop reason: DRUGHIGH

## 2020-10-14 RX ADMIN — ATORVASTATIN CALCIUM 80 MG: 80 TABLET, FILM COATED ORAL at 08:27

## 2020-10-14 RX ADMIN — SODIUM CHLORIDE, PRESERVATIVE FREE 10 ML: 5 INJECTION INTRAVENOUS at 08:29

## 2020-10-14 RX ADMIN — DOCUSATE SODIUM 100 MG: 100 CAPSULE, LIQUID FILLED ORAL at 08:27

## 2020-10-14 RX ADMIN — FUROSEMIDE 20 MG: 10 INJECTION, SOLUTION INTRAVENOUS at 08:27

## 2020-10-14 RX ADMIN — ASPIRIN 81 MG: 81 TABLET, CHEWABLE ORAL at 08:29

## 2020-10-14 RX ADMIN — SPIRONOLACTONE 25 MG: 25 TABLET ORAL at 14:13

## 2020-10-14 RX ADMIN — CARVEDILOL 6.25 MG: 6.25 TABLET, FILM COATED ORAL at 14:13

## 2020-10-14 RX ADMIN — SERTRALINE HYDROCHLORIDE 50 MG: 50 TABLET ORAL at 08:27

## 2020-10-14 RX ADMIN — AMLODIPINE BESYLATE 2.5 MG: 5 TABLET ORAL at 08:28

## 2020-10-14 RX ADMIN — PANTOPRAZOLE SODIUM 40 MG: 40 TABLET, DELAYED RELEASE ORAL at 05:14

## 2020-10-14 RX ADMIN — CLOPIDOGREL BISULFATE 75 MG: 75 TABLET ORAL at 08:28

## 2020-10-14 RX ADMIN — TAMSULOSIN HYDROCHLORIDE 0.8 MG: 0.4 CAPSULE ORAL at 08:27

## 2020-10-14 ASSESSMENT — PAIN SCALES - GENERAL
PAINLEVEL_OUTOF10: 0
PAINLEVEL_OUTOF10: 0

## 2020-10-14 NOTE — PROGRESS NOTES
[facility] ([facility] PHYSICAL [facility]  [address]  Phone: [phone]    Fax [phone]                  Arabella Weir MD, Musa Crane MD, Walt Habermann, MD, Sim Russel, MD Floyce Sandifer, MD Elner Genera, MD Earnestine Santiago, APRN      Geovanny Gaspar, APRN  Manuelito Maxwello, [address] Georgimumtaz Moscoso, APRN    Cardiology Progress Note     Today's Plan: sign off    Admit Date:  10/13/2020    Consult reason/ Seen today for: STEMI    Subjective and  Overnight Events:  Patient states that he is not having chest pain today. He is feeling like he is doing    Assessment / Plan / Recommendation:     1. Chest pain with HO CAD with CABG 5/2020: [facility] 10/14/2020 showed patent grafts 2/2. Continue BB plavix and ASA. 2. HTN: restart lower dose BB. 3. Dyslipidemia: continue statins, will recheck lipid panel. 4. Ho cva: continue plavix. Per primary  5. Renal dysfunction: renal consulted  6. DVT prophylaxis if not contraindicated while in the hospital.         History of Presenting Illness:    Chief complain on admission : 63 y. o.year old who is admitted for  Chief Complaint   Patient presents with    Chest Pain     left sided; started last night; starts lower chest area and radiates into the upper chest    Shortness of Breath     started yesterday    Hypertension     states that he was a rehab today and sent to ED for elevated blood pressure        Past medical history:    has a past medical history of B12 deficiency, CAD (coronary artery disease), GERD (gastroesophageal reflux disease), Guillain Barré syndrome ([facility] [address] H/O echocardiogram, Heart murmur, History of echocardiogram, History of myocardial infarction, History of nuclear MUGA test, History of nuclear stress test, History of PTCA, History of PTCA, Hx of cardiovascular stress test, Hx of cardiovascular stress test, HX OTHER MEDICAL, Hyperlipidemia, Hypertension, Impaired glucose tolerance, MUGA, NSTEMI muscular tenderness. Vascular: No carotid bruit. Cardiovascular:      Rate and Rhythm: Normal rate and regular rhythm. Pulses: Normal pulses. Heart sounds: Normal heart sounds. No murmur. Pulmonary:      Effort: Pulmonary effort is normal. No respiratory distress. Breath sounds: Normal breath sounds. Musculoskeletal:         General: No swelling or deformity. Skin:     General: Skin is warm and dry. Capillary Refill: Capillary refill takes less than 2 seconds. Comments: right groin site dressing is clean dry and intact, site is soft without hematoma, bruising or bleeding noted. Neurological:      Mental Status: He is alert and oriented to person, place, and time. Psychiatric:         Mood and Affect: Mood normal.         Behavior: Behavior normal.         Thought Content:  Thought content normal.         Judgment: Judgment normal.         Telemetry Reviewed:   Sinus rhythm rate 93    Medications:    furosemide  20 mg Intravenous BID    sodium chloride flush  10 mL Intravenous 2 times per day    aspirin  81 mg Oral Daily    atorvastatin  80 mg Oral Daily    clopidogrel  75 mg Oral Daily    docusate sodium  100 mg Oral Daily    tamsulosin  0.8 mg Oral Daily    sertraline  50 mg Oral Daily    pantoprazole  40 mg Oral BID AC    insulin lispro  0-6 Units Subcutaneous TID WC    insulin lispro  0-3 Units Subcutaneous Nightly    enoxaparin  40 mg Subcutaneous Daily    amLODIPine  2.5 mg Oral Daily      dextrose       sodium chloride flush, acetaminophen **OR** acetaminophen, promethazine **OR** ondansetron, ipratropium-albuterol, glucose, dextrose, glucagon (rDNA), dextrose    Lab Data:  CBC:   Recent Labs     10/13/20  1001   WBC 9.2   HGB 15.5   HCT 48.0   .0        BMP:   Recent Labs     10/13/20  1001 10/14/20  0344    138   K 4.2 4.0    105   CO2 27 23   BUN 18 19   CREATININE 1.3 1.3     PT/INR:   Recent Labs     10/13/20  1001   PROTIME 12.9   INR 1.07     BNP:    Recent Labs     10/13/20  1001   PROBNP 868.1*     TROPONIN:   Recent Labs     10/13/20  1001   TROPONINT <0.010        ECHO :   Echocardiogram 10/7/2020 Summary   Left ventricular function is moderately abnormal, EF is estimated at 40%. (No   change since previous echo on 5/17/2020 )   Mid inferior hypokinesis noted. Grade I diastolic dysfunction. Moderate mitral, mild tricuspid regurgitation is present. Dilatation of the aortic root. No evidence of pericardial effusion. All labs, medications and tests reviewed by myself , continue all other medications of all above medical condition listed as is except for changes mentioned above. Thank you very much for consult , please call with questions. Electronically signed by JERED Singh CNP on 10/14/2020 at 10:46 AM      CARDIOLOGY ATTENDING ADDENDUM    I have seen ,spoken to  and examined this patient personally, independently of the nurse practitioner. I have reviewed the hospital care given to date and reviewed all pertinent labs and imaging. The plan was developed mutually at the time of the visit with the patient,  NP   and myself. I have spoken with patient, nursing staff and provided written and verbal instructions . The above note has been reviewed and I agree with the assessment, diagnosis, and treatment plan with changes made by me as follows     HPI:  I have reviewed the above HPI  And agree with above   Nicolasa Del Angel is a 63 y. o.year old who and presents with had concerns including Chest Pain (left sided; started last night; starts lower chest area and radiates into the upper chest); Shortness of Breath (started yesterday); and Hypertension (states that he was a rehab today and sent to ED for elevated blood pressure).   Chief Complaint   Patient presents with    Chest Pain     left sided; started last night; starts lower chest area and radiates into the upper chest    Shortness of Breath     started yesterday  Hypertension     states that he was a rehab today and sent to ED for elevated blood pressure     Please review addendum/changes made to note above   Interval history: He had mild chest discomfort but it is gone now could be GI related grafts are patent it was not exertion related he did have intermittent left bundle branch block and STEMI was activated grafts are patent    Physical Exam:  General:  Awake, alert, NAD  Head:normal  Eye:normal  Neck:  No JVD   Chest:  Clear to auscultation, respiration easy  Cardiovascular:  S1 and S2 audible, No added heart sounds, No signs of ankle edema, or volume overload, No evidence of JVD, No crackles  Abdomen:   nontender  Extremities:  No edema  Pulses; palpable  Neuro: grossly normal      MEDICAL DECISION MAKING;    I agree with the above plan, which was planned by myself and discussed with NP.     Uptitrate antianginal therapy if he has more symptoms consider GI medication  Will sign off call with questions    Jamal Mcdaniel MD [facility] 10/14/20

## 2020-10-14 NOTE — CONSULTS
Pt seen ,examined,interviewed and chart reviewed. Please see the dictated consult for details     Imp :   1. CKD stage -3a A1- stable   2. likely has pulmo edema with CMP- and S/p cath- no intervention  3. underlying ASCVD/DM/ HTN/BPH etc     Plan:  1. Lasix 20 IV  BID  2. UA   3. Start acei later today or tomorrow  4. Daily wt'  5. low salt- good glycemic control  6. Follow clinically  7. If he is ready to go home- may resume home meds   8. CMP , mg in 1 wk  9.  F/U with me in 2 wks       Thanks for the consult    #[id number]

## 2020-10-14 NOTE — CONSULTS
range.    In general, his creatinine was running about 0.9 to 1.2 range in 2019. He did not have much proteinuria, even though he is diabetic. PAST MEDICAL HISTORY:  1.  CKD stage IIIA/A1. 2.  Atherosclerotic cardiovascular disease, of course he had bypass on  05/18/2020 with LIMA to LAD as well as saphenous vein graft to obtuse  marginal 2. He obviously had several stents prior to that. His last EF  was about 35% to 40% or so. He did have decompensated heart failure  before. 3.  Apparently, he had Guillain-Glendale syndrome about four years ago or  so. He does not have much residual symptoms at this time. 4.  He also he had a CVA in the distant past without much residual  symptoms. 5.  Diabetes mellitus type 2, diagnosed about five years ago, did not  have any proteinuria and no known retinopathy. 6.  Hypertension. 7.  Gastroesophageal reflux disorder. 8.  Hyperlipidemia. HABITS:  He quit smoking about five years ago, has roughly 40-pack-year  history. He used to drink a lot, but quit a couple of years ago. No  history of illicit drug abuse. PAST SURGICAL HISTORY:  Of course CABG as mentioned earlier x2. Several  stents prior to that of coronary artery disease, also had orthopedic  surgery in the distant past as well as surgery for his fingers injury. SOCIAL HISTORY:  The patient is  for almost 20 years. He is  living with his wife here in town. He does have three biological  children. 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 He does have some activities of daily  living obviously, he drove himself to the emergency room. FAMILY MEDICAL HISTORY:  Apparently cancer runs in the family, mainly  the GI. No advanced kidney disease. Nobody is on dialysis in his  family. ALLERGIES:  He is listed allergic to AUGMENTIN, CODEINE, and IBUPROFEN,  might be an adverse reaction rather than allergic reaction.     HOME MEDICATIONS: According to Epic, of course I cannot verify the  authenticity:  Lisinopril 2.5 mg daily, Coreg 12.5 mg twice a day, Lasix  20 mg twice a day. This is antihypertensive and diuretic agent for  diabetes. He takes glimepiride. His other medications include Protonix  40 mg daily, Plavix, Lipitor, bronchodilator, Colace, sertraline, and  Flomax. CURRENT MEDICATIONS:  Here in the hospital, he was given some IV fluid,  but that has been discontinued. He is on Norvasc 2.5 mg, atorvastatin,  aspirin, Plavix, Protonix, sertraline, and Flomax. REVIEW OF SYSTEMS:  He does not have any chest pain at this time. Obviously, it was his chest pain and shoulder pain. He feels like he  has some shortness of breath and wheezing. No fever, chills, or rigor. Rest of the review of systems is negative or as in previous paragraph. PHYSICAL EXAMINATION:  VITAL SIGNS:  At the time of examination revealed temperature, T-max was  99, blood pressure 160s/70s, pulse 71, respiratory rate 19, saturating  about 96%. GENERAL:  The patient is without any acute distress. HEENT:  Head and Neck:  Normocephalic and atraumatic. Eyes:  No  conjunctival pallor. Ears, Mouth, and Throat:  Normal oral mucosa. CARDIOVASCULAR:  Seems regular rate and rhythm. RESPIRATORY:  He does have diffuse expiratory wheeze. He has a  well-healed scar from the previous sternotomy. ABDOMEN:  Soft. EXTREMITIES:  No edema. LABORATORY VALUES AND ANCILLARY SERVICES:  His creatinine was 1.3. Electrolytes are okay. His CBC has normal H and H. IMPRESSION:  An 70-year-old male with CKD stage III, had cardiac cath. 1.  CKD stage IIIA/A1, stable. We will watch for any tubular injury. 2.  Likely he has pulmonary edema with underlying cardiomyopathy and  atherosclerotic cardiovascular disease. He had a cath for  non-ST-elevation MI, apparently normal intervention needed. I did talk  to Dr. Edmond Garduno.   3.  Underlying atherosclerotic cardiovascular disease, diabetes,  hypertension, BPH, etc.    PLAN:  We will go ahead and give him some IV Lasix. We will get a UA. Probably okay start ACE inhibitors today or tomorrow. Daily weight, low  salt, good glycemic control, follow clinically. He feels otherwise  ready to go home, which will be okay with me. He can resume his home  medications, good diet, and lifestyle. Get his CMP and magnesium in one  week and I can see him in my office in two weeks.         Vandana Bethea MD    D: 10/14/2020 7:40:48       T: 10/14/2020 8:36:20     [email or web address]  Job#: [id number]     Doc#: [id number]    CC:

## 2020-10-14 NOTE — PROGRESS NOTES
This Cath Lab nurse attempted to educate the patient post STEMI. Patient was asleep. Stoplight reports regarding CHF and Pneumonia left at bedside.

## 2020-10-14 NOTE — DISCHARGE SUMMARY
Discharge Summary    Name:  Va Stewart /Age/Sex: 1960  (63 y.o. male)   MRN & CSN:  [id number] & [id number] Admission Date/Time: 10/13/2020 10:04 AM   Attending:  Joyce Cantu MD Discharging Physician: Joyce Cantu MD     Hospital Course:   Va Stewart is a 63 y.o.  male who presented to the hospital with a complaint of chest pain. He underwent two-vessel CABG in May 2020. EKG showed ST segment elevation on leads II, III and aVF and T wave inversion on lead I and lead aVL. Troponin was normal at the time of admission. Patient was immediately taken to the Cath Lab. He was noted to have severe native coronary artery disease which was unchanged from previously. He was noted to have patent CABG grafts. Stents were not placed. He was continued on Lipitor, Coreg, aspirin and Plavix. Patient did not have any chest pain at the time of discharge. He was discharged in stable condition    CKD 3---CR at baseline    DM2--- hemoglobin A1c 6.4 on 10/13/20. Currently taking glimepiride. He used to be on metformin but no longer takes metformin. He was taken off by his PCP. Patient did not provide why metformin was discontinued. GERD---he had been taking Protonix 40 p.o. twice daily for almost 2 years. I changed the Protonix to 40 daily at the time of discharge. I encouraged the patient to discuss with his primary care doctor to see if Protonix could be discontinued and his reflux can be managed conservatively. Hypertension----BP suboptimally controlled. He is currently taking Lasix 20 p.o. twice daily, Coreg 12.5 twice daily and lisinopril 2.5 mg daily. Lisinopril increased to 20 mg daily at the time of discharge. Patient instructed to follow-up with his primary care doctor for further antihypertensive regimen adjustment. Chronic systolic congestive heart failure---EF 35 to 40% in May 2020. No exacerbation. He is currently on Coreg 12.5 twice daily.   He has been taking lisinopril 2.5 mg daily. Lisinopril increased to 20 mg daily at the time of discharge. Obesity--- 204 LBS, BMI 31.95    The patient expressed appropriate understanding of and agreement with the discharge recommendations, medications, and plan.      Consults this admission:  IP CONSULT TO CARDIOLOGY  IP CONSULT TO HOSPITALIST  IP CONSULT TO [address] [place] [address]    Discharge Instruction:   Follow-up with nephrology and PCP    Diet:  diabetic diet   Activity: activity as tolerated  Disposition: Discharged to:   [x]Home, [][facility], []SNF, []Acute Rehab, []Hospice   Condition on discharge: Stable    Discharge Medications:      Sarah Boykin   Home Medication Instructions DYI:[id number]    Printed on:10/14/20 5287   Medication Information                      albuterol-ipratropium (COMBIVENT RESPIMAT)  MCG/ACT AERS inhaler  Inhale 1 puff into the lungs every 6 hours Dispense if cheaper than atrovent inhaler             Alcohol Swabs (ALCOHOL PADS) 70 % PADS  1 Units by Does not apply route 2 times daily             aspirin 81 MG chewable tablet  Take 1 tablet by mouth daily             atorvastatin (LIPITOR) 80 MG tablet  Take 1 tablet by mouth daily             blood glucose monitor strips  1 strip by Other route 2 times daily for 180 doses             Blood Glucose Monitoring Suppl (BLOOD GLUCOSE MONITOR SYSTEM) w/Device KIT  Use twice per day             Blood Pressure KIT  Use prn             carvedilol (COREG) 12.5 MG tablet  Take 1 tablet by mouth 2 times daily             clopidogrel (PLAVIX) 75 MG tablet  Take 1 tablet by mouth daily             docusate sodium (COLACE, DULCOLAX) 100 MG CAPS  Take 100 mg by mouth daily             furosemide (LASIX) 20 MG tablet  Take 1 tablet by mouth 2 times daily             glimepiride (AMARYL) 2 MG tablet  Take 1 tablet by mouth every morning             Hospital Bed MISC  by Does not apply route             ipratropium (ATROVENT HFA) 17 MCG/ACT inhaler  Inhale 2 puffs into the lungs every 4 hours (while awake) for 14 days             Lancets MISC  1 each by Does not apply route 2 times daily for 180 doses             lisinopril (PRINIVIL;ZESTRIL) 2.5 MG tablet  Take 1 tablet by mouth daily             pantoprazole (PROTONIX) 40 MG tablet  Take 1 tablet by mouth 2 times daily (before meals)             sertraline (ZOLOFT) 50 MG tablet  Take 1 tablet by mouth daily             tamsulosin (FLOMAX) 0.4 MG capsule  Take 1 capsule by mouth daily                 Objective Findings at Discharge:   BP (!) 150/71   Pulse 69   Temp 98.9 °F (37.2 °C) (Oral)   Resp 16   Ht 5' 7\" (1.702 m)   Wt 204 lb (92.5 kg)   SpO2 97%   BMI 31.95 kg/m²            PHYSICAL EXAM   GEN Awake male, laying flat in bed. Nontoxic-appearing. Answers questions appropriate. EYES no eye discharge. Ocular muscles intact. HENT membranes moist.  No nasal discharge. NECK Supple  RESP clear to auscultation bilateral.  No wheezing. No crackles. CARDIO/VASC RRR. No murmur. No pitting lower extremity edema. GI abdomen soft. Nontender. Nondistended.  no York in place. MSK No gross joint deformities. SKIN Normal coloration, warm, dry. NEURO Cranial nerves appear grossly intact, normal speech, no lateralizing weakness.   PSYCH Awake, alert, oriented     BMP/CBC  Recent Labs     10/13/20  1001 10/14/20  0344    138   K 4.2 4.0    105   CO2 27 23   BUN 18 19   CREATININE 1.3 1.3   WBC 9.2  --    HCT 48.0  --      --        IMAGING:  All imaging reviewed before discharge    Discharge Time of 33 minutes    Electronically signed by Martha Thomason MD on 10/14/2020 at 9:48 AM

## 2020-10-15 ENCOUNTER — APPOINTMENT (OUTPATIENT)
Dept: CARDIAC REHAB | Age: 60
End: 2020-10-15
Payer: MEDICARE

## 2020-10-15 ENCOUNTER — CARE COORDINATION (OUTPATIENT)
Dept: CASE MANAGEMENT | Age: 60
End: 2020-10-15

## 2020-10-15 NOTE — CARE COORDINATION
AM [address] [facility]   11/16/2020  8:00 AM 1200 [facility] CARDIAC REHAB EXERCISE [address] [facility]   11/17/2020  8:00 AM 1200 [facility] CARDIAC REHAB EXERCISE [address] [place] [address]   11/18/2020 10:30 AM Arturo Amaya  [facility]   11/19/2020  8:00 AM 1200 [facility] CARDIAC REHAB EXERCISE [address] 1000 [facility]   11/23/2020  8:00 AM 1200 [facility] CARDIAC REHAB EXERCISE [address] 1000 [facility]   11/24/2020  8:00 AM 1200 [facility] CARDIAC REHAB EXERCISE [address] 1000 [facility]   11/26/2020  8:00 AM [address] [place] [address]   11/30/2020  8:00 AM 1200 [facility] CARDIAC REHAB EXERCISE [address] 1000 [facility]   12/1/2020  8:00 AM 1200 [facility] CARDIAC REHAB EXERCISE [address] 1000 [facility]   12/3/2020  8:00 AM 1200 [facility] CARDIAC REHAB EXERCISE [address] 1000 [facility]   12/7/2020  8:00 AM 1200 [facility] CARDIAC REHAB EXERCISE [address] 1000 [facility]   12/8/2020  8:00 AM 1200 [facility] CARDIAC REHAB EXERCISE [address]       Betty Mancuso RN

## 2020-10-16 ENCOUNTER — CARE COORDINATION (OUTPATIENT)
Dept: CASE MANAGEMENT | Age: 60
End: 2020-10-16

## 2020-10-16 ENCOUNTER — TELEPHONE (OUTPATIENT)
Dept: FAMILY MEDICINE CLINIC | Age: 60
End: 2020-10-16

## 2020-10-16 NOTE — CARE COORDINATION
[facility] Transitions Initial Follow Up Call    Call within 2 business days of discharge: Yes    Patient: Liza Holder Patient : 1960   MRN: [id number]  Reason for Admission:  CP; STEMI  Discharge Date: 10/14/20 RARS: Readmission Risk Score: 16      Last Discharge [facility]       Complaint Diagnosis Description Type Department Provider    10/13/20 Chest Pain; Shortness of Breath; Hypertension ST elevation myocardial infarction (STEMI), unspecified artery ([address] . .. ED to Hosp-Admission (Discharged) (ADMITTED) Gagandeep Lloyd MD; Remberto Andino. .. Facility: [facility]    Follow Up:  COVID-19 Risk Monitoring:    COVID-19:  Not tested. Attempted patient contact x 2. No answer to phone. Message left with CTN contact information and request for return call. Hospital follow up appointment noted 10/19.           Future Appointments   Date Time Provider Carmen Díaz   10/19/2020  8:00 AM Garrett Tellez   10/19/2020 10:15 AM Liz Mustafa MD [facility]   10/20/2020  8:00 AM 1200 [facility] CARDIAC REHAB EXERCISE [address] 1000 [facility]   10/22/2020  8:00 AM 1200 [facility] CARDIAC REHAB EXERCISE [address] 1000 [facility]   10/26/2020  8:00 AM 1200 [facility] CARDIAC REHAB EXERCISE [address] 1000 [facility]   10/27/2020  8:00 AM 1200 [facility] CARDIAC REHAB EXERCISE [address] 1000 [facility]   10/29/2020  8:00 AM 1200 [facility] CARDIAC REHAB EXERCISE [address] [facility]   2020  8:00 AM 1200 [facility] CARDIAC REHAB EXERCISE [address] [facility]   11/3/2020  8:00 AM 1200 [facility] CARDIAC REHAB EXERCISE [address] [facility] [address] 1000 [facility]   2020  8:00 AM 1200 [facility] CARDIAC REHAB EXERCISE [address] [facility] [address] 1000 [facility]   2020  8:30 AM RESEARCH [address]   2020  8:00 AM 1200 [facility] CARDIAC REHAB EXERCISE [address] 1000 [facility]   11/10/2020  8:00 AM 1200 [facility] CARDIAC REHAB EXERCISE [address] [facility] [address] 1000 [facility]   2020  8:00 AM [address] EXERCISE [address] 1000 [facility]   2020

## 2020-10-16 NOTE — TELEPHONE ENCOUNTER
[facility] Transitions Initial Follow Up Call    Outreach made within 2 business days of discharge: Yes     Patient: Ambrosio Cuevas Patient : 1960   MRN: [id number]  Reason for Admission: Chest Pain   Discharge Date: 10/14/20       Spoke with: Olivia Poon     Discharge department/facility [facility]     TCM Interactive Patient Contact:  Was patient able to fill all prescriptions: YES  Was patient instructed to bring all medications to the follow-up visit: YES   Is patient taking all medications as directed in the discharge summary?  YES   Does patient understand their discharge instructions: YES   Does patient have questions or concerns that need addressed prior to 7-14 day follow up office visit: NO     Scheduled appointment with PCP within 7-14 days    Follow Up  Future Appointments   Date Time Provider Carmen Díaz   10/19/2020  8:00 AM 1153 [facility]   10/20/2020  8:00 AM [address] 1000 [facility]   10/22/2020  8:00 AM [address] 1000 [facility]   10/26/2020  8:00 AM [address] 1000 [facility]   10/27/2020  8:00 AM [address] 1000 [facility]   10/29/2020  8:00 AM [address] 1000 [facility]   2020  8:00 AM [address] 1000 [facility]   11/3/2020  8:00 AM [address] 1000 [facility]   2020  8:00 AM [address] [place] [address]   2020  8:30 AM RESEARCH  [facility] [address] [facility]   2020  8:00 AM [address] 1000 [facility]   11/10/2020  8:00 AM [address] 1000 [facility]   2020  8:00 AM [address] 1000 [facility]   2020  8:00 AM [address] 1000 [facility]   2020  8:00 AM [address] [place] [address] 11/18/2020 10:30 AM Anup Yu MD [facility]   11/19/2020  8:00 AM [facility] CARDIAC REHAB EXERCISE [facility]   11/23/2020  8:00 AM [facility] CARDIAC REHAB EXERCISE [address] [facility]   11/24/2020  8:00 AM [address] [place] [address] Road 1000 [facility]   11/26/2020  8:00 AM [address] [place] [address] Road 1000 [facility]   11/30/2020  8:00 AM [facility] CARDIAC REHAB EXERCISE [facility] CAR [address] [facility]   12/1/2020  8:00 AM [facility] CARDIAC REHAB EXERCISE [facility] CAR [address] [facility]   12/3/2020  8:00 AM [facility] CARDIAC REHAB EXERCISE [facility] CAR [address] [facility]   12/7/2020  8:00 AM [facility] CARDIAC REHAB EXERCISE [facility] CAR [address] [facility]   12/8/2020  8:00 AM [facility] CARDIAC REHAB EXERCISE [address] [place], [place]

## 2020-10-19 ENCOUNTER — APPOINTMENT (OUTPATIENT)
Dept: CARDIAC REHAB | Age: 60
End: 2020-10-19
Payer: MEDICARE

## 2020-10-19 ENCOUNTER — OFFICE VISIT (OUTPATIENT)
Dept: FAMILY MEDICINE CLINIC | Age: 60
End: 2020-10-19
Payer: MEDICARE

## 2020-10-19 ENCOUNTER — CARE COORDINATION (OUTPATIENT)
Dept: CASE MANAGEMENT | Age: 60
End: 2020-10-19

## 2020-10-19 VITALS
WEIGHT: 205.6 LBS | TEMPERATURE: 97.1 F | SYSTOLIC BLOOD PRESSURE: 140 MMHG | DIASTOLIC BLOOD PRESSURE: 98 MMHG | HEART RATE: 68 BPM | BODY MASS INDEX: 32.2 KG/M2 | OXYGEN SATURATION: 99 %

## 2020-10-19 PROBLEM — I16.1 HYPERTENSIVE EMERGENCY: Status: RESOLVED | Noted: 2018-03-14 | Resolved: 2020-10-19

## 2020-10-19 PROCEDURE — 99495 TRANSJ CARE MGMT MOD F2F 14D: CPT | Performed by: FAMILY MEDICINE

## 2020-10-19 PROCEDURE — 1111F DSCHRG MED/CURRENT MED MERGE: CPT | Performed by: FAMILY MEDICINE

## 2020-10-19 RX ORDER — LISINOPRIL 40 MG/1
40 TABLET ORAL DAILY
Qty: 90 TABLET | Refills: 3 | Status: SHIPPED | OUTPATIENT
Start: 2020-10-19 | End: 2021-06-04 | Stop reason: SDUPTHER

## 2020-10-19 RX ORDER — TAMSULOSIN HYDROCHLORIDE 0.4 MG/1
0.4 CAPSULE ORAL DAILY
Status: ON HOLD | COMMUNITY
End: 2022-03-14 | Stop reason: SDUPTHER

## 2020-10-19 NOTE — PROGRESS NOTES
Post-Discharge Transitional Care Management Services or Hospital Follow Up      Veronique Newell   YOB: 1960    Date of Office Visit:  10/19/2020  Date of Hospital Admission: 10/13/20  Date of Hospital Discharge: 10/14/20  Risk of hospital readmission (high >=14%. Medium >=10%) :Readmission Risk Score: 16      Care management risk score Rising risk (score 2-5) and Complex Care (Scores >=6): 11     Non face to face  following discharge, date last encounter closed (first attempt may have been earlier): 10/16/2020  2:24 PM    Call initiated 2 business days of discharge: Yes    Patient Active Problem List   Diagnosis    CAD (coronary artery disease)    History of PTCA    History of acute inferior wall myocardial infarction    Chest pain    B12 deficiency    Controlled type 2 diabetes mellitus with diabetic nephropathy, without long-term current use of insulin ([address]    Fatty liver    Colon, diverticulosis    Renal cyst    Adrenal adenoma    Hyperlipidemia LDL goal <70    Obesity (BMI 30-39. 9)    Cerebrovascular accident (CVA) due to thrombosis of left middle cerebral artery (HCC)    Hemiparesis of right dominant side as late effect of cerebral infarction ([address]    Gait disturbance    Essential hypertension    Hemiparesis affecting right side as late effect of cerebrovascular accident (CVA) ([address]    Shortness of breath    Gastroesophageal reflux disease    Acute ST elevation myocardial infarction (STEMI) ([facility])    Unstable angina (HCC)    Non-ST elevation myocardial infarction (NSTEMI) ([address]    Hypertensive urgency    Frequent PVCs    Ischemic cardiomyopathy    History of Guillain-Modoc syndrome    Acute pain of left shoulder    CVA (cerebral vascular accident) ([address]    Acute cerebrovascular accident (CVA) ([address]    Dysarthria due to recent stroke    Aphasia due to acute stroke ([address]    Complication of coronary artery bypass graft surgery    Uncontrolled pain    S/P CABG (coronary artery bypass graft)       Allergies   Allergen Reactions    Ibuprofen Other (See Comments)     Stomach irritation    Augmentin [Amoxicillin-Pot Clavulanate]     Codeine        Medications listed as ordered at the time of discharge from [facility]   Home Medication Instructions MERLIN:    Printed on:10/19/20 9463   Medication Information                      albuterol-ipratropium (COMBIVENT RESPIMAT)  MCG/ACT AERS inhaler  Inhale 1 puff into the lungs every 6 hours Dispense if cheaper than atrovent inhaler             Alcohol Swabs (ALCOHOL PADS) 70 % PADS  1 Units by Does not apply route 2 times daily             aspirin 81 MG chewable tablet  Take 1 tablet by mouth daily             atorvastatin (LIPITOR) 80 MG tablet  Take 1 tablet by mouth daily             blood glucose monitor strips  1 strip by Other route 2 times daily for 180 doses             Blood Glucose Monitoring Suppl (BLOOD GLUCOSE MONITOR SYSTEM) w/Device KIT  Use twice per day             Blood Pressure KIT  Use prn             carvedilol (COREG) 12.5 MG tablet  Take 1 tablet by mouth 2 times daily             clopidogrel (PLAVIX) 75 MG tablet  Take 1 tablet by mouth daily             docusate sodium (COLACE, DULCOLAX) 100 MG CAPS  Take 100 mg by mouth daily             furosemide (LASIX) 20 MG tablet  Take 1 tablet by mouth 2 times daily             glimepiride (AMARYL) 2 MG tablet  Take 1 tablet by mouth every morning             Hospital Bed MISC  by Does not apply route             Lancets MISC  1 each by Does not apply route 2 times daily for 180 doses             lisinopril (PRINIVIL;ZESTRIL) 40 MG tablet  Take 1 tablet by mouth daily             pantoprazole (PROTONIX) 40 MG tablet  Take 1 tablet by mouth daily             sertraline (ZOLOFT) 50 MG tablet  Take 1 tablet by mouth daily             tamsulosin (FLOMAX) 0.4 MG capsule  Take 0.4 mg by mouth daily                   Medications marked \"taking\" at this time  Outpatient Medications Marked as Taking for the 10/19/20 encounter (Office Visit) with Gabriella Kim MD   Medication Sig Dispense Refill    tamsulosin (FLOMAX) 0.4 MG capsule Take 0.4 mg by mouth daily      lisinopril (PRINIVIL;ZESTRIL) 40 MG tablet Take 1 tablet by mouth daily 90 tablet 3    pantoprazole (PROTONIX) 40 MG tablet Take 1 tablet by mouth daily 30 tablet 1    clopidogrel (PLAVIX) 75 MG tablet Take 1 tablet by mouth daily 90 tablet 3    carvedilol (COREG) 12.5 MG tablet Take 1 tablet by mouth 2 times daily 180 tablet 3    furosemide (LASIX) 20 MG tablet Take 1 tablet by mouth 2 times daily 60 tablet 1    atorvastatin (LIPITOR) 80 MG tablet Take 1 tablet by mouth daily 90 tablet 3    albuterol-ipratropium (COMBIVENT RESPIMAT)  MCG/ACT AERS inhaler Inhale 1 puff into the lungs every 6 hours Dispense if cheaper than atrovent inhaler 1 Inhaler 2    docusate sodium (COLACE, DULCOLAX) 100 MG CAPS Take 100 mg by mouth daily      sertraline (ZOLOFT) 50 MG tablet Take 1 tablet by mouth daily 90 tablet 3    glimepiride (AMARYL) 2 MG tablet Take 1 tablet by mouth every morning 90 tablet 3    Blood Glucose Monitoring Suppl (BLOOD GLUCOSE MONITOR SYSTEM) w/Device KIT Use twice per day 1 kit 0    blood glucose monitor strips 1 strip by Other route 2 times daily for 180 doses 180 strip 3    Lancets MISC 1 each by Does not apply route 2 times daily for 180 doses 180 each 3    Alcohol Swabs (ALCOHOL PADS) 70 % PADS 1 Units by Does not apply route 2 times daily 180 each 3    aspirin 81 MG chewable tablet Take 1 tablet by mouth daily 30 tablet 3        Medications patient taking as of now reconciled against medications ordered at time of hospital discharge: Yes    Chief Complaint   Patient presents with    Follow-Up from Hospital     chest pain        History of Present illness - Follow up of Hospital diagnosis(es):chest pain, HTN  Inpatient course: Discharge summary reviewed- see chart.    Interval history/Current status: no chest pain, but lots of fatigue. Wife says he sleeps 20 hours per day. He does snore, but not sure if he stops breathing. No chest pain, but he does not know if he had an MI or not. Medications were not changed except his lisinopril was increased to 20 mg    Physical therapy had called the office last week. Patient's systolic blood pressure was 170. He was told to go to the walk-in clinic or emergency room. He did not go. A comprehensive review of systems was negative except for what was noted in the HPI. Vitals:    10/19/20 1020 10/19/20 1029   BP: (!) 140/98 (!) 140/98   Site: Left Upper Arm    Position: Sitting    Cuff Size: Medium Adult    Pulse: 68    Temp: 97.1 °F (36.2 °C)    TempSrc: Infrared    SpO2: 99%    Weight: 205 lb 9.6 oz (93.3 kg)      Body mass index is 32.2 kg/m².    Wt Readings from Last 3 Encounters:   10/19/20 205 lb 9.6 oz (93.3 kg)   10/13/20 204 lb (92.5 kg)   09/24/20 205 lb 12.8 oz (93.4 kg)     BP Readings from Last 3 Encounters:   10/19/20 (!) 140/98   10/14/20 (!) 150/71   09/24/20 126/84        Physical Exam:  General Appearance: alert and oriented to person, place and time, well developed and well- nourished, in no acute distress  Skin: warm and dry, no rash or erythema  Head: normocephalic and atraumatic  Eyes: pupils equal, round, and reactive to light, extraocular eye movements intact, conjunctivae normal  ENT: tympanic membrane, external ear and ear canal normal bilaterally, nose without deformity, nasal mucosa and turbinates normal without polyps  Neck: supple and non-tender without mass, no thyromegaly or thyroid nodules, no cervical lymphadenopathy  Pulmonary/Chest: clear to auscultation bilaterally- no wheezes, rales or rhonchi, normal air movement, no respiratory distress  Cardiovascular: normal rate, regular rhythm, normal S1 and S2, no murmurs, rubs, clicks, or gallops,t, no carotid bruits  Abdomen: soft, non-tender, non-distended, normal bowel sounds, no masses or organomegaly  Extremities: no cyanosis, clubbing or edema  Musculoskeletal: normal range of motion, no joint swelling, deformity or tenderness  Neurologic:  no cranial nerve deficit, gait, coordination and speech normal.  Gait is slow     Diagnosis Orders   1. Hospital discharge follow-up  VT DISCHARGE MEDS RECONCILED W/ CURRENT OUTPATIENT MED LIST   2. Chest pain, unspecified type     3. Controlled type 2 diabetes mellitus with diabetic nephropathy, without long-term current use of insulin ([facility]     4. Hypersomnia  [facility]   5. Snoring  [facility]   6. Essential hypertension  Basic Metabolic Panel       Hospital notes reviewed. Per the cardiology notes, he was consulted because of STEMI. It appears that heart cath had not changed compared to earlier this year. Patient's medications were not changed except for the dose of his lisinopril which was increased. I increase the dose of his lisinopril from 20 mg to 40 mg.  I have asked him to get his kidney panel checked and 1 to 2 weeks. He is to follow-up with cardiology.     Medical Decision Making: moderate complexity

## 2020-10-19 NOTE — CARE COORDINATION
[facility] Transitions Initial Follow Up Call    Call within 2 business days of discharge: Yes    Patient: Kaylee Montero Patient : 1960   MRN: [id number]  Reason for Admission:  STEMI  Discharge Date: 10/14/20 RARS: Readmission Risk Score: 16      Last Discharge [facility]       Complaint Diagnosis Description Type Department Provider    10/13/20 Chest Pain; Shortness of Breath; Hypertension ST elevation myocardial infarction (STEMI), unspecified artery ([address] . .. ED to Hosp-Admission (Discharged) (ADMITTED) Jorge Luis Bryant MD; Charleen Mercado. .. Facility:   [facility]    Follow Up:  COVID-19 Risk Monitoring:    COVID-19 :  Not tested    Attempted to reach patient for Care Transition follow up. No answer to phone. Message left to inform of final call with CTN contact information and request for call back. No further outreach planned.        Future Appointments   Date Time Provider Carmen Díaz   10/20/2020  8:00 AM [address] [facility]   10/21/2020  9:15 AM Marti Oscar  [facility]   10/22/2020  8:00 AM 1200 [facility] CARDIAC REHAB EXERCISE 1200 [facility]   10/26/2020  8:00 AM 1200 [facility] CARDIAC REHAB EXERCISE [address] [facility]   10/27/2020  8:00 AM 1200 [facility] CARDIAC REHAB EXERCISE 1200 [facility]   10/29/2020  8:00 AM 1200 [facility] CARDIAC REHAB EXERCISE [facility]   2020  8:00 AM 1200 [facility] CARDIAC REHAB EXERCISE [facility]   11/3/2020  8:00 AM 1200 [facility] CARDIAC REHAB EXERCISE 1200 [facility]   2020  2:20 PM JERED Jack -  [facility]   2020  8:00 AM [address] REHAB EXERCISE 1200 [facility]   2020  8:30 AM RESEARCH [address]   2020  8:00 AM [facility]   11/10/2020  8:00 AM 1200 [facility] CARDIAC REHAB EXERCISE [address] [facility]   2020  8:00 AM 5801 [facility]   11/16/2020  8:00 AM 1200 [place], Susan. \Hospital Has a New Name and Outlook.\"" CARDIAC REHAB EXERCISE [address] 1000 [facility]   11/17/2020  8:00 AM 1200 [place], Susan. \Hospital [facility] a New Name and Outlook.\"" CARDIAC REHAB EXERCISE [facility] 1000 [facility]   11/18/2020 10:30 AM Liz Mustafa MD [facility]   11/19/2020  8:00 AM [address], Susan. \Hospital Has a New Name and Outlook.\"" CARDIAC REHAB EXERCISE 1200 [place], Susan. \Hospital Has a New Name and Outlook.\"" CAR 1000 [facility]   11/23/2020  8:00 AM 1200 [place], Susan. \Hospital Has a New Name and Outlook.\"" CARDIAC REHAB EXERCISE [address] 1000 [facility]   11/24/2020  8:00 AM 1200 [place], Susan. \Hospital Has a New Name and Outlook.\"" CARDIAC REHAB EXERCISE [address] 1000 [facility]   11/26/2020  8:00 AM [address] [place] [address]   11/30/2020  8:00 AM 1200 [place], Susan. \Hospital Has a New Name and Outlook.\"" CARDIAC REHAB EXERCISE [facility] CAR 1000 [facility]   12/1/2020  8:00 AM 1200 [place], Susan. \Hospital Has a New Name and Outlook.\"" CARDIAC REHAB EXERCISE [address] 1000 [facility]   12/3/2020  8:00 AM 1200 [place], Susan. \Hospital Has a New Name and Outlook.\"" CARDIAC REHAB EXERCISE [address] 1000 [facility]   12/7/2020  8:00 AM 1200 [place], Susan. \Hospital Has a New Name and Outlook.\"" CARDIAC REHAB EXERCISE [address] 1000 [facility]   12/8/2020  8:00 AM 1200 [place], Susan. \Hospital Has a New Name and Outlook.\"" CARDIAC REHAB EXERCISE [address]       Vida Hand RN

## 2020-10-19 NOTE — PATIENT INSTRUCTIONS
Early voting starts October sixth. VOTE VOTE VOTE VOTE VOTE VOTE        PLEASE BRING YOUR MEDICATIONS TO ALL APPOINTMENTS    The diagnoses and medications listed in this after visit summary may not be accurate at the time of check out. Please check MY CHART in 28-48 hours for possible corrections. Late cancellation policy: So that we can better accommodate people who are sick, please give our office 24 hour notice for an appointment cancellation. Thank you. Missed appointments: Your care is very important to us. It is important that you keep your scheduled appointments. Multiple missed appointments will lead to a dismissal from the office. Later arrival policy: If you are more than 10 minutes late for your appointment, you will be asked to reschedule. Please allow 5-7 business days for paperwork to be processed. It is important that you check your MY Chart messages, as they include appointment reminders, test results, and other important information. If you have forgotten your password, please call [phone].

## 2020-10-19 NOTE — Clinical Note
I might have given patient copy of referral for sleep study instead of BMP order.   Please make sure sleep study is order is sent appropriately

## 2020-10-20 ENCOUNTER — APPOINTMENT (OUTPATIENT)
Dept: CARDIAC REHAB | Age: 60
End: 2020-10-20
Payer: MEDICARE

## 2020-10-22 ENCOUNTER — HOSPITAL ENCOUNTER (OUTPATIENT)
Dept: CARDIAC REHAB | Age: 60
Setting detail: THERAPIES SERIES
Discharge: HOME | End: 2020-10-22
Payer: MEDICARE

## 2020-10-22 ENCOUNTER — APPOINTMENT (OUTPATIENT)
Dept: CARDIAC REHAB | Age: 60
End: 2020-10-22
Payer: MEDICARE

## 2020-10-22 PROCEDURE — 93798 PHYS/QHP OP CAR RHAB W/ECG: CPT

## 2020-10-26 ENCOUNTER — APPOINTMENT (OUTPATIENT)
Dept: CARDIAC REHAB | Age: 60
End: 2020-10-26
Payer: MEDICARE

## 2020-10-26 ENCOUNTER — HOSPITAL ENCOUNTER (OUTPATIENT)
Dept: CARDIAC REHAB | Age: 60
Setting detail: THERAPIES SERIES
Discharge: HOME | End: 2020-10-26
Payer: MEDICARE

## 2020-10-26 PROCEDURE — 93798 PHYS/QHP OP CAR RHAB W/ECG: CPT

## 2020-10-27 ENCOUNTER — APPOINTMENT (OUTPATIENT)
Dept: CARDIAC REHAB | Age: 60
End: 2020-10-27
Payer: MEDICARE

## 2020-10-27 ENCOUNTER — HOSPITAL ENCOUNTER (OUTPATIENT)
Dept: CARDIAC REHAB | Age: 60
Setting detail: THERAPIES SERIES
Discharge: HOME | End: 2020-10-27
Payer: MEDICARE

## 2020-10-27 PROCEDURE — 93798 PHYS/QHP OP CAR RHAB W/ECG: CPT

## 2020-10-29 ENCOUNTER — APPOINTMENT (OUTPATIENT)
Dept: CARDIAC REHAB | Age: 60
End: 2020-10-29
Payer: MEDICARE

## 2020-10-29 ENCOUNTER — HOSPITAL ENCOUNTER (OUTPATIENT)
Dept: CARDIAC REHAB | Age: 60
Setting detail: THERAPIES SERIES
Discharge: HOME | End: 2020-10-29
Payer: MEDICARE

## 2020-10-29 PROCEDURE — 93798 PHYS/QHP OP CAR RHAB W/ECG: CPT

## 2020-11-02 ENCOUNTER — APPOINTMENT (OUTPATIENT)
Dept: CARDIAC REHAB | Age: 60
End: 2020-11-02
Payer: MEDICARE

## 2020-11-02 ENCOUNTER — HOSPITAL ENCOUNTER (OUTPATIENT)
Dept: CARDIAC REHAB | Age: 60
Setting detail: THERAPIES SERIES
Discharge: HOME | End: 2020-11-02
Payer: MEDICARE

## 2020-11-02 PROCEDURE — 93798 PHYS/QHP OP CAR RHAB W/ECG: CPT

## 2020-11-03 ENCOUNTER — APPOINTMENT (OUTPATIENT)
Dept: CARDIAC REHAB | Age: 60
End: 2020-11-03
Payer: MEDICARE

## 2020-11-03 ENCOUNTER — HOSPITAL ENCOUNTER (OUTPATIENT)
Dept: CARDIAC REHAB | Age: 60
Setting detail: THERAPIES SERIES
Discharge: HOME | End: 2020-11-03
Payer: MEDICARE

## 2020-11-03 PROBLEM — I63.9 CVA (CEREBRAL VASCULAR ACCIDENT) (HCC): Status: RESOLVED | Noted: 2020-05-22 | Resolved: 2020-11-03

## 2020-11-03 PROCEDURE — 93798 PHYS/QHP OP CAR RHAB W/ECG: CPT

## 2020-11-04 ENCOUNTER — OFFICE VISIT (OUTPATIENT)
Dept: CARDIOLOGY CLINIC | Age: 60
End: 2020-11-04
Payer: MEDICARE

## 2020-11-04 VITALS
HEIGHT: 67 IN | BODY MASS INDEX: 32.02 KG/M2 | HEART RATE: 58 BPM | TEMPERATURE: 97 F | OXYGEN SATURATION: 99 % | DIASTOLIC BLOOD PRESSURE: 62 MMHG | SYSTOLIC BLOOD PRESSURE: 110 MMHG | WEIGHT: 204 LBS

## 2020-11-04 DIAGNOSIS — I25.5 ISCHEMIC CARDIOMYOPATHY: ICD-10-CM

## 2020-11-04 DIAGNOSIS — R06.02 SHORTNESS OF BREATH: ICD-10-CM

## 2020-11-04 LAB
ANION GAP SERPL CALCULATED.3IONS-SCNC: 12 MMOL/L (ref 3–16)
BUN BLDV-MCNC: 19 MG/DL (ref 7–20)
CALCIUM SERPL-MCNC: 9.3 MG/DL (ref 8.3–10.6)
CHLORIDE BLD-SCNC: 103 MMOL/L (ref 99–110)
CO2: 27 MMOL/L (ref 21–32)
CREAT SERPL-MCNC: 1.4 MG/DL (ref 0.8–1.3)
GFR AFRICAN AMERICAN: >60
GFR NON-AFRICAN AMERICAN: 51
GLUCOSE BLD-MCNC: 56 MG/DL (ref 70–99)
POTASSIUM SERPL-SCNC: 4.3 MMOL/L (ref 3.5–5.1)
PRO-BNP: 547 PG/ML (ref 0–124)
SODIUM BLD-SCNC: 142 MMOL/L (ref 136–145)

## 2020-11-04 PROCEDURE — 99214 OFFICE O/P EST MOD 30 MIN: CPT | Performed by: NURSE PRACTITIONER

## 2020-11-04 RX ORDER — FUROSEMIDE 20 MG/1
TABLET ORAL
Qty: 60 TABLET | Refills: 1
Start: 2020-11-04 | End: 2021-02-08

## 2020-11-04 ASSESSMENT — ENCOUNTER SYMPTOMS
SHORTNESS OF BREATH: 0
ABDOMINAL PAIN: 0
PHOTOPHOBIA: 0
DIARRHEA: 0
COLOR CHANGE: 0
BLOOD IN STOOL: 0
VOMITING: 0
NAUSEA: 0
CONSTIPATION: 0
COUGH: 0
SINUS PAIN: 0

## 2020-11-04 NOTE — PROGRESS NOTES
[address], [address] [facility],Third [address]  Phone: [phone]    Fax [phone]                  Bridger Gilbert MD, Niharika Garcia MD, [address] [facility], MD, MD Kristen Christina MD Oswald Sheriff, MD Margaree Bang, MD Israel Juneau, [facility]      April Johnson, APREULALIA Wrightter Bills, [facility], [facility]    CARDIOLOGY  NOTE      11/4/2020    RE: Navdeep Billingsley  (1960)                               TO:  Dr. Jethro Hanna MD  The primary cardiologist is Dr. Heaven Dumont    CC:   1. Essential hypertension    2. Ischemic cardiomyopathy    3. Shortness of breath    4. Status post left heart catheterization (LHC)      Patient denies all of the following:  Chest Pain  Palpitations  Edema  Dizziness  Syncope      HPI: Thank you for involving me in taking care of your patient Navdeep Billingsley, who is a  63y.o. year old male with a history as listed above. Patient is  active and does not exercise regularly. Patient is  compliant with his medications. Patient is here for post LHC follow up. He states that he is feeling well. His shortness of breath has not improved. He has not increased weight nor does he have swelling in his lower legs, but he is not sleeping well.     Vitals:    11/04/20 1432   BP: 110/62   Pulse: 58   Temp: 97 °F (36.1 °C)   SpO2: 99%       Current Outpatient Medications   Medication Sig Dispense Refill    tamsulosin (FLOMAX) 0.4 MG capsule Take 0.4 mg by mouth daily      lisinopril (PRINIVIL;ZESTRIL) 40 MG tablet Take 1 tablet by mouth daily 90 tablet 3    pantoprazole (PROTONIX) 40 MG tablet Take 1 tablet by mouth daily 30 tablet 1    clopidogrel (PLAVIX) 75 MG tablet Take 1 tablet by mouth daily 90 tablet 3    carvedilol (COREG) 12.5 MG tablet Take 1 tablet by mouth 2 times daily 180 tablet 3    furosemide (LASIX) 20 MG tablet Take 1 tablet by mouth 2 times daily 60 tablet 1    atorvastatin (LIPITOR) 80 MG 6/03-PTCA w/stents x2, 2/03-PTCA w/stent x3    History of PTCA 03/15/2018    LAD EF25%    Hx of cardiovascular stress test 04/25/2018    EF 28% Nuclear scintigraphy demonstartes inferior wall infarct.  Hx of cardiovascular stress test 01/27/2020    Abnormal Study. Large area of inferior & apical-lateral infarct. No significant reversible ischemia. Severly reduced LVEF 23%    HX OTHER MEDICAL 03/14/2018    lifevest  d/c 7/2/2018    Hyperlipidemia     Hypertension     Impaired glucose tolerance 6/3/2016    MUGA 07/09/2018    EF 47%    NSTEMI (non-ST elevated myocardial infarction) ([facility] [address] 03/15/2018    Patient in clinical research study 05/2018    3 year study dalcitripib vs placebo [phone]    Pneumonia due to organism     Renal cyst 5/23/2014    S/P cardiac catheterization 7/12/13 7/13-EF=55%. PATENT LAD AND RCA STENTS, OM2 50% stenosis, & Normal wall motion.  Stroke (cerebrum) [facility])     May 2016     Past Surgical History:   Procedure Laterality Date    CARDIAC CATHETERIZATION  7/13 7/13-EF=55%. PATENT LAD AND RCA STENTS, OM2 50% stenosis, & Normal wall motion.     COLONOSCOPY  8/21/14    colon polyp, diverticulosis, internal hemorrhoids    CORONARY ANGIOPLASTY  6/03 6/14/03- PTCA w stents x2 mid LAD, 2/03-PTCA w stents x3, RCA    CORONARY ARTERY BYPASS GRAFT N/A 5/18/2020    CABG CORONARY ARTERY BYPASS x 2, INTRAOPERATIVE NGA, INDUCED HYPOTHERMIA, LEFT LEG ENDOSCOPIC VEIN HARVEST performed by Kinza June MD at [address], COLON, DIAGNOSTIC  8/21/14    irregular GE junction, gastritis,hiatal hernia, gastric polyps    HAND SURGERY      rt knuckle    PTCA  03/2018    LAD    SHOULDER SURGERY Right 2011    bone spurs     Family History   Problem Relation Age of Onset    Hypertension Mother     High Cholesterol Mother     Alcohol Abuse Mother     Depression Mother     Alcohol Abuse Father     Stomach Cancer Father     Depression Sister     Diabetes Brother  Depression Sister     Stomach Cancer Brother     Alcohol Abuse Brother      Social History     Tobacco Use    Smoking status: Former Smoker     Packs/day: 2.00     Years: 45.00     Pack years: 90.00     Types: Cigarettes     Last attempt to quit: 2016     Years since quittin.5    Smokeless tobacco: Never Used   Substance Use Topics    Alcohol use: No     Alcohol/week: 0.0 standard drinks        Review of Systems   Constitutional: Negative for fatigue and fever. HENT: Negative for ear pain and sinus pain. Eyes: Negative for photophobia and visual disturbance. Respiratory: Negative for cough and shortness of breath. Cardiovascular: Negative for chest pain, palpitations and leg swelling. Gastrointestinal: Negative for abdominal pain, blood in stool, constipation, diarrhea, nausea and vomiting. Endocrine: Negative for polyphagia and polyuria. Genitourinary: Negative for decreased urine volume and difficulty urinating. Musculoskeletal: Negative for arthralgias and gait problem. Skin: Negative for color change and wound. Neurological: Negative for dizziness, syncope, weakness, light-headedness and headaches. Psychiatric/Behavioral: Negative for agitation. The patient is not hyperactive. Objective:      Physical Exam:  /62   Pulse 58   Temp 97 °F (36.1 °C)   Ht 5' 7\" (1.702 m)   Wt 204 lb (92.5 kg)   SpO2 99%   BMI 31.95 kg/m²   Wt Readings from Last 3 Encounters:   20 204 lb (92.5 kg)   10/19/20 205 lb 9.6 oz (93.3 kg)   10/13/20 204 lb (92.5 kg)     Body mass index is 31.95 kg/m². Physical Exam  Vitals signs reviewed. Constitutional:       General: He is not in acute distress. Appearance: Normal appearance. He is obese. He is not ill-appearing. HENT:      Head: Normocephalic and atraumatic. Eyes:      Conjunctiva/sclera: Conjunctivae normal.      Pupils: Pupils are equal, round, and reactive to light. Neck:      Musculoskeletal: Neck supple. No muscular tenderness. Vascular: No carotid bruit. Cardiovascular:      Rate and Rhythm: Normal rate and regular rhythm. Pulses: Normal pulses. Heart sounds: Normal heart sounds. No murmur. Pulmonary:      Effort: Pulmonary effort is normal. No respiratory distress. Breath sounds: Normal breath sounds. Musculoskeletal:         General: No swelling or deformity. Skin:     General: Skin is warm and dry. Capillary Refill: Capillary refill takes less than 2 seconds. Comments: right groin site is soft without hematoma, bruising or bleeding noted. Neurological:      Mental Status: He is alert and oriented to person, place, and time. Psychiatric:         Mood and Affect: Mood normal.         Behavior: Behavior normal.         Thought Content: Thought content normal.         Judgment: Judgment normal.         DATA:  Lab Results   Component Value Date    CKTOTAL 113 05/07/2016    TROPONINI <0.006 05/13/2014     BNP:  No results found for: BNP  PT/INR:  No results found for: DEONTICS  Lab Results   Component Value Date    LABA1C 6.4 (H) 10/13/2020    LABA1C 6.4 07/16/2020     Lab Results   Component Value Date    CHOL 88 10/14/2020    TRIG 71 10/14/2020    HDL 33 (L) 10/14/2020    LDLCALC 114 (H) 04/16/2019    LDLDIRECT 47 10/14/2020     Lab Results   Component Value Date    ALT 12 10/13/2020    AST 15 10/13/2020     TSH:  No results found for: TSH      Assessment/ Plan:     Shortness of breath  Increase lasix  Check BMP, BNP  Follow up in 2 weeks or sooner if worsens. Essential hypertension   Controlled   To continue Beta blocker, ACE, Diuretic   advised low salt diet   Last echo 10/2020  Summary   Left ventricular function is moderately abnormal, EF is estimated at 40%. (No change since previous echo on 5/17/2020 )   Mid inferior hypokinesis noted. Grade I diastolic dysfunction. Moderate mitral, mild tricuspid regurgitation is present. Dilatation of the aortic root.    No evidence of pericardial effusion. Ischemic cardiomyopathy   Appears slightly decompensated   Monitor weight daily   Continue BB, ACE Diuretic   Will increase lasix to two tabs in the morning and one in the afternoon for four days then resume 1 tab BID.  Check BMP and BNP.  Limit sodium to <2000mg a day   Limit water to <64 oz in a day   Call the office if a weight gain of >3 lbs in 2 days or > 5 lbs in a week is noted. Patient seen, interviewed and examined. Testing was reviewed. Patient is encouraged to exercise even a brisk walk for 30 minutes at least 3 to 4 times a week. Lifestyle and risk factor modificatons discussed. Various goals are discussed and questions answered. Continue current medications. Appropriate prescriptions are addressed. Questions answered and patient verbalizes understanding. Call for any problems, questions, or concerns. Pt is to follow up in 2 weeks for Cardiac management    I have spent 25 minutes of face to face time with the patient with more than 50% spent counseling and coordinating care for HTN, CHF, SOB, SP LHC, and follow up plan of care.     Electronically signed by JERED Castano CNP on 11/4/2020 at 2:43 PM

## 2020-11-04 NOTE — PATIENT INSTRUCTIONS
Increase lasix 20 mg tablets to 2 in the morning and 1 in the afternoon for 4 days then resume 1 in the morning and 1 in the evening

## 2020-11-05 ENCOUNTER — HOSPITAL ENCOUNTER (OUTPATIENT)
Dept: CARDIAC REHAB | Age: 60
Setting detail: THERAPIES SERIES
Discharge: HOME | End: 2020-11-05
Payer: MEDICARE

## 2020-11-05 ENCOUNTER — APPOINTMENT (OUTPATIENT)
Dept: CARDIAC REHAB | Age: 60
End: 2020-11-05
Payer: MEDICARE

## 2020-11-05 PROCEDURE — 93798 PHYS/QHP OP CAR RHAB W/ECG: CPT

## 2020-11-05 NOTE — ASSESSMENT & PLAN NOTE
 Appears slightly decompensated   Monitor weight daily   Continue BB, ACE Diuretic   Will increase lasix to two tabs in the morning and one in the afternoon for four days then resume 1 tab BID.  Check BMP and BNP.  Limit sodium to <2000mg a day   Limit water to <64 oz in a day   Call the office if a weight gain of >3 lbs in 2 days or > 5 lbs in a week is noted.

## 2020-11-05 NOTE — ASSESSMENT & PLAN NOTE
 Controlled   To continue Beta blocker, ACE, Diuretic   advised low salt diet   Last echo 10/2020  Summary   Left ventricular function is moderately abnormal, EF is estimated at 40%. (No change since previous echo on 5/17/2020 )   Mid inferior hypokinesis noted. Grade I diastolic dysfunction. Moderate mitral, mild tricuspid regurgitation is present. Dilatation of the aortic root. No evidence of pericardial effusion.

## 2020-11-09 ENCOUNTER — APPOINTMENT (OUTPATIENT)
Dept: CARDIAC REHAB | Age: 60
End: 2020-11-09
Payer: MEDICARE

## 2020-11-09 ENCOUNTER — HOSPITAL ENCOUNTER (OUTPATIENT)
Dept: CARDIAC REHAB | Age: 60
Setting detail: THERAPIES SERIES
Discharge: HOME | End: 2020-11-09
Payer: MEDICARE

## 2020-11-09 PROCEDURE — 93798 PHYS/QHP OP CAR RHAB W/ECG: CPT

## 2020-11-10 ENCOUNTER — APPOINTMENT (OUTPATIENT)
Dept: CARDIAC REHAB | Age: 60
End: 2020-11-10
Payer: MEDICARE

## 2020-11-10 ENCOUNTER — HOSPITAL ENCOUNTER (OUTPATIENT)
Dept: CARDIAC REHAB | Age: 60
Setting detail: THERAPIES SERIES
Discharge: HOME | End: 2020-11-10
Payer: MEDICARE

## 2020-11-10 PROCEDURE — 93798 PHYS/QHP OP CAR RHAB W/ECG: CPT

## 2020-11-12 ENCOUNTER — APPOINTMENT (OUTPATIENT)
Dept: CARDIAC REHAB | Age: 60
End: 2020-11-12
Payer: MEDICARE

## 2020-11-12 ENCOUNTER — HOSPITAL ENCOUNTER (OUTPATIENT)
Dept: SLEEP CENTER | Age: 60
Discharge: HOME | End: 2020-11-12
Payer: MEDICARE

## 2020-11-12 ENCOUNTER — HOSPITAL ENCOUNTER (OUTPATIENT)
Dept: CARDIAC REHAB | Age: 60
Setting detail: THERAPIES SERIES
Discharge: HOME | End: 2020-11-12
Payer: MEDICARE

## 2020-11-12 VITALS
SYSTOLIC BLOOD PRESSURE: 123 MMHG | HEART RATE: 68 BPM | HEIGHT: 67 IN | DIASTOLIC BLOOD PRESSURE: 72 MMHG | BODY MASS INDEX: 32.02 KG/M2 | OXYGEN SATURATION: 99 % | WEIGHT: 204 LBS

## 2020-11-12 PROBLEM — F17.210 CIGARETTE SMOKER: Status: ACTIVE | Noted: 2020-11-12

## 2020-11-12 PROBLEM — G47.10 HYPERSOMNIA: Status: ACTIVE | Noted: 2020-11-12

## 2020-11-12 PROBLEM — G47.33 OSA (OBSTRUCTIVE SLEEP APNEA): Status: ACTIVE | Noted: 2020-11-12

## 2020-11-12 PROCEDURE — 99205 OFFICE O/P NEW HI 60 MIN: CPT | Performed by: INTERNAL MEDICINE

## 2020-11-12 PROCEDURE — 93798 PHYS/QHP OP CAR RHAB W/ECG: CPT

## 2020-11-12 PROCEDURE — 99211 OFF/OP EST MAY X REQ PHY/QHP: CPT

## 2020-11-12 ASSESSMENT — SLEEP AND FATIGUE QUESTIONNAIRES
ESS TOTAL SCORE: 15
HOW LIKELY ARE YOU TO NOD OFF OR FALL ASLEEP WHILE SITTING AND TALKING TO SOMEONE: 0
HOW LIKELY ARE YOU TO NOD OFF OR FALL ASLEEP WHEN YOU ARE A PASSENGER IN A CAR FOR AN HOUR WITHOUT A BREAK: 3
HOW LIKELY ARE YOU TO NOD OFF OR FALL ASLEEP WHILE WATCHING TV: 0
HOW LIKELY ARE YOU TO NOD OFF OR FALL ASLEEP WHILE SITTING AND READING: 3
HOW LIKELY ARE YOU TO NOD OFF OR FALL ASLEEP WHILE SITTING QUIETLY AFTER LUNCH WITHOUT ALCOHOL: 3
HOW LIKELY ARE YOU TO NOD OFF OR FALL ASLEEP IN A CAR, WHILE STOPPED FOR A FEW MINUTES IN TRAFFIC: 0
HOW LIKELY ARE YOU TO NOD OFF OR FALL ASLEEP WHILE SITTING INACTIVE IN A PUBLIC PLACE: 3
HOW LIKELY ARE YOU TO NOD OFF OR FALL ASLEEP WHILE LYING DOWN TO REST IN THE AFTERNOON WHEN CIRCUMSTANCES PERMIT: 3

## 2020-11-12 NOTE — ASSESSMENT & PLAN NOTE
He has all the symptoms that are consistent with ANNA  Advised to go for the sleep study  Loose weight

## 2020-11-12 NOTE — CONSULTS
Neena Santos MD, Michael Abad MD, Lauren Ravi MD, Kath George MD, [facility]      [address], [address] [facility]   PH: [phone]  F: [phone]     Subjective:     Patient ID: Fady Reich is a 63 y.o. male, referred to the sleep center for   Chief Complaint   Patient presents with    Fatigue    Snoring   . Referring physician:  Dr. Cande Jacome    History:  Mr. Jordi Bonilla has been referred for ANNA. He has gained no weight in the last 5 years. He goes to bed at 8:30 PM and it takes about 30 mins to fall asleep. He snores and not sure if he stops breathing. He wakes up 3 times in the night to go to the bathroom. It takes about few mins to fall back to sleep. He never woke up choking or gasping for air, woke up with dry mouth and palpitations. He gets up at 6:30 AM and he is tired. He has no morning headaches. He is sleepy and tired during the day time. He has no RLS. He has no sleep paralysis, no cataplexy, no hyonogogic or hypnopompic hallucinations. He has no depression or anxiety. He has h/o smoking 1 pk per day for 40 years which he quit about 4 months. He has cough, no phlegm, no hemoptysis, no loss of weight, SOBOE walking 30 feet. He is not on oxygen. He is not on any inhalers. He had no flu vaccine. He had pneumovax. He is not a shift worker.        Social History     Socioeconomic History    Marital status:      Spouse name: Not on file    Number of children: Not on file    Years of education: Not on file    Highest education level: Not on file   Occupational History    Occupation:      Comment: full time   Social Needs    Financial resource strain: Not on file    Food insecurity     Worry: Not on file     Inability: Not on file   [employer] needs     Medical: Not on file     Non-medical: Not on file   Tobacco Use    Smoking status: Former Smoker Packs/day: 2.00     Years: 45.00     Pack years: 90.00     Types: Cigarettes     Last attempt to quit: 2016     Years since quittin.5    Smokeless tobacco: Never Used   Substance and Sexual Activity    Alcohol use: No     Alcohol/week: 0.0 standard drinks    Drug use: No    Sexual activity: Yes     Partners: Female   Lifestyle    Physical activity     Days per week: Not on file     Minutes per session: Not on file    Stress: Not on file   Relationships    Social connections     Talks on phone: Not on file     Gets together: Not on file     Attends Jewish service: Not on file     Active member of club or organization: Not on file     Attends meetings of clubs or organizations: Not on file     Relationship status: Not on file    Intimate partner violence     Fear of current or ex partner: Not on file     Emotionally abused: Not on file     Physically abused: Not on file     Forced sexual activity: Not on file   Other Topics Concern    Not on file   Social History Narrative    Wears glasses       Prior to Admission medications    Medication Sig Start Date End Date Taking?  Authorizing Provider   furosemide (LASIX) 20 MG tablet 2 tabs in the morning and 1 tab in the evening 20   Kathe Banda APRN - CNP   tamsulosin (FLOMAX) 0.4 MG capsule Take 0.4 mg by mouth daily    Historical Provider, MD   lisinopril (PRINIVIL;ZESTRIL) 40 MG tablet Take 1 tablet by mouth daily 10/19/20   Paramjit Castro MD   pantoprazole (PROTONIX) 40 MG tablet Take 1 tablet by mouth daily 10/14/20   Karina Washington MD   clopidogrel (PLAVIX) 75 MG tablet Take 1 tablet by mouth daily 20   Magnus Baird MD   carvedilol (COREG) 12.5 MG tablet Take 1 tablet by mouth 2 times daily 20   Magnus Baird MD   atorvastatin (LIPITOR) 80 MG tablet Take 1 tablet by mouth daily 20   Paramjit Castro MD   albuterol-ipratropium (COMBIVENT RESPIMAT)  MCG/ACT AERS inhaler Inhale 1 puff into the lungs every 6 hours Dispense if Hyperlipidemia     Hypertension     Impaired glucose tolerance 6/3/2016    MUGA 07/09/2018    EF 47%    NSTEMI (non-ST elevated myocardial infarction) ([facility] [address] 03/15/2018    Patient in clinical research study 05/2018    3 year study dalcitripib vs placebo [phone]    Pneumonia due to organism     Renal cyst 5/23/2014    S/P cardiac catheterization 7/12/13 7/13-EF=55%. PATENT LAD AND RCA STENTS, OM2 50% stenosis, & Normal wall motion.  Stroke (cerebrum) [facility])     May 2016       Past Surgical History:   Procedure Laterality Date    CARDIAC CATHETERIZATION  7/13 7/13-EF=55%. PATENT LAD AND RCA STENTS, OM2 50% stenosis, & Normal wall motion.  COLONOSCOPY  8/21/14    colon polyp, diverticulosis, internal hemorrhoids    CORONARY ANGIOPLASTY  6/03 6/14/03- PTCA w stents x2 mid LAD, 2/03-PTCA w stents x3, RCA    CORONARY ARTERY BYPASS GRAFT N/A 5/18/2020    CABG CORONARY ARTERY BYPASS x 2, INTRAOPERATIVE NGA, INDUCED HYPOTHERMIA, LEFT LEG ENDOSCOPIC VEIN HARVEST performed by Raul Hanks MD at [address] [place] [address], COLON, DIAGNOSTIC  8/21/14    irregular GE junction, gastritis,hiatal hernia, gastric polyps    HAND SURGERY      rt knuckle    PTCA  03/2018    LAD    SHOULDER SURGERY Right 2011    bone spurs       Family History   Problem Relation Age of Onset    Hypertension Mother     High Cholesterol Mother     Alcohol Abuse Mother     Depression Mother     Alcohol Abuse Father     Stomach Cancer Father     Depression Sister     Diabetes Brother     Depression Sister     Stomach Cancer Brother     Alcohol Abuse Brother          Objective:   /72   Pulse 68   Ht 5' 7\" (1.702 m)   Wt 204 lb (92.5 kg)   SpO2 99%   BMI 31.95 kg/m²   Body mass index is 31.95 kg/m².   Sleep Medicine 11/12/2020   Sitting and reading 3   Watching TV 0   Sitting, inactive in a public place (e.g. a theatre or a meeting) 3   As a passenger in a car for an hour without a break 3   Lying down to rest in the afternoon when circumstances permit 3   Sitting and talking to someone 0   Sitting quietly after a lunch without alcohol 3   In a car, while stopped for a few minutes in traffic 0   Total score 15   Neck circumference 16.5     {MALLAMPATI:3    Vitals:    11/12/20 1018   BP: 123/72   Pulse: 68   SpO2: 99%   Weight: 204 lb (92.5 kg)   Height: 5' 7\" (1.702 m)     Neck circumference: 16.5  Inches  [place] - Total score: 15    Gen: No distress. Eyes: PERRL. No sclera icterus. No conjunctival injection. ENT: No discharge. Pharynx clear. External appearance of ears and nose normal.OVERJET  Neck: Trachea midline. No obvious mass. Resp: No accessory muscle use. No crackles. No wheezes. No rhonchi. No dullness on percussion. CV: Regular rate. Regular rhythm. No murmur or rub. No edema. GI: Non-tender. Non-distended. No hernia. Skin: Warm, dry, normal texture and turgor. No nodule on exposed extremities. Lymph: No cervical LAD. No supraclavicular LAD. M/S: No cyanosis. No clubbing. No joint deformity. Psych: Oriented x 3. No anxiety. Awake. Alert. Intact judgement and insight. Mallampati Airway Classification:   []1 []2 [x]3 []4        Assessment and Plan     Diagnosis:    Problem List        Pulmonary Problems    SOBOE (shortness of breath on exertion)     Advised to loose weight  Quit smoking  PFT  6 MWT         ANNA (obstructive sleep apnea)     He has all the symptoms that are consistent with ANNA  Advised to go for the sleep study  Loose weight         Relevant Orders    Baseline Diagnostic Sleep Study       Other    Obesity (BMI 30-39. 9)     Advised to loose weight with diet and exercise           Relevant Medications    glimepiride (AMARYL) 2 MG tablet    Hypersomnia     Advised to go for the sleep study  Loose weight         Cigarette smoker     Advised to c.w quitting smoking  PFT  6 MWT  Low dose CT chest         Relevant Orders    CT LUNG SCREENING    Full PFT Study With Bronchodilator    6 Minute Walk Test                Additional Plan:     [x]  Sleep hygiene/ relaxation methods & CBTi principles review with patient   [x]  Avoid supine/back sleep until sleep study   [x]  Driving precautions   [x]  Medical consequences of untreated ANNA   [x]  Weight loss recommendations   [x]  Diet recommendations   [x]  Exercise   []  Advised to quit smoking       []  PFT referral   []  Bariatric Program referral      Follow-Up:    No follow-ups on file.     Electronically signed by Louie Crouch MD on 11/12/2020 at 10:46 AM

## 2020-11-16 ENCOUNTER — APPOINTMENT (OUTPATIENT)
Dept: CARDIAC REHAB | Age: 60
End: 2020-11-16
Payer: MEDICARE

## 2020-11-16 ENCOUNTER — HOSPITAL ENCOUNTER (OUTPATIENT)
Dept: CARDIAC REHAB | Age: 60
Setting detail: THERAPIES SERIES
Discharge: HOME | End: 2020-11-16
Payer: MEDICARE

## 2020-11-16 PROCEDURE — 93798 PHYS/QHP OP CAR RHAB W/ECG: CPT

## 2020-11-17 ENCOUNTER — APPOINTMENT (OUTPATIENT)
Dept: CARDIAC REHAB | Age: 60
End: 2020-11-17
Payer: MEDICARE

## 2020-11-18 ENCOUNTER — VIRTUAL VISIT (OUTPATIENT)
Dept: FAMILY MEDICINE CLINIC | Age: 60
End: 2020-11-18
Payer: MEDICARE

## 2020-11-18 VITALS — HEIGHT: 67 IN | WEIGHT: 204 LBS | BODY MASS INDEX: 32.02 KG/M2

## 2020-11-18 PROCEDURE — G0438 PPPS, INITIAL VISIT: HCPCS | Performed by: FAMILY MEDICINE

## 2020-11-18 ASSESSMENT — PATIENT HEALTH QUESTIONNAIRE - PHQ9
1. LITTLE INTEREST OR PLEASURE IN DOING THINGS: 0
SUM OF ALL RESPONSES TO PHQ QUESTIONS 1-9: 0
2. FEELING DOWN, DEPRESSED OR HOPELESS: 0
SUM OF ALL RESPONSES TO PHQ9 QUESTIONS 1 & 2: 0

## 2020-11-18 ASSESSMENT — LIFESTYLE VARIABLES: HOW OFTEN DO YOU HAVE A DRINK CONTAINING ALCOHOL: 0

## 2020-11-18 NOTE — PROGRESS NOTES
Medicare Annual Wellness Visit  Name: Rocío Silva Date: 2020   MRN: [id number] Sex: Male   Age: 63 y.o. Ethnicity: Non-/Non    : 1960 Race: Soto Ferrer is here for Medicare AWV    Screenings for behavioral, psychosocial and functional/safety risks, and cognitive dysfunction are all negative except as indicated below. These results, as well as other patient data from the [address] form, are documented in Flowsheets linked to this Encounter. Allergies   Allergen Reactions    Ibuprofen Other (See Comments)     Stomach irritation    Augmentin [Amoxicillin-Pot Clavulanate]     Codeine        Prior to Visit Medications    Medication Sig Taking?  Authorizing Provider   furosemide (LASIX) 20 MG tablet 2 tabs in the morning and 1 tab in the evening Yes JERED Castano - CNP   tamsulosin (FLOMAX) 0.4 MG capsule Take 0.4 mg by mouth daily Yes Historical Provider, MD   lisinopril (PRINIVIL;ZESTRIL) 40 MG tablet Take 1 tablet by mouth daily Yes Linsey De La Cruz MD   pantoprazole (PROTONIX) 40 MG tablet Take 1 tablet by mouth daily Yes Ryan Carbajal MD   clopidogrel (PLAVIX) 75 MG tablet Take 1 tablet by mouth daily Yes Anni Guillory MD   carvedilol (COREG) 12.5 MG tablet Take 1 tablet by mouth 2 times daily Yes Anni Guillory MD   atorvastatin (LIPITOR) 80 MG tablet Take 1 tablet by mouth daily Yes Linsey De La Cruz MD   albuterol-ipratropium (COMBIVENT RESPIMAT)  MCG/ACT AERS inhaler Inhale 1 puff into the lungs every 6 hours Dispense if cheaper than atrovent inhaler Yes Linsey De La Cruz [facility] by Does not apply route Yes Linsey De La Cruz MD   docusate sodium (COLACE, DULCOLAX) 100 MG CAPS Take 100 mg by mouth daily Yes David Chinchilla MD   sertraline (ZOLOFT) 50 MG tablet Take 1 tablet by mouth daily Yes Linsey De La Cruz MD   glimepiride (AMARYL) 2 MG tablet Take 1 tablet by mouth every morning Yes Linsey De La Cruz MD   Blood Glucose Monitoring Suppl (BLOOD GLUCOSE MONITOR SYSTEM) w/Device KIT Use twice per day Yes Donovan Haddad MD   Alcohol Swabs (ALCOHOL PADS) 70 % PADS 1 Units by Does not apply route 2 times daily Yes Donovan Haddad MD   aspirin 81 MG chewable tablet Take 1 tablet by mouth daily Yes MD Vineet   Blood Pressure KIT Use prn Yes Donovan Haddad MD   blood glucose monitor strips 1 strip by Other route 2 times daily for 180 doses  Donovan Haddad MD   Lancets MISC 1 each by Does not apply route 2 times daily for 180 doses  Donovan Haddad MD       Past Medical History:   Diagnosis Date    B12 deficiency     CAD (coronary artery disease)     GERD (gastroesophageal reflux disease)     Guillain Barr[address] syndrome ([facility] [address]     H/O echocardiogram 7/30/13, 6/16/10    7/13-EF 50-55% WNL. 6/10-EF53%, mod LVH, MARCI, RVE, trace PI, mild MR/TR, 6/09    Heart murmur     History of echocardiogram 10/07/2020    EF 40%, No changed since previous 5/2020, Mid inferior hypokinesis noted, Grade I DD, Mod MR, Mild TR, Dilatation of the aortic root, No pericardial effusion     History of myocardial infarction 2003    History of nuclear MUGA test 03/03/2020    EF 53%, Normal, No ICD needed    History of nuclear stress test 06/23/2020    EF 30%, study suggestive of abnormal myocardial perfusion.  History of PTCA 6/03;2/03 6/03-PTCA w/stents x2, 2/03-PTCA w/stent x3    History of PTCA 03/15/2018    LAD EF25%    Hx of cardiovascular stress test 04/25/2018    EF 28% Nuclear scintigraphy demonstartes inferior wall infarct.  Hx of cardiovascular stress test 01/27/2020    Abnormal Study. Large area of inferior & apical-lateral infarct. No significant reversible ischemia.   Severly reduced LVEF 23%    HX OTHER MEDICAL 03/14/2018    lifevest  d/c 7/2/2018    Hyperlipidemia     Hypertension     Impaired glucose tolerance 6/3/2016    MUGA 07/09/2018    EF 47%    NSTEMI (non-ST elevated myocardial infarction) ([facility] [address] 03/15/2018    Patient in clinical research study 05/2018    3 year study dalcitripib vs placebo [phone]    Pneumonia due to organism     Renal cyst 5/23/2014    S/P cardiac catheterization 7/12/13 7/13-EF=55%. PATENT LAD AND RCA STENTS, OM2 50% stenosis, & Normal wall motion.  Stroke (cerebrum) [facility])     May 2016       Past Surgical History:   Procedure Laterality Date    CARDIAC CATHETERIZATION  7/13 7/13-EF=55%. PATENT LAD AND RCA STENTS, OM2 50% stenosis, & Normal wall motion.     COLONOSCOPY  8/21/14    colon polyp, diverticulosis, internal hemorrhoids    CORONARY ANGIOPLASTY  6/03 6/14/03- PTCA w stents x2 mid LAD, 2/03-PTCA w stents x3, RCA    CORONARY ARTERY BYPASS GRAFT N/A 5/18/2020    CABG CORONARY ARTERY BYPASS x 2, INTRAOPERATIVE NGA, INDUCED HYPOTHERMIA, LEFT LEG ENDOSCOPIC VEIN HARVEST performed by Jj Andrew MD at [address], COLON, DIAGNOSTIC  8/21/14    irregular GE junction, gastritis,hiatal hernia, gastric polyps    HAND SURGERY      rt knuckle    PTCA  03/2018    LAD    SHOULDER SURGERY Right 2011    bone spurs       Family History   Problem Relation Age of Onset    Hypertension Mother     High Cholesterol Mother     Alcohol Abuse Mother     Depression Mother     Alcohol Abuse Father     Stomach Cancer Father     Depression Sister     Diabetes Brother     Depression Sister     Stomach Cancer Brother     Alcohol Abuse Brother        CareTeam (Including outside providers/suppliers regularly involved in providing care):   Patient Care Team:  Donovan Haddad MD as PCP - General (Family Medicine)  Donovan Haddad MD as PCP - [facility] Empaneled Provider  Owen Dobson MD as Consulting Physician (Cardiology)  Juju Turner MD (Neurology)    Wt Readings from Last 3 Encounters:   11/18/20 204 lb (92.5 kg)   11/12/20 204 lb (92.5 kg)   11/04/20 204 lb (92.5 kg)     Vitals:    11/18/20 1100   Weight: 204 lb (92.5 kg)   Height: 5' 7\" (1.702 m)     Body mass index is 31.95 Preventive Plan   Current Health Maintenance Status  Immunization History   Administered Date(s) Administered    Hepatitis B (Recombivax HB) 04/11/2014, 06/13/2014, 10/10/2014    Influenza Virus Vaccine 12/19/2014    Pneumococcal Conjugate 7-valent (Prevnar7) 01/01/2011    Pneumococcal Polysaccharide ([id number]) 07/16/2020    Tdap (Boostrix, Adacel) 04/11/2014        Health Maintenance   Topic Date Due    Hepatitis C screen  1960    Shingles Vaccine (1 of 2) 06/24/2009    Colon cancer screen colonoscopy  08/21/2017    Annual Wellness Visit (AWV)  05/29/2019    Low dose CT lung screening  08/20/2020    Diabetic foot exam  07/16/2021    A1C test (Diabetic or Prediabetic)  10/13/2021    Lipid screen  10/14/2021    Potassium monitoring  11/04/2021    Creatinine monitoring  11/04/2021    Diabetic retinal exam  10/01/2022    DTaP/Tdap/Td vaccine (2 - Td) 04/11/2024    Pneumococcal 0-64 years Vaccine  Completed    HIV screen  Completed    Hepatitis A vaccine  Aged Out    Hib vaccine  Aged Out    Meningococcal (ACWY) vaccine  Aged Out     Recommendations for [employer] Due: see orders and patient instructions/AVS.    Unable to obtain 3 vital signs due to patient not having equipment to take temperature/BP. Recommended screening schedule for the next 5-10 years is provided to the patient in written form: see Patient Instructions/AVS.    Ephraim Schmidt is a 63 y.o. male being evaluated by a Virtual Visit (audio visit) encounter to address concerns as mentioned above. A caregiver was present when appropriate. Due to this being a TeleHealth encounter (During [facility] public health emergency), evaluation of the following organ systems was limited: Vitals/Constitutional/EENT/Resp/CV/GI//MS/Neuro/Skin/Heme-Lymph-Imm.   Pursuant to the emergency declaration under the [address] [facility] [address] waiver authority and the [employer] and Response Supplemental Appropriations Act, this Virtual Visit was conducted with patient's (and/or legal guardian's) consent, to reduce the patient's risk of exposure to COVID-19 and provide necessary medical care. The patient (and/or legal guardian) has also been advised to contact this office for worsening conditions or problems, and seek emergency medical treatment and/or call 911 if deemed necessary. Patient identification was verified at the start of the visit: Yes    Total time spent for this encounter: Not billed by time    Services were provided through a audio synchronous discussion virtually to substitute for in-person clinic visit. Patient and provider were located at their individual homes. --Jeb To on 11/18/2020 at 11:04 AM    An electronic signature was used to authenticate this note. Yusuf Norman, 11/18/2020, performed the documented evaluation under the direct supervision of the attending physician.

## 2020-11-18 NOTE — PATIENT INSTRUCTIONS
Personalized Preventive Plan for Bill Breen - 11/18/2020  Medicare offers a range of preventive health benefits. Some of the tests and screenings are paid in full while other may be subject to a deductible, co-insurance, and/or copay. Some of these benefits include a comprehensive review of your medical history including lifestyle, illnesses that may run in your family, and various assessments and screenings as appropriate. After reviewing your medical record and screening and assessments performed today your provider may have ordered immunizations, labs, imaging, and/or referrals for you. A list of these orders (if applicable) as well as your Preventive Care list are included within your After Visit Summary for your review. Other Preventive Recommendations:    · A preventive eye exam performed by an eye specialist is recommended every 1-2 years to screen for glaucoma; cataracts, macular degeneration, and other eye disorders. · A preventive dental visit is recommended every 6 months. · Try to get at least 150 minutes of exercise per week or 10,000 steps per day on a pedometer . · Order or download the FREE \"Exercise & Physical Activity: Your Everyday Guide\" from [employer]. Call 5-909.636.6847 or search [employer] online. · You need 2462-5494 mg of calcium and 9124-0066 IU of vitamin D per day. It is possible to meet your calcium requirement with diet alone, but a vitamin D supplement is usually necessary to meet this goal.  · When exposed to the sun, use a sunscreen that protects against both UVA and UVB radiation with an SPF of 30 or greater. Reapply every 2 to 3 hours or after sweating, drying off with a towel, or swimming. · Always wear a seat belt when traveling in a car. Always wear a helmet when riding a bicycle or motorcycle.

## 2020-11-19 ENCOUNTER — APPOINTMENT (OUTPATIENT)
Dept: CARDIAC REHAB | Age: 60
End: 2020-11-19
Payer: MEDICARE

## 2020-11-23 ENCOUNTER — HOSPITAL ENCOUNTER (OUTPATIENT)
Dept: CARDIAC REHAB | Age: 60
Setting detail: THERAPIES SERIES
Discharge: HOME | End: 2020-11-23
Payer: MEDICARE

## 2020-11-23 ENCOUNTER — APPOINTMENT (OUTPATIENT)
Dept: CARDIAC REHAB | Age: 60
End: 2020-11-23
Payer: MEDICARE

## 2020-11-23 PROCEDURE — 93798 PHYS/QHP OP CAR RHAB W/ECG: CPT

## 2020-11-24 ENCOUNTER — APPOINTMENT (OUTPATIENT)
Dept: CARDIAC REHAB | Age: 60
End: 2020-11-24
Payer: MEDICARE

## 2020-11-25 ENCOUNTER — OFFICE VISIT (OUTPATIENT)
Dept: FAMILY MEDICINE CLINIC | Age: 60
End: 2020-11-25
Payer: MEDICARE

## 2020-11-25 VITALS
DIASTOLIC BLOOD PRESSURE: 86 MMHG | WEIGHT: 208.6 LBS | HEIGHT: 67 IN | OXYGEN SATURATION: 98 % | SYSTOLIC BLOOD PRESSURE: 128 MMHG | TEMPERATURE: 98.7 F | HEART RATE: 62 BPM | BODY MASS INDEX: 32.74 KG/M2

## 2020-11-25 PROCEDURE — 99213 OFFICE O/P EST LOW 20 MIN: CPT | Performed by: FAMILY MEDICINE

## 2020-11-25 RX ORDER — GLIMEPIRIDE 2 MG/1
2 TABLET ORAL EVERY MORNING
Qty: 90 TABLET | Refills: 3 | Status: SHIPPED | OUTPATIENT
Start: 2020-11-25 | End: 2021-06-04 | Stop reason: SDUPTHER

## 2020-11-25 ASSESSMENT — ENCOUNTER SYMPTOMS
RHINORRHEA: 0
ABDOMINAL PAIN: 0
COUGH: 0
SHORTNESS OF BREATH: 0
SORE THROAT: 0
EYE REDNESS: 0
DIARRHEA: 0

## 2020-11-25 ASSESSMENT — COPD QUESTIONNAIRES: COPD: 1

## 2020-11-25 NOTE — PROGRESS NOTES
Patient ID: Krista Carmona 1960    . Chief Complaint   Patient presents with    Diabetes    COPD    Hypertension    Hyperlipidemia         Diabetes   He presents for his follow-up diabetic visit. He has type 2 diabetes mellitus. His disease course has been stable. Pertinent negatives for hypoglycemia include no headaches. Associated symptoms include chest pain and weakness. Pertinent negatives for diabetes include no fatigue. Diabetic complications include a CVA. Risk factors for coronary artery disease include male sex and obesity. Current diabetic treatments: Has not been seen for almost a year. Out of all medication. He is compliant with treatment some of the time. When asked about meal planning, he reported none. He rarely participates in exercise. Blood glucose monitoring compliance is poor. His overall blood glucose range is 130-140 mg/dl. COPD   There is no cough (no unusual) or shortness of breath. Associated symptoms include chest pain. Pertinent negatives include no fever, headaches, myalgias, rhinorrhea or sore throat. Hypertension   This is a chronic problem. The current episode started more than 1 year ago. The problem is controlled. Associated symptoms include chest pain. Pertinent negatives include no headaches, palpitations or shortness of breath. Compliance problems include psychosocial issues. Hypertensive end-organ damage includes CVA. Hyperlipidemia   Associated symptoms include chest pain. Pertinent negatives include no myalgias or shortness of breath. Gastroesophageal Reflux   He complains of chest pain. He reports no abdominal pain, no coughing (no unusual) or no sore throat. Moodiness. Patient thinks his mood is improved but his wife would say otherwise. He like to go ahead and continue the Zoloft for now. . This is a chronic problem. The problem occurs frequently. The problem has been unchanged. Pertinent negatives include no fatigue.  Risk factors include lack of exercise, obesity and caffeine use. He has tried a PPI for the symptoms. The treatment provided mild (ran out of all medication. feels that he needs his PPI again) relief. Mental Health Problem   Primary symptoms comment: Moodiness. Patient thinks his mood is improved but his wife would say otherwise. He like to go ahead and continue the Zoloft for now. .   Additional symptoms of the illness do not include fatigue, headaches or abdominal pain. Review of Systems   Constitutional: Negative for chills, fatigue and fever. HENT: Negative for rhinorrhea and sore throat. No change in taste or smell sensation   Eyes: Negative for redness. Respiratory: Negative for cough (no unusual) and shortness of breath. Cardiovascular: Positive for chest pain. Negative for palpitations. Gastrointestinal: Negative for abdominal pain and diarrhea. Musculoskeletal: Negative for arthralgias and myalgias. Skin: Negative for rash. Neurological: Positive for weakness. Negative for headaches. Hematological: Does not bruise/bleed easily. Patient Active Problem List   Diagnosis    CAD (coronary artery disease)    History of PTCA    History of acute inferior wall myocardial infarction    Chest pain    B12 deficiency    Controlled type 2 diabetes mellitus with diabetic nephropathy, without long-term current use of insulin (HCC)    Fatty liver    Colon, diverticulosis    Renal cyst    Adrenal adenoma    Hyperlipidemia LDL goal <70    Obesity (BMI 30-39. 9)    Cerebrovascular accident (CVA) due to thrombosis of left middle cerebral artery (HCC)    Hemiparesis of right dominant side as late effect of cerebral infarction ([address]    Gait disturbance    Essential hypertension    Hemiparesis affecting right side as late effect of cerebrovascular accident (CVA) ([address]    SOBOE (shortness of breath on exertion)    Gastroesophageal reflux disease    Acute ST elevation myocardial infarction (STEMI) 40 MG tablet Take 1 tablet by mouth daily 90 tablet 3    pantoprazole (PROTONIX) 40 MG tablet Take 1 tablet by mouth daily 30 tablet 1    clopidogrel (PLAVIX) 75 MG tablet Take 1 tablet by mouth daily 90 tablet 3    carvedilol (COREG) 12.5 MG tablet Take 1 tablet by mouth 2 times daily 180 tablet 3    atorvastatin (LIPITOR) 80 MG tablet Take 1 tablet by mouth daily 90 tablet 3    docusate sodium (COLACE, DULCOLAX) 100 MG CAPS Take 100 mg by mouth daily      Blood Glucose Monitoring Suppl (BLOOD GLUCOSE MONITOR SYSTEM) w/Device KIT Use twice per day 1 kit 0    Lancets MISC 1 each by Does not apply route 2 times daily for 180 doses 180 each 3    Alcohol Swabs (ALCOHOL PADS) 70 % PADS 1 Units by Does not apply route 2 times daily 180 each 3    aspirin 81 MG chewable tablet Take 1 tablet by mouth daily 30 tablet [address] by Does not apply route (Patient not taking: Reported on 11/25/2020) 1 each 0    blood glucose monitor strips 1 strip by Other route 2 times daily for 180 doses 180 strip 3    Blood Pressure KIT Use prn (Patient not taking: Reported on 11/25/2020) 1 kit 0     No current facility-administered medications on file prior to visit. Objective:         Physical Exam  Vitals signs and nursing note reviewed. Constitutional:       Appearance: He is well-developed. He is obese. HENT:      Head: Normocephalic and atraumatic. Cardiovascular:      Rate and Rhythm: Normal rate and regular rhythm. Heart sounds: Normal heart sounds, S1 normal and S2 normal.   Pulmonary:      Effort: No respiratory distress. Breath sounds: Normal breath sounds. Decreased air movement present. No wheezing or rales. Skin:     General: Skin is warm and dry. Neurological:      Mental Status: He is alert.    Psychiatric:         Speech: Speech normal.         Behavior: Behavior normal.       Vitals:    11/25/20 0931   BP: 128/86   Pulse: 62   Temp: 98.7 °F (37.1 °C)   TempSrc: Infrared   SpO2: 98%   Weight: 208 lb 9.6 oz (94.6 kg)   Height: 5' 7\" (1.702 m)     Body mass index is 32.67 kg/m². Wt Readings from Last 3 Encounters:   11/25/20 208 lb 9.6 oz (94.6 kg)   11/18/20 204 lb (92.5 kg)   11/12/20 204 lb (92.5 kg)     BP Readings from Last 3 Encounters:   11/25/20 128/86   11/12/20 123/72   11/04/20 110/62          No results found for this visit on 11/25/20. The ASCVD Risk score (Srhuthi Berkowitz, et al., 2013) failed to calculate for the following reasons:     The patient has a prior MI or stroke diagnosis  Lab Review   Orders Only on 11/04/2020   Component Date Value    Pro-BNP 11/04/2020 547*    Sodium 11/04/2020 142     Potassium 11/04/2020 4.3     Chloride 11/04/2020 103     CO2 11/04/2020 27     Anion Gap 11/04/2020 12     Glucose 11/04/2020 56*    BUN 11/04/2020 19     CREATININE 11/04/2020 1.4*    GFR Non- 11/04/2020 51*    GFR  11/04/2020 >60     Calcium 11/04/2020 9.3    Admission on 10/13/2020, Discharged on 10/14/2020   Component Date Value    WBC 10/13/2020 9.2     RBC 10/13/2020 4.80     Hemoglobin 10/13/2020 15.5     Hematocrit 10/13/2020 48.0     MCV 10/13/2020 100.0     MCH 10/13/2020 32.3*    MCHC 10/13/2020 32.3     RDW 10/13/2020 13.1     Platelets 20/40/4071 179     MPV 10/13/2020 12.0*    Differential Type 10/13/2020 AUTOMATED DIFFERENTIAL     Segs Relative 10/13/2020 61.9     Lymphocytes % 10/13/2020 24.8     Monocytes % 10/13/2020 9.5*    Eosinophils % 10/13/2020 2.8     Basophils % 10/13/2020 0.7     Segs Absolute 10/13/2020 5.7     Lymphocytes Absolute 10/13/2020 2.3     Monocytes Absolute 10/13/2020 0.9     Eosinophils Absolute 10/13/2020 0.3     Basophils Absolute 10/13/2020 0.1     Nucleated RBC % 10/13/2020 0.0     Total Nucleated RBC 10/13/2020 0.0     Total Immature Neutrophil 10/13/2020 0.03     Immature Neutrophil % 10/13/2020 0.3     Sodium 10/13/2020 141     Potassium 10/13/2020 for his mood swings          Return in about 3 months (around 2/25/2021) for DM, HTN, Doxy, Parking lot A1c.

## 2020-11-25 NOTE — PATIENT INSTRUCTIONS
PLEASE BRING YOUR MEDICATIONS TO ALL APPOINTMENTS    The diagnoses and medications listed in this after visit summary may not be accurate at the time of check out. Please check MY CHART in 28-48 hours for possible corrections. Late cancellation policy: So that we can better accommodate people who are sick, please give our office 24 hour notice for an appointment cancellation. Thank you. Missed appointments: Your care is very important to us. It is important that you keep your scheduled appointments. Multiple missed appointments will lead to a dismissal from the office. Later arrival policy: If you are more than 10 minutes late for your appointment, you will be asked to reschedule. Please allow 5-7 business days for paperwork to be processed. It is important that you check your MY Chart messages, as they include appointment reminders, test results, and other important information. If you have forgotten your password, please call [phone].          1. Take your blood pressure medications at night. This reduces your chance of cardiovascular event by half  2. Fever in kids: give both Tylenol and Ibuprofen at the same time  3. Pediatric obesity is decreased by less exposure to antibiotics, consuming whole milk instead of skim milk, watching public TV instead of regular TV,  and experiencing fewer adverse childhood events  4. 1 egg per day is cardio-protective  5. Alternate day fasting does promote weight loss. 6. Skipping breakfast increases obesity  7. Artificially sweetened drinks increase all cause mortality (strokes, BMI, cardiovascular)  8. Kale consumption can reduce onset of dementia  9. Walking at least 8000 steps per day and resistance exercise 2-3 x per week are cardio-protective  10. Covid 19:  Wearing gloves not that helpful  Having low vitamin D levels increases risk of infection (take 2-4000 units of D3 per day until our covid crisis is resolved)  11.  Brushing teeth 3

## 2020-11-26 ENCOUNTER — APPOINTMENT (OUTPATIENT)
Dept: CARDIAC REHAB | Age: 60
End: 2020-11-26
Payer: MEDICARE

## 2020-11-30 ENCOUNTER — HOSPITAL ENCOUNTER (OUTPATIENT)
Dept: CARDIAC REHAB | Age: 60
Setting detail: THERAPIES SERIES
Discharge: HOME | End: 2020-11-30
Payer: MEDICARE

## 2020-11-30 ENCOUNTER — APPOINTMENT (OUTPATIENT)
Dept: CARDIAC REHAB | Age: 60
End: 2020-11-30
Payer: MEDICARE

## 2020-11-30 ENCOUNTER — HOSPITAL ENCOUNTER (OUTPATIENT)
Age: 60
Discharge: HOME | End: 2020-11-30
Payer: MEDICARE

## 2020-11-30 PROCEDURE — 93798 PHYS/QHP OP CAR RHAB W/ECG: CPT

## 2020-11-30 PROCEDURE — U0002 COVID-19 LAB TEST NON-CDC: HCPCS

## 2020-11-30 PROCEDURE — C9803 HOPD COVID-19 SPEC COLLECT: HCPCS

## 2020-12-01 ENCOUNTER — HOSPITAL ENCOUNTER (OUTPATIENT)
Dept: CARDIAC REHAB | Age: 60
Setting detail: THERAPIES SERIES
Discharge: HOME | End: 2020-12-01
Payer: MEDICARE

## 2020-12-01 LAB
SARS-COV-2: NOT DETECTED
SOURCE: NORMAL

## 2020-12-01 PROCEDURE — 93798 PHYS/QHP OP CAR RHAB W/ECG: CPT

## 2020-12-02 ENCOUNTER — HOSPITAL ENCOUNTER (OUTPATIENT)
Dept: CT IMAGING | Age: 60
Discharge: HOME | End: 2020-12-02
Payer: MEDICARE

## 2020-12-02 ENCOUNTER — HOSPITAL ENCOUNTER (OUTPATIENT)
Dept: PULMONOLOGY | Age: 60
Discharge: HOME | End: 2020-12-02
Payer: MEDICARE

## 2020-12-02 ENCOUNTER — HOSPITAL ENCOUNTER (OUTPATIENT)
Dept: SLEEP CENTER | Age: 60
Discharge: HOME | End: 2020-12-02
Payer: MEDICARE

## 2020-12-02 ENCOUNTER — TELEPHONE (OUTPATIENT)
Dept: CARDIOLOGY CLINIC | Age: 60
End: 2020-12-02

## 2020-12-02 LAB
DLCO %PRED: 66 %
DLCO PRED: NORMAL
DLCO/VA %PRED: NORMAL
DLCO/VA PRED: NORMAL
DLCO/VA: NORMAL
DLCO: NORMAL
EXPIRATORY TIME-POST: NORMAL
EXPIRATORY TIME: NORMAL
FEF 25-75% %CHNG: NORMAL
FEF 25-75% %PRED-POST: NORMAL
FEF 25-75% %PRED-PRE: NORMAL
FEF 25-75% PRED: NORMAL
FEF 25-75%-POST: NORMAL
FEF 25-75%-PRE: NORMAL
FEV1 %PRED-POST: 69 %
FEV1 %PRED-PRE: 64 %
FEV1 PRED: NORMAL
FEV1-POST: NORMAL
FEV1-PRE: NORMAL
FEV1/FVC %PRED-POST: NORMAL
FEV1/FVC %PRED-PRE: NORMAL
FEV1/FVC PRED: NORMAL
FEV1/FVC-POST: 95 %
FEV1/FVC-PRE: 99 %
FVC %PRED-POST: NORMAL
FVC %PRED-PRE: NORMAL
FVC PRED: NORMAL
FVC-POST: NORMAL
FVC-PRE: NORMAL
GAW %PRED: NORMAL
GAW PRED: NORMAL
GAW: NORMAL
IC %PRED: NORMAL
IC PRED: NORMAL
IC: NORMAL
MEP: NORMAL
MIP: NORMAL
MVV %PRED-PRE: NORMAL
MVV PRED: NORMAL
MVV-PRE: NORMAL
PEF %PRED-POST: NORMAL
PEF %PRED-PRE: NORMAL
PEF PRED: NORMAL
PEF%CHNG: NORMAL
PEF-POST: NORMAL
PEF-PRE: NORMAL
RAW %PRED: NORMAL
RAW PRED: NORMAL
RAW: NORMAL
RV %PRED: NORMAL
RV PRED: NORMAL
RV: NORMAL
SVC %PRED: NORMAL
SVC PRED: NORMAL
SVC: NORMAL
TLC %PRED: 76 %
TLC PRED: NORMAL
TLC: NORMAL
VA %PRED: NORMAL
VA PRED: NORMAL
VA: NORMAL
VTG %PRED: NORMAL
VTG PRED: NORMAL
VTG: NORMAL

## 2020-12-02 PROCEDURE — 94060 EVALUATION OF WHEEZING: CPT

## 2020-12-02 PROCEDURE — 94618 PULMONARY STRESS TESTING: CPT

## 2020-12-02 PROCEDURE — 95810 POLYSOM 6/> YRS 4/> PARAM: CPT

## 2020-12-02 PROCEDURE — 94726 PLETHYSMOGRAPHY LUNG VOLUMES: CPT

## 2020-12-02 PROCEDURE — G0297 LDCT FOR LUNG CA SCREEN: HCPCS

## 2020-12-02 PROCEDURE — 94729 DIFFUSING CAPACITY: CPT

## 2020-12-02 ASSESSMENT — PULMONARY FUNCTION TESTS
FEV1_PERCENT_PREDICTED_PRE: 64
FEV1_PERCENT_PREDICTED_POST: 69
FEV1/FVC_PRE: 99
FEV1/FVC_POST: 95

## 2020-12-02 NOTE — PROGRESS NOTES
[facility] Pulmonary Function Lab - Six Minute Walk  Test Performed on: Room Air__x__ Oxygen at _____ lpm via N/C  Assist Device Used During Test:    None__x__ Cane____ Walker___   Shortness of Breath - Rosalie's Scale  0 Nothing at all 5 Severe    0.5 Very very slight 6    1 Very slight 7 Very severe   2 Slight 8     3 Moderate 9 Very very severe   4 Somewhat severe 10 Maximal      Time SpO2 Heart Rate Respiratory Rate  Rosalies Scale Other      Baseline  99%                  62   PRE 12   3           1 minute                    98%              74 16 3           2 minutes 99% 72 17 3           3 minutes 99% 72 18 4           4 minutes 98%  74    18 4           5 minutes 95% 71 18 4           6 minutes 99% 75 16 4        Recovery   x 1 Min 98% 72 22 5        Recovery   x 2 Min 99% 66 16 4         Number of Laps__4_ X 170 feet __680___+ __35_ additional feet = Total 715__feet  Stopped or paused before 6 minutes?  No__x__ Yes _____   Pre Blood Pressure: _190_ / __90_    Post Blood Pressure:_181  _/__88_  Interpretation:

## 2020-12-03 LAB — STATUS: NORMAL

## 2020-12-03 PROCEDURE — 95810 POLYSOM 6/> YRS 4/> PARAM: CPT | Performed by: INTERNAL MEDICINE

## 2020-12-03 NOTE — PROGRESS NOTES
12/3/2020  sleep study  for Va Scriver  1960 is complete. Results are pending physician review.     Electronically signed by Manuel Carmona on 12/3/2020 at 6:28 AM

## 2020-12-07 ENCOUNTER — HOSPITAL ENCOUNTER (OUTPATIENT)
Dept: CARDIAC REHAB | Age: 60
Setting detail: THERAPIES SERIES
Discharge: HOME | End: 2020-12-07
Payer: MEDICARE

## 2020-12-07 PROCEDURE — 93798 PHYS/QHP OP CAR RHAB W/ECG: CPT

## 2020-12-08 ENCOUNTER — HOSPITAL ENCOUNTER (OUTPATIENT)
Dept: CARDIAC REHAB | Age: 60
Setting detail: THERAPIES SERIES
Discharge: HOME | End: 2020-12-08
Payer: MEDICARE

## 2020-12-08 PROCEDURE — 93798 PHYS/QHP OP CAR RHAB W/ECG: CPT

## 2020-12-09 ENCOUNTER — TELEPHONE (OUTPATIENT)
Dept: CARDIOLOGY CLINIC | Age: 60
End: 2020-12-09

## 2020-12-11 ENCOUNTER — VIRTUAL VISIT (OUTPATIENT)
Dept: PULMONOLOGY | Age: 60
End: 2020-12-11
Payer: MEDICARE

## 2020-12-11 PROBLEM — J44.9 COPD (CHRONIC OBSTRUCTIVE PULMONARY DISEASE) (HCC): Status: ACTIVE | Noted: 2020-12-11

## 2020-12-11 PROCEDURE — 99443 PR PHYS/QHP TELEPHONE EVALUATION 21-30 MIN: CPT | Performed by: INTERNAL MEDICINE

## 2020-12-11 NOTE — PROGRESS NOTES
[facility]  1960  Referring Provider: Dr. Carolyn Singh    Subjective:     Chief Complaint   Patient presents with    Results     PSG follow up. ROXANNE Vick is a 63 y.o. male is doing a telephone follow up visit. He had a PSG done on 12/02/2020 showed that he has an AHI of 4.7 and desat to 84%. His AHI during the REM and supine is 18.2. He has 40 pk yr smoking and he quit about 6 months. He has no symptoms. His PFT done on 12/02/2020 showed moderate small airways dysfunction and mild decrease in DLCO. He had a Low dose CT chest done on 12/02.2020 and it showed no suspicious nodules.      Current Outpatient Medications   Medication Sig Dispense Refill    sertraline (ZOLOFT) 50 MG tablet Take 1 tablet by mouth daily 90 tablet 3    glimepiride (AMARYL) 2 MG tablet Take 1 tablet by mouth every morning 90 tablet 3    albuterol-ipratropium (COMBIVENT RESPIMAT)  MCG/ACT AERS inhaler Inhale 1 puff into the lungs every 6 hours Dispense if cheaper than atrovent inhaler 3 Inhaler 1    furosemide (LASIX) 20 MG tablet 2 tabs in the morning and 1 tab in the evening 60 tablet 1    tamsulosin (FLOMAX) 0.4 MG capsule Take 0.4 mg by mouth daily      lisinopril (PRINIVIL;ZESTRIL) 40 MG tablet Take 1 tablet by mouth daily 90 tablet 3    pantoprazole (PROTONIX) 40 MG tablet Take 1 tablet by mouth daily 30 tablet 1    clopidogrel (PLAVIX) 75 MG tablet Take 1 tablet by mouth daily 90 tablet 3    carvedilol (COREG) 12.5 MG tablet Take 1 tablet by mouth 2 times daily 180 tablet 3    atorvastatin (LIPITOR) 80 MG tablet Take 1 tablet by mouth daily 90 tablet 3    Hospital Bed MISC by Does not apply route 1 each 0    docusate sodium (COLACE, DULCOLAX) 100 MG CAPS Take 100 mg by mouth daily      Blood Glucose Monitoring Suppl (BLOOD GLUCOSE MONITOR SYSTEM) w/Device KIT Use twice per day 1 kit 0    Lancets MISC 1 each by Does not apply route 2 times daily for 180 doses 180 each 3    Alcohol Swabs (ALCOHOL PADS) 70 % PADS 1 Units by Does not apply route 2 times daily 180 each 3    aspirin 81 MG chewable tablet Take 1 tablet by mouth daily 30 tablet 3    Blood Pressure KIT Use prn 1 kit 0    blood glucose monitor strips 1 strip by Other route 2 times daily for 180 doses 180 strip 3     No current facility-administered medications for this visit. Allergies   Allergen Reactions    Ibuprofen Other (See Comments)     Stomach irritation    Augmentin [Amoxicillin-Pot Clavulanate]     Codeine        Past Medical History:   Diagnosis Date    B12 deficiency     CAD (coronary artery disease)     COPD [facility]chronic obstructive [facility]) [facility] 12/11/2020    GERD (gastroesophageal reflux disease)     Guillain Barré syndrome ([facility]     H/O echocardiogram 7/30/13, 6/16/10    7/13-EF 50-55% WNL. 6/10-EF53%, mod LVH, MARCI, RVE, trace PI, mild MR/TR, 6/09    Heart murmur     History of echocardiogram 10/07/2020    EF 40%, No changed since previous 5/2020, Mid inferior hypokinesis noted, Grade I DD, Mod MR, Mild TR, Dilatation of the aortic root, No pericardial effusion     History of myocardial infarction 2003    History of nuclear MUGA test 03/03/2020    EF 53%, Normal, No ICD needed    History of nuclear stress test 06/23/2020    EF 30%, study suggestive of abnormal myocardial perfusion.  History of PTCA 6/03;2/03 6/03-PTCA w/stents x2, 2/03-PTCA w/stent x3    History of PTCA 03/15/2018    LAD EF25%    Hx of cardiovascular stress test 04/25/2018    EF 28% Nuclear scintigraphy demonstartes inferior wall infarct.  Hx of cardiovascular stress test 01/27/2020    Abnormal Study. Large area of inferior & apical-lateral infarct. No significant reversible ischemia.   Severly reduced LVEF 23%    HX OTHER MEDICAL 03/14/2018    lifevest  d/c 7/2/2018    Hyperlipidemia     Hypertension     Impaired glucose tolerance 6/3/2016    MUGA 07/09/2018    EF 47%    NSTEMI (non-ST elevated myocardial infarction) ([facility] [facility] 03/15/2018    Patient in clinical research study 2018    3 year study dalcitripib vs placebo [phone]    Pneumonia due to organism     Renal cyst 2014    S/P cardiac catheterization 13-EF=55%. PATENT LAD AND RCA STENTS, OM2 50% stenosis, & Normal wall motion.  Stroke (cerebrum) [facility])     May 2016       Past Surgical History:   Procedure Laterality Date    CARDIAC CATHETERIZATION  -EF=55%. PATENT LAD AND RCA STENTS, OM2 50% stenosis, & Normal wall motion.     COLONOSCOPY  14    colon polyp, diverticulosis, internal hemorrhoids    CORONARY ANGIOPLASTY  03- PTCA w stents x2 mid LAD, -PTCA w stents x3, RCA    CORONARY ARTERY BYPASS GRAFT N/A 2020    CABG CORONARY ARTERY BYPASS x 2, INTRAOPERATIVE NGA, INDUCED HYPOTHERMIA, LEFT LEG ENDOSCOPIC VEIN HARVEST performed by Thierry Cerrato MD at [address] [place] [address], [place], DIAGNOSTIC  14    irregular GE junction, gastritis,hiatal hernia, gastric polyps    HAND SURGERY      rt knuckle    PTCA  2018    LAD    SHOULDER SURGERY Right 2011    bone spurs       Social History     Socioeconomic History    Marital status:      Spouse name: Not on file    Number of children: Not on file    Years of education: Not on file    Highest education level: Not on file   Occupational History    Occupation:      Comment: full time   Social Needs    Financial resource strain: Not on file    Food insecurity     Worry: Not on file     Inability: Not on file   [employer] needs     Medical: Not on file     Non-medical: Not on file   Tobacco Use    Smoking status: Former Smoker     Packs/day: 2.00     Years: 45.00     Pack years: 90.00     Types: Cigarettes     Last attempt to quit: 2016     Years since quittin.6    Smokeless tobacco: Never Used   Substance and Sexual Activity    Alcohol use: No     Alcohol/week: 0.0 standard drinks    Drug use: No    Sexual activity: Yes     Partners: Female   Lifestyle    Physical activity     Days per week: Not on file     Minutes per session: Not on file    Stress: Not on file   Relationships    Social connections     Talks on phone: Not on file     Gets together: Not on file     Attends Restorationism service: Not on file     Active member of club or organization: Not on file     Attends meetings of clubs or organizations: Not on file     Relationship status: Not on file    Intimate partner violence     Fear of current or ex partner: Not on file     Emotionally abused: Not on file     Physically abused: Not on file     Forced sexual activity: Not on file   Other Topics Concern    Not on file   Social History Narrative    Wears glasses       Review of Systems    Objective: There were no vitals taken for this visit. There is no height or weight on file to calculate BMI.   Sleep Medicine 11/12/2020   Sitting and reading 3   Watching TV 0   Sitting, inactive in a public place (e.g. a theatre or a meeting) 3   As a passenger in a car for an hour without a break 3   Lying down to rest in the afternoon when circumstances permit 3   Sitting and talking to someone 0   Sitting quietly after a lunch without alcohol 3   In a car, while stopped for a few minutes in traffic 0   Total score 15   Neck circumference 16.5           Radiology: Reviewed    Assessment and Plan     Problem List        Pulmonary Problems    SOBOE (shortness of breath on exertion)     Loose weight  C/w quitting smoking  PFT in 1 year         Relevant Orders    Full PFT Study With Bronchodilator    ANNA (obstructive sleep apnea)     He has moderate ANNA in the REM and Supine sleep  Advised to use Auto CPAP  Loose weight  I need a 2 week download data         Relevant Orders    DME Order for CPAP as OP    COPD (chronic obstructive pulmonary disease) ([facility] [address]     Advised to c.w quitting smoking  PFT and Low dose CT chest in 1 year  Get yearly flu vaccine         Relevant Medications    albuterol-ipratropium (COMBIVENT RESPIMAT)  MCG/ACT AERS inhaler    Other Relevant Orders    Full PFT Study With Bronchodilator       Other    Obesity (BMI 30-39. 9)     Advised to loose weight with diet and exercise           Relevant Medications    glimepiride (AMARYL) 2 MG tablet    Hypersomnia     Advised to use Auto CPAP  Loose weight         Cigarette smoker     Advised to c/w quitting smoking         Relevant Orders    Full PFT Study With Bronchodilator    CT LUNG SCREENING               Return in about 2 months (around 2/11/2021) for 2 week download data. Progress notes sent to the referring Provider    Puma Warren MD  12/11/2020  10:26 AM  Олег Hayden is a 63 y.o. male being evaluated by a Virtual Visit (video visit) encounter to address concerns as mentioned above. A caregiver was present when appropriate. Due to this being a TeleHealth encounter (During [facility] public health emergency), evaluation of the following organ systems was limited: Vitals/Constitutional/EENT/Resp/CV/GI//MS/Neuro/Skin/Heme-Lymph-Imm. Pursuant to the emergency declaration under the [address] [place] [address], [address] [place] [address] authority and the [employer] Act, this Virtual Visit was conducted with patient's (and/or legal guardian's) consent, to reduce the patient's risk of exposure to COVID-19 and provide necessary medical care. The patient (and/or legal guardian) has also been advised to contact this office for worsening conditions or problems, and seek emergency medical treatment and/or call 911 if deemed necessary. Patient identification was verified at the start of the visit: Yes    Total time spent for this encounter: 21 minutes    Services were provided through a video synchronous discussion virtually to substitute for in-person clinic visit. Patient and provider were located at their individual homes.     --Min DAVID Doni Buckner MD on 12/11/2020 at 10:26 AM    An electronic signature was used to authenticate this note.

## 2020-12-14 ENCOUNTER — HOSPITAL ENCOUNTER (OUTPATIENT)
Dept: CARDIAC REHAB | Age: 60
Setting detail: THERAPIES SERIES
Discharge: HOME | End: 2020-12-14
Payer: MEDICARE

## 2020-12-14 PROCEDURE — 93798 PHYS/QHP OP CAR RHAB W/ECG: CPT

## 2020-12-15 ENCOUNTER — HOSPITAL ENCOUNTER (OUTPATIENT)
Dept: CARDIAC REHAB | Age: 60
Setting detail: THERAPIES SERIES
Discharge: HOME | End: 2020-12-15
Payer: MEDICARE

## 2020-12-15 PROCEDURE — 93798 PHYS/QHP OP CAR RHAB W/ECG: CPT

## 2020-12-17 ENCOUNTER — TELEPHONE (OUTPATIENT)
Dept: PULMONOLOGY | Age: 60
End: 2020-12-17

## 2020-12-17 NOTE — TELEPHONE ENCOUNTER
LVM to return call. Pts AHI does not qualify him for a cpap. [Dizziness] : dizziness [Negative] : Heme/Lymph [FreeTextEntry7] : see hpi

## 2021-01-06 DIAGNOSIS — K21.9 GASTROESOPHAGEAL REFLUX DISEASE: ICD-10-CM

## 2021-01-06 RX ORDER — PANTOPRAZOLE SODIUM 40 MG/1
40 TABLET, DELAYED RELEASE ORAL DAILY
Qty: 90 TABLET | Refills: 0 | Status: SHIPPED | OUTPATIENT
Start: 2021-01-06 | End: 2021-02-25 | Stop reason: SDUPTHER

## 2021-01-18 ENCOUNTER — HOSPITAL ENCOUNTER (OUTPATIENT)
Dept: CARDIAC REHAB | Age: 61
Setting detail: THERAPIES SERIES
Discharge: HOME | End: 2021-01-18
Payer: MEDICARE

## 2021-01-18 PROCEDURE — 93798 PHYS/QHP OP CAR RHAB W/ECG: CPT

## 2021-01-19 ENCOUNTER — HOSPITAL ENCOUNTER (OUTPATIENT)
Dept: CARDIAC REHAB | Age: 61
Setting detail: THERAPIES SERIES
Discharge: HOME | End: 2021-01-19
Payer: MEDICARE

## 2021-01-19 PROCEDURE — 93798 PHYS/QHP OP CAR RHAB W/ECG: CPT

## 2021-01-21 ENCOUNTER — HOSPITAL ENCOUNTER (OUTPATIENT)
Dept: CARDIAC REHAB | Age: 61
Setting detail: THERAPIES SERIES
Discharge: HOME | End: 2021-01-21
Payer: MEDICARE

## 2021-01-21 PROCEDURE — 93798 PHYS/QHP OP CAR RHAB W/ECG: CPT

## 2021-01-25 ENCOUNTER — HOSPITAL ENCOUNTER (OUTPATIENT)
Dept: CARDIAC REHAB | Age: 61
Setting detail: THERAPIES SERIES
Discharge: HOME | End: 2021-01-25
Payer: MEDICARE

## 2021-01-25 PROCEDURE — 93798 PHYS/QHP OP CAR RHAB W/ECG: CPT

## 2021-01-28 ENCOUNTER — HOSPITAL ENCOUNTER (OUTPATIENT)
Dept: CARDIAC REHAB | Age: 61
Setting detail: THERAPIES SERIES
Discharge: HOME | End: 2021-01-28
Payer: MEDICARE

## 2021-01-28 PROCEDURE — 93798 PHYS/QHP OP CAR RHAB W/ECG: CPT

## 2021-02-04 ENCOUNTER — HOSPITAL ENCOUNTER (OUTPATIENT)
Dept: CARDIAC REHAB | Age: 61
Setting detail: THERAPIES SERIES
Discharge: HOME | End: 2021-02-04
Payer: MEDICARE

## 2021-02-04 PROCEDURE — 93798 PHYS/QHP OP CAR RHAB W/ECG: CPT

## 2021-02-08 ENCOUNTER — OFFICE VISIT (OUTPATIENT)
Dept: CARDIOLOGY CLINIC | Age: 61
End: 2021-02-08
Payer: MEDICARE

## 2021-02-08 ENCOUNTER — HOSPITAL ENCOUNTER (OUTPATIENT)
Dept: CARDIAC REHAB | Age: 61
Setting detail: THERAPIES SERIES
Discharge: HOME | End: 2021-02-08
Payer: MEDICARE

## 2021-02-08 ENCOUNTER — TELEPHONE (OUTPATIENT)
Dept: CARDIOLOGY CLINIC | Age: 61
End: 2021-02-08

## 2021-02-08 VITALS
HEIGHT: 67 IN | HEART RATE: 68 BPM | BODY MASS INDEX: 33.56 KG/M2 | DIASTOLIC BLOOD PRESSURE: 102 MMHG | SYSTOLIC BLOOD PRESSURE: 192 MMHG | WEIGHT: 213.8 LBS

## 2021-02-08 DIAGNOSIS — I16.0 HYPERTENSIVE URGENCY: ICD-10-CM

## 2021-02-08 DIAGNOSIS — I63.312 CEREBROVASCULAR ACCIDENT (CVA) DUE TO THROMBOSIS OF LEFT MIDDLE CEREBRAL ARTERY (HCC): ICD-10-CM

## 2021-02-08 DIAGNOSIS — I25.10 CORONARY ARTERY DISEASE INVOLVING NATIVE CORONARY ARTERY OF NATIVE HEART WITHOUT ANGINA PECTORIS: Primary | ICD-10-CM

## 2021-02-08 PROCEDURE — 99214 OFFICE O/P EST MOD 30 MIN: CPT | Performed by: NURSE PRACTITIONER

## 2021-02-08 RX ORDER — FUROSEMIDE 40 MG/1
40 TABLET ORAL 2 TIMES DAILY
Qty: 60 TABLET | Refills: 1 | Status: SHIPPED | OUTPATIENT
Start: 2021-02-08 | End: 2021-02-12

## 2021-02-08 RX ORDER — CARVEDILOL 25 MG/1
25 TABLET ORAL 2 TIMES DAILY
Qty: 30 TABLET | Refills: 1 | Status: SHIPPED | OUTPATIENT
Start: 2021-02-08 | End: 2021-03-08

## 2021-02-08 NOTE — PROGRESS NOTES
[facility] PH[facility]ICAL [facility] [address] [facility],Third Floor  Phone: [phone]    Fax [phone]                  Mc Steward MD, Marybeth Mcmullen MD, Isma Self MD, MD Antwon Costello MD Atanacio Capers, MD Jacquline Jansky, [facility], [facility]  Dakota Novoaner, [facility], [facility]        Cardiology Progress Note      2/8/2021    RE: Dann Lerner  (1960)                             Primary cardiologist: Dr. Juno Zendejas:   1. Coronary artery disease involving native coronary artery of native heart without angina pectoris    2. Hypertensive urgency    3. Cerebrovascular accident (CVA) due to thrombosis of left middle cerebral artery (HCC)        HPI: Dann Lerner, who is a  63y.o. year old male with a past medical history as listed below. Patient presents to the office for follow up on CAD-CABG x 2 in 2020, CVA, HTN, and hyperlipidemia. Patient was in cardiac rehab and noted to have elevated BP. Patient refused to go to emergency department to be evaluated. He was agreeable to come to the office for further evaluation. Patient reports that he consumed a large amount of sodium yesterday at [employer] party. Patient denies any chest pain, shortness of breath, dizziness, syncope, or palpitations. Past Medical History:   Diagnosis Date    B12 deficiency     CAD (coronary artery disease)     COPD (chronic obstructive pulmonary disease) ([facility] 12/11/2020    GERD (gastroesophageal reflux disease)     [facility] Barr[facility]     H/O echocardiogram 7/30/13, 6/16/10    7/13-EF 50-55% WNL.  6/10-EF53%, mod LVH, MARCI, RVE, trace PI, mild MR/TR, 6/09    Heart murmur     History of echocardiogram 10/07/2020    EF 40%, No changed since previous 5/2020, Mid inferior hypokinesis noted, Grade I DD, Mod MR, Mild TR, Dilatation of the aortic root, No pericardial effusion     History of myocardial infarction 2003    History of nuclear MUGA test 03/03/2020    EF 53%, Normal, No ICD needed    History of nuclear stress test 06/23/2020    EF 30%, study suggestive of abnormal myocardial perfusion.  History of PTCA 6/03;2/03 6/03-PTCA w/stents x2, 2/03-PTCA w/stent x3    History of PTCA 03/15/2018    LAD EF25%    Hx of cardiovascular stress test 04/25/2018    EF 28% Nuclear scintigraphy demonstartes inferior wall infarct.  Hx of cardiovascular stress test 01/27/2020    Abnormal Study. Large area of inferior & apical-lateral infarct. No significant reversible ischemia. Severly reduced LVEF 23%    HX OTHER MEDICAL 03/14/2018    lifevest  d/c 7/2/2018    Hyperlipidemia     Hypertension     Impaired glucose tolerance 6/3/2016    MUGA 07/09/2018    EF 47%    NSTEMI (non-ST elevated myocardial infarction) ([facility] [address] 03/15/2018    Patient in clinical research study 05/2018    3 year study dalcitripib vs placebo [phone]    Pneumonia due to organism     Renal cyst 5/23/2014    S/P cardiac catheterization 7/12/13 7/13-EF=55%. PATENT LAD AND RCA STENTS, OM2 50% stenosis, & Normal wall motion.     Stroke (cerebrum[facility])     May 2016       Current Outpatient Medications   Medication Sig Dispense Refill    carvedilol (COREG) 25 MG tablet Take 1 tablet by mouth 2 times daily 30 tablet 1    furosemide (LASIX) 40 MG tablet Take 1 tablet by mouth 2 times daily 60 tablet 1    pantoprazole (PROTONIX) 40 MG tablet Take 1 tablet by mouth daily 90 tablet 0    sertraline (ZOLOFT) 50 MG tablet Take 1 tablet by mouth daily 90 tablet 3    glimepiride (AMARYL) 2 MG tablet Take 1 tablet by mouth every morning 90 tablet 3    albuterol-ipratropium (COMBIVENT RESPIMAT)  MCG/ACT AERS inhaler Inhale 1 puff into the lungs every 6 hours Dispense if cheaper than atrovent inhaler 3 Inhaler 1    tamsulosin (FLOMAX) 0.4 MG capsule Take 0.4 mg by mouth daily      lisinopril (PRINIVIL;ZESTRIL) 40 MG tablet Take 1 tablet by Stomach Cancer Father     Depression Sister     Diabetes Brother     Depression Sister     Stomach Cancer Brother     Alcohol Abuse Brother      Social History     Tobacco Use    Smoking status: Former Smoker     Packs/day: 2.00     Years: 45.00     Pack years: 90.00     Types: Cigarettes     Quit date: 2016     Years since quittin.7    Smokeless tobacco: Never Used   Substance Use Topics    Alcohol use: No     Alcohol/week: 0.0 standard drinks        Review of Systems - History obtained from the patient  General: negative for - fatigue, malaise, night sweats, weight gain  Psychological: negative for - anxiety, depression, sleep disturbances  Ophthalmic: negative for - blurry vision, loss of vision  ENT: negative for - headaches, vertigo, visual changes  Hematological and Lymphatic: negative for - bleeding problems, blood clots, bruising, fatigue or pallor  Endocrine: negative for - malaise/lethargy, palpitations, unexpected weight changes  Respiratory: negative for - cough, orthopnea, shortness of breath or tachypnea  Cardiovascular: negative for - chest pain, dyspnea on exertion, edema or palpitations  Gastrointestinal: no abdominal pain, change in bowel habits, or black or bloody stools  Genito-Urinary: no dysuria, trouble voiding, or hematuria  Musculoskeletal: negative for - gait disturbance, pain, swelling    Neurological: negative for - confusion, dizziness, impaired coordination/balance or numbness/tingling    Objective:      Physical Exam:  BP (!) 192/102 (Site: Right Upper Arm)   Pulse 68   Ht 5' 7\" (1.702 m)   Wt 213 lb 12.8 oz (97 kg)   BMI 33.49 kg/m²   Wt Readings from Last 3 Encounters:   21 213 lb 12.8 oz (97 kg)   20 208 lb 9.6 oz (94.6 kg)   20 204 lb (92.5 kg)     Body mass index is 33.49 kg/m². GENERAL - Alert, oriented, pleasant, in no apparent distress.     Head unremarkable  Eyes - pupils equal and reactive to light - bilateral conjunctiva are pink: diagnosis      Assessment/ Plan:     Hypertensive urgency  Patient refuses to go to ER, increase coreg to 25 mg BID and Lasix to 40 mg BID. Patient to notify office if SBP is not <160 by this afternoon. BMP in 2 weeks. F/U on Friday for B/P check. Discussed the importance of low-sodium diet. CAD (coronary artery disease)  CABG x 2 in 2020. Patient currently in cardiac rehab. Primary and secondary prevention discussed with patient:   -Low sodium cardiac diet   -exercise 30 min a day three days a week      Cerebrovascular accident (CVA) due to thrombosis of left middle cerebral artery ([address]  Explained to the patient that he is at risk for stroke with BP being as elevated as it currently is. Patient refuses to go to emergency department. He denies any strokelike symptoms. Patient seen, interviewed and examined. Testing was reviewed. Patient is encouraged to exercise even a brisk walk for 30 minutes at least 3 to 4 times a week. Lifestyle and risk factor modificatons discussed. Various goals are discussed and questions answered. Continue current medications. Appropriate prescriptions are addressed. Questions answered and patient verbalizes understanding. Call for any problems, questions, or concerns. Pt is to follow up in 4 days for Cardiac management    Electronically signed by Ronald Swenson.  JERED Tang CNP on 2/8/2021 at 9:21 AM

## 2021-02-08 NOTE — TELEPHONE ENCOUNTER
Collette Marin from cardiac rehab called states patient BP is extremely high today and he will not go to ED. His wife is very ill with other health issues and will not go to ED. Patient Will be coming her to see Jack this morning.

## 2021-02-08 NOTE — LETTER
Sammie Roldan [facility]  1960  [id number]    Have you had any Chest Pain that is not new? - No      ? DO EKG IF: Patient has a Heart Rate above 100 or below 40     CAD (Coronary Artery Disease) patient should have one on file every 6 months        Have you had any Shortness of Breath - No      Have you had any dizziness - No    ? Sitting wait 5 minutes do supine (laying down) wait 5 minutes then do standing - log each in \"vitals\" area in Epic  ? Be sure to ask what symptoms they are having if they get dizzy while completing ortho stats such as room spinning, nausea, etc.    Have you had any palpitations that are not new? - Yes  If Yes DO EKG - Do you feel your heart pounding  How long does it last - when laying down and coughing     Do you have any edema - No    Vein \"LEG PROBLEM Questionnaire\"  1. Do you have prominent leg veins? No   2. Do you have any skin discoloration? No  3. Do you have any healed or active sores? No  4. Do you have swelling of the legs? No  5. Do you have a family history of varicose veins? No  6. Does your profession involve pro-longed        standing or heavy lifting? No  7. Have you been fighting overweight problems? No  8. Do you have restless legs? No  9. Do you have any night time cramps? No  10.  Do you have any of the following in your legs:        No     Do you have a surgery or procedure scheduled in the near future - No

## 2021-02-09 ENCOUNTER — HOSPITAL ENCOUNTER (OUTPATIENT)
Age: 61
Discharge: HOME | End: 2021-02-09
Payer: MEDICARE

## 2021-02-09 LAB
ANION GAP SERPL CALCULATED.3IONS-SCNC: 11 MMOL/L (ref 4–16)
BUN BLDV-MCNC: 19 MG/DL (ref 6–23)
CALCIUM SERPL-MCNC: 8.7 MG/DL (ref 8.3–10.6)
CHLORIDE BLD-SCNC: 101 MMOL/L (ref 99–110)
CO2: 28 MMOL/L (ref 21–32)
CREAT SERPL-MCNC: 1.6 MG/DL (ref 0.9–1.3)
GFR AFRICAN AMERICAN: 53 ML/MIN/1.73M2
GFR NON-AFRICAN AMERICAN: 44 ML/MIN/1.73M2
GLUCOSE BLD-MCNC: 192 MG/DL (ref 70–99)
POTASSIUM SERPL-SCNC: 4.3 MMOL/L (ref 3.5–5.1)
SODIUM BLD-SCNC: 140 MMOL/L (ref 135–145)

## 2021-02-09 PROCEDURE — 80048 BASIC METABOLIC PNL TOTAL CA: CPT

## 2021-02-09 PROCEDURE — 36415 COLL VENOUS BLD VENIPUNCTURE: CPT

## 2021-02-12 ENCOUNTER — NURSE ONLY (OUTPATIENT)
Dept: CARDIOLOGY CLINIC | Age: 61
End: 2021-02-12
Payer: MEDICARE

## 2021-02-12 VITALS
RESPIRATION RATE: 18 BRPM | HEART RATE: 60 BPM | HEIGHT: 68 IN | TEMPERATURE: 96.9 F | SYSTOLIC BLOOD PRESSURE: 152 MMHG | WEIGHT: 212.6 LBS | DIASTOLIC BLOOD PRESSURE: 82 MMHG | BODY MASS INDEX: 32.22 KG/M2

## 2021-02-12 DIAGNOSIS — I10 ESSENTIAL HYPERTENSION: Primary | ICD-10-CM

## 2021-02-12 PROCEDURE — 99211 OFF/OP EST MAY X REQ PHY/QHP: CPT | Performed by: NURSE PRACTITIONER

## 2021-02-12 RX ORDER — FUROSEMIDE 40 MG/1
40 TABLET ORAL DAILY
Qty: 60 TABLET | Refills: 1
Start: 2021-02-12 | End: 2022-02-25 | Stop reason: SDUPTHER

## 2021-02-12 RX ORDER — AMLODIPINE BESYLATE 5 MG/1
5 TABLET ORAL DAILY
Qty: 30 TABLET | Refills: 3 | Status: SHIPPED | OUTPATIENT
Start: 2021-02-12 | End: 2021-03-26 | Stop reason: SDUPTHER

## 2021-02-12 NOTE — PROGRESS NOTES
[facility] [address] [facility],Third [address]  Phone: [phone]    Fax [phone]                  Lavell Jeffery MD, Kirby Flores MD, [address] MD, MD Christina Vallecillo MD Juliane Petite, MD Devon Cong, JERED Pina, JERED Jesus, JERED Carl    BP and Weight Check Visit    Pt is here for a 2 week BP and weight check. Previously patient was in cardiac rehab noted to have elevated BP. Patient reports large amount of sodium intake due to singlewall cardiac. Coreg was increased to 25 mg twice daily and Lasix to 40 mg daily at last office visit. Patient's weight has also increased by 5 pounds. Vitals:    02/12/21 0818 02/12/21 0823   BP: (!) 144/82 (!) 152/82   Site: Left Upper Arm Left Upper Arm   Position: Sitting Sitting   Cuff Size: Large Adult Large Adult   Pulse: 60  Comment: apical    Resp: 18    Temp: 96.9 °F (36.1 °C)    Weight: 212 lb 9.6 oz (96.4 kg)    Height: 5' 7.6\" (1.717 m)        Wt Readings from Last 3 Encounters:   02/12/21 212 lb 9.6 oz (96.4 kg)   02/08/21 213 lb 12.8 oz (97 kg)   11/25/20 208 lb 9.6 oz (94.6 kg)        BMP reviewed and decline in renal function noted when compared to previous study in September 2020. Patient's weight is now 212. Decrease lasix to 40 mg daily and add Norvas 5 mg daily. .    Follow up in 2 weeks for b/p check. Pt is to report any dizziness or syncope to the office        Electronically signed by Isela Arboleda.  JERED Gilmore - CNP on 2/12/2021 at 8:36 AM

## 2021-02-25 ENCOUNTER — VIRTUAL VISIT (OUTPATIENT)
Dept: FAMILY MEDICINE CLINIC | Age: 61
End: 2021-02-25
Payer: MEDICARE

## 2021-02-25 ENCOUNTER — TELEPHONE (OUTPATIENT)
Dept: FAMILY MEDICINE CLINIC | Age: 61
End: 2021-02-25

## 2021-02-25 DIAGNOSIS — E11.9 TYPE 2 DIABETES MELLITUS WITHOUT COMPLICATION, UNSPECIFIED WHETHER LONG TERM INSULIN USE (HCC): Primary | ICD-10-CM

## 2021-02-25 DIAGNOSIS — R45.4 IRRITABILITY AND ANGER: ICD-10-CM

## 2021-02-25 DIAGNOSIS — K21.9 GASTROESOPHAGEAL REFLUX DISEASE, UNSPECIFIED WHETHER ESOPHAGITIS PRESENT: ICD-10-CM

## 2021-02-25 LAB — HBA1C MFR BLD: 6.8 %

## 2021-02-25 PROCEDURE — 99442 PR PHYS/QHP TELEPHONE EVALUATION 11-20 MIN: CPT | Performed by: FAMILY MEDICINE

## 2021-02-25 PROCEDURE — 83036 HEMOGLOBIN GLYCOSYLATED A1C: CPT | Performed by: FAMILY MEDICINE

## 2021-02-25 RX ORDER — BLOOD SUGAR DIAGNOSTIC
1 STRIP MISCELLANEOUS 2 TIMES DAILY
Qty: 180 EACH | Refills: 3 | Status: ON HOLD | OUTPATIENT
Start: 2021-02-25 | End: 2022-03-14 | Stop reason: SDUPTHER

## 2021-02-25 RX ORDER — SERTRALINE HYDROCHLORIDE 100 MG/1
100 TABLET, FILM COATED ORAL DAILY
Qty: 90 TABLET | Refills: 1 | Status: SHIPPED | OUTPATIENT
Start: 2021-02-25 | End: 2021-06-04 | Stop reason: SDUPTHER

## 2021-02-25 RX ORDER — PANTOPRAZOLE SODIUM 40 MG/1
40 TABLET, DELAYED RELEASE ORAL DAILY
Qty: 90 TABLET | Refills: 1 | Status: ON HOLD | OUTPATIENT
Start: 2021-02-25 | End: 2022-03-14 | Stop reason: SDUPTHER

## 2021-02-25 RX ORDER — GLUCOSAMINE HCL/CHONDROITIN SU 500-400 MG
1 CAPSULE ORAL 2 TIMES DAILY
Qty: 180 STRIP | Refills: 3 | Status: ON HOLD | OUTPATIENT
Start: 2021-02-25 | End: 2022-03-14

## 2021-02-25 RX ORDER — LANCETS 30 GAUGE
1 EACH MISCELLANEOUS 2 TIMES DAILY
Qty: 180 EACH | Refills: 3 | Status: ON HOLD | OUTPATIENT
Start: 2021-02-25 | End: 2022-03-14

## 2021-02-25 ASSESSMENT — PATIENT HEALTH QUESTIONNAIRE - PHQ9
SUM OF ALL RESPONSES TO PHQ9 QUESTIONS 1 & 2: 0
SUM OF ALL RESPONSES TO PHQ QUESTIONS 1-9: 0
SUM OF ALL RESPONSES TO PHQ QUESTIONS 1-9: 0

## 2021-02-25 NOTE — PROGRESS NOTES
Anjum Cardozo is a 63 y.o. male evaluated via telephone on 2/25/2021. Consent:  He and/or health care decision maker is aware that that he may receive a bill for this telephone service, depending on his insurance coverage, and has provided verbal consent to proceed: Yes      Documentation:  I communicated with the patient and/or health care decision maker about     Diabetes   He presents for his follow-up diabetic visit. He has type 2 diabetes mellitus. His disease course has been stable. Pertinent negatives for hypoglycemia include no headaches. Associated symptoms include chest pain and weakness. Pertinent negatives for diabetes include no fatigue. Diabetic complications include a CVA. Risk factors for coronary artery disease include male sex and obesity. Current diabetic treatments: Has not been seen for almost a year. Out of all medication. He is compliant with treatment some of the time. When asked about meal planning, he reported none. He rarely participates in exercise. Blood glucose monitoring compliance is poor. His overall blood glucose range is 130-140 mg/dl. He only checks in the mornings. He admits to just grabbing whatever he wants to eat when he gets . Hypertension   This is a chronic problem. The current episode started more than 1 year ago. The problem is controlled. Associated symptoms include chest pain. Pertinent negatives include no headaches, palpitations or shortness of breath. Compliance problems include psychosocial issues. Hypertensive end-organ damage includes CVA. Hyperlipidemia   Associated symptoms include chest pain. Pertinent negatives include no myalgias or shortness of breath. Gastroesophageal Reflux   He complains of chest pain. He reports no abdominal pain, no coughing (no unusual) or no sore throat. Moodiness. Patient thinks his mood is improved but his wife would say otherwise. He like to go ahead and continue the Zoloft for now. . This is a chronic problem.  The problem occurs frequently. The problem has been unchanged. Pertinent negatives include no fatigue. Risk factors include lack of exercise, obesity and caffeine use. He has tried a PPI for the symptoms. The treatment provided mild ) relief. Mental Health Problem   Primary symptoms comment: Moodiness. Patient thinks his mood is worsening. He like to go ahead and increase the Zoloft for now. .     Details of this discussion including any medical advice provided:     Diagnosis Orders   1. Type 2 diabetes mellitus without complication, unspecified whether long term insulin use (HCC)  POCT glycosylated hemoglobin (Hb A1C)    blood glucose monitor strips    Lancets MISC    Alcohol Swabs (ALCOHOL PADS) 70 % PADS   2. Irritability and anger  sertraline (ZOLOFT) 100 MG tablet   3. Gastroesophageal reflux disease, unspecified whether esophagitis present  pantoprazole (PROTONIX) 40 MG tablet     Diabetes controlled. Continue current medication. I urged him to work on meal planning so that he is not grabbing unhealthy foods    Increasing dose of Zoloft from 50 mg to 100 mg. Continue Protonix for his GERD. Recheck in 3 months. See orders    I affirm this is a Patient Initiated Episode with a Patient who has not had a related appointment within my department in the past 7 days or scheduled within the next 24 hours.     Patient identification was verified at the start of the visit: Yes    Total Time: minutes: 11-20 minutes    Note: not billable if this call serves to triage the patient into an appointment for the relevant concern      Lamar Payan

## 2021-02-25 NOTE — PROGRESS NOTES
2021    TELEHEALTH EVALUATION -- Audio/Visual (During IOJDX-65 public health emergency)    HPI:    Candelaria Perez (:  1960) has requested an audio/video evaluation for the following concern(s):        Diabetes   He presents for his follow-up diabetic visit. He has type 2 diabetes mellitus. His disease course has been stable. Pertinent negatives for hypoglycemia include no headaches. Associated symptoms include chest pain and weakness. Pertinent negatives for diabetes include no fatigue. Diabetic complications include a CVA. Risk factors for coronary artery disease include male sex and obesity. Current diabetic treatments: Has not been seen for almost a year. Out of all medication. He is compliant with treatment some of the time. When asked about meal planning, he reported none. He rarely participates in exercise. Blood glucose monitoring compliance is poor. His overall blood glucose range is 130-140 mg/dl. COPD   There is no cough (no unusual) or shortness of breath. Associated symptoms include chest pain. Pertinent negatives include no fever, headaches, myalgias, rhinorrhea or sore throat. Hypertension   This is a chronic problem. The current episode started more than 1 year ago. The problem is controlled. Associated symptoms include chest pain. Pertinent negatives include no headaches, palpitations or shortness of breath. Compliance problems include psychosocial issues. Hypertensive end-organ damage includes CVA. Hyperlipidemia   Associated symptoms include chest pain. Pertinent negatives include no myalgias or shortness of breath.    Gastroesophageal Reflux He complains of chest pain. He reports no abdominal pain, no coughing (no unusual) or no sore throat. Moodiness. Patient thinks his mood is improved but his wife would say otherwise. He like to go ahead and continue the Zoloft for now. . This is a chronic problem. The problem occurs frequently. The problem has been unchanged. Pertinent negatives include no fatigue. Risk factors include lack of exercise, obesity and caffeine use. He has tried a PPI for the symptoms. The treatment provided mild (ran out of all medication. feels that he needs his PPI again) relief. Mental Health Problem   Primary symptoms comment: Moodiness. Patient thinks his mood is improved but his wife would say otherwise. He like to go ahead and continue the Zoloft for now. .   Additional symptoms of the illness do not include fatigue, headaches or abdominal pain. Review of Systems    Prior to Visit Medications    Medication Sig Taking? Authorizing Provider   furosemide (LASIX) 40 MG tablet Take 1 tablet by mouth daily Yes JERED Lopez - CNP   amLODIPine (NORVASC) 5 MG tablet Take 1 tablet by mouth daily Yes JERED Lopez - CNP   carvedilol (COREG) 25 MG tablet Take 1 tablet by mouth 2 times daily Yes JERED Lopez - CNP   pantoprazole (PROTONIX) 40 MG tablet Take 1 tablet by mouth daily Yes Padmini Salcido MD   glimepiride (AMARYL) 2 MG tablet Take 1 tablet by mouth every morning Yes Padmini Salcido MD   albuterol-ipratropium (COMBIVENT RESPIMAT)  MCG/ACT AERS inhaler Inhale 1 puff into the lungs every 6 hours Dispense if cheaper than atrovent inhaler Yes Padmini Salcido MD   tamsulosin (FLOMAX) 0.4 MG capsule Take 0.4 mg by mouth daily Yes Historical Provider, MD   lisinopril (PRINIVIL;ZESTRIL) 40 MG tablet Take 1 tablet by mouth daily Yes Padmini Salcido MD   atorvastatin (LIPITOR) 80 MG tablet Take 1 tablet by mouth daily Yes Padmini Salcido MD aspirin 81 MG chewable tablet Take 1 tablet by mouth daily Yes Primo Moncada MD   sertraline (ZOLOFT) 50 MG tablet Take 1 tablet by mouth daily  Patient not taking: Reported on 2021  Collette Pierce MD   clopidogrel (PLAVIX) 75 MG tablet Take 1 tablet by mouth daily  Patient not taking: Reported on 2021  Erica Herrmann, [address] [place] [address] by Does not apply route  Collette Pierce MD   Blood Glucose Monitoring Suppl (BLOOD GLUCOSE MONITOR SYSTEM) w/Device KIT Use twice per day  Collette Pierce MD   blood glucose monitor strips 1 strip by Other route 2 times daily for 180 doses  Collette Pierce MD   Lancets MISC 1 each by Does not apply route 2 times daily for 180 doses  Collette Pierce MD   Alcohol Swabs (ALCOHOL PADS) 70 % PADS 1 Units by Does not apply route 2 times daily  Collette Pierce MD   Blood Pressure KIT Use prn  Collette Pierce MD       Social History     Tobacco Use    Smoking status: Former Smoker     Packs/day: 2.00     Years: 45.00     Pack years: 90.00     Types: Cigarettes     Quit date: 2016     Years since quittin.8    Smokeless tobacco: Never Used   Substance Use Topics    Alcohol use: No     Alcohol/week: 0.0 standard drinks    Drug use: No        {F2 for Optional History [facility]:06396}    PHYSICAL EXAMINATION:  [ INSTRUCTIONS:  \"[x]\" Indicates a positive item  \"[]\" Indicates a negative item  -- DELETE ALL ITEMS NOT EXAMINED]  Vital Signs: (As obtained by patient/caregiver or practitioner observation)    Blood pressure-  Heart rate-    Respiratory rate-    Temperature-  Pulse oximetry-     Constitutional: [] Appears well-developed and well-nourished [] No apparent distress      [] Abnormal-   Mental status  [] Alert and awake  [] Oriented to person/place/time []Able to follow commands      Eyes:  EOM    []  Normal  [] Abnormal-  Sclera  []  Normal  [] Abnormal -         Discharge []  None visible  [] Abnormal -    HENT:   [] Normocephalic, atraumatic.   [] Abnormal [] Mouth/Throat: Mucous membranes are moist.     External Ears [] Normal  [] Abnormal-     Neck: [] No visualized mass     Pulmonary/Chest: [] Respiratory effort normal.  [] No visualized signs of difficulty breathing or respiratory distress        [] Abnormal-      Musculoskeletal:   [] Normal gait with no signs of ataxia         [] Normal range of motion of neck        [] Abnormal-       Neurological:        [] No Facial Asymmetry (Cranial nerve 7 motor function) (limited exam to video visit)          [] No gaze palsy        [] Abnormal-         Skin:        [] No significant exanthematous lesions or discoloration noted on facial skin         [] Abnormal-            Psychiatric:       [] Normal Affect [] No Hallucinations        [] Abnormal-     Other pertinent observable physical exam findings-     ASSESSMENT/PLAN:  1. Type 2 diabetes mellitus without complication, unspecified whether long term insulin use (HCC)  ***  - POCT glycosylated hemoglobin (Hb A1C)      No follow-ups on file. Zenaida Patricio is a 63 y.o. male being evaluated by a Virtual Visit (video visit) encounter to address concerns as mentioned above. A caregiver was present when appropriate. Due to this being a TeleHealth encounter ([facility]), evaluation of the following organ systems was limited: Vitals/Constitutional/EENT/Resp/CV/GI//MS/Neuro/Skin/Heme-Lymph-Imm. Pursuant to the emergency declaration under the [address] [place] [address] and the [employer] Act, this Virtual Visit was conducted with patient's (and/or legal guardian's) consent, to reduce the patient's risk of exposure to COVID-19 and provide necessary medical care. The patient (and/or legal guardian) has also been advised to contact this office for worsening conditions or problems, and seek emergency medical treatment and/or call 911 if deemed necessary. Patient identification was verified at the start of the visit: {YES/NO:38912}    Total time spent on this encounter: {Time Spent:51148564}    Services were provided through a video synchronous discussion virtually to substitute for in-person clinic visit. Patient and provider were located at their individual homes. --Lamar Payan MD on 2/25/2021 at 11:53 AM    An electronic signature was used to authenticate this note.

## 2021-02-25 NOTE — PATIENT INSTRUCTIONS
PLEASE BRING YOUR MEDICATIONS TO ALL APPOINTMENTS    The diagnoses and medications listed in this after visit summary may not be accurate at the time of check out. Please check MY CHART in 28-48 hours for possible corrections. Late cancellation policy: So that we can better accommodate people who are sick, please give our office 24 hour notice for an appointment cancellation. Thank you. Missed appointments: Your care is very important to us. It is important that you keep your scheduled appointments. Multiple missed appointments will lead to a dismissal from the office. Later arrival policy: If you are more than 10 minutes late for your appointment, you will be asked to reschedule. Please allow 5-7 business days for paperwork to be processed. It is important that you check your MY Chart messages, as they include appointment reminders, test results, and other important information. If you have forgotten your password, please call [phone]. HERE ARE SOME LIFE CHANGING TIPS      1. Take your blood pressure medications at night. This reduces your chance of cardiovascular event by half  2. Fever in kids: It's best to give both Tylenol and Ibuprofen at the same time rather than staggering them which is confusing  3. Follow these tips to reduce childhood obesity: Reduce unnecessary exposure to antibiotics, consume whole milk instead of skim milk, watch public TV instead of regular TV (less exposure to junk food commercials), and reduce traumatic experiences. 4. 1 egg per day is good for your heart  5. Alternate day fasting does promote weight loss. 6. Skipping breakfast increases your risk of obesity  7. Artificially sweetened drinks increase all cause mortality (strokes, BMI, cardiovascular)  8. Kale consumption can reduce onset of dementia  9. Walking at least 8000 steps per day and resistance exercise 2-3 x per week are good for your heart  10.  Covid 19: Wearing gloves is not that helpful  Having low vitamin D levels increases risk of infection (take 2-4000 units of D3 per day until our covid crisis is resolved)  11.  Brushing teeth 3 times per day can decrease chance of getting diabetes

## 2021-02-25 NOTE — TELEPHONE ENCOUNTER
Called and spoke with pt regarding his urine culture being negative. Pt stated he was still having burning while urinating. I put the pt on hold and spoke with . I informed pt per  to give it some time and if it continues in 1-2 weeks to give us a call. Pt verbalized understanding.    Looking at chart, it was refilled this morning.

## 2021-03-02 DIAGNOSIS — I10 ESSENTIAL HYPERTENSION: ICD-10-CM

## 2021-03-02 DIAGNOSIS — R45.4 IRRITABILITY AND ANGER: ICD-10-CM

## 2021-03-02 RX ORDER — TRIAMTERENE AND HYDROCHLOROTHIAZIDE 37.5; 25 MG/1; MG/1
TABLET ORAL
Qty: 90 TABLET | Refills: 3 | OUTPATIENT
Start: 2021-03-02

## 2021-03-08 RX ORDER — CARVEDILOL 25 MG/1
25 TABLET ORAL 2 TIMES DAILY
Qty: 30 TABLET | Refills: 5 | Status: SHIPPED | OUTPATIENT
Start: 2021-03-08 | End: 2022-02-25 | Stop reason: SDUPTHER

## 2021-03-16 ENCOUNTER — NURSE ONLY (OUTPATIENT)
Dept: CARDIOLOGY CLINIC | Age: 61
End: 2021-03-16
Payer: MEDICARE

## 2021-03-16 VITALS
WEIGHT: 208 LBS | BODY MASS INDEX: 32.65 KG/M2 | HEART RATE: 72 BPM | SYSTOLIC BLOOD PRESSURE: 120 MMHG | HEIGHT: 67 IN | DIASTOLIC BLOOD PRESSURE: 64 MMHG

## 2021-03-16 DIAGNOSIS — I10 ESSENTIAL HYPERTENSION: Primary | ICD-10-CM

## 2021-03-16 PROCEDURE — 99211 OFF/OP EST MAY X REQ PHY/QHP: CPT | Performed by: NURSE PRACTITIONER

## 2021-03-16 NOTE — PROGRESS NOTES
[address], [address] [facility],Third [address]  Phone: [phone]    Fax [phone]                  Lavell Jeffery MD, Kirby Flores MD, Gay Menjivar MD, MD Christina Vallecillo MD Juliane Petite, MD Irwin Horan, JERED Leon, JERED Carl    BP and Weight Check Visit    Pt is here for a 2 week BP and weight check. Previously had Lasix adjusted for weight gain and hypertension. Patient now on Lasix 40 mg daily and Norvasc 5 mg daily was added. Vitals:    03/16/21 0801   BP: 120/64   Pulse: 72       Wt Readings from Last 3 Encounters:   03/16/21 208 lb (94.3 kg)   02/12/21 212 lb 9.6 oz (96.4 kg)   02/08/21 213 lb 12.8 oz (97 kg)        Plan for pt is to continue Coreg 25 mg daily, Lasix 40 mg daily, amlodipine 5 mg daily, and lisinopril 40 mg daily. Labs reviewed and renal function unchanged. Follow up in 3 months     Pt is to report any dizziness or syncope to the office        Electronically signed by Isela Arboleda.  JERED Gilmore - CNP on 3/16/2021 at 8:06 AM

## 2021-03-25 ENCOUNTER — NURSE ONLY (OUTPATIENT)
Dept: CARDIOLOGY CLINIC | Age: 61
End: 2021-03-25

## 2021-03-25 DIAGNOSIS — I10 ESSENTIAL HYPERTENSION: Primary | ICD-10-CM

## 2021-03-26 VITALS
HEART RATE: 78 BPM | BODY MASS INDEX: 32.58 KG/M2 | SYSTOLIC BLOOD PRESSURE: 192 MMHG | DIASTOLIC BLOOD PRESSURE: 106 MMHG | WEIGHT: 208 LBS

## 2021-03-26 RX ORDER — AMLODIPINE BESYLATE 10 MG/1
10 TABLET ORAL DAILY
Qty: 90 TABLET | Refills: 1 | Status: SHIPPED | OUTPATIENT
Start: 2021-03-26 | End: 2022-02-25 | Stop reason: SDUPTHER

## 2021-04-01 ENCOUNTER — OFFICE VISIT (OUTPATIENT)
Dept: CARDIOLOGY CLINIC | Age: 61
End: 2021-04-01
Payer: MEDICARE

## 2021-04-01 VITALS
SYSTOLIC BLOOD PRESSURE: 144 MMHG | HEIGHT: 67 IN | BODY MASS INDEX: 33.68 KG/M2 | HEART RATE: 63 BPM | DIASTOLIC BLOOD PRESSURE: 86 MMHG | WEIGHT: 214.6 LBS

## 2021-04-01 DIAGNOSIS — I25.10 CORONARY ARTERY DISEASE INVOLVING NATIVE CORONARY ARTERY OF NATIVE HEART WITHOUT ANGINA PECTORIS: Primary | ICD-10-CM

## 2021-04-01 DIAGNOSIS — I10 ESSENTIAL HYPERTENSION: ICD-10-CM

## 2021-04-01 PROCEDURE — 99214 OFFICE O/P EST MOD 30 MIN: CPT | Performed by: NURSE PRACTITIONER

## 2021-04-01 RX ORDER — HYDRALAZINE HYDROCHLORIDE 10 MG/1
10 TABLET, FILM COATED ORAL 2 TIMES DAILY
Qty: 180 TABLET | Refills: 3 | Status: SHIPPED | OUTPATIENT
Start: 2021-04-01 | End: 2022-02-25 | Stop reason: SDUPTHER

## 2021-04-01 NOTE — ASSESSMENT & PLAN NOTE
-2020 CABG x 2, LHC in 10/20 showed patent bypasses. Previous echo noted to have EF of 40%. Patient is scheduled for echocardiogram this next week. He does not appear to be in fluid overload despite 6 pound weight gain. Primary and secondary prevention discussed with patient:   -Low sodium cardiac diet   -exercise 30 min a day three days a week  Continue current medications Norvasc, Lipitor, aspirin, Coreg, lasix.

## 2021-04-01 NOTE — PROGRESS NOTES
6/03;2/03 6/03-PTCA w/stents x2, 2/03-PTCA w/stent x3    History of PTCA 03/15/2018    LAD EF25%    Hx of cardiovascular stress test 04/25/2018    EF 28% Nuclear scintigraphy demonstartes inferior wall infarct.  Hx of cardiovascular stress test 01/27/2020    Abnormal Study. Large area of inferior & apical-lateral infarct. No significant reversible ischemia. Severly reduced LVEF 23%    HX OTHER MEDICAL 03/14/2018    lifevest  d/c 7/2/2018    Hyperlipidemia     Hypertension     Impaired glucose tolerance 6/3/2016    MUGA 07/09/2018    EF 47%    NSTEMI (non-ST elevated myocardial infarction) ([facility] [address] [address] 03/15/2018    Patient in clinical research study 05/2018    3 year study dalcitripib vs placebo [phone]    Pneumonia due to organism     Renal cyst 5/23/2014    S/P cardiac catheterization 7/12/13 7/13-EF=55%. PATENT LAD AND RCA STENTS, OM2 50% stenosis, & Normal wall motion.     Stroke (cerebrum) [facility])     May 2016       Current Outpatient Medications   Medication Sig Dispense Refill    hydrALAZINE (APRESOLINE) 10 MG tablet Take 1 tablet by mouth 2 times daily 180 tablet 3    amLODIPine (NORVASC) 10 MG tablet Take 1 tablet by mouth daily 90 tablet 1    carvedilol (COREG) 25 MG tablet Take 1 tablet by mouth 2 times daily 30 tablet 5    pantoprazole (PROTONIX) 40 MG tablet Take 1 tablet by mouth daily 90 tablet 1    blood glucose monitor strips 1 strip by Other route 2 times daily for 180 doses 180 strip 3    Lancets MISC 1 each by Does not apply route 2 times daily for 180 doses 180 each 3    Alcohol Swabs (ALCOHOL PADS) 70 % PADS 1 Units by Does not apply route 2 times daily 180 each 3    sertraline (ZOLOFT) 100 MG tablet Take 1 tablet by mouth daily 90 tablet 1    furosemide (LASIX) 40 MG tablet Take 1 tablet by mouth daily 60 tablet 1    sertraline (ZOLOFT) 50 MG tablet Take 1 tablet by mouth daily 90 tablet 3    glimepiride (AMARYL) 2 MG tablet Take 1 tablet by mouth every morning 90 tablet 3    albuterol-ipratropium (COMBIVENT RESPIMAT)  MCG/ACT AERS inhaler Inhale 1 puff into the lungs every 6 hours Dispense if cheaper than atrovent inhaler 3 Inhaler 1    tamsulosin (FLOMAX) 0.4 MG capsule Take 0.4 mg by mouth daily      lisinopril (PRINIVIL;ZESTRIL) 40 MG tablet Take 1 tablet by mouth daily 90 tablet 3    atorvastatin (LIPITOR) 80 MG tablet Take 1 tablet by mouth daily 90 tablet [facility] MIS by Does not apply route 1 each 0    Blood Glucose Monitoring Suppl (BLOOD GLUCOSE MONITOR SYSTEM) w/Device KIT Use twice per day 1 kit 0    aspirin 81 MG chewable tablet Take 1 tablet by mouth daily 30 tablet 3    Blood Pressure KIT Use prn 1 kit 0    clopidogrel (PLAVIX) 75 MG tablet Take 1 tablet by mouth daily (Patient not taking: Reported on 2/8/2021) 90 tablet 3     No current facility-administered medications for this visit. Allergies: Ibuprofen, Augmentin [amoxicillin-pot clavulanate], and Codeine  Past Surgical History:   Procedure Laterality Date    CARDIAC CATHETERIZATION  7/13 7/13-EF=55%. PATENT LAD AND RCA STENTS, OM2 50% stenosis, & Normal wall motion.     COLONOSCOPY  8/21/14    colon polyp, diverticulosis, internal hemorrhoids    CORONARY ANGIOPLASTY  6/03 6/14/03- PTCA w stents x2 mid LAD, 2/03-PTCA w stents x3, RCA    CORONARY ARTERY BYPASS GRAFT N/A 5/18/2020    CABG CORONARY ARTERY BYPASS x 2, INTRAOPERATIVE NGA, INDUCED HYPOTHERMIA, LEFT LEG ENDOSCOPIC VEIN HARVEST performed by Kinga June MD at [address] [place] [address], COLON, DIAGNOSTIC  8/21/14    irregular GE junction, gastritis,hiatal hernia, gastric polyps    HAND SURGERY      rt knuckle    PTCA  03/2018    LAD    SHOULDER SURGERY Right 2011    bone spurs     Family History   Problem Relation Age of Onset    Hypertension Mother     High Cholesterol Mother     Alcohol Abuse Mother     Depression Mother     Alcohol Abuse Father     Stomach Cancer Father     Depression Sister    [facility] Diabetes Brother     Depression Sister     Stomach Cancer Brother     Alcohol Abuse Brother      Social History     Tobacco Use    Smoking status: Former Smoker     Packs/day: 2.00     Years: 45.00     Pack years: 90.00     Types: Cigarettes     Quit date: 2016     Years since quittin.9    Smokeless tobacco: Never Used   Substance Use Topics    Alcohol use: No     Alcohol/week: 0.0 standard drinks        Review of Systems - History obtained from the patient  General: negative for - fatigue, malaise, night sweats, weight gain  Psychological: negative for - anxiety, depression, sleep disturbances  Ophthalmic: negative for - blurry vision, loss of vision  ENT: negative for - headaches, vertigo, visual changes  Hematological and Lymphatic: negative for - bleeding problems, blood clots, bruising, fatigue or pallor  Endocrine: negative for - malaise/lethargy, palpitations, unexpected weight changes  Respiratory: negative for - cough, orthopnea, shortness of breath or tachypnea  Cardiovascular: negative for - chest pain, dyspnea on exertion, edema or palpitations  Gastrointestinal: no abdominal pain, change in bowel habits, or black or bloody stools  Genito-Urinary: no dysuria, trouble voiding, or hematuria  Musculoskeletal: negative for - gait disturbance, pain, swelling    Neurological: negative for - confusion, dizziness, impaired coordination/balance or numbness/tingling    Objective:      Physical Exam:  BP (!) 144/86   Pulse 63   Ht 5' 7\" (1.702 m)   Wt 214 lb 9.6 oz (97.3 kg)   BMI 33.61 kg/m²   Wt Readings from Last 3 Encounters:   21 214 lb 9.6 oz (97.3 kg)   21 208 lb (94.3 kg)   21 208 lb (94.3 kg)     Body mass index is 33.61 kg/m². GENERAL - Alert, oriented, pleasant, in no apparent distress.     Head unremarkable  Eyes - pupils equal and reactive to light - bilateral conjunctiva are pink: sclera are white   ENT  external ears intact, nose is intact:  tongue is midline pink and moist  Neck - Supple. No jugular venous distention noted. No carotid bruits appreciated. Cardiovascular  Normal S1 and S2:  murmur appreciated No, No gallop. Regular rate- Yes,  rhythm regular-Yes. Extremities - No cyanosis, clubbing, no edema to lower legs. Pulmonary  No respiratory distress. No wheezes or rales. Chest is clear  Pulses: Bilateral radial and pedal pulses normal  Abdomen   Soft no tenderness, non distended   Musculoskeletal  Normal movement of all extremities   Neurologic  alert and oriented: There are no gross focal neurologic abnormalities. Skin-  No rash: No echymosis   Affect- normal mood and pleasant     DATA:  Lab Results   Component Value Date    CKTOTAL 113 05/07/2016    TROPONINI <0.006 05/13/2014     BNP:  No results found for: BNP  PT/INR:  No results found for: PTINR  Lab Results   Component Value Date    LABA1C 6.8 02/25/2021    LABA1C 6.4 (H) 10/13/2020     Lab Results   Component Value Date    CHOL 88 10/14/2020    TRIG 71 10/14/2020    HDL 33 (L) 10/14/2020    LDLCALC 114 (H) 04/16/2019    LDLDIRECT 47 10/14/2020     Lab Results   Component Value Date    ALT 12 10/13/2020    AST 15 10/13/2020     TSH:  No results found for: TSH    Vitals:    04/01/21 1018   BP: (!) 144/86   Pulse:        Echo:10/7/20   Left ventricular function is moderately abnormal, EF is estimated at 40%. (No   change since previous echo on 5/17/2020 )   Mid inferior hypokinesis noted. Grade I diastolic dysfunction. Moderate mitral, mild tricuspid regurgitation is present. Dilatation of the aortic root. No evidence of pericardial effusion. monty in 6 mos      Cath:8/12/23  SEVERE NATIVE CORONARY ARTERY DISEASE UNCHANGED FROM BEFORE,   PATENT 2/2 CABG       The ASCVD Risk score (Jenny Cuello, et al., 2013) failed to calculate for the following reasons:     The patient has a prior MI or stroke diagnosis      Assessment/ Plan:     CAD (coronary artery disease)  -2020 CABG x 2, C in

## 2021-04-29 DIAGNOSIS — E11.9 TYPE 2 DIABETES MELLITUS WITHOUT COMPLICATION, UNSPECIFIED WHETHER LONG TERM INSULIN USE (HCC): ICD-10-CM

## 2021-04-29 RX ORDER — GLIMEPIRIDE 2 MG/1
TABLET ORAL
Qty: 90 TABLET | Refills: 2 | OUTPATIENT
Start: 2021-04-29

## 2021-05-21 RX ORDER — ATORVASTATIN CALCIUM 80 MG/1
TABLET, FILM COATED ORAL
Qty: 90 TABLET | Refills: 3 | OUTPATIENT
Start: 2021-05-21

## 2021-06-01 ENCOUNTER — TELEPHONE (OUTPATIENT)
Dept: CARDIOLOGY CLINIC | Age: 61
End: 2021-06-01

## 2021-06-04 ENCOUNTER — OFFICE VISIT (OUTPATIENT)
Dept: FAMILY MEDICINE CLINIC | Age: 61
End: 2021-06-04
Payer: MEDICARE

## 2021-06-04 VITALS
HEIGHT: 67 IN | TEMPERATURE: 97.3 F | SYSTOLIC BLOOD PRESSURE: 128 MMHG | HEART RATE: 68 BPM | DIASTOLIC BLOOD PRESSURE: 86 MMHG | WEIGHT: 206 LBS | BODY MASS INDEX: 32.33 KG/M2

## 2021-06-04 DIAGNOSIS — R45.4 IRRITABILITY AND ANGER: ICD-10-CM

## 2021-06-04 DIAGNOSIS — Z28.21 COVID-19 VIRUS VACCINATION REFUSED: ICD-10-CM

## 2021-06-04 DIAGNOSIS — E11.9 TYPE 2 DIABETES MELLITUS WITHOUT COMPLICATION, UNSPECIFIED WHETHER LONG TERM INSULIN USE (HCC): Primary | ICD-10-CM

## 2021-06-04 DIAGNOSIS — Z86.010 HISTORY OF COLON POLYPS: ICD-10-CM

## 2021-06-04 DIAGNOSIS — L72.3 SEBACEOUS CYST: ICD-10-CM

## 2021-06-04 DIAGNOSIS — J41.0 SIMPLE CHRONIC BRONCHITIS (HCC): ICD-10-CM

## 2021-06-04 DIAGNOSIS — I10 ESSENTIAL HYPERTENSION: ICD-10-CM

## 2021-06-04 LAB — HBA1C MFR BLD: 6.7 %

## 2021-06-04 PROCEDURE — 99214 OFFICE O/P EST MOD 30 MIN: CPT | Performed by: FAMILY MEDICINE

## 2021-06-04 PROCEDURE — 83036 HEMOGLOBIN GLYCOSYLATED A1C: CPT | Performed by: FAMILY MEDICINE

## 2021-06-04 RX ORDER — ATORVASTATIN CALCIUM 80 MG/1
80 TABLET, FILM COATED ORAL DAILY
Qty: 90 TABLET | Refills: 3 | Status: ON HOLD | OUTPATIENT
Start: 2021-06-04 | End: 2022-03-14 | Stop reason: SDUPTHER

## 2021-06-04 RX ORDER — LISINOPRIL 40 MG/1
40 TABLET ORAL DAILY
Qty: 90 TABLET | Refills: 0 | Status: ON HOLD | OUTPATIENT
Start: 2021-10-09 | End: 2022-03-14 | Stop reason: SDUPTHER

## 2021-06-04 RX ORDER — GLIMEPIRIDE 2 MG/1
2 TABLET ORAL EVERY MORNING
Qty: 90 TABLET | Refills: 0 | Status: ON HOLD | OUTPATIENT
Start: 2021-11-17 | End: 2022-03-14 | Stop reason: SDUPTHER

## 2021-06-04 RX ORDER — SERTRALINE HYDROCHLORIDE 100 MG/1
100 TABLET, FILM COATED ORAL DAILY
Qty: 90 TABLET | Refills: 3 | Status: ON HOLD | OUTPATIENT
Start: 2021-08-20 | End: 2022-03-14 | Stop reason: SDUPTHER

## 2021-06-04 ASSESSMENT — ENCOUNTER SYMPTOMS
RHINORRHEA: 0
SORE THROAT: 0
ABDOMINAL PAIN: 0
SHORTNESS OF BREATH: 0
COUGH: 0

## 2021-06-04 ASSESSMENT — COPD QUESTIONNAIRES: COPD: 1

## 2021-06-04 NOTE — PROGRESS NOTES
Patient ID: Jaqueline Hernandez 1960  Colon cancer screen  . Chief Complaint   Patient presents with    Diabetes    Depression    Gastroesophageal Reflux    Mass     soft bump on head right side,  x 1 wk         Diabetes  He presents for his follow-up diabetic visit. He has type 2 diabetes mellitus. His disease course has been stable. Pertinent negatives for hypoglycemia include no headaches. Associated symptoms include chest pain and weakness. Pertinent negatives for diabetes include no fatigue. Diabetic complications include a CVA. Risk factors for coronary artery disease include male sex and obesity. Current diabetic treatments: Has not been seen for almost a year. Out of all medication. He is compliant with treatment some of the time. When asked about meal planning, he reported none. He rarely participates in exercise. Blood glucose monitoring compliance is poor. His overall blood glucose range is 130-140 mg/dl. Gastroesophageal Reflux  He complains of chest pain. He reports no abdominal pain, no coughing (no unusual) or no sore throat. Moodiness. Patient thinks his mood is improved but his wife would say otherwise. He like to go ahead and continue the Zoloft for now. . This is a chronic problem. The problem occurs frequently. The problem has been unchanged. Pertinent negatives include no fatigue. Risk factors include lack of exercise, obesity and caffeine use. He has tried a PPI for the symptoms. The treatment provided mild (ran out of all medication. feels that he needs his PPI again) relief. COPD  There is no cough (no unusual) or shortness of breath. Associated symptoms include chest pain. Pertinent negatives include no fever, headaches, myalgias, rhinorrhea or sore throat. Hypertension  This is a chronic problem. The current episode started more than 1 year ago. The problem is controlled. Associated symptoms include chest pain.  Pertinent negatives include no headaches, palpitations or shortness of breath. Compliance problems include psychosocial issues. Hypertensive end-organ damage includes CVA. Hyperlipidemia  Associated symptoms include chest pain. Pertinent negatives include no myalgias or shortness of breath. Mental Health Problem  Primary symptoms comment: Moodiness. Patient thinks his mood is improved but his wife would say otherwise. He like to go ahead and continue the Zoloft for now. .   Additional symptoms of the illness do not include fatigue, headaches or abdominal pain. Cyst: Right side of scalp. Present for about a week. Review of Systems   Constitutional: Negative for fatigue and fever. HENT: Negative for rhinorrhea and sore throat. Respiratory: Negative for cough (no unusual) and shortness of breath. Cardiovascular: Positive for chest pain. Negative for palpitations. Gastrointestinal: Negative for abdominal pain. Musculoskeletal: Negative for myalgias. Neurological: Positive for weakness. Negative for headaches. Patient Active Problem List   Diagnosis    CAD (coronary artery disease)    History of PTCA    History of acute inferior wall myocardial infarction    Chest pain    B12 deficiency    Controlled type 2 diabetes mellitus with diabetic nephropathy, without long-term current use of insulin (HCC)    Fatty liver    Colon, diverticulosis    Renal cyst    Adrenal adenoma    Hyperlipidemia LDL goal <70    Obesity (BMI 30-39. 9)    Cerebrovascular accident (CVA) due to thrombosis of left middle cerebral artery (HCC)    Hemiparesis of right dominant side as late effect of cerebral infarction ([address]    Gait disturbance    Essential hypertension    Hemiparesis affecting right side as late effect of cerebrovascular accident (CVA) ([address]    SOBOE (shortness of breath on exertion)    Gastroesophageal reflux disease    Acute ST elevation myocardial infarction (STEMI) (HCC)    Unstable angina (HCC)    Non-ST elevation myocardial [facility] (NSTEMI) [facility]    Hypertensive urgency    Frequent PVCs    Ischemic cardiomyopathy    History of Guillain-[place] syndrome    Acute pain of left shoulder    Acute cerebrovascular accident (CVA) [facility]    Dysarthria due to recent stroke    Aphasia due to acute stroke ([facility]    Complication of coronary artery bypass graft surgery    Uncontrolled pain    S/P CABG (coronary artery bypass graft)    ANNA (obstructive sleep apnea)    Hypersomnia    Cigarette smoker    COPD (chronic obstructive pulmonary disease) (HCC)    Irritability and anger       Past Surgical History:   Procedure Laterality Date    CARDIAC CATHETERIZATION  7/13 7/13-EF=55%. PATENT LAD AND RCA STENTS, OM2 50% stenosis, & Normal wall motion.     COLONOSCOPY  8/21/14    colon polyp, diverticulosis, internal hemorrhoids    CORONARY ANGIOPLASTY  6/03 6/14/03- PTCA w stents x2 mid LAD, 2/03-PTCA w stents x3, RCA    CORONARY ARTERY BYPASS GRAFT N/A 5/18/2020    CABG CORONARY ARTERY BYPASS x 2, INTRAOPERATIVE NGA, INDUCED HYPOTHERMIA, LEFT LEG ENDOSCOPIC VEIN HARVEST performed by Kushal Avila MD at [facility], COLON, DIAGNOSTIC  8/21/14    irregular GE junction, gastritis,hiatal hernia, gastric polyps    HAND SURGERY      rt knuckle    PTCA  03/2018    LAD    SHOULDER SURGERY Right 2011    bone spurs       Family History   Problem Relation Age of Onset    Hypertension Mother     High Cholesterol Mother     Alcohol Abuse Mother     Depression Mother     Alcohol Abuse Father     Stomach Cancer Father     Depression Sister     Diabetes Brother     Depression Sister     Stomach Cancer Brother     Alcohol Abuse Brother        Current Outpatient Medications on File Prior to Visit   Medication Sig Dispense Refill    hydrALAZINE (APRESOLINE) 10 MG tablet Take 1 tablet by mouth 2 times daily 180 tablet 3    amLODIPine (NORVASC) 10 MG tablet Take 1 tablet by mouth daily 90 tablet 1    carvedilol (COREG) 25 MG tablet Take 1 tablet by mouth 2 times daily 30 tablet 5    pantoprazole (PROTONIX) 40 MG tablet Take 1 tablet by mouth daily 90 tablet 1    Alcohol Swabs (ALCOHOL PADS) 70 % PADS 1 Units by Does not apply route 2 times daily 180 each 3    furosemide (LASIX) 40 MG tablet Take 1 tablet by mouth daily 60 tablet 1    tamsulosin (FLOMAX) 0.4 MG capsule Take 0.4 mg by mouth daily      Blood Glucose Monitoring Suppl (BLOOD GLUCOSE MONITOR SYSTEM) w/Device KIT Use twice per day 1 kit 0    aspirin 81 MG chewable tablet Take 1 tablet by mouth daily 30 tablet 3    Blood Pressure KIT Use prn 1 kit 0    blood glucose monitor strips 1 strip by Other route 2 times daily for 180 doses 180 strip 3    Lancets MISC 1 each by Does not apply route 2 times daily for 180 doses 180 each 3    clopidogrel (PLAVIX) 75 MG tablet Take 1 tablet by mouth daily (Patient not taking: Reported on 6/4/2021) 90 tablet [address] by Does not apply route 1 each 0     No current facility-administered medications on file prior to visit. Objective:         Physical Exam  Vitals and nursing note reviewed. Constitutional:       General: He is not in acute distress. Appearance: He is well-developed. He is obese. HENT:      Head: Normocephalic. Neck:      Thyroid: No thyromegaly. Trachea: No tracheal deviation. Cardiovascular:      Rate and Rhythm: Normal rate and regular rhythm. Pulses:           Dorsalis pedis pulses are 2+ on the right side and 2+ on the left side. Posterior tibial pulses are 2+ on the right side and 2+ on the left side. Heart sounds: S1 normal and S2 normal. No murmur heard. No gallop. Comments: Carotids without bruits  Pulmonary:      Effort: Pulmonary effort is normal. No respiratory distress. Breath sounds: Normal breath sounds. No wheezing or rales. Abdominal:      General: There is no distension. Palpations: Abdomen is soft.  There is no mass.      Tenderness: There is no abdominal tenderness. Musculoskeletal:      Right foot: Normal range of motion. No deformity. Left foot: Normal range of motion. No deformity. Comments: Feet without lesions     Feet:      Right foot:      Protective Sensation: 5 sites tested. 5 sites sensed. Skin integrity: No ulcer, blister, skin breakdown, erythema, warmth, callus or dry skin. Left foot:      Protective Sensation: 5 sites tested. 5 sites sensed. Skin integrity: No ulcer, blister, skin breakdown, erythema, warmth, callus or dry skin. Skin:     General: Skin is warm and dry. Neurological:      Mental Status: He is alert and oriented to person, place, and time. Comments:      Psychiatric:         Mood and Affect: Affect is blunt. Speech: Speech normal.         Behavior: Behavior normal.       Vitals:    06/04/21 0853   BP: 128/86   Site: Left Upper Arm   Position: Sitting   Cuff Size: Medium Adult   Pulse: 68   Temp: 97.3 °F (36.3 °C)   TempSrc: Infrared   Weight: 206 lb (93.4 kg)   Height: 5' 7\" (1.702 m)     Body mass index is 32.26 kg/m². Wt Readings from Last 3 Encounters:   06/04/21 206 lb (93.4 kg)   04/01/21 214 lb 9.6 oz (97.3 kg)   03/25/21 208 lb (94.3 kg)     BP Readings from Last 3 Encounters:   06/04/21 128/86   04/01/21 (!) 144/86   03/25/21 (!) 192/106          Results for orders placed or performed in visit on 06/04/21   POCT glycosylated hemoglobin (Hb A1C)   Result Value Ref Range    Hemoglobin A1C 6.7 %     The ASCVD Risk score (Everardo Hull et al., 2013) failed to calculate for the following reasons: The patient has a prior MI or stroke diagnosis  Lab Review   No visits with results within 2 Month(s) from this visit. Latest known visit with results is:   Virtual Visit on 02/25/2021   Component Date Value    Hemoglobin A1C 02/25/2021 6.8            Assessment:       Diagnosis Orders   1.  Type 2 diabetes mellitus without complication, unspecified whether long term insulin use (HCC)  POCT glycosylated hemoglobin (Hb A1C)     DIABETES FOOT EXAM    [facility] (Ambulatory)    lisinopril (PRINIVIL;ZESTRIL) 40 MG tablet    glimepiride (AMARYL) 2 MG tablet   2. History of colon polyps  Janell Lomas MD, Gastroenterology, [facility]   3. COVID-19 virus vaccination refused     4. Simple chronic bronchitis (HCC)  albuterol-ipratropium (COMBIVENT RESPIMAT)  MCG/ACT AERS inhaler   5. Irritability and anger  sertraline (ZOLOFT) 100 MG tablet   6. Essential hypertension     7. Sebaceous cyst             Plan:      Diabetes stable. Continue the glimepiride and the lisinopril. Encouraged him to get Covid vaccine    Hypertension stable. Continue the lisinopril. Weight loss encouraged. Reassured that sebaceous cyst was benign. Concerned about his obesity and diabetes. Had long discussion with him about his nutrition. I suspect patient may be having some continued issues with irritability and anger. He was slow to remove his socks today for the foot exam.  He also was checking in at the  with his mask not covering his face. I am not sure if these actions are microquestions or not. We will continue his Zoloft for now. Return for Depression, DM, HTN.

## 2021-06-04 NOTE — PATIENT INSTRUCTIONS
Advance Directives: Care Instructions  Your Care Instructions  An advance directive is a legal way to state your wishes at the end of your life. It tells your family and your doctor what to do if you can no longer say what you want. There are two main types of advance directives. You can change them any time that your wishes change. · A living will tells your family and your doctor your wishes about life support and other treatment. · A durable power of  for health care lets you name a person to make treatment decisions for you when you can't speak for yourself. This person is called a health care agent. If you do not have an advance directive, decisions about your medical care may be made by a doctor or a  who doesn't know you. It may help to think of an advance directive as a gift to the people who care for you. If you have one, they won't have to make tough decisions by themselves. Follow-up care is a key part of your treatment and safety. Be sure to make and go to all appointments, and call your doctor if you are having problems. It's also a good idea to know your test results and keep a list of the medicines you take. How can you care for yourself at home? · Discuss your wishes with your loved ones and your doctor. This way, there are no surprises. · Many states have a unique form. Or you might use a universal form that has been approved by many states. This kind of form can sometimes be completed and stored online. Your electronic copy will then be available wherever you have a connection to the Internet. In most cases, doctors will respect your wishes even if you have a form from a different state. · You don't need a  to do an advance directive. But you may want to get legal advice. · Think about these questions when you prepare an advance directive:  ? Who do you want to make decisions about your medical care if you are not able to?  Many people choose a family member or close friend. ? Do you know enough about life support methods that might be used? If not, talk to your doctor so you understand. ? What are you most afraid of that might happen? You might be afraid of having pain, losing your independence, or being kept alive by machines. ? Where would you prefer to die? Choices include your home, a hospital, or a nursing home. ? Would you like to have information about hospice care to support you and your family? ? Do you want to donate organs when you die? ? Do you want certain Lutheran practices performed before you die? If so, put your wishes in the advance directive. · Read your advance directive every year, and make changes as needed. When should you call for help? Be sure to contact your doctor if you have any questions. Where can you learn more? Learning About Living [place]  What is a living will? A living will is a legal form you use to write down the kind of care you want at the end of your life. It is used by the health professionals who will treat you if you aren't able to decide for yourself. If you put your wishes in writing, your loved ones and others will know what kind of care you want. They won't need to guess. This can ease your mind and be helpful to others. A living will is not the same as an estate or property will. An estate will explains what you want to happen with your money and property after you die. Is a living will a legal document? A living will is a legal document. Each state has its own laws about living tsai. If you move to another state, make sure that your living will is legal in the state where you now live. Or you might use a universal form that has been approved by many states. This kind of form can sometimes be completed and stored online. Your electronic copy will then be available wherever you have a connection to the Internet.  In most cases, doctors will respect your wishes even if you have a form from a different state.  · You don't need an  to complete a living will. But legal advice can be helpful if your state's laws are unclear, your health history is complicated, or your family can't agree on what should be in your living will. · You can change your living will at any time. Some people find that their wishes about end-of-life care change as their health changes. · In addition to making a living will, think about completing a medical power of  form. This form lets you name the person you want to make end-of-life treatment decisions for you (your \"health care agent\") if you're not able to. Many hospitals and nursing homes will give you the forms you need to complete a living will and a medical power of . · Your living will is used only if you can't make or communicate decisions for yourself anymore. If you become able to make decisions again, you can accept or refuse any treatment, no matter what you wrote in your living will. · Your state may offer an online registry. This is a place where you can store your living will online so the doctors and nurses who need to treat you can find it right away. What should you think about when creating a living will? Talk about your end-of-life wishes with your family members and your doctor. Let them know what you want. That way the people making decisions for you won't be surprised by your choices. Think about these questions as you make your living will:  · Do you know enough about life support methods that might be used? If not, talk to your doctor so you know what might be done if you can't breathe on your own, your heart stops, or you're unable to swallow. · What things would you still want to be able to do after you receive life-support methods? Would you want to be able to walk? To speak? To eat on your own? To live without the help of machines? · If you have a choice, where do you want to be cared for? In your home?  At a hospital or nursing home?  · Do you want certain Orthodoxy practices performed if you become very ill? · If you have a choice at the end of your life, where would you prefer to die? At home? In a hospital or nursing home? Somewhere else? · Would you prefer to be buried or cremated? · Do you want your organs to be donated after you die? What should you do with your living will? · Make sure that your family members and your health care agent have copies of your living will. · Give your doctor a copy of your living will to keep in your medical record. If you have more than one doctor, make sure that each one has a copy. · You may want to put a copy of your living will where it can be easily found. Where can you learn more? Go to [email or web address] and sign in to your [employer] account. Enter P672 in the [employer] box to learn more about \"Learning About Living [place]. \"     If you do not have an account, please click on the \"Sign Up Now\" link. Current as of: April 1, 2019  Content Version: 12.1  © 6523-5654 Healthwise, Incorporated. Care instructions adapted under license by [facility] ([facility]). If you have questions about a medical condition or this instruction, always ask your healthcare professional. [address] any warranty or liability for your use of this information. Need help scheduling your covid vaccine? [address] [phone] or Call [facility] hotline: [phone]         PLEASE BRING YOUR MEDICATIONS TO ALL APPOINTMENTS    The diagnoses and medications listed in this after visit summary may not be accurate at the time of check out. Please check MY CHART in 28-48 hours for possible corrections. Late cancellation policy: So that we can better accommodate people who are sick, please give our office 24 hour notice for an appointment cancellation. Thank you.     Missed appointments: Your care is very important to us. It is important that you keep your scheduled appointments. Multiple missed appointments will lead to a dismissal from the office. Later arrival policy: If you are more than 10 minutes late for your appointment, you will be asked to re-schedule. Please allow 5-7 business days for paperwork to be processed. It is important that you check your MY Chart messages, as they include appointment reminders, test results, and other important information. If you have forgotten your password, please call [phone]. HERE ARE SOME LIFE CHANGING TIPS      1. Take your blood pressure medications at bedtime. This reduces your chance of cardiovascular event by half  2. Fever in kids:  Give both Tylenol and Ibuprofen at the same time rather than staggering them which is confusing  3. Follow these tips to reduce childhood obesity: Reduce unnecessary exposure to antibiotics, consume whole milk instead of skim milk, watch public TV instead of regular TV (less exposure to junk food commercials), and reduce traumatic experiences. 4. 1 egg per day is good for your heart  5. Alternate day fasting does promote weight loss. Skipping breakfast increases your risk of obesity  6. Artificially sweetened drinks increase all cause mortality (strokes, BMI, cardiovascular)  7. Kale consumption can reduce onset of dementia  8. Walking at least 8000 steps per day and resistance exercise 2-3 x per week are good for your heart  9.  Brushing teeth 3 times per day can decrease chance of getting diabetes

## 2021-06-16 ENCOUNTER — TELEPHONE (OUTPATIENT)
Dept: GASTROENTEROLOGY | Age: 61
End: 2021-06-16

## 2021-07-13 ENCOUNTER — PROCEDURE VISIT (OUTPATIENT)
Dept: CARDIOLOGY CLINIC | Age: 61
End: 2021-07-13

## 2021-07-13 DIAGNOSIS — I50.22 CHRONIC SYSTOLIC HEART FAILURE (HCC): ICD-10-CM

## 2021-07-13 LAB
LV EF: 48 %
LVEF MODALITY: NORMAL

## 2021-07-13 PROCEDURE — 93306 TTE W/DOPPLER COMPLETE: CPT | Performed by: INTERNAL MEDICINE

## 2021-08-17 ENCOUNTER — OFFICE VISIT (OUTPATIENT)
Dept: CARDIOLOGY CLINIC | Age: 61
End: 2021-08-17
Payer: MEDICARE

## 2021-08-17 VITALS
DIASTOLIC BLOOD PRESSURE: 94 MMHG | HEIGHT: 67 IN | BODY MASS INDEX: 31.96 KG/M2 | SYSTOLIC BLOOD PRESSURE: 160 MMHG | HEART RATE: 80 BPM | WEIGHT: 203.6 LBS

## 2021-08-17 DIAGNOSIS — I25.10 CORONARY ARTERY DISEASE INVOLVING NATIVE CORONARY ARTERY OF NATIVE HEART WITHOUT ANGINA PECTORIS: Primary | ICD-10-CM

## 2021-08-17 PROCEDURE — 99214 OFFICE O/P EST MOD 30 MIN: CPT | Performed by: INTERNAL MEDICINE

## 2021-08-17 NOTE — PROGRESS NOTES
CARDIOLOGY NOTE      8/17/2021    RE: Krista Carmona  (1960)                               TO:  Dr. Donovan Haddad MD            Janene Garcia is a 57 y.o. male who was seen today for management of  cad                                    HPI:                   The patient does not have cardiac complaints  Patient also seen  for    - Coronary artery disease, does not have chest pain. Patient is  compliant with prescribed medicines. - Hypertension,is  well controlled, pt is  compliant with medicines  - Hyperlipidimea, importance of hyperlipidimea discussed with pt.   - Diabetes mellitus, blood glucose level is  well controlled.  Pt is compliant with meds and diet  - HFrEF    Krista Carmona has the following history recorded in care path:  Patient Active Problem List    Diagnosis Date Noted    Non-ST elevation myocardial infarction (NSTEMI) ([address] 03/15/2018    Hypertensive urgency     Hemiparesis affecting right side as late effect of cerebrovascular accident (CVA) ([address] 07/07/2016    Essential hypertension 05/24/2016    Cerebrovascular accident (CVA) due to thrombosis of left middle cerebral artery ([address] 05/14/2016    Obesity (BMI 30-39.9) 06/26/2015    Controlled type 2 diabetes mellitus with diabetic nephropathy, without long-term current use of insulin ([address]     History of acute inferior wall myocardial infarction     History of Guillain-[place] syndrome 03/08/2019    Adrenal adenoma 05/23/2014    CAD (coronary artery disease)     Frequent PVCs     Unstable angina ([address] 03/15/2018    Acute ST elevation myocardial infarction (STEMI) ([address] 03/14/2018    Gastroesophageal reflux disease 04/19/2017    SOBOE (shortness of breath on exertion) 10/25/2016    Hemiparesis of right dominant side as late effect of cerebral infarction ([address] 05/14/2016    Gait disturbance 05/14/2016    Hyperlipidemia LDL goal <70 12/19/2014    Fatty liver 05/23/2014    Colon, diverticulosis 05/23/2014  Renal cyst 05/23/2014    B12 deficiency 01/10/2014    Chest pain 07/30/2013    History of PTCA     Irritability and anger 02/25/2021    COPD (chronic obstructive pulmonary disease) ([facility] 12/11/2020    ANNA (obstructive sleep apnea) 11/12/2020    Hypersomnia 11/12/2020    Cigarette smoker 11/12/2020    S/P CABG (coronary artery bypass graft) 06/24/2020    Dysarthria due to recent stroke     Aphasia due to acute stroke ([facility]     Complication of coronary artery bypass graft surgery     Uncontrolled pain     Acute cerebrovascular accident (CVA) ([facility] 05/22/2020    Acute pain of left shoulder     Ischemic cardiomyopathy 06/26/2018     Current Outpatient Medications   Medication Sig Dispense Refill    atorvastatin (LIPITOR) 80 MG tablet Take 1 tablet by mouth daily 90 tablet 3    albuterol-ipratropium (COMBIVENT RESPIMAT)  MCG/ACT AERS inhaler Inhale 1 puff into the lungs every 6 hours 3 Inhaler 1    [START ON 8/20/2021] sertraline (ZOLOFT) 100 MG tablet Take 1 tablet by mouth daily 90 tablet 3    [START ON 10/9/2021] lisinopril (PRINIVIL;ZESTRIL) 40 MG tablet Take 1 tablet by mouth daily 90 tablet 0    [START ON 11/17/2021] glimepiride (AMARYL) 2 MG tablet Take 1 tablet by mouth every morning 90 tablet 0    hydrALAZINE (APRESOLINE) 10 MG tablet Take 1 tablet by mouth 2 times daily 180 tablet 3    amLODIPine (NORVASC) 10 MG tablet Take 1 tablet by mouth daily 90 tablet 1    carvedilol (COREG) 25 MG tablet Take 1 tablet by mouth 2 times daily 30 tablet 5    pantoprazole (PROTONIX) 40 MG tablet Take 1 tablet by mouth daily 90 tablet 1    blood glucose monitor strips 1 strip by Other route 2 times daily for 180 doses 180 strip 3    Lancets MISC 1 each by Does not apply route 2 times daily for 180 doses 180 each 3    Alcohol Swabs (ALCOHOL PADS) 70 % PADS 1 Units by Does not apply route 2 times daily 180 each 3    furosemide (LASIX) 40 MG tablet Take 1 tablet by mouth daily 60 tablet 1  tamsulosin (FLOMAX) 0.4 MG capsule Take 0.4 mg by mouth daily     [address] by Does not apply route 1 each 0    Blood Glucose Monitoring Suppl (BLOOD GLUCOSE MONITOR SYSTEM) w/Device KIT Use twice per day 1 kit 0    aspirin 81 MG chewable tablet Take 1 tablet by mouth daily 30 tablet 3    Blood Pressure KIT Use prn 1 kit 0    clopidogrel (PLAVIX) 75 MG tablet Take 1 tablet by mouth daily (Patient not taking: Reported on 6/4/2021) 90 tablet 3     No current facility-administered medications for this visit. Allergies: Ibuprofen, Augmentin [amoxicillin-pot clavulanate], and Codeine  Past Medical History:   Diagnosis Date    B12 deficiency     CAD (coronary artery disease)     COPD (ch[facility] obstructive pulmonary disease) [facility] 12/11/2020    GERD (gastroesophageal reflux disease)     Guillain Barré syndrome ([facility]     H/O echocardiogram 7/30/13, 6/16/10    7/13-EF 50-55% WNL. 6/10-EF53%, mod LVH, MARCI, RVE, trace PI, mild MR/TR, 6/09    Heart murmur     History of echocardiogram 10/07/2020    EF 40%, No changed since previous 5/2020, Mid inferior hypokinesis noted, Grade I DD, Mod MR, Mild TR, Dilatation of the aortic root, No pericardial effusion     History of myocardial infarction 2003    History of nuclear MUGA test 03/03/2020    EF 53%, Normal, No ICD needed    History of nuclear stress test 06/23/2020    EF 30%, study suggestive of abnormal myocardial perfusion.  History of PTCA 6/03;2/03 6/03-PTCA w/stents x2, 2/03-PTCA w/stent x3    History of PTCA 03/15/2018    LAD EF25%    Hx of cardiovascular stress test 04/25/2018    EF 28% Nuclear scintigraphy demonstartes inferior wall infarct.  Hx of cardiovascular stress test 01/27/2020    Abnormal Study. Large area of inferior & apical-lateral infarct. No significant reversible ischemia.   Severly reduced LVEF 23%    HX OTHER MEDICAL 03/14/2018    lifevest  d/c 7/2/2018    Hyperlipidemia     Hypertension     Impaired glucose fatigue  Psychological:Negative for anxiety or depression  HENT: Negative for headaches, nasal congestion, sinus pain or vertigo  Eyes: Negative for visual disturbance. Endocrine: Negative for polydipsia/polyuria  Respiratory: Negative for shortness of breath  Cardiovascular: Negative for chest pain, dyspnea on exertion, claudication, edema, irregular heartbeat, murmur, palpitations or shortness of breath  Gastrointestinal: Negative for abdominal pain or heartburn  Genito-Urinary: Negative for urinary frequency/urgency  Musculoskeletal: Negative for muscle pain, muscular weakness, negative for pain in arm and leg or swelling in foot and leg  Neurological: Negative for dizziness, headaches, memory loss, numbness/tingling, visual changes, syncope  Dermatological: Negative for rash    Objective:    Vitals:    08/17/21 1624 08/17/21 1626   BP: (!) 160/92 (!) 160/94   Pulse: 80    Weight: 203 lb 9.6 oz (92.4 kg)    Height: 5' 7\" (1.702 m)      BP (!) 160/94   Pulse 80   Ht 5' 7\" (1.702 m)   Wt 203 lb 9.6 oz (92.4 kg)   BMI 31.89 kg/m²     Patient-Reported Vitals 2/25/2021   Patient-Reported Weight -   Patient-Reported Height -   Patient-Reported Systolic 630   Patient-Reported Diastolic 66   Patient-Reported Pulse 69        Wt Readings from Last 3 Encounters:   08/17/21 203 lb 9.6 oz (92.4 kg)   06/04/21 206 lb (93.4 kg)   04/01/21 214 lb 9.6 oz (97.3 kg)     Body mass index is 31.89 kg/m². GENERAL - Alert, oriented, pleasant, in no apparent distress. EYES: No jaundice, no conjunctival pallor. SKIN: It is warm & dry. No rashes. No Echhymosis    HEENT  No clinically significant abnormalities seen. Neck - Supple. No jugular venous distention noted. No carotid bruits. Cardiovascular  Normal S1 and S2 without obvious murmur or gallop. Extremities - No cyanosis, clubbing, or significant edema. Pulmonary  No respiratory distress. No wheezes or rales.     Abdomen  No masses, tenderness, or organomegaly. Musculoskeletal  No significant edema. No joint deformities. No muscle wasting. Neurologic  Cranial nerves II through XII are grossly intact. There were no gross focal neurologic abnormalities. Lab Review   Lab Results   Component Value Date    CKTOTAL 113 05/07/2016    TROPONINT <0.010 10/13/2020     BNP:  No results found for: BNP  PT/INR:    Lab Results   Component Value Date    INR 1.07 10/13/2020     Lab Results   Component Value Date    LABA1C 6.7 06/04/2021    LABA1C 6.8 02/25/2021     Lab Results   Component Value Date    WBC 9.2 10/13/2020    HCT 48.0 10/13/2020    .0 10/13/2020     10/13/2020     Lab Results   Component Value Date    CHOL 88 10/14/2020    TRIG 71 10/14/2020    HDL 33 (L) 10/14/2020    LDLCALC 114 (H) 04/16/2019    LDLDIRECT 47 10/14/2020     Lab Results   Component Value Date    ALT 12 10/13/2020    AST 15 10/13/2020     BMP:    Lab Results   Component Value Date     02/09/2021    K 4.3 02/09/2021    K 3.8 03/16/2018     02/09/2021    CO2 28 02/09/2021    BUN 19 02/09/2021    CREATININE 1.6 02/09/2021     CMP:   Lab Results   Component Value Date     02/09/2021    K 4.3 02/09/2021    K 3.8 03/16/2018     02/09/2021    CO2 28 02/09/2021    BUN 19 02/09/2021    PROT 7.1 10/13/2020     TSH:    Lab Results   Component Value Date    TSHHS 0.723 05/07/2016           Assessment & Plan:               -     CORONARY ARTERY DISEASE:  asymptomatic     All available  tests in chart reviewed. Management discussed . Testing ordered  no       On asa                          -  Hypertension: Patients blood pressure is normal. Patient is advised about low sodium diet. Present medical regimen will not be changed. m  onnprinivil         - EF has improved           -  LIPID MANAGEMENT:  Importance of lipid levels discussed with patient   and patient was given dietary advice. NCEP- ATP III guidelines reviewed with patient.     -   Changes  in medicines made: No       On lipitor                           -   DIABETES MELLITUS: Available pertinent lab data reviewed   and  patient was given dietary advice . Advised to check blood glucose level on a regular basis.       -   Changes  in medicines made: No        On bekcy Keyes MD    [facility]

## 2021-08-17 NOTE — PATIENT INSTRUCTIONS
**It is YOUR responsibilty to bring medication bottles and/or updated medication list to [address] [place] [address]. This will allow us to better serve you and all your healthcare needs**        Please be informed that if you contact our office outside of normal business hours the physician on call cannot help with any scheduling or rescheduling issues, procedure instruction questions or any type of medication issue. We advise you for any urgent/emergency that you go to the nearest emergency room!     PLEASE CALL OUR OFFICE DURING NORMAL BUSINESS HOURS    Monday - Friday   8 am to 5 pm    [place]: [address]: [phone]    [place]:  [phone]

## 2021-09-03 DIAGNOSIS — E11.9 TYPE 2 DIABETES MELLITUS WITHOUT COMPLICATION, UNSPECIFIED WHETHER LONG TERM INSULIN USE (HCC): ICD-10-CM

## 2021-09-03 RX ORDER — GLIMEPIRIDE 2 MG/1
TABLET ORAL
Qty: 90 TABLET | Refills: 0 | OUTPATIENT
Start: 2021-09-03

## 2021-09-03 RX ORDER — LISINOPRIL 40 MG/1
TABLET ORAL
Qty: 90 TABLET | Refills: 0 | OUTPATIENT
Start: 2021-09-03

## 2022-02-25 ENCOUNTER — OFFICE VISIT (OUTPATIENT)
Dept: CARDIOLOGY CLINIC | Age: 62
End: 2022-02-25
Payer: MEDICARE

## 2022-02-25 VITALS
BODY MASS INDEX: 32.55 KG/M2 | WEIGHT: 207.4 LBS | SYSTOLIC BLOOD PRESSURE: 162 MMHG | HEART RATE: 65 BPM | HEIGHT: 67 IN | DIASTOLIC BLOOD PRESSURE: 80 MMHG

## 2022-02-25 DIAGNOSIS — I25.10 CORONARY ARTERY DISEASE INVOLVING NATIVE CORONARY ARTERY OF NATIVE HEART WITHOUT ANGINA PECTORIS: Primary | ICD-10-CM

## 2022-02-25 PROCEDURE — 93000 ELECTROCARDIOGRAM COMPLETE: CPT | Performed by: INTERNAL MEDICINE

## 2022-02-25 PROCEDURE — 99214 OFFICE O/P EST MOD 30 MIN: CPT | Performed by: INTERNAL MEDICINE

## 2022-02-25 RX ORDER — CARVEDILOL 25 MG/1
25 TABLET ORAL 2 TIMES DAILY
Qty: 30 TABLET | Refills: 5 | Status: CANCELLED | OUTPATIENT
Start: 2022-02-25

## 2022-02-25 RX ORDER — CARVEDILOL 25 MG/1
25 TABLET ORAL 2 TIMES DAILY
Qty: 30 TABLET | Refills: 5 | Status: ON HOLD | OUTPATIENT
Start: 2022-02-25 | End: 2022-03-14 | Stop reason: SDUPTHER

## 2022-02-25 RX ORDER — AMLODIPINE BESYLATE 10 MG/1
10 TABLET ORAL DAILY
Qty: 90 TABLET | Refills: 1 | Status: ON HOLD | OUTPATIENT
Start: 2022-02-25 | End: 2022-03-14 | Stop reason: SDUPTHER

## 2022-02-25 RX ORDER — AMLODIPINE BESYLATE 10 MG/1
10 TABLET ORAL DAILY
Qty: 90 TABLET | Refills: 1 | Status: CANCELLED | OUTPATIENT
Start: 2022-02-25

## 2022-02-25 RX ORDER — CLOPIDOGREL BISULFATE 75 MG/1
75 TABLET ORAL DAILY
Qty: 90 TABLET | Refills: 3 | Status: CANCELLED | OUTPATIENT
Start: 2022-02-25

## 2022-02-25 RX ORDER — FUROSEMIDE 40 MG/1
40 TABLET ORAL DAILY
Qty: 60 TABLET | Refills: 1 | Status: CANCELLED | OUTPATIENT
Start: 2022-02-25

## 2022-02-25 RX ORDER — HYDRALAZINE HYDROCHLORIDE 10 MG/1
10 TABLET, FILM COATED ORAL 2 TIMES DAILY
Qty: 180 TABLET | Refills: 3 | Status: CANCELLED | OUTPATIENT
Start: 2022-02-25

## 2022-02-25 RX ORDER — FUROSEMIDE 40 MG/1
40 TABLET ORAL DAILY
Qty: 60 TABLET | Refills: 1 | Status: ON HOLD | OUTPATIENT
Start: 2022-02-25 | End: 2022-03-14 | Stop reason: SDUPTHER

## 2022-02-25 RX ORDER — CLOPIDOGREL BISULFATE 75 MG/1
75 TABLET ORAL DAILY
Qty: 90 TABLET | Refills: 3 | Status: ON HOLD | OUTPATIENT
Start: 2022-02-25 | End: 2022-03-14 | Stop reason: SDUPTHER

## 2022-02-25 RX ORDER — HYDRALAZINE HYDROCHLORIDE 10 MG/1
10 TABLET, FILM COATED ORAL 2 TIMES DAILY
Qty: 180 TABLET | Refills: 3 | Status: ON HOLD | OUTPATIENT
Start: 2022-02-25 | End: 2022-03-14 | Stop reason: SDUPTHER

## 2022-02-25 NOTE — PROGRESS NOTES
CARDIOLOGY NOTE      2/25/2022    RE: Ethyl Ranch  (1960)                               TO:  Dr. Pretty Gilbert MD            Teri Shaffer is a 57 y.o. male who was seen today for management of coronary artery disease                                    HPI:                   Pt has h/o coronary artery disease, hypertension, hyperlipidemia, diabetes, obesity, HFrEF, seen today for follow-up.  Pt has cardiac complaints has no chest pain shortness of breath and is compliant with his meds    Ethyl Jayshree has the following history recorded in care path:  Patient Active Problem List    Diagnosis Date Noted    Non-ST elevation myocardial infarction (NSTEMI) ([address] 03/15/2018    Hypertensive urgency     Hemiparesis affecting right side as late effect of cerebrovascular accident (CVA) ([address] 07/07/2016    Essential hypertension 05/24/2016    Cerebrovascular accident (CVA) due to thrombosis of left middle cerebral artery ([address] 05/14/2016    Obesity (BMI 30-39.9) 06/26/2015    Controlled type 2 diabetes mellitus with diabetic nephropathy, without long-term current use of insulin ([address]     History of acute inferior wall myocardial infarction     History of Guillain-[place] syndrome 03/08/2019    Adrenal adenoma 05/23/2014    CAD (coronary artery disease)     Frequent PVCs     Unstable angina ([address] 03/15/2018    Acute ST elevation myocardial infarction (STEMI) ([address] 03/14/2018    Gastroesophageal reflux disease 04/19/2017    SOBOE (shortness of breath on exertion) 10/25/2016    Hemiparesis of right dominant side as late effect of cerebral infarction ([address] 05/14/2016    Gait disturbance 05/14/2016    Hyperlipidemia LDL goal <70 12/19/2014    Fatty liver 05/23/2014    Colon, diverticulosis 05/23/2014    Renal cyst 05/23/2014    B12 deficiency 01/10/2014    Chest pain 07/30/2013    History of PTCA     Irritability and anger 02/25/2021    COPD (chronic obstructive pulmonary disease) ([facility] 12/11/2020    ANNA (obstructive sleep apnea) 11/12/2020    Hypersomnia 11/12/2020    Cigarette smoker 11/12/2020    S/P CABG (coronary artery bypass graft) 06/24/2020    Dysarthria due to recent stroke     Aphasia due to acute stroke ([facility]     Complication of coronary artery bypass graft surgery     Uncontrolled pain     Acute cerebrovascular accident (CVA) ([facility] 05/22/2020    Acute pain of left shoulder     Ischemic cardiomyopathy 06/26/2018     Current Outpatient Medications   Medication Sig Dispense Refill    atorvastatin (LIPITOR) 80 MG tablet Take 1 tablet by mouth daily 90 tablet 3    albuterol-ipratropium (COMBIVENT RESPIMAT)  MCG/ACT AERS inhaler Inhale 1 puff into the lungs every 6 hours 3 Inhaler 1    sertraline (ZOLOFT) 100 MG tablet Take 1 tablet by mouth daily 90 tablet 3    lisinopril (PRINIVIL;ZESTRIL) 40 MG tablet Take 1 tablet by mouth daily 90 tablet 0    glimepiride (AMARYL) 2 MG tablet Take 1 tablet by mouth every morning 90 tablet 0    hydrALAZINE (APRESOLINE) 10 MG tablet Take 1 tablet by mouth 2 times daily 180 tablet 3    amLODIPine (NORVASC) 10 MG tablet Take 1 tablet by mouth daily 90 tablet 1    carvedilol (COREG) 25 MG tablet Take 1 tablet by mouth 2 times daily 30 tablet 5    pantoprazole (PROTONIX) 40 MG tablet Take 1 tablet by mouth daily 90 tablet 1    Lancets MISC 1 each by Does not apply route 2 times daily for 180 doses 180 each 3    tamsulosin (FLOMAX) 0.4 MG capsule Take 0.4 mg by mouth daily      clopidogrel (PLAVIX) 75 MG tablet Take 1 tablet by mouth daily 90 tablet 3    Blood Glucose Monitoring Suppl (BLOOD GLUCOSE MONITOR SYSTEM) w/Device KIT Use twice per day 1 kit 0    aspirin 81 MG chewable tablet Take 1 tablet by mouth daily 30 tablet 3    Blood Pressure KIT Use prn 1 kit 0    blood glucose monitor strips 1 strip by Other route 2 times daily for 180 doses 180 strip 3    Alcohol Swabs (ALCOHOL PADS) 70 % PADS 1 Units by Does not apply route 2 times daily 180 each 3    furosemide (LASIX) 40 MG tablet Take 1 tablet by mouth daily 60 tablet 1   [address] by Does not apply route (Patient not taking: Reported on 2/25/2022) 1 each 0     No current facility-administered medications for this visit. Allergies: Ibuprofen, Augmentin [amoxicillin-pot clavulanate], and Codeine  Past Medical History:   Diagnosis Date    B12 deficiency     CAD (coronary artery disease)     COPD (chronic obstructive pulmonary disease) ([facility] 12/11/2020    GERD (gastroesophageal reflux disease)     Guillain Barré syndrome ([facility]     H/O echocardiogram 7/30/13, 6/16/10    7/13-EF 50-55% WNL. 6/10-EF53%, mod LVH, MARCI, RVE, trace PI, mild MR/TR, 6/09    Heart murmur     History of echocardiogram 10/07/2020    EF 40%, No changed since previous 5/2020, Mid inferior hypokinesis noted, Grade I DD, Mod MR, Mild TR, Dilatation of the aortic root, No pericardial effusion     History of myocardial infarction 2003    History of nuclear MUGA test 03/03/2020    EF 53%, Normal, No ICD needed    History of nuclear stress test 06/23/2020    EF 30%, study suggestive of abnormal myocardial perfusion.  History of PTCA 6/03;2/03 6/03-PTCA w/stents x2, 2/03-PTCA w/stent x3    History of PTCA 03/15/2018    LAD EF25%    Hx of cardiovascular stress test 04/25/2018    EF 28% Nuclear scintigraphy demonstartes inferior wall infarct.  Hx of cardiovascular stress test 01/27/2020    Abnormal Study. Large area of inferior & apical-lateral infarct. No significant reversible ischemia.   Severly reduced LVEF 23%    HX OTHER MEDICAL 03/14/2018    lifevest  d/c 7/2/2018    Hyperlipidemia     Hypertension     Impaired glucose tolerance 6/3/2016    MUGA 07/09/2018    EF 47%    NSTEMI (non-ST elevated myocardial infarction) ([facility] 03/15/2018    Patient in clinical research study 05/2018    3 year study dalcitripib vs placebo [phone]    Pneumonia due to organism  Renal cyst 2014    S/P cardiac catheterization 13-EF=55%. PATENT LAD AND RCA STENTS, OM2 50% stenosis, & Normal wall motion.  Stroke (cerebrum) [facility])     May 2016     Past Surgical History:   Procedure Laterality Date    CARDIAC CATHETERIZATION  -EF=55%. PATENT LAD AND RCA STENTS, OM2 50% stenosis, & Normal wall motion.     COLONOSCOPY  14    colon polyp, diverticulosis, internal hemorrhoids    CORONARY ANGIOPLASTY  03- PTCA w stents x2 mid LAD, -PTCA w stents x3, RCA    CORONARY ARTERY BYPASS GRAFT N/A 2020    CABG CORONARY ARTERY BYPASS x 2, INTRAOPERATIVE NGA, INDUCED HYPOTHERMIA, LEFT LEG ENDOSCOPIC VEIN HARVEST [facility], COLON, DIAGNOSTIC  14    irregular GE junction, gastritis,hiatal hernia, gastric polyps    HAND SURGERY      rt knuckle    PTCA  2018    LAD    SHOULDER SURGERY Right     bone spurs      As reviewed   Family History   Problem Relation Age of Onset    Hypertension Mother     High Cholesterol Mother     Alcohol Abuse Mother     Depression Mother     Alcohol Abuse Father     Stomach Cancer Father     Depression Sister     Diabetes Brother     Depression Sister     Stomach Cancer Brother     Alcohol Abuse Brother      Social History     Tobacco Use    Smoking status: Former Smoker     Packs/day: 2.00     Years: 45.00     Pack years: 90.00     Types: Cigarettes     Quit date: 2016     Years since quittin.8    Smokeless tobacco: Never Used   Substance Use Topics    Alcohol use: No     Alcohol/week: 0.0 standard drinks        Objective:    Vitals:    22 0804 22 0817   BP: (!) 198/96 (!) 162/80   Site: Left Upper Arm Right Upper Arm   Position: Sitting Sitting   Cuff Size: Large Adult Medium Adult   Pulse: 65    Weight: 207 lb 6.4 oz (94.1 kg)    Height: 5' 7\" (1.702 m)      BP (!) 162/80 (Site: Right Upper Arm, Position: Sitting, Cuff Size: Medium Adult)   Pulse 65   Ht 5' 7\" (1.702 m)   Wt 207 lb 6.4 oz (94.1 kg)   BMI 32.48 kg/m²     Patient-Reported Vitals 2/25/2021   Patient-Reported Weight -   Patient-Reported Height -   Patient-Reported Systolic 370   Patient-Reported Diastolic 66   Patient-Reported Pulse 69        Wt Readings from Last 3 Encounters:   02/25/22 207 lb 6.4 oz (94.1 kg)   08/17/21 203 lb 9.6 oz (92.4 kg)   06/04/21 206 lb (93.4 kg)     Body mass index is 32.48 kg/m². GENERAL - Alert, oriented, pleasant, in no apparent distress. EYES: No jaundice, no conjunctival pallor. SKIN: It is warm & dry. No rashes. No Echhymosis    HEENT  No clinically significant abnormalities seen. Neck - Supple. No jugular venous distention noted. No carotid bruits. Cardiovascular  Normal S1 and S2 without obvious murmur or gallop. Extremities - No cyanosis, clubbing, or significant edema. Pulmonary  No respiratory distress. No wheezes or rales. Abdomen  No masses, tenderness, or organomegaly. Musculoskeletal  No significant edema. No joint deformities. No muscle wasting. Neurologic  Cranial nerves II through XII are grossly intact. There were no gross focal neurologic abnormalities.     Lab Review   Lab Results   Component Value Date    CKTOTAL 113 05/07/2016    TROPONINT <0.010 10/13/2020     BNP:  No results found for: BNP  PT/INR:    Lab Results   Component Value Date    INR 1.07 10/13/2020     Lab Results   Component Value Date    LABA1C 6.7 06/04/2021    LABA1C 6.8 02/25/2021     Lab Results   Component Value Date    WBC 9.2 10/13/2020    HCT 48.0 10/13/2020    .0 10/13/2020     10/13/2020     Lab Results   Component Value Date    CHOL 88 10/14/2020    TRIG 71 10/14/2020    HDL 33 (L) 10/14/2020    LDLCALC 114 (H) 04/16/2019    LDLDIRECT 47 10/14/2020     Lab Results   Component Value Date    ALT 12 10/13/2020    AST 15 10/13/2020     BMP:    Lab Results   Component Value Date     02/09/2021    K 4.3 02/09/2021    K 3.8 03/16/2018     02/09/2021    CO2 28 02/09/2021    BUN 19 02/09/2021    CREATININE 1.6 02/09/2021     CMP:   Lab Results   Component Value Date     02/09/2021    K 4.3 02/09/2021    K 3.8 03/16/2018     02/09/2021    CO2 28 02/09/2021    BUN 19 02/09/2021    PROT 7.1 10/13/2020     TSH:    Lab Results   Component Value Date    TSHHS 0.723 05/07/2016           Assessment & Plan:               -     CORONARY ARTERY DISEASE:  asymptomatic     All available  tests in chart reviewed. Management discussed . Testing ordered  no       On Plavix compliant no side effects                        Last Cardiolite June 2018 showed infarct EF of 30%  EF is improved since then  EKG done today shows no new changes    -  Hypertension: Patients blood pressure is normal. Patient is advised about low sodium diet. Present medical regimen will not be changed. On hydralazine, amlodipine, Coreg, Lasix compliant has no side effects                    -  LIPID MANAGEMENT:  Importance of lipid levels discussed with patient   and patient was given dietary advice. NCEP- ATP III guidelines reviewed with patient. -   Changes  in medicines made: No     On Lipitor 80 mg p.o. daily compliant LDL of 47                           -   DIABETES MELLITUS: Available pertinent lab data reviewed   and  patient was given dietary advice . Advised to check blood glucose level on a regular basis. -   Changes  in medicines made: No        On glimepiride compliant hemoglobin A1c of 6.7        -HFrEF on beta-blocker that is Coreg, Lasix compliant  Last EF July of last year was about 45%    Mortality from the morbid obesity is very high: Wt loss advised     Body mass index is 32.48 kg/m².           Aurora Pinto MD    [facility] [place]    Please note this report has been partially produced using speech recognition software and may contain errors related to that system including errors in grammar, punctuation, and spelling, as well as words and phrases that may be inappropriate. If there are any questions or concerns please feel free to contact the dictating provider for clarification.

## 2022-02-25 NOTE — PATIENT INSTRUCTIONS
Please be informed that if you contact our office outside of normal business hours the physician on call cannot help with any scheduling or rescheduling issues, procedure instruction questions or any type of medication issue. We advise you for any urgent/emergency that you go to the nearest emergency room! PLEASE CALL OUR OFFICE DURING NORMAL BUSINESS HOURS    Monday - Friday   8 am to 5 pm    [place]: [address]: [phone]    [place]:  [phone]    **It is YOUR responsibilty to bring medication bottles and/or updated medication list to [address] [place] [address].  This will allow us to better serve you and all your healthcare needs**

## 2022-03-07 DIAGNOSIS — K21.9 GASTROESOPHAGEAL REFLUX DISEASE, UNSPECIFIED WHETHER ESOPHAGITIS PRESENT: ICD-10-CM

## 2022-03-07 RX ORDER — PANTOPRAZOLE SODIUM 40 MG/1
TABLET, DELAYED RELEASE ORAL
Qty: 90 TABLET | Refills: 1 | OUTPATIENT
Start: 2022-03-07

## 2022-03-12 ENCOUNTER — HOSPITAL ENCOUNTER (INPATIENT)
Age: 62
LOS: 1 days | Discharge: HOME | DRG: 281 | End: 2022-03-14
Attending: STUDENT IN AN ORGANIZED HEALTH CARE EDUCATION/TRAINING PROGRAM | Admitting: INTERNAL MEDICINE
Payer: MEDICARE

## 2022-03-12 ENCOUNTER — APPOINTMENT (OUTPATIENT)
Dept: GENERAL RADIOLOGY | Age: 62
DRG: 281 | End: 2022-03-12
Payer: MEDICARE

## 2022-03-12 DIAGNOSIS — R45.4 IRRITABILITY AND ANGER: ICD-10-CM

## 2022-03-12 DIAGNOSIS — R07.9 CHEST PAIN, UNSPECIFIED TYPE: ICD-10-CM

## 2022-03-12 DIAGNOSIS — J41.0 SIMPLE CHRONIC BRONCHITIS (HCC): ICD-10-CM

## 2022-03-12 DIAGNOSIS — E11.9 TYPE 2 DIABETES MELLITUS WITHOUT COMPLICATION, UNSPECIFIED WHETHER LONG TERM INSULIN USE (HCC): ICD-10-CM

## 2022-03-12 DIAGNOSIS — K21.9 GASTROESOPHAGEAL REFLUX DISEASE, UNSPECIFIED WHETHER ESOPHAGITIS PRESENT: ICD-10-CM

## 2022-03-12 DIAGNOSIS — I21.3 ST ELEVATION MYOCARDIAL INFARCTION (STEMI), UNSPECIFIED ARTERY (HCC): Primary | ICD-10-CM

## 2022-03-12 LAB
ACTIVATED CLOTTING TIME, LOW RANGE: 205 SEC
ADENOVIRUS DETECTION BY PCR: NOT DETECTED
ANION GAP SERPL CALCULATED.3IONS-SCNC: 9 MMOL/L (ref 4–16)
BASOPHILS ABSOLUTE: 0.1 K/CU MM
BASOPHILS RELATIVE PERCENT: 1 % (ref 0–1)
BORDETELLA PARAPERTUSSIS BY PCR: NOT DETECTED
BORDETELLA PERTUSSIS PCR: NOT DETECTED
BUN BLDV-MCNC: 21 MG/DL (ref 6–23)
CALCIUM SERPL-MCNC: 9.1 MG/DL (ref 8.3–10.6)
CHLAMYDOPHILA PNEUMONIA PCR: NOT DETECTED
CHLORIDE BLD-SCNC: 104 MMOL/L (ref 99–110)
CO2: 26 MMOL/L (ref 21–32)
CORONAVIRUS 229E PCR: NOT DETECTED
CORONAVIRUS HKU1 PCR: NOT DETECTED
CORONAVIRUS NL63 PCR: NOT DETECTED
CORONAVIRUS OC43 PCR: NOT DETECTED
CREAT SERPL-MCNC: 1.5 MG/DL (ref 0.9–1.3)
DIFFERENTIAL TYPE: ABNORMAL
EKG ATRIAL RATE: 75 BPM
EKG DIAGNOSIS: NORMAL
EKG P AXIS: 16 DEGREES
EKG P-R INTERVAL: 158 MS
EKG Q-T INTERVAL: 426 MS
EKG QRS DURATION: 118 MS
EKG QTC CALCULATION (BAZETT): 475 MS
EKG R AXIS: 27 DEGREES
EKG T AXIS: 136 DEGREES
EKG VENTRICULAR RATE: 75 BPM
EOSINOPHILS ABSOLUTE: 0.1 K/CU MM
EOSINOPHILS RELATIVE PERCENT: 1.8 % (ref 0–3)
ESTIMATED AVERAGE GLUCOSE: 160 MG/DL
GFR AFRICAN AMERICAN: 57 ML/MIN/1.73M2
GFR NON-AFRICAN AMERICAN: 47 ML/MIN/1.73M2
GLUCOSE BLD-MCNC: 169 MG/DL (ref 70–99)
GLUCOSE BLD-MCNC: 219 MG/DL (ref 70–99)
GLUCOSE BLD-MCNC: 262 MG/DL (ref 70–99)
HBA1C MFR BLD: 7.2 % (ref 4.2–6.3)
HCT VFR BLD CALC: 51.7 % (ref 42–52)
HEMOGLOBIN: 17.2 GM/DL (ref 13.5–18)
HUMAN METAPNEUMOVIRUS PCR: NOT DETECTED
IMMATURE NEUTROPHIL %: 0.6 % (ref 0–0.43)
INFLUENZA A BY PCR: NOT DETECTED
INFLUENZA A H1 (2009) PCR: NOT DETECTED
INFLUENZA A H1 PANDEMIC PCR: NOT DETECTED
INFLUENZA A H3 PCR: NOT DETECTED
INFLUENZA B BY PCR: NOT DETECTED
INR BLD: 0.9 INDEX
LYMPHOCYTES ABSOLUTE: 1.4 K/CU MM
LYMPHOCYTES RELATIVE PERCENT: 19.2 % (ref 24–44)
MAGNESIUM: 2.2 MG/DL (ref 1.8–2.4)
MCH RBC QN AUTO: 32.5 PG (ref 27–31)
MCHC RBC AUTO-ENTMCNC: 33.3 % (ref 32–36)
MCV RBC AUTO: 97.5 FL (ref 78–100)
MONOCYTES ABSOLUTE: 0.4 K/CU MM
MONOCYTES RELATIVE PERCENT: 6.1 % (ref 0–4)
MYCOPLASMA PNEUMONIAE PCR: NOT DETECTED
NUCLEATED RBC %: 0 %
PARAINFLUENZA 1 PCR: NOT DETECTED
PARAINFLUENZA 2 PCR: NOT DETECTED
PARAINFLUENZA 3 PCR: NOT DETECTED
PARAINFLUENZA 4 PCR: NOT DETECTED
PDW BLD-RTO: 12.8 % (ref 11.7–14.9)
PLATELET # BLD: 163 K/CU MM (ref 140–440)
PMV BLD AUTO: 12 FL (ref 7.5–11.1)
POTASSIUM SERPL-SCNC: 4.5 MMOL/L (ref 3.5–5.1)
PRO-BNP: 2388 PG/ML
PROTHROMBIN TIME: 11.6 SECONDS (ref 11.7–14.5)
RBC # BLD: 5.3 M/CU MM (ref 4.6–6.2)
RHINOVIRUS ENTEROVIRUS PCR: NOT DETECTED
RSV PCR: NOT DETECTED
SARS-COV-2: NOT DETECTED
SEGMENTED NEUTROPHILS ABSOLUTE COUNT: 5.2 K/CU MM
SEGMENTED NEUTROPHILS RELATIVE PERCENT: 71.3 % (ref 36–66)
SODIUM BLD-SCNC: 139 MMOL/L (ref 135–145)
TOTAL IMMATURE NEUTOROPHIL: 0.04 K/CU MM
TOTAL NUCLEATED RBC: 0 K/CU MM
TROPONIN T: 0.02 NG/ML
WBC # BLD: 7.3 K/CU MM (ref 4–10.5)

## 2022-03-12 PROCEDURE — 85610 PROTHROMBIN TIME: CPT

## 2022-03-12 PROCEDURE — 93459 L HRT ART/GRFT ANGIO: CPT | Performed by: INTERNAL MEDICINE

## 2022-03-12 PROCEDURE — 99285 EMERGENCY DEPT VISIT HI MDM: CPT

## 2022-03-12 PROCEDURE — 93458 L HRT ARTERY/VENTRICLE ANGIO: CPT

## 2022-03-12 PROCEDURE — 0202U NFCT DS 22 TRGT SARS-COV-2: CPT

## 2022-03-12 PROCEDURE — 83735 ASSAY OF MAGNESIUM: CPT

## 2022-03-12 PROCEDURE — 4A023N7 MEASUREMENT OF CARDIAC SAMPLING AND PRESSURE, LEFT HEART, PERCUTANEOUS APPROACH: ICD-10-PCS | Performed by: INTERNAL MEDICINE

## 2022-03-12 PROCEDURE — B2181ZZ FLUOROSCOPY OF LEFT INTERNAL MAMMARY BYPASS GRAFT USING LOW OSMOLAR CONTRAST: ICD-10-PCS | Performed by: INTERNAL MEDICINE

## 2022-03-12 PROCEDURE — 82962 GLUCOSE BLOOD TEST: CPT

## 2022-03-12 PROCEDURE — 96375 TX/PRO/DX INJ NEW DRUG ADDON: CPT

## 2022-03-12 PROCEDURE — G0378 HOSPITAL OBSERVATION PER HR: HCPCS

## 2022-03-12 PROCEDURE — 6370000000 HC RX 637 (ALT 250 FOR IP)

## 2022-03-12 PROCEDURE — 85347 COAGULATION TIME ACTIVATED: CPT

## 2022-03-12 PROCEDURE — C1894 INTRO/SHEATH, NON-LASER: HCPCS

## 2022-03-12 PROCEDURE — 83880 ASSAY OF NATRIURETIC PEPTIDE: CPT

## 2022-03-12 PROCEDURE — B2131ZZ FLUOROSCOPY OF MULTIPLE CORONARY ARTERY BYPASS GRAFTS USING LOW OSMOLAR CONTRAST: ICD-10-PCS | Performed by: INTERNAL MEDICINE

## 2022-03-12 PROCEDURE — 6370000000 HC RX 637 (ALT 250 FOR IP): Performed by: STUDENT IN AN ORGANIZED HEALTH CARE EDUCATION/TRAINING PROGRAM

## 2022-03-12 PROCEDURE — 83036 HEMOGLOBIN GLYCOSYLATED A1C: CPT

## 2022-03-12 PROCEDURE — 2500000003 HC RX 250 WO HCPCS

## 2022-03-12 PROCEDURE — 6360000002 HC RX W HCPCS

## 2022-03-12 PROCEDURE — 2709999900 HC NON-CHARGEABLE SUPPLY

## 2022-03-12 PROCEDURE — 80048 BASIC METABOLIC PNL TOTAL CA: CPT

## 2022-03-12 PROCEDURE — 6370000000 HC RX 637 (ALT 250 FOR IP): Performed by: INTERNAL MEDICINE

## 2022-03-12 PROCEDURE — 93005 ELECTROCARDIOGRAM TRACING: CPT | Performed by: STUDENT IN AN ORGANIZED HEALTH CARE EDUCATION/TRAINING PROGRAM

## 2022-03-12 PROCEDURE — C1760 CLOSURE DEV, VASC: HCPCS

## 2022-03-12 PROCEDURE — 6360000004 HC RX CONTRAST MEDICATION

## 2022-03-12 PROCEDURE — 6370000000 HC RX 637 (ALT 250 FOR IP): Performed by: NURSE PRACTITIONER

## 2022-03-12 PROCEDURE — 96374 THER/PROPH/DIAG INJ IV PUSH: CPT

## 2022-03-12 PROCEDURE — 2580000003 HC RX 258: Performed by: INTERNAL MEDICINE

## 2022-03-12 PROCEDURE — 6360000002 HC RX W HCPCS: Performed by: STUDENT IN AN ORGANIZED HEALTH CARE EDUCATION/TRAINING PROGRAM

## 2022-03-12 PROCEDURE — 93010 ELECTROCARDIOGRAM REPORT: CPT | Performed by: INTERNAL MEDICINE

## 2022-03-12 PROCEDURE — 99221 1ST HOSP IP/OBS SF/LOW 40: CPT | Performed by: INTERNAL MEDICINE

## 2022-03-12 PROCEDURE — 71045 X-RAY EXAM CHEST 1 VIEW: CPT

## 2022-03-12 PROCEDURE — 85025 COMPLETE CBC W/AUTO DIFF WBC: CPT

## 2022-03-12 PROCEDURE — 84484 ASSAY OF TROPONIN QUANT: CPT

## 2022-03-12 PROCEDURE — 2580000003 HC RX 258

## 2022-03-12 PROCEDURE — C1769 GUIDE WIRE: HCPCS

## 2022-03-12 PROCEDURE — B2111ZZ FLUOROSCOPY OF MULTIPLE CORONARY ARTERIES USING LOW OSMOLAR CONTRAST: ICD-10-PCS | Performed by: INTERNAL MEDICINE

## 2022-03-12 RX ORDER — GUAIFENESIN 600 MG/1
600 TABLET, EXTENDED RELEASE ORAL 2 TIMES DAILY
Status: DISCONTINUED | OUTPATIENT
Start: 2022-03-12 | End: 2022-03-14 | Stop reason: HOSPADM

## 2022-03-12 RX ORDER — LANCETS 30 GAUGE
1 EACH MISCELLANEOUS 2 TIMES DAILY
Status: DISCONTINUED | OUTPATIENT
Start: 2022-03-12 | End: 2022-03-12 | Stop reason: CLARIF

## 2022-03-12 RX ORDER — ALBUTEROL SULFATE 90 UG/1
1 AEROSOL, METERED RESPIRATORY (INHALATION) EVERY 6 HOURS
Status: DISCONTINUED | OUTPATIENT
Start: 2022-03-12 | End: 2022-03-14 | Stop reason: HOSPADM

## 2022-03-12 RX ORDER — FENTANYL CITRATE 50 UG/ML
50 INJECTION, SOLUTION INTRAMUSCULAR; INTRAVENOUS ONCE
Status: DISCONTINUED | OUTPATIENT
Start: 2022-03-12 | End: 2022-03-13

## 2022-03-12 RX ORDER — DEXTROSE MONOHYDRATE 25 G/50ML
12.5 INJECTION, SOLUTION INTRAVENOUS PRN
Status: DISCONTINUED | OUTPATIENT
Start: 2022-03-12 | End: 2022-03-12 | Stop reason: RX

## 2022-03-12 RX ORDER — PANTOPRAZOLE SODIUM 40 MG/1
40 TABLET, DELAYED RELEASE ORAL
Status: DISCONTINUED | OUTPATIENT
Start: 2022-03-12 | End: 2022-03-14 | Stop reason: HOSPADM

## 2022-03-12 RX ORDER — CLOPIDOGREL BISULFATE 75 MG/1
75 TABLET ORAL DAILY
Status: DISCONTINUED | OUTPATIENT
Start: 2022-03-12 | End: 2022-03-14 | Stop reason: HOSPADM

## 2022-03-12 RX ORDER — TAMSULOSIN HYDROCHLORIDE 0.4 MG/1
0.4 CAPSULE ORAL DAILY
Status: DISCONTINUED | OUTPATIENT
Start: 2022-03-12 | End: 2022-03-14 | Stop reason: HOSPADM

## 2022-03-12 RX ORDER — BLOOD SUGAR DIAGNOSTIC
1 STRIP MISCELLANEOUS 2 TIMES DAILY
Status: DISCONTINUED | OUTPATIENT
Start: 2022-03-12 | End: 2022-03-12 | Stop reason: CLARIF

## 2022-03-12 RX ORDER — LISINOPRIL 40 MG/1
40 TABLET ORAL DAILY
Status: DISCONTINUED | OUTPATIENT
Start: 2022-03-12 | End: 2022-03-14 | Stop reason: HOSPADM

## 2022-03-12 RX ORDER — ASPIRIN 81 MG/1
81 TABLET, CHEWABLE ORAL DAILY
Status: DISCONTINUED | OUTPATIENT
Start: 2022-03-12 | End: 2022-03-14 | Stop reason: HOSPADM

## 2022-03-12 RX ORDER — FUROSEMIDE 40 MG/1
40 TABLET ORAL DAILY
Status: DISCONTINUED | OUTPATIENT
Start: 2022-03-12 | End: 2022-03-14 | Stop reason: HOSPADM

## 2022-03-12 RX ORDER — ALBUTEROL SULFATE 2.5 MG/3ML
2.5 SOLUTION RESPIRATORY (INHALATION) EVERY 6 HOURS PRN
Status: DISCONTINUED | OUTPATIENT
Start: 2022-03-12 | End: 2022-03-12

## 2022-03-12 RX ORDER — SODIUM CHLORIDE 0.9 % (FLUSH) 0.9 %
5-40 SYRINGE (ML) INJECTION EVERY 12 HOURS SCHEDULED
Status: DISCONTINUED | OUTPATIENT
Start: 2022-03-12 | End: 2022-03-14 | Stop reason: HOSPADM

## 2022-03-12 RX ORDER — HEPARIN SODIUM 1000 [USP'U]/ML
5000 INJECTION, SOLUTION INTRAVENOUS; SUBCUTANEOUS ONCE
Status: COMPLETED | OUTPATIENT
Start: 2022-03-12 | End: 2022-03-12

## 2022-03-12 RX ORDER — AMLODIPINE BESYLATE 10 MG/1
10 TABLET ORAL DAILY
Status: DISCONTINUED | OUTPATIENT
Start: 2022-03-12 | End: 2022-03-14 | Stop reason: HOSPADM

## 2022-03-12 RX ORDER — ATORVASTATIN CALCIUM 80 MG/1
80 TABLET, FILM COATED ORAL DAILY
Status: DISCONTINUED | OUTPATIENT
Start: 2022-03-12 | End: 2022-03-14 | Stop reason: HOSPADM

## 2022-03-12 RX ORDER — PREDNISONE 10 MG/1
40 TABLET ORAL DAILY
Status: DISCONTINUED | OUTPATIENT
Start: 2022-03-12 | End: 2022-03-14 | Stop reason: HOSPADM

## 2022-03-12 RX ORDER — SERTRALINE HYDROCHLORIDE 100 MG/1
100 TABLET, FILM COATED ORAL DAILY
Status: DISCONTINUED | OUTPATIENT
Start: 2022-03-12 | End: 2022-03-14 | Stop reason: HOSPADM

## 2022-03-12 RX ORDER — ISOSORBIDE MONONITRATE 30 MG/1
30 TABLET, EXTENDED RELEASE ORAL DAILY
Status: DISCONTINUED | OUTPATIENT
Start: 2022-03-12 | End: 2022-03-14 | Stop reason: HOSPADM

## 2022-03-12 RX ORDER — CARVEDILOL 25 MG/1
25 TABLET ORAL 2 TIMES DAILY
Status: DISCONTINUED | OUTPATIENT
Start: 2022-03-12 | End: 2022-03-14 | Stop reason: HOSPADM

## 2022-03-12 RX ORDER — SODIUM CHLORIDE 9 MG/ML
25 INJECTION, SOLUTION INTRAVENOUS PRN
Status: DISCONTINUED | OUTPATIENT
Start: 2022-03-12 | End: 2022-03-14 | Stop reason: HOSPADM

## 2022-03-12 RX ORDER — ASPIRIN 81 MG/1
324 TABLET, CHEWABLE ORAL ONCE
Status: COMPLETED | OUTPATIENT
Start: 2022-03-12 | End: 2022-03-12

## 2022-03-12 RX ORDER — ONDANSETRON 2 MG/ML
4 INJECTION INTRAMUSCULAR; INTRAVENOUS ONCE
Status: COMPLETED | OUTPATIENT
Start: 2022-03-12 | End: 2022-03-12

## 2022-03-12 RX ORDER — NICOTINE POLACRILEX 4 MG
15 LOZENGE BUCCAL PRN
Status: DISCONTINUED | OUTPATIENT
Start: 2022-03-12 | End: 2022-03-14 | Stop reason: HOSPADM

## 2022-03-12 RX ORDER — GLUCOSAMINE HCL/CHONDROITIN SU 500-400 MG
1 CAPSULE ORAL 2 TIMES DAILY
Status: DISCONTINUED | OUTPATIENT
Start: 2022-03-12 | End: 2022-03-12 | Stop reason: CLARIF

## 2022-03-12 RX ORDER — HYDRALAZINE HYDROCHLORIDE 10 MG/1
10 TABLET, FILM COATED ORAL 2 TIMES DAILY
Status: DISCONTINUED | OUTPATIENT
Start: 2022-03-12 | End: 2022-03-14 | Stop reason: HOSPADM

## 2022-03-12 RX ORDER — SODIUM CHLORIDE 0.9 % (FLUSH) 0.9 %
5-40 SYRINGE (ML) INJECTION PRN
Status: DISCONTINUED | OUTPATIENT
Start: 2022-03-12 | End: 2022-03-14 | Stop reason: HOSPADM

## 2022-03-12 RX ORDER — DEXTROSE MONOHYDRATE 50 MG/ML
100 INJECTION, SOLUTION INTRAVENOUS PRN
Status: DISCONTINUED | OUTPATIENT
Start: 2022-03-12 | End: 2022-03-14 | Stop reason: HOSPADM

## 2022-03-12 RX ADMIN — ATORVASTATIN CALCIUM 80 MG: 80 TABLET, FILM COATED ORAL at 21:01

## 2022-03-12 RX ADMIN — CLOPIDOGREL BISULFATE 75 MG: 75 TABLET ORAL at 12:51

## 2022-03-12 RX ADMIN — HYDRALAZINE HYDROCHLORIDE 10 MG: 10 TABLET, FILM COATED ORAL at 21:01

## 2022-03-12 RX ADMIN — ISOSORBIDE MONONITRATE 30 MG: 30 TABLET, EXTENDED RELEASE ORAL at 12:52

## 2022-03-12 RX ADMIN — CARVEDILOL 25 MG: 25 TABLET, FILM COATED ORAL at 21:01

## 2022-03-12 RX ADMIN — SERTRALINE 100 MG: 100 TABLET, FILM COATED ORAL at 12:52

## 2022-03-12 RX ADMIN — HEPARIN SODIUM 5000 UNITS: 1000 INJECTION INTRAVENOUS; SUBCUTANEOUS at 07:50

## 2022-03-12 RX ADMIN — CARVEDILOL 25 MG: 25 TABLET, FILM COATED ORAL at 12:52

## 2022-03-12 RX ADMIN — AMLODIPINE BESYLATE 10 MG: 10 TABLET ORAL at 12:52

## 2022-03-12 RX ADMIN — ASPIRIN 324 MG: 81 TABLET, CHEWABLE ORAL at 07:34

## 2022-03-12 RX ADMIN — TAMSULOSIN HYDROCHLORIDE 0.4 MG: 0.4 CAPSULE ORAL at 12:52

## 2022-03-12 RX ADMIN — HYDRALAZINE HYDROCHLORIDE 10 MG: 10 TABLET, FILM COATED ORAL at 14:54

## 2022-03-12 RX ADMIN — GUAIFENESIN 600 MG: 600 TABLET, EXTENDED RELEASE ORAL at 21:01

## 2022-03-12 RX ADMIN — ONDANSETRON 4 MG: 2 INJECTION INTRAMUSCULAR; INTRAVENOUS at 08:04

## 2022-03-12 RX ADMIN — PREDNISONE 40 MG: 10 TABLET ORAL at 14:53

## 2022-03-12 RX ADMIN — SODIUM CHLORIDE, PRESERVATIVE FREE 10 ML: 5 INJECTION INTRAVENOUS at 21:04

## 2022-03-12 RX ADMIN — LISINOPRIL 40 MG: 40 TABLET ORAL at 14:54

## 2022-03-12 RX ADMIN — ASPIRIN 81 MG: 81 TABLET, CHEWABLE ORAL at 14:53

## 2022-03-12 RX ADMIN — GUAIFENESIN 600 MG: 600 TABLET, EXTENDED RELEASE ORAL at 14:54

## 2022-03-12 RX ADMIN — FUROSEMIDE 40 MG: 40 TABLET ORAL at 14:53

## 2022-03-12 RX ADMIN — PANTOPRAZOLE SODIUM 40 MG: 40 TABLET, DELAYED RELEASE ORAL at 14:54

## 2022-03-12 ASSESSMENT — ENCOUNTER SYMPTOMS
ABDOMINAL PAIN: 0
COUGH: 0
NAUSEA: 1
EYE REDNESS: 0
SHORTNESS OF BREATH: 0
DIARRHEA: 0
EYE PAIN: 0
SORE THROAT: 0
VOMITING: 0
CHEST TIGHTNESS: 0

## 2022-03-12 ASSESSMENT — PAIN DESCRIPTION - PAIN TYPE: TYPE: ACUTE PAIN

## 2022-03-12 ASSESSMENT — PAIN SCALES - GENERAL: PAINLEVEL_OUTOF10: 6

## 2022-03-12 ASSESSMENT — PAIN DESCRIPTION - ORIENTATION: ORIENTATION: MID

## 2022-03-12 ASSESSMENT — PAIN DESCRIPTION - LOCATION: LOCATION: CHEST

## 2022-03-12 NOTE — CONSULTS
Hospitalist Consult Note      Name:  Aida Taylor /Age/Sex: 1960  (57 y.o. male)   MRN & CSN:  [id number] & [id number] Admission Date/Time: 3/12/2022  7:29 AM   Location:  -A PCP: Benito Fraser MD       Hospital Day: 1    Assessment and Plan:   Aida Taylor is a 57 y.o.  male  who presents with Chest pain was STEMI alert in ER when to Cath lab, has hx of 7 stents. Hospitalist Team consulted for: Medical Management     STEMI CC of Chest pain : Patient underwent cardiac cath this morning that revealed 100% occluded RCA but patent bypass grafts LAD is occluded. This LAD supplied by [facility] no changes continue medical management. .  Patient is known to have occluded RCA. -Admitting service Cardiology  -Imdur started  -Telemetry monitoring  -Thank you for the consult    Chronic systolic heart failure: Does not appear to be in exacerbation last echo on file: XX15-37%, grade 1 diastolic dysfunction. Managed at home on diuretic, beta-blocker, ACE. [address] [place] [address] cardiology as outpatient. EKG demonstrates sinus rhythm, heart rate 75, QTc 475. ST and T wave abnormalities. . ProBNP pending on admission, Troponin 0.018. Chest x-ray pending  -Strict intake and output  -Diet restriction sodium  -Telemetry monitoring  -BMP daily  -Monitor diuresis closely  -Continue PO lasix-Daily weight    COPD:  mild exacerbation. Patient has been out of inhalers x 1 week. CXR pending on Admission. Patient complains of worsening shortness of breath with exertion. Endorses productive cough  -Respiratory interventions-  supplemental oxygen prn, continuous pulse oximetry  -Continue routine inhalers and add prn bronchodilators   - Prednisone 40 mg daily  -Portable chest x-ray pending  -Viral respiratory panel    Hypertension:  Upon ER arrival BP was 200/101 pt was STEMI alert and went to cath lab.   Outpatient regimen includes hydralazine, lisinopril, amlodipine  -Monitor BP trends  -OP regimen restarted by Cardiology    CKD stage III: Cr 1.5, stable around baseline. BUN 21, GFR 47  -Monitor BMP  -Avoid nephrotoxic agents when able  -Adjust medications for GFR  -Plan to diuretic use    Diabetes Mellitus type II-controlled. Without complication. without long tern use of insulin. managed on 219, hold long acting antiglycemics last Hgb A1C-6.7, Blood glucose on admission.   - SSI  - POCT glucose qACHS  - hypoglycemia management protocol   - target blood glucose 100-180  - ADA carb controlled diet     GERD:   -Continue PPI    Hyperlipidemia: Continue OP statin therapy. Last Lipid panel 10/14/2020    BMI [id number] PCP for [facility] care    Diet ADULT DIET; Regular   DVT Prophylaxis [x] Lovenox, []  Heparin, [] SCDs, [] Ambulation   GI Prophylaxis [x] PPI,  [] H2 Blocker,  [] Carafate,  [] Diet/Tube Feeds   Code Status Prior     History of Present Illness:     Chief Complaint: Chest pain with shortness of breath presented to emergency room because of a STEMI alert and went directly to Cath Lab from ER. Gertrudis Tuttle is a 57 y.o.  male  who presented to the emergency room with complaint of shortness of breath and chest pain. Patient reports around 4 AM this morning he felt like his prior heart attack. Patient states he ran out of his inhalers 1 week ago for COPD has had worsening shortness of breath on exertion. States he cares for his wife and has to lift her to bed he was unable to. Endorses increased fatigue. Patient reports episodes of diaphoresis also fever and chills. Patient also endorses nausea and episodes of vomiting this morning. Denies any abdominal pain. Denies any pleuritic chest pain or pain with deep breaths.      Ten point ROS reviewed negative, unless as noted above    Objective:   No intake or output data in the 24 hours ending 03/12/22 1138   Vitals:   Vitals:    03/12/22 0757   BP: (!) 160/87   Pulse: 75   Resp: 12   Temp:    SpO2: 97%     Physical Exam:   GEN Awake male, sitting upright in bed in no apparent distress. Appears given age. EYES Pupils are equally round. No scleral erythema, discharge, or conjunctivitis. HENT Mucous membranes are moist. Oral pharynx without exudates, no evidence of thrush. NECK Supple, no apparent thyromegaly or masses. RESP decreased breath sounds throughout, wheezes noted at left upper lobe. No crackles or rhonchi noted. Symmetrical chest movement on room air. No conversational dyspnea  CARDIO/VASC S1/S2 auscultated. Regular rate without appreciable murmurs, rubs, or gallops. No JVD or carotid bruits. Peripheral pulses equal bilaterally and palpable. No peripheral edema. GI Abdomen is soft without significant tenderness, masses, or guarding. Bowel sounds are normoactive. .  MSK No gross joint deformities. SKIN Normal coloration, warm, dry. NEURO Cranial nerves appear grossly intact, normal speech, no lateralizing weakness. PSYCH Awake, alert, oriented x 4. Affect appropriate.     Medications:   Medications:    sodium chloride flush  5-40 mL IntraVENous 2 times per day    fentanNYL  50 mcg IntraVENous Once    aspirin  81 mg Oral Daily    tamsulosin  0.4 mg Oral Daily    pantoprazole  40 mg Oral Daily    blood glucose test strips  1 strip Other BID    Lancets  1 each Does not apply BID    Alcohol Pads  1 Units Does not apply BID    atorvastatin  80 mg Oral Daily    albuterol-ipratropium  1 puff Inhalation Q6H    sertraline  100 mg Oral Daily    lisinopril  40 mg Oral Daily    amLODIPine  10 mg Oral Daily    carvedilol  25 mg Oral BID    clopidogrel  75 mg Oral Daily    furosemide  40 mg Oral Daily    hydrALAZINE  10 mg Oral BID    isosorbide mononitrate  30 mg Oral Daily      Infusions:    sodium chloride       PRN Meds: sodium chloride flush, 5-40 mL, PRN  sodium chloride, 25 mL, PRN          Electronically signed by JERED Walsh CNP on 3/12/2022 at 11:38 AM

## 2022-03-12 NOTE — ED PROVIDER NOTES
stress test 01/27/2020    Abnormal Study. Large area of inferior & apical-lateral infarct. No significant reversible ischemia. Severly reduced LVEF 23%    HX OTHER MEDICAL 03/14/2018    lifevest  d/c 7/2/2018    Hyperlipidemia     Hypertension     Impaired glucose tolerance 6/3/2016    MUGA 07/09/2018    EF 47%    NSTEMI (non-ST elevated myocardial infarction) ([facility] [address] 03/15/2018    Patient in clinical research study 05/2018    3 year study dalcitripib vs placebo [phone]    Pneumonia due to organism     Renal cyst 5/23/2014    S/P cardiac catheterization 7/12/13 7/13-EF=55%. PATENT LAD AND RCA STENTS, OM2 50% stenosis, & Normal wall motion.  Stroke (cerebrum) [facility])     May 2016       PMSx:    Past Surgical History:   Procedure Laterality Date    CARDIAC CATHETERIZATION  7/13 7/13-EF=55%. PATENT LAD AND RCA STENTS, OM2 50% stenosis, & Normal wall motion.  COLONOSCOPY  8/21/14    colon polyp, diverticulosis, internal hemorrhoids    CORONARY ANGIOPLASTY  6/03 6/14/03- PTCA w stents x2 mid LAD, 2/03-PTCA w stents x3, RCA    CORONARY ARTERY BYPASS GRAFT N/A 5/18/2020    CABG CORONARY ARTERY BYPASS x 2, INTRAOPERATIVE NGA, INDUCED HYPOTHERMIA, LEFT LEG ENDOSCOPIC VEIN HARVEST performed by Ernie Lieberman MD at [address] [place] [address], DIAGNOSTIC  8/21/14    irregular GE junction, gastritis,hiatal hernia, gastric polyps    HAND SURGERY      rt knuckle    PTCA  03/2018    LAD    SHOULDER SURGERY Right 2011    bone spurs       FAM. Hx:   Family History   Problem Relation Age of Onset    Hypertension Mother     High Cholesterol Mother     Alcohol Abuse Mother     Depression Mother     Alcohol Abuse Father     Stomach Cancer Father     Depression Sister     Diabetes Brother     Depression Sister     Stomach Cancer Brother     Alcohol Abuse Brother        SOC.  Hx:   Social History     Socioeconomic History    Marital status:      Spouse name: Not on file    Number of children: Not on file    Years of education: Not on file    Highest education level: Not on file   Occupational History    Occupation:      Comment: full time   Tobacco Use    Smoking status: Former Smoker     Packs/day: 2.00     Years: 45.00     Pack years: 90.00     Types: Cigarettes     Quit date: 2016     Years since quittin.8    Smokeless tobacco: Never Used   Vaping Use    Vaping Use: Never used   Substance and Sexual Activity    Alcohol use: No     Alcohol/week: 0.0 standard drinks    Drug use: No    Sexual activity: Yes     Partners: Female   Other Topics Concern    Not on file   Social History Narrative    Wears glasses     Social Determinants of Health     Financial Resource Strain:     Difficulty of Paying Living Expenses: Not on file   Food Insecurity:     Worried About Running Out of Food in the Last Year: Not on file    [employer] in the Last Year: Not on file   Transportation Needs:     Lack of Transportation (Medical): Not on file    Lack of Transportation (Non-Medical):  Not on file   Physical Activity:     Days of Exercise per Week: Not on file    Minutes of Exercise per Session: Not on file   Stress:     Feeling of Stress : Not on file   Social Connections:     Frequency of Communication with Friends and Family: Not on file    Frequency of Social Gatherings with Friends and Family: Not on file    Attends Holiness Services: Not on file    Active Member of [address] [place] [address] or Organizations: Not on file    Attends Club or Organization Meetings: Not on file    Marital Status: Not on file   Intimate Partner Violence:     Fear of Current or Ex-Partner: Not on file    Emotionally Abused: Not on file    Physically Abused: Not on file    Sexually Abused: Not on file   Housing Stability:     Unable to Pay for Housing in the Last Year: Not on file    Number of [place] in the Last Year: Not on file    Unstable Housing in the Last Year: Not on file MEDs:    Previous Medications    ALBUTEROL-IPRATROPIUM (COMBIVENT RESPIMAT)  MCG/ACT AERS INHALER    Inhale 1 puff into the lungs every 6 hours    ALCOHOL SWABS (ALCOHOL PADS) 70 % PADS    1 Units by Does not apply route 2 times daily    AMLODIPINE (NORVASC) 10 MG TABLET    Take 1 tablet by mouth daily    ASPIRIN 81 MG CHEWABLE TABLET    Take 1 tablet by mouth daily    ATORVASTATIN (LIPITOR) 80 MG TABLET    Take 1 tablet by mouth daily    BLOOD GLUCOSE MONITOR STRIPS    1 strip by Other route 2 times daily for 180 doses    BLOOD GLUCOSE MONITORING SUPPL (BLOOD GLUCOSE MONITOR SYSTEM) W/DEVICE KIT    Use twice per day    BLOOD PRESSURE KIT    Use prn    CARVEDILOL (COREG) 25 MG TABLET    Take 1 tablet by mouth 2 times daily    CLOPIDOGREL (PLAVIX) 75 MG TABLET    Take 1 tablet by mouth daily    FUROSEMIDE (LASIX) 40 MG TABLET    Take 1 tablet by mouth daily    GLIMEPIRIDE (AMARYL) 2 MG TABLET    Take 1 tablet by mouth every morning    HOSPITAL BED MISC    by Does not apply route    HYDRALAZINE (APRESOLINE) 10 MG TABLET    Take 1 tablet by mouth 2 times daily    LANCETS MISC    1 each by Does not apply route 2 times daily for 180 doses    LISINOPRIL (PRINIVIL;ZESTRIL) 40 MG TABLET    Take 1 tablet by mouth daily    PANTOPRAZOLE (PROTONIX) 40 MG TABLET    Take 1 tablet by mouth daily    SERTRALINE (ZOLOFT) 100 MG TABLET    Take 1 tablet by mouth daily    TAMSULOSIN (FLOMAX) 0.4 MG CAPSULE    Take 0.4 mg by mouth daily        ALL:     Allergies   Allergen Reactions    Ibuprofen Other (See Comments)     Stomach irritation    Augmentin [Amoxicillin-Pot Clavulanate]     Codeine        ROS:  Review of Systems   Constitutional: Negative for fatigue and fever. HENT: Negative for congestion and sore throat. Eyes: Negative for pain and redness. Respiratory: Negative for cough, chest tightness and shortness of breath. Cardiovascular: Positive for chest pain. Negative for leg swelling.    Gastrointestinal: Positive for nausea. Negative for abdominal pain, diarrhea and vomiting. Genitourinary: Negative for difficulty urinating. Skin: Negative for rash. Neurological: Negative for dizziness and headaches. Psychiatric/Behavioral: Negative for agitation and behavioral problems. Positives andpertinent negatives as per HPI. All other systems were reviewed and are negative. Physical Exam:      Patient Vitals for the past 24 hrs:   BP Temp Temp src Pulse Resp SpO2 Height Weight   03/12/22 0757 (!) 160/87 -- -- 75 12 97 % -- --   03/12/22 0755 (!) 183/103 -- -- 71 13 97 % -- --   03/12/22 0745 (!) 173/100 -- -- 69 16 98 % -- --   03/12/22 0740 (!) 193/102 -- -- 76 15 100 % -- --   03/12/22 0737 -- -- -- -- -- -- 5' 7\" (1.702 m) 206 lb (93.4 kg)   03/12/22 0735 (!) 195/122 -- -- 83 19 98 % -- --   03/12/22 0712 (!) 200/101 97.6 °F (36.4 °C) Oral 71 20 99 % -- --                  Physical Exam  Vitals and nursing note reviewed. Constitutional:       Appearance: Normal appearance. HENT:      Head: Normocephalic. Eyes:      Conjunctiva/sclera: Conjunctivae normal.      Pupils: Pupils are equal, round, and reactive to light. Cardiovascular:      Rate and Rhythm: Normal rate and regular rhythm. Pulses: Normal pulses. Heart sounds: Normal heart sounds. No murmur heard. No gallop. Pulmonary:      Effort: Pulmonary effort is normal.      Breath sounds: Normal breath sounds. Abdominal:      General: There is no distension. Palpations: Abdomen is soft. Tenderness: There is no abdominal tenderness. There is no guarding or rebound. Musculoskeletal:         General: Normal range of motion. Cervical back: Normal range of motion and neck supple. Right lower leg: No edema. Left lower leg: No edema. Skin:     General: Skin is warm and dry. Capillary Refill: Capillary refill takes less than 2 seconds. Neurological:      General: No focal deficit present.       Mental Status: He is alert and oriented to person, place, and time. Psychiatric:         Mood and Affect: Mood normal.         Behavior: Behavior normal.              Laboratory & Radiological Imaging (if done):      Labs Reviewed   BASIC METABOLIC PANEL W/ REFLEX TO MG FOR LOW K - Abnormal; Notable for the following components:       Result Value    CREATININE 1.5 (*)     Glucose 219 (*)     GFR Non- 47 (*)     GFR  57 (*)     All other components within normal limits   TROPONIN - Abnormal; Notable for the following components:    Troponin T 0.018 (*)     All other components within normal limits   CBC WITH AUTO DIFFERENTIAL - Abnormal; Notable for the following components:    MCH 32.5 (*)     MPV 12.0 (*)     Segs Relative 71.3 (*)     Lymphocytes % 19.2 (*)     Monocytes % 6.1 (*)     Immature Neutrophil % 0.6 (*)     All other components within normal limits   PROTIME-INR - Abnormal; Notable for the following components:    Protime 11.6 (*)     All other components within normal limits        Interpretation per the Radiologist below, if available at the time of this note:  No results found. Medical Decision Making/   ED Course as of 03/12/22 0925   Sat Mar 12, 2022   0811 Patient left the ED to cath lab [MC]      ED Course User Index  [] Emelia Winslow MD            Is a 34-year-old male with presents with a chest pain. EKG was obtained in triage and was reviewed by me and called STEMI alert. Patient has a previous EKG on February 25, 2022 which shows no ST elevations. There is also a similar EKG with ST elevations was found on 10/13/2020 that was called a STEMI. Discussed with Dr. David Landry the EKG findings who agreed to take patient to the cath lab. She received full dose of aspirin, Zofran, fentanyl and heparin in the ED. Patient was then taken to Cath Lab for urgent intervention. Critical Care Time on this patient was between 10-15 minutes due to concern for STEMI.    This was exclusive of separately billable procedures. Procedure(s):   12 lead EKG per my interpretation:  Normal Sinus Rhythm 75 STEMI   Axis is   Normal  QTc is  within an acceptable range  There is specific T wave changes appreciated. There is specific ST wave changes appreciated. STEMI   Prior EKG to compare with was available. Comparison from last EKG shows new changes. Last EKG on 10/13/2020 [STEMI at that time] is similar to this EKG. Clinical Impression:      1. ST elevation myocardial infarction (STEMI), unspecified artery (HCC)    2. Chest pain, unspecified type          Disposition:              Patient is being ADMITTED, went to Raheem Nobles M.D                                                              ED Attending Physician      (Please note that portions of this note have been completed with a voice recognition software.  Efforts were made to correct any errors, butoccasionally words are mis-transcribed.)               Muna Doe MD  03/12/22 2643

## 2022-03-12 NOTE — CONSULTS
CARDIOLOGY CONSULT NOTE   Reason for consultation:  chest pain     Referring physician:  Zelma Bernheim, MD     Primary care physician: Pierre Foster MD      Dear  Dr. Zelma Bernheim, MD   Thanks for the consult. Chief Complaints :  Chief Complaint   Patient presents with    Chest Pain        History of present illness:Hilaria is a 57 y. o.year old who presents with complains of chest pain which started around 4 in the morning he says he is short of breath as well he says similar to his previous heart attack. He does have history of coronary artery disease s/p CABG in 2020 with LIMA to LAD and SVG to OM2. He has known occluded right coronary artery EKG shows subtle ST elevation anterior leads but he has infarcted inferior wall Q waves. Echo shows inferior wall hypokinesis with severe mitral regurgitation  In the emergency department STEMI was activated    Past medical history:    has a past medical history of B12 deficiency, CAD (coronary artery disease), COPD (chronic obstructive pulmonary disease) ([facility] [address] GERD (gastroesophageal reflux disease), Guillain Barré syndrome ([facility] [address] H/O echocardiogram, Heart murmur, History of echocardiogram, History of myocardial infarction, History of nuclear MUGA test, History of nuclear stress test, History of PTCA, History of PTCA, Hx of cardiovascular stress test, Hx of cardiovascular stress test, HX OTHER MEDICAL, Hyperlipidemia, Hypertension, Impaired glucose tolerance, MUGA, NSTEMI (non-ST elevated myocardial infarction) [facility]), Patient in clinical research study, Pneumonia due to organism, Renal cyst, S/P cardiac catheterization, and Stroke (cerebrum) ([facility] [address] Past surgical history:   has a past surgical history that includes Hand surgery; Coronary angioplasty (6/03); shoulder surgery (Right, 2011); Colonoscopy (8/21/14); Endoscopy, colon, diagnostic (8/21/14); Cardiac catheterization (7/13);  Percutaneous Transluminal Coronary Angio (03/2018); and Coronary artery bypass graft (N/A, 5/18/2020). Social History:   reports that he quit smoking about 5 years ago. His smoking use included cigarettes. He has a 90.00 pack-year smoking history. He has never used smokeless tobacco. He reports that he does not drink alcohol and does not use drugs.   Family history:   no family history of CAD, STROKE of DM at early age    Allergies   Allergen Reactions    Ibuprofen Other (See Comments)     Stomach irritation    Augmentin [Amoxicillin-Pot Clavulanate]     Codeine        sodium chloride flush 0.9 % injection 5-40 mL, 2 times per day  sodium chloride flush 0.9 % injection 5-40 mL, PRN  0.9 % sodium chloride infusion, PRN  fentaNYL (SUBLIMAZE) injection 50 mcg, Once  isosorbide mononitrate (IMDUR) extended release tablet 30 mg, Daily      Current Facility-Administered Medications   Medication Dose Route Frequency Provider Last Rate Last Admin    sodium chloride flush 0.9 % injection 5-40 mL  5-40 mL IntraVENous 2 times per day Masood Bear MD        sodium chloride flush 0.9 % injection 5-40 mL  5-40 mL IntraVENous PRN Masood Bear MD        0.9 % sodium chloride infusion  25 mL IntraVENous PRN Masood Bear MD        fentaNYL (SUBLIMAZE) injection 50 mcg  50 mcg IntraVENous Once Masood Bear MD        isosorbide mononitrate (IMDUR) extended release tablet 30 mg  30 mg Oral Daily Leidy Palomino MD         Review of Systems:   · Constitutional: No Fever or Weight Loss   · Eyes: No Decreased Vision  · ENT: No Headaches, Hearing Loss or Vertigo  · Cardiovascular: As per HPI  · Respiratory: As per HPI  · Gastrointestinal: No abdominal pain, appetite loss, blood in stools, constipation, diarrhea or heartburn  · Genitourinary: No dysuria, trouble voiding, or hematuria  · Musculoskeletal:  No gait disturbance, weakness or joint complaints  · Integumentary: No rash or pruritis  · Neurological: No TIA or stroke symptoms  · Psychiatric: No anxiety or depression  · Endocrine: No malaise, fatigue or temperature intolerance  · Hematologic/Lymphatic: No bleeding problems, blood clots or swollen lymph nodes  · Allergic/Immunologic: No nasal congestion or hives  All systems negative except as marked. Physical Examination:    Vitals:    03/12/22 0740 03/12/22 0745 03/12/22 0755 03/12/22 0757   BP: (!) 193/102 (!) 173/100 (!) 183/103 (!) 160/87   Pulse: 76 69 71 75   Resp: 15 16 13 12   Temp:       TempSrc:       SpO2: 100% 98% 97% 97%   Weight:       Height:           General Appearance:  No distress, conversant    Constitutional:  Well developed, Well nourished, No acute distress, Non-toxic appearance. HENT:  Normocephalic, Atraumatic, Bilateral external ears normal, Oropharynx moist, No oral exudates, Nose normal. Neck- Normal range of motion, No tenderness, Supple, No stridor,no apical-carotid delay  Lymphatics : no palpable lymph nodes  Eyes:  PERRL, EOMI, Conjunctiva normal, No discharge. Respiratory:  Normal breath sounds, No respiratory distress, No wheezing, No chest tenderness. ,no use of accessory muscles, crackles Absent   Cardiovascular: (PMI) apex non displaced,no lifts no thrills, ankle swelling Absent  , 1+, s1 and s2 audible,Murmur. Absent , JVD not noted    Abdomen /GI:  Bowel sounds normal, Soft, No tenderness, No masses, No gross visceromegaly   :  No costovertebral angle tenderness   Musculoskeletal:  No edema, no tenderness, no deformities.  Back- no tenderness  Integument:  Well hydrated, no rash   Lymphatic:  No lymphadenopathy noted   Neurologic:  Alert & oriented x 3, CN 2-12 normal, normal motor function, normal sensory function, no focal deficits noted           Medical decision making and Data review:    Lab Review   Recent Labs     03/12/22  0731   WBC 7.3   HGB 17.2   HCT 51.7         Recent Labs     03/12/22  0731      K 4.5      CO2 26   BUN 21   CREATININE 1.5*     No results for input(s): AST, ALT, ALB, BILIDIR, BILITOT, ALKPHOS in the last 72 hours. Recent Labs     03/12/22  0731   TROPONINT 0.018*       No results for input(s): PROBNP in the last 72 hours. Lab Results   Component Value Date    INR 0.90 03/12/2022    PROTIME 11.6 (L) 03/12/2022       EKG: (reviewed by myself)    ECHO:(reviewed by myself)    Chest Xray:(reviewed by myself)  No results found. All labs, medications and tests reviewed by myself including data  from outside source , patient and available family . Continue all other medications of all above medical condition listed as is. Impression:  Active Problems:    History of acute inferior wall myocardial infarction    ASCVD (arteriosclerotic cardiovascular disease)    History of Guillain-[place] syndrome    S/P CABG (coronary artery bypass graft)  Resolved Problems:    * No resolved hospital problems. *      Assessment: 57 y. o.year old with PMH of  has a past medical history of B12 deficiency, CAD (coronary artery disease), COPD (chronic obstructive pulmonary disease) ([facility] [address] GERD (gastroesophageal reflux disease), Guillain Barré syndrome ([facility] [address] H/O echocardiogram, Heart murmur, History of echocardiogram, History of myocardial infarction, History of nuclear MUGA test, History of nuclear stress test, History of PTCA, History of PTCA, Hx of cardiovascular stress test, Hx of cardiovascular stress test, HX OTHER MEDICAL, Hyperlipidemia, Hypertension, Impaired glucose tolerance, MUGA, NSTEMI (non-ST elevated myocardial infarction) [facility]), Patient in clinical research study, Pneumonia due to organism, Renal cyst, S/P cardiac catheterization, and Stroke (cerebrum) ([facility] [address] Plan and Recommendations:    Cardiac cath reveals 100% occluded right coronary artery but patent bypass grafts LAD is occluded.   Distal LAD is supplied by [facility]A no changes continue medical management start Imdur  Work-up for noncardiac etiology get chest x-ray, check BNP restart home meds  HTN: stable, continue present medications   Diabetes management will request hospitalist advise in consultation  DVT prophylaxis if no contraindication  6. Dyslipidemia: continue statins   No further cardiac work-up at this time          Thank you  much for consult and giving us the opportunity in contributing in the care of this patient. Please feel free to call me for any questions.        Perry Pompa MD, 3/12/2022 9:09 AM

## 2022-03-12 NOTE — ED NOTES
Pt arrives from home with complaints of chest pain.   Per patient \"I started having sharp chest pain that woke me up from my sleep at 4am\"         María Elena Ricci RN  03/12/22 0168

## 2022-03-13 LAB
ANION GAP SERPL CALCULATED.3IONS-SCNC: 12 MMOL/L (ref 4–16)
BUN BLDV-MCNC: 29 MG/DL (ref 6–23)
CALCIUM SERPL-MCNC: 9 MG/DL (ref 8.3–10.6)
CHLORIDE BLD-SCNC: 104 MMOL/L (ref 99–110)
CO2: 20 MMOL/L (ref 21–32)
CREAT SERPL-MCNC: 1.8 MG/DL (ref 0.9–1.3)
GFR AFRICAN AMERICAN: 46 ML/MIN/1.73M2
GFR NON-AFRICAN AMERICAN: 38 ML/MIN/1.73M2
GLUCOSE BLD-MCNC: 196 MG/DL (ref 70–99)
GLUCOSE BLD-MCNC: 199 MG/DL (ref 70–99)
GLUCOSE BLD-MCNC: 214 MG/DL (ref 70–99)
GLUCOSE BLD-MCNC: 285 MG/DL (ref 70–99)
POTASSIUM SERPL-SCNC: 4.9 MMOL/L (ref 3.5–5.1)
SODIUM BLD-SCNC: 136 MMOL/L (ref 135–145)

## 2022-03-13 PROCEDURE — 2580000003 HC RX 258: Performed by: INTERNAL MEDICINE

## 2022-03-13 PROCEDURE — 94640 AIRWAY INHALATION TREATMENT: CPT

## 2022-03-13 PROCEDURE — 6370000000 HC RX 637 (ALT 250 FOR IP): Performed by: INTERNAL MEDICINE

## 2022-03-13 PROCEDURE — 6370000000 HC RX 637 (ALT 250 FOR IP): Performed by: NURSE PRACTITIONER

## 2022-03-13 PROCEDURE — G0378 HOSPITAL OBSERVATION PER HR: HCPCS

## 2022-03-13 PROCEDURE — 80048 BASIC METABOLIC PNL TOTAL CA: CPT

## 2022-03-13 PROCEDURE — 1200000000 HC SEMI PRIVATE

## 2022-03-13 PROCEDURE — 94761 N-INVAS EAR/PLS OXIMETRY MLT: CPT

## 2022-03-13 PROCEDURE — 82962 GLUCOSE BLOOD TEST: CPT

## 2022-03-13 RX ADMIN — HYDRALAZINE HYDROCHLORIDE 10 MG: 10 TABLET, FILM COATED ORAL at 11:07

## 2022-03-13 RX ADMIN — TAMSULOSIN HYDROCHLORIDE 0.4 MG: 0.4 CAPSULE ORAL at 11:07

## 2022-03-13 RX ADMIN — CARVEDILOL 25 MG: 25 TABLET, FILM COATED ORAL at 11:07

## 2022-03-13 RX ADMIN — ATORVASTATIN CALCIUM 80 MG: 80 TABLET, FILM COATED ORAL at 20:55

## 2022-03-13 RX ADMIN — FUROSEMIDE 40 MG: 40 TABLET ORAL at 11:07

## 2022-03-13 RX ADMIN — GUAIFENESIN 600 MG: 600 TABLET, EXTENDED RELEASE ORAL at 11:07

## 2022-03-13 RX ADMIN — IPRATROPIUM BROMIDE 1 PUFF: 17 AEROSOL, METERED RESPIRATORY (INHALATION) at 19:38

## 2022-03-13 RX ADMIN — IPRATROPIUM BROMIDE 1 PUFF: 17 AEROSOL, METERED RESPIRATORY (INHALATION) at 07:34

## 2022-03-13 RX ADMIN — CARVEDILOL 25 MG: 25 TABLET, FILM COATED ORAL at 20:55

## 2022-03-13 RX ADMIN — LISINOPRIL 40 MG: 40 TABLET ORAL at 11:07

## 2022-03-13 RX ADMIN — SODIUM CHLORIDE, PRESERVATIVE FREE 10 ML: 5 INJECTION INTRAVENOUS at 20:56

## 2022-03-13 RX ADMIN — PANTOPRAZOLE SODIUM 40 MG: 40 TABLET, DELAYED RELEASE ORAL at 06:38

## 2022-03-13 RX ADMIN — CLOPIDOGREL BISULFATE 75 MG: 75 TABLET ORAL at 11:07

## 2022-03-13 RX ADMIN — AMLODIPINE BESYLATE 10 MG: 10 TABLET ORAL at 11:07

## 2022-03-13 RX ADMIN — SODIUM CHLORIDE, PRESERVATIVE FREE 10 ML: 5 INJECTION INTRAVENOUS at 11:06

## 2022-03-13 RX ADMIN — ALBUTEROL SULFATE 1 PUFF: 90 AEROSOL, METERED RESPIRATORY (INHALATION) at 15:37

## 2022-03-13 RX ADMIN — SERTRALINE 100 MG: 100 TABLET, FILM COATED ORAL at 11:07

## 2022-03-13 RX ADMIN — ASPIRIN 81 MG: 81 TABLET, CHEWABLE ORAL at 11:07

## 2022-03-13 RX ADMIN — ALBUTEROL SULFATE 1 PUFF: 90 AEROSOL, METERED RESPIRATORY (INHALATION) at 19:38

## 2022-03-13 RX ADMIN — PREDNISONE 40 MG: 10 TABLET ORAL at 11:07

## 2022-03-13 RX ADMIN — IPRATROPIUM BROMIDE 1 PUFF: 17 AEROSOL, METERED RESPIRATORY (INHALATION) at 15:37

## 2022-03-13 RX ADMIN — ISOSORBIDE MONONITRATE 30 MG: 30 TABLET, EXTENDED RELEASE ORAL at 11:07

## 2022-03-13 RX ADMIN — ALBUTEROL SULFATE 1 PUFF: 90 AEROSOL, METERED RESPIRATORY (INHALATION) at 07:33

## 2022-03-13 RX ADMIN — GUAIFENESIN 600 MG: 600 TABLET, EXTENDED RELEASE ORAL at 20:55

## 2022-03-13 ASSESSMENT — PAIN SCALES - GENERAL
PAINLEVEL_OUTOF10: 0
PAINLEVEL_OUTOF10: 0

## 2022-03-13 NOTE — PROGRESS NOTES
Pt alert and oriented, cooperative with care. Pt in bed, eating breakfast, urinal emptied, medicated without complications. Pt inquires about discharge and plan of care. Pt denies any complaints of chest pain, sob, or other complications. Call light in reach, denies complaints, thanks staff for care.

## 2022-03-13 NOTE — PLAN OF CARE
Problem: Cardiac:  Goal: Ability to maintain an adequate cardiac output will improve  Description: Ability to maintain an adequate cardiac output will improve  Outcome: Met This Shift  Goal: Hemodynamic stability will improve  Description: Hemodynamic stability will improve  Outcome: Met This Shift     Problem: Fluid Volume:  Goal: Ability to achieve and maintain adequate urine output will improve  Description: Ability to achieve and maintain adequate urine output will improve  Outcome: Met This Shift     Problem: Respiratory:  Goal: Respiratory status will improve  Description: Respiratory status will improve  Outcome: Met This Shift

## 2022-03-13 NOTE — PROGRESS NOTES
Hospitalist Progress Note      Name:  Leigh Hayes /Age/Sex: 1960  (57 y.o. male)   MRN & CSN:  [id number] & [id number] Admission Date/Time: 3/12/2022  7:29 AM   Location:  -A PCP: Diana Arambula MD         Hospital Day: 2    Assessment and Plan:   Leigh Hayes is a 57 y.o.  male with PMH significant for CAD (s/p CABG and s/p 7 stents), COPD, CHF ( EF 45-50%), DM2, CKD III, dyslipidemia who is being admitted  with chest pain with EKG changes. He was called for  STEMI alert in ER, taken  to Cath lab and cardio recommends medical management.      ACS suspected  Patient with hx of CABG and multiple stents in the past  Admitted with EKG change and chest pain  Patient underwent cardiac cath on 3/12 morning and foundt o have 100% occluded RCA but patent bypass grafts LAD is occluded. This LAD supplied by [facility] no changes continue medical management. .  Patient is known to have occluded RCA.  - c/w DAPT/BB/statin  - cw imdur   - he is chest pain free  - ambulate and moitor  - appreciate cardio input and management     Chronic systolic heart failure: compensated  2D echo 2021 with  MS14-31%, grade 1 diastolic dysfunction. C/w  diuretic, beta-blocker, ACE. [facility] cardiology as outpatient ,resume on discharge   CXR stable  -Strict intake and output  -Diet restriction sodium  -Telemetry monitoring  -Monitor diuresis closely  - monitor daily weight     COPD: not in  exacerbation. Patient has been out of inhalers x 1 week. -c/w o2/sytemic steroid/breathing treatment  - reinforced smoking cessation     Hypertension:  uncontrolled  BP  200/101 with EKG change on admission,   States he ran out of meds for a week  C/w  hydralazine, lisinopril, amlodipine  -monitor and titrate      Diabetes Mellitus type II-controlled. States his DM is diet controlled  A1c 7.2 from 3/12/22  Monitor and titrate    CKD stage III: Cr 1.5, stable around baseline.   BUN 21, GFR 47  -Monitor BMP  -Avoid nephrotoxic agents when able  -Adjust medications for GFR  -Plan to diuretic use                 GERD:   -Continue PPI     Hyperlipidemia:   C/w statin    Obesity  Body mass index is 33.01 kg/m². Wt loss advised  OP diet and lifestyle modifications       Diet ADULT DIET; Regular   DVT Prophylaxis [x] Lovenox, []  Heparin, [] SCDs, [] Ambulation   GI Prophylaxis [x] PPI,  [] H2 Blocker,  [] Carafate,  [] Diet/Tube Feeds   Code Status Prior   Disposition Home on 3/14   MDM [] Low, [x] Moderate,[]  High       History of Present Illness:     Chief Complaint: Chest pain  Rohit Wang is a 57 y.o.  male  who presents with chest pain  Hx of CABG/ 7 stents, HTN, DM2, COPD  States ran out of meds for a week  S/p emergent LHC yesterday and medical management recommended  Patient denies chest pain/sob/cough  Does not feel ready for discharge today  No leg swelling  Denies smoking cigarettes currently     Ten point ROS reviewed negative, unless as noted above    Objective: Intake/Output Summary (Last 24 hours) at 3/13/2022 0944  Last data filed at 3/13/2022 0636  Gross per 24 hour   Intake --   Output 650 ml   Net -650 ml      Vitals:   Vitals:    03/13/22 0735   BP:    Pulse:    Resp: 17   Temp:    SpO2: 95%     Physical Exam:   GEN Chr sick looking obese male. Awake male, sitting upright in bed in no apparent distress. EYES Pupils are equally round. No scleral erythema, discharge, or conjunctivitis. HENT Mucous membranes are moist. Oral pharynx without exudates, no evidence of thrush. NECK Supple, no apparent thyromegaly or masses. RESP Prolonged expiration  to auscultation, no wheezes, rales or rhonchi. Symmetric chest movement while on room air. CARDIO/VASC S1/S2 auscultated. Regular rate without appreciable murmurs, rubs, or gallops. No JVD or carotid bruits. Peripheral pulses equal bilaterally and palpable. No peripheral edema. GI +fatpad, abdomen is soft without significant tenderness, masses, or guarding.  Bowel sounds are normoactive. Rectal exam deferred.  No costovertebral angle tenderness. Normal appearing external genitalia. York catheter is not present. HEME/LYMPH No palpable cervical lymphadenopathy and no hepatosplenomegaly. No petechiae or ecchymoses. MSK No gross joint deformities. SKIN Normal coloration, warm, dry. NEURO Cranial nerves appear grossly intact, normal speech, no lateralizing weakness. PSYCH Awake, alert, oriented x 4. Affect appropriate.     Medications:   Medications:    sodium chloride flush  5-40 mL IntraVENous 2 times per day    fentanNYL  50 mcg IntraVENous Once    aspirin  81 mg Oral Daily    tamsulosin  0.4 mg Oral Daily    pantoprazole  40 mg Oral QAM AC    atorvastatin  80 mg Oral Daily    sertraline  100 mg Oral Daily    lisinopril  40 mg Oral Daily    amLODIPine  10 mg Oral Daily    carvedilol  25 mg Oral BID    clopidogrel  75 mg Oral Daily    furosemide  40 mg Oral Daily    hydrALAZINE  10 mg Oral BID    isosorbide mononitrate  30 mg Oral Daily    insulin lispro  0-12 Units SubCUTAneous TID WC    insulin lispro  0-6 Units SubCUTAneous Nightly    predniSONE  40 mg Oral Daily    guaiFENesin  600 mg Oral BID    albuterol sulfate HFA  1 puff Inhalation Q6H    And    ipratropium  1 puff Inhalation Q6H      Infusions:    sodium chloride      dextrose       PRN Meds: sodium chloride flush, 5-40 mL, PRN  sodium chloride, 25 mL, PRN  glucose, 15 g, PRN  glucagon (rDNA), 1 mg, PRN  dextrose, 100 mL/hr, PRN  dextrose bolus (hypoglycemia), 250 mL, PRN      Lab Results   Component Value Date    WBC 7.3 03/12/2022    HGB 17.2 03/12/2022    HCT 51.7 03/12/2022    MCV 97.5 03/12/2022     03/12/2022     Lab Results   Component Value Date     03/13/2022    K 4.9 03/13/2022    K 3.8 03/16/2018     03/13/2022    CO2 20 03/13/2022    BUN 29 03/13/2022    CREATININE 1.8 03/13/2022    GLUCOSE 199 03/13/2022    CALCIUM 9.0 03/13/2022      XR CHEST PORTABLE Final Result      1. No acute cardiopulmonary abnormality.              Electronically signed by Tootie Oconnell MD on 3/13/2022 at 9:44 AM

## 2022-03-13 NOTE — PROGRESS NOTES
Pt ambulates to bathroom with staff standby. PT denies complaints, chest pain, experiences mild dyspnea but spO2 is 96% on room air. Pt recovers quickly and declares he has COPD and frequently experiences this type of dyspnea upon exertion. Emotional and physical support given and pt receptive. Pt in bed, call light in reach, thanks staff for care. Denies complaints.

## 2022-03-14 ENCOUNTER — TELEPHONE (OUTPATIENT)
Dept: FAMILY MEDICINE CLINIC | Age: 62
End: 2022-03-14

## 2022-03-14 VITALS
HEART RATE: 70 BPM | BODY MASS INDEX: 33.08 KG/M2 | WEIGHT: 210.76 LBS | TEMPERATURE: 97.4 F | OXYGEN SATURATION: 97 % | DIASTOLIC BLOOD PRESSURE: 72 MMHG | RESPIRATION RATE: 16 BRPM | SYSTOLIC BLOOD PRESSURE: 128 MMHG | HEIGHT: 67 IN

## 2022-03-14 PROBLEM — E66.811 CLASS 1 OBESITY DUE TO EXCESS CALORIES WITH BODY MASS INDEX (BMI) OF 33.0 TO 33.9 IN ADULT: Status: ACTIVE | Noted: 2022-03-14

## 2022-03-14 PROBLEM — N18.32 STAGE 3B CHRONIC KIDNEY DISEASE (HCC): Status: ACTIVE | Noted: 2022-03-14

## 2022-03-14 PROBLEM — E66.09 CLASS 1 OBESITY DUE TO EXCESS CALORIES WITH BODY MASS INDEX (BMI) OF 33.0 TO 33.9 IN ADULT: Status: ACTIVE | Noted: 2022-03-14

## 2022-03-14 PROBLEM — Z91.14 NONCOMPLIANCE WITH MEDICATIONS: Status: ACTIVE | Noted: 2022-03-14

## 2022-03-14 PROBLEM — I20.8 STABLE ANGINA PECTORIS (HCC): Status: ACTIVE | Noted: 2022-03-14

## 2022-03-14 PROBLEM — I20.89 STABLE ANGINA PECTORIS: Status: ACTIVE | Noted: 2022-03-14

## 2022-03-14 PROBLEM — Z91.148 NONCOMPLIANCE WITH MEDICATIONS: Status: ACTIVE | Noted: 2022-03-14

## 2022-03-14 LAB
ANION GAP SERPL CALCULATED.3IONS-SCNC: 11 MMOL/L (ref 4–16)
BUN BLDV-MCNC: 32 MG/DL (ref 6–23)
CALCIUM SERPL-MCNC: 8.4 MG/DL (ref 8.3–10.6)
CHLORIDE BLD-SCNC: 103 MMOL/L (ref 99–110)
CO2: 22 MMOL/L (ref 21–32)
CREAT SERPL-MCNC: 1.7 MG/DL (ref 0.9–1.3)
GFR AFRICAN AMERICAN: 50 ML/MIN/1.73M2
GFR NON-AFRICAN AMERICAN: 41 ML/MIN/1.73M2
GLUCOSE BLD-MCNC: 139 MG/DL (ref 70–99)
GLUCOSE BLD-MCNC: 144 MG/DL (ref 70–99)
POTASSIUM SERPL-SCNC: 4.2 MMOL/L (ref 3.5–5.1)
SODIUM BLD-SCNC: 136 MMOL/L (ref 135–145)

## 2022-03-14 PROCEDURE — 94640 AIRWAY INHALATION TREATMENT: CPT

## 2022-03-14 PROCEDURE — 80048 BASIC METABOLIC PNL TOTAL CA: CPT

## 2022-03-14 PROCEDURE — 94761 N-INVAS EAR/PLS OXIMETRY MLT: CPT

## 2022-03-14 PROCEDURE — 2580000003 HC RX 258: Performed by: INTERNAL MEDICINE

## 2022-03-14 PROCEDURE — 36415 COLL VENOUS BLD VENIPUNCTURE: CPT

## 2022-03-14 PROCEDURE — 6370000000 HC RX 637 (ALT 250 FOR IP): Performed by: INTERNAL MEDICINE

## 2022-03-14 PROCEDURE — 6370000000 HC RX 637 (ALT 250 FOR IP): Performed by: NURSE PRACTITIONER

## 2022-03-14 PROCEDURE — 82962 GLUCOSE BLOOD TEST: CPT

## 2022-03-14 RX ORDER — ASPIRIN 81 MG/1
81 TABLET, CHEWABLE ORAL DAILY
Qty: 180 TABLET | Refills: 0 | Status: SHIPPED | OUTPATIENT
Start: 2022-03-14 | End: 2022-03-14 | Stop reason: SDUPTHER

## 2022-03-14 RX ORDER — ISOSORBIDE MONONITRATE 30 MG/1
30 TABLET, EXTENDED RELEASE ORAL DAILY
Qty: 30 TABLET | Refills: 0 | Status: SHIPPED | OUTPATIENT
Start: 2022-03-15 | End: 2022-03-14

## 2022-03-14 RX ORDER — CLOPIDOGREL BISULFATE 75 MG/1
75 TABLET ORAL DAILY
Qty: 30 TABLET | Refills: 0 | Status: SHIPPED | OUTPATIENT
Start: 2022-03-14 | End: 2022-08-01 | Stop reason: SDUPTHER

## 2022-03-14 RX ORDER — GLUCOSAMINE HCL/CHONDROITIN SU 500-400 MG
1 CAPSULE ORAL 2 TIMES DAILY
Qty: 180 STRIP | Refills: 0 | Status: SHIPPED | OUTPATIENT
Start: 2022-03-14 | End: 2022-03-14

## 2022-03-14 RX ORDER — ISOSORBIDE MONONITRATE 30 MG/1
30 TABLET, EXTENDED RELEASE ORAL DAILY
Qty: 30 TABLET | Refills: 0 | Status: SHIPPED | OUTPATIENT
Start: 2022-03-15 | End: 2022-08-22

## 2022-03-14 RX ORDER — DIPHENHYDRAMINE HYDROCHLORIDE 25 MG/1
CAPSULE, LIQUID FILLED ORAL
Qty: 1 KIT | Refills: 0 | Status: SHIPPED | OUTPATIENT
Start: 2022-03-14 | End: 2022-03-14 | Stop reason: SDUPTHER

## 2022-03-14 RX ORDER — SERTRALINE HYDROCHLORIDE 100 MG/1
100 TABLET, FILM COATED ORAL DAILY
Qty: 30 TABLET | Refills: 0 | Status: SHIPPED | OUTPATIENT
Start: 2022-03-14 | End: 2022-09-08 | Stop reason: SDUPTHER

## 2022-03-14 RX ORDER — HYDRALAZINE HYDROCHLORIDE 10 MG/1
10 TABLET, FILM COATED ORAL 2 TIMES DAILY
Qty: 60 TABLET | Refills: 0 | Status: SHIPPED | OUTPATIENT
Start: 2022-03-14 | End: 2022-03-14 | Stop reason: SDUPTHER

## 2022-03-14 RX ORDER — GLIMEPIRIDE 2 MG/1
2 TABLET ORAL EVERY MORNING
Qty: 30 TABLET | Refills: 0 | Status: SHIPPED | OUTPATIENT
Start: 2022-03-14 | End: 2022-03-14 | Stop reason: SDUPTHER

## 2022-03-14 RX ORDER — BLOOD SUGAR DIAGNOSTIC
1 STRIP MISCELLANEOUS 2 TIMES DAILY
Qty: 180 EACH | Refills: 3 | Status: SHIPPED | OUTPATIENT
Start: 2022-03-14 | End: 2022-03-14 | Stop reason: SDUPTHER

## 2022-03-14 RX ORDER — CARVEDILOL 25 MG/1
25 TABLET ORAL 2 TIMES DAILY
Qty: 60 TABLET | Refills: 0 | Status: ON HOLD | OUTPATIENT
Start: 2022-03-14 | End: 2022-03-24 | Stop reason: HOSPADM

## 2022-03-14 RX ORDER — FUROSEMIDE 40 MG/1
40 TABLET ORAL DAILY
Qty: 60 TABLET | Refills: 1 | Status: SHIPPED | OUTPATIENT
Start: 2022-03-14 | End: 2022-03-14 | Stop reason: SDUPTHER

## 2022-03-14 RX ORDER — PANTOPRAZOLE SODIUM 40 MG/1
40 TABLET, DELAYED RELEASE ORAL DAILY
Qty: 30 TABLET | Refills: 0 | Status: SHIPPED | OUTPATIENT
Start: 2022-03-14 | End: 2022-11-03

## 2022-03-14 RX ORDER — SERTRALINE HYDROCHLORIDE 100 MG/1
100 TABLET, FILM COATED ORAL DAILY
Qty: 30 TABLET | Refills: 0 | Status: SHIPPED | OUTPATIENT
Start: 2022-03-14 | End: 2022-03-14 | Stop reason: SDUPTHER

## 2022-03-14 RX ORDER — TAMSULOSIN HYDROCHLORIDE 0.4 MG/1
0.4 CAPSULE ORAL DAILY
Qty: 30 CAPSULE | Refills: 0 | Status: SHIPPED | OUTPATIENT
Start: 2022-03-14 | End: 2022-11-03

## 2022-03-14 RX ORDER — ATORVASTATIN CALCIUM 80 MG/1
80 TABLET, FILM COATED ORAL DAILY
Qty: 30 TABLET | Refills: 0 | Status: SHIPPED | OUTPATIENT
Start: 2022-03-14 | End: 2022-03-14 | Stop reason: SDUPTHER

## 2022-03-14 RX ORDER — CLOPIDOGREL BISULFATE 75 MG/1
75 TABLET ORAL DAILY
Qty: 30 TABLET | Refills: 0 | Status: SHIPPED | OUTPATIENT
Start: 2022-03-14 | End: 2022-03-14 | Stop reason: SDUPTHER

## 2022-03-14 RX ORDER — ATORVASTATIN CALCIUM 80 MG/1
80 TABLET, FILM COATED ORAL DAILY
Qty: 30 TABLET | Refills: 0 | Status: SHIPPED | OUTPATIENT
Start: 2022-03-14 | End: 2022-09-08 | Stop reason: SDUPTHER

## 2022-03-14 RX ORDER — AMLODIPINE BESYLATE 10 MG/1
10 TABLET ORAL DAILY
Qty: 30 TABLET | Refills: 0 | Status: SHIPPED | OUTPATIENT
Start: 2022-03-14 | End: 2022-03-14 | Stop reason: SDUPTHER

## 2022-03-14 RX ORDER — GLUCOSAMINE HCL/CHONDROITIN SU 500-400 MG
1 CAPSULE ORAL 2 TIMES DAILY
Qty: 180 STRIP | Refills: 0 | Status: SHIPPED | OUTPATIENT
Start: 2022-03-14 | End: 2022-08-22

## 2022-03-14 RX ORDER — ASPIRIN 81 MG/1
81 TABLET, CHEWABLE ORAL DAILY
Qty: 180 TABLET | Refills: 0 | Status: SHIPPED | OUTPATIENT
Start: 2022-03-14 | End: 2022-09-08 | Stop reason: SDUPTHER

## 2022-03-14 RX ORDER — LISINOPRIL 40 MG/1
40 TABLET ORAL DAILY
Qty: 30 TABLET | Refills: 0 | Status: SHIPPED | OUTPATIENT
Start: 2022-03-14 | End: 2022-03-14 | Stop reason: SDUPTHER

## 2022-03-14 RX ORDER — PANTOPRAZOLE SODIUM 40 MG/1
40 TABLET, DELAYED RELEASE ORAL DAILY
Qty: 30 TABLET | Refills: 0 | Status: SHIPPED | OUTPATIENT
Start: 2022-03-14 | End: 2022-03-14 | Stop reason: SDUPTHER

## 2022-03-14 RX ORDER — GLIMEPIRIDE 2 MG/1
2 TABLET ORAL EVERY MORNING
Qty: 30 TABLET | Refills: 0 | Status: SHIPPED | OUTPATIENT
Start: 2022-03-14 | End: 2022-03-31 | Stop reason: SDUPTHER

## 2022-03-14 RX ORDER — CARVEDILOL 25 MG/1
25 TABLET ORAL 2 TIMES DAILY
Qty: 60 TABLET | Refills: 0 | Status: SHIPPED | OUTPATIENT
Start: 2022-03-14 | End: 2022-03-14 | Stop reason: SDUPTHER

## 2022-03-14 RX ORDER — AMLODIPINE BESYLATE 10 MG/1
10 TABLET ORAL DAILY
Qty: 30 TABLET | Refills: 0 | Status: SHIPPED | OUTPATIENT
Start: 2022-03-14 | End: 2022-08-22

## 2022-03-14 RX ORDER — LANCETS 30 GAUGE
1 EACH MISCELLANEOUS 2 TIMES DAILY
Qty: 180 EACH | Refills: 3 | Status: SHIPPED | OUTPATIENT
Start: 2022-03-14 | End: 2022-08-22

## 2022-03-14 RX ORDER — FUROSEMIDE 40 MG/1
40 TABLET ORAL DAILY
Qty: 60 TABLET | Refills: 1 | Status: ON HOLD | OUTPATIENT
Start: 2022-03-14 | End: 2022-07-09

## 2022-03-14 RX ORDER — LISINOPRIL 40 MG/1
40 TABLET ORAL DAILY
Qty: 30 TABLET | Refills: 0 | Status: SHIPPED | OUTPATIENT
Start: 2022-03-14 | End: 2022-08-22

## 2022-03-14 RX ORDER — HYDRALAZINE HYDROCHLORIDE 10 MG/1
10 TABLET, FILM COATED ORAL 2 TIMES DAILY
Qty: 60 TABLET | Refills: 0 | Status: SHIPPED | OUTPATIENT
Start: 2022-03-14 | End: 2022-08-22

## 2022-03-14 RX ORDER — BLOOD SUGAR DIAGNOSTIC
1 STRIP MISCELLANEOUS 2 TIMES DAILY
Qty: 180 EACH | Refills: 3 | Status: SHIPPED | OUTPATIENT
Start: 2022-03-14

## 2022-03-14 RX ORDER — LANCETS 30 GAUGE
1 EACH MISCELLANEOUS 2 TIMES DAILY
Qty: 180 EACH | Refills: 3 | Status: SHIPPED | OUTPATIENT
Start: 2022-03-14 | End: 2022-03-14

## 2022-03-14 RX ORDER — DIPHENHYDRAMINE HYDROCHLORIDE 25 MG/1
CAPSULE, LIQUID FILLED ORAL
Qty: 1 KIT | Refills: 0 | Status: SHIPPED | OUTPATIENT
Start: 2022-03-14

## 2022-03-14 RX ORDER — TAMSULOSIN HYDROCHLORIDE 0.4 MG/1
0.4 CAPSULE ORAL DAILY
Qty: 30 CAPSULE | Refills: 0 | Status: SHIPPED | OUTPATIENT
Start: 2022-03-14 | End: 2022-03-14 | Stop reason: SDUPTHER

## 2022-03-14 RX ADMIN — LISINOPRIL 40 MG: 40 TABLET ORAL at 08:44

## 2022-03-14 RX ADMIN — GUAIFENESIN 600 MG: 600 TABLET, EXTENDED RELEASE ORAL at 08:44

## 2022-03-14 RX ADMIN — SODIUM CHLORIDE, PRESERVATIVE FREE 10 ML: 5 INJECTION INTRAVENOUS at 08:24

## 2022-03-14 RX ADMIN — TAMSULOSIN HYDROCHLORIDE 0.4 MG: 0.4 CAPSULE ORAL at 08:44

## 2022-03-14 RX ADMIN — ASPIRIN 81 MG: 81 TABLET, CHEWABLE ORAL at 08:44

## 2022-03-14 RX ADMIN — ISOSORBIDE MONONITRATE 30 MG: 30 TABLET, EXTENDED RELEASE ORAL at 08:44

## 2022-03-14 RX ADMIN — PANTOPRAZOLE SODIUM 40 MG: 40 TABLET, DELAYED RELEASE ORAL at 05:25

## 2022-03-14 RX ADMIN — SERTRALINE 100 MG: 100 TABLET, FILM COATED ORAL at 08:44

## 2022-03-14 RX ADMIN — FUROSEMIDE 40 MG: 40 TABLET ORAL at 08:44

## 2022-03-14 RX ADMIN — AMLODIPINE BESYLATE 10 MG: 10 TABLET ORAL at 08:44

## 2022-03-14 RX ADMIN — PREDNISONE 40 MG: 10 TABLET ORAL at 08:44

## 2022-03-14 RX ADMIN — CLOPIDOGREL BISULFATE 75 MG: 75 TABLET ORAL at 08:44

## 2022-03-14 RX ADMIN — HYDRALAZINE HYDROCHLORIDE 10 MG: 10 TABLET, FILM COATED ORAL at 08:44

## 2022-03-14 RX ADMIN — ALBUTEROL SULFATE 1 PUFF: 90 AEROSOL, METERED RESPIRATORY (INHALATION) at 08:24

## 2022-03-14 RX ADMIN — CARVEDILOL 25 MG: 25 TABLET, FILM COATED ORAL at 08:43

## 2022-03-14 RX ADMIN — IPRATROPIUM BROMIDE 1 PUFF: 17 AEROSOL, METERED RESPIRATORY (INHALATION) at 08:25

## 2022-03-14 NOTE — DISCHARGE SUMMARY
Discharge Summary    Name:  Latisha Cheng /Age/Sex: 1960  (57 y.o. male)   MRN & CSN:  [id number] & [id number] Admission Date/Time: 3/12/2022  7:29 AM   Attending:  No att. providers found Discharging Physician: Bernabe Victoria MD     Hospital Course:   Latisha Cheng is a 57 y.o.  male  with PMH significant for CAD (s/p CABG and s/p 7 stents), COPD, CHF ( EF 45-50%), DM2, CKD III, dyslipidemia who is being admitted  with chest pain with EKG changes. He was called for  STEMI alert in ER, taken  to Cath lab and cardio recommends medical management. When he was cleared by cardio, he was discharged home in stable but guarded condition with advice to quit smoking, regular compliance with meds and OP f/u with his physicians as instructed     Chronic Stable Angina  Patient with hx of CABG and multiple stents in the past  Admitted with EKG change and chest pain  Patient underwent cardiac cath on 3/12 morning and foundt o have 100% occluded RCA but patent bypass grafts LAD is occluded.  This LAD supplied by [facility] no changes continue medical management. .  Patient is known to have occluded RCA.  - c/w DAPT/BB/statin  - cw imdur   - he is chest pain free with resumption of home meds  - imdur added, continue on discharge  - cardio followed and felt stable for discharge     Chronic systolic heart failure: compensated  2D echo 2021 with  [id number]%, GUFRB 1 diastolic dysfunction.    C/w  diuretic, beta-blocker, ACE.    Follows cardiology as outpatient ,resume on discharge   CXR stable  - c/w dietary and regular medication compliance, stable for discharge home per cardio  - OP CHF clinic f/u as instructed     COPD: not in  exacerbation.    Patient has been out of inhalers x 1 week.    -c/w o2/sytemic steroid/breathing treatment  - reinforced smoking cessation     Hypertension:  uncontrolled  BP  200/101 with EKG change on admission,   States he ran out of meds for a week  C/w  hydralazine, lisinopril, amlodipine  -monitor and titrate      Diabetes Mellitus type II-controlled. States his DM is diet controlled  A1c 7.2 from 3/12/22  Monitor and titrate     CKD stage III: Cr 1.5, stable around baseline.  BUN 21, GFR 47  -Monitor BMP  -Avoid nephrotoxic agents when able  -Adjust medications for GFR  -Plan to diuretic use                 GERD:   -Continue PPI     Hyperlipidemia:   C/w statin     Obesity  Body mass index is 33.01 kg/m². Wt loss advised  OP diet and lifestyle modifications       The patient expressed appropriate understanding of and agreement with the discharge recommendations, medications, and plan.      Consults this admission:  IP CONSULT TO HOSPITALIST  IP CONSULT TO CASE MANAGEMENT    Discharge Instruction:   Follow up appointments: one week  Primary care physician:  within 2 weeks    Diet:  cardiac diet , 1.8L fluid daily  Activity: activity as tolerated  Disposition: Discharged to:   [x]Home, [][facility], []SNF, []Acute Rehab, []Hospice   Condition on discharge: Stable    Discharge Medications:        Medication List      START taking these medications    isosorbide mononitrate 30 MG extended release tablet  Commonly known as: IMDUR  Take 1 tablet by mouth daily  Start taking on: March 15, 2022        CHANGE how you take these medications    albuterol-ipratropium  MCG/ACT Aers inhaler  Commonly known as: COMBIVENT RESPIMAT  Inhale 1 puff into the lungs every 6 hours as needed for Wheezing or Shortness of Breath  What changed:   when to take this  reasons to take this        CONTINUE taking these medications    Alcohol Pads 70 % Pads  1 Units by Does not apply route 2 times daily     amLODIPine 10 MG tablet  Commonly known as: Norvasc  Take 1 tablet by mouth daily     aspirin 81 MG chewable tablet  Take 1 tablet by mouth daily     atorvastatin 80 MG tablet  Commonly known as: Lipitor  Take 1 tablet by mouth daily     Blood Glucose Monitor System w/Device Kit  Use twice per day     blood glucose test strips  1 strip by Other route 2 times daily for 180 doses     Blood Pressure Kit  Use prn     carvedilol 25 MG tablet  Commonly known as: COREG  Take 1 tablet by mouth 2 times daily     clopidogrel 75 MG tablet  Commonly known as: PLAVIX  Take 1 tablet by mouth daily     furosemide 40 MG tablet  Commonly known as: LASIX  Take 1 tablet by mouth daily     glimepiride 2 MG tablet  Commonly known as: Amaryl  Take 1 tablet by mouth every morning     hydrALAZINE 10 MG tablet  Commonly known as: APRESOLINE  Take 1 tablet by mouth 2 times daily     Lancets Misc  1 each by Does not apply route 2 times daily for 180 doses     lisinopril 40 MG tablet  Commonly known as: PRINIVIL;ZESTRIL  Take 1 tablet by mouth daily     pantoprazole 40 MG tablet  Commonly known as: PROTONIX  Take 1 tablet by mouth daily     sertraline 100 MG tablet  Commonly known as: ZOLOFT  Take 1 tablet by mouth daily     tamsulosin 0.4 MG capsule  Commonly known as: FLOMAX  Take 1 capsule by mouth daily        STOP taking these medications    [facility] [place] [facility]           Where to Get Your Medications      You can get these medications from any pharmacy    Bring a paper prescription for each of these medications  albuterol-ipratropium  MCG/ACT Aers inhaler  Alcohol Pads 70 % Pads  amLODIPine 10 MG tablet  aspirin 81 MG chewable tablet  atorvastatin 80 MG tablet  Blood Glucose Monitor System w/Device Kit  blood glucose test strips  carvedilol 25 MG tablet  clopidogrel 75 MG tablet  furosemide 40 MG tablet  glimepiride 2 MG tablet  hydrALAZINE 10 MG tablet  isosorbide mononitrate 30 MG extended release tablet  Lancets Misc  lisinopril 40 MG tablet  pantoprazole 40 MG tablet  sertraline 100 MG tablet  tamsulosin 0.4 MG capsule         Objective Findings at Discharge:   /72   Pulse 70   Temp 97.4 °F (36.3 °C) (Axillary)   Resp 16   Ht 5' 7\" (1.702 m)   Wt 210 lb 12.2 oz (95.6 kg)   SpO2 97%   BMI 33.01 kg/m²            PHYSICAL EXAM   GEN Awake male, sitting upright in bed in no apparent distress. Appears given age. +obese  EYES Pupils are equally round. No scleral erythema, discharge, or conjunctivitis. HENT Mucous membranes are moist. Oral pharynx without exudates, no evidence of thrush. NECK Supple, no apparent thyromegaly or masses. RESP Clear to auscultation, no wheezes, rales or rhonchi. Symmetric chest movement while on room air. CARDIO/VASC S1/S2 auscultated. Regular rate without appreciable murmurs, rubs, or gallops. No JVD or carotid bruits. Peripheral pulses equal bilaterally and palpable. No peripheral edema. GI Abdomen is soft without significant tenderness, masses, or guarding. Bowel sounds are normoactive. Rectal exam deferred.  No costovertebral angle tenderness. Normal appearing external genitalia. York catheter is not present. HEME/LYMPH No palpable cervical lymphadenopathy and no hepatosplenomegaly. No petechiae or ecchymoses. MSK No gross joint deformities. SKIN Normal coloration, warm, dry. NEURO Cranial nerves appear grossly intact, normal speech, no lateralizing weakness. PSYCH Awake, alert, oriented x 4. Affect appropriate. BMP/CBC  Recent Labs     03/12/22  0731 03/13/22  0530 03/14/22  0616    136 136   K 4.5 4.9 4.2    104 103   CO2 26 20* 22   BUN 21 29* 32*   CREATININE 1.5* 1.8* 1.7*   WBC 7.3  --   --    HCT 51.7  --   --      --   --        IMAGING:  XR CHEST PORTABLE   Final Result      1. No acute cardiopulmonary abnormality.                Discharge Time of 35 minutes    Electronically signed by Kashmir Brady MD on 3/14/2022 at 2:39 PM

## 2022-03-14 NOTE — PROGRESS NOTES
Pt discharged per cardio and hospitalist. Reviewed discharge instructions with pt including any new, changed or discontinued medications and any wound care, as applicable. Pt verbalized understanding and states no questions at this time. Pt transported to [facility] via wheelchair with RN writer and released to wife at this time. Pt A&O, VSS, skin warm and dry.

## 2022-03-14 NOTE — TELEPHONE ENCOUNTER
[facility] Transitions Initial Follow Up Call    Outreach made within 2 business days of discharge: Yes    Patient: Shan Peck Patient : 1960   MRN: [id number]  Reason for Admission: There are no discharge diagnoses documented for the most recent discharge. Discharge Date: 3/14/22       Spoke with: BALWINDER olivier  to call    Discharge department/facility: [address] [place] [address] Interactive Patient Contact:  Was patient able to fill all prescriptions: YES  Was patient instructed to bring all medications to the follow-up visit: Yes  Is patient taking all medications as directed in the discharge summary?  Yes  Does patient understand their discharge instructions: Yes  Does patient have questions or concerns that need addressed prior to 7-14 day follow up office visit: NO    Scheduled appointment with PCP within 7-14 days    Follow Up  Future Appointments   Date Time Provider Carmen Díaz   2022  9:20 AM Keith Jhaveri MD [facility]       Lj Debby

## 2022-03-15 ENCOUNTER — CARE COORDINATION (OUTPATIENT)
Dept: CASE MANAGEMENT | Age: 62
End: 2022-03-15

## 2022-03-15 DIAGNOSIS — I21.3 ACUTE ST ELEVATION MYOCARDIAL INFARCTION (STEMI), UNSPECIFIED ARTERY (HCC): Primary | ICD-10-CM

## 2022-03-15 PROCEDURE — 1111F DSCHRG MED/CURRENT MED MERGE: CPT | Performed by: FAMILY MEDICINE

## 2022-03-15 NOTE — CARE COORDINATION
Request from [facility], RN., please schedule patient for hospital f/u    Called Dr. Carole Patterson office, was told they have left msg for patient, awaiting call back. Called patient no answer had to leave msg. Request patient call PCP office as they are awaiting patients call.     Cardiology appt on Wed 23rd at 11:20    LVM for patient to call PCP also with date and time of above appt for cardio    Wilfrid Runner, [address] [place] [address] Coordination Transition

## 2022-03-15 NOTE — CARE COORDINATION
[facility] Transitions Initial Follow Up Call    Call within 2 business days of discharge: Yes    Patient: Vivienne Choudhury Patient : 1960   MRN: [id number]  Reason for Admission: STEMI  Discharge Date: 3/14/22 RARS: Readmission Risk Score: 13.4 ( )      Last Discharge  [place] [id number]       Complaint Diagnosis Description Type Department Provider    3/12/22 Chest Pain ST elevation myocardial infarction (STEMI), unspecified artery ([facility] [address] . .. ED to Hosp-Admission (Discharged) (ADMITTED) Husam Velazquez MD; [facility],... Spoke with: Sarwat Settles, denies chest pain @ the moment. No shortness of breath. C/o some wheezing as he states he always has due to COPD. Denies N/V/D. Appetite is fair. Discussed/reviewed AVS & DC meds. States he is on his way to pharmacy to pu his new med, Imdur, taking all other meds as prescribed. Emphasized importance of follow up appts within 7 days. Informed CC Support, Lee Del Real to please schedule. Pt prefers am appts. No issues with transportation, drives self. Pt lives with wife, states able to ambulate in his home & community without issue. Provided DM teaching:     Take all diabetic meds and insulins as directed   Ensure glucometer is in good working order and you have all supplies   Routinely monitor blood sugar as directed. Notify MD if noting blood sugars are running below 70 two or more times per month or if noting constantly higher readings   Keep all scheduled MD appts, labs   Healthy eating habits; limit sugar, carbs   Increase activity level   Notify MD immediately if noting increased thirst/urination, vision changes, feeling confused or jittery, feeling faint. Call 911 if noting acute distress.      And CHF Mgt:    Daily weights in a.m. before breakfast, after voiding   Keep written log of weights for review   Notify MD immediately of weight gain/loss 3# or more in 1-7days   Take all rx as prescribed, keep scheduled MD appts   Low sodium diet- read labels; avoid/limit high sodium foods such as fast food, canned/boxed/processed foods, frozen meals, soups, cheeses, breads, chips, soda.  Do not add salt to food   Adhere to MD recommended fluid restriction   Notify MD immediately if experiencing increased sob, orthopnea, edema, weight gain, feeling of uneasiness, chest pain, increased cough, confusion, wheezing or chest tightness. Call 911 if noting acute distress        Non-face-to-face services provided:  Scheduled appointment with PCP--  Scheduled appointment with Specialist--  Obtained and reviewed discharge summary and/or continuity of care documents  Education of patient/family/caregiver/guardian to support self-management-care @ home  Assessment and support for treatment adherence and medication management-DC meds reviewed 1111F completed    Care Transitions 24 Hour Call    Schedule Follow Up Appointment with PCP: Completed  Do you have any ongoing symptoms?: No  Do you have a copy of your discharge instructions?: Yes  Do you have all of your prescriptions and are they filled?: Yes  Have you been contacted by a [facility] Pharmacist?: No  Have you scheduled your follow up appointment?: No (Comment: Request to [facility] to schedule appts)  Were you discharged with any Home Care or Post Acute Services: No  Do you feel like you have everything you need to keep you well at home?: Yes  Care Transitions Interventions   Home Care Waiver: Declined        Transportation Support: Declined      DME Assistance: Declined   Disease Association: Completed             Follow Up  Future Appointments   Date Time Provider Carmen Díaz   9/6/2022  9:20 AM Marina Barnes MD [facility]     Transitions of Care Initial Call    Was this an external facility discharge?  No Discharge Facility: [facility]    Challenges to be reviewed by the provider   Additional needs identified to be addressed with provider: No  none             Method of communication with provider : none      Advance Care Planning:   Does patient have an Advance Directive: patient declined education. Was this a readmission? No  Patient stated reason for admission: Chest pain. Heart attack  Patients top risk factors for readmission: medical condition-COPD, CVA, [place], diverticulosis, CHF, HTN, CKD, DM2, cigarette smoker, hx of non compliance    Care Transition Nurse (CTN) contacted the patient by telephone to perform post hospital discharge assessment. Verified name and  with patient as identifiers. Provided introduction to self, and explanation of the CTN role. CTN reviewed discharge instructions, medical action plan and red flags with patient who verbalized understanding. Patient given an opportunity to ask questions and does not have any further questions or concerns at this time. Were discharge instructions available to patient? Yes. Reviewed appropriate site of care based on symptoms and resources available to patient including: PCP, Specialist, Urgent care clinics and When to call 911. The patient agrees to contact the PCP office for questions related to their healthcare. Medication reconciliation was performed with patient, who verbalizes understanding of administration of home medications. Advised obtaining a 90-day supply of all daily and as-needed medications. Covid Risk Education     Educated patient about risk for severe COVID-19 due to risk factors according to CDC guidelines. CTN reviewed discharge instructions, medical action plan and red flag symptoms with the patient who verbalized understanding. Discussed COVID vaccination status: Yes. Education provided on COVID-19 vaccination as appropriate. Discussed exposure protocols and quarantine with CDC Guidelines. Patient was given an opportunity to verbalize any questions and concerns and agrees to contact CTN or health care provider for questions related to their healthcare.     Reviewed and educated patient on any new and changed medications related to discharge diagnosis. CTN provided contact information. Plan for follow-up call in 5-7 days based on severity of symptoms and risk factors.   Plan for next call: symptom management--  follow up appointment--  medication management--      Remi Salazar RN -[phone]

## 2022-03-23 ENCOUNTER — HOSPITAL ENCOUNTER (OUTPATIENT)
Age: 62
Setting detail: OBSERVATION
Discharge: HOME | End: 2022-03-24
Attending: EMERGENCY MEDICINE | Admitting: INTERNAL MEDICINE
Payer: MEDICARE

## 2022-03-23 ENCOUNTER — CARE COORDINATION (OUTPATIENT)
Dept: CASE MANAGEMENT | Age: 62
End: 2022-03-23

## 2022-03-23 ENCOUNTER — APPOINTMENT (OUTPATIENT)
Dept: GENERAL RADIOLOGY | Age: 62
End: 2022-03-23
Payer: MEDICARE

## 2022-03-23 ENCOUNTER — OFFICE VISIT (OUTPATIENT)
Dept: CARDIOLOGY CLINIC | Age: 62
End: 2022-03-23
Payer: MEDICARE

## 2022-03-23 VITALS
BODY MASS INDEX: 31.92 KG/M2 | DIASTOLIC BLOOD PRESSURE: 84 MMHG | HEART RATE: 79 BPM | WEIGHT: 203.4 LBS | HEIGHT: 67 IN | SYSTOLIC BLOOD PRESSURE: 132 MMHG

## 2022-03-23 DIAGNOSIS — I25.10 ASCVD (ARTERIOSCLEROTIC CARDIOVASCULAR DISEASE): ICD-10-CM

## 2022-03-23 DIAGNOSIS — R07.9 CHEST PAIN, UNSPECIFIED TYPE: Primary | ICD-10-CM

## 2022-03-23 DIAGNOSIS — Z87.891 FORMER TOBACCO USE: ICD-10-CM

## 2022-03-23 DIAGNOSIS — E78.5 HYPERLIPIDEMIA LDL GOAL <70: ICD-10-CM

## 2022-03-23 DIAGNOSIS — R06.02 SHORTNESS OF BREATH: Primary | ICD-10-CM

## 2022-03-23 PROBLEM — I63.9 ACUTE CEREBROVASCULAR ACCIDENT (CVA) (HCC): Status: RESOLVED | Noted: 2020-05-22 | Resolved: 2022-03-23

## 2022-03-23 PROBLEM — I21.3 ACUTE ST ELEVATION MYOCARDIAL INFARCTION (STEMI) (HCC): Status: RESOLVED | Noted: 2018-03-14 | Resolved: 2022-03-23

## 2022-03-23 LAB
ALBUMIN SERPL-MCNC: 3.9 GM/DL (ref 3.4–5)
ALP BLD-CCNC: 60 IU/L (ref 40–129)
ALT SERPL-CCNC: 12 U/L (ref 10–40)
ANION GAP SERPL CALCULATED.3IONS-SCNC: 13 MMOL/L (ref 4–16)
AST SERPL-CCNC: 13 IU/L (ref 15–37)
BASOPHILS ABSOLUTE: 0.1 K/CU MM
BASOPHILS RELATIVE PERCENT: 0.7 % (ref 0–1)
BILIRUB SERPL-MCNC: 0.4 MG/DL (ref 0–1)
BUN BLDV-MCNC: 26 MG/DL (ref 6–23)
CALCIUM SERPL-MCNC: 8.7 MG/DL (ref 8.3–10.6)
CHLORIDE BLD-SCNC: 102 MMOL/L (ref 99–110)
CO2: 20 MMOL/L (ref 21–32)
CREAT SERPL-MCNC: 1.8 MG/DL (ref 0.9–1.3)
DIFFERENTIAL TYPE: ABNORMAL
EKG ATRIAL RATE: 56 BPM
EKG DIAGNOSIS: NORMAL
EKG P AXIS: 16 DEGREES
EKG P-R INTERVAL: 164 MS
EKG Q-T INTERVAL: 442 MS
EKG QRS DURATION: 116 MS
EKG QTC CALCULATION (BAZETT): 467 MS
EKG R AXIS: 19 DEGREES
EKG T AXIS: 130 DEGREES
EKG VENTRICULAR RATE: 67 BPM
EOSINOPHILS ABSOLUTE: 0.1 K/CU MM
EOSINOPHILS RELATIVE PERCENT: 1.1 % (ref 0–3)
GFR AFRICAN AMERICAN: 46 ML/MIN/1.73M2
GFR NON-AFRICAN AMERICAN: 38 ML/MIN/1.73M2
GLUCOSE BLD-MCNC: 149 MG/DL (ref 70–99)
GLUCOSE BLD-MCNC: 182 MG/DL (ref 70–99)
HCT VFR BLD CALC: 41.8 % (ref 42–52)
HEMOGLOBIN: 13.9 GM/DL (ref 13.5–18)
IMMATURE NEUTROPHIL %: 0.5 % (ref 0–0.43)
LIPASE: 58 IU/L (ref 13–60)
LYMPHOCYTES ABSOLUTE: 1.7 K/CU MM
LYMPHOCYTES RELATIVE PERCENT: 16.5 % (ref 24–44)
MCH RBC QN AUTO: 32.2 PG (ref 27–31)
MCHC RBC AUTO-ENTMCNC: 33.3 % (ref 32–36)
MCV RBC AUTO: 96.8 FL (ref 78–100)
MONOCYTES ABSOLUTE: 0.6 K/CU MM
MONOCYTES RELATIVE PERCENT: 6.3 % (ref 0–4)
NUCLEATED RBC %: 0 %
PDW BLD-RTO: 12.4 % (ref 11.7–14.9)
PLATELET # BLD: 183 K/CU MM (ref 140–440)
PMV BLD AUTO: 12.3 FL (ref 7.5–11.1)
POTASSIUM SERPL-SCNC: 4.4 MMOL/L (ref 3.5–5.1)
RBC # BLD: 4.32 M/CU MM (ref 4.6–6.2)
SEGMENTED NEUTROPHILS ABSOLUTE COUNT: 7.5 K/CU MM
SEGMENTED NEUTROPHILS RELATIVE PERCENT: 74.9 % (ref 36–66)
SODIUM BLD-SCNC: 135 MMOL/L (ref 135–145)
TOTAL IMMATURE NEUTOROPHIL: 0.05 K/CU MM
TOTAL NUCLEATED RBC: 0 K/CU MM
TOTAL PROTEIN: 6.6 GM/DL (ref 6.4–8.2)
TROPONIN T: <0.01 NG/ML
WBC # BLD: 10 K/CU MM (ref 4–10.5)

## 2022-03-23 PROCEDURE — 96372 THER/PROPH/DIAG INJ SC/IM: CPT

## 2022-03-23 PROCEDURE — 1200000000 HC SEMI PRIVATE

## 2022-03-23 PROCEDURE — 99291 CRITICAL CARE FIRST HOUR: CPT | Performed by: INTERNAL MEDICINE

## 2022-03-23 PROCEDURE — 82962 GLUCOSE BLOOD TEST: CPT

## 2022-03-23 PROCEDURE — 96366 THER/PROPH/DIAG IV INF ADDON: CPT

## 2022-03-23 PROCEDURE — 96374 THER/PROPH/DIAG INJ IV PUSH: CPT

## 2022-03-23 PROCEDURE — 2500000003 HC RX 250 WO HCPCS: Performed by: EMERGENCY MEDICINE

## 2022-03-23 PROCEDURE — 93000 ELECTROCARDIOGRAM COMPLETE: CPT | Performed by: NURSE PRACTITIONER

## 2022-03-23 PROCEDURE — 96365 THER/PROPH/DIAG IV INF INIT: CPT

## 2022-03-23 PROCEDURE — 93010 ELECTROCARDIOGRAM REPORT: CPT | Performed by: INTERNAL MEDICINE

## 2022-03-23 PROCEDURE — 6360000002 HC RX W HCPCS: Performed by: EMERGENCY MEDICINE

## 2022-03-23 PROCEDURE — G0378 HOSPITAL OBSERVATION PER HR: HCPCS

## 2022-03-23 PROCEDURE — 6370000000 HC RX 637 (ALT 250 FOR IP): Performed by: INTERNAL MEDICINE

## 2022-03-23 PROCEDURE — 85025 COMPLETE CBC W/AUTO DIFF WBC: CPT

## 2022-03-23 PROCEDURE — 99214 OFFICE O/P EST MOD 30 MIN: CPT | Performed by: NURSE PRACTITIONER

## 2022-03-23 PROCEDURE — 84484 ASSAY OF TROPONIN QUANT: CPT

## 2022-03-23 PROCEDURE — 93005 ELECTROCARDIOGRAM TRACING: CPT | Performed by: EMERGENCY MEDICINE

## 2022-03-23 PROCEDURE — 83690 ASSAY OF LIPASE: CPT

## 2022-03-23 PROCEDURE — 71045 X-RAY EXAM CHEST 1 VIEW: CPT

## 2022-03-23 PROCEDURE — 80053 COMPREHEN METABOLIC PANEL: CPT

## 2022-03-23 PROCEDURE — 6360000002 HC RX W HCPCS: Performed by: INTERNAL MEDICINE

## 2022-03-23 PROCEDURE — 6370000000 HC RX 637 (ALT 250 FOR IP): Performed by: EMERGENCY MEDICINE

## 2022-03-23 PROCEDURE — 99285 EMERGENCY DEPT VISIT HI MDM: CPT

## 2022-03-23 RX ORDER — ACETAMINOPHEN 650 MG/1
650 SUPPOSITORY RECTAL EVERY 6 HOURS PRN
Status: DISCONTINUED | OUTPATIENT
Start: 2022-03-23 | End: 2022-03-24 | Stop reason: HOSPADM

## 2022-03-23 RX ORDER — CLOPIDOGREL BISULFATE 75 MG/1
75 TABLET ORAL DAILY
Status: DISCONTINUED | OUTPATIENT
Start: 2022-03-24 | End: 2022-03-24 | Stop reason: HOSPADM

## 2022-03-23 RX ORDER — AMLODIPINE BESYLATE 10 MG/1
10 TABLET ORAL DAILY
Status: DISCONTINUED | OUTPATIENT
Start: 2022-03-24 | End: 2022-03-24 | Stop reason: HOSPADM

## 2022-03-23 RX ORDER — SERTRALINE HYDROCHLORIDE 100 MG/1
100 TABLET, FILM COATED ORAL DAILY
Status: DISCONTINUED | OUTPATIENT
Start: 2022-03-24 | End: 2022-03-24 | Stop reason: HOSPADM

## 2022-03-23 RX ORDER — SODIUM CHLORIDE 0.9 % (FLUSH) 0.9 %
5-40 SYRINGE (ML) INJECTION PRN
Status: DISCONTINUED | OUTPATIENT
Start: 2022-03-23 | End: 2022-03-24 | Stop reason: HOSPADM

## 2022-03-23 RX ORDER — DEXTROSE MONOHYDRATE 25 G/50ML
12.5 INJECTION, SOLUTION INTRAVENOUS PRN
Status: DISCONTINUED | OUTPATIENT
Start: 2022-03-23 | End: 2022-03-24 | Stop reason: HOSPADM

## 2022-03-23 RX ORDER — HYDRALAZINE HYDROCHLORIDE 10 MG/1
10 TABLET, FILM COATED ORAL 2 TIMES DAILY
Status: DISCONTINUED | OUTPATIENT
Start: 2022-03-23 | End: 2022-03-24 | Stop reason: HOSPADM

## 2022-03-23 RX ORDER — POLYETHYLENE GLYCOL 3350 17 G/17G
17 POWDER, FOR SOLUTION ORAL DAILY PRN
Status: DISCONTINUED | OUTPATIENT
Start: 2022-03-23 | End: 2022-03-24 | Stop reason: HOSPADM

## 2022-03-23 RX ORDER — TAMSULOSIN HYDROCHLORIDE 0.4 MG/1
0.4 CAPSULE ORAL DAILY
Status: DISCONTINUED | OUTPATIENT
Start: 2022-03-23 | End: 2022-03-24 | Stop reason: HOSPADM

## 2022-03-23 RX ORDER — FUROSEMIDE 40 MG/1
40 TABLET ORAL DAILY
Status: DISCONTINUED | OUTPATIENT
Start: 2022-03-24 | End: 2022-03-24 | Stop reason: HOSPADM

## 2022-03-23 RX ORDER — ATORVASTATIN CALCIUM 80 MG/1
80 TABLET, FILM COATED ORAL DAILY
Status: DISCONTINUED | OUTPATIENT
Start: 2022-03-23 | End: 2022-03-24 | Stop reason: HOSPADM

## 2022-03-23 RX ORDER — NITROGLYCERIN 20 MG/100ML
5-200 INJECTION INTRAVENOUS CONTINUOUS
Status: DISCONTINUED | OUTPATIENT
Start: 2022-03-23 | End: 2022-03-24 | Stop reason: HOSPADM

## 2022-03-23 RX ORDER — ASPIRIN 81 MG/1
324 TABLET, CHEWABLE ORAL ONCE
Status: COMPLETED | OUTPATIENT
Start: 2022-03-23 | End: 2022-03-23

## 2022-03-23 RX ORDER — FENTANYL CITRATE 50 UG/ML
25 INJECTION, SOLUTION INTRAMUSCULAR; INTRAVENOUS ONCE
Status: COMPLETED | OUTPATIENT
Start: 2022-03-23 | End: 2022-03-23

## 2022-03-23 RX ORDER — ALBUTEROL SULFATE 90 UG/1
2 AEROSOL, METERED RESPIRATORY (INHALATION) EVERY 6 HOURS PRN
Status: DISCONTINUED | OUTPATIENT
Start: 2022-03-23 | End: 2022-03-24 | Stop reason: HOSPADM

## 2022-03-23 RX ORDER — ASPIRIN 81 MG/1
81 TABLET, CHEWABLE ORAL DAILY
Status: DISCONTINUED | OUTPATIENT
Start: 2022-03-24 | End: 2022-03-24 | Stop reason: HOSPADM

## 2022-03-23 RX ORDER — ISOSORBIDE MONONITRATE 30 MG/1
30 TABLET, EXTENDED RELEASE ORAL DAILY
Status: DISCONTINUED | OUTPATIENT
Start: 2022-03-24 | End: 2022-03-24 | Stop reason: HOSPADM

## 2022-03-23 RX ORDER — SODIUM CHLORIDE 0.9 % (FLUSH) 0.9 %
5-40 SYRINGE (ML) INJECTION EVERY 12 HOURS SCHEDULED
Status: DISCONTINUED | OUTPATIENT
Start: 2022-03-23 | End: 2022-03-24 | Stop reason: HOSPADM

## 2022-03-23 RX ORDER — ONDANSETRON 2 MG/ML
4 INJECTION INTRAMUSCULAR; INTRAVENOUS EVERY 30 MIN PRN
Status: DISCONTINUED | OUTPATIENT
Start: 2022-03-23 | End: 2022-03-24 | Stop reason: HOSPADM

## 2022-03-23 RX ORDER — DEXTROSE MONOHYDRATE 50 MG/ML
100 INJECTION, SOLUTION INTRAVENOUS PRN
Status: DISCONTINUED | OUTPATIENT
Start: 2022-03-23 | End: 2022-03-24 | Stop reason: HOSPADM

## 2022-03-23 RX ORDER — LISINOPRIL 40 MG/1
40 TABLET ORAL DAILY
Status: DISCONTINUED | OUTPATIENT
Start: 2022-03-24 | End: 2022-03-24 | Stop reason: HOSPADM

## 2022-03-23 RX ORDER — ONDANSETRON 2 MG/ML
4 INJECTION INTRAMUSCULAR; INTRAVENOUS EVERY 6 HOURS PRN
Status: DISCONTINUED | OUTPATIENT
Start: 2022-03-23 | End: 2022-03-24 | Stop reason: HOSPADM

## 2022-03-23 RX ORDER — SODIUM CHLORIDE 9 MG/ML
25 INJECTION, SOLUTION INTRAVENOUS PRN
Status: DISCONTINUED | OUTPATIENT
Start: 2022-03-23 | End: 2022-03-24 | Stop reason: HOSPADM

## 2022-03-23 RX ORDER — ACETAMINOPHEN 325 MG/1
650 TABLET ORAL EVERY 6 HOURS PRN
Status: DISCONTINUED | OUTPATIENT
Start: 2022-03-23 | End: 2022-03-24 | Stop reason: HOSPADM

## 2022-03-23 RX ORDER — ONDANSETRON 4 MG/1
4 TABLET, ORALLY DISINTEGRATING ORAL EVERY 8 HOURS PRN
Status: DISCONTINUED | OUTPATIENT
Start: 2022-03-23 | End: 2022-03-24 | Stop reason: HOSPADM

## 2022-03-23 RX ORDER — PANTOPRAZOLE SODIUM 40 MG/1
40 TABLET, DELAYED RELEASE ORAL DAILY
Status: DISCONTINUED | OUTPATIENT
Start: 2022-03-24 | End: 2022-03-24 | Stop reason: HOSPADM

## 2022-03-23 RX ORDER — NICOTINE POLACRILEX 4 MG
15 LOZENGE BUCCAL PRN
Status: DISCONTINUED | OUTPATIENT
Start: 2022-03-23 | End: 2022-03-24 | Stop reason: HOSPADM

## 2022-03-23 RX ORDER — CARVEDILOL 25 MG/1
25 TABLET ORAL 2 TIMES DAILY
Status: DISCONTINUED | OUTPATIENT
Start: 2022-03-23 | End: 2022-03-24

## 2022-03-23 RX ADMIN — HYDRALAZINE HYDROCHLORIDE 10 MG: 10 TABLET ORAL at 20:43

## 2022-03-23 RX ADMIN — FENTANYL CITRATE 25 MCG: 50 INJECTION, SOLUTION INTRAMUSCULAR; INTRAVENOUS at 17:35

## 2022-03-23 RX ADMIN — ENOXAPARIN SODIUM 40 MG: 40 INJECTION SUBCUTANEOUS at 20:44

## 2022-03-23 RX ADMIN — ATORVASTATIN CALCIUM 80 MG: 80 TABLET, FILM COATED ORAL at 20:43

## 2022-03-23 RX ADMIN — ASPIRIN 324 MG: 81 TABLET, CHEWABLE ORAL at 16:07

## 2022-03-23 RX ADMIN — CARVEDILOL 25 MG: 25 TABLET, FILM COATED ORAL at 20:44

## 2022-03-23 RX ADMIN — NITROGLYCERIN 5 MCG/MIN: 20 INJECTION INTRAVENOUS at 16:08

## 2022-03-23 RX ADMIN — TAMSULOSIN HYDROCHLORIDE 0.4 MG: 0.4 CAPSULE ORAL at 20:43

## 2022-03-23 ASSESSMENT — PAIN DESCRIPTION - LOCATION
LOCATION: CHEST
LOCATION: SHOULDER

## 2022-03-23 ASSESSMENT — PAIN - FUNCTIONAL ASSESSMENT: PAIN_FUNCTIONAL_ASSESSMENT: 0-10

## 2022-03-23 ASSESSMENT — PAIN DESCRIPTION - ORIENTATION
ORIENTATION: LEFT
ORIENTATION: RIGHT

## 2022-03-23 ASSESSMENT — PAIN DESCRIPTION - PAIN TYPE
TYPE: ACUTE PAIN
TYPE: ACUTE PAIN

## 2022-03-23 ASSESSMENT — PAIN SCALES - GENERAL
PAINLEVEL_OUTOF10: 10
PAINLEVEL_OUTOF10: 9
PAINLEVEL_OUTOF10: 0

## 2022-03-23 ASSESSMENT — ENCOUNTER SYMPTOMS
COUGH: 0
ORTHOPNEA: 0

## 2022-03-23 ASSESSMENT — PAIN DESCRIPTION - ONSET: ONSET: ON-GOING

## 2022-03-23 ASSESSMENT — PAIN DESCRIPTION - FREQUENCY: FREQUENCY: INTERMITTENT

## 2022-03-23 NOTE — PROGRESS NOTES
3/23/2022  Primary cardiologist: Dr. Rut Spain  is an established 57 y.o.  male here for follow-up on sp hospitalization for STEMI. SUBJECTIVE/OBJECTIVE:    HPI :   Johnathan Delgado reports that he has been feeling fatigued since he got home from the hospital.  Patient denies any chest pain since starting Imdur. Patient has abstained from tobacco abuse since being discharged from hospital additionally. Patient was admitted to [facility] for STEMI March 12, 2022. Patient taken to Cath Lab and found to have occlusion of LAD and RCA but has patent LIMA to LAD, saphenous to OM. Review of Systems   Constitutional: Positive for malaise/fatigue. Cardiovascular: Positive for dyspnea on exertion. Negative for chest pain, leg swelling, near-syncope, orthopnea, palpitations and syncope. Respiratory: Negative for cough. Vitals:    03/23/22 1107   BP: 132/84   Pulse: 79   Weight: 203 lb 6.4 oz (92.3 kg)   Height: 5' 7\" (1.702 m)     Patient-Reported Vitals 2/25/2021   Patient-Reported Weight -   Patient-Reported Height -   Patient-Reported Systolic 675   Patient-Reported Diastolic 66   Patient-Reported Pulse 69     Wt Readings from Last 3 Encounters:   03/23/22 203 lb 6.4 oz (92.3 kg)   03/13/22 210 lb 12.2 oz (95.6 kg)   02/25/22 207 lb 6.4 oz (94.1 kg)     Body mass index is 31.86 kg/m². Physical Exam  Vitals reviewed. Constitutional:       General: He is not in acute distress. Appearance: Normal appearance. He is obese. He is not ill-appearing. HENT:      Head: Atraumatic. Neck:      Vascular: No carotid bruit. Cardiovascular:      Rate and Rhythm: Normal rate and regular rhythm. Pulses: Normal pulses. Heart sounds: Normal heart sounds. No murmur heard. Pulmonary:      Effort: Pulmonary effort is normal. No respiratory distress. Breath sounds: Normal breath sounds. Musculoskeletal:         General: No swelling or deformity. Cervical back: Neck supple.  No muscular tenderness. Neurological:      Mental Status: He is alert. Current Outpatient Medications   Medication Sig Dispense Refill    aspirin 81 MG chewable tablet Take 1 tablet by mouth daily 180 tablet 0    albuterol-ipratropium (COMBIVENT RESPIMAT)  MCG/ACT AERS inhaler Inhale 1 puff into the lungs every 6 hours as needed for Wheezing or Shortness of Breath 120 each 0    sertraline (ZOLOFT) 100 MG tablet Take 1 tablet by mouth daily 30 tablet 0    glimepiride (AMARYL) 2 MG tablet Take 1 tablet by mouth every morning 30 tablet 0    atorvastatin (LIPITOR) 80 MG tablet Take 1 tablet by mouth daily 30 tablet 0    hydrALAZINE (APRESOLINE) 10 MG tablet Take 1 tablet by mouth 2 times daily 60 tablet 0    lisinopril (PRINIVIL;ZESTRIL) 40 MG tablet Take 1 tablet by mouth daily 30 tablet 0    carvedilol (COREG) 25 MG tablet Take 1 tablet by mouth 2 times daily 60 tablet 0    amLODIPine (NORVASC) 10 MG tablet Take 1 tablet by mouth daily 30 tablet 0    blood glucose monitor strips 1 strip by Other route 2 times daily for 180 doses 180 strip 0    furosemide (LASIX) 40 MG tablet Take 1 tablet by mouth daily 60 tablet 1    Alcohol Swabs (ALCOHOL PADS) 70 % PADS 1 Units by Does not apply route 2 times daily 180 each 3    Blood Glucose Monitoring Suppl (BLOOD GLUCOSE MONITOR SYSTEM) w/Device KIT Use twice per day 1 kit 0    Lancets MISC 1 each by Does not apply route 2 times daily for 180 doses 180 each 3    tamsulosin (FLOMAX) 0.4 MG capsule Take 1 capsule by mouth daily 30 capsule 0    clopidogrel (PLAVIX) 75 MG tablet Take 1 tablet by mouth daily 30 tablet 0    pantoprazole (PROTONIX) 40 MG tablet Take 1 tablet by mouth daily 30 tablet 0    isosorbide mononitrate (IMDUR) 30 MG extended release tablet Take 1 tablet by mouth daily 30 tablet 0    Blood Pressure KIT Use prn 1 kit 0     No current facility-administered medications for this visit.       [facility] 3/12/2022   Procedure Summary   Indication : medication(s). He is stable on current dose. Medications reviewed and confirmed with patient     Tests ordered:  Lipid panel      Follow-up  In 1 month     Signed:  JERED Durán CNP, 3/23/2022, 11:18 AM    An electronic signature was used to authenticate this note. Please note this report has been partially produced using speech recognition software and may contain errors related to that system including errors in grammar, punctuation, and spelling, as well as words and phrases that may be inappropriate. If there are any questions or concerns please feel free to contact the dictating provider for clarification.

## 2022-03-23 NOTE — ED NOTES
Report given to [facility]      Ana Go RN  03/23/22 0689

## 2022-03-23 NOTE — CONSULTS
INPATIENT CARDIOLOGY CONSULT NOTE         Reason for consultation:  STEMI     Primary care physician: Nana Oppenheim, MD       Chief Complaint   Patient presents with    Chest Pain       History of present illness:Hilaria is a 57 y. o.year old who  presents with   Chief Complaint   Patient presents with    Chest Pain     Patient is a 69-year-old gentleman with prior medical history significant for coronary disease s/p CABG, LIMA to LAD, SVG to OM 2, history of hypertension, stroke presents to the hospital with chief complaint of chest pain    STEMI is alerted, based on patient's symptom of chest pain and abnormal EKG. Patient mentioned that he started developing chest discomfort for the past 2 hours. Pain is anterior chest reproducible 7 out of 10 associated with dyspnea. Patient had similar presentation on 3/12/2022, in which STEMI was alerted and patient was taken emergently to cardiac Cath Lab. Cardiac catheterization images reviewed from the visit. Severe native coronary disease including  RCA,  mid LAD. Patent MCALLISTER to LAD, patent SVG to OM 2. Adequate revascularization noted. EKG in the hospital today shows sinus rhythm, ST elevation noted with IVCD in inferior leads with T wave inversion noted in high lateral leads. EKG is similar to prior EKG on the 12th of this month. Patient was also seen in the clinic, heart [place] earlier today as a follow-up from recent hospitalization. Patient denies any chest pain at the time.     Past medical history:    has a past medical history of Acute cerebrovascular accident (CVA) ([address] Acute ST elevation myocardial infarction (STEMI) ([address] B12 deficiency, CAD (coronary artery disease), COPD (chronic obstructive pulmonary disease) ([address] GERD (gastroesophageal reflux disease), Guillain Barré syndrome ([address] H/O echocardiogram, Heart murmur, History of echocardiogram, History of myocardial infarction, History of nuclear MUGA test, History of nuclear stress test, History of PTCA, History of PTCA, Hx of cardiovascular stress test, Hx of cardiovascular stress test, HX OTHER MEDICAL, Hyperlipidemia, Hypertension, Impaired glucose tolerance, MUGA, NSTEMI (non-ST elevated myocardial infarction) [facility]), Patient in clinical research study, Pneumonia due to organism, Renal cyst, S/P cardiac catheterization, and Stroke (cerebrum) ([facility] [address] Past surgical history:   has a past surgical history that includes Hand surgery; Coronary angioplasty (6/03); shoulder surgery (Right, 2011); Colonoscopy (8/21/14); Endoscopy, colon, diagnostic (8/21/14); Cardiac catheterization (7/13); Percutaneous Transluminal Coronary Angio (03/2018); and Coronary artery bypass graft (N/A, 5/18/2020). Social History:   reports that he quit smoking about 5 years ago. His smoking use included cigarettes. He has a 90.00 pack-year smoking history. He has never used smokeless tobacco. He reports that he does not drink alcohol and does not use drugs.   Family history:   no family history of CAD, STROKE of DM    Allergies   Allergen Reactions    Ibuprofen Other (See Comments)     Stomach irritation    Augmentin [Amoxicillin-Pot Clavulanate]     Codeine        aspirin chewable tablet 324 mg, Once  nitroGLYCERIN 50 mg in dextrose 5% 250 mL infusion, Continuous      Current Facility-Administered Medications   Medication Dose Route Frequency Provider Last Rate Last Admin    aspirin chewable tablet 324 mg  324 mg Oral Once Emerson Saravia MD        nitroGLYCERIN 50 mg in dextrose 5% 250 mL infusion  5-200 mcg/min IntraVENous Continuous Emerson Saravia MD         Current Outpatient Medications   Medication Sig Dispense Refill    aspirin 81 MG chewable tablet Take 1 tablet by mouth daily 180 tablet 0    albuterol-ipratropium (COMBIVENT RESPIMAT)  MCG/ACT AERS inhaler Inhale 1 puff into the lungs every 6 hours as needed for Wheezing or Shortness of Breath 120 each 0    sertraline (ZOLOFT) 100 MG tablet Take 1 tablet by mouth daily 30 tablet 0    glimepiride (AMARYL) 2 MG tablet Take 1 tablet by mouth every morning 30 tablet 0    atorvastatin (LIPITOR) 80 MG tablet Take 1 tablet by mouth daily 30 tablet 0    hydrALAZINE (APRESOLINE) 10 MG tablet Take 1 tablet by mouth 2 times daily 60 tablet 0    lisinopril (PRINIVIL;ZESTRIL) 40 MG tablet Take 1 tablet by mouth daily 30 tablet 0    carvedilol (COREG) 25 MG tablet Take 1 tablet by mouth 2 times daily 60 tablet 0    amLODIPine (NORVASC) 10 MG tablet Take 1 tablet by mouth daily 30 tablet 0    blood glucose monitor strips 1 strip by Other route 2 times daily for 180 doses 180 strip 0    furosemide (LASIX) 40 MG tablet Take 1 tablet by mouth daily 60 tablet 1    Alcohol Swabs (ALCOHOL PADS) 70 % PADS 1 Units by Does not apply route 2 times daily 180 each 3    Blood Glucose Monitoring Suppl (BLOOD GLUCOSE MONITOR SYSTEM) w/Device KIT Use twice per day 1 kit 0    Lancets MISC 1 each by Does not apply route 2 times daily for 180 doses 180 each 3    tamsulosin (FLOMAX) 0.4 MG capsule Take 1 capsule by mouth daily 30 capsule 0    clopidogrel (PLAVIX) 75 MG tablet Take 1 tablet by mouth daily 30 tablet 0    pantoprazole (PROTONIX) 40 MG tablet Take 1 tablet by mouth daily 30 tablet 0    isosorbide mononitrate (IMDUR) 30 MG extended release tablet Take 1 tablet by mouth daily 30 tablet 0    Blood Pressure KIT Use prn 1 kit 0         Review of Systems:     · Constitutional: No Fever or Weight Loss   · Eyes: No Decreased Vision  · ENT: No Headaches, Hearing Loss or Vertigo  · Cardiovascular:  ++ chest pain,  no dyspnea on exertion,  no palpitations or loss of consciousness  · Respiratory: No cough or wheezing    · Gastrointestinal: No abdominal pain, appetite loss, blood in stools, constipation, diarrhea or heartburn  · Genitourinary: No dysuria, trouble voiding, or hematuria  · Musculoskeletal:  No gait disturbance, weakness or joint complaints  · Integumentary: No rash or pruritis  · Neurological: No TIA or stroke symptoms  · Psychiatric: No anxiety or depression  · Endocrine: No malaise, fatigue or temperature intolerance  · Hematologic/Lymphatic: No bleeding problems, blood clots or swollen lymph nodes  · Allergic/Immunologic: No nasal congestion or hives    All other systems were reviewed and were negative otherwise. Physical Examination:      Vitals:    03/23/22 1550   Pulse: 57   Resp: 20   SpO2: 96%      Wt Readings from Last 3 Encounters:   03/23/22 203 lb 6.4 oz (92.3 kg)   03/13/22 210 lb 12.2 oz (95.6 kg)   02/25/22 207 lb 6.4 oz (94.1 kg)     There is no height or weight on file to calculate BMI. General Appearance:  No distress, conversant  Constitutional:  Well developed, Well nourished  HEENT:  Normocephalic, Atraumatic, Oropharynx moist   Nose normal. Neck Supple Carotid: no carotid bruit  Eyes:  Conjunctiva normal, No discharge. Respiratory:    Normal breath sounds, No respiratory distress, No wheezing, no use of accessory muscles, diaphragm movement is normal  No chest Tenderness  Cardiovascular: S1-S2 No murmurs auscultated. No rubs, thrills or gallops. Normal  rhythm. Pedal pulses are normal. No pedal edema  GI:  Soft Non tender, non distended. Musculoskeletal:   No tenderness, No cyanosis, No clubbing. Integument:  Warm, Dry, No erythema, No rash. Lymphatic:  No lymphadenopathy noted. Neurologic:  Alert & oriented x 3  No focal deficits noted. Psychiatric:  Affect normal, Judgment normal, Mood normal.       Lab Review     No results for input(s): WBC, HGB, HCT, PLT in the last 72 hours. No results for input(s): NA, K, CL, CO2, PHOS, BUN, CREATININE, CA in the last 72 hours. No results for input(s): AST, ALT, ALB, BILIDIR, BILITOT, ALKPHOS in the last 72 hours. No results for input(s): TROPONINI in the last 72 hours.   No results found for: BNP  Lab Results   Component Value Date    INR 0.90 03/12/2022    PROTIME 11.6 (L) 03/12/2022         All labs, images, EKGs were personally reviewed      Assessment: 57 y. o.year old with PMH of  has a past medical history of Acute cerebrovascular accident (CVA) ([facility] [address] Acute ST elevation myocardial infarction (STEMI) ([facility] [address] B12 deficiency, CAD (coronary artery disease), COPD (chronic obstructive pulmonary disease) ([facility] [address] GERD (gastroesophageal reflux disease), Guillain Barré syndrome ([facility] [address] H/O echocardiogram, Heart murmur, History of echocardiogram, History of myocardial infarction, History of nuclear MUGA test, History of nuclear stress test, History of PTCA, History of PTCA, Hx of cardiovascular stress test, Hx of cardiovascular stress test, HX OTHER MEDICAL, Hyperlipidemia, Hypertension, Impaired glucose tolerance, MUGA, NSTEMI (non-ST elevated myocardial infarction) [facility]), Patient in clinical research study, Pneumonia due to organism, Renal cyst, S/P cardiac catheterization, and Stroke (cerebrum) ([facility] [address] Medical Decision Making :       1. Chest pain  2. ? STEMI   3. History of coronary disease s/p CABG  4. Essential hypertension  5. Hyperlipidemia  6. History of stroke    Patient presentation is similar to 10 days ago when STEMI was alerted on 3/12/2022. EKG is unchanged. Patient's chest pain is reproducible to touch, likely musculoskeletal in origin with element of anxiety. STEMI alert is called off  Continue with medical care, including aspirin beta-blocker and isosorbide mononitrate. Reconcile home medications when available. Patient's last echocardiogram was July 2021 which showed severe LVH, EF of around 45 to 50%. RVSP 44 mmHg. Will obtain follow-up echocardiogram        Thank you for the consult    Dr. Deandre Rose  3/23/2022 3:55 PM       Critical time is performed by myself in  32 minutes exclusive of separately billable procedures.   This time includes bedside physical exams and repeat evaluation, close medical management, titration of discussion with consultants, review of diagnostic testing results and monitoring for potential decompensation.

## 2022-03-23 NOTE — H&P
History and Physical      Name:  Rohit Wang /Age/Sex: 1960  (57 y.o. male)   MRN & CSN:  [id number] & [id number] Admission Date/Time: 3/23/2022  3:41 PM   Location:  [facility] PCP: Callum Frances MD       Hospital Day: 1    Assessment and Plan:   Rohit Wang is a 57 y.o.  male  who presents with Chest pain    Chest Pain: Case d/w ED: STEMI alert initially called then cancelled; reproducible, no evidence of ACS, repeat echo ordered, continue antiplatelet and statin therapy, appreciate Cardiology recs  Right Shoulder pain:  Denies any recent trauma as does his wife, pain originates in right shoulder then goes down his right arm and will occasionally shoot across his chest; will obtain CT right shoulder to further evaluate; would get XR however no recent trauma so will eval for other pathology  Hx CAD s/p stenting: STEMI 10 days ago and underwent cath with 100% occluded right coronary and recommended medical management  Hx Type II DM: SSI and hypoglycemia protocol  Hx HFpEF: Repeat echo pending, Cardiology following  Hx HLD: Continue Statin therapy  Hx COPD: Home bronchodilators resumed    Diet No diet orders on file   DVT Prophylaxis [] Lovenox, []  Heparin, [] SCDs, [] Ambulation   GI Prophylaxis [] PPI,  [] H2 Blocker,  [] Carafate,  [] Diet/Tube Feeds   Code Status Prior   Disposition Patient requires continued admission due to    MDM [] Low, [] Moderate,[]  High  Patient's risk as above due to      History of Present Illness: This is a 57-year-old male with a past medical history of CAD status post CABG with 7 stents, COPD, type 2 diabetes CKD stage III, hyperlipidemia, heart failure preserved ejection fraction is presenting with chest pain. Initially a STEMI alert was called however patient's EKG was similar to 10 days ago when a STEMI alert was called and the pain was reproducible on exam but with cardiology, as well as on my exam, and STEMI alert was called off.     Patient states his pain is located over the left side of his chest and specifically originates from his right shoulder radiates down his right arm and then across to his left chest.  Denies any recent trauma no fevers no chills. Some shortness of breath with the pain. Denies any paresthesias down his arm incisions complains of pain mainly in his right shoulder. Ten point ROS reviewed negative, unless as noted above. In the ED patient was afebrile vital signs were stable creatinine noted to be 1.8 which is at his baseline for CKD, although labs unremarkable. Chest x-ray with no acute fractures or any other evidence of trauma. Objective:   No intake or output data in the 24 hours ending 03/23/22 1805   Vitals:   Vitals:    03/23/22 1745   BP: (!) 144/73   Pulse: 70   Resp: 22   Temp:    SpO2: 100%     Physical Exam:   GEN Awake male, sitting upright in bed in no apparent distress. Appears given age. EYES Pupils are equally round. No scleral erythema, discharge, or conjunctivitis. HENT Mucous membranes are moist. Oral pharynx without exudates, no evidence of thrush. NECK Supple, no apparent thyromegaly or masses. RESP Clear to auscultation, no wheezes, rales or rhonchi. Symmetric chest movement while on room air. CARDIO/VASC S1/S2 auscultated. Regular rate without appreciable murmurs, rubs, or gallops. No JVD or carotid bruits. Peripheral pulses equal bilaterally and palpable. No peripheral edema. GI Abdomen is soft without significant tenderness, masses, or guarding. Bowel sounds are normoactive. Rectal exam deferred.  No costovertebral angle tenderness  HEME/LYMPH No petechiae or ecchymoses. MSK No gross joint deformities; left chest wall TTP, right shoulder TTP  SKIN Normal coloration, warm, dry. NEURO Cranial nerves appear grossly intact, normal speech  PSYCH Awake, alert, oriented x 4. Affect appropriate.     Past Medical History:      Past Medical History:   Diagnosis Date    Acute cerebrovascular accident (CVA) ([facility] 5/22/2020    Acute ST elevation myocardial infarction (STEMI) ([facility] 3/14/2018    B12 deficiency     CAD (coronary artery disease)     COPD (chronic obstructive pulmonary disease) ([facility] 12/11/2020    GERD (gastroesophageal reflux disease)     Guillain Barré syndrome ([facility]     H/O echocardiogram 7/30/13, 6/16/10    7/13-EF 50-55% WNL. 6/10-EF53%, mod LVH, MARCI, RVE, trace PI, mild MR/TR, 6/09    Heart murmur     History of echocardiogram 10/07/2020    EF 40%, No changed since previous 5/2020, Mid inferior hypokinesis noted, Grade I DD, Mod MR, Mild TR, Dilatation of the aortic root, No pericardial effusion     History of myocardial infarction 2003    History of nuclear MUGA test 03/03/2020    EF 53%, Normal, No ICD needed    History of nuclear stress test 06/23/2020    EF 30%, study suggestive of abnormal myocardial perfusion.  History of PTCA 6/03;2/03 6/03-PTCA w/stents x2, 2/03-PTCA w/stent x3    History of PTCA 03/15/2018    LAD EF25%    Hx of cardiovascular stress test 04/25/2018    EF 28% Nuclear scintigraphy demonstartes inferior wall infarct.  Hx of cardiovascular stress test 01/27/2020    Abnormal Study. Large area of inferior & apical-lateral infarct. No significant reversible ischemia. Severly reduced LVEF 23%    HX OTHER MEDICAL 03/14/2018    lifevest  d/c 7/2/2018    Hyperlipidemia     Hypertension     Impaired glucose tolerance 6/3/2016    MUGA 07/09/2018    EF 47%    NSTEMI (non-ST elevated myocardial infarction) ([facility] 03/15/2018    Patient in clinical research study 05/2018    3 year study dalcitripib vs placebo [phone]    Pneumonia due to organism     Renal cyst 5/23/2014    S/P cardiac catheterization 7/12/13 7/13-EF=55%. PATENT LAD AND RCA STENTS, OM2 50% stenosis, & Normal wall motion.     Stroke (cerebrum) [facility])     May 2016     PSHX:  has a past surgical history that includes Hand surgery; Coronary angioplasty (6/03); shoulder surgery (Right, ); Colonoscopy (14); Endoscopy, colon, diagnostic (14); Cardiac catheterization (); Percutaneous Transluminal Coronary Angio (2018); and Coronary artery bypass graft (N/A, 2020). Allergies: Allergies   Allergen Reactions    Ibuprofen Other (See Comments)     Stomach irritation    Augmentin [Amoxicillin-Pot Clavulanate]     Codeine        FAM HX: family history includes Alcohol Abuse in his brother, father, and mother; Depression in his mother, sister, and sister; Diabetes in his brother; High Cholesterol in his mother; Hypertension in his mother; Stomach Cancer in his brother and father. Soc HX:   Social History     Socioeconomic History    Marital status:      Spouse name: None    Number of children: None    Years of education: None    Highest education level: None   Occupational History    Occupation:      Comment: full time   Tobacco Use    Smoking status: Former Smoker     Packs/day: 2.00     Years: 45.00     Pack years: 90.00     Types: Cigarettes     Quit date: 2016     Years since quittin.8    Smokeless tobacco: Never Used   Vaping Use    Vaping Use: Never used   Substance and Sexual Activity    Alcohol use: No     Alcohol/week: 0.0 standard drinks    Drug use: No    Sexual activity: Yes     Partners: Female   Other Topics Concern    None   Social History Narrative    Wears glasses     Social Determinants of Health     Financial Resource Strain:     Difficulty of Paying Living Expenses: Not on file   Food Insecurity:     Worried About Running Out of Food in the Last Year: Not on file    [employer] of Food in the Last Year: Not on file   Transportation Needs:     Lack of Transportation (Medical): Not on file    Lack of Transportation (Non-Medical):  Not on file   Physical Activity:     Days of Exercise per Week: Not on file    Minutes of Exercise per Session: Not on file   Stress:     Feeling of Stress : Not on file   Social Connections:     Frequency of Communication with Friends and Family: Not on file    Frequency of Social Gatherings with Friends and Family: Not on file    Attends Rastafari Services: Not on file    Active Member of Clubs or Organizations: Not on file    Attends Club or Organization Meetings: Not on file    Marital Status: Not on file   Intimate Partner Violence:     Fear of Current or Ex-Partner: Not on file    Emotionally Abused: Not on file    Physically Abused: Not on file    Sexually Abused: Not on file   Housing Stability:     Unable to Pay for Housing in the Last Year: Not on file    Number of [place] in the Last Year: Not on file    Unstable Housing in the Last Year: Not on file       Medications:   Medications:    Infusions:    nitroGLYCERIN 7 mcg/min (03/23/22 1715)     PRN Meds: ondansetron, 4 mg, Q30 Min PRN          Electronically signed by Breann Rousseau MD on 3/23/2022 at 6:05 PM

## 2022-03-23 NOTE — ED NOTES
No relief of chest pain 8/10   Increased to 7 mcg   /73  Map; [address], [facility]  03/23/22 171

## 2022-03-23 NOTE — CARE COORDINATION
[facility] Transitions Follow Up Call    3/23/2022    Patient: Latisha Cheng  Patient : 1960   MRN: [id number]  Reason for Admission: STEMI  Discharge Date: 3/14/22 RARS: Readmission Risk Score: 13.4 ( )    Attempted to contact patient for Care Transition follow up. Unable to reach patient. Left message with contact information and request for call back. Patient had follow up appointment with cardiology today.         Follow Up  Future Appointments   Date Time Provider Carmen Díaz   3/24/2022  3:00 PM Nadeem Pennington MD [facility]   2022  8:00 AM JERED Price - CNP [facility]   2022  9:20 AM Jaylen Blanton MD [facility]       Bishnu Spaulding RN

## 2022-03-23 NOTE — ED PROVIDER NOTES
Emergency Department Encounter  [facility] 3E    Patient: Perry Echevarria  MRN: [id number]  : 1960  Date of Evaluation: 3/23/2022  ED Provider: Cesar Galvez MD    Chief Complaint       Chief Complaint   Patient presents with    Chest Pain     CHARLIE Echevarria is a 57 y.o. male who presents to the emergency department for evaluation of chest pain. Patient reports been usual state of health until 1 hour prior to arrival he says he was lying down when symptoms started. Is located in the middle of his chest.  Does not radiate does not migrate. Patient does report having a significant history of cardiac disease and has 7 stents to coronary artery bypass surgeries. Said to feel similar to previous cardiac episodes in the past.  Has been seen evaluated for similar earlier this month. Did not take any medications for symptoms prior to arrival.  Alert was activated given his EKG. He was seen and evaluated by cardiology in the emergency department who felt this was not an acute ST elevation MI and medical management was warranted at this time. ROS:     At least 10 systems reviewed and otherwise acutely negative except as in the [address] Past History     Past Medical History:   Diagnosis Date    Acute cerebrovascular accident (CVA) ([address] 2020    Acute ST elevation myocardial infarction (STEMI) ([facility] [address] 3/14/2018    B12 deficiency     CAD (coronary artery disease)     COPD (chronic obstructive pulmonary disease) ([address] 2020    GERD (gastroesophageal reflux disease)     Guillain Barré syndrome ([facility] [facility]     H/O echocardiogram 13, 6/16/10    7/13-EF 50-55% WNL.  6/10-EF53%, mod LVH, MARCI, RVE, trace PI, mild MR/TR,     Heart murmur     History of echocardiogram 10/07/2020    EF 40%, No changed since previous 2020, Mid inferior hypokinesis noted, Grade I DD, Mod MR, Mild TR, Dilatation of the aortic root, No pericardial effusion     History of myocardial infarction     History of nuclear MUGA test 03/03/2020    EF 53%, Normal, No ICD needed    History of nuclear stress test 06/23/2020    EF 30%, study suggestive of abnormal myocardial perfusion.  History of PTCA 6/03;2/03 6/03-PTCA w/stents x2, 2/03-PTCA w/stent x3    History of PTCA 03/15/2018    LAD EF25%    Hx of cardiovascular stress test 04/25/2018    EF 28% Nuclear scintigraphy demonstartes inferior wall infarct.  Hx of cardiovascular stress test 01/27/2020    Abnormal Study. Large area of inferior & apical-lateral infarct. No significant reversible ischemia. Severly reduced LVEF 23%    HX OTHER MEDICAL 03/14/2018    lifevest  d/c 7/2/2018    Hyperlipidemia     Hypertension     Impaired glucose tolerance 6/3/2016    MUGA 07/09/2018    EF 47%    NSTEMI (non-ST elevated myocardial infarction) ([facility] [address] 03/15/2018    Patient in clinical research study 05/2018    3 year study dalcitripib vs placebo [phone]    Pneumonia due to organism     Renal cyst 5/23/2014    S/P cardiac catheterization 7/12/13 7/13-EF=55%. PATENT LAD AND RCA STENTS, OM2 50% stenosis, & Normal wall motion.  Stroke (cerebrum) [facility])     May 2016     Past Surgical History:   Procedure Laterality Date    CARDIAC CATHETERIZATION  7/13 7/13-EF=55%. PATENT LAD AND RCA STENTS, OM2 50% stenosis, & Normal wall motion.     COLONOSCOPY  8/21/14    colon polyp, diverticulosis, internal hemorrhoids    CORONARY ANGIOPLASTY  6/03 6/14/03- PTCA w stents x2 mid LAD, 2/03-PTCA w stents x3, RCA    CORONARY ARTERY BYPASS GRAFT N/A 5/18/2020    CABG CORONARY ARTERY BYPASS x 2, INTRAOPERATIVE NGA, INDUCED HYPOTHERMIA, LEFT LEG ENDOSCOPIC VEIN HARVEST performed by Bessy Reed MD at [address], COLON, DIAGNOSTIC  8/21/14    irregular GE junction, gastritis,hiatal hernia, gastric polyps    HAND SURGERY      rt knuckle    PTCA  03/2018    LAD    SHOULDER SURGERY Right 2011    bone spurs     Social History     Socioeconomic History    Marital status:      Spouse name: None    Number of children: None    Years of education: None    Highest education level: None   Occupational History    Occupation:      Comment: full time   Tobacco Use    Smoking status: Former Smoker     Packs/day: 2.00     Years: 45.00     Pack years: 90.00     Types: Cigarettes     Quit date: 2016     Years since quittin.8    Smokeless tobacco: Never Used   Vaping Use    Vaping Use: Never used   Substance and Sexual Activity    Alcohol use: No     Alcohol/week: 0.0 standard drinks    Drug use: No    Sexual activity: Yes     Partners: Female   Other Topics Concern    None   Social History Narrative    Wears glasses     Social Determinants of Health     Financial Resource Strain:     Difficulty of Paying Living Expenses: Not on file   Food Insecurity:     Worried About Running Out of Food in the Last Year: Not on file    Evangelista of Food in the Last Year: Not on file   Transportation Needs:     Lack of Transportation (Medical): Not on file    Lack of Transportation (Non-Medical):  Not on file   Physical Activity:     Days of Exercise per Week: Not on file    Minutes of Exercise per Session: Not on file   Stress:     Feeling of Stress : Not on file   Social Connections:     Frequency of Communication with Friends and Family: Not on file    Frequency of Social Gatherings with Friends and Family: Not on file    Attends Synagogue Services: Not on file    Active Member of Clubs or Organizations: Not on file    Attends Club or Organization Meetings: Not on file    Marital Status: Not on file   Intimate Partner Violence:     Fear of Current or Ex-Partner: Not on file    Emotionally Abused: Not on file    Physically Abused: Not on file    Sexually Abused: Not on file   Housing Stability:     Unable to Pay for Housing in the Last Year: Not on file    Number of [place] in the Last Year: Not on file    Unstable Housing in the Last Year: Not on file       Medications/Allergies     Current Discharge Medication List      CONTINUE these medications which have NOT CHANGED    Details   aspirin 81 MG chewable tablet Take 1 tablet by mouth daily  Qty: 180 tablet, Refills: 0      albuterol-ipratropium (COMBIVENT RESPIMAT)  MCG/ACT AERS inhaler Inhale 1 puff into the lungs every 6 hours as needed for Wheezing or Shortness of Breath  Qty: 120 each, Refills: 0    Associated Diagnoses: Simple chronic bronchitis (HCC)      sertraline (ZOLOFT) 100 MG tablet Take 1 tablet by mouth daily  Qty: 30 tablet, Refills: 0    Associated Diagnoses: Irritability and anger      glimepiride (AMARYL) 2 MG tablet Take 1 tablet by mouth every morning  Qty: 30 tablet, Refills: 0    Associated Diagnoses: Type 2 diabetes mellitus without complication, unspecified whether long term insulin use ([facility])      atorvastatin (LIPITOR) 80 MG tablet Take 1 tablet by mouth daily  Qty: 30 tablet, Refills: 0      hydrALAZINE (APRESOLINE) 10 MG tablet Take 1 tablet by mouth 2 times daily  Qty: 60 tablet, Refills: 0      lisinopril (PRINIVIL;ZESTRIL) 40 MG tablet Take 1 tablet by mouth daily  Qty: 30 tablet, Refills: 0    Associated Diagnoses: Type 2 diabetes mellitus without complication, unspecified whether long term insulin use ([facility])      carvedilol (COREG) 25 MG tablet Take 1 tablet by mouth 2 times daily  Qty: 60 tablet, Refills: 0      amLODIPine (NORVASC) 10 MG tablet Take 1 tablet by mouth daily  Qty: 30 tablet, Refills: 0      blood glucose monitor strips 1 strip by Other route 2 times daily for 180 doses  Qty: 180 strip, Refills: 0    Associated Diagnoses: Type 2 diabetes mellitus without complication, unspecified whether long term insulin use ([facility])      furosemide (LASIX) 40 MG tablet Take 1 tablet by mouth daily  Qty: 60 tablet, Refills: 1      Alcohol Swabs (ALCOHOL PADS) 70 % PADS 1 Units by Does not apply route 2 times daily  Qty: 180 each, Refills: 3    Associated deep palpation  EXTREMITIES: No acute deformities. No unilateral leg swelling or tenderness behind either one of calves  SKIN: Warm and dry. No erythema edema or rashes appreciated  NEUROLOGICAL:  Cranial nerves II through XII grossly intact. No gross facial drooping. Moves all 4 extremities spontaneously. PSYCHIATRIC: Normal mood. Alert and oriented x3. No reported active suicidality or homicidality.     Diagnostics   Labs:  Results for orders placed or performed during the hospital encounter of 03/23/22   CBC with Auto Differential   Result Value Ref Range    WBC 10.0 4.0 - 10.5 K/CU MM    RBC 4.32 (L) 4.6 - 6.2 M/CU MM    Hemoglobin 13.9 13.5 - 18.0 GM/DL    Hematocrit 41.8 (L) 42 - 52 %    MCV 96.8 78 - 100 FL    MCH 32.2 (H) 27 - 31 PG    MCHC 33.3 32.0 - 36.0 %    RDW 12.4 11.7 - 14.9 %    Platelets 203 609 - 887 K/CU MM    MPV 12.3 (H) 7.5 - 11.1 FL    Differential Type AUTOMATED DIFFERENTIAL     Segs Relative 74.9 (H) 36 - 66 %    Lymphocytes % 16.5 (L) 24 - 44 %    Monocytes % 6.3 (H) 0 - 4 %    Eosinophils % 1.1 0 - 3 %    Basophils % 0.7 0 - 1 %    Segs Absolute 7.5 K/CU MM    Lymphocytes Absolute 1.7 K/CU MM    Monocytes Absolute 0.6 K/CU MM    Eosinophils Absolute 0.1 K/CU MM    Basophils Absolute 0.1 K/CU MM    Nucleated RBC % 0.0 %    Total Nucleated RBC 0.0 K/CU MM    Total Immature Neutrophil 0.05 K/CU MM    Immature Neutrophil % 0.5 (H) 0 - 0.43 %   Comprehensive Metabolic Panel w/ Reflex to MG   Result Value Ref Range    Sodium 135 135 - 145 MMOL/L    Potassium 4.4 3.5 - 5.1 MMOL/L    Chloride 102 99 - 110 mMol/L    CO2 20 (L) 21 - 32 MMOL/L    BUN 26 (H) 6 - 23 MG/DL    CREATININE 1.8 (H) 0.9 - 1.3 MG/DL    Glucose 182 (H) 70 - 99 MG/DL    Calcium 8.7 8.3 - 10.6 MG/DL    Albumin 3.9 3.4 - 5.0 GM/DL    Total Protein 6.6 6.4 - 8.2 GM/DL    Total Bilirubin 0.4 0.0 - 1.0 MG/DL    ALT 12 10 - 40 U/L    AST 13 (L) 15 - 37 IU/L    Alkaline Phosphatase 60 40 - 129 IU/L    GFR Non- 38 (L) >60 mL/min/1.73m2    GFR  46 (L) >60 mL/min/1.73m2    Anion Gap 13 4 - 16   Troponin   Result Value Ref Range    Troponin T <0.010 <0.01 NG/ML   Lipase   Result Value Ref Range    Lipase 58 13 - 60 IU/L   POCT Glucose   Result Value Ref Range    POC Glucose 149 (H) 70 - 99 MG/DL   EKG 12 Lead   Result Value Ref Range    Ventricular Rate 67 BPM    Atrial Rate 56 BPM    P-R Interval 164 ms    QRS Duration 116 ms    Q-T Interval 442 ms    QTc Calculation (Bazett) 467 ms    P Axis 16 degrees    R Axis 19 degrees    T Axis 130 degrees    Diagnosis       Sinus bradycardia with occasional premature ventricular complexes  Inferior infarct (cited on or before 14-MAR-2018)  T wave abnormality, consider lateral ischemia  Nonspecific intraventricular conduction delay  ST changes similar to EKG 10 days ago   Confirmed by Jonathan West ([id number]) on 3/23/2022 4:53:55 PM       Radiographs:  XR CHEST PORTABLE    Result Date: 3/23/2022  EXAMINATION: ONE XRAY VIEW OF THE CHEST 3/23/2022 3:53 pm COMPARISON: Chest radiograph 03/12/2022. HISTORY: ORDERING SYSTEM PROVIDED HISTORY: chest pain TECHNOLOGIST PROVIDED HISTORY: Reason for exam:->chest pain Reason for Exam: chest pain FINDINGS: Status post median sternotomy. The cardiac silhouette is mildly enlarged but unchanged. The remaining mediastinal contours are unremarkable. No pneumothorax, vascular congestion, consolidation, or pleural effusion is identified. No acute osseous abnormality. No acute process. Procedures/EKG:   Patient's EKG is interpreted by me sinus rhythm rate 67   patient has ST elevation in 2 3 and aVF and Q waves in the same and flipped T waves in 1 and aVL concerning for an acute ST elevation MI    ED Course and MDM   In brief, Shan Peck is a 57 y.o. male who presented to the emergency department for evaluation of chest pain. STEMI alert was activated based on patient's initial EKG.   Patient was seen and evaluated by Dr. Jeb Lopez and Dr. Milana Olvera, here in the emergency department. They did evaluate the patient and reviewed the patient's EKG. Report that his EKG is unchanged compared to his EKG from 12 March. He did have a cardiac catheterization performed last week. States that the patient does not need to go emergently to the catheterization laboratory at this time and canceled the STEMI alert. Did review patient's imaging studies and laboratory work as noted above. On reevaluation patient reports that his chest pain was still present. Started on nitroglycerin drip. Discussed case with hospital service patient be admitted for further evaluation and treatment.     ED Medication Orders (From admission, onward)    Start Ordered     Status Ordering Provider    03/24/22 0900 03/23/22 1946  amLODIPine (NORVASC) tablet 10 mg  DAILY         Acknowledged Leonard Villegas    03/24/22 0900 03/23/22 1946  aspirin chewable tablet 81 mg  DAILY         Acknowledged SHAWN ONTIVEROS    03/24/22 0900 03/23/22 1946  clopidogrel (PLAVIX) tablet 75 mg  DAILY         Acknowledged SHAWN ONTIVEROS    03/24/22 0900 03/23/22 1946  furosemide (LASIX) tablet 40 mg  DAILY         Acknowledged SHAWN ONTIVEROS    03/24/22 0900 03/23/22 1946  isosorbide mononitrate (IMDUR) extended release tablet 30 mg  DAILY         Acknowledged SHAWN ONTIVEROS    03/24/22 0900 03/23/22 1946  lisinopril (PRINIVIL;ZESTRIL) tablet 40 mg  DAILY         Acknowledged SHAWN ONTIVEROS    03/24/22 0900 03/23/22 1946  pantoprazole (PROTONIX) tablet 40 mg  DAILY         Acknowledged SHAWN ONTIVEROS    03/24/22 0900 03/23/22 1946  sertraline (ZOLOFT) tablet 100 mg  DAILY         Acknowledged SHAWN ONTIVEROS    03/24/22 0800 03/23/22 1946  insulin lispro (HUMALOG) injection vial 0-12 Units  3 TIMES DAILY WITH MEALS         Acknowledged Leonard Bear    03/23/22 2100 03/23/22 1946  atorvastatin (LIPITOR) tablet 80 mg  DAILY         Last MAR action: Given - by Cinthia Gilliam on 03/23/22 at 2043 [facility]    03/23/22 2100 03/23/22 1946  carvedilol (COREG) tablet 25 mg  2 TIMES DAILY         Last MAR action: Given - by Kauffman Joyce on 03/23/22 at 2044 [facility] Quivers    03/23/22 2100 03/23/22 1946  insulin lispro (HUMALOG) injection vial 0-6 Units  NIGHTLY         Last MAR action: Given - by Kauffman Joyce on 03/23/22 at 2045 [facility] Quivers    03/23/22 2100 03/23/22 1946  hydrALAZINE (APRESOLINE) tablet 10 mg  2 TIMES DAILY         Last MAR action: Given - by Kauffman Joyce on 03/23/22 at 2043 [facility] Quivers    03/23/22 2100 03/23/22 1946  tamsulosin (FLOMAX) capsule 0.4 mg  DAILY         Last MAR action: Given - by Kauffman Joyce on 03/23/22 at 2043 [facility] Quivers    03/23/22 2100 03/23/22 1946  sodium chloride flush 0.9 % injection 5-40 mL  2 times per day         Last MAR action: Canceled Entry - by Kauffman Joyce on 03/23/22 [facility]    03/23/22 2100 03/23/22 1946  enoxaparin (LOVENOX) injection 40 mg  DAILY         Last MAR action: Given - by Kauffman Joyce on 03/23/22 at 2044 [facility]    03/23/22 2020 03/23/22 2020  ipratropium (ATROVENT HFA) 17 MCG/ACT inhaler 2 puff  EVERY 6 HOURS PRN        Question:  Initiate RT Bronchodilator Protocol  Answer: Yes   \"And\" Linked Group Details    Acknowledged Maria Isabel Lunaty    03/23/22 2019 03/23/22 2020  albuterol sulfate  (90 Base) MCG/ACT inhaler 2 puff  EVERY 6 HOURS PRN        Question:  Initiate RT Bronchodilator Protocol  Answer:   Yes   \"And\" Linked Group Details    Acknowledged Maria Isabel Bauer    03/23/22 1946 03/23/22 1946  ondansetron (ZOFRAN-ODT) disintegrating tablet 4 mg  EVERY 8 HOURS PRN        \"Or\" Linked Group Details    Acknowledged Maria Isabel Bauer    03/23/22 1946 03/23/22 1946  ondansetron (ZOFRAN) injection 4 mg  EVERY 6 HOURS PRN        \"Or\" Linked Group Details    Acknowledged Maria Isabel Bauer    03/23/22 1946 03/23/22 1946  acetaminophen (TYLENOL) tablet 650 mg  EVERY 6 HOURS PRN        \"Or\" Linked Group Details    Acknowledged Blinda Finger    03/23/22 1946 03/23/22 1946  acetaminophen (TYLENOL) suppository 650 mg  EVERY 6 HOURS PRN        \"Or\" Linked Group Details    Acknowledged Blinda Finger    03/23/22 1946 03/23/22 1946  HYDROmorphone (DILAUDID) injection 0.5 mg  EVERY 4 HOURS PRN         Acknowledged Blinda Finger    03/23/22 1946 03/23/22 1946  glucose (GLUTOSE) 40 % oral gel 15 g  PRN         Acknowledged SHAWN ONTIVEROS    03/23/22 1946 03/23/22 1946  glucagon (rDNA) injection 1 mg  PRN         Acknowledged SHAWN ONTIVEROS    03/23/22 1946 03/23/22 1946  sodium chloride flush 0.9 % injection 5-40 mL  PRN         Acknowledged SHAWN ONTIVEROS    03/23/22 1946 03/23/22 1946  polyethylene glycol (GLYCOLAX) packet 17 g  DAILY PRN         Acknowledged SHAWN ONTIVEROS    03/23/22 1946 03/23/22 1946  dextrose 50 % IV solution  PRN         Acknowledged SHAWN ONTIVEROS    03/23/22 1946 03/23/22 1946  dextrose 5 % solution  PRN         Acknowledged SHAWN ONTIVEROS    03/23/22 1946 03/23/22 1946  0.9 % sodium chloride infusion  PRN         Acknowledged SHAWN ONTIVEROS    03/23/22 1730 03/23/22 1716  fentaNYL (SUBLIMAZE) injection 25 mcg  ONCE         Last MAR action: Given - by Jesse Leong on 03/23/22 at [address] [place] [address]    03/23/22 1716 03/23/22 1716  ondansetron (ZOFRAN) injection 4 mg  EVERY 30 MIN PRN         Acknowledged RAS Dresden    03/23/22 1600 03/23/22 1550  aspirin chewable tablet 324 mg  ONCE         Last MAR action: Given - by Jesse Leong on 03/23/22 at 1800 [facility]    03/23/22 1600 03/23/22 1550  nitroGLYCERIN 50 mg in dextrose 5% 250 mL infusion  CONTINUOUS        Question Answer Comment   Titrate Infusion?  Yes    Initial Infusion Dose: 5 mcg/min    Goal of Therapy is: Chest pain symptom relief    Contact Provider if: SBP less than 90 mmHg        Last MAR action: Stopped - by Nicole Reynolds on 03/23/22 at [address] Impression      1. Chest pain, unspecified type      DISPOSITION           This patient was cared for in the setting of the COVID-19 pandemic, with nationwide stress on resources and staffing.     (Please note that portions of this note may have been completed with a voice recognition program. Efforts were made to edit the dictations but occasionally words are mis-transcribed.)    Roseline Verma MD  [address] [place] [address]       Roseline Verma MD  03/24/22 2001

## 2022-03-24 VITALS
HEART RATE: 61 BPM | OXYGEN SATURATION: 98 % | TEMPERATURE: 98 F | RESPIRATION RATE: 12 BRPM | SYSTOLIC BLOOD PRESSURE: 143 MMHG | DIASTOLIC BLOOD PRESSURE: 80 MMHG

## 2022-03-24 LAB
ANION GAP SERPL CALCULATED.3IONS-SCNC: 11 MMOL/L (ref 4–16)
BASOPHILS ABSOLUTE: 0.1 K/CU MM
BASOPHILS RELATIVE PERCENT: 0.5 % (ref 0–1)
BUN BLDV-MCNC: 21 MG/DL (ref 6–23)
CALCIUM SERPL-MCNC: 8.6 MG/DL (ref 8.3–10.6)
CHLORIDE BLD-SCNC: 103 MMOL/L (ref 99–110)
CO2: 22 MMOL/L (ref 21–32)
CREAT SERPL-MCNC: 1.6 MG/DL (ref 0.9–1.3)
DIFFERENTIAL TYPE: ABNORMAL
EOSINOPHILS ABSOLUTE: 0.2 K/CU MM
EOSINOPHILS RELATIVE PERCENT: 2 % (ref 0–3)
GFR AFRICAN AMERICAN: 53 ML/MIN/1.73M2
GFR NON-AFRICAN AMERICAN: 44 ML/MIN/1.73M2
GLUCOSE BLD-MCNC: 121 MG/DL (ref 70–99)
GLUCOSE BLD-MCNC: 165 MG/DL (ref 70–99)
GLUCOSE BLD-MCNC: 168 MG/DL (ref 70–99)
HCT VFR BLD CALC: 43.7 % (ref 42–52)
HEMOGLOBIN: 14.1 GM/DL (ref 13.5–18)
IMMATURE NEUTROPHIL %: 0.5 % (ref 0–0.43)
LV EF: 43 %
LVEF MODALITY: NORMAL
LYMPHOCYTES ABSOLUTE: 1.5 K/CU MM
LYMPHOCYTES RELATIVE PERCENT: 16.3 % (ref 24–44)
MCH RBC QN AUTO: 31.8 PG (ref 27–31)
MCHC RBC AUTO-ENTMCNC: 32.3 % (ref 32–36)
MCV RBC AUTO: 98.4 FL (ref 78–100)
MONOCYTES ABSOLUTE: 0.5 K/CU MM
MONOCYTES RELATIVE PERCENT: 5.5 % (ref 0–4)
NUCLEATED RBC %: 0 %
PDW BLD-RTO: 12.4 % (ref 11.7–14.9)
PLATELET # BLD: 181 K/CU MM (ref 140–440)
PMV BLD AUTO: 12.4 FL (ref 7.5–11.1)
POTASSIUM SERPL-SCNC: 4.6 MMOL/L (ref 3.5–5.1)
RBC # BLD: 4.44 M/CU MM (ref 4.6–6.2)
SEGMENTED NEUTROPHILS ABSOLUTE COUNT: 7.1 K/CU MM
SEGMENTED NEUTROPHILS RELATIVE PERCENT: 75.2 % (ref 36–66)
SODIUM BLD-SCNC: 136 MMOL/L (ref 135–145)
TOTAL IMMATURE NEUTOROPHIL: 0.05 K/CU MM
TOTAL NUCLEATED RBC: 0 K/CU MM
WBC # BLD: 9.4 K/CU MM (ref 4–10.5)

## 2022-03-24 PROCEDURE — 80048 BASIC METABOLIC PNL TOTAL CA: CPT

## 2022-03-24 PROCEDURE — 36415 COLL VENOUS BLD VENIPUNCTURE: CPT

## 2022-03-24 PROCEDURE — 82962 GLUCOSE BLOOD TEST: CPT

## 2022-03-24 PROCEDURE — APPSS60 APP SPLIT SHARED TIME 46-60 MINUTES: Performed by: NURSE PRACTITIONER

## 2022-03-24 PROCEDURE — 6370000000 HC RX 637 (ALT 250 FOR IP): Performed by: INTERNAL MEDICINE

## 2022-03-24 PROCEDURE — 93306 TTE W/DOPPLER COMPLETE: CPT

## 2022-03-24 PROCEDURE — 94761 N-INVAS EAR/PLS OXIMETRY MLT: CPT

## 2022-03-24 PROCEDURE — 85025 COMPLETE CBC W/AUTO DIFF WBC: CPT

## 2022-03-24 PROCEDURE — G0378 HOSPITAL OBSERVATION PER HR: HCPCS

## 2022-03-24 PROCEDURE — 99233 SBSQ HOSP IP/OBS HIGH 50: CPT | Performed by: INTERNAL MEDICINE

## 2022-03-24 RX ORDER — CARVEDILOL 12.5 MG/1
12.5 TABLET ORAL 2 TIMES DAILY
Status: DISCONTINUED | OUTPATIENT
Start: 2022-03-24 | End: 2022-03-24 | Stop reason: HOSPADM

## 2022-03-24 RX ORDER — LIDOCAINE 50 MG/G
1 PATCH TOPICAL DAILY
Qty: 10 PATCH | Refills: 0 | Status: SHIPPED | OUTPATIENT
Start: 2022-03-24 | End: 2022-04-03

## 2022-03-24 RX ORDER — CARVEDILOL 12.5 MG/1
12.5 TABLET ORAL 2 TIMES DAILY
Qty: 60 TABLET | Refills: 3 | Status: SHIPPED | OUTPATIENT
Start: 2022-03-24 | End: 2022-07-07

## 2022-03-24 RX ADMIN — INSULIN LISPRO 2 UNITS: 100 INJECTION, SOLUTION INTRAVENOUS; SUBCUTANEOUS at 11:35

## 2022-03-24 RX ADMIN — LISINOPRIL 40 MG: 40 TABLET ORAL at 09:42

## 2022-03-24 RX ADMIN — ISOSORBIDE MONONITRATE 30 MG: 30 TABLET, EXTENDED RELEASE ORAL at 09:50

## 2022-03-24 RX ADMIN — SERTRALINE 100 MG: 100 TABLET, FILM COATED ORAL at 09:42

## 2022-03-24 RX ADMIN — HYDRALAZINE HYDROCHLORIDE 10 MG: 10 TABLET ORAL at 09:42

## 2022-03-24 RX ADMIN — CLOPIDOGREL BISULFATE 75 MG: 75 TABLET ORAL at 09:42

## 2022-03-24 RX ADMIN — FUROSEMIDE 40 MG: 40 TABLET ORAL at 09:42

## 2022-03-24 RX ADMIN — AMLODIPINE BESYLATE 10 MG: 10 TABLET ORAL at 09:42

## 2022-03-24 RX ADMIN — ASPIRIN 81 MG: 81 TABLET, CHEWABLE ORAL at 09:42

## 2022-03-24 RX ADMIN — CARVEDILOL 12.5 MG: 25 TABLET, FILM COATED ORAL at 09:49

## 2022-03-24 RX ADMIN — PANTOPRAZOLE SODIUM 40 MG: 40 TABLET, DELAYED RELEASE ORAL at 09:41

## 2022-03-24 ASSESSMENT — PAIN DESCRIPTION - ONSET: ONSET: ON-GOING

## 2022-03-24 ASSESSMENT — PAIN SCALES - GENERAL
PAINLEVEL_OUTOF10: 0
PAINLEVEL_OUTOF10: 7

## 2022-03-24 ASSESSMENT — PAIN DESCRIPTION - PAIN TYPE
TYPE: ACUTE PAIN
TYPE: ACUTE PAIN

## 2022-03-24 ASSESSMENT — ENCOUNTER SYMPTOMS: SHORTNESS OF BREATH: 0

## 2022-03-24 ASSESSMENT — PAIN DESCRIPTION - LOCATION: LOCATION: CHEST;SHOULDER

## 2022-03-24 ASSESSMENT — PAIN DESCRIPTION - DESCRIPTORS: DESCRIPTORS: DULL

## 2022-03-24 ASSESSMENT — PAIN DESCRIPTION - ORIENTATION: ORIENTATION: RIGHT

## 2022-03-24 ASSESSMENT — PAIN DESCRIPTION - FREQUENCY: FREQUENCY: CONTINUOUS

## 2022-03-24 NOTE — PROGRESS NOTES
Outpatient Pharmacy Progress Note for Meds-to-Beds    Total number of Prescriptions Filled: 1  The following medications were dispensed to the patient during the discharge process:  Carvedilol 12.5mg    Additional Documentation:  Medication(s) were delivered to the patient's room prior to discharge   The following prescription(s) were not covered under the patient's insurance because they can be purchased as OTC medications: Lidocaine patches      Thank you for letting us serve your patients.   [address] [facility]    [address] [facility]    Phone: [phone]    Fax: [phone]

## 2022-03-24 NOTE — PROGRESS NOTES
Hospitalist Progress Note      Name:  Shan Peck /Age/Sex: 1960  (57 y.o. male)   MRN & CSN:  [id number] & [id number] Admission Date/Time: 3/23/2022  3:41 PM   Location:  [address] [place], [place] [id number] PCP: Alexandro Rivero MD         Hospital Day: 2    Assessment and Plan:   Shan Peck is a 57 y.o.  male  who presents with Chest pain    Shan Peck is a 57 y.o.  male  who presents with Chest pain     Chest Pain: Case d/w ED: STEMI alert initially called then cancelled; reproducible, no evidence of ACS,continue antiplatelet and statin therapy, appreciate Cardiology recs  Right Shoulder pain:  Denies any recent trauma as does his wife, pain originates in right shoulder then goes down his right arm and will occasionally shoot across his chest; right shoulder pain has subsided no precipitation of pain on shoulder manipulation today. Hx CAD s/p stenting: STEMI 10 days ago and underwent cath with 100% occluded right coronary and recommended medical management. Cardiology notes reviewed. No intervention at this time. Hx Type II DM: SSI and hypoglycemia protocol  Hx HFpEF: Continue current management. Repeat echo pending at this time. Hx HLD: Continue Statin therapy  Hx COPD: Home bronchodilators resumed       Diet ADULT DIET; Regular; Low Fat/Low Chol/High Fiber/JOSEE   DVT Prophylaxis [] Lovenox, []  Heparin, [] SCDs, [] Ambulation   GI Prophylaxis [] PPI,  [] H2 Blocker,  [] Carafate,  [] Diet/Tube Feeds   Code Status Full Code   Disposition Patient requires continued admission due to chest pain   MDM [] Low, [] Moderate,[x]  High  Patient's risk as above due to multiple comorbidities. History of Present Illness:     Chief Complaint: Chest pain  Shan Peck is a 57 y.o.  male  who presents with chest pain. Subjective-patient examined at bedside. He is comfortable in no distress. He denies any chest pain or shortness of breath. He denies any shoulder pain today.   Labs have been reviewed consultant notes reviewed. Discussed with the patient patient is agreeable to discharge today. Follow-up with cardiology as an outpatient. Ten point ROS reviewed negative, unless as noted above    Objective: Intake/Output Summary (Last 24 hours) at 3/24/2022 0925  Last data filed at 3/23/2022 2130  Gross per 24 hour   Intake --   Output 200 ml   Net -200 ml      Vitals:   Vitals:    03/24/22 0751   BP: (!) 143/80   Pulse: 61   Resp: 12   Temp: 98 °F (36.7 °C)   SpO2:        Vent Information  SpO2: 98 %  No results for input(s): PH, HYE2OJH, PO2ART, BE, JVL2ZAT, CO2CT, O2SAT, LABCARB in the last 72 hours. Invalid input(s): METHGBART           Physical Exam:   GEN Awake male, sitting upright in bed in no apparent distress. Appears given age. EYES Pupils are equally round. No scleral erythema, discharge, or conjunctivitis. HENT Mucous membranes are moist. Oral pharynx without exudates, no evidence of thrush. NECK Supple, no apparent thyromegaly or masses. RESP Clear to auscultation, no wheezes, rales or rhonchi. Symmetric chest movement while on room air. CARDIO/VASC S1/S2 auscultated. Regular rate without appreciable murmurs, rubs, or gallops. No JVD or carotid bruits. Peripheral pulses equal bilaterally and palpable. No peripheral edema. GI Abdomen is soft without significant tenderness, masses, or guarding. Bowel sounds are normoactive. Rectal exam deferred.  No costovertebral angle tenderness. Normal appearing external genitalia. York catheter is not present. HEME/LYMPH No palpable cervical lymphadenopathy and no hepatosplenomegaly. No petechiae or ecchymoses. MSK No gross joint deformities. SKIN Normal coloration, warm, dry. NEURO Cranial nerves appear grossly intact, normal speech, no lateralizing weakness. PSYCH Awake, alert, oriented x 4. Affect appropriate.     Data:   CBC with Differential:    Lab Results   Component Value Date    WBC 9.4 03/24/2022    RBC 4.44 03/24/2022    HGB 14.1 03/24/2022    HCT 43.7 03/24/2022     03/24/2022    MCV 98.4 03/24/2022    MCH 31.8 03/24/2022    MCHC 32.3 03/24/2022    RDW 12.4 03/24/2022    SEGSPCT 75.2 03/24/2022    LYMPHOPCT 16.3 03/24/2022    MONOPCT 5.5 03/24/2022    BASOPCT 0.5 03/24/2022    MONOSABS 0.5 03/24/2022    LYMPHSABS 1.5 03/24/2022    EOSABS 0.2 03/24/2022    BASOSABS 0.1 03/24/2022    DIFFTYPE AUTOMATED DIFFERENTIAL 03/24/2022       CMP:     Lab Results   Component Value Date     03/23/2022    K 4.4 03/23/2022    K 3.8 03/16/2018     03/23/2022    CO2 20 03/23/2022    BUN 26 03/23/2022    CREATININE 1.8 03/23/2022    GFRAA 46 03/23/2022    AGRATIO 1.7 04/16/2019    LABGLOM 38 03/23/2022    GLUCOSE 182 03/23/2022    PROT 6.6 03/23/2022    LABALBU 3.9 03/23/2022    CALCIUM 8.7 03/23/2022    BILITOT 0.4 03/23/2022    ALKPHOS 60 03/23/2022    AST 13 03/23/2022    ALT 12 03/23/2022       Troponin:  Lab Results   Component Value Date    TROPONINT <0.010 03/23/2022       U/A:    Lab Results   Component Value Date    COLORU YELLOW 05/17/2020    PROTEINU NEGATIVE 05/17/2020    WBCUA 2 05/17/2020    RBCUA NONE SEEN 05/17/2020    MUCUS RARE 05/17/2020    TRICHOMONAS NONE SEEN 05/17/2020    BACTERIA NEGATIVE 05/17/2020    CLARITYU CLEAR 05/17/2020    SPECGRAV 1.016 05/17/2020    LEUKOCYTESUR NEGATIVE 05/17/2020    UROBILINOGEN NORMAL 05/17/2020    BILIRUBINUR NEGATIVE 05/17/2020    BLOODU NEGATIVE 05/17/2020       Urine Culture:  No components found for: CURINE    Radiology results:  XR CHEST PORTABLE   Final Result   No acute process.          CT HUMERUS RIGHT WO CONTRAST    (Results Pending)       Medications:   Medications:    amLODIPine  10 mg Oral Daily    aspirin  81 mg Oral Daily    atorvastatin  80 mg Oral Daily    carvedilol  25 mg Oral BID    clopidogrel  75 mg Oral Daily    furosemide  40 mg Oral Daily    insulin lispro  0-12 Units SubCUTAneous TID WC    insulin lispro  0-6 Units SubCUTAneous Nightly    hydrALAZINE 10 mg Oral BID    isosorbide mononitrate  30 mg Oral Daily    lisinopril  40 mg Oral Daily    pantoprazole  40 mg Oral Daily    sertraline  100 mg Oral Daily    tamsulosin  0.4 mg Oral Daily    sodium chloride flush  5-40 mL IntraVENous 2 times per day    enoxaparin  40 mg SubCUTAneous Daily      Infusions:    nitroGLYCERIN Stopped (03/23/22 2000)    dextrose      sodium chloride       PRN Meds: ondansetron, 4 mg, Q30 Min PRN  HYDROmorphone, 0.5 mg, Q4H PRN  glucose, 15 g, PRN  dextrose, 12.5 g, PRN  glucagon (rDNA), 1 mg, PRN  dextrose, 100 mL/hr, PRN  sodium chloride flush, 5-40 mL, PRN  sodium chloride, 25 mL, PRN  ondansetron, 4 mg, Q8H PRN   Or  ondansetron, 4 mg, Q6H PRN  polyethylene glycol, 17 g, Daily PRN  acetaminophen, 650 mg, Q6H PRN   Or  acetaminophen, 650 mg, Q6H PRN  albuterol sulfate HFA, 2 puff, Q6H PRN   And  ipratropium, 2 puff, Q6H PRN          Electronically signed by Stevie Blanchard MD on 3/24/2022 at 9:25 AM

## 2022-03-24 NOTE — PROGRESS NOTES
Cardiology Progress Note     Today's Plan: decrease Coreg to 12.5 mg BID    Admit Date:  3/23/2022    Consult reason/ Seen today for: STEMI    Subjective and  Overnight Events: Patient complains of right upper chest pain that radiates to right arm, reproducible with palpation. History of Presenting Illness:    Chief complain on admission : 57 y. o.year old who is admitted for  Chief Complaint   Patient presents with    Chest Pain        Past medical history:    has a past medical history of Acute cerebrovascular accident (CVA) ([facility] [address] [facility] Acute ST elevation myocardial infarction (STEMI) ([facility] [address] B12 deficiency, CAD (coronary artery disease), [facility] GERD (gastroesophageal reflux disease), Guillain Barré syndrome ([facility] H/O echocardiogram, Heart murmur, History of echocardiogram, History of myocardial infarction, History of nuclear MUGA test, History of nuclear stress test, History of PTCA, History of PTCA, Hx of cardiovascular stress test, Hx of cardiovascular stress test, HX OTHER MEDICAL, Hyperlipidemia, Hypertension, Impaired glucose tolerance, MUGA, NSTEMI (non-ST elevated myocardial infarction) [facility]), Patient in clinical research study, Pneumonia due to organism, Renal cyst, S/P cardiac catheterization, and Stroke (cerebrum) [facility] Past surgical history:   has a past surgical history that includes Hand surgery; Coronary angioplasty (6/03); shoulder surgery (Right, 2011); Colonoscopy (8/21/14); Endoscopy, colon, diagnostic (8/21/14); Cardiac catheterization (7/13); Percutaneous Transluminal Coronary Angio (03/2018); and Coronary artery bypass graft (N/A, 5/18/2020). Social History:   reports that he quit smoking about 5 years ago. His smoking use included cigarettes. He has a 90.00 pack-year smoking history.  He has never used smokeless tobacco. He reports that he does not drink alcohol and does not use drugs. Family history:  family history includes Alcohol Abuse in his brother, father, and mother; Depression in his mother, sister, and sister; Diabetes in his brother; High Cholesterol in his mother; Hypertension in his mother; Stomach Cancer in his brother and father. Allergies   Allergen Reactions    Ibuprofen Other (See Comments)     Stomach irritation    Augmentin [Amoxicillin-Pot Clavulanate]     Codeine        Review of Systems:  Review of Systems   Respiratory: Negative for shortness of breath. Cardiovascular: Negative for chest pain, palpitations and leg swelling. Musculoskeletal: Positive for arthralgias. Skin: Negative. Neurological: Negative for dizziness and weakness. All other systems reviewed and are negative. BP (!) 143/80   Pulse 61   Temp 98 °F (36.7 °C) (Oral)   Resp 12   SpO2 98%       Intake/Output Summary (Last 24 hours) at 3/24/2022 1114  Last data filed at 3/23/2022 2130  Gross per 24 hour   Intake --   Output 200 ml   Net -200 ml       Physical Exam:  Physical Exam  Constitutional:       Appearance: He is well-developed. Cardiovascular:      Rate and Rhythm: Normal rate and regular rhythm. Pulses: Intact distal pulses. Dorsalis pedis pulses are 2+ on the right side and 2+ on the left side. Posterior tibial pulses are 2+ on the right side and 2+ on the left side. Heart sounds: Normal heart sounds, S1 normal and S2 normal.   Pulmonary:      Effort: Pulmonary effort is normal.      Breath sounds: Normal breath sounds. Musculoskeletal:         General: Normal range of motion. Skin:     General: Skin is warm and dry. Neurological:      Mental Status: He is alert and oriented to person, place, and time.           Medications:    carvedilol  12.5 mg Oral BID    amLODIPine  10 mg Oral Daily    aspirin  81 mg Oral Daily    atorvastatin  80 mg Oral Daily    clopidogrel  75 mg Oral Daily    furosemide  40 mg Oral Daily  insulin lispro  0-12 Units SubCUTAneous TID     insulin lispro  0-6 Units SubCUTAneous Nightly    hydrALAZINE  10 mg Oral BID    isosorbide mononitrate  30 mg Oral Daily    lisinopril  40 mg Oral Daily    pantoprazole  40 mg Oral Daily    sertraline  100 mg Oral Daily    tamsulosin  0.4 mg Oral Daily    sodium chloride flush  5-40 mL IntraVENous 2 times per day    enoxaparin  40 mg SubCUTAneous Daily      nitroGLYCERIN Stopped (03/23/22 2000)    dextrose      sodium chloride       ondansetron, HYDROmorphone, glucose, dextrose, glucagon (rDNA), dextrose, sodium chloride flush, sodium chloride, ondansetron **OR** ondansetron, polyethylene glycol, acetaminophen **OR** acetaminophen, albuterol sulfate HFA **AND** ipratropium    Lab Data:  CBC:   Recent Labs     03/23/22  1550 03/24/22  0825   WBC 10.0 9.4   HGB 13.9 14.1   HCT 41.8* 43.7   MCV 96.8 98.4    181     BMP:   Recent Labs     03/23/22  1550 03/24/22  0825    136   K 4.4 4.6    103   CO2 20* 22   BUN 26* 21   CREATININE 1.8* 1.6*     PT/INR: No results for input(s): PROTIME, INR in the last 72 hours. BNP:  No results for input(s): PROBNP in the last 72 hours. TROPONIN:   Recent Labs     03/23/22  1550   TROPONINT <0.010        Impression:  Principal Problem:    Chest pain  Resolved Problems:    * No resolved hospital problems. *       All labs, medications and tests reviewed by myself, continue all other medications of all above medical condition listed as is except for changes mentioned above. Thank you   Please call with questions. Electronically signed by Cari Hurst. JERED Aguirre CNP on 3/24/2022 at 11:14 AM           CARDIOLOGY ATTENDING ADDENDUM    I have seen, spoken to and examined this patient personally, independent of the NP/PAC. I have reviewed the hospital care given to date and reviewed all pertinent labs and imaging.  I have spoken with patient, nursing staff and provided written and verbal instructions . The above note has been reviewed. I have spent substantive amount of time in formulating patient care. Physical Exam:    General:   Awake, alert  Head:normal  Eye:normal  Chest:   Clear to auscultation  0 Basilar crackles   Cardiovascular:  S1S2   Abdomen: soft   Extremities:   0 edema  Pulses; palpable      MEDICAL DECISION MAKING :        1. Chest pain: atypical, right upper chest pain reproducible with palpation. Musculoskeletal pain in origin. Continue with Imdur 30 mg daily. ? Anxiety induced    2. ? STEMI: Patient had similar symptoms 10 days prior and then proceeded to have LHC which showed patent grafts. EKG comparable to previous. 3. Bradycardia: decrease Coreg to 12.5 mg BID     4. History of coronary disease s/p CABG    5. Essential hypertension: Stable, continue with Norvasc 10 mg daily, Coreg 12.5 mg twice daily, Lasix 40 mg daily and lisinopril 40 mg daily. 6. Hyperlipidemia: Continue with statins    7. History of stroke: Continue with aspirin and Plavix    No further work-up at this time patient can be discharged from cardiac standpoint. - Case d/w Dr. Dillon Frias primary hospitalist     Echo      Left ventricular systolic function is abnormal.   Ejection fraction is visually estimated at 40-45%. Akinesis of the inferior and inferoseptal walls. Moderate left ventricular hypertrophy. Mild to moderate mitral regurgitation. No evidence of any pericardial effusion.       Dr. Itzel Escalante MD

## 2022-03-24 NOTE — DISCHARGE SUMMARY
Discharge Summary    Name:  Isabela Manuel /Age/Sex: 1960  (57 y.o. male)   MRN & CSN:  [id number] & [id number] Admission Date/Time: 3/23/2022  3:41 PM   Attending:  Bonny Guerra MD Discharging Physician: Bonny Guerra MD     Hospital Course:   Isabela Manuel is a 57 y.o.  male  who presents with Chest pain    Hospital Day: 2     Assessment and Plan:   Isabela Manuel is a 57 y.o.  male  who presents with Chest pain     Emily Rodriguez is a 57 y. o.  male  who presents with Chest pain     Chest Pain: Case d/w ED: STEMI alert initially called then cancelled; reproducible, no evidence of ACS,continue antiplatelet and statin therapy, appreciate Cardiology recs  Right Shoulder pain:  Denies any recent trauma as does his wife, pain originates in right shoulder then goes down his right arm and will occasionally shoot across his chest; right shoulder pain has subsided no precipitation of pain on shoulder manipulation today. lidoderm patch ordered  Hx CAD s/p stenting: STEMI 10 days ago and underwent cath with 100% occluded right coronary and recommended medical management. Cardiology notes reviewed. No intervention at this time. Hx Type II DM: SSI and hypoglycemia protocol  Hx HFpEF: Continue current management. Repeat echo pending at this time. Hx HLD: Continue Statin therapy  Hx COPD: Home bronchodilators resumed        Diet ADULT DIET; Regular; Low Fat/Low Chol/High Fiber/JOSEE   DVT Prophylaxis []? Lovenox, []? Heparin, []? SCDs, []? Ambulation   GI Prophylaxis []? PPI,  []? H2 Blocker,  []? Carafate,  []? Diet/Tube Feeds   Code Status Full Code   Disposition Patient requires continued admission due to chest pain   MDM []? Low, []? Moderate,[x]? High  Patient's risk as above due to multiple comorbidities.      History of Present Illness:      Chief Complaint: Chest pain  Isabela Manuel is a 57 y.o.  male  who presents with chest pain.     Subjective-patient examined at bedside.   He is comfortable in no distress. He denies any chest pain or shortness of breath. He denies any shoulder pain today. Labs have been reviewed consultant notes reviewed. Discussed with the patient patient is agreeable to discharge today. Follow-up with cardiology as an outpatient.        Ten point ROS reviewed negative, unless as noted above     Objective:         Intake/Output Summary (Last 24 hours) at 3/24/2022 0925  Last data filed at 3/23/2022 2130      Gross per 24 hour   Intake --   Output 200 ml   Net -200 ml      Vitals:       Vitals:     03/24/22 0751   BP: (!) 143/80   Pulse: 61   Resp: 12   Temp: 98 °F (36.7 °C)   SpO2:           Vent Information  SpO2: 98 %  No results for input(s): PH, QYL8DFB, PO2ART, BE, EJZ8EJH, CO2CT, O2SAT, LABCARB in the last 72 hours.     Invalid input(s): METHGBART           Physical Exam:   GEN    Awake male, sitting upright in bed in no apparent distress. Appears given age. EYES   Pupils are equally round. No scleral erythema, discharge, or conjunctivitis. HENT  Mucous membranes are moist. Oral pharynx without exudates, no evidence of thrush. NECK  Supple, no apparent thyromegaly or masses. RESP  Clear to auscultation, no wheezes, rales or rhonchi. Symmetric chest movement while on room air. CARDIO/VASC           S1/S2 auscultated. Regular rate without appreciable murmurs, rubs, or gallops. No JVD or carotid bruits. Peripheral pulses equal bilaterally and palpable. No peripheral edema. GI        Abdomen is soft without significant tenderness, masses, or guarding. Bowel sounds are normoactive. Rectal exam deferred.        No costovertebral angle tenderness. Normal appearing external genitalia. York catheter is not present. HEME/LYMPH            No palpable cervical lymphadenopathy and no hepatosplenomegaly. No petechiae or ecchymoses. MSK    No gross joint deformities. SKIN    Normal coloration, warm, dry.   NEURO           Cranial nerves appear grossly intact, normal speech, no lateralizing weakness. PSYCH            Awake, alert, oriented x 4.   Affect appropriate.     Data:   CBC with Differential:          Lab Results   Component Value Date     WBC 9.4 03/24/2022     RBC 4.44 03/24/2022     HGB 14.1 03/24/2022     HCT 43.7 03/24/2022      03/24/2022     MCV 98.4 03/24/2022     MCH 31.8 03/24/2022     MCHC 32.3 03/24/2022     RDW 12.4 03/24/2022     SEGSPCT 75.2 03/24/2022     LYMPHOPCT 16.3 03/24/2022     MONOPCT 5.5 03/24/2022     BASOPCT 0.5 03/24/2022     MONOSABS 0.5 03/24/2022     LYMPHSABS 1.5 03/24/2022     EOSABS 0.2 03/24/2022     BASOSABS 0.1 03/24/2022     DIFFTYPE AUTOMATED DIFFERENTIAL 03/24/2022         CMP:           Lab Results   Component Value Date      03/23/2022     K 4.4 03/23/2022     K 3.8 03/16/2018      03/23/2022     CO2 20 03/23/2022     BUN 26 03/23/2022     CREATININE 1.8 03/23/2022     GFRAA 46 03/23/2022     AGRATIO 1.7 04/16/2019     LABGLOM 38 03/23/2022     GLUCOSE 182 03/23/2022     PROT 6.6 03/23/2022     LABALBU 3.9 03/23/2022     CALCIUM 8.7 03/23/2022     BILITOT 0.4 03/23/2022     ALKPHOS 60 03/23/2022     AST 13 03/23/2022     ALT 12 03/23/2022         Troponin:        Lab Results   Component Value Date     TROPONINT <0.010 03/23/2022         U/A:          Lab Results   Component Value Date     COLORU YELLOW 05/17/2020     PROTEINU NEGATIVE 05/17/2020     WBCUA 2 05/17/2020     RBCUA NONE SEEN 05/17/2020     MUCUS RARE 05/17/2020     TRICHOMONAS NONE SEEN 05/17/2020     BACTERIA NEGATIVE 05/17/2020     CLARITYU CLEAR 05/17/2020     SPECGRAV 1.016 05/17/2020     LEUKOCYTESUR NEGATIVE 05/17/2020     UROBILINOGEN NORMAL 05/17/2020     BILIRUBINUR NEGATIVE 05/17/2020     BLOODU NEGATIVE 05/17/2020         Urine Culture:  No components found for: RAFI     Radiology results:  XR CHEST PORTABLE   Final Result   No acute process.           CT HUMERUS RIGHT WO CONTRAST    (Results Pending) Medications:   Medications:   Scheduled Medications    amLODIPine  10 mg Oral Daily    aspirin  81 mg Oral Daily    atorvastatin  80 mg Oral Daily    carvedilol  25 mg Oral BID    clopidogrel  75 mg Oral Daily    furosemide  40 mg Oral Daily    insulin lispro  0-12 Units SubCUTAneous TID WC    insulin lispro  0-6 Units SubCUTAneous Nightly    hydrALAZINE  10 mg Oral BID    isosorbide mononitrate  30 mg Oral Daily    lisinopril  40 mg Oral Daily    pantoprazole  40 mg Oral Daily    sertraline  100 mg Oral Daily    tamsulosin  0.4 mg Oral Daily    sodium chloride flush  5-40 mL IntraVENous 2 times per day    enoxaparin  40 mg SubCUTAneous Daily         Infusions:   Infusions Meds    nitroGLYCERIN Stopped (03/23/22 2000)    dextrose      sodium chloride           PRN Meds:   PRN Medications    ondansetron, 4 mg, Q30 Min PRN  HYDROmorphone, 0.5 mg, Q4H PRN  glucose, 15 g, PRN  dextrose, 12.5 g, PRN  glucagon (rDNA), 1 mg, PRN  dextrose, 100 mL/hr, PRN  sodium chloride flush, 5-40 mL, PRN  sodium chloride, 25 mL, PRN  ondansetron, 4 mg, Q8H PRN   Or  ondansetron, 4 mg, Q6H PRN  polyethylene glycol, 17 g, Daily PRN  acetaminophen, 650 mg, Q6H PRN   Or  acetaminophen, 650 mg, Q6H PRN  albuterol sulfate HFA, 2 puff, Q6H PRN   And  ipratropium, 2 puff, Q6H PRN               Electronically signed by Janice David MD on 3/24/2022 at 9:25 AM                         The patient expressed appropriate understanding of and agreement with the discharge recommendations, medications, and plan.      Consults this admission:  IP CONSULT TO HOSPITALIST    Discharge Instruction:   Follow up appointments: as scheduled  Primary care physician:  within 2 weeks    Diet:  cardiac diet   Activity: activity as tolerated  Disposition: Discharged to:   [x]Home, []C, []SNF, []Acute Rehab, []Hospice   Condition on discharge: Stable    Discharge Medications:        Medication List      START taking these medications lidocaine 5 %  Commonly known as: LIDODERM  Place 1 patch onto the skin daily for 10 days 12 hours on, 12 hours off.         CHANGE how you take these medications    carvedilol 12.5 MG tablet  Commonly known as: Coreg  Take 1 tablet by mouth 2 times daily  What changed:   medication strength  how much to take        CONTINUE taking these medications    albuterol-ipratropium  MCG/ACT Aers inhaler  Commonly known as: COMBIVENT RESPIMAT  Inhale 1 puff into the lungs every 6 hours as needed for Wheezing or Shortness of Breath     Alcohol Pads 70 % Pads  1 Units by Does not apply route 2 times daily     amLODIPine 10 MG tablet  Commonly known as: Norvasc  Take 1 tablet by mouth daily     aspirin 81 MG chewable tablet  Take 1 tablet by mouth daily     atorvastatin 80 MG tablet  Commonly known as: Lipitor  Take 1 tablet by mouth daily     Blood Glucose Monitor System w/Device Kit  Use twice per day     blood glucose test strips  1 strip by Other route 2 times daily for 180 doses     Blood Pressure Kit  Use prn     clopidogrel 75 MG tablet  Commonly known as: PLAVIX  Take 1 tablet by mouth daily     furosemide 40 MG tablet  Commonly known as: LASIX  Take 1 tablet by mouth daily     glimepiride 2 MG tablet  Commonly known as: Amaryl  Take 1 tablet by mouth every morning     hydrALAZINE 10 MG tablet  Commonly known as: APRESOLINE  Take 1 tablet by mouth 2 times daily     isosorbide mononitrate 30 MG extended release tablet  Commonly known as: IMDUR  Take 1 tablet by mouth daily     Lancets Misc  1 each by Does not apply route 2 times daily for 180 doses     lisinopril 40 MG tablet  Commonly known as: PRINIVIL;ZESTRIL  Take 1 tablet by mouth daily     pantoprazole 40 MG tablet  Commonly known as: PROTONIX  Take 1 tablet by mouth daily     sertraline 100 MG tablet  Commonly known as: ZOLOFT  Take 1 tablet by mouth daily     tamsulosin 0.4 MG capsule  Commonly known as: FLOMAX  Take 1 capsule by mouth daily Where to Get Your Medications      These medications were sent to [facility], [address] [facility] [address], [address]    Phone: [phone]   carvedilol 12.5 MG tablet  lidocaine 5 %         Objective Findings at Discharge:   BP (!) 143/80   Pulse 61   Temp 98 °F (36.7 °C) (Oral)   Resp 12   SpO2 98%            PHYSICAL EXAM   GEN Awake male, sitting upright in bed in no apparent distress. Appears given age. EYES Pupils are equally round. No scleral erythema, discharge, or conjunctivitis. HENT Mucous membranes are moist. Oral pharynx without exudates, no evidence of thrush. NECK Supple, no apparent thyromegaly or masses. RESP Clear to auscultation, no wheezes, rales or rhonchi. Symmetric chest movement while on room air. CARDIO/VASC S1/S2 auscultated. Regular rate without appreciable murmurs, rubs, or gallops. No JVD or carotid bruits. Peripheral pulses equal bilaterally and palpable. No peripheral edema. GI Abdomen is soft without significant tenderness, masses, or guarding. Bowel sounds are normoactive. Rectal exam deferred.  No costovertebral angle tenderness. Normal appearing external genitalia. York catheter is not present. HEME/LYMPH No palpable cervical lymphadenopathy and no hepatosplenomegaly. No petechiae or ecchymoses. MSK No gross joint deformities. SKIN Normal coloration, warm, dry. NEURO Cranial nerves appear grossly intact, normal speech, no lateralizing weakness. PSYCH Awake, alert, oriented x 4. Affect appropriate.     BMP/CBC  Recent Labs     03/23/22  1550 03/24/22  0825    136   K 4.4 4.6    103   CO2 20* 22   BUN 26* 21   CREATININE 1.8* 1.6*   WBC 10.0 9.4   HCT 41.8* 43.7    181       IMAGING:  reviewed    Discharge Time of 35 minutes    Electronically signed by Art Theodore MD on 3/24/2022 at 9:54 AM

## 2022-03-25 ENCOUNTER — CARE COORDINATION (OUTPATIENT)
Dept: CASE MANAGEMENT | Age: 62
End: 2022-03-25

## 2022-03-25 NOTE — CARE COORDINATION
Care Transitions Initial Outreach Attempt    Call within 2 business days of discharge: Yes   Attempted initial 24 hour transitional call to patient. Left VM to return call directly to [phone]    Patient: Jaqueline Hernandez Patient : 1960 MRN: [id number]    Last Discharge [facility]       Complaint Diagnosis Description Type Department Provider    3/23/22 Chest Pain Chest pain, unspecified type ED to Hosp-Admission (Discharged) (ADMITTED) Osito Shukla MD; Charisse Rose. ..             Noted following upcoming appointments from discharge chart review:   [facility] follow up appointment(s):   Future Appointments   Date Time Provider Carmen Díaz   2022  8:00 AM Davonte Galarza [address] [facility]   2022  9:20 AM Alva Fuller MD [facility]     Non-[facility] follow up appointment(s): na

## 2022-03-28 ENCOUNTER — CARE COORDINATION (OUTPATIENT)
Dept: CASE MANAGEMENT | Age: 62
End: 2022-03-28

## 2022-03-28 ENCOUNTER — TELEPHONE (OUTPATIENT)
Dept: FAMILY MEDICINE CLINIC | Age: 62
End: 2022-03-28

## 2022-03-28 NOTE — TELEPHONE ENCOUNTER
[facility] Transitions Initial Follow Up Call    Outreach made within 2 business days of discharge: Yes    Patient: Perry Echevarria Patient : 1960   MRN: [id number]  Reason for Admission: There are no discharge diagnoses documented for the most recent discharge. Discharge Date: 3/24/22       Spoke with: David Godfrey    Discharge department/facility: [facility]    TCM Interactive Patient Contact:  Was patient able to fill all prescriptions: Yes  Was patient instructed to bring all medications to the follow-up visit: Yes  Is patient taking all medications as directed in the discharge summary?  Yes  Does patient understand their discharge instructions: Yes  Does patient have questions or concerns that need addressed prior to 7-14 day follow up office visit: no    Scheduled appointment with PCP within 7-14 days    Follow Up  Future Appointments   Date Time Provider Carmen Díaz   3/31/2022 11:00 AM MD SPARKLE [facility]   2022  8:00 AM Bar Oh, APRN - CNP [facility]   2022  9:20 AM Albania Ha MD [facility]       Stasia Cheadle

## 2022-03-28 NOTE — CARE COORDINATION
Care Transitions Initial Outreach Attempt-2nd attempt    Call within 2 business days of discharge: Yes   Attempted initial 24 hour transitional call to patient. Left VM to return call directly to [phone]. If no return call, CTN will sign off-2nd attempt. Will email PCP  for f/u. Patient: Shan Peck Patient : 1960 MRN: [id number]    Last Discharge [address] [address]       Complaint Diagnosis Description Type Department Provider    3/23/22 Chest Pain Chest pain, unspecified type ED to Hosp-Admission (Discharged) (ADMITTED) Jose Lopez MD; Ana Liner. ..             Noted following upcoming appointments from discharge chart review:   [facility] follow up appointment(s):   Future Appointments   Date Time Provider Carmen Díaz   2022  8:00 AM Kenzie Major, [address] [facility]   2022  9:20 AM Keith Jhaveri MD [facility]     Non-[facility] follow up appointment(s): ayesha

## 2022-03-31 ENCOUNTER — OFFICE VISIT (OUTPATIENT)
Dept: FAMILY MEDICINE CLINIC | Age: 62
End: 2022-03-31
Payer: MEDICARE

## 2022-03-31 VITALS
OXYGEN SATURATION: 96 % | BODY MASS INDEX: 31.8 KG/M2 | SYSTOLIC BLOOD PRESSURE: 130 MMHG | WEIGHT: 202.6 LBS | HEART RATE: 56 BPM | HEIGHT: 67 IN | DIASTOLIC BLOOD PRESSURE: 72 MMHG

## 2022-03-31 DIAGNOSIS — K21.9 GASTROESOPHAGEAL REFLUX DISEASE, UNSPECIFIED WHETHER ESOPHAGITIS PRESENT: ICD-10-CM

## 2022-03-31 DIAGNOSIS — R45.4 IRRITABILITY AND ANGER: ICD-10-CM

## 2022-03-31 DIAGNOSIS — E11.9 TYPE 2 DIABETES MELLITUS WITHOUT COMPLICATION, UNSPECIFIED WHETHER LONG TERM INSULIN USE (HCC): ICD-10-CM

## 2022-03-31 DIAGNOSIS — J41.0 SIMPLE CHRONIC BRONCHITIS (HCC): ICD-10-CM

## 2022-03-31 DIAGNOSIS — Z09 HOSPITAL DISCHARGE FOLLOW-UP: Primary | ICD-10-CM

## 2022-03-31 DIAGNOSIS — M25.511 ACUTE PAIN OF RIGHT SHOULDER: ICD-10-CM

## 2022-03-31 PROCEDURE — 99496 TRANSJ CARE MGMT HIGH F2F 7D: CPT | Performed by: FAMILY MEDICINE

## 2022-03-31 PROCEDURE — 1111F DSCHRG MED/CURRENT MED MERGE: CPT | Performed by: FAMILY MEDICINE

## 2022-03-31 RX ORDER — ATORVASTATIN CALCIUM 80 MG/1
80 TABLET, FILM COATED ORAL DAILY
Qty: 90 TABLET | Refills: 1 | Status: ON HOLD | OUTPATIENT
Start: 2022-03-31 | End: 2022-07-09 | Stop reason: HOSPADM

## 2022-03-31 RX ORDER — LISINOPRIL 40 MG/1
40 TABLET ORAL DAILY
Qty: 90 TABLET | Refills: 1 | Status: ON HOLD | OUTPATIENT
Start: 2022-03-31 | End: 2022-07-09 | Stop reason: HOSPADM

## 2022-03-31 RX ORDER — GLIMEPIRIDE 2 MG/1
2 TABLET ORAL EVERY MORNING
Qty: 90 TABLET | Refills: 1 | Status: SHIPPED | OUTPATIENT
Start: 2022-03-31 | End: 2022-09-08 | Stop reason: SDUPTHER

## 2022-03-31 RX ORDER — SERTRALINE HYDROCHLORIDE 100 MG/1
100 TABLET, FILM COATED ORAL DAILY
Qty: 90 TABLET | Refills: 3 | Status: ON HOLD | OUTPATIENT
Start: 2022-03-31 | End: 2022-07-09 | Stop reason: HOSPADM

## 2022-03-31 RX ORDER — PANTOPRAZOLE SODIUM 40 MG/1
40 TABLET, DELAYED RELEASE ORAL DAILY
Qty: 90 TABLET | Refills: 1 | Status: ON HOLD | OUTPATIENT
Start: 2022-03-31 | End: 2022-07-09 | Stop reason: HOSPADM

## 2022-03-31 ASSESSMENT — PATIENT HEALTH QUESTIONNAIRE - PHQ9
SUM OF ALL RESPONSES TO PHQ QUESTIONS 1-9: 0
2. FEELING DOWN, DEPRESSED OR HOPELESS: 0
SUM OF ALL RESPONSES TO PHQ QUESTIONS 1-9: 0
SUM OF ALL RESPONSES TO PHQ9 QUESTIONS 1 & 2: 0
1. LITTLE INTEREST OR PLEASURE IN DOING THINGS: 0

## 2022-03-31 NOTE — PATIENT INSTRUCTIONS
PLEASE BRING YOUR MEDICATIONS TO ALL APPOINTMENTS      Please check MY CHART for test results. If you have forgotten your password, please call [phone]. The diagnoses and medications listed in this after visit summary may not be up to date. Please check MY CHART in 28-48 hours for possible corrections. Late cancellation policy: So that we can better accommodate people who are sick, please give our office 24 hour notice for an appointment cancellation. Missed appointments: Your care is very important to us. It is important that you keep your scheduled appointments. Multiple missed appointments will lead to a dismissal from the office. Patients arriving late will be worked into the schedule as time permits, with patients arriving on time taken as scheduled. Late arriving patients are more than welcome to wait or reschedule their appointments. Please allow 5-7 business days for paperwork to be processed. HERE ARE SOME LIFE CHANGING TIPS      1. Take your blood pressure medications at bedtime to reduce your chance of heart attack or stroke  2. Fever in kids:  Give both Tylenol and Ibuprofen at the same time rather than staggering them   3. Follow these tips to reduce childhood obesity: Reduce unnecessary exposure to antibiotics, consume whole milk instead of skim milk, watch public TV instead of regular TV (less exposure to junk food commercials), and reduce traumatic experiences. 4. 1 egg per day is good for your heart  5. Alternate day fasting does promote weight loss. Skipping breakfast increases your risk of obesity  6. Artificially sweetened drinks increase all cause mortality (strokes, body mass index, cardiovascular disease)  7. Kale consumption can reduce onset of dementia  8. Walking at least 8000 steps per day and resistance exercise 2-3 x per week are good for your heart  9. Brushing teeth 3 times per day can decrease chance of getting diabetes  10.  Antibiotic

## 2022-03-31 NOTE — PROGRESS NOTES
Post-Discharge Transitional Care  Follow Up      Aida Taylor   YOB: 1960    Date of Office Visit:  3/31/2022  Date of Hospital Admission: 3/23/22  Date of Hospital Discharge: 3/24/22  Risk of hospital readmission (high >=14%. Medium >=10%) :Readmission Risk Score: 17.5 ( )      Care management risk score Rising risk (score 2-5) and Complex Care (Scores >=6): 8     Non face to face  following discharge, date last encounter closed (first attempt may have been earlier): 3/28/2022  3:46 PM    Call initiated 2 business days of discharge: Yes    ASSESSMENT/PLAN:   Type 2 diabetes mellitus without complication, unspecified whether long term insulin use (HCC)  -     glimepiride (AMARYL) 2 MG tablet; Take 1 tablet by mouth every morning, Disp-90 tablet, R-1Normal      Medical Decision Making: high complexity  Return in about 5 months (around 8/31/2022) for HTN, Hyperlipid, Depression, DM, PHQ9. Subjective:   HPI:  Follow up of Hospital problems/diagnosis(es): Right shoulder pain and chest pain    Inpatient course: Discharge summary reviewed- see chart. Interval history/Current status: He ruled out for heart attack. His right shoulder pain was musculoskeletal.  It was most likely because he had tried to lift his wife who is very ill. Diabetes: He admits to not following up here regularly. Hospital restarted him on his medication. Patient Active Problem List   Diagnosis    ASCVD (arteriosclerotic cardiovascular disease)    History of PTCA    History of acute inferior wall myocardial infarction    Chest pain    B12 deficiency    Fatty liver    Colon, diverticulosis    Renal cyst    Adrenal adenoma    Hyperlipidemia LDL goal <70    Obesity (BMI 30-39. 9)    Cerebrovascular accident (CVA) due to thrombosis of left middle cerebral artery (HCC)    Hemiparesis of right dominant side as late effect of cerebral infarction ([address]    Gait disturbance    Essential hypertension    Hemiparesis affecting right side as late effect of cerebrovascular accident (CVA) ([address]    SOBOE (shortness of breath on exertion)    Gastroesophageal reflux disease    Unstable angina ([facility])    Non-ST elevation myocardial infarction (NSTEMI) ([facility])    Hypertensive urgency    Frequent PVCs    Ischemic cardiomyopathy    History of Guillain-[place] syndrome    Acute pain of left shoulder    Dysarthria due to recent stroke    Aphasia due to acute stroke ([address]    Complication of coronary artery bypass graft surgery    Uncontrolled pain    S/P CABG (coronary artery bypass graft)    ANNA (obstructive sleep apnea)    Hypersomnia    Cigarette smoker    COPD (chronic obstructive pulmonary disease) ([facility])    Irritability and anger    Stable angina pectoris ([address]    Noncompliance with medications    Stage 3b chronic kidney disease ([facility]    Class 1 obesity due to excess calories with body mass index (BMI) of 33.0 to 33.9 in adult       Medications listed as ordered at the time of discharge from hospital     Medication List          Accurate as of March 31, 2022  3:24 PM. If you have any questions, ask your nurse or doctor.             CONTINUE taking these medications    albuterol-ipratropium  MCG/ACT Aers inhaler  Commonly known as: COMBIVENT RESPIMAT  Inhale 1 puff into the lungs every 6 hours as needed for Wheezing or Shortness of Breath     Alcohol Pads 70 % Pads  1 Units by Does not apply route 2 times daily     amLODIPine 10 MG tablet  Commonly known as: Norvasc  Take 1 tablet by mouth daily     aspirin 81 MG chewable tablet  Take 1 tablet by mouth daily     atorvastatin 80 MG tablet  Commonly known as: Lipitor  Take 1 tablet by mouth daily     Blood Glucose Monitor System w/Device Kit  Use twice per day     blood glucose test strips  1 strip by Other route 2 times daily for 180 doses     Blood Pressure Kit  Use prn     carvedilol 12.5 MG tablet  Commonly known as: Coreg  Take 1 tablet by mouth 2 times daily     clopidogrel 75 MG tablet  Commonly known as: PLAVIX  Take 1 tablet by mouth daily     furosemide 40 MG tablet  Commonly known as: LASIX  Take 1 tablet by mouth daily     glimepiride 2 MG tablet  Commonly known as: Amaryl  Take 1 tablet by mouth every morning     hydrALAZINE 10 MG tablet  Commonly known as: APRESOLINE  Take 1 tablet by mouth 2 times daily     isosorbide mononitrate 30 MG extended release tablet  Commonly known as: IMDUR  Take 1 tablet by mouth daily     Lancets Misc  1 each by Does not apply route 2 times daily for 180 doses     lidocaine 5 %  Commonly known as: LIDODERM  Place 1 patch onto the skin daily for 10 days 12 hours on, 12 hours off.     lisinopril 40 MG tablet  Commonly known as: PRINIVIL;ZESTRIL  Take 1 tablet by mouth daily     pantoprazole 40 MG tablet  Commonly known as: PROTONIX  Take 1 tablet by mouth daily     sertraline 100 MG tablet  Commonly known as: ZOLOFT  Take 1 tablet by mouth daily     tamsulosin 0.4 MG capsule  Commonly known as: FLOMAX  Take 1 capsule by mouth daily           Where to Get Your Medications      These medications were sent to [address] [place] [address] [phone] - F [phone]  [address] [facility] [address]    Phone: [phone]   · glimepiride 2 MG tablet           Medications marked \"taking\" at this time  Outpatient Medications Marked as Taking for the 3/31/22 encounter (Office Visit) with Nancy Ascencio MD   Medication Sig Dispense Refill    glimepiride (AMARYL) 2 MG tablet Take 1 tablet by mouth every morning 90 tablet 1    carvedilol (COREG) 12.5 MG tablet Take 1 tablet by mouth 2 times daily 60 tablet 3    lidocaine (LIDODERM) 5 % Place 1 patch onto the skin daily for 10 days 12 hours on, 12 hours off.  10 patch 0    aspirin 81 MG chewable tablet Take 1 tablet by mouth daily 180 tablet 0    albuterol-ipratropium (COMBIVENT RESPIMAT)  MCG/ACT AERS inhaler Inhale 1 puff into the lungs every 6 hours as needed for Wheezing or Shortness of Breath 120 each 0    sertraline (ZOLOFT) 100 MG tablet Take 1 tablet by mouth daily 30 tablet 0    atorvastatin (LIPITOR) 80 MG tablet Take 1 tablet by mouth daily 30 tablet 0    hydrALAZINE (APRESOLINE) 10 MG tablet Take 1 tablet by mouth 2 times daily 60 tablet 0    lisinopril (PRINIVIL;ZESTRIL) 40 MG tablet Take 1 tablet by mouth daily 30 tablet 0    amLODIPine (NORVASC) 10 MG tablet Take 1 tablet by mouth daily 30 tablet 0    blood glucose monitor strips 1 strip by Other route 2 times daily for 180 doses 180 strip 0    furosemide (LASIX) 40 MG tablet Take 1 tablet by mouth daily 60 tablet 1    Alcohol Swabs (ALCOHOL PADS) 70 % PADS 1 Units by Does not apply route 2 times daily 180 each 3    Blood Glucose Monitoring Suppl (BLOOD GLUCOSE MONITOR SYSTEM) w/Device KIT Use twice per day 1 kit 0    Lancets MISC 1 each by Does not apply route 2 times daily for 180 doses 180 each 3    tamsulosin (FLOMAX) 0.4 MG capsule Take 1 capsule by mouth daily 30 capsule 0    clopidogrel (PLAVIX) 75 MG tablet Take 1 tablet by mouth daily 30 tablet 0    pantoprazole (PROTONIX) 40 MG tablet Take 1 tablet by mouth daily 30 tablet 0    isosorbide mononitrate (IMDUR) 30 MG extended release tablet Take 1 tablet by mouth daily 30 tablet 0    Blood Pressure KIT Use prn 1 kit 0        Medications patient taking as of now reconciled against medications ordered at time of hospital discharge: Yes    A comprehensive review of systems was negative except for what was noted in the HPI.     Objective:    /72 (Site: Left Upper Arm, Position: Sitting, Cuff Size: Large Adult)   Pulse 56   Ht 5' 7\" (1.702 m)   Wt 202 lb 9.6 oz (91.9 kg)   SpO2 96%   BMI 31.73 kg/m²   General Appearance: alert and oriented to person, place and time, well developed and well- nourished, in no acute distress  Skin: warm and dry, no rash or erythema  Head: normocephalic and atraumatic  Eyes: pupils equal, round, and reactive to light, extraocular eye movements intact, conjunctivae normal  ENT: tympanic membrane, external ear and ear canal normal bilaterally, nose without deformity, nasal mucosa and turbinates normal without polyps  Neck: supple and non-tender without mass, no thyromegaly or thyroid nodules, no cervical lymphadenopathy  Pulmonary/Chest: clear to auscultation bilaterally- no wheezes, rales or rhonchi, normal air movement, no respiratory distress  Cardiovascular: normal rate, regular rhythm, normal S1 and S2, no murmurs, rubs, clicks, or gallops, distal pulses 1+ (HES SAYS that cardiology has checked his pulses and they are OK), no carotid bruits  Abdomen: soft, non-tender, non-distended, normal bowel sounds, no masses or organomegaly  Extremities: no cyanosis, clubbing or edema  Musculoskeletal: normal range of motion, no joint swelling, deformity or tenderness  Neurologic: gait is antalgic.   coordination and speech normal       Diagnosis Orders   1. Hospital discharge follow-up     2. Type 2 diabetes mellitus without complication, unspecified whether long term insulin use (HCC)  glimepiride (AMARYL) 2 MG tablet    lisinopril (PRINIVIL;ZESTRIL) 40 MG tablet    HM DIABETES FOOT EXAM   3. Acute pain of right shoulder     4. Simple chronic bronchitis (HCC)  albuterol-ipratropium (COMBIVENT RESPIMAT)  MCG/ACT AERS inhaler   5. Irritability and anger  sertraline (ZOLOFT) 100 MG tablet   6. Gastroesophageal reflux disease, unspecified whether esophagitis present  pantoprazole (PROTONIX) 40 MG tablet     Restarted all his medications. Have asked him to be more compliant. We will see him back in 6 months. An electronic signature was used to authenticate this note.   --James Underwood MD

## 2022-06-21 NOTE — ASSESSMENT & PLAN NOTE
He has moderate ANNA in the REM and Supine sleep  Advised to use Auto CPAP  Loose weight  I need a 2 week download data Opt out

## 2022-07-07 ENCOUNTER — APPOINTMENT (OUTPATIENT)
Dept: CT IMAGING | Age: 62
DRG: 291 | End: 2022-07-07
Payer: MEDICARE

## 2022-07-07 ENCOUNTER — TELEPHONE (OUTPATIENT)
Dept: FAMILY MEDICINE CLINIC | Age: 62
End: 2022-07-07

## 2022-07-07 ENCOUNTER — APPOINTMENT (OUTPATIENT)
Dept: GENERAL RADIOLOGY | Age: 62
DRG: 291 | End: 2022-07-07
Payer: MEDICARE

## 2022-07-07 ENCOUNTER — HOSPITAL ENCOUNTER (INPATIENT)
Age: 62
LOS: 2 days | Discharge: HOME | DRG: 291 | End: 2022-07-09
Attending: INTERNAL MEDICINE | Admitting: INTERNAL MEDICINE
Payer: MEDICARE

## 2022-07-07 DIAGNOSIS — R05.9 COUGH: ICD-10-CM

## 2022-07-07 DIAGNOSIS — J41.0 SIMPLE CHRONIC BRONCHITIS (HCC): ICD-10-CM

## 2022-07-07 DIAGNOSIS — R06.02 SHORTNESS OF BREATH: ICD-10-CM

## 2022-07-07 DIAGNOSIS — R77.8 ELEVATED TROPONIN: ICD-10-CM

## 2022-07-07 DIAGNOSIS — R07.9 CHEST PAIN, UNSPECIFIED TYPE: Primary | ICD-10-CM

## 2022-07-07 DIAGNOSIS — R06.2 WHEEZING: ICD-10-CM

## 2022-07-07 LAB
ALBUMIN SERPL-MCNC: 4.1 GM/DL (ref 3.4–5)
ALP BLD-CCNC: 59 IU/L (ref 40–129)
ALT SERPL-CCNC: 13 U/L (ref 10–40)
ANION GAP SERPL CALCULATED.3IONS-SCNC: 13 MMOL/L (ref 4–16)
AST SERPL-CCNC: 18 IU/L (ref 15–37)
BASOPHILS ABSOLUTE: 0.1 K/CU MM
BASOPHILS RELATIVE PERCENT: 0.9 % (ref 0–1)
BILIRUB SERPL-MCNC: 0.6 MG/DL (ref 0–1)
BUN BLDV-MCNC: 17 MG/DL (ref 6–23)
CALCIUM SERPL-MCNC: 8.9 MG/DL (ref 8.3–10.6)
CHLORIDE BLD-SCNC: 105 MMOL/L (ref 99–110)
CO2: 21 MMOL/L (ref 21–32)
CREAT SERPL-MCNC: 1.4 MG/DL (ref 0.9–1.3)
DIFFERENTIAL TYPE: ABNORMAL
EOSINOPHILS ABSOLUTE: 0.2 K/CU MM
EOSINOPHILS RELATIVE PERCENT: 2.6 % (ref 0–3)
GFR AFRICAN AMERICAN: >60 ML/MIN/1.73M2
GFR NON-AFRICAN AMERICAN: 51 ML/MIN/1.73M2
GLUCOSE BLD-MCNC: 135 MG/DL (ref 70–99)
GLUCOSE BLD-MCNC: 97 MG/DL (ref 70–99)
HCT VFR BLD CALC: 44.5 % (ref 42–52)
HEMOGLOBIN: 14.9 GM/DL (ref 13.5–18)
IMMATURE NEUTROPHIL %: 0.2 % (ref 0–0.43)
LYMPHOCYTES ABSOLUTE: 2 K/CU MM
LYMPHOCYTES RELATIVE PERCENT: 30.4 % (ref 24–44)
MCH RBC QN AUTO: 32.7 PG (ref 27–31)
MCHC RBC AUTO-ENTMCNC: 33.5 % (ref 32–36)
MCV RBC AUTO: 97.8 FL (ref 78–100)
MONOCYTES ABSOLUTE: 0.5 K/CU MM
MONOCYTES RELATIVE PERCENT: 6.8 % (ref 0–4)
NUCLEATED RBC %: 0 %
PDW BLD-RTO: 13.2 % (ref 11.7–14.9)
PLATELET # BLD: 152 K/CU MM (ref 140–440)
PMV BLD AUTO: 12.2 FL (ref 7.5–11.1)
POTASSIUM SERPL-SCNC: 3.7 MMOL/L (ref 3.5–5.1)
PRO-BNP: 3516 PG/ML
RBC # BLD: 4.55 M/CU MM (ref 4.6–6.2)
SARS-COV-2, NAAT: NOT DETECTED
SEGMENTED NEUTROPHILS ABSOLUTE COUNT: 3.9 K/CU MM
SEGMENTED NEUTROPHILS RELATIVE PERCENT: 59.1 % (ref 36–66)
SODIUM BLD-SCNC: 139 MMOL/L (ref 135–145)
SOURCE: NORMAL
TOTAL IMMATURE NEUTOROPHIL: 0.01 K/CU MM
TOTAL NUCLEATED RBC: 0 K/CU MM
TOTAL PROTEIN: 6.8 GM/DL (ref 6.4–8.2)
TROPONIN T: 0.03 NG/ML
WBC # BLD: 6.6 K/CU MM (ref 4–10.5)

## 2022-07-07 PROCEDURE — 84484 ASSAY OF TROPONIN QUANT: CPT

## 2022-07-07 PROCEDURE — 99222 1ST HOSP IP/OBS MODERATE 55: CPT | Performed by: INTERNAL MEDICINE

## 2022-07-07 PROCEDURE — 6360000002 HC RX W HCPCS: Performed by: INTERNAL MEDICINE

## 2022-07-07 PROCEDURE — 87635 SARS-COV-2 COVID-19 AMP PRB: CPT

## 2022-07-07 PROCEDURE — 6370000000 HC RX 637 (ALT 250 FOR IP): Performed by: NURSE PRACTITIONER

## 2022-07-07 PROCEDURE — 93005 ELECTROCARDIOGRAM TRACING: CPT | Performed by: PHYSICIAN ASSISTANT

## 2022-07-07 PROCEDURE — 94640 AIRWAY INHALATION TREATMENT: CPT

## 2022-07-07 PROCEDURE — 85025 COMPLETE CBC W/AUTO DIFF WBC: CPT

## 2022-07-07 PROCEDURE — 6360000002 HC RX W HCPCS: Performed by: NURSE PRACTITIONER

## 2022-07-07 PROCEDURE — 99285 EMERGENCY DEPT VISIT HI MDM: CPT

## 2022-07-07 PROCEDURE — 2580000003 HC RX 258: Performed by: NURSE PRACTITIONER

## 2022-07-07 PROCEDURE — 2060000000 HC ICU INTERMEDIATE R&B

## 2022-07-07 PROCEDURE — 6360000004 HC RX CONTRAST MEDICATION: Performed by: NURSE PRACTITIONER

## 2022-07-07 PROCEDURE — 93005 ELECTROCARDIOGRAM TRACING: CPT | Performed by: NURSE PRACTITIONER

## 2022-07-07 PROCEDURE — 94664 DEMO&/EVAL PT USE INHALER: CPT

## 2022-07-07 PROCEDURE — 71045 X-RAY EXAM CHEST 1 VIEW: CPT

## 2022-07-07 PROCEDURE — 6370000000 HC RX 637 (ALT 250 FOR IP): Performed by: PHYSICIAN ASSISTANT

## 2022-07-07 PROCEDURE — 80053 COMPREHEN METABOLIC PANEL: CPT

## 2022-07-07 PROCEDURE — 83880 ASSAY OF NATRIURETIC PEPTIDE: CPT

## 2022-07-07 PROCEDURE — 82962 GLUCOSE BLOOD TEST: CPT

## 2022-07-07 PROCEDURE — 71260 CT THORAX DX C+: CPT | Performed by: NURSE PRACTITIONER

## 2022-07-07 RX ORDER — ALBUTEROL SULFATE 90 UG/1
2 AEROSOL, METERED RESPIRATORY (INHALATION) ONCE
Status: COMPLETED | OUTPATIENT
Start: 2022-07-07 | End: 2022-07-07

## 2022-07-07 RX ORDER — INSULIN LISPRO 100 [IU]/ML
0-6 INJECTION, SOLUTION INTRAVENOUS; SUBCUTANEOUS
Status: DISCONTINUED | OUTPATIENT
Start: 2022-07-08 | End: 2022-07-09 | Stop reason: HOSPADM

## 2022-07-07 RX ORDER — ONDANSETRON 2 MG/ML
4 INJECTION INTRAMUSCULAR; INTRAVENOUS EVERY 6 HOURS PRN
Status: DISCONTINUED | OUTPATIENT
Start: 2022-07-07 | End: 2022-07-09 | Stop reason: HOSPADM

## 2022-07-07 RX ORDER — ONDANSETRON 2 MG/ML
4 INJECTION INTRAMUSCULAR; INTRAVENOUS EVERY 6 HOURS PRN
Status: CANCELLED | OUTPATIENT
Start: 2022-07-07

## 2022-07-07 RX ORDER — ASPIRIN 81 MG/1
324 TABLET, CHEWABLE ORAL ONCE
Status: COMPLETED | OUTPATIENT
Start: 2022-07-07 | End: 2022-07-07

## 2022-07-07 RX ORDER — CARVEDILOL 25 MG/1
25 TABLET ORAL 2 TIMES DAILY WITH MEALS
Status: DISCONTINUED | OUTPATIENT
Start: 2022-07-07 | End: 2022-07-09 | Stop reason: HOSPADM

## 2022-07-07 RX ORDER — CLOPIDOGREL BISULFATE 75 MG/1
75 TABLET ORAL DAILY
Status: DISCONTINUED | OUTPATIENT
Start: 2022-07-08 | End: 2022-07-09 | Stop reason: HOSPADM

## 2022-07-07 RX ORDER — ISOSORBIDE MONONITRATE 30 MG/1
30 TABLET, EXTENDED RELEASE ORAL DAILY
Status: DISCONTINUED | OUTPATIENT
Start: 2022-07-08 | End: 2022-07-09 | Stop reason: HOSPADM

## 2022-07-07 RX ORDER — LISINOPRIL 20 MG/1
40 TABLET ORAL DAILY
Status: DISCONTINUED | OUTPATIENT
Start: 2022-07-08 | End: 2022-07-09 | Stop reason: HOSPADM

## 2022-07-07 RX ORDER — ALBUTEROL SULFATE 90 UG/1
1 AEROSOL, METERED RESPIRATORY (INHALATION) EVERY 6 HOURS PRN
Status: DISCONTINUED | OUTPATIENT
Start: 2022-07-07 | End: 2022-07-09 | Stop reason: HOSPADM

## 2022-07-07 RX ORDER — ONDANSETRON 4 MG/1
4 TABLET, ORALLY DISINTEGRATING ORAL EVERY 8 HOURS PRN
Status: CANCELLED | OUTPATIENT
Start: 2022-07-07

## 2022-07-07 RX ORDER — LISINOPRIL 20 MG/1
40 TABLET ORAL DAILY
Status: CANCELLED | OUTPATIENT
Start: 2022-07-07

## 2022-07-07 RX ORDER — PANTOPRAZOLE SODIUM 40 MG/1
40 TABLET, DELAYED RELEASE ORAL DAILY
Status: DISCONTINUED | OUTPATIENT
Start: 2022-07-08 | End: 2022-07-09 | Stop reason: HOSPADM

## 2022-07-07 RX ORDER — ASPIRIN 81 MG/1
81 TABLET, CHEWABLE ORAL DAILY
Status: DISCONTINUED | OUTPATIENT
Start: 2022-07-08 | End: 2022-07-09 | Stop reason: HOSPADM

## 2022-07-07 RX ORDER — ENOXAPARIN SODIUM 100 MG/ML
40 INJECTION SUBCUTANEOUS DAILY
Status: CANCELLED | OUTPATIENT
Start: 2022-07-07

## 2022-07-07 RX ORDER — ACETAMINOPHEN 650 MG/1
650 SUPPOSITORY RECTAL EVERY 6 HOURS PRN
Status: DISCONTINUED | OUTPATIENT
Start: 2022-07-07 | End: 2022-07-09 | Stop reason: HOSPADM

## 2022-07-07 RX ORDER — PREDNISONE 10 MG/1
40 TABLET ORAL DAILY
Status: DISCONTINUED | OUTPATIENT
Start: 2022-07-09 | End: 2022-07-09 | Stop reason: HOSPADM

## 2022-07-07 RX ORDER — SODIUM CHLORIDE 0.9 % (FLUSH) 0.9 %
5-40 SYRINGE (ML) INJECTION PRN
Status: DISCONTINUED | OUTPATIENT
Start: 2022-07-07 | End: 2022-07-09 | Stop reason: HOSPADM

## 2022-07-07 RX ORDER — METOCLOPRAMIDE HYDROCHLORIDE 5 MG/ML
10 INJECTION INTRAMUSCULAR; INTRAVENOUS EVERY 6 HOURS PRN
Status: DISCONTINUED | OUTPATIENT
Start: 2022-07-07 | End: 2022-07-09 | Stop reason: HOSPADM

## 2022-07-07 RX ORDER — GUAIFENESIN/DEXTROMETHORPHAN 100-10MG/5
5 SYRUP ORAL ONCE
Status: COMPLETED | OUTPATIENT
Start: 2022-07-07 | End: 2022-07-07

## 2022-07-07 RX ORDER — ATORVASTATIN CALCIUM 40 MG/1
80 TABLET, FILM COATED ORAL DAILY
Status: DISCONTINUED | OUTPATIENT
Start: 2022-07-08 | End: 2022-07-09 | Stop reason: HOSPADM

## 2022-07-07 RX ORDER — ONDANSETRON 4 MG/1
4 TABLET, ORALLY DISINTEGRATING ORAL EVERY 8 HOURS PRN
Status: DISCONTINUED | OUTPATIENT
Start: 2022-07-07 | End: 2022-07-09 | Stop reason: HOSPADM

## 2022-07-07 RX ORDER — SODIUM CHLORIDE 9 MG/ML
INJECTION, SOLUTION INTRAVENOUS PRN
Status: DISCONTINUED | OUTPATIENT
Start: 2022-07-07 | End: 2022-07-09 | Stop reason: HOSPADM

## 2022-07-07 RX ORDER — PREDNISONE 20 MG/1
60 TABLET ORAL ONCE
Status: COMPLETED | OUTPATIENT
Start: 2022-07-07 | End: 2022-07-07

## 2022-07-07 RX ORDER — HYDRALAZINE HYDROCHLORIDE 10 MG/1
10 TABLET, FILM COATED ORAL 2 TIMES DAILY
Status: DISCONTINUED | OUTPATIENT
Start: 2022-07-07 | End: 2022-07-09 | Stop reason: HOSPADM

## 2022-07-07 RX ORDER — METHYLPREDNISOLONE SODIUM SUCCINATE 40 MG/ML
40 INJECTION, POWDER, LYOPHILIZED, FOR SOLUTION INTRAMUSCULAR; INTRAVENOUS EVERY 6 HOURS
Status: COMPLETED | OUTPATIENT
Start: 2022-07-07 | End: 2022-07-08

## 2022-07-07 RX ORDER — ACETAMINOPHEN 325 MG/1
650 TABLET ORAL EVERY 6 HOURS PRN
Status: DISCONTINUED | OUTPATIENT
Start: 2022-07-07 | End: 2022-07-09 | Stop reason: HOSPADM

## 2022-07-07 RX ORDER — ENOXAPARIN SODIUM 100 MG/ML
40 INJECTION SUBCUTANEOUS DAILY
Status: DISCONTINUED | OUTPATIENT
Start: 2022-07-08 | End: 2022-07-09 | Stop reason: HOSPADM

## 2022-07-07 RX ORDER — ASPIRIN 81 MG/1
81 TABLET, CHEWABLE ORAL DAILY
Status: DISCONTINUED | OUTPATIENT
Start: 2022-07-08 | End: 2022-07-07 | Stop reason: SDUPTHER

## 2022-07-07 RX ORDER — SODIUM CHLORIDE 9 MG/ML
INJECTION, SOLUTION INTRAVENOUS CONTINUOUS
Status: DISCONTINUED | OUTPATIENT
Start: 2022-07-07 | End: 2022-07-07

## 2022-07-07 RX ORDER — LEVOFLOXACIN 5 MG/ML
750 INJECTION, SOLUTION INTRAVENOUS EVERY 24 HOURS
Status: DISCONTINUED | OUTPATIENT
Start: 2022-07-07 | End: 2022-07-09 | Stop reason: HOSPADM

## 2022-07-07 RX ORDER — ASPIRIN 81 MG/1
81 TABLET, CHEWABLE ORAL DAILY
Status: CANCELLED | OUTPATIENT
Start: 2022-07-08

## 2022-07-07 RX ORDER — FUROSEMIDE 10 MG/ML
40 INJECTION INTRAMUSCULAR; INTRAVENOUS 2 TIMES DAILY
Status: DISCONTINUED | OUTPATIENT
Start: 2022-07-07 | End: 2022-07-09

## 2022-07-07 RX ORDER — AMLODIPINE BESYLATE 10 MG/1
10 TABLET ORAL DAILY
Status: DISCONTINUED | OUTPATIENT
Start: 2022-07-08 | End: 2022-07-09 | Stop reason: HOSPADM

## 2022-07-07 RX ORDER — INSULIN LISPRO 100 [IU]/ML
0-3 INJECTION, SOLUTION INTRAVENOUS; SUBCUTANEOUS NIGHTLY
Status: DISCONTINUED | OUTPATIENT
Start: 2022-07-07 | End: 2022-07-09 | Stop reason: HOSPADM

## 2022-07-07 RX ORDER — TAMSULOSIN HYDROCHLORIDE 0.4 MG/1
0.4 CAPSULE ORAL DAILY
Status: DISCONTINUED | OUTPATIENT
Start: 2022-07-08 | End: 2022-07-09 | Stop reason: HOSPADM

## 2022-07-07 RX ORDER — DEXTROSE MONOHYDRATE 50 MG/ML
100 INJECTION, SOLUTION INTRAVENOUS PRN
Status: DISCONTINUED | OUTPATIENT
Start: 2022-07-07 | End: 2022-07-09 | Stop reason: HOSPADM

## 2022-07-07 RX ORDER — CARVEDILOL 25 MG/1
1 TABLET ORAL 2 TIMES DAILY
COMMUNITY
Start: 2022-04-12 | End: 2022-07-15

## 2022-07-07 RX ORDER — SODIUM CHLORIDE 0.9 % (FLUSH) 0.9 %
5-40 SYRINGE (ML) INJECTION EVERY 12 HOURS SCHEDULED
Status: DISCONTINUED | OUTPATIENT
Start: 2022-07-07 | End: 2022-07-09 | Stop reason: HOSPADM

## 2022-07-07 RX ORDER — POLYETHYLENE GLYCOL 3350 17 G/17G
17 POWDER, FOR SOLUTION ORAL DAILY PRN
Status: DISCONTINUED | OUTPATIENT
Start: 2022-07-07 | End: 2022-07-09 | Stop reason: HOSPADM

## 2022-07-07 RX ADMIN — GUAIFENESIN AND DEXTROMETHORPHAN 5 ML: 100; 10 SYRUP ORAL at 20:28

## 2022-07-07 RX ADMIN — SODIUM CHLORIDE: 9 INJECTION, SOLUTION INTRAVENOUS at 20:30

## 2022-07-07 RX ADMIN — ALBUTEROL SULFATE 2 PUFF: 90 AEROSOL, METERED RESPIRATORY (INHALATION) at 18:32

## 2022-07-07 RX ADMIN — CARVEDILOL 25 MG: 25 TABLET, FILM COATED ORAL at 23:18

## 2022-07-07 RX ADMIN — PREDNISONE 60 MG: 20 TABLET ORAL at 20:28

## 2022-07-07 RX ADMIN — METHYLPREDNISOLONE SODIUM SUCCINATE 40 MG: 40 INJECTION, POWDER, FOR SOLUTION INTRAMUSCULAR; INTRAVENOUS at 23:19

## 2022-07-07 RX ADMIN — Medication 2 PUFF: at 18:32

## 2022-07-07 RX ADMIN — ASPIRIN 81 MG CHEWABLE TABLET 324 MG: 81 TABLET CHEWABLE at 20:27

## 2022-07-07 RX ADMIN — HYDRALAZINE HYDROCHLORIDE 10 MG: 10 TABLET, FILM COATED ORAL at 23:18

## 2022-07-07 RX ADMIN — FUROSEMIDE 40 MG: 10 INJECTION, SOLUTION INTRAMUSCULAR; INTRAVENOUS at 23:18

## 2022-07-07 RX ADMIN — SODIUM CHLORIDE, PRESERVATIVE FREE 10 ML: 5 INJECTION INTRAVENOUS at 23:19

## 2022-07-07 RX ADMIN — IOPAMIDOL 75 ML: 755 INJECTION, SOLUTION INTRAVENOUS at 21:35

## 2022-07-07 RX ADMIN — LEVOFLOXACIN 750 MG: 5 INJECTION, SOLUTION INTRAVENOUS at 23:19

## 2022-07-07 ASSESSMENT — ENCOUNTER SYMPTOMS
VOMITING: 0
ABDOMINAL PAIN: 0
CHEST TIGHTNESS: 1
SHORTNESS OF BREATH: 1
NAUSEA: 0
COUGH: 1
WHEEZING: 1
SORE THROAT: 0
CONSTIPATION: 0
EYES NEGATIVE: 1

## 2022-07-07 NOTE — ED PROVIDER NOTES
EMERGENCY DEPARTMENT ENCOUNTER      PCP: Socorro Allan MD    [facility]    Chief Complaint   Patient presents with    Shortness of Breath     x3days    Cough     x1week       This patient was not evaluated by the attending physician. I have independently evaluated this patient. HPI    Brian Hess is a 60 y.o. male who presents with cough productive of phlegm. Onset 1 week ago. Patient states over the past 3 days he has had increased work of breathing. Patient has history of COPD and has had increased wheezing. Patient denies sick contacts. Patient has associated chest pain which he describes as tightness and sharp with coughing. Patient denies abdominal pain, vomiting, diarrhea, lower extremity pain or swelling. Patient denies history of blood clots, recent travel or surgery. Patient denies sore throat or ear pain. REVIEW OF SYSTEMS    Constitutional:  Denies fever  HENT:  Denies sore throat or ear pain   Cardiovascular: see HPI  Respiratory:  See HPI. GI:  Denies abdominal pain, vomiting, or diarrhea  :  Denies any urinary symptoms  Musculoskeletal:  Denies back pain   Skin:  Denies rash  Neurologic:  Denies focal weakness or sensory changes   Lymphatic:  Denies swollen glands     All other review of systems are negative  See HPI and nursing notes for additional information       PAST MEDICAL & SURGICAL HISTORY    Past Medical History:   Diagnosis Date    [facility]vascular accident ([facility]A) [facility] 5/22/2020    Acute ST elevation myocardial infarction (STEMI) ([facility] 3/14/2018    B12 deficiency     CAD (coronary artery disease)     COPD (chronic obstructive pulmonary disease) ([facility] 12/11/2020    GERD (gastroesophageal reflux disease)     Guillain Barré syndrome ([facility]     H/O echocardiogram 7/30/13, 6/16/10    7/13-EF 50-55% WNL.  6/10-EF53%, mod LVH, MARCI, RVE, trace PI, mild MR/TR, 6/09    Heart murmur     History of echocardiogram 10/07/2020    EF 40%, No changed since previous 5/2020, Mid inferior hypokinesis noted, Grade I DD, Mod MR, Mild TR, Dilatation of the aortic root, No pericardial effusion     History of myocardial infarction 2003    History of nuclear MUGA test 03/03/2020    EF 53%, Normal, No ICD needed    History of nuclear stress test 06/23/2020    EF 30%, study suggestive of abnormal myocardial perfusion.  History of PTCA 6/03;2/03 6/03-PTCA w/stents x2, 2/03-PTCA w/stent x3    History of PTCA 03/15/2018    LAD EF25%    Hx of cardiovascular stress test 04/25/2018    EF 28% Nuclear scintigraphy demonstartes inferior wall infarct.  Hx of cardiovascular stress test 01/27/2020    Abnormal Study. Large area of inferior & apical-lateral infarct. No significant reversible ischemia. Severly reduced LVEF 23%    HX OTHER MEDICAL 03/14/2018    lifevest  d/c 7/2/2018    Hyperlipidemia     Hypertension     Impaired glucose tolerance 6/3/2016    MUGA 07/09/2018    EF 47%    NSTEMI (non-ST elevated myocardial infarction) ([address] 03/15/2018    Patient in clinical research study 05/2018    3 year study dalcitripib vs placebo [phone]    Pneumonia due to organism     Renal cyst 5/23/2014    S/P cardiac catheterization 7/12/13 7/13-EF=55%. PATENT LAD AND RCA STENTS, OM2 50% stenosis, & Normal wall motion.  Stroke (cerebrum) [facility])     May 2016     Past Surgical History:   Procedure Laterality Date    CARDIAC CATHETERIZATION  7/13 7/13-EF=55%. PATENT LAD AND RCA STENTS, OM2 50% stenosis, & Normal wall motion.     COLONOSCOPY  8/21/14    colon polyp, diverticulosis, internal hemorrhoids    CORONARY ANGIOPLASTY  6/03 6/14/03- PTCA w stents x2 mid LAD, 2/03-PTCA w stents x3, RCA    CORONARY ARTERY BYPASS GRAFT N/A 5/18/2020    CABG CORONARY ARTERY BYPASS x 2, INTRAOPERATIVE NGA, INDUCED HYPOTHERMIA, LEFT LEG ENDOSCOPIC VEIN HARVEST performed by Analilia Santiago MD at [address], COLON, DIAGNOSTIC  8/21/14    irregular GE junction, route 2 times daily for 180 doses 180 each 3    tamsulosin (FLOMAX) 0.4 MG capsule Take 1 capsule by mouth daily 30 capsule 0    clopidogrel (PLAVIX) 75 MG tablet Take 1 tablet by mouth daily 30 tablet 0    pantoprazole (PROTONIX) 40 MG tablet Take 1 tablet by mouth daily 30 tablet 0    isosorbide mononitrate (IMDUR) 30 MG extended release tablet Take 1 tablet by mouth daily 30 tablet 0    Blood Pressure KIT Use prn 1 kit 0       ALLERGIES    Allergies   Allergen Reactions    Ibuprofen Other (See Comments)     Stomach irritation    Augmentin [Amoxicillin-Pot Clavulanate]     Codeine        SOCIAL & FAMILY HISTORY    Social History     Socioeconomic History    Marital status:      Spouse name: None    Number of children: None    Years of education: None    Highest education level: None   Occupational History    Occupation:      Comment: full time   Tobacco Use    Smoking status: Former Smoker     Packs/day: 2.00     Years: 45.00     Pack years: 90.00     Types: Cigarettes     Quit date: 2016     Years since quittin.1    Smokeless tobacco: Never Used   Vaping Use    Vaping Use: Never used   Substance and Sexual Activity    Alcohol use: No     Alcohol/week: 0.0 standard drinks    Drug use: No    Sexual activity: Yes     Partners: Female   Other Topics Concern    None   Social History Narrative    Wears glasses     Social Determinants of Health     Financial Resource Strain:     Difficulty of Paying Living Expenses: Not on file   Food Insecurity:     Worried About Running Out of Food in the Last Year: Not on file    [employer] of Food in the Last Year: Not on file   Transportation Needs:     Lack of Transportation (Medical): Not on file    Lack of Transportation (Non-Medical):  Not on file   Physical Activity:     Days of Exercise per Week: Not on file    Minutes of Exercise per Session: Not on file   Stress:     Feeling of Stress : Not on file   Social Connections:     Frequency of Communication with Friends and Family: Not on file    Frequency of Social Gatherings with Friends and Family: Not on file    Attends Baptism Services: Not on file    Active Member of Clubs or Organizations: Not on file    Attends Club or Organization Meetings: Not on file    Marital Status: Not on file   Intimate Partner Violence:     Fear of Current or Ex-Partner: Not on file    Emotionally Abused: Not on file    Physically Abused: Not on file    Sexually Abused: Not on file   Housing Stability:     Unable to Pay for Housing in the Last Year: Not on file    Number of [place] in the Last Year: Not on file    Unstable Housing in the Last Year: Not on file     Family History   Problem Relation Age of Onset    Hypertension Mother     High Cholesterol Mother     Alcohol Abuse Mother     Depression Mother     Alcohol Abuse Father     Stomach Cancer Father     Depression Sister     Diabetes Brother     Depression Sister     Stomach Cancer Brother     Alcohol Abuse Brother        PHYSICAL EXAM    VITAL SIGNS: BP (!) 182/98   Pulse 86   Temp 97.9 °F (36.6 °C) (Oral)   Resp 24   Ht 5' 7\" (1.702 m)   Wt 205 lb (93 kg)   SpO2 98%   BMI 32.11 kg/m²    Constitutional:  Well developed, well nourished, no acute distress   HENT:  Atraumatic, moist mucus membranes. Oropharynx is clear  Neck/Lymphatics: supple, no JVD, no swollen nodes  Respiratory:  Nonlabored breathing.   Lungs decreased bilaterally with expiratory wheezes, no retractions   Cardiovascular: Normal rate and rhythm  GI:  Soft, nontender, normal bowel sounds  Musculoskeletal:  No edema, no acute deformities  Integument:  Skin is warm and dry, no petechiae   Neurologic:  Alert & oriented, no slurred speech  Psych: Pleasant affect, no hallucinations      EKG      EKG Interpretation  Please see ED physician's note for EKG interpretation      LABS:  Results for orders placed or performed during the hospital encounter of 07/07/22   COVID-19, Rapid    Specimen: Nasopharyngeal   Result Value Ref Range    Source THROAT     SARS-CoV-2, NAAT NOT DETECTED NOT DETECTED   Comprehensive Metabolic Panel w/ Reflex to MG   Result Value Ref Range    Sodium 139 135 - 145 MMOL/L    Potassium 3.7 3.5 - 5.1 MMOL/L    Chloride 105 99 - 110 mMol/L    CO2 21 21 - 32 MMOL/L    BUN 17 6 - 23 MG/DL    CREATININE 1.4 (H) 0.9 - 1.3 MG/DL    Glucose 97 70 - 99 MG/DL    Calcium 8.9 8.3 - 10.6 MG/DL    Albumin 4.1 3.4 - 5.0 GM/DL    Total Protein 6.8 6.4 - 8.2 GM/DL    Total Bilirubin 0.6 0.0 - 1.0 MG/DL    ALT 13 10 - 40 U/L    AST 18 15 - 37 IU/L    Alkaline Phosphatase 59 40 - 129 IU/L    GFR Non- 51 (L) >60 mL/min/1.73m2    GFR African American >60 >60 mL/min/1.73m2    Anion Gap 13 4 - 16   Troponin   Result Value Ref Range    Troponin T 0.028 (H) <0.01 NG/ML   Brain Natriuretic Peptide   Result Value Ref Range    Pro-BNP 3,516 (H) <300 PG/ML   CBC with Auto Differential   Result Value Ref Range    WBC 6.6 4.0 - 10.5 K/CU MM    RBC 4.55 (L) 4.6 - 6.2 M/CU MM    Hemoglobin 14.9 13.5 - 18.0 GM/DL    Hematocrit 44.5 42 - 52 %    MCV 97.8 78 - 100 FL    MCH 32.7 (H) 27 - 31 PG    MCHC 33.5 32.0 - 36.0 %    RDW 13.2 11.7 - 14.9 %    Platelets 232 935 - 645 K/CU MM    MPV 12.2 (H) 7.5 - 11.1 FL    Differential Type AUTOMATED DIFFERENTIAL     Segs Relative 59.1 36 - 66 %    Lymphocytes % 30.4 24 - 44 %    Monocytes % 6.8 (H) 0 - 4 %    Eosinophils % 2.6 0 - 3 %    Basophils % 0.9 0 - 1 %    Segs Absolute 3.9 K/CU MM    Lymphocytes Absolute 2.0 K/CU MM    Monocytes Absolute 0.5 K/CU MM    Eosinophils Absolute 0.2 K/CU MM    Basophils Absolute 0.1 K/CU MM    Nucleated RBC % 0.0 %    Total Nucleated RBC 0.0 K/CU MM    Total Immature Neutrophil 0.01 K/CU MM    Immature Neutrophil % 0.2 0 - 0.43 %           RADIOLOGY/PROCEDURES    XR CHEST PORTABLE   Final Result   1. No acute cardiopulmonary process identified.                  ED COURSE & MEDICAL DECISION MAKING      Patient presents as above. See physician note for EKG reading. Patient provided prednisone, cough medication and inhaler treatments. COVID is negative. CMP shows creatinine 1.4 which is similar to previous. Troponin is 0.028 and BNP is 3516. CBC is grossly within normal limits. Chest x-ray shows no acute process. I discussed labs and imaging with patient today. I believe patient requires admission for further evaluation and treatment. Patient had a heart cath on 3/12/2022 which showed LAD and % occluded, circumflex is patent with recommendation for continued medical management. Troponin on 3/12 was 0.018 and trended to undetectable on 3/23. Consult hospitalist who accepts admission, does request that cardiology be consulted. Consult cardiologist Dr. Nely Grimm who agrees with current plan and will evaluate patient. Clinical  IMPRESSION    1. Chest pain, unspecified type    2. Shortness of breath    3. Wheezing    4. Cough    5. Elevated troponin        Patient admitted    Comment: Please note this report has been produced using speech recognition software and may contain errors related to that system including errors in grammar, punctuation, and spelling, as well as words and phrases that may be inappropriate. If there are any questions or concerns please feel free to contact the dictating provider for clarification.                           Taina Feliciano PA-C  07/07/22 1954

## 2022-07-07 NOTE — CONSULTS
Cardiac catheterization (7/13); Percutaneous Transluminal Coronary Angio (03/2018); and Coronary artery bypass graft (N/A, 5/18/2020). Social History:   reports that he quit smoking about 6 years ago. His smoking use included cigarettes. He has a 90.00 pack-year smoking history. He has never used smokeless tobacco. He reports that he does not drink alcohol and does not use drugs. Family history:  family history includes Alcohol Abuse in his brother, father, and mother; Depression in his mother, sister, and sister; Diabetes in his brother; High Cholesterol in his mother; Hypertension in his mother; Stomach Cancer in his brother and father.     Allergies   Allergen Reactions    Ibuprofen Other (See Comments)     Stomach irritation    Augmentin [Amoxicillin-Pot Clavulanate]     Codeine        predniSONE (DELTASONE) tablet 60 mg, Once  guaiFENesin-dextromethorphan (ROBITUSSIN DM) 100-10 MG/5ML syrup 5 mL, Once  aspirin chewable tablet 324 mg, Once      Current Facility-Administered Medications   Medication Dose Route Frequency Provider Last Rate Last Admin    predniSONE (DELTASONE) tablet 60 mg  60 mg Oral Once Jessica Pierson PA-C        guaiFENesin-dextromethorphan (ROBITUSSIN DM) 100-10 MG/5ML syrup 5 mL  5 mL Oral Once Jessica Pierson PA-C        aspirin chewable tablet 324 mg  324 mg Oral Once Rowan Patel PA-C         Current Outpatient Medications   Medication Sig Dispense Refill    glimepiride (AMARYL) 2 MG tablet Take 1 tablet by mouth every morning 90 tablet 1    atorvastatin (LIPITOR) 80 MG tablet Take 1 tablet by mouth daily 90 tablet 1    albuterol-ipratropium (COMBIVENT RESPIMAT)  MCG/ACT AERS inhaler Inhale 1 puff into the lungs every 6 hours 3 each 1    sertraline (ZOLOFT) 100 MG tablet Take 1 tablet by mouth daily 90 tablet 3    pantoprazole (PROTONIX) 40 MG tablet Take 1 tablet by mouth daily 90 tablet 1    lisinopril (PRINIVIL;ZESTRIL) 40 MG tablet Take 1 tablet by mouth daily 90 tablet 1    carvedilol (COREG) 12.5 MG tablet Take 1 tablet by mouth 2 times daily 60 tablet 3    aspirin 81 MG chewable tablet Take 1 tablet by mouth daily 180 tablet 0    albuterol-ipratropium (COMBIVENT RESPIMAT)  MCG/ACT AERS inhaler Inhale 1 puff into the lungs every 6 hours as needed for Wheezing or Shortness of Breath 120 each 0    sertraline (ZOLOFT) 100 MG tablet Take 1 tablet by mouth daily 30 tablet 0    atorvastatin (LIPITOR) 80 MG tablet Take 1 tablet by mouth daily 30 tablet 0    hydrALAZINE (APRESOLINE) 10 MG tablet Take 1 tablet by mouth 2 times daily 60 tablet 0    lisinopril (PRINIVIL;ZESTRIL) 40 MG tablet Take 1 tablet by mouth daily 30 tablet 0    amLODIPine (NORVASC) 10 MG tablet Take 1 tablet by mouth daily 30 tablet 0    blood glucose monitor strips 1 strip by Other route 2 times daily for 180 doses 180 strip 0    furosemide (LASIX) 40 MG tablet Take 1 tablet by mouth daily 60 tablet 1    Alcohol Swabs (ALCOHOL PADS) 70 % PADS 1 Units by Does not apply route 2 times daily 180 each 3    Blood Glucose Monitoring Suppl (BLOOD GLUCOSE MONITOR SYSTEM) w/Device KIT Use twice per day 1 kit 0    Lancets MISC 1 each by Does not apply route 2 times daily for 180 doses 180 each 3    tamsulosin (FLOMAX) 0.4 MG capsule Take 1 capsule by mouth daily 30 capsule 0    clopidogrel (PLAVIX) 75 MG tablet Take 1 tablet by mouth daily 30 tablet 0    pantoprazole (PROTONIX) 40 MG tablet Take 1 tablet by mouth daily 30 tablet 0    isosorbide mononitrate (IMDUR) 30 MG extended release tablet Take 1 tablet by mouth daily 30 tablet 0    Blood Pressure KIT Use prn 1 kit 0     Review of Systems:  All 14 systems reviewed, all negative except for  cp    Physical Examination:    BP (!) 176/90   Pulse 77   Temp 97.9 °F (36.6 °C) (Oral)   Resp 17   Ht 5' 7\" (1.702 m)   Wt 205 lb (93 kg)   SpO2 96%   BMI 32.11 kg/m²      Wt Readings from Last 3 Encounters:   07/07/22 205 lb (93 14.9 13.5 - 18.0 GM/DL    Hematocrit 44.5 42 - 52 %    MCV 97.8 78 - 100 FL    MCH 32.7 (H) 27 - 31 PG    MCHC 33.5 32.0 - 36.0 %    RDW 13.2 11.7 - 14.9 %    Platelets 225 023 - 243 K/CU MM    MPV 12.2 (H) 7.5 - 11.1 FL    Differential Type AUTOMATED DIFFERENTIAL     Segs Relative 59.1 36 - 66 %    Lymphocytes % 30.4 24 - 44 %    Monocytes % 6.8 (H) 0 - 4 %    Eosinophils % 2.6 0 - 3 %    Basophils % 0.9 0 - 1 %    Segs Absolute 3.9 K/CU MM    Lymphocytes Absolute 2.0 K/CU MM    Monocytes Absolute 0.5 K/CU MM    Eosinophils Absolute 0.2 K/CU MM    Basophils Absolute 0.1 K/CU MM    Nucleated RBC % 0.0 %    Total Nucleated RBC 0.0 K/CU MM    Total Immature Neutrophil 0.01 K/CU MM    Immature Neutrophil % 0.2 0 - 0.43 %   COVID-19, Rapid    Collection Time: 07/07/22  5:45 PM    Specimen: Nasopharyngeal   Result Value Ref Range    Source THROAT     SARS-CoV-2, NAAT NOT DETECTED NOT DETECTED   EKG 12 Lead    Collection Time: 07/07/22  7:39 PM   Result Value Ref Range    Ventricular Rate 75 BPM    Atrial Rate 75 BPM    P-R Interval 160 ms    QRS Duration 126 ms    Q-T Interval 440 ms    QTc Calculation (Bazett) 491 ms    P Axis 65 degrees    R Axis 2 degrees    T Axis 137 degrees    Diagnosis       Normal sinus rhythm  Possible Left atrial enlargement  Left ventricular hypertrophy with QRS widening  Possible Inferior infarct (cited on or before 14-MAR-2018)  Cannot rule out Anterior infarct , age undetermined  Abnormal ECG  When compared with ECG of 07-JUL-2022 17:06,  T wave inversion more evident in Lateral leads             Assessment/Recommendations:     - CAD history of CABG, recent cath showed bypasses were patent I will check the troponins we will continue present medical therapy  - HfREF EF is known to be low hence will continue with gentle diuresis along with preload and afterload reduction  -Hyperlioidimea patient is on statin which will be continued lipid profile will be checked  - DM we will monitor the blood glucose level along with A1c he will benefit better from Andrea Sousa MD, 7/7/2022 7:55 PM       Please note this report has been partially produced using speech recognition software and may contain errors related to that system including errors in grammar, punctuation, and spelling, as well as words and phrases that may be inappropriate. If there are any questions or concerns please feel free to contact the dictating provider for clarification.

## 2022-07-08 LAB
ADENOVIRUS DETECTION BY PCR: NOT DETECTED
ALBUMIN SERPL-MCNC: 4.3 GM/DL (ref 3.4–5)
ALP BLD-CCNC: 66 IU/L (ref 40–128)
ALT SERPL-CCNC: 14 U/L (ref 10–40)
ANION GAP SERPL CALCULATED.3IONS-SCNC: 11 MMOL/L (ref 4–16)
AST SERPL-CCNC: 17 IU/L (ref 15–37)
BILIRUB SERPL-MCNC: 0.6 MG/DL (ref 0–1)
BORDETELLA PARAPERTUSSIS BY PCR: NOT DETECTED
BORDETELLA PERTUSSIS PCR: NOT DETECTED
BUN BLDV-MCNC: 18 MG/DL (ref 6–23)
CALCIUM SERPL-MCNC: 8.9 MG/DL (ref 8.3–10.6)
CHLAMYDOPHILA PNEUMONIA PCR: NOT DETECTED
CHLORIDE BLD-SCNC: 100 MMOL/L (ref 99–110)
CHOLESTEROL: 174 MG/DL
CO2: 22 MMOL/L (ref 21–32)
CORONAVIRUS 229E PCR: NOT DETECTED
CORONAVIRUS HKU1 PCR: NOT DETECTED
CORONAVIRUS NL63 PCR: NOT DETECTED
CORONAVIRUS OC43 PCR: NOT DETECTED
CREAT SERPL-MCNC: 1.4 MG/DL (ref 0.9–1.3)
EKG ATRIAL RATE: 59 BPM
EKG ATRIAL RATE: 75 BPM
EKG ATRIAL RATE: 83 BPM
EKG DIAGNOSIS: NORMAL
EKG P AXIS: 33 DEGREES
EKG P AXIS: 65 DEGREES
EKG P AXIS: 77 DEGREES
EKG P-R INTERVAL: 156 MS
EKG P-R INTERVAL: 160 MS
EKG P-R INTERVAL: 176 MS
EKG Q-T INTERVAL: 400 MS
EKG Q-T INTERVAL: 440 MS
EKG Q-T INTERVAL: 484 MS
EKG QRS DURATION: 122 MS
EKG QRS DURATION: 126 MS
EKG QRS DURATION: 130 MS
EKG QTC CALCULATION (BAZETT): 470 MS
EKG QTC CALCULATION (BAZETT): 479 MS
EKG QTC CALCULATION (BAZETT): 491 MS
EKG R AXIS: 10 DEGREES
EKG R AXIS: 2 DEGREES
EKG R AXIS: 28 DEGREES
EKG T AXIS: 137 DEGREES
EKG T AXIS: 145 DEGREES
EKG T AXIS: 147 DEGREES
EKG VENTRICULAR RATE: 59 BPM
EKG VENTRICULAR RATE: 75 BPM
EKG VENTRICULAR RATE: 83 BPM
ESTIMATED AVERAGE GLUCOSE: 143 MG/DL
GFR AFRICAN AMERICAN: >60 ML/MIN/1.73M2
GFR NON-AFRICAN AMERICAN: 51 ML/MIN/1.73M2
GLUCOSE BLD-MCNC: 194 MG/DL (ref 70–99)
GLUCOSE BLD-MCNC: 195 MG/DL (ref 70–99)
GLUCOSE BLD-MCNC: 228 MG/DL (ref 70–99)
GLUCOSE BLD-MCNC: 246 MG/DL (ref 70–99)
GLUCOSE BLD-MCNC: 327 MG/DL (ref 70–99)
HBA1C MFR BLD: 6.6 % (ref 4.2–6.3)
HCT VFR BLD CALC: 44.7 % (ref 42–52)
HDLC SERPL-MCNC: 32 MG/DL
HEMOGLOBIN: 14.9 GM/DL (ref 13.5–18)
HUMAN METAPNEUMOVIRUS PCR: NOT DETECTED
INFLUENZA A BY PCR: NOT DETECTED
INFLUENZA A H1 (2009) PCR: NOT DETECTED
INFLUENZA A H1 PANDEMIC PCR: NOT DETECTED
INFLUENZA A H3 PCR: NOT DETECTED
INFLUENZA B BY PCR: NOT DETECTED
LDL CHOLESTEROL CALCULATED: 124 MG/DL
LEGIONELLA URINARY AG: NEGATIVE
MCH RBC QN AUTO: 33 PG (ref 27–31)
MCHC RBC AUTO-ENTMCNC: 33.3 % (ref 32–36)
MCV RBC AUTO: 98.9 FL (ref 78–100)
MYCOPLASMA PNEUMONIAE PCR: NOT DETECTED
PARAINFLUENZA 1 PCR: NOT DETECTED
PARAINFLUENZA 2 PCR: NOT DETECTED
PARAINFLUENZA 3 PCR: NOT DETECTED
PARAINFLUENZA 4 PCR: NOT DETECTED
PDW BLD-RTO: 13.2 % (ref 11.7–14.9)
PLATELET # BLD: 155 K/CU MM (ref 140–440)
PMV BLD AUTO: 12.4 FL (ref 7.5–11.1)
POTASSIUM SERPL-SCNC: 4.3 MMOL/L (ref 3.5–5.1)
PROCALCITONIN: 0.07
RBC # BLD: 4.52 M/CU MM (ref 4.6–6.2)
RHINOVIRUS ENTEROVIRUS PCR: NOT DETECTED
RSV PCR: NOT DETECTED
SARS-COV-2: NOT DETECTED
SODIUM BLD-SCNC: 133 MMOL/L (ref 135–145)
STREP PNEUMONIAE ANTIGEN: NORMAL
T4 FREE: 1.09 NG/DL (ref 0.9–1.8)
TOTAL PROTEIN: 6.6 GM/DL (ref 6.4–8.2)
TRIGL SERPL-MCNC: 90 MG/DL
TROPONIN T: 0.01 NG/ML
TSH HIGH SENSITIVITY: 0.75 UIU/ML (ref 0.27–4.2)
WBC # BLD: 7.1 K/CU MM (ref 4–10.5)

## 2022-07-08 PROCEDURE — 36415 COLL VENOUS BLD VENIPUNCTURE: CPT

## 2022-07-08 PROCEDURE — 93005 ELECTROCARDIOGRAM TRACING: CPT | Performed by: NURSE PRACTITIONER

## 2022-07-08 PROCEDURE — 93010 ELECTROCARDIOGRAM REPORT: CPT | Performed by: INTERNAL MEDICINE

## 2022-07-08 PROCEDURE — APPSS45 APP SPLIT SHARED TIME 31-45 MINUTES: Performed by: NURSE PRACTITIONER

## 2022-07-08 PROCEDURE — 84439 ASSAY OF FREE THYROXINE: CPT

## 2022-07-08 PROCEDURE — 6360000002 HC RX W HCPCS: Performed by: HOSPITALIST

## 2022-07-08 PROCEDURE — 87899 AGENT NOS ASSAY W/OPTIC: CPT

## 2022-07-08 PROCEDURE — 99232 SBSQ HOSP IP/OBS MODERATE 35: CPT | Performed by: INTERNAL MEDICINE

## 2022-07-08 PROCEDURE — 80053 COMPREHEN METABOLIC PANEL: CPT

## 2022-07-08 PROCEDURE — 0202U NFCT DS 22 TRGT SARS-COV-2: CPT

## 2022-07-08 PROCEDURE — 2060000000 HC ICU INTERMEDIATE R&B

## 2022-07-08 PROCEDURE — 83036 HEMOGLOBIN GLYCOSYLATED A1C: CPT

## 2022-07-08 PROCEDURE — 84443 ASSAY THYROID STIM HORMONE: CPT

## 2022-07-08 PROCEDURE — 82962 GLUCOSE BLOOD TEST: CPT

## 2022-07-08 PROCEDURE — 6370000000 HC RX 637 (ALT 250 FOR IP): Performed by: NURSE PRACTITIONER

## 2022-07-08 PROCEDURE — 84145 PROCALCITONIN (PCT): CPT

## 2022-07-08 PROCEDURE — 6360000002 HC RX W HCPCS: Performed by: NURSE PRACTITIONER

## 2022-07-08 PROCEDURE — 85027 COMPLETE CBC AUTOMATED: CPT

## 2022-07-08 PROCEDURE — 6360000002 HC RX W HCPCS: Performed by: INTERNAL MEDICINE

## 2022-07-08 PROCEDURE — 2580000003 HC RX 258: Performed by: NURSE PRACTITIONER

## 2022-07-08 PROCEDURE — 87449 NOS EACH ORGANISM AG IA: CPT

## 2022-07-08 PROCEDURE — 84484 ASSAY OF TROPONIN QUANT: CPT

## 2022-07-08 PROCEDURE — 94761 N-INVAS EAR/PLS OXIMETRY MLT: CPT

## 2022-07-08 PROCEDURE — 80061 LIPID PANEL: CPT

## 2022-07-08 RX ADMIN — FUROSEMIDE 40 MG: 10 INJECTION, SOLUTION INTRAMUSCULAR; INTRAVENOUS at 16:52

## 2022-07-08 RX ADMIN — INSULIN LISPRO 2 UNITS: 100 INJECTION, SOLUTION INTRAVENOUS; SUBCUTANEOUS at 18:30

## 2022-07-08 RX ADMIN — ENOXAPARIN SODIUM 40 MG: 100 INJECTION SUBCUTANEOUS at 08:56

## 2022-07-08 RX ADMIN — HYDRALAZINE HYDROCHLORIDE 10 MG: 10 TABLET, FILM COATED ORAL at 21:38

## 2022-07-08 RX ADMIN — METHYLPREDNISOLONE SODIUM SUCCINATE 40 MG: 40 INJECTION, POWDER, FOR SOLUTION INTRAMUSCULAR; INTRAVENOUS at 16:52

## 2022-07-08 RX ADMIN — CLOPIDOGREL BISULFATE 75 MG: 75 TABLET ORAL at 08:56

## 2022-07-08 RX ADMIN — PANTOPRAZOLE SODIUM 40 MG: 40 TABLET, DELAYED RELEASE ORAL at 06:10

## 2022-07-08 RX ADMIN — FUROSEMIDE 40 MG: 10 INJECTION, SOLUTION INTRAMUSCULAR; INTRAVENOUS at 08:56

## 2022-07-08 RX ADMIN — AMLODIPINE BESYLATE 10 MG: 10 TABLET ORAL at 08:56

## 2022-07-08 RX ADMIN — LEVOFLOXACIN 750 MG: 5 INJECTION, SOLUTION INTRAVENOUS at 22:50

## 2022-07-08 RX ADMIN — SERTRALINE 100 MG: 50 TABLET, FILM COATED ORAL at 08:56

## 2022-07-08 RX ADMIN — LISINOPRIL 40 MG: 20 TABLET ORAL at 08:56

## 2022-07-08 RX ADMIN — CARVEDILOL 25 MG: 25 TABLET, FILM COATED ORAL at 16:52

## 2022-07-08 RX ADMIN — ASPIRIN 81 MG: 81 TABLET, CHEWABLE ORAL at 08:56

## 2022-07-08 RX ADMIN — ATORVASTATIN CALCIUM 80 MG: 40 TABLET, FILM COATED ORAL at 08:56

## 2022-07-08 RX ADMIN — SODIUM CHLORIDE, PRESERVATIVE FREE 10 ML: 5 INJECTION INTRAVENOUS at 08:56

## 2022-07-08 RX ADMIN — HYDRALAZINE HYDROCHLORIDE 10 MG: 10 TABLET, FILM COATED ORAL at 08:56

## 2022-07-08 RX ADMIN — METHYLPREDNISOLONE SODIUM SUCCINATE 40 MG: 40 INJECTION, POWDER, FOR SOLUTION INTRAMUSCULAR; INTRAVENOUS at 11:48

## 2022-07-08 RX ADMIN — INSULIN LISPRO 1 UNITS: 100 INJECTION, SOLUTION INTRAVENOUS; SUBCUTANEOUS at 09:02

## 2022-07-08 RX ADMIN — CARVEDILOL 25 MG: 25 TABLET, FILM COATED ORAL at 08:56

## 2022-07-08 RX ADMIN — ISOSORBIDE MONONITRATE 30 MG: 30 TABLET, EXTENDED RELEASE ORAL at 08:56

## 2022-07-08 RX ADMIN — METHYLPREDNISOLONE SODIUM SUCCINATE 40 MG: 40 INJECTION, POWDER, FOR SOLUTION INTRAMUSCULAR; INTRAVENOUS at 04:47

## 2022-07-08 RX ADMIN — INSULIN LISPRO 2 UNITS: 100 INJECTION, SOLUTION INTRAVENOUS; SUBCUTANEOUS at 21:39

## 2022-07-08 RX ADMIN — INSULIN LISPRO 1 UNITS: 100 INJECTION, SOLUTION INTRAVENOUS; SUBCUTANEOUS at 13:27

## 2022-07-08 RX ADMIN — TAMSULOSIN HYDROCHLORIDE 0.4 MG: 0.4 CAPSULE ORAL at 08:56

## 2022-07-08 NOTE — ED NOTES
Attempted to call report at this time, no answer. This RN to attempt report again shortly.      Jeff Faust RN  07/07/22 2040

## 2022-07-08 NOTE — PROGRESS NOTES
Hospitalist Progress Note      Name:  Day Cornelius /Age/Sex: 1960  (60 y.o. male)   MRN & CSN:  [id number] & [id number] Admission Date/Time: 2022  5:08 PM   Location:  -A PCP: Christian Mckeon MD         Hospital Day: 2    Assessment and Plan:   Day Cornelius is a 60 y.o.  male  who presents with Chest pain    Day Cornelius is a 60 y.o. male with a pmh as noted below presents with Shortness of breath , cough, chest pain with deep breath      COPD Exacerbation  Obstructuve Sleep Apnea              -Admit to intermediate care              -Respiratory PCR negative               -Denies fevers, chills, WBC 6.6               -Cough productive with thick yellow sputum,               -Stared ont methylprednisone IV every 6 hours and now transition to p.o. prednisone prednisone taper              -Schedule DuoNeb treatments              -Guaifenesin cough syrup        Atypical Chest Pressure  Acute on chronic systolic heart failure  Elevated troponin  Coronary Artery Disease w/ history of CABG  [place] Hypertension  Hyperlipidemia, mixed                  Repeat Trop noted at 0.066              CT PE noncontributory              NT Pro BNP elevated above baseline               -Admit to inpatient, stepdown  2/2 fluid overload/acutely decompensated  NT Pro BNP elevated, gentle diuresis per cardio  Last echo 3/22 EF 40-45 %  Troponin 0.028 , elevated above baseline.  .  EKG NSR, no acute ST elevation or depression                     Telemetry monitoring                   Consult cardiology, Follows with Dr Sandeep Carlos               Lasix 40 mg IV BID x4 doses per cardio                  Strict I&Os, Daily weights, Chem 7 qam              Consult to dietician for diet education, 2 GM NA 2 L FR diet                   Continue home coreg, imdur, hydralazine, lisinopril        Non Insulin Diabetes              #Insulin dependent type 2 diabetes               sliding scale with hypoglycemia protocol -Hold home oral antihyperglycemics, check hemoglobin A1c              -Cardio recommended Jaden Ordonez outpatient at discharge                CKD              Continue close monitoring especially as patient is on IV Lasix               creatinine noted to be 1.4          Diet ADULT DIET; Regular; 5 carb choices (75 gm/meal); Low Sodium (2 gm)   DVT Prophylaxis [] Lovenox, []  Heparin, [] SCDs, [] Ambulation   GI Prophylaxis [x] PPI,  [] H2 Blocker,  [] Carafate,  [] Diet/Tube Feeds   Code Status Full Code   Disposition Patient requires continued admission due to above described medical conditions   MDM [] Low, [] Moderate,[x]  High  Patient's risk as above due to above described medical conditions     History of Present Illness:     Chief Complaint: Chest pain  Forest Zhu is a 60 y.o.  male  who presents with shortness of breath and atypical chest pressure. Subjective-patient examined at bedside. He is comfortable and in no distress. He is on room air. He tells me he feels \"pretty good\". He is on room air and saturating well above 92%. He denies any chest pain. Encouraged him to get out of bed and ambulate. Continue with current measures and follow-up cardiology. If no intervention probable discharge tomorrow. Ten point ROS reviewed negative, unless as noted above    Objective: Intake/Output Summary (Last 24 hours) at 7/8/2022 0736  Last data filed at 7/8/2022 0653  Gross per 24 hour   Intake 240 ml   Output 600 ml   Net -360 ml      Vitals:   Vitals:    07/08/22 0300   BP: (!) 155/86   Pulse: 68   Resp: 19   Temp: 97.6 °F (36.4 °C)   SpO2: 97%          No results for input(s): PH, [id number], PO2ART, BE, ZTE3QPA, CO2CT, O2SAT, LABCARB in the last 72 hours. Invalid input(s): METHGBART           Physical Exam:   GEN Awake male, sitting upright in bed in no apparent distress. Appears given age. EYES Pupils are equally round. No scleral erythema, discharge, or conjunctivitis.   HENT Mucous membranes are moist. Oral pharynx without exudates, no evidence of thrush. NECK Supple, no apparent thyromegaly or masses. RESP Clear to auscultation, no wheezes, rales or rhonchi. Symmetric chest movement while on room air. CARDIO/VASC S1/S2 auscultated. Regular rate without appreciable murmurs, rubs, or gallops. No JVD or carotid bruits. Peripheral pulses equal bilaterally and palpable. No peripheral edema. GI Abdomen is soft without significant tenderness, masses, or guarding. Bowel sounds are normoactive. Rectal exam deferred.  No costovertebral angle tenderness. Normal appearing external genitalia. York catheter is not present. HEME/LYMPH No palpable cervical lymphadenopathy and no hepatosplenomegaly. No petechiae or ecchymoses. MSK No gross joint deformities. SKIN Normal coloration, warm, dry. NEURO Cranial nerves appear grossly intact, normal speech, no lateralizing weakness. PSYCH Awake, alert, oriented x 4. Affect appropriate.     Data:   CBC with Differential:    Lab Results   Component Value Date/Time    WBC 7.1 07/08/2022 12:58 AM    RBC 4.52 07/08/2022 12:58 AM    HGB 14.9 07/08/2022 12:58 AM    HCT 44.7 07/08/2022 12:58 AM     07/08/2022 12:58 AM    MCV 98.9 07/08/2022 12:58 AM    MCH 33.0 07/08/2022 12:58 AM    MCHC 33.3 07/08/2022 12:58 AM    RDW 13.2 07/08/2022 12:58 AM    SEGSPCT 59.1 07/07/2022 05:32 PM    LYMPHOPCT 30.4 07/07/2022 05:32 PM    MONOPCT 6.8 07/07/2022 05:32 PM    BASOPCT 0.9 07/07/2022 05:32 PM    MONOSABS 0.5 07/07/2022 05:32 PM    LYMPHSABS 2.0 07/07/2022 05:32 PM    EOSABS 0.2 07/07/2022 05:32 PM    BASOSABS 0.1 07/07/2022 05:32 PM    DIFFTYPE AUTOMATED DIFFERENTIAL 07/07/2022 05:32 PM       CMP:     Lab Results   Component Value Date/Time     07/08/2022 12:58 AM    K 4.3 07/08/2022 12:58 AM    K 3.8 03/16/2018 10:06 AM     07/08/2022 12:58 AM    CO2 22 07/08/2022 12:58 AM    BUN 18 07/08/2022 12:58 AM    CREATININE 1.4 07/08/2022 12:58 AM GFRAA >60 07/08/2022 12:58 AM    AGRATIO 1.7 04/16/2019 11:36 AM    LABGLOM 51 07/08/2022 12:58 AM    GLUCOSE 228 07/08/2022 12:58 AM    PROT 6.6 07/08/2022 12:58 AM    LABALBU 4.3 07/08/2022 12:58 AM    CALCIUM 8.9 07/08/2022 12:58 AM    BILITOT 0.6 07/08/2022 12:58 AM    ALKPHOS 66 07/08/2022 12:58 AM    AST 17 07/08/2022 12:58 AM    ALT 14 07/08/2022 12:58 AM       Troponin:  Lab Results   Component Value Date/Time    TROPONINT 0.013 07/08/2022 12:58 AM       U/A:    Lab Results   Component Value Date/Time    COLORU YELLOW 05/17/2020 01:25 AM    PROTEINU NEGATIVE 05/17/2020 01:25 AM    WBCUA 2 05/17/2020 01:25 AM    RBCUA NONE SEEN 05/17/2020 01:25 AM    MUCUS RARE 05/17/2020 01:25 AM    TRICHOMONAS NONE SEEN 05/17/2020 01:25 AM    BACTERIA NEGATIVE 05/17/2020 01:25 AM    CLARITYU CLEAR 05/17/2020 01:25 AM    SPECGRAV 1.016 05/17/2020 01:25 AM    LEUKOCYTESUR NEGATIVE 05/17/2020 01:25 AM    UROBILINOGEN NORMAL 05/17/2020 01:25 AM    BILIRUBINUR NEGATIVE 05/17/2020 01:25 AM    BLOODU NEGATIVE 05/17/2020 01:25 AM       Urine Culture:  No components found for: RAFI    Radiology results:  CT CHEST PULMONARY EMBOLISM W CONTRAST   Final Result   No evidence of pulmonary embolism or acute pulmonary abnormality. RECOMMENDATIONS:   Unavailable         XR CHEST PORTABLE   Final Result   1. No acute cardiopulmonary process identified.              Medications:   Medications:    sertraline  100 mg Oral Daily    atorvastatin  80 mg Oral Daily    hydrALAZINE  10 mg Oral BID    amLODIPine  10 mg Oral Daily    tamsulosin  0.4 mg Oral Daily    clopidogrel  75 mg Oral Daily    pantoprazole  40 mg Oral Daily    isosorbide mononitrate  30 mg Oral Daily    carvedilol  25 mg Oral BID WC    sodium chloride flush  5-40 mL IntraVENous 2 times per day    aspirin  81 mg Oral Daily    enoxaparin  40 mg SubCUTAneous Daily    methylPREDNISolone  40 mg IntraVENous Q6H    Followed by   Pro Todd ON 7/10/2022] predniSONE  40 mg Oral Daily    levofloxacin  750 mg IntraVENous Q24H    insulin lispro  0-6 Units SubCUTAneous TID WC    insulin lispro  0-3 Units SubCUTAneous Nightly    furosemide  40 mg IntraVENous BID    lisinopril  40 mg Oral Daily      Infusions:    sodium chloride      dextrose       PRN Meds: sodium chloride flush, 5-40 mL, PRN  sodium chloride, , PRN  polyethylene glycol, 17 g, Daily PRN  acetaminophen, 650 mg, Q6H PRN   Or  acetaminophen, 650 mg, Q6H PRN  ondansetron, 4 mg, Q8H PRN   Or  ondansetron, 4 mg, Q6H PRN  glucose, 4 tablet, PRN  dextrose bolus, 125 mL, PRN   Or  dextrose bolus, 250 mL, PRN  glucagon (rDNA), 1 mg, PRN  dextrose, 100 mL/hr, PRN  metoclopramide, 10 mg, Q6H PRN  albuterol sulfate HFA, 1 puff, Q6H PRN   And  ipratropium, 1 puff, Q6H PRN          Electronically signed by Angel Oseguera MD on 7/8/2022 at 7:36 AM

## 2022-07-08 NOTE — PROGRESS NOTES
Cardiology Progress Note     Today's Plan CPM    Admit Date:  7/7/2022    Consult reason/ Seen today for: chest pain shortness of breath     Subjective and  Overnight Events:  States he is feeling better today- shortness of breath improving-chest pain has resolved    Telemetry SR     Assessment / Plan:     HFrEF-acute on chronic  BNP 3,516 : shortness of breath improving- continue IV lasix for today - change to po in am  HF nurse consulted / strict I/O    Chest pain - resolved- mild bump in troponin peak 0.013- not ACS    CAD- h/o CABG- [facility] 03/2022 patent bypasses: on  asa/ atorvastatin / coreg/plavix / Imdur     Hypertensive urgency: bp on admit 204/107- blood pressure is improving- on amlodipine / hydralazine / lisinopril          History of Presenting Illness:    Chief complain on admission : 60 y. o.year old who is admitted for  Chief Complaint   Patient presents with    Shortness of Breath     x3days    Cough     x1week        Past medical history:    has a past medical history of Acute cerebrovascular accident (CVA) ([facility] [address] Acute ST elevation myocardial infarction (STEMI) ([facility] [address] B12 deficiency, CAD (coronary artery disease), COPD (chronic obstructive pulmonary disease) ([facility] [address] GERD (gastroesophageal reflux disease), Guillain Barré syndrome ([facility] [address] H/O echocardiogram, Heart murmur, History of echocardiogram, History of myocardial infarction, History of nuclear MUGA test, History of nuclear stress test, History of PTCA, History of PTCA, Hx of cardiovascular stress test, Hx of cardiovascular stress test, HX OTHER MEDICAL, Hyperlipidemia, Hypertension, Impaired glucose tolerance, MUGA, NSTEMI (non-ST elevated myocardial infarction) [facility]), Patient in clinical research study, Pneumonia due to organism, Renal cyst, S/P cardiac catheterization, and Stroke (cerebrum) ([facility] [address]   Past surgical history:   has a past surgical history that includes Hand surgery; Coronary angioplasty (6/03); shoulder surgery (Right, 2011); Colonoscopy (8/21/14); Endoscopy, colon, diagnostic (8/21/14); Cardiac catheterization (7/13); Percutaneous Transluminal Coronary Angio (03/2018); and Coronary artery bypass graft (N/A, 5/18/2020). Social History:   reports that he quit smoking about 6 years ago. His smoking use included cigarettes. He has a 90.00 pack-year smoking history. He has never used smokeless tobacco. He reports that he does not drink alcohol and does not use drugs. Family history:  family history includes Alcohol Abuse in his brother, father, and mother; Depression in his mother, sister, and sister; Diabetes in his brother; High Cholesterol in his mother; Hypertension in his mother; Stomach Cancer in his brother and father. Allergies   Allergen Reactions    Ibuprofen Other (See Comments)     Stomach irritation    Augmentin [Amoxicillin-Pot Clavulanate]     Codeine        Review of Systems:   All 14 systems were reviewed and are negative  Except for the positive findings which are documented     BP (!) 151/91   Pulse 70   Temp 96.8 °F (36 °C) (Oral)   Resp (!) 36   Ht 5' 7\" (1.702 m)   Wt 201 lb 4.5 oz (91.3 kg)   SpO2 97%   BMI 31.52 kg/m²       Intake/Output Summary (Last 24 hours) at 7/8/2022 1309  Last data filed at 7/8/2022 0653  Gross per 24 hour   Intake 240 ml   Output 600 ml   Net -360 ml       Physical Exam:  Physical Exam  Vitals reviewed. HENT:      Head: Normocephalic and atraumatic. Mouth/Throat:      Mouth: Mucous membranes are moist.   Cardiovascular:      Rate and Rhythm: Regular rhythm. Bradycardia present. Pulses: Normal pulses. Heart sounds: Normal heart sounds. Pulmonary:      Breath sounds: Normal breath sounds. Abdominal:      General: There is no distension. Tenderness: There is no abdominal tenderness. There is no guarding. Musculoskeletal:         General: No tenderness.       Cervical back: No tenderness. Right lower leg: No edema. Left lower leg: No edema. Neurological:      General: No focal deficit present. Mental Status: He is alert. Psychiatric:         Mood and Affect: Mood normal.          Medications:    sertraline  100 mg Oral Daily    atorvastatin  80 mg Oral Daily    hydrALAZINE  10 mg Oral BID    amLODIPine  10 mg Oral Daily    tamsulosin  0.4 mg Oral Daily    clopidogrel  75 mg Oral Daily    pantoprazole  40 mg Oral Daily    isosorbide mononitrate  30 mg Oral Daily    carvedilol  25 mg Oral BID WC    sodium chloride flush  5-40 mL IntraVENous 2 times per day    aspirin  81 mg Oral Daily    enoxaparin  40 mg SubCUTAneous Daily    methylPREDNISolone  40 mg IntraVENous Q6H    Followed by   Alta Mckoy ON 7/9/2022] predniSONE  40 mg Oral Daily    levofloxacin  750 mg IntraVENous Q24H    insulin lispro  0-6 Units SubCUTAneous TID WC    insulin lispro  0-3 Units SubCUTAneous Nightly    furosemide  40 mg IntraVENous BID    lisinopril  40 mg Oral Daily      sodium chloride      dextrose       sodium chloride flush, sodium chloride, polyethylene glycol, acetaminophen **OR** acetaminophen, ondansetron **OR** ondansetron, glucose, dextrose bolus **OR** dextrose bolus, glucagon (rDNA), dextrose, metoclopramide, albuterol sulfate HFA **AND** ipratropium    Lab Data:  CBC:   Recent Labs     07/07/22 1732 07/08/22 0058   WBC 6.6 7.1   HGB 14.9 14.9   HCT 44.5 44.7   MCV 97.8 98.9    155     BMP:   Recent Labs     07/07/22 1732 07/08/22  0058    133*   K 3.7 4.3    100   CO2 21 22   BUN 17 18   CREATININE 1.4* 1.4*     PT/INR: No results for input(s): PROTIME, INR in the last 72 hours. BNP:    Recent Labs     07/07/22 1732   PROBNP 3,516*     TROPONIN:   Recent Labs     07/07/22 1732 07/08/22 0058   TROPONINT 0.028* 0.013*              Impression:  Principal Problem:    Chest pain  Resolved Problems:    * No resolved hospital problems.  *       All reduction    -Hyperlioidimea patient is on statin which will be continued lipid profile will be checked    - DM we will monitor the blood glucose level along with A1c he will benefit better from Yonny Logan MD [facility] [place]

## 2022-07-08 NOTE — CARE COORDINATION
CM spoke with the patient on the room phone to initiate discharge planning. Patient lives at home with his wife, has insurance with Rx coverage & PCP, stated that he is independent with ADL's, and is able to drive. Patient stated that he uses a cane at home but only as needed and does not require home oxygen but does use an inhaler. Patient stated that he has a PCP currently but is considering finding a new provider. CM added a list of providers accepting new patients to the discharge instructions. Patient plans to return home upon discharge and is unable to identify any other needs at this time. CM available if needs arise.

## 2022-07-08 NOTE — H&P
V2.0  History and Physical      Name:  Marylee Lea /Age/Sex: 1960  (60 y.o. male)   MRN & CSN:  [id number] & [id number] Encounter Date/Time: 2022 8:30 PM EDT   Location:  ED16/ED-16 PCP: Dorita Patel MD       Hospital Day: 1    Assessment and Plan:   Marylee Lea is a 60 y.o. male with a pmh as noted below presents with Shortness of breath , cough, chest pain with deep breath     COPD Exacerbation  Obstructuve Sleep Apnea   -Admit to intermediate care   -Respiratory PCR pending    -Denies fevers, chills, WBC 6.6    -Cough productive with thick yellow sputum,    -Start methylprednisone IV every 6 hours with prednisone taper   -Schedule DuoNeb treatments   -Guaifenesin cough syrup      Atypical Chest Pressure  Acute on chronic systolic heart failure  Elevated troponin  Coronary Artery Disease w/ history of CABG  [facility] Hypertension  Hyperlipidemia, mixed      Repeat Trop pending   CT PE negative   NT Pro BNP elevated above baseline    -Admit to inpatient, stepdown  2/2 fluid overload/acutely decompensated  NT Pro BNP elevated, gentle diuresis per cardio  Last echo 3/22 EF 40-45 %  Troponin 0.028 , elevated above baseline. Repeat pending. Trending  EKG NSR, no acute ST elevation or depression          Telemetry monitoring        Consult cardiology, Follows with Dr Elizabeth Raines         Lasix 40 mg IV BID x4 doses per cardio        Strict I&Os, Daily weights, Chem 7 qam   Consult to dietician for diet education, 2 GM NA 2 L FR diet        Continue home coreg, imdur, hydralazine, lisinopril      Non Insulin Diabetes   #Insulin dependent type 2 diabetes    sliding scale with hypoglycemia protocol   -Hold home oral antihyperglycemics, check hemoglobin A1c   -Cardio recommended [place] outpatient at discharge     CKD   Creatinine below baseline, monitor with CT scan        Chronic Conditions: continue home medication as ordered  . All testing  and results reviewed with patient . All questions answered. Patient and family voiced understanding    This patient was seen and examined in conjunction with Dr. Vanita Forman . He/She was agreeable with the plan and management as dictated above. Disposition:   Expected Disposition: Home  Estimated D/C: 2-3 days     Diet No diet orders on file   GI Prophylaxis  [x] PPI,  [] H2 Blocker,  [] Carafate,  [] Diet/Tube Feeds   DVT Prophylaxis [] Lovenox, []  Heparin, [] SCDs, [] Ambulation,  [] NOAC   Code Status Prior   Surrogate Decision Maker/ POA          History from:     patient, electronic medical record    History of Present Illness:     Chief Complaint: Chest pain  Pau Chicas is a 60 y.o. male with pmh of coronary artery disease status post CABG, HFrEF, hypertension, noninsulin-dependent type 2 diabetes, COPD, former smoker presented emergency department with complaints of cough x3 days, malaise and difficulty breathing x1 week. Endorses productive cough with blood. States he has a history of COPD. Also reports increased wheezing. Denies sick Contacts. Also endorses chest pain which he describes as a tightness and sharp in the left side of his chest.  Denies abdominal pain. Denies history of prior blood clots but states his daughter does have a history of blood clots. Denies any recent travel. Denies sore throat or ear pain. Review of Systems: Need 10 Elements   Review of Systems   Constitutional: Negative. HENT: Negative for congestion and sore throat. Eyes: Negative. Respiratory: Positive for cough, chest tightness, shortness of breath and wheezing. Cardiovascular: Negative for chest pain and leg swelling. Gastrointestinal: Negative for abdominal pain, constipation, nausea and vomiting. Endocrine: Negative. Genitourinary: Negative for dysuria and hematuria. Musculoskeletal: Negative for neck pain. Skin: Negative for wound. Neurological: Negative for dizziness and light-headedness.    All other systems reviewed and are 4/13/22  Elvin Vergara MD   blood glucose monitor strips 1 strip by Other route 2 times daily for 180 doses 3/14/22 6/12/22  Elvin Vergara MD   furosemide (LASIX) 40 MG tablet Take 1 tablet by mouth daily 3/14/22 4/13/22  Elvin Vergara MD   Alcohol Swabs (ALCOHOL PADS) 70 % PADS 1 Units by Does not apply route 2 times daily 3/14/22   Elvin Vergara MD   Blood Glucose Monitoring Suppl (BLOOD GLUCOSE MONITOR SYSTEM) w/Device KIT Use twice per day 3/14/22   Elvin Vergara MD   Lancets MISC 1 each by Does not apply route 2 times daily for 180 doses 3/14/22 6/12/22  Elvin Vergara MD   tamsulosin [facility]) 0.4 MG capsule Take 1 capsule by mouth daily 3/14/22 4/13/22  Elvin Vergara MD   clopidogrel (PLAVIX) 75 MG tablet Take 1 tablet by mouth daily 3/14/22 4/13/22  Elvin Vergara MD   pantoprazole (PROTONIX) 40 MG tablet Take 1 tablet by mouth daily 3/14/22 4/13/22  Elvin Vergara MD   isosorbide mononitrate (IMDUR) 30 MG extended release tablet Take 1 tablet by mouth daily 3/15/22 4/14/22  Elvin Vergara MD   Blood Pressure KIT Use prn 6/3/16   Radha Payan MD       Physical Exam: Need 8 Elements   Physical Exam  Vitals and nursing note reviewed. Constitutional:       General: He is not in acute distress. Appearance: Normal appearance. HENT:      Head: Normocephalic. Nose: Nose normal.      Mouth/Throat:      Pharynx: Oropharynx is clear. Eyes:      Pupils: Pupils are equal, round, and reactive to light. Cardiovascular:      Rate and Rhythm: Normal rate and regular rhythm. Pulses: Normal pulses. Pulmonary:      Effort: Pulmonary effort is normal. No respiratory distress. Breath sounds: Examination of the right-upper field reveals rhonchi. Examination of the left-upper field reveals wheezing and rhonchi. Examination of the right-lower field reveals rales. Wheezing, rhonchi and rales present. Abdominal:      General: Abdomen is flat.  Bowel sounds are normal. There is no distension. Musculoskeletal:         General: Normal range of motion. Cervical back: Normal range of motion. Skin:     General: Skin is warm and dry. Capillary Refill: Capillary refill takes less than 2 seconds. Neurological:      General: No focal deficit present. Mental Status: He is alert and oriented to person, place, and time. Psychiatric:         Mood and Affect: Mood normal.            Past Medical History:   PMHx   Past Medical History:   Diagnosis Date    [facility]vascular accident ([facility]A) [facility] 5/22/2020    Acute ST elevation myocardial infarction (STEMI) ([facility] 3/14/2018    B12 deficiency     CAD (coronary artery disease)     COPD (chronic obstructive pulmonary disease) ([facility] 12/11/2020    GERD (gastroesophageal reflux disease)     Guillain Barré syndrome ([facility]     H/O echocardiogram 7/30/13, 6/16/10    7/13-EF 50-55% WNL. 6/10-EF53%, mod LVH, MARCI, RVE, trace PI, mild MR/TR, 6/09    Heart murmur     History of echocardiogram 10/07/2020    EF 40%, No changed since previous 5/2020, Mid inferior hypokinesis noted, Grade I DD, Mod MR, Mild TR, Dilatation of the aortic root, No pericardial effusion     History of myocardial infarction 2003    History of nuclear MUGA test 03/03/2020    EF 53%, Normal, No ICD needed    History of nuclear stress test 06/23/2020    EF 30%, study suggestive of abnormal myocardial perfusion.  History of PTCA 6/03;2/03 6/03-PTCA w/stents x2, 2/03-PTCA w/stent x3    History of PTCA 03/15/2018    LAD EF25%    Hx of cardiovascular stress test 04/25/2018    EF 28% Nuclear scintigraphy demonstartes inferior wall infarct.  Hx of cardiovascular stress test 01/27/2020    Abnormal Study. Large area of inferior & apical-lateral infarct. No significant reversible ischemia.   Severly reduced LVEF 23%    HX OTHER MEDICAL 03/14/2018    lifevest  d/c 7/2/2018    Hyperlipidemia     Hypertension     Impaired glucose tolerance 6/3/2016    MUGA 2018    EF 47%    NSTEMI (non-ST elevated myocardial infarction) ([facility] 03/15/2018    Patient in clinical research study 2018    3 year study dalcitripib vs placebo [phone]    Pneumonia due to organism     Renal cyst 2014    S/P cardiac catheterization 13-EF=55%. PATENT LAD AND RCA STENTS, OM2 50% stenosis, & Normal wall motion.  Stroke (cerebrum) [facility])     May 2016     PSHX:  has a past surgical history that includes Hand surgery; Coronary angioplasty (); shoulder surgery (Right, ); Colonoscopy (14); Endoscopy, colon, diagnostic (14); Cardiac catheterization (); Percutaneous Transluminal Coronary Angio (2018); and Coronary artery bypass graft (N/A, 2020). Allergies: Allergies   Allergen Reactions    Ibuprofen Other (See Comments)     Stomach irritation    Augmentin [Amoxicillin-Pot Clavulanate]     Codeine      Fam HX:  family history includes Alcohol Abuse in his brother, father, and mother; Depression in his mother, sister, and sister; Diabetes in his brother; High Cholesterol in his mother; Hypertension in his mother; Stomach Cancer in his brother and father.   Soc HX:   Social History     Socioeconomic History    Marital status:      Spouse name: None    Number of children: None    Years of education: None    Highest education level: None   Occupational History    Occupation:      Comment: full time   Tobacco Use    Smoking status: Former Smoker     Packs/day: 2.00     Years: 45.00     Pack years: 90.00     Types: Cigarettes     Quit date: 2016     Years since quittin.1    Smokeless tobacco: Never Used   Vaping Use    Vaping Use: Never used   Substance and Sexual Activity    Alcohol use: No     Alcohol/week: 0.0 standard drinks    Drug use: No    Sexual activity: Yes     Partners: Female   Other Topics Concern    None   Social History Narrative    Wears glasses     Social Determinants of Health Financial Resource Strain:     Difficulty of Paying Living Expenses: Not on file   Food Insecurity:     Worried About Running Out of Food in the Last Year: Not on file    [address] of Food in the Last Year: Not on file   Transportation Needs:     Lack of Transportation (Medical): Not on file    Lack of Transportation (Non-Medical):  Not on file   Physical Activity:     Days of Exercise per Week: Not on file    Minutes of Exercise per Session: Not on file   Stress:     Feeling of Stress : Not on file   Social Connections:     Frequency of Communication with Friends and Family: Not on file    Frequency of Social Gatherings with Friends and Family: Not on file    Attends Restorationist Services: Not on file    Active Member of [address] [place] [address] [employer] or Organizations: Not on file    Attends Club or Organization Meetings: Not on file    Marital Status: Not on file   Intimate Partner Violence:     Fear of Current or Ex-Partner: Not on file    Emotionally Abused: Not on file    Physically Abused: Not on file    Sexually Abused: Not on file   Housing Stability:     Unable to Pay for Housing in the Last Year: Not on file    Number of [place] in the Last Year: Not on file    Unstable Housing in the Last Year: Not on file       Medications:   Medications:    predniSONE  60 mg Oral Once    guaiFENesin-dextromethorphan  5 mL Oral Once    aspirin  324 mg Oral Once      Infusions:    sodium chloride       PRN Meds:      Labs      CBC:   Recent Labs     07/07/22  1732   WBC 6.6   HGB 14.9        BMP:    Recent Labs     07/07/22  1732      K 3.7      CO2 21   BUN 17   CREATININE 1.4*   GLUCOSE 97     Hepatic:   Recent Labs     07/07/22  1732   AST 18   ALT 13   BILITOT 0.6   ALKPHOS 59     Lipids:   Lab Results   Component Value Date/Time    CHOL 88 10/14/2020 03:44 AM    CHOL 177 04/16/2019 11:36 AM    HDL 33 10/14/2020 03:44 AM    TRIG 71 10/14/2020 03:44 AM     Hemoglobin A1C:   Lab Results   Component Value Date/Time    LABA1C 7.2 03/12/2022 07:31 AM     TSH: No results found for: TSH  Troponin:   Lab Results   Component Value Date/Time    TROPONINT 0.028 07/07/2022 05:32 PM    TROPONINT <0.010 03/23/2022 03:50 PM    TROPONINT 0.018 03/12/2022 07:31 AM     Lactic Acid: No results for input(s): LACTA in the last 72 hours. BNP:   Recent Labs     07/07/22  1732   PROBNP 3,516*     UA:  Lab Results   Component Value Date/Time    NITRU NEGATIVE 05/17/2020 01:25 AM    COLORU YELLOW 05/17/2020 01:25 AM    WBCUA 2 05/17/2020 01:25 AM    RBCUA NONE SEEN 05/17/2020 01:25 AM    MUCUS RARE 05/17/2020 01:25 AM    TRICHOMONAS NONE SEEN 05/17/2020 01:25 AM    BACTERIA NEGATIVE 05/17/2020 01:25 AM    CLARITYU CLEAR 05/17/2020 01:25 AM    SPECGRAV 1.016 05/17/2020 01:25 AM    LEUKOCYTESUR NEGATIVE 05/17/2020 01:25 AM    UROBILINOGEN NORMAL 05/17/2020 01:25 AM    BILIRUBINUR NEGATIVE 05/17/2020 01:25 AM    BLOODU NEGATIVE 05/17/2020 01:25 AM    KETUA NEGATIVE 05/17/2020 01:25 AM     Urine Cultures: No results found for: Efrain Sanchez  Blood Cultures: No results found for: BC  No results found for: BLOODCULT2  Organism: No results found for: ORG    Imaging/Diagnostics Last 24 Hours   XR CHEST PORTABLE    Result Date: 7/7/2022  EXAMINATION: ONE XRAY VIEW OF THE CHEST 7/7/2022 5:41 pm COMPARISON: Chest x-ray March 23, 2022 HISTORY: ORDERING SYSTEM PROVIDED HISTORY: sob, cough TECHNOLOGIST PROVIDED HISTORY: Reason for exam:->sob, cough Reason for Exam: sob, cough Additional signs and symptoms: NA Relevant Medical/Surgical History: cad, copd FINDINGS: Cardiac silhouette is enlarged but stable. Postoperative changes of CABG procedure and median sternotomy. Chronic interstitial changes of the lungs. No focal consolidation, pleural effusion, or pneumothorax. Osseous structures appear intact. 1. No acute cardiopulmonary process identified.              Electronically signed by JERED Cárdenas CNP on 7/7/2022 at 8:30 PM          This dictation was created with voice recognition software. While attempts have been made to review the dictation as it is transcribed, on occasion the spoken word can be misinterpreted by the technology leading to omissions or inappropriate words, phrases or sentences.      Electronically signed by JERED Richard CNP on 7/7/2022 at 8:30 PM

## 2022-07-08 NOTE — ED NOTES
Report to [address] [place] [address] YHCEBF, RN  07/07/22 7956 Birth Control Pills Counseling: Birth Control Pill Counseling: I discussed with the patient the potential side effects of OCPs including but not limited to increased risk of stroke, heart attack, thrombophlebitis, deep venous thrombosis, hepatic adenomas, breast changes, GI upset, headaches, and depression.  The patient verbalized understanding of the proper use and possible adverse effects of OCPs. All of the patient's questions and concerns were addressed.

## 2022-07-08 NOTE — PROGRESS NOTES
Physician Progress Note      Ashley Glover  CSN #:                  [id number]  :                       1960  ADMIT DATE:       2022 5:08 PM  [address] [place] DATE:  RESPONDING  PROVIDER #:        Mary Darby          QUERY TEXT:    Pt admitted with SOB . Pt noted to also have acute systolic CHF, HTN and CAD,   . If possible, please document in progress notes and discharge summary the   etiology of CHF, if able to be determined. The medical record reflects the following:  Risk Factors: HTN, CAD, hyperlipidemia  Clinical Indicators:  consult note \"CAD history of CABG, recent cath showed   bypasses were patent\", echo on 3/24/22 \"Ejection fraction is visually   estimated at 40-45%. Akinesis of the inferior and inferoseptal walls. Moderate   left ventricular hypertrophy. \"  Treatment: IV Lasix, Lipitor, Coreg, hydralazine, lisinopril. Thank you,  JESSICA aGrciaN, RN, [facility]  [phone]  Options provided:  -- CHF due to Hypertensive Heart Disease  -- CHF due to Hypertensive Heart Disease and CAD  -- CHF not due to Hypertension but due to CAD  -- Other - I will add my own diagnosis  -- Disagree - Not applicable / Not valid  -- Disagree - Clinically unable to determine / Unknown  -- Refer to Clinical Documentation Reviewer    PROVIDER RESPONSE TEXT:    This patient has CHF due to hypertensive heart disease and CAD.     Query created by: Laurie Miller on 2022 9:26 AM      Electronically signed by:  Mary Darby 2022 9:57 AM

## 2022-07-08 NOTE — PROGRESS NOTES
Admitting diagnosis-chest pain   Cardiologist-Raymond   Heart Failure Education Nurse consulted-yes  Ejection fraction 40-45 % as of 3/23/22  Pro BNP-3,516 on 7/7/2022  Hospital follow up appt-  [facility]. [address] on 7/15. Information added to AVS.  Cardiac rehabilitation referral-N/A   Smoking Cessation information and referral-former smoker   Pneumonia vaccine- contraindicated d/t Steve Rdz   PCP-tana Reza M.D. Patient has a digital scale-yes   Able to obtain medications without difficulty- Denies difficulty obtaining or taking medications. Met with patient and introduced myself as the Heart failure education R.N. 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 She is bed bound and requires total care. Voices stress due to need for additional help. In order to have the additional help through Medicaid they would have to reduce assets. He reports receiving cardiac health education and cardiac rehabilitation after CABG. Patient is knowledgeable about heart health as evidenced by his conversation. He and spouse are receiving semi- prepared, healthy meals weekly through [employer]. Reviewed Heart failure patient education book  with patient. Questions answered. Patient's heart failure medications reviewed and information given on each. Reviewed the Stop Light Handout with patient and instructed when to call his provider. Patient given list of heart failure related patient education located on hospital TV and how to access videos. Heart Failure education booklet, the [employer] handout and a link to the American Heart Association's Healthier Living with Heart Failure Interactive workbook website given to the patient. 1 hour of education provided.

## 2022-07-09 VITALS
RESPIRATION RATE: 24 BRPM | DIASTOLIC BLOOD PRESSURE: 69 MMHG | SYSTOLIC BLOOD PRESSURE: 139 MMHG | TEMPERATURE: 98.4 F | BODY MASS INDEX: 31.14 KG/M2 | OXYGEN SATURATION: 98 % | HEIGHT: 67 IN | WEIGHT: 198.41 LBS | HEART RATE: 62 BPM

## 2022-07-09 LAB
GLUCOSE BLD-MCNC: 181 MG/DL (ref 70–99)
GLUCOSE BLD-MCNC: 245 MG/DL (ref 70–99)

## 2022-07-09 PROCEDURE — 82962 GLUCOSE BLOOD TEST: CPT

## 2022-07-09 PROCEDURE — 6360000002 HC RX W HCPCS: Performed by: NURSE PRACTITIONER

## 2022-07-09 PROCEDURE — 6370000000 HC RX 637 (ALT 250 FOR IP): Performed by: NURSE PRACTITIONER

## 2022-07-09 PROCEDURE — 6370000000 HC RX 637 (ALT 250 FOR IP): Performed by: HOSPITALIST

## 2022-07-09 PROCEDURE — APPNB45 APP NON BILLABLE 31-45 MINUTES: Performed by: NURSE PRACTITIONER

## 2022-07-09 RX ORDER — FUROSEMIDE 40 MG/1
40 TABLET ORAL DAILY
Status: DISCONTINUED | OUTPATIENT
Start: 2022-07-09 | End: 2022-07-09 | Stop reason: HOSPADM

## 2022-07-09 RX ORDER — FUROSEMIDE 40 MG/1
40 TABLET ORAL DAILY
Qty: 60 TABLET | Refills: 1 | Status: SHIPPED | OUTPATIENT
Start: 2022-07-09 | End: 2022-09-12

## 2022-07-09 RX ORDER — PREDNISONE 20 MG/1
40 TABLET ORAL DAILY
Qty: 30 TABLET | Refills: 0 | Status: SHIPPED | OUTPATIENT
Start: 2022-07-09 | End: 2022-07-19

## 2022-07-09 RX ADMIN — ENOXAPARIN SODIUM 40 MG: 100 INJECTION SUBCUTANEOUS at 10:32

## 2022-07-09 RX ADMIN — PREDNISONE 40 MG: 10 TABLET ORAL at 10:32

## 2022-07-09 RX ADMIN — PANTOPRAZOLE SODIUM 40 MG: 40 TABLET, DELAYED RELEASE ORAL at 06:04

## 2022-07-09 RX ADMIN — INSULIN LISPRO 2 UNITS: 100 INJECTION, SOLUTION INTRAVENOUS; SUBCUTANEOUS at 10:27

## 2022-07-09 RX ADMIN — FUROSEMIDE 40 MG: 40 TABLET ORAL at 10:32

## 2022-07-09 NOTE — PROGRESS NOTES
This RN reviewed AVS with pt. Pt has no questions or concerns at this time. Pt to receive medication through [address] and aware of all established appointments. Pt a&o x4 with no physical deficits states he is driving himself home and states Dr. Kamille Herr aware. This RN notified Dr. Kamille Herr. GOLDEN May assisted pt off unit with all belongings at this time.

## 2022-07-09 NOTE — PROGRESS NOTES
Assessment/plan    1. HFrEF- acute on chronic, patient tolerating oral Lasix 40 mg daily. Recommend starting [place] as outpatient. 2. Chest pain: Resolved, slight elevation in troponin. No ACS. 3. CAD-CABG x 2 5/18/20, [facility] 3/2022 showed patent bypasses. Continue with ASA/atorvastatin/Coreg/Plavix/Imdur. 4. Hypertensive urgency: Resolved, admit /107. Continue with [id number]/hydralazine 10 mg/lisinopril 40 mg.  5. Patient okay for discharge from cardiac standpoint, follow-up in CHF clinic. Electronically signed by JERED Jennings - CNP on 7/9/2022 at 8:42 AM

## 2022-07-09 NOTE — PROGRESS NOTES
Hospitalist Progress Note      Name:  Donaldo Eddy /Age/Sex: 1960  (60 y.o. male)   MRN & CSN:  [id number] & [id number] Admission Date/Time: 2022  5:08 PM   Location:  -A PCP: Caridad Peterson MD         Hospital Day: 3    Assessment and Plan:   Donaldo Eddy is a 60 y.o.  male  who presents with Chest pain    Donaldo Eddy is a 60 y.o. male with a pmh as noted below presents with Shortness of breath , cough, chest pain with deep breath      COPD Exacerbation  Obstructuve Sleep Apnea              -Admit to intermediate care              -Respiratory PCR negative               -Denies fevers, chills, WBC 7.1 at discharge                         -Stared ont methylprednisone IV every 6 hours and now transition to p.o. prednisone prednisone taper              -Schedule DuoNeb treatments              -Guaifenesin cough syrup        Atypical Chest Pressure  Acute on chronic systolic heart failure  Elevated troponin  Coronary Artery Disease w/ history of CABG  [place] Hypertension  Hyperlipidemia, mixed                  Repeat Trop noted at 0.066              CT PE noncontributory              NT Pro BNP elevated above baseline, more than 3000               -Admit to inpatient, stepdown  2/2 fluid overload/acutely decompensated  NT Pro BNP elevated, gentle diuresis per cardio. IV Lasix changed to p.o. Lasix at discharge  Last echo 3/22 EF 40-45 %  Troponin 0.028 , elevated above baseline. .  EKG NSR, no acute ST elevation or depression                     Telemetry monitoring                   Consult cardiology, Follows with Dr Zuleyka Quezada              status post Lasix 40 mg IV BID x4 doses per cardio                  Strict I&Os, Daily weights, Chem 7 qam              Consult to dietician for diet education, 2 GM NA 2 L FR diet                   Continue home coreg, imdur, hydralazine, lisinopril.   No further intervention mention from cardiology and patient okay to go home        Non Insulin Diabetes              #Insulin dependent type 2 diabetes               sliding scale with hypoglycemia protocol              -Hold home oral antihyperglycemics, check hemoglobin A1c which is 6.6              -Cardio recommended Chikis Stallworth outpatient at discharge but with chronic kidney disease would defer to outpatient endocrinology                CKD              Continue close monitoring especially as patient is on IV Lasix               creatinine noted to be 1.4          Diet ADULT DIET; Regular; 5 carb choices (75 gm/meal); Low Sodium (2 gm)   DVT Prophylaxis [] Lovenox, []  Heparin, [] SCDs, [] Ambulation   GI Prophylaxis [x] PPI,  [] H2 Blocker,  [] Carafate,  [] Diet/Tube Feeds   Code Status Full Code   Disposition Patient requires continued admission due to above described medical conditions   MDM [] Low, [] Moderate,[x]  High  Patient's risk as above due to above described medical conditions     History of Present Illness:     Chief Complaint: Chest pain  Lacie Mcfarlane is a 60 y.o.  male  who presents with shortness of breath and atypical chest pressure. Subjective-patient examined at bedside. He is comfortable and in no distress. He is on room air. He just finished his breakfast and is watching a fishing show. He is agreeable for discharge. He voices understanding of all directions and follow-up with consultants. Ten point ROS reviewed negative, unless as noted above    Objective: Intake/Output Summary (Last 24 hours) at 7/9/2022 0829  Last data filed at 7/9/2022 0226  Gross per 24 hour   Intake 830 ml   Output 675 ml   Net 155 ml      Vitals:   Vitals:    07/09/22 0318   BP:    Pulse: 62   Resp:    Temp:    SpO2:           No results for input(s): PH, IKK8CRH, PO2ART, BE, IFC5CLB, CO2CT, O2SAT, LABCARB in the last 72 hours. Invalid input(s): METHGBART           Physical Exam:   GEN Awake male, sitting upright in bed in no apparent distress. Appears given age.   EYES Pupils are equally round. No scleral erythema, discharge, or conjunctivitis. HENT Mucous membranes are moist. Oral pharynx without exudates, no evidence of thrush. NECK Supple, no apparent thyromegaly or masses. RESP Clear to auscultation, no wheezes, rales or rhonchi. Symmetric chest movement while on room air. CARDIO/VASC S1/S2 auscultated. Regular rate without appreciable murmurs, rubs, or gallops. No JVD or carotid bruits. Peripheral pulses equal bilaterally and palpable. No peripheral edema. GI Abdomen is soft without significant tenderness, masses, or guarding. Bowel sounds are normoactive. Rectal exam deferred.  No costovertebral angle tenderness. Normal appearing external genitalia. York catheter is not present. HEME/LYMPH No palpable cervical lymphadenopathy and no hepatosplenomegaly. No petechiae or ecchymoses. MSK No gross joint deformities. SKIN Normal coloration, warm, dry. NEURO Cranial nerves appear grossly intact, normal speech, no lateralizing weakness. PSYCH Awake, alert, oriented x 4. Affect appropriate.     Data:   CBC with Differential:    Lab Results   Component Value Date/Time    WBC 7.1 07/08/2022 12:58 AM    RBC 4.52 07/08/2022 12:58 AM    HGB 14.9 07/08/2022 12:58 AM    HCT 44.7 07/08/2022 12:58 AM     07/08/2022 12:58 AM    MCV 98.9 07/08/2022 12:58 AM    MCH 33.0 07/08/2022 12:58 AM    MCHC 33.3 07/08/2022 12:58 AM    RDW 13.2 07/08/2022 12:58 AM    SEGSPCT 59.1 07/07/2022 05:32 PM    LYMPHOPCT 30.4 07/07/2022 05:32 PM    MONOPCT 6.8 07/07/2022 05:32 PM    BASOPCT 0.9 07/07/2022 05:32 PM    MONOSABS 0.5 07/07/2022 05:32 PM    LYMPHSABS 2.0 07/07/2022 05:32 PM    EOSABS 0.2 07/07/2022 05:32 PM    BASOSABS 0.1 07/07/2022 05:32 PM    DIFFTYPE AUTOMATED DIFFERENTIAL 07/07/2022 05:32 PM       CMP:     Lab Results   Component Value Date/Time     07/08/2022 12:58 AM    K 4.3 07/08/2022 12:58 AM    K 3.8 03/16/2018 10:06 AM     07/08/2022 12:58 AM    CO2 22 07/08/2022 12:58 AM BUN 18 07/08/2022 12:58 AM    CREATININE 1.4 07/08/2022 12:58 AM    GFRAA >60 07/08/2022 12:58 AM    AGRATIO 1.7 04/16/2019 11:36 AM    LABGLOM 51 07/08/2022 12:58 AM    GLUCOSE 228 07/08/2022 12:58 AM    PROT 6.6 07/08/2022 12:58 AM    LABALBU 4.3 07/08/2022 12:58 AM    CALCIUM 8.9 07/08/2022 12:58 AM    BILITOT 0.6 07/08/2022 12:58 AM    ALKPHOS 66 07/08/2022 12:58 AM    AST 17 07/08/2022 12:58 AM    ALT 14 07/08/2022 12:58 AM       Troponin:  Lab Results   Component Value Date/Time    TROPONINT 0.013 07/08/2022 12:58 AM       U/A:    Lab Results   Component Value Date/Time    COLORU YELLOW 05/17/2020 01:25 AM    PROTEINU NEGATIVE 05/17/2020 01:25 AM    WBCUA 2 05/17/2020 01:25 AM    RBCUA NONE SEEN 05/17/2020 01:25 AM    MUCUS RARE 05/17/2020 01:25 AM    TRICHOMONAS NONE SEEN 05/17/2020 01:25 AM    BACTERIA NEGATIVE 05/17/2020 01:25 AM    CLARITYU CLEAR 05/17/2020 01:25 AM    SPECGRAV 1.016 05/17/2020 01:25 AM    LEUKOCYTESUR NEGATIVE 05/17/2020 01:25 AM    UROBILINOGEN NORMAL 05/17/2020 01:25 AM    BILIRUBINUR NEGATIVE 05/17/2020 01:25 AM    BLOODU NEGATIVE 05/17/2020 01:25 AM       Urine Culture:  No components found for: CURINE    Radiology results:  CT CHEST PULMONARY EMBOLISM W CONTRAST   Final Result   No evidence of pulmonary embolism or acute pulmonary abnormality. RECOMMENDATIONS:   Unavailable         XR CHEST PORTABLE   Final Result   1. No acute cardiopulmonary process identified.              Medications:   Medications:    furosemide  40 mg Oral Daily    sertraline  100 mg Oral Daily    atorvastatin  80 mg Oral Daily    hydrALAZINE  10 mg Oral BID    amLODIPine  10 mg Oral Daily    tamsulosin  0.4 mg Oral Daily    clopidogrel  75 mg Oral Daily    pantoprazole  40 mg Oral Daily    isosorbide mononitrate  30 mg Oral Daily    carvedilol  25 mg Oral BID WC    sodium chloride flush  5-40 mL IntraVENous 2 times per day    aspirin  81 mg Oral Daily    enoxaparin  40 mg SubCUTAneous Daily  predniSONE  40 mg Oral Daily    levofloxacin  750 mg IntraVENous Q24H    insulin lispro  0-6 Units SubCUTAneous TID WC    insulin lispro  0-3 Units SubCUTAneous Nightly    lisinopril  40 mg Oral Daily      Infusions:    sodium chloride      dextrose       PRN Meds: sodium chloride flush, 5-40 mL, PRN  sodium chloride, , PRN  polyethylene glycol, 17 g, Daily PRN  acetaminophen, 650 mg, Q6H PRN   Or  acetaminophen, 650 mg, Q6H PRN  ondansetron, 4 mg, Q8H PRN   Or  ondansetron, 4 mg, Q6H PRN  glucose, 4 tablet, PRN  dextrose bolus, 125 mL, PRN   Or  dextrose bolus, 250 mL, PRN  glucagon (rDNA), 1 mg, PRN  dextrose, 100 mL/hr, PRN  metoclopramide, 10 mg, Q6H PRN  albuterol sulfate HFA, 1 puff, Q6H PRN   And  ipratropium, 1 puff, Q6H PRN          Electronically signed by Markie Medina MD on 7/9/2022 at 8:29 AM

## 2022-07-09 NOTE — DISCHARGE SUMMARY
Discharge Summary    Name:  Franny Vera /Age/Sex: 1960  (60 y.o. male)   MRN & CSN:  [id number] & [id number] Admission Date/Time: 2022  5:08 PM   Attending:  Odin Holder MD Discharging Physician: Odin Holder MD     Hospital Course:   Franny Vera is a 60 y.o.  male  who presents with Chest pain    Ane Bhavya Rodriguez is a 60 y. o. male with a pmh as noted below presents with Shortness of breath , cough, chest pain with deep breath      COPD Exacerbation  Obstructuve Sleep Apnea              -Admit to intermediate care              -Respiratory PCR negative               -Denies fevers, chills, WBC 7.1 at discharge                         -Stared ont methylprednisone IV every 6 hours and now transition to p.o. prednisone prednisone taper              -Schedule DuoNeb treatments              -Guaifenesin cough syrup        Atypical Chest Pressure  Acute on chronic systolic heart failure  Elevated troponin  Coronary Artery Disease w/ history of CABG  [place] Hypertension  Hyperlipidemia, mixed                  Repeat Trop noted at 0.066              CT PE noncontributory              NT Pro BNP elevated above baseline, more than 3000               -Admit to inpatient, stepdown  2/2 fluid overload/acutely decompensated  NT Pro BNP elevated, gentle diuresis per cardio. IV Lasix changed to p.o. Lasix at discharge  Last echo 3/22 EF 40-45 %  Troponin 0.028 , elevated above baseline. .  EKG NSR, no acute ST elevation or depression                     Telemetry monitoring                   Consult cardiology, Follows with Dr Nadeem Borja                  status post Lasix 40 mg IV BID x4 doses per cardio                  Strict I&Os, Daily weights, Chem 7 qam              Consult to dietician for diet education, 2 GM NA 2 L FR diet                   Continue home coreg, imdur, hydralazine, lisinopril.   No further intervention mention from cardiology and patient okay to go home        Non Insulin tablet  Commonly known as: PRINIVIL;ZESTRIL  Take 1 tablet by mouth daily  What changed: Another medication with the same name was removed. Continue taking this medication, and follow the directions you see here. pantoprazole 40 MG tablet  Commonly known as: PROTONIX  Take 1 tablet by mouth daily  What changed: Another medication with the same name was removed. Continue taking this medication, and follow the directions you see here. sertraline 100 MG tablet  Commonly known as: ZOLOFT  Take 1 tablet by mouth daily  What changed: Another medication with the same name was removed. Continue taking this medication, and follow the directions you see here.         CONTINUE taking these medications    Alcohol Pads 70 % Pads  1 Units by Does not apply route 2 times daily     amLODIPine 10 MG tablet  Commonly known as: Norvasc  Take 1 tablet by mouth daily     aspirin 81 MG chewable tablet  Take 1 tablet by mouth daily     Blood Glucose Monitor System w/Device Kit  Use twice per day     blood glucose test strips  1 strip by Other route 2 times daily for 180 doses     Blood Pressure Kit  Use prn     carvedilol 25 MG tablet  Commonly known as: COREG     clopidogrel 75 MG tablet  Commonly known as: PLAVIX  Take 1 tablet by mouth daily     furosemide 40 MG tablet  Commonly known as: LASIX  Take 1 tablet by mouth daily     glimepiride 2 MG tablet  Commonly known as: Amaryl  Take 1 tablet by mouth every morning     hydrALAZINE 10 MG tablet  Commonly known as: APRESOLINE  Take 1 tablet by mouth 2 times daily     isosorbide mononitrate 30 MG extended release tablet  Commonly known as: IMDUR  Take 1 tablet by mouth daily     Lancets Misc  1 each by Does not apply route 2 times daily for 180 doses     tamsulosin 0.4 MG capsule  Commonly known as: FLOMAX  Take 1 capsule by mouth daily           Where to Get Your Medications      These medications were sent to [address] [place] [address] Street, [address] [facility], [facility] [address] [facility].  [address] [facility]    Phone: [phone]   · albuterol-ipratropium  MCG/ACT Aers inhaler  · furosemide 40 MG tablet  · predniSONE 20 MG tablet         Objective Findings at Discharge:   /69   Pulse 62   Temp 98.4 °F (36.9 °C) (Oral)   Resp 24   Ht 5' 7\" (1.702 m)   Wt 198 lb 6.6 oz (90 kg)   SpO2 98%   BMI 31.08 kg/m²            PHYSICAL EXAM   GEN Awake male, sitting upright in bed in no apparent distress. Appears given age. EYES Pupils are equally round. No scleral erythema, discharge, or conjunctivitis. HENT Mucous membranes are moist. Oral pharynx without exudates, no evidence of thrush. NECK Supple, no apparent thyromegaly or masses. RESP Clear to auscultation, no wheezes, rales or rhonchi. Symmetric chest movement while on room air. CARDIO/VASC S1/S2 auscultated. Regular rate without appreciable murmurs, rubs, or gallops. No JVD or carotid bruits. Peripheral pulses equal bilaterally and palpable. No peripheral edema. GI Abdomen is soft without significant tenderness, masses, or guarding. Bowel sounds are normoactive. Rectal exam deferred.  No costovertebral angle tenderness. Normal appearing external genitalia. York catheter is not present. HEME/LYMPH No palpable cervical lymphadenopathy and no hepatosplenomegaly. No petechiae or ecchymoses. MSK No gross joint deformities. SKIN Normal coloration, warm, dry. NEURO Cranial nerves appear grossly intact, normal speech, no lateralizing weakness. PSYCH Awake, alert, oriented x 4. Affect appropriate.     BMP/CBC  Recent Labs     07/07/22  1732 07/08/22  0058    133*   K 3.7 4.3    100   CO2 21 22   BUN 17 18   CREATININE 1.4* 1.4*   WBC 6.6 7.1   HCT 44.5 44.7    155       IMAGING:  Reviewed    Discharge Time of 35minutes    Electronically signed by Teresa Miller MD on 7/9/2022 at 8:38 AM

## 2022-07-15 ENCOUNTER — OFFICE VISIT (OUTPATIENT)
Dept: CARDIOLOGY CLINIC | Age: 62
End: 2022-07-15
Payer: MEDICARE

## 2022-07-15 VITALS
BODY MASS INDEX: 31.14 KG/M2 | HEART RATE: 51 BPM | SYSTOLIC BLOOD PRESSURE: 80 MMHG | HEIGHT: 67 IN | WEIGHT: 198.4 LBS | OXYGEN SATURATION: 96 % | DIASTOLIC BLOOD PRESSURE: 60 MMHG

## 2022-07-15 DIAGNOSIS — R00.1 BRADYCARDIA: ICD-10-CM

## 2022-07-15 DIAGNOSIS — I95.9 HYPOTENSION, UNSPECIFIED HYPOTENSION TYPE: ICD-10-CM

## 2022-07-15 DIAGNOSIS — I25.10 ASCVD (ARTERIOSCLEROTIC CARDIOVASCULAR DISEASE): Primary | ICD-10-CM

## 2022-07-15 PROCEDURE — 93000 ELECTROCARDIOGRAM COMPLETE: CPT | Performed by: NURSE PRACTITIONER

## 2022-07-15 PROCEDURE — 99215 OFFICE O/P EST HI 40 MIN: CPT | Performed by: NURSE PRACTITIONER

## 2022-07-15 RX ORDER — CARVEDILOL 12.5 MG/1
12.5 TABLET ORAL 2 TIMES DAILY
Qty: 180 TABLET | Refills: 3 | Status: SHIPPED | OUTPATIENT
Start: 2022-07-15 | End: 2022-08-01

## 2022-07-15 ASSESSMENT — ENCOUNTER SYMPTOMS: SPUTUM PRODUCTION: 0
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
## 2022-07-15 NOTE — PROGRESS NOTES
CLINICAL STAFF DOCUMENTATION    Liberty Horvath  1960  [id number]    Have you had any Chest Pain that is not new? - No      Have you had any Shortness of Breath - Yes  If Yes - When at rest and on exertion    Have you had any dizziness - yes  Pt states dizzy when taking medications    Have you had any palpitations that are not new? - No      Do you have any edema - swelling in No    If Yes - CHECK TO SEE IF THE EDEMA IS PITTING    Vein \"LEG PROBLEM Questionnaire\"  Do you have prominent leg veins? No   Do you have any skin discoloration? No  Do you have any healed or active sores? No  Do you have swelling of the legs? No  Do you have a family history of varicose veins? No    10.  Do you have any of the following in your legs:         no          Ask patient if they want to sign up for MyChart if they are not already signed up    Check to see if we have an E-MAIL on file for the patient    Check medication list thoroughly!!! AND RECONCILE OUTSIDE MEDICATIONS  If dose has changed change the entire order not just the MG  BE SURE TO ASK PATIENT IF THEY NEED MEDICATION REFILLS    At check out add to every patient's \"wrap up\" the following dot phrase AFTERHOURSEDUCATION and ensure we explain this to our patients

## 2022-07-18 ENCOUNTER — OFFICE VISIT (OUTPATIENT)
Dept: CARDIOLOGY CLINIC | Age: 62
End: 2022-07-18
Payer: MEDICARE

## 2022-07-18 VITALS
WEIGHT: 196.8 LBS | BODY MASS INDEX: 30.89 KG/M2 | SYSTOLIC BLOOD PRESSURE: 122 MMHG | HEIGHT: 67 IN | HEART RATE: 70 BPM | DIASTOLIC BLOOD PRESSURE: 84 MMHG

## 2022-07-18 DIAGNOSIS — I95.2 HYPOTENSION DUE TO DRUGS: Primary | ICD-10-CM

## 2022-07-18 PROCEDURE — 99211 OFF/OP EST MAY X REQ PHY/QHP: CPT | Performed by: NURSE PRACTITIONER

## 2022-07-18 NOTE — PROGRESS NOTES
Sejal Jalloh is here for a BP check  Last visit was noted to be significantly hypotensive which most likely related to polypharmacy. Last office visit his carvedilol /Lasix/Imdur/furosemide/lisinopril were placed on hold due to symptomatic hypotension    BP Readings from Last 3 Encounters:   07/18/22 122/84   07/15/22 80/60   07/09/22 139/69     Pulse Readings from Last 3 Encounters:   07/18/22 70   07/15/22 51   07/09/22 62     Wt Readings from Last 3 Encounters:   07/18/22 196 lb 12.8 oz (89.3 kg)   07/15/22 198 lb 6.4 oz (90 kg)   07/09/22 198 lb 6.6 oz (90 kg)           Plan:   Sejal Jalloh is to resume coreg 625 mg twice daily-return to clinic in 2 weeks for reevaluation and possible reintroduction of antihypertensives/diuretic therapy. Encouraged him to weigh self daily and if 3 pound weight gain overnight or increase shortness of breath notify office.

## 2022-08-01 ENCOUNTER — OFFICE VISIT (OUTPATIENT)
Dept: CARDIOLOGY CLINIC | Age: 62
End: 2022-08-01
Payer: MEDICARE

## 2022-08-01 VITALS
HEIGHT: 68 IN | DIASTOLIC BLOOD PRESSURE: 102 MMHG | BODY MASS INDEX: 29.95 KG/M2 | SYSTOLIC BLOOD PRESSURE: 142 MMHG | WEIGHT: 197.6 LBS | HEART RATE: 72 BPM

## 2022-08-01 DIAGNOSIS — I10 ESSENTIAL HYPERTENSION: Primary | ICD-10-CM

## 2022-08-01 DIAGNOSIS — E78.5 HYPERLIPIDEMIA LDL GOAL <70: ICD-10-CM

## 2022-08-01 DIAGNOSIS — E66.9 OBESITY (BMI 30-39.9): ICD-10-CM

## 2022-08-01 DIAGNOSIS — Z87.891 FORMER TOBACCO USE: ICD-10-CM

## 2022-08-01 DIAGNOSIS — I25.10 ASCVD (ARTERIOSCLEROTIC CARDIOVASCULAR DISEASE): ICD-10-CM

## 2022-08-01 DIAGNOSIS — I25.5 ISCHEMIC CARDIOMYOPATHY: ICD-10-CM

## 2022-08-01 PROCEDURE — 99214 OFFICE O/P EST MOD 30 MIN: CPT | Performed by: NURSE PRACTITIONER

## 2022-08-01 RX ORDER — CARVEDILOL 12.5 MG/1
12.5 TABLET ORAL 2 TIMES DAILY
Qty: 180 TABLET | Refills: 3
Start: 2022-08-01 | End: 2022-10-04 | Stop reason: SDUPTHER

## 2022-08-01 ASSESSMENT — ENCOUNTER SYMPTOMS
COUGH: 0
ORTHOPNEA: 0

## 2022-08-01 NOTE — PROGRESS NOTES
8/1/2022  Primary cardiologist: Dr. Lawler Number  is an established 60 y.o.  male here for follow-up on sp hospitalization for STEMI. SUBJECTIVE/OBJECTIVE:    HPI :   Gracia Quiroz reports that he has been feeling fatigued since he got home from the hospital.  Patient denies any chest pain since starting Imdur. Patient has abstained from tobacco abuse since being discharged from hospital additionally. Patient was admitted to [facility] for STEMI March 12, 2022. Patient taken to Cath Lab and found to have occlusion of LAD and RCA but has patent LIMA to LAD, saphenous to OM. Review of Systems   Constitutional: Positive for malaise/fatigue. Cardiovascular:  Positive for dyspnea on exertion. Negative for chest pain, leg swelling, near-syncope, orthopnea, palpitations and syncope. Respiratory:  Negative for cough. Vitals:    08/01/22 0919   BP: (!) 142/102   Site: Right Upper Arm   Position: Sitting   Cuff Size: Large Adult   Pulse: 72   Weight: 197 lb 9.6 oz (89.6 kg)   Height: 5' 7.5\" (1.715 m)     Patient-Reported Vitals 2/25/2021   Patient-Reported Weight -   Patient-Reported Height -   Patient-Reported Systolic 794   Patient-Reported Diastolic 66   Patient-Reported Pulse 69     Wt Readings from Last 3 Encounters:   07/18/22 196 lb 12.8 oz (89.3 kg)   07/15/22 198 lb 6.4 oz (90 kg)   07/09/22 198 lb 6.6 oz (90 kg)     There is no height or weight on file to calculate BMI. Physical Exam  Vitals reviewed. Constitutional:       General: He is not in acute distress. Appearance: Normal appearance. He is obese. He is not ill-appearing. HENT:      Head: Atraumatic. Neck:      Vascular: No carotid bruit. Cardiovascular:      Rate and Rhythm: Normal rate and regular rhythm. Pulses: Normal pulses. Heart sounds: Normal heart sounds. No murmur heard. Pulmonary:      Effort: Pulmonary effort is normal. No respiratory distress. Breath sounds: Normal breath sounds.    Musculoskeletal: facility-administered medications for this visit. [facility] 3/12/2022   Procedure Summary   Indication : stemi activation Access Femoral   1. LAD and RCA are 100 % occluded   2. Circ is patent   LIMA to LAd and SVG to OM2 are patent   LVEDP was 28  Angiographic Findings      Diagnostic Findings      Cardiac Arteries and Lesion Findings     LMCA: Normal (no stenosis %) and No Angiographicalyl Significant  Disease. widely patent     LAD: Abnormal, Focal stenosis and Chronic occlusion. 100 % occluded in mid  segmentThere is a previous stent on Mid LAD Mid subsection. There is a  previous stent on Prox LAD. LCx: Normal (no stenosis %) and No Angiographicalyl Significant Disease. Circ  and Om are patent     RCA: Abnormal and Chronic occlusion. 100 % occludedThere is a previous stent on  Mid RCA Proximal subsection. Cardiac Grafts      -  There is a Vein graft that originates at the Aorta Left and attaches to      the 2nd Ob Dora.      -  There is a Free LIMA graft that originates at the LIMA and attaches to      the Mid LAD. -  There is a LIMA graft that originates at the LIMA and attaches to the Mid      LAD (patent). -  There is a Vein graft that originates at the Aorta Right and attaches to      the 2nd Ob Dora (patent). All pertinent data reviewed and discussed with patient       ASSESSMENT/PLAN:    Shortness of breath  Resolved    CHF  Appears compensated  Secondary to ischemia  NYHA HEART FAILURE CLASS: Class II - Symptoms of HF on ordinary exertion, Newly Diagnosed? No, Heart Failure Type: moderate reduced  EF 40-45% on echo 3/2022  Monitor weight daily  Limit sodium to <2000mg a day  Limit water to <64 oz in a day  Call the office if a weight gain of >3 lbs in 2 days or > 5 lbs in a week is noted. ASCVD (arteriosclerotic cardiovascular disease)  [facility] 3/12/2022 showed patent grafts with occluded LAD and RCA  Continue medical management. Increase coreg to 1 tab daily.    Follow up in office in 2 weeks   Bring pill bottles. ? Taking plavix. He is taking ASA. Former tobacco use  Congratulated on being tobacco free since hospitalization    Hyperlipidemia LDL goal <70  No recent lipid panels to review. Lipid panel ordered  Reviewed goal LDL < 70  Encouraged low cholesterol diet exercise and medication compliance. On lipitor. No reported adverse events or reported side effects from medication(s). He is stable on current dose. Medications reviewed and confirmed with patient     Tests ordered:  Lipid panel      Follow-up  In 2 weeks     Signed:  JERED Gerard CNP, 8/1/2022, 7:04 AM    An electronic signature was used to authenticate this note. Please note this report has been partially produced using speech recognition software and may contain errors related to that system including errors in grammar, punctuation, and spelling, as well as words and phrases that may be inappropriate. If there are any questions or concerns please feel free to contact the dictating provider for clarification.

## 2022-08-02 RX ORDER — CLOPIDOGREL BISULFATE 75 MG/1
75 TABLET ORAL DAILY
Qty: 90 TABLET | Refills: 1 | Status: SHIPPED | OUTPATIENT
Start: 2022-08-02 | End: 2022-10-04 | Stop reason: SDUPTHER

## 2022-08-22 ENCOUNTER — NURSE ONLY (OUTPATIENT)
Dept: CARDIOLOGY CLINIC | Age: 62
End: 2022-08-22
Payer: MEDICARE

## 2022-08-22 VITALS — DIASTOLIC BLOOD PRESSURE: 100 MMHG | SYSTOLIC BLOOD PRESSURE: 162 MMHG | HEART RATE: 80 BPM

## 2022-08-22 DIAGNOSIS — I25.10 ASCVD (ARTERIOSCLEROTIC CARDIOVASCULAR DISEASE): ICD-10-CM

## 2022-08-22 DIAGNOSIS — Z91.199 CURRENT NONADHERENCE TO MEDICAL TREATMENT: ICD-10-CM

## 2022-08-22 DIAGNOSIS — I10 ESSENTIAL HYPERTENSION: Primary | ICD-10-CM

## 2022-08-22 PROCEDURE — 99211 OFF/OP EST MAY X REQ PHY/QHP: CPT | Performed by: NURSE PRACTITIONER

## 2022-08-22 NOTE — PROGRESS NOTES
[facility] [place]  [address], [address]  Phone: [phone]    Fax [phone]                  Corina Cruz MD, Mary Oliveros MD, Jarek Hernandez MD, MD Simon Perez MD Kyra Lemons, MD Dominic Docker, MD Creta Colder, JERED Wynne, JERED Otero    BP Check Visit    Pt is here for a 2 week BP check. At the last office visit patient was found to have a blood pressure of 142/102. At that time the patient was instructed to increase coreg to 1 tab daily. Unfortunately he never restarted his coreg. He brought pills in today. I marked the coreg to restart 1/2 tab daily. BP Readings from Last 3 Encounters:   08/22/22 (!) 162/100   08/01/22 (!) 142/102   07/18/22 122/84         Plan for pt is to start 6.25 mg BID today.     Follow up in 2 weeks for BP check     Pt is to report any dizziness or syncope to the office    Electronically signed by JERED Bloom CNP on 8/22/2022 at 9:02 AM

## 2022-08-25 ENCOUNTER — TELEPHONE (OUTPATIENT)
Dept: PHARMACY | Facility: CLINIC | Age: 62
End: 2022-08-25

## 2022-08-25 NOTE — LETTER
[place]  [address], [address]        Jagdeep Arevalo   [address]  [place]reet[place] Maddox [address]           08/30/22     Dear Jagdeep Arevalo,    We tried to reach you recently regarding your GLIMEPIRIDE 2 MG, but were unable to reach you on the telephone. We have on file that you are currently taking GLIMEPIRIDE 2 MG every morning. If you are no longer taking or taking differently, please call us at the number below so that we can discuss this and update your medication profile. It appears that this medication has not been filled at proper times. Last refilled in April. Due to refill around July 21st.  Due for refill around We are worried you might be missing doses or not taking it as directed. It is important that you take your medications regularly and try not to miss a single dose.     Some ways to help you remember to take and refill your medications are to:  · Use a pill box, set an alarm, and/or keep your medication near something that you do every day  · Fill a 3-month supply of your prescription at a time to save you time and trips to the pharmacy - if you would like assistance in switching your prescriptions to a 3-month supply, please contact us  · Ask your pharmacy if they participate in [facility]", a program where you can  all of your medications on the same day  · Ask your pharmacy if you can be set up with automatic refill, where they will automatically refill your prescription when it is due and let you know it's ready to     Sincerely,   [address] [place] [address] free: [phone]

## 2022-08-30 NOTE — TELEPHONE ENCOUNTER
2nd Attempt Documentation:  2nd attempt to contact this patient regarding the previous message  CLINICAL PHARMACY: [facility]  Patient unavailable at the time of call. Left following message on home and mobileTAD: please call back at toll-free [phone] to retrieve previous message. Letter mailed to patient.      For [address] [facility] in place:  No  Time Spent (min): 15
MD SPARKLE Alvarez [facility]   9/6/2022  9:20 AM Adis Santamaria MD [facility]       Samira Upton [facility].    [address] [facility] free: [phone]

## 2022-09-06 ENCOUNTER — OFFICE VISIT (OUTPATIENT)
Dept: CARDIOLOGY CLINIC | Age: 62
End: 2022-09-06
Payer: MEDICARE

## 2022-09-06 VITALS
SYSTOLIC BLOOD PRESSURE: 120 MMHG | WEIGHT: 201 LBS | BODY MASS INDEX: 31.55 KG/M2 | DIASTOLIC BLOOD PRESSURE: 60 MMHG | HEART RATE: 56 BPM | HEIGHT: 67 IN

## 2022-09-06 DIAGNOSIS — I25.10 ASCVD (ARTERIOSCLEROTIC CARDIOVASCULAR DISEASE): Primary | ICD-10-CM

## 2022-09-06 PROCEDURE — 99214 OFFICE O/P EST MOD 30 MIN: CPT | Performed by: INTERNAL MEDICINE

## 2022-09-06 NOTE — PROGRESS NOTES
Vein \"LEG PROBLEM Questionnaire\"  Do you have prominent leg veins? No   Do you have any skin discoloration? No  Do you have any healed or active sores? No  Do you have swelling of the legs? No  Do you have a family history of varicose veins? No  Does your profession involve pro-longed        standing or heavy lifting? No  7. Have you been fighting overweight problems? No  8. Do you have restless legs? No  9. Do you have any night time cramps? No  10. Do you have any of the following in your legs:         I  11. If Yes - Have they worn compression stockings No  12.  If they have worn compression stockings

## 2022-09-06 NOTE — PATIENT INSTRUCTIONS
Please be informed that if you contact our office outside of normal business hours the physician on call cannot help with any scheduling or rescheduling issues, procedure instruction questions or any type of medication issue. We advise you for any urgent/emergency that you go to the nearest emergency room! PLEASE CALL OUR OFFICE DURING NORMAL BUSINESS HOURS    Monday - Friday   8 am to 5 pm    [place]: [phone]    [place] [employer]:  [phone]  **It is YOUR responsibilty to bring medication bottles and/or updated medication list to [address] [place] [address]. This will allow us to better serve you and all your healthcare needs**  [facility] Locations - No appointment necessary. Sites open Monday to Friday. Call your preferred location for test preparation, business hours and other information you need. [employer] accepts [employer]. [place] [facility] [address]. [place]reet[place] Maddox, [address] [facility]  Phone: [phone] [facility] [address] [facility]  [address].   Mehnaz barnhart, [address]  Phone: [phone]

## 2022-09-06 NOTE — PROGRESS NOTES
CARDIOLOGY NOTE      9/6/2022    RE: Cara Freitas  (1960)                               TO:  Dr. Angus Yen MD            Sheryle Roys is a 60 y.o. male who was seen today for management of coronary artery disease                                    HPI:                   Pt has h/o coronary artery disease, hypertension, hyperlipidemia, diabetes, obesity, HFrEF, seen today for follow-up.  Pt has cardiac complaints has no chest pain shortness of breath and is compliant with his meds    Cara Freitas has the following history recorded in care path:  Patient Active Problem List    Diagnosis Date Noted    Non-ST elevation myocardial infarction (NSTEMI) ([address] 03/15/2018    Hypertensive urgency     Hemiparesis affecting right side as late effect of cerebrovascular accident (CVA) ([address] 07/07/2016    Essential hypertension 05/24/2016    Cerebrovascular accident (CVA) due to thrombosis of left middle cerebral artery ([address] 05/14/2016    Obesity (BMI 30-39.9) 06/26/2015    History of acute inferior wall myocardial infarction     History of Guillain-[place] syndrome 03/08/2019    Adrenal adenoma 05/23/2014    ASCVD (arteriosclerotic cardiovascular disease)     Frequent PVCs     Unstable angina ([address] 03/15/2018    Gastroesophageal reflux disease 04/19/2017    SOBOE (shortness of breath on exertion) 10/25/2016    Hemiparesis of right dominant side as late effect of cerebral infarction ([address] 05/14/2016    Gait disturbance 05/14/2016    Hyperlipidemia LDL goal <70 12/19/2014    Fatty liver 05/23/2014    Colon, diverticulosis 05/23/2014    Renal cyst 05/23/2014    B12 deficiency 01/10/2014    Chest pain 07/30/2013    History of PTCA     Stable angina pectoris ([address] 03/14/2022    Noncompliance with medications 03/14/2022    Stage 3b chronic kidney disease ([address] 03/14/2022    Class 1 obesity due to excess calories with body mass index (BMI) of 33.0 to 33.9 in adult 03/14/2022    Irritability and anger 02/25/2021    COPD (chronic obstructive pulmonary disease) ([facility] 12/11/2020    ANNA (obstructive sleep apnea) 11/12/2020    Hypersomnia 11/12/2020    Cigarette smoker 11/12/2020    S/P CABG (coronary artery bypass graft) 06/24/2020    Dysarthria due to recent stroke     Aphasia due to acute stroke [facility])     Complication of coronary artery bypass graft surgery     Uncontrolled pain     Acute pain of left shoulder     Ischemic cardiomyopathy 06/26/2018     Current Outpatient Medications   Medication Sig Dispense Refill    clopidogrel (PLAVIX) 75 MG tablet Take 1 tablet by mouth in the morning. 90 tablet 1    carvedilol (COREG) 12.5 MG tablet Take 1 tablet by mouth in the morning and 1 tablet before bedtime. 180 tablet 3    glimepiride (AMARYL) 2 MG tablet Take 1 tablet by mouth every morning 90 tablet 1    aspirin 81 MG chewable tablet Take 1 tablet by mouth daily 180 tablet 0    sertraline (ZOLOFT) 100 MG tablet Take 1 tablet by mouth daily 30 tablet 0    atorvastatin (LIPITOR) 80 MG tablet Take 1 tablet by mouth daily 30 tablet 0    Alcohol Swabs (ALCOHOL PADS) 70 % PADS 1 Units by Does not apply route 2 times daily 180 each 3    Blood Glucose Monitoring Suppl (BLOOD GLUCOSE MONITOR SYSTEM) w/Device KIT Use twice per day 1 kit 0    tamsulosin (FLOMAX) 0.4 MG capsule Take 1 capsule by mouth daily 30 capsule 0    pantoprazole (PROTONIX) 40 MG tablet Take 1 tablet by mouth daily 30 tablet 0    Blood Pressure KIT Use prn 1 kit 0    furosemide (LASIX) 40 MG tablet Take 1 tablet by mouth daily 60 tablet 1     No current facility-administered medications for this visit.      Allergies: Ibuprofen, Augmentin [amoxicillin-pot clavulanate], and Codeine  Past Medical History:   Diagnosis Date    Acute cerebrovascular accident (CVA) ([facility] 5/22/2020    Acute ST elevation myocardial infarction (STEMI) ([facility] 3/14/2018    B12 deficiency     CAD (coronary artery disease)     COPD (chronic obstructive pulmonary disease) ([facility] 12/11/2020 GERD (gastroesophageal reflux disease)     Guillain [place] syndrome ([facility]     H/O echocardiogram 7/30/13, 6/16/10    7/13-EF 50-55% WNL. 6/10-EF53%, mod LVH, MARCI, RVE, trace PI, mild MR/TR, 6/09    Heart murmur     History of echocardiogram 10/07/2020    EF 40%, No changed since previous 5/2020, Mid inferior hypokinesis noted, Grade I DD, Mod MR, Mild TR, Dilatation of the aortic root, No pericardial effusion     History of myocardial infarction 2003    History of nuclear MUGA test 03/03/2020    EF 53%, Normal, No ICD needed    History of nuclear stress test 06/23/2020    EF 30%, study suggestive of abnormal myocardial perfusion. History of PTCA 6/03;2/03 6/03-PTCA w/stents x2, 2/03-PTCA w/stent x3    History of PTCA 03/15/2018    LAD EF25%    Hx of cardiovascular stress test 04/25/2018    EF 28% Nuclear scintigraphy demonstartes inferior wall infarct. Hx of cardiovascular stress test 01/27/2020    Abnormal Study. Large area of inferior & apical-lateral infarct. No significant reversible ischemia. Severly reduced LVEF 23%    HX OTHER MEDICAL 03/14/2018    lifevest  d/c 7/2/2018    Hyperlipidemia     Hypertension     Impaired glucose tolerance 6/3/2016    MUGA 07/09/2018    EF 47%    N[facility] (non-ST elevated [facility]) [facility] 03/15/2018    Patient in clinical research study 05/2018    3 year study dalcitripib vs placebo [phone]    Pneumonia due to organism     Renal cyst 5/23/2014    S/P cardiac catheterization 7/12/13 7/13-EF=55%. PATENT LAD AND RCA STENTS, OM2 50% stenosis, & Normal wall motion. Stroke (cerebrum) [facility])     May 2016     Past Surgical History:   Procedure Laterality Date    CARDIAC CATHETERIZATION  7/13 7/13-EF=55%. PATENT LAD AND RCA STENTS, OM2 50% stenosis, & Normal wall motion.     COLONOSCOPY  8/21/14    colon polyp, diverticulosis, internal hemorrhoids    CORONARY ANGIOPLASTY  6/03 6/14/03- PTCA w stents x2 mid LAD, 2/03-PTCA w stents x3, RCA    CORONARY ARTERY BYPASS GRAFT N/A 2020    CABG CORONARY ARTERY BYPASS x 2, INTRAOPERATIVE NGA, INDUCED HYPOTHERMIA, LEFT LEG ENDOSCOPIC VEIN HARVEST performed by Elly Tapia MD at [address] [place] [address] Entrance, COLON, DIAGNOSTIC  14    irregular GE junction, gastritis,hiatal hernia, gastric polyps    HAND SURGERY      rt knuckle    PTCA  2018    LAD    SHOULDER SURGERY Right 2011    bone spurs      As reviewed   Family History   Problem Relation Age of Onset    Hypertension Mother     High Cholesterol Mother     Alcohol Abuse Mother     Depression Mother     Alcohol Abuse Father     Stomach Cancer Father     Depression Sister     Diabetes Brother     Depression Sister     Stomach Cancer Brother     Alcohol Abuse Brother      Social History     Tobacco Use    Smoking status: Former     Packs/day: 2.00     Years: 45.00     Pack years: 90.00     Types: Cigarettes     Quit date: 2016     Years since quittin.3    Smokeless tobacco: Never   Substance Use Topics    Alcohol use: No     Alcohol/week: 0.0 standard drinks        Objective:    Vitals:    22 0901   BP: 120/60   Site: Left Upper Arm   Position: Sitting   Cuff Size: Large Adult   Pulse: 56   Weight: 201 lb (91.2 kg)   Height: 5' 7\" (1.702 m)     /60 (Site: Left Upper Arm, Position: Sitting, Cuff Size: Large Adult)   Pulse 56   Ht 5' 7\" (1.702 m)   Wt 201 lb (91.2 kg)   BMI 31.48 kg/m²     Patient-Reported Vitals 2021   Patient-Reported Weight -   Patient-Reported Height -   Patient-Reported Systolic 475   Patient-Reported Diastolic 66   Patient-Reported Pulse 69        Wt Readings from Last 3 Encounters:   22 201 lb (91.2 kg)   22 197 lb 9.6 oz (89.6 kg)   22 196 lb 12.8 oz (89.3 kg)     Body mass index is 31.48 kg/m². GENERAL - Alert, oriented, pleasant, in no apparent distress. EYES: No jaundice, no conjunctival pallor. SKIN: It is warm & dry. No rashes.  No Echhymosis    HEENT - No clinically significant abnormalities seen. Neck - Supple. No jugular venous distention noted. No carotid bruits. Cardiovascular - Normal S1 and S2 without obvious murmur or gallop. Extremities - No cyanosis, clubbing, or significant edema. Pulmonary - No respiratory distress. No wheezes or rales. Abdomen - No masses, tenderness, or organomegaly. Musculoskeletal - No significant edema. No joint deformities. No muscle wasting. Neurologic - Cranial nerves II through XII are grossly intact. There were no gross focal neurologic abnormalities.     Lab Review   Lab Results   Component Value Date/Time    CKTOTAL 113 05/07/2016 12:15 PM    TROPONINT 0.013 07/08/2022 12:58 AM     BNP:  No results found for: BNP  PT/INR:    Lab Results   Component Value Date    INR 0.90 03/12/2022     Lab Results   Component Value Date    LABA1C 6.6 (H) 07/08/2022    LABA1C 7.2 (H) 03/12/2022     Lab Results   Component Value Date    WBC 7.1 07/08/2022    HCT 44.7 07/08/2022    MCV 98.9 07/08/2022     07/08/2022     Lab Results   Component Value Date    CHOL 174 07/08/2022    TRIG 90 07/08/2022    HDL 32 (L) 07/08/2022    LDLCALC 124 (H) 07/08/2022    LDLDIRECT 47 10/14/2020     Lab Results   Component Value Date    ALT 14 07/08/2022    AST 17 07/08/2022     BMP:    Lab Results   Component Value Date/Time     07/08/2022 12:58 AM    K 4.3 07/08/2022 12:58 AM    K 3.8 03/16/2018 10:06 AM     07/08/2022 12:58 AM    CO2 22 07/08/2022 12:58 AM    BUN 18 07/08/2022 12:58 AM    CREATININE 1.4 07/08/2022 12:58 AM     CMP:   Lab Results   Component Value Date/Time     07/08/2022 12:58 AM    K 4.3 07/08/2022 12:58 AM    K 3.8 03/16/2018 10:06 AM     07/08/2022 12:58 AM    CO2 22 07/08/2022 12:58 AM    BUN 18 07/08/2022 12:58 AM    PROT 6.6 07/08/2022 12:58 AM     TSH:    Lab Results   Component Value Date/Time    TSHHS 0.746 07/08/2022 12:58 AM           Assessment & Plan:               -     CORONARY ARTERY DISEASE: asymptomatic     All available  tests in chart reviewed. Management discussed . Testing ordered  no       On Plavix compliant no side effects                        Last Cardiolite June 2018 showed infarct EF of 30%  EF is improved since then 40%  EKG done today shows no new changes    -  Hypertension: Patients blood pressure is normal. Patient is advised about low sodium diet. Present medical regimen will not be changed. On hydralazine, amlodipine, Coreg, Lasix compliant has no side effects                    -  LIPID MANAGEMENT:  Importance of lipid levels discussed with patient   and patient was given dietary advice. NCEP- ATP III guidelines reviewed with patient. -   Changes  in medicines made: No     On Lipitor 80 mg p.o. daily compliant LDL of 47                           -   DIABETES MELLITUS: Available pertinent lab data reviewed   and  patient was given dietary advice . Advised to check blood glucose level on a regular basis. -   Changes  in medicines made: No        On glimepiride compliant hemoglobin A1c of 6.7        -HFrEF on beta-blocker that is Coreg, Lasix compliant  Last EF July of last year was about 45%    Mortality from the morbid obesity is very high: Wt loss advised     Body mass index is 31.48 kg/m². Ghazala Blankenship MD    [facility]    Please note this report has been partially produced using speech recognition software and may contain errors related to that system including errors in grammar, punctuation, and spelling, as well as words and phrases that may be inappropriate. If there are any questions or concerns please feel free to contact the dictating provider for clarification.

## 2022-09-08 ENCOUNTER — OFFICE VISIT (OUTPATIENT)
Dept: FAMILY MEDICINE CLINIC | Age: 62
End: 2022-09-08
Payer: MEDICARE

## 2022-09-08 ENCOUNTER — HOSPITAL ENCOUNTER (OUTPATIENT)
Dept: CT IMAGING | Age: 62
Discharge: HOME | End: 2022-09-08
Payer: MEDICARE

## 2022-09-08 ENCOUNTER — TELEPHONE (OUTPATIENT)
Dept: CARDIOLOGY CLINIC | Age: 62
End: 2022-09-08

## 2022-09-08 ENCOUNTER — HOSPITAL ENCOUNTER (OUTPATIENT)
Age: 62
Discharge: HOME | End: 2022-09-08
Payer: MEDICARE

## 2022-09-08 ENCOUNTER — TELEPHONE (OUTPATIENT)
Dept: FAMILY MEDICINE CLINIC | Age: 62
End: 2022-09-08

## 2022-09-08 VITALS
HEIGHT: 67 IN | OXYGEN SATURATION: 98 % | BODY MASS INDEX: 31.42 KG/M2 | SYSTOLIC BLOOD PRESSURE: 150 MMHG | DIASTOLIC BLOOD PRESSURE: 70 MMHG | RESPIRATION RATE: 20 BRPM | TEMPERATURE: 97.4 F | HEART RATE: 67 BPM | WEIGHT: 200.2 LBS

## 2022-09-08 DIAGNOSIS — R06.01 ORTHOPNEA: ICD-10-CM

## 2022-09-08 DIAGNOSIS — Z11.59 NEED FOR HEPATITIS C SCREENING TEST: ICD-10-CM

## 2022-09-08 DIAGNOSIS — E11.9 TYPE 2 DIABETES MELLITUS WITHOUT COMPLICATION, UNSPECIFIED WHETHER LONG TERM INSULIN USE (HCC): Primary | ICD-10-CM

## 2022-09-08 DIAGNOSIS — I25.5 ISCHEMIC CARDIOMYOPATHY: ICD-10-CM

## 2022-09-08 DIAGNOSIS — R06.02 SOB (SHORTNESS OF BREATH): ICD-10-CM

## 2022-09-08 DIAGNOSIS — R09.89 DECREASED PULSES IN FEET: ICD-10-CM

## 2022-09-08 DIAGNOSIS — Z86.010 HX OF COLONIC POLYPS: ICD-10-CM

## 2022-09-08 DIAGNOSIS — R45.4 IRRITABILITY AND ANGER: ICD-10-CM

## 2022-09-08 DIAGNOSIS — K76.0 FATTY LIVER: ICD-10-CM

## 2022-09-08 DIAGNOSIS — N18.31 STAGE 3A CHRONIC KIDNEY DISEASE (HCC): ICD-10-CM

## 2022-09-08 PROBLEM — Z86.0100 HX OF COLONIC POLYPS: Status: ACTIVE | Noted: 2022-09-08

## 2022-09-08 LAB
BASOPHILS ABSOLUTE: 0.1 K/CU MM
BASOPHILS RELATIVE PERCENT: 0.8 % (ref 0–1)
CREATININE URINE: 200.7 MG/DL (ref 39–259)
DIFFERENTIAL TYPE: ABNORMAL
EOSINOPHILS ABSOLUTE: 0.2 K/CU MM
EOSINOPHILS RELATIVE PERCENT: 2.3 % (ref 0–3)
GFR AFRICAN AMERICAN: 49 ML/MIN/1.73M2
GFR NON-AFRICAN AMERICAN: 41 ML/MIN/1.73M2
HBA1C MFR BLD: 6.5 %
HCT VFR BLD CALC: 47.7 % (ref 42–52)
HEMOGLOBIN: 15.8 GM/DL (ref 13.5–18)
HEPATITIS C ANTIBODY: NON REACTIVE
IMMATURE NEUTROPHIL %: 0.3 % (ref 0–0.43)
LYMPHOCYTES ABSOLUTE: 1.9 K/CU MM
LYMPHOCYTES RELATIVE PERCENT: 22.5 % (ref 24–44)
MCH RBC QN AUTO: 32.6 PG (ref 27–31)
MCHC RBC AUTO-ENTMCNC: 33.1 % (ref 32–36)
MCV RBC AUTO: 98.6 FL (ref 78–100)
MICROALBUMIN UR-MCNC: 50.8 MG/DL
MICROALBUMIN/CREAT UR-RTO: 253.1 MG/G (ref 0–30)
MONOCYTES ABSOLUTE: 0.7 K/CU MM
MONOCYTES RELATIVE PERCENT: 7.5 % (ref 0–4)
NUCLEATED RBC %: 0 %
PDW BLD-RTO: 13.5 % (ref 11.7–14.9)
PLATELET # BLD: 152 K/CU MM (ref 140–440)
PMV BLD AUTO: 12.6 FL (ref 7.5–11.1)
POC CREATININE: 1.7 MG/DL (ref 0.9–1.3)
PRO-BNP: 4270 PG/ML
RBC # BLD: 4.84 M/CU MM (ref 4.6–6.2)
SEGMENTED NEUTROPHILS ABSOLUTE COUNT: 5.7 K/CU MM
SEGMENTED NEUTROPHILS RELATIVE PERCENT: 66.6 % (ref 36–66)
TOTAL IMMATURE NEUTOROPHIL: 0.03 K/CU MM
TOTAL NUCLEATED RBC: 0 K/CU MM
WBC # BLD: 8.6 K/CU MM (ref 4–10.5)

## 2022-09-08 PROCEDURE — 2580000003 HC RX 258: Performed by: FAMILY MEDICINE

## 2022-09-08 PROCEDURE — 71260 CT THORAX DX C+: CPT | Performed by: FAMILY MEDICINE

## 2022-09-08 PROCEDURE — 83880 ASSAY OF NATRIURETIC PEPTIDE: CPT

## 2022-09-08 PROCEDURE — 3044F HG A1C LEVEL LT 7.0%: CPT | Performed by: FAMILY MEDICINE

## 2022-09-08 PROCEDURE — 85025 COMPLETE CBC W/AUTO DIFF WBC: CPT

## 2022-09-08 PROCEDURE — 86803 HEPATITIS C AB TEST: CPT

## 2022-09-08 PROCEDURE — 6360000004 HC RX CONTRAST MEDICATION: Performed by: FAMILY MEDICINE

## 2022-09-08 PROCEDURE — 36415 COLL VENOUS BLD VENIPUNCTURE: CPT

## 2022-09-08 PROCEDURE — 99215 OFFICE O/P EST HI 40 MIN: CPT | Performed by: FAMILY MEDICINE

## 2022-09-08 RX ORDER — ASPIRIN 81 MG/1
81 TABLET, CHEWABLE ORAL DAILY
Qty: 180 TABLET | Refills: 1 | Status: SHIPPED | OUTPATIENT
Start: 2022-09-08 | End: 2023-03-07

## 2022-09-08 RX ORDER — SERTRALINE HYDROCHLORIDE 100 MG/1
100 TABLET, FILM COATED ORAL DAILY
Qty: 90 TABLET | Refills: 1 | Status: SHIPPED | OUTPATIENT
Start: 2022-09-08 | End: 2022-11-03

## 2022-09-08 RX ORDER — METHYLPREDNISOLONE 4 MG/1
TABLET ORAL
Qty: 1 KIT | Refills: 0 | Status: SHIPPED | OUTPATIENT
Start: 2022-09-08 | End: 2022-09-14

## 2022-09-08 RX ORDER — GLIMEPIRIDE 2 MG/1
2 TABLET ORAL EVERY MORNING
Qty: 90 TABLET | Refills: 1 | Status: SHIPPED | OUTPATIENT
Start: 2022-09-08 | End: 2022-11-03

## 2022-09-08 RX ORDER — ATORVASTATIN CALCIUM 80 MG/1
80 TABLET, FILM COATED ORAL DAILY
Qty: 90 TABLET | Refills: 1 | Status: SHIPPED | OUTPATIENT
Start: 2022-09-08 | End: 2022-11-03

## 2022-09-08 RX ORDER — SODIUM CHLORIDE 0.9 % (FLUSH) 0.9 %
10 SYRINGE (ML) INJECTION PRN
Status: DISCONTINUED | OUTPATIENT
Start: 2022-09-08 | End: 2022-09-09 | Stop reason: HOSPADM

## 2022-09-08 RX ADMIN — IOPAMIDOL 95 ML: 755 INJECTION, SOLUTION INTRAVENOUS at 12:10

## 2022-09-08 RX ADMIN — SODIUM CHLORIDE, PRESERVATIVE FREE 10 ML: 5 INJECTION INTRAVENOUS at 12:08

## 2022-09-08 SDOH — ECONOMIC STABILITY: FOOD INSECURITY: WITHIN THE PAST 12 MONTHS, THE FOOD YOU BOUGHT JUST DIDN'T LAST AND YOU DIDN'T HAVE MONEY TO GET MORE.: NEVER TRUE

## 2022-09-08 SDOH — ECONOMIC STABILITY: FOOD INSECURITY: WITHIN THE PAST 12 MONTHS, YOU WORRIED THAT YOUR FOOD WOULD RUN OUT BEFORE YOU GOT MONEY TO BUY MORE.: NEVER TRUE

## 2022-09-08 ASSESSMENT — ENCOUNTER SYMPTOMS
SORE THROAT: 0
COUGH: 1
ABDOMINAL PAIN: 0
SHORTNESS OF BREATH: 1
RHINORRHEA: 0

## 2022-09-08 ASSESSMENT — SOCIAL DETERMINANTS OF HEALTH (SDOH): HOW HARD IS IT FOR YOU TO PAY FOR THE VERY BASICS LIKE FOOD, HOUSING, MEDICAL CARE, AND HEATING?: NOT VERY HARD

## 2022-09-08 ASSESSMENT — COPD QUESTIONNAIRES: COPD: 1

## 2022-09-08 NOTE — PROGRESS NOTES
[facility] [id number]    . Chief Complaint   Patient presents with    Diabetes    Hypertension    Hyperlipidemia    COPD    Depression         Diabetes  He presents for his follow-up diabetic visit. He has type 2 diabetes mellitus. His disease course has been stable. Pertinent negatives for hypoglycemia include no headaches. Associated symptoms include weakness. Pertinent negatives for diabetes include no chest pain and no fatigue. Diabetic complications include a CVA. Risk factors for coronary artery disease include male sex and obesity. Current diabetic treatments: Has not been seen for almost a year. Out of all medication. He is compliant with treatment some of the time. When asked about meal planning, he reported none. He rarely participates in exercise. Blood glucose monitoring compliance is poor. His overall blood glucose range is 130-140 mg/dl. Gastroesophageal Reflux  He complains of coughing. He reports no abdominal pain, no chest pain or no sore throat. Moodiness. Patient thinks his mood is improved but his wife would say otherwise. He like to go ahead and continue the Zoloft for now. . This is a chronic problem. The problem occurs frequently. The problem has been unchanged. Pertinent negatives include no fatigue. Risk factors include lack of exercise, obesity and caffeine use. He has tried a PPI for the symptoms. The treatment provided mild (ran out of all medication. feels that he needs his PPI again) relief. COPD  He complains of cough and shortness of breath. This is a chronic problem. The problem has been rapidly worsening. Pertinent negatives include no chest pain, fever, headaches, myalgias, rhinorrhea or sore throat. Hypertension  This is a chronic problem. The current episode started more than 1 year ago. The problem is controlled. Associated symptoms include shortness of breath. Pertinent negatives include no chest pain, headaches or palpitations.  Compliance problems include psychosocial issues. Hypertensive end-organ damage includes CVA. Hyperlipidemia  Associated symptoms include shortness of breath. Pertinent negatives include no chest pain or myalgias. Mental Health Problem  Primary symptoms comment: Moodiness. Patient thinks his mood is improved but his wife would say otherwise. He like to go ahead and continue the Zoloft for now. .   Additional symptoms of the illness do not include fatigue, headaches or abdominal pain. Shortness of Breath  This is a recurrent problem. The problem has been rapidly worsening. Pertinent negatives include no abdominal pain, chest pain, fever, headaches, rhinorrhea or sore throat. The symptoms are aggravated by any activity and lying flat (sleeps on 4-5 pillows but was unable to lie flat). Review of Systems   Constitutional:  Negative for fatigue and fever. HENT:  Negative for rhinorrhea and sore throat. Respiratory:  Positive for cough and shortness of breath. Cardiovascular:  Negative for chest pain and palpitations. Gastrointestinal:  Negative for abdominal pain. Musculoskeletal:  Negative for myalgias. Neurological:  Positive for weakness. Negative for headaches. Patient Active Problem List   Diagnosis    ASCVD (arteriosclerotic cardiovascular disease)    History of PTCA    History of acute inferior wall myocardial infarction    Chest pain    B12 deficiency    Fatty liver    Colon, diverticulosis    Renal cyst    Adrenal adenoma    Hyperlipidemia LDL goal <70    Obesity (BMI 30-39. 9)    Cerebrovascular accident (CVA) due to thrombosis of left middle cerebral artery (HCC)    Hemiparesis of right dominant side as late effect of cerebral infarction [facility])    Gait disturbance    Essential hypertension    Hemiparesis affecting right side as late effect of cerebrovascular accident (CVA) ([facility] [address]    SOBOE (shortness of breath on exertion)    Gastroesophageal reflux disease    Unstable angina (HCC)    Non-ST elevation myocardial infarction (NSTEMI) ([facility] [address]    Hypertensive urgency    Frequent PVCs    Ischemic cardiomyopathy    History of Guillain-Foster syndrome    Acute pain of left shoulder    Dysarthria due to recent stroke    Aphasia due to acute stroke ([facility] [address]    Complication of coronary artery bypass graft surgery    S/P CABG (coronary artery bypass graft)    ANNA (obstructive sleep apnea)    Hypersomnia    Cigarette smoker    COPD (chronic obstructive pulmonary disease) ([facility])    Irritability and anger    Stable angina pectoris ([facility] [address]    Noncompliance with medications    Stage 3b chronic kidney disease ([facility])    Class 1 obesity due to excess calories with body mass index (BMI) of 33.0 to 33.9 in adult    Hx of colonic polyps    Stage 3a chronic kidney disease ([facility] [address]       Past Surgical History:   Procedure Laterality Date    CARDIAC CATHETERIZATION  7/13 7/13-EF=55%. PATENT LAD AND RCA STENTS, OM2 50% stenosis, & Normal wall motion.     COLONOSCOPY  8/21/14    colon polyp, diverticulosis, internal hemorrhoids    CORONARY ANGIOPLASTY  6/03 6/14/03- PTCA w stents x2 mid LAD, 2/03-PTCA w stents x3, RCA    CORONARY ARTERY BYPASS GRAFT N/A 5/18/2020    CABG CORONARY ARTERY BYPASS x 2, INTRAOPERATIVE NGA, INDUCED HYPOTHERMIA, LEFT LEG ENDOSCOPIC VEIN HARVEST performed by Bill Andino MD at [address] [place] [address] Entrance, COLON, DIAGNOSTIC  8/21/14    irregular GE junction, gastritis,hiatal hernia, gastric polyps    HAND SURGERY      rt knuckle    PTCA  03/2018    LAD    SHOULDER SURGERY Right 2011    bone spurs       Family History   Problem Relation Age of Onset    Hypertension Mother     High Cholesterol Mother     Alcohol Abuse Mother     Depression Mother     Alcohol Abuse Father     Stomach Cancer Father     Depression Sister     Diabetes Brother     Depression Sister     Stomach Cancer Brother     Alcohol Abuse Brother        Current Outpatient Medications on File Prior to Visit   Medication Sig Dispense Refill clopidogrel (PLAVIX) 75 MG tablet Take 1 tablet by mouth in the morning. 90 tablet 1    carvedilol (COREG) 12.5 MG tablet Take 1 tablet by mouth in the morning and 1 tablet before bedtime. 180 tablet 3    furosemide (LASIX) 40 MG tablet Take 1 tablet by mouth daily 60 tablet 1    Alcohol Swabs (ALCOHOL PADS) 70 % PADS 1 Units by Does not apply route 2 times daily 180 each 3    Blood Glucose Monitoring Suppl (BLOOD GLUCOSE MONITOR SYSTEM) w/Device KIT Use twice per day 1 kit 0    tamsulosin (FLOMAX) 0.4 MG capsule Take 1 capsule by mouth daily 30 capsule 0    pantoprazole (PROTONIX) 40 MG tablet Take 1 tablet by mouth daily 30 tablet 0    Blood Pressure KIT Use prn 1 kit 0     No current facility-administered medications on file prior to visit. Objective:         Physical Exam  Vitals and nursing note reviewed. Constitutional:       General: He is not in acute distress. Appearance: He is well-developed. HENT:      Head: Normocephalic. Neck:      Thyroid: No thyromegaly. Trachea: No tracheal deviation. Cardiovascular:      Rate and Rhythm: Normal rate and regular rhythm. Pulses:           Dorsalis pedis pulses are 0 on the right side and 0 on the left side. Posterior tibial pulses are 0 on the right side and 0 on the left side. Heart sounds: S1 normal and S2 normal. No murmur heard. No gallop. Pulmonary:      Effort: Pulmonary effort is normal. No respiratory distress. Breath sounds: Normal breath sounds. No wheezing or rales. Abdominal:      General: There is no distension. Palpations: Abdomen is soft. There is no mass. Tenderness: There is no abdominal tenderness. Musculoskeletal:      Right foot: Normal range of motion. No deformity. Left foot: Normal range of motion. No deformity. Comments: Feet without lesions     Feet:      Right foot:      Protective Sensation: 5 sites tested. 5 sites sensed.       Skin integrity: No ulcer, blister, skin breakdown, erythema, warmth, callus or dry skin. Left foot:      Protective Sensation: 5 sites tested. 5 sites sensed. Skin integrity: No ulcer, blister, skin breakdown, erythema, warmth, callus or dry skin. Skin:     General: Skin is warm and dry. Neurological:      Mental Status: He is alert and oriented to person, place, and time. Comments:      Psychiatric:         Behavior: Behavior normal.     Vitals:    09/08/22 1005 09/08/22 1009   BP: (!) 150/76 (!) 150/70   Site: Right Upper Arm    Position: Sitting    Cuff Size: Large Adult    Pulse: 67    Resp: 20    Temp: 97.4 °F (36.3 °C)    TempSrc: Temporal    SpO2: 98%    Weight: 200 lb 3.2 oz (90.8 kg)    Height: 5' 7\" (1.702 m)      Body mass index is 31.36 kg/m². Wt Readings from Last 3 Encounters:   09/08/22 200 lb 3.2 oz (90.8 kg)   09/06/22 201 lb (91.2 kg)   08/01/22 197 lb 9.6 oz (89.6 kg)     BP Readings from Last 3 Encounters:   09/08/22 (!) 150/70   09/06/22 120/60   08/22/22 (!) 162/100          Results for orders placed or performed in visit on 09/08/22   POCT glycosylated hemoglobin (Hb A1C)   Result Value Ref Range    Hemoglobin A1C 6.5 %     The ASCVD Risk score (Rosi Hernandez, et al., 2013) failed to calculate for the following reasons:     The patient has a prior MI or stroke diagnosis  Lab Review   Admission on 07/07/2022, Discharged on 07/09/2022   Component Date Value    Sodium 07/07/2022 139     Potassium 07/07/2022 3.7     Chloride 07/07/2022 105     CO2 07/07/2022 21     BUN 07/07/2022 17     Creatinine 07/07/2022 1.4 (A)    Glucose 07/07/2022 97     Calcium 07/07/2022 8.9     Albumin 07/07/2022 4.1     Total Protein 07/07/2022 6.8     Total Bilirubin 07/07/2022 0.6     ALT 07/07/2022 13     AST 07/07/2022 18     Alkaline Phosphatase 07/07/2022 59     GFR Non- 07/07/2022 51 (A)    GFR  07/07/2022 >60     Anion Gap 07/07/2022 13     Troponin T 07/07/2022 0.028 (A)    Pro-BNP 07/07/2022 3,516 (A)    Source 07/07/2022 THROAT     SARS-CoV-2, NAAT 07/07/2022 NOT DETECTED     WBC 07/07/2022 6.6     RBC 07/07/2022 4.55 (A)    Hemoglobin 07/07/2022 14.9     Hematocrit 07/07/2022 44.5     MCV 07/07/2022 97.8     MCH 07/07/2022 32.7 (A)    MCHC 07/07/2022 33.5     RDW 07/07/2022 13.2     Platelets 13/82/9912 152     MPV 07/07/2022 12.2 (A)    Differential Type 07/07/2022 AUTOMATED DIFFERENTIAL     Segs Relative 07/07/2022 59.1     Lymphocytes % 07/07/2022 30.4     Monocytes % 07/07/2022 6.8 (A)    Eosinophils % 07/07/2022 2.6     Basophils % 07/07/2022 0.9     Segs Absolute 07/07/2022 3.9     Lymphocytes Absolute 07/07/2022 2.0     Monocytes Absolute 07/07/2022 0.5     Eosinophils Absolute 07/07/2022 0.2     Basophils Absolute 07/07/2022 0.1     Nucleated RBC % 07/07/2022 0.0     Total Nucleated RBC 07/07/2022 0.0     Total Immature Neutrophil 07/07/2022 0.01     Immature Neutrophil % 07/07/2022 0.2     Ventricular Rate 07/07/2022 75     Atrial Rate 07/07/2022 75     P-R Interval 07/07/2022 160     QRS Duration 07/07/2022 126     Q-T Interval 07/07/2022 440     QTc Calculation (Bazett) 07/07/2022 491     P Axis 07/07/2022 65     R Axis 07/07/2022 2     T [place] 07/07/2022 137     Diagnosis 07/07/2022                      Value:Normal sinus rhythm  Possible Left atrial enlargement  Left ventricular hypertrophy with QRS widening  Possible Inferior infarct (cited on or before 14-MAR-2018)  Cannot rule out Anterior infarct , age undetermined  Abnormal ECG  When compared with ECG of 07-JUL-2022 17:06,  T wave inversion more evident in Lateral leads  Confirmed by [facility] Chloé GOODRICH ([id number]) on 7/8/2022 7:47:03 PM      Adenovirus Detection by * 07/08/2022 NOT DETECTED     Coronavirus 229E PCR 07/08/2022 NOT DETECTED     Coronavirus HKU1 PCR 07/08/2022 NOT DETECTED     Coronavirus NL63 PCR 07/08/2022 NOT DETECTED     Coronavirus OC43 PCR 07/08/2022 NOT DETECTED     SARS-CoV-2 07/08/2022 NOT DETECTED Human Metapneumovirus PCR 07/08/2022 NOT DETECTED     Rhinovirus Enterovirus P* 07/08/2022 NOT DETECTED     Influenza A by PCR 07/08/2022 NOT DETECTED     Influenza A H1 Pandemic * 07/08/2022 NOT DETECTED     Influenza A H1 (2009) PCR 07/08/2022 NOT DETECTED     Influenza A H3 PCR 07/08/2022 NOT DETECTED     Influenza B by PCR 07/08/2022 NOT DETECTED     Parainfluenza 1 PCR 07/08/2022 NOT DETECTED     Parainfluenza 2 PCR 07/08/2022 NOT DETECTED     Parainfluenza 3 PCR 07/08/2022 NOT DETECTED     Parainfluenza 4 PCR 07/08/2022 NOT DETECTED     RSV PCR 07/08/2022 NOT DETECTED     Bordetella parapertussis* 07/08/2022 NOT DETECTED     B Pertussis by PCR 07/08/2022 NOT DETECTED     Chlamydophila Pneumonia * 07/08/2022 NOT DETECTED     Mycoplasma pneumo by PCR 07/08/2022 NOT DETECTED     Triglycerides 07/08/2022 90     Cholesterol 07/08/2022 174     HDL 07/08/2022 32 (A)    LDL Calculated 07/08/2022 124 (A)    WBC 07/08/2022 7.1     RBC 07/08/2022 4.52 (A)    Hemoglobin 07/08/2022 14.9     Hematocrit 07/08/2022 44.7     MCV 07/08/2022 98.9     MCH 07/08/2022 33.0 (A)    MCHC 07/08/2022 33.3     RDW 07/08/2022 13.2     Platelets 88/97/0633 155     MPV 07/08/2022 12.4 (A)    Hemoglobin A1C 07/08/2022 6.6 (A)    eAG 07/08/2022 143     Sodium 07/08/2022 133 (A)    Potassium 07/08/2022 4.3     Chloride 07/08/2022 100     CO2 07/08/2022 22     BUN 07/08/2022 18     Creatinine 07/08/2022 1.4 (A)    Glucose 07/08/2022 228 (A)    Calcium 07/08/2022 8.9     Albumin 07/08/2022 4.3     Total Protein 07/08/2022 6.6     Total Bilirubin 07/08/2022 0.6     ALT 07/08/2022 14     AST 07/08/2022 17     Alkaline Phosphatase 07/08/2022 66     GFR Non- 07/08/2022 51 (A)    GFR  07/08/2022 >60     Anion Gap 07/08/2022 11     Procalcitonin 07/08/2022 0.066     T4 Free 07/08/2022 1.09     Strep pneumo Ag 07/08/2022 URINE NEGATIVE     Legionella Urinary Ag 07/08/2022 NEGATIVE     POC Glucose 07/07/2022 135 (A) Ventricular Rate 07/07/2022 83     Atrial Rate 07/07/2022 83     P-R Interval 07/07/2022 156     QRS Duration 07/07/2022 122     Q-T Interval 07/07/2022 400     QTc Calculation (Bazett) 07/07/2022 470     P Axis 07/07/2022 77     R [place] 07/07/2022 10     T Axis 07/07/2022 147     Diagnosis 07/07/2022                      Value:Normal sinus rhythm  Possible Left atrial enlargement  Possible Inferior infarct (cited on or before 14-MAR-2018)  Abnormal ECG  When compared with ECG of 23-MAR-2022 15:44,  premature ventricular complexes are no longer present  Inverted T waves have replaced nonspecific T wave abnormality in Inferior leads  Confirmed by [facility] Mei GOODRICH ([id number]) on 7/8/2022 7:40:30 PM      Ventricular Rate 07/08/2022 59     Atrial Rate 07/08/2022 59     P-R Interval 07/08/2022 176     QRS Duration 07/08/2022 130     Q-T Interval 07/08/2022 484     QTc Calculation (Bazett) 07/08/2022 479     P Axis 07/08/2022 33     R [place] 07/08/2022 28     T [place] 07/08/2022 145     Diagnosis 07/08/2022                      Value:Sinus bradycardia  Left ventricular hypertrophy with QRS widening and repolarization abnormality  Cannot rule out Septal infarct (cited on or before 12-MAR-2022)  Cannot rule out Inferior infarct (cited on or before 14-MAR-2018)  Abnormal ECG  When compared with ECG of 07-JUL-2022 19:39,  Questionable change in initial forces of Septal leads  T wave inversion now evident in Anterior leads  Confirmed by [facility] Mei GOODRICH ([id number]) on 7/8/2022 7:58:15 PM      Troponin T 07/08/2022 0.013 (A)    TSH, High Sensitivity 07/08/2022 0.746     POC Glucose 07/08/2022 195 (A)    POC Glucose 07/08/2022 194 (A)    POC Glucose 07/08/2022 246 (A)    POC Glucose 07/08/2022 327 (A)    POC Glucose 07/09/2022 181 (A)    POC Glucose 07/09/2022 245 (A)       Results for orders placed or performed in visit on 09/08/22   POCT glycosylated hemoglobin (Hb A1C)   Result Value Ref Range    Hemoglobin A1C 6.5 %        No evidence of pulmonary embolism. Cardiomegaly with pulmonary vascular congestion. Mild emphysema. Assessment:       Diagnosis Orders   1. Type 2 diabetes mellitus without complication, unspecified whether long term insulin use (HCC)  Microalbumin / Creatinine Urine Ratio    POCT glycosylated hemoglobin (Hb A1C)    glimepiride (AMARYL) 2 MG tablet     DIABETES FOOT EXAM      2. Hx of colonic polyps        3. Fatty liver        4. Stage 3a chronic kidney disease ([address]        5. Irritability and anger  sertraline (ZOLOFT) 100 MG tablet      6. Orthopnea  Brain Natriuretic Peptide    CBC with Auto Differential    CT CHEST W CONTRAST    CT CHEST PULMONARY EMBOLISM W CONTRAST      7. SOB (shortness of breath)  Brain Natriuretic Peptide    CBC with Auto Differential    CT CHEST W CONTRAST    CT CHEST PULMONARY EMBOLISM W CONTRAST      8. Ischemic cardiomyopathy  Brain Natriuretic Peptide    CT CHEST PULMONARY EMBOLISM W CONTRAST      9. Need for hepatitis C screening test  Hepatitis C Antibody      10. Decreased pulses in feet  VL DUP LOWER EXTREMITY ARTERIES BILATERAL              Plan:      Patient's lungs are clear and his oxygen level is not dropping. No leg edema. Concerned about pulmonary embolism. Will order stat CT chest.  Blood tests ordered stat. CT chest and labs reviewed. Patient's heart failure is worsening. Asked him to take his Lasix daily. He is to contact his cardiologist for follow-up. Concerned about the decreased pulses in feet. We will get arterial study    Diabetes well controlled. Continue glimepiride. Recheck in 1 week for the shortness of breath. Return in about 3 months (around 12/8/2022) for (AWV LPN, schedule on phone), DM, Depression, COPD.

## 2022-09-08 NOTE — TELEPHONE ENCOUNTER
Dr Rivera's office called patient came in very lethargic  Dr ordered labs that indicated his heart failure is getting worse BMP very elevated

## 2022-09-09 PROBLEM — R80.9 MICROALBUMINURIA: Status: ACTIVE | Noted: 2022-09-09

## 2022-09-12 ENCOUNTER — OFFICE VISIT (OUTPATIENT)
Dept: CARDIOLOGY CLINIC | Age: 62
End: 2022-09-12
Payer: MEDICARE

## 2022-09-12 VITALS
BODY MASS INDEX: 31.61 KG/M2 | SYSTOLIC BLOOD PRESSURE: 162 MMHG | OXYGEN SATURATION: 99 % | DIASTOLIC BLOOD PRESSURE: 94 MMHG | WEIGHT: 201.4 LBS | HEART RATE: 61 BPM | HEIGHT: 67 IN

## 2022-09-12 DIAGNOSIS — I10 ESSENTIAL HYPERTENSION: Primary | ICD-10-CM

## 2022-09-12 PROCEDURE — 99214 OFFICE O/P EST MOD 30 MIN: CPT | Performed by: NURSE PRACTITIONER

## 2022-09-12 RX ORDER — FUROSEMIDE 40 MG/1
60 TABLET ORAL 2 TIMES DAILY
Qty: 90 TABLET | Refills: 5 | Status: SHIPPED | OUTPATIENT
Start: 2022-09-12 | End: 2022-10-04 | Stop reason: SDUPTHER

## 2022-09-12 RX ORDER — LISINOPRIL 10 MG/1
10 TABLET ORAL DAILY
Qty: 30 TABLET | Refills: 1 | Status: SHIPPED | OUTPATIENT
Start: 2022-09-12

## 2022-09-12 ASSESSMENT — ENCOUNTER SYMPTOMS
BLOATING: 1
ORTHOPNEA: 1
SHORTNESS OF BREATH: 0

## 2022-09-12 NOTE — PATIENT INSTRUCTIONS
Increase lasix to 60 mg twice a day    Take 1.5 tablets  in  am and 1.5 tablets at 2 pm  Decrease fluid intake -stay around 64-68 OZ a day  Have labs done in 2 weeks       Start lisinopril once a day - for blood pressure

## 2022-09-12 NOTE — PROGRESS NOTES
9/12/2022  Primary cardiologist: Dr. Nora Forman:   Jordan Monzon  is an established 60 y.o.  male here for shortness of breath / HFrEF      SUBJECTIVE/OBJECTIVE:  Jordan Monzon is a 60 y.o. male with a history of HFrEF, coronary artery disease s/p CABG hypertension, hyperlipidemia, COPD, obstructive sleep apnea and diabetes mellitus type 2. Hilaria underwent cardiac catheterization in March 2022 showing patent bypasses    HPI :   Jordan Monzon reports he has been short of breath for a few weeks now. He notes walking from house to car he gets short of breath. He is now sleeping on 5 pillows. He reports orthopnea and PND along with abdominal bloating. He reports started taking lasix twice a day since Friday with not much help     Review of Systems   Constitutional: Negative for diaphoresis and malaise/fatigue. Cardiovascular:  Positive for dyspnea on exertion, orthopnea and paroxysmal nocturnal dyspnea. Negative for chest pain, claudication, irregular heartbeat, leg swelling, near-syncope and palpitations. Respiratory:  Negative for shortness of breath. Gastrointestinal:  Positive for bloating. Neurological:  Negative for dizziness and light-headedness. Vitals:    09/12/22 1107 09/12/22 1112   BP: (!) 160/94 (!) 162/94   Site: Left Upper Arm Right Upper Arm   Position: Sitting Sitting   Cuff Size: Large Adult Large Adult   Pulse: 61    SpO2: 99%    Weight: 201 lb 6.4 oz (91.4 kg)    Height: 5' 7\" (1.702 m)      Patient-Reported Vitals 2/25/2021   Patient-Reported Weight -   Patient-Reported Height -   Patient-Reported Systolic 168   Patient-Reported Diastolic 66   Patient-Reported Pulse 69     Wt Readings from Last 3 Encounters:   09/12/22 201 lb 6.4 oz (91.4 kg)   09/08/22 200 lb 3.2 oz (90.8 kg)   09/06/22 201 lb (91.2 kg)     Body mass index is 31.54 kg/m². Physical Exam  Vitals reviewed. HENT:      Head: Normocephalic and atraumatic. Eyes:      Extraocular Movements: Extraocular movements intact.       Pupils: Pupils are equal, round, and reactive to light. Neck:      Vascular: No carotid bruit. Cardiovascular:      Rate and Rhythm: Normal rate and regular rhythm. Pulses: Normal pulses. Pulmonary:      Effort: Pulmonary effort is normal.      Breath sounds: Normal breath sounds. No wheezing or rales. Abdominal:      General: There is no distension. Palpations: Abdomen is soft. Tenderness: There is no abdominal tenderness. Musculoskeletal:         General: Normal range of motion. Cervical back: No tenderness. Right lower leg: No edema. Left lower leg: No edema. Skin:     General: Skin is warm and dry. Capillary Refill: Capillary refill takes less than 2 seconds. Neurological:      General: No focal deficit present. Mental Status: He is alert and oriented to person, place, and time. Psychiatric:         Mood and Affect: Mood normal.         Behavior: Behavior normal.              Current Outpatient Medications   Medication Sig Dispense Refill    sertraline (ZOLOFT) 100 MG tablet Take 1 tablet by mouth daily 90 tablet 1    aspirin 81 MG chewable tablet Take 1 tablet by mouth daily 180 tablet 1    glimepiride (AMARYL) 2 MG tablet Take 1 tablet by mouth every morning 90 tablet 1    atorvastatin (LIPITOR) 80 MG tablet Take 1 tablet by mouth daily 90 tablet 1    methylPREDNISolone (MEDROL DOSEPACK) 4 MG tablet Take by mouth. 1 kit 0    carvedilol (COREG) 12.5 MG tablet Take 1 tablet by mouth in the morning and 1 tablet before bedtime. 180 tablet 3    Alcohol Swabs (ALCOHOL PADS) 70 % PADS 1 Units by Does not apply route 2 times daily 180 each 3    Blood Glucose Monitoring Suppl (BLOOD GLUCOSE MONITOR SYSTEM) w/Device KIT Use twice per day 1 kit 0    Blood Pressure KIT Use prn 1 kit 0    clopidogrel (PLAVIX) 75 MG tablet Take 1 tablet by mouth in the morning.  90 tablet 1    furosemide (LASIX) 40 MG tablet Take 1 tablet by mouth daily 60 tablet 1    tamsulosin (FLOMAX) 0.4 MG capsule Take 1 capsule by mouth daily 30 capsule 0    pantoprazole (PROTONIX) 40 MG tablet Take 1 tablet by mouth daily 30 tablet 0     No current facility-administered medications for this visit. All pertinent data reviewed and discussed with patient       ASSESSMENT/PLAN:    HFrEF  Acute on chronic decompensated heart failure with associated shortness of breath, orthopnea, PND abdominal bloating. CTA of chest September 8, 2022 with vascular congestion. BNP elevated 4, 270  Recently started on Lasix however has ongoing symptoms. Recommend increase Lasix to 60 mg twice daily with second at 2 PM.  Restrict fluids to 64 to 68 ounces per day. Low sodium diet 2 gm a day     Hypertension  Blood pressure not well controlled. This may be contributing to his decompensated heart failure : add lisinopril 10 mg once daily and check BMP in 2 weeks. Tests ordered: BMP  Follow-up 2 weeks    Signed:  JERED Snow CNP, 9/12/2022, 11:13 AM    An electronic signature was used to authenticate this note. Please note this report has been partially produced using speech recognition software and may contain errors related to that system including errors in grammar, punctuation, and spelling, as well as words and phrases that may be inappropriate. If there are any questions or concerns please feel free to contact the dictating provider for clarification.

## 2022-09-23 ENCOUNTER — HOSPITAL ENCOUNTER (OUTPATIENT)
Age: 62
Discharge: HOME | End: 2022-09-23
Payer: MEDICARE

## 2022-09-23 LAB
ANION GAP SERPL CALCULATED.3IONS-SCNC: 11 MMOL/L (ref 4–16)
BUN BLDV-MCNC: 30 MG/DL (ref 6–23)
CALCIUM SERPL-MCNC: 9 MG/DL (ref 8.3–10.6)
CHLORIDE BLD-SCNC: 102 MMOL/L (ref 99–110)
CO2: 26 MMOL/L (ref 21–32)
CREAT SERPL-MCNC: 1.7 MG/DL (ref 0.9–1.3)
GFR AFRICAN AMERICAN: 50 ML/MIN/1.73M2
GFR NON-AFRICAN AMERICAN: 41 ML/MIN/1.73M2
GLUCOSE BLD-MCNC: 270 MG/DL (ref 70–99)
POTASSIUM SERPL-SCNC: 4.3 MMOL/L (ref 3.5–5.1)
SODIUM BLD-SCNC: 139 MMOL/L (ref 135–145)

## 2022-09-23 PROCEDURE — 80048 BASIC METABOLIC PNL TOTAL CA: CPT

## 2022-09-23 PROCEDURE — 36415 COLL VENOUS BLD VENIPUNCTURE: CPT

## 2022-09-26 ENCOUNTER — TELEPHONE (OUTPATIENT)
Dept: CARDIOLOGY CLINIC | Age: 62
End: 2022-09-26

## 2022-09-26 NOTE — TELEPHONE ENCOUNTER
PT STATES SOB IS ABOUT THE SAME, I ADVISED TO STAY ON 1.5 TABLETS BID  AND TO KEEP APPT ON 10/4  -    ---- Message from JERED Sweeney CNP sent at 9/26/2022  2:01 PM EDT -----  Please phone patient - see how his shortness of breath is - if better decrease lasix to 40 mg bid - renal function is mildly reduced

## 2022-10-04 ENCOUNTER — OFFICE VISIT (OUTPATIENT)
Dept: CARDIOLOGY CLINIC | Age: 62
End: 2022-10-04
Payer: MEDICARE

## 2022-10-04 VITALS
HEIGHT: 68 IN | DIASTOLIC BLOOD PRESSURE: 76 MMHG | SYSTOLIC BLOOD PRESSURE: 126 MMHG | WEIGHT: 197.2 LBS | HEART RATE: 48 BPM | BODY MASS INDEX: 29.89 KG/M2 | OXYGEN SATURATION: 96 %

## 2022-10-04 DIAGNOSIS — I10 ESSENTIAL HYPERTENSION: ICD-10-CM

## 2022-10-04 DIAGNOSIS — I25.10 ASCVD (ARTERIOSCLEROTIC CARDIOVASCULAR DISEASE): ICD-10-CM

## 2022-10-04 DIAGNOSIS — I50.20 HFREF (HEART FAILURE WITH REDUCED EJECTION FRACTION) (HCC): ICD-10-CM

## 2022-10-04 PROCEDURE — 93000 ELECTROCARDIOGRAM COMPLETE: CPT | Performed by: NURSE PRACTITIONER

## 2022-10-04 PROCEDURE — 99214 OFFICE O/P EST MOD 30 MIN: CPT | Performed by: NURSE PRACTITIONER

## 2022-10-04 RX ORDER — CLOPIDOGREL BISULFATE 75 MG/1
75 TABLET ORAL DAILY
Qty: 90 TABLET | Refills: 3 | Status: SHIPPED | OUTPATIENT
Start: 2022-10-04 | End: 2022-11-03

## 2022-10-04 RX ORDER — FUROSEMIDE 40 MG/1
60 TABLET ORAL 2 TIMES DAILY
Qty: 270 TABLET | Refills: 3 | Status: SHIPPED | OUTPATIENT
Start: 2022-10-04 | End: 2022-11-03

## 2022-10-04 RX ORDER — CARVEDILOL 6.25 MG/1
6.25 TABLET ORAL 2 TIMES DAILY
Qty: 180 TABLET | Refills: 3 | Status: SHIPPED | OUTPATIENT
Start: 2022-10-04

## 2022-10-04 ASSESSMENT — ENCOUNTER SYMPTOMS
ORTHOPNEA: 0
SHORTNESS OF BREATH: 0

## 2022-10-04 NOTE — PATIENT INSTRUCTIONS
Please be informed that if you contact our office outside of normal business hours the physician on call cannot help with any scheduling or rescheduling issues, procedure instruction questions or any type of medication issue. We advise you for any urgent/emergency that you go to the nearest emergency room! PLEASE CALL OUR OFFICE DURING NORMAL BUSINESS HOURS    Monday - Friday   8 am to 5 pm    [place]: [phone]    [place]:  [phone]    **It is YOUR responsibilty to bring medication bottles and/or updated medication list to [address] [place] [address].  This will allow us to better serve you and all your healthcare needs**

## 2022-10-04 NOTE — PROGRESS NOTES
10/4/2022  Primary cardiologist: Dr. Karla Granger:   Juanita Salinas  is an established 60 y.o.  male here for a 2 week follow up on CHF      SUBJECTIVE/OBJECTIVE:  Juanita Salinas is a 60 y.o. male with a history of HFrEF, coronary artery disease s/p CABG hypertension, hyperlipidemia, COPD, obstructive sleep apnea and diabetes mellitus type 2. HPI :   Juanita Salinas reports he is feeling well today-he denies chest pain or shortness of breath -he denies dizziness or near syncope. He reports he feels better than what he has in a long time. He is actually now able to work out side and is currently building his wife raised flower garden    Review of Systems   Constitutional: Negative for diaphoresis and malaise/fatigue. Cardiovascular:  Negative for chest pain, claudication, dyspnea on exertion, irregular heartbeat, leg swelling, near-syncope, orthopnea, palpitations and paroxysmal nocturnal dyspnea. Respiratory:  Negative for shortness of breath. Neurological:  Negative for dizziness and light-headedness. All other systems reviewed and are negative. Vitals:    10/04/22 1106   BP: 126/76   Site: Left Upper Arm   Position: Sitting   Cuff Size: Medium Adult   Pulse: (!) 48   SpO2: 96%   Weight: 197 lb 3.2 oz (89.4 kg)   Height: 5' 7.5\" (1.715 m)     Patient-Reported Vitals 2/25/2021   Patient-Reported Weight -   Patient-Reported Height -   Patient-Reported Systolic 636   Patient-Reported Diastolic 66   Patient-Reported Pulse 69     Wt Readings from Last 3 Encounters:   10/04/22 197 lb 3.2 oz (89.4 kg)   09/12/22 201 lb 6.4 oz (91.4 kg)   09/08/22 200 lb 3.2 oz (90.8 kg)     Body mass index is 30.43 kg/m². Physical Exam  Vitals reviewed. HENT:      Head: Normocephalic. Eyes:      Extraocular Movements: Extraocular movements intact. Pupils: Pupils are equal, round, and reactive to light. Neck:      Vascular: No carotid bruit. Cardiovascular:      Rate and Rhythm: Regular rhythm. Bradycardia present.       Pulses: Normal pulses. Pulmonary:      Effort: Pulmonary effort is normal. No respiratory distress. Breath sounds: No wheezing. Abdominal:      General: There is no distension. Palpations: Abdomen is soft. Tenderness: There is no abdominal tenderness. Musculoskeletal:         General: Normal range of motion. Cervical back: No tenderness. Right lower leg: No edema. Left lower leg: No edema. Skin:     General: Skin is warm and dry. Capillary Refill: Capillary refill takes less than 2 seconds. Neurological:      Mental Status: He is alert and oriented to person, place, and time. Psychiatric:         Mood and Affect: Mood normal.         Behavior: Behavior normal.              Current Outpatient Medications   Medication Sig Dispense Refill    lisinopril (PRINIVIL;ZESTRIL) 10 MG tablet Take 1 tablet by mouth daily 30 tablet 1    furosemide (LASIX) 40 MG tablet Take 1.5 tablets by mouth 2 times daily 90 tablet 5    sertraline (ZOLOFT) 100 MG tablet Take 1 tablet by mouth daily 90 tablet 1    aspirin 81 MG chewable tablet Take 1 tablet by mouth daily 180 tablet 1    glimepiride (AMARYL) 2 MG tablet Take 1 tablet by mouth every morning 90 tablet 1    atorvastatin (LIPITOR) 80 MG tablet Take 1 tablet by mouth daily 90 tablet 1    clopidogrel (PLAVIX) 75 MG tablet Take 1 tablet by mouth in the morning. 90 tablet 1    carvedilol (COREG) 12.5 MG tablet Take 1 tablet by mouth in the morning and 1 tablet before bedtime. 180 tablet 3    Alcohol Swabs (ALCOHOL PADS) 70 % PADS 1 Units by Does not apply route 2 times daily 180 each 3    Blood Glucose Monitoring Suppl (BLOOD GLUCOSE MONITOR SYSTEM) w/Device KIT Use twice per day 1 kit 0    tamsulosin (FLOMAX) 0.4 MG capsule Take 1 capsule by mouth daily 30 capsule 0    pantoprazole (PROTONIX) 40 MG tablet Take 1 tablet by mouth daily 30 tablet 0    Blood Pressure KIT Use prn 1 kit 0     No current facility-administered medications for this visit. All pertinent data reviewed and discussed with patient       ASSESSMENT/PLAN:    HFrEF  He appears to be euvolemic. Shortness of breath is resolved. His weight has trended down. Continue with Lasix at 60 mg twice daily. Can adjust accordingly pending volume status. Continue with lisinopril and lower dose of carvedilol      Bradycardia  Noted to be bradycardic with rate in the 40s. He is asymptomatic : Recommend to  decrease carvedilol to 6.25 twice daily. Coronary artery disease  Denies chest pain or shortness of breath. EKG today shows sinus bradycardia with T inversion in the lateral leads: unchanged from previous EKG :   [facility] 03/2022 reviewed : Indication : stemi activation Access Femoral   1. LAD and RCA are 100 % occluded   2. Circ is patent   LIMA to LAd and SVG to OM2 are patent   LVEDP was 28    Medical management : asa atorvastatin coreg and plavix     Tests ordered:  none  Follow-up  3 mo     Signed:  JERED Brumfield CNP, 10/4/2022, 11:21 AM    An electronic signature was used to authenticate this note. Please note this report has been partially produced using speech recognition software and may contain errors related to that system including errors in grammar, punctuation, and spelling, as well as words and phrases that may be inappropriate. If there are any questions or concerns please feel free to contact the dictating provider for clarification.

## 2022-11-03 ENCOUNTER — HOSPITAL ENCOUNTER (OUTPATIENT)
Dept: CT IMAGING | Age: 62
Discharge: HOME | End: 2022-11-03
Payer: MEDICARE

## 2022-11-03 ENCOUNTER — HOSPITAL ENCOUNTER (OUTPATIENT)
Age: 62
Setting detail: SPECIMEN
Discharge: HOME | End: 2022-11-03
Payer: MEDICARE

## 2022-11-03 ENCOUNTER — OFFICE VISIT (OUTPATIENT)
Dept: FAMILY MEDICINE CLINIC | Age: 62
End: 2022-11-03
Payer: MEDICARE

## 2022-11-03 VITALS
SYSTOLIC BLOOD PRESSURE: 120 MMHG | DIASTOLIC BLOOD PRESSURE: 86 MMHG | BODY MASS INDEX: 31.02 KG/M2 | OXYGEN SATURATION: 94 % | WEIGHT: 201 LBS | RESPIRATION RATE: 20 BRPM | HEART RATE: 72 BPM | TEMPERATURE: 97.2 F

## 2022-11-03 DIAGNOSIS — R06.02 SOB (SHORTNESS OF BREATH): ICD-10-CM

## 2022-11-03 DIAGNOSIS — R19.09 CENTRAL ABDOMINAL MASS: ICD-10-CM

## 2022-11-03 DIAGNOSIS — R05.1 ACUTE COUGH: Primary | ICD-10-CM

## 2022-11-03 DIAGNOSIS — J44.1 CHRONIC OBSTRUCTIVE PULMONARY DISEASE WITH ACUTE EXACERBATION (HCC): ICD-10-CM

## 2022-11-03 DIAGNOSIS — R10.84 GENERALIZED ABDOMINAL PAIN: ICD-10-CM

## 2022-11-03 LAB
EGFR, POC: 48 ML/MIN/1.73M2
INFLUENZA A ANTIBODY: NORMAL
INFLUENZA B ANTIBODY: NORMAL
POC CREATININE: 1.6 MG/DL (ref 0.9–1.3)

## 2022-11-03 PROCEDURE — 3074F SYST BP LT 130 MM HG: CPT | Performed by: FAMILY MEDICINE

## 2022-11-03 PROCEDURE — U0005 INFEC AGEN DETEC AMPLI PROBE: HCPCS

## 2022-11-03 PROCEDURE — 6360000004 HC RX CONTRAST MEDICATION: Performed by: FAMILY MEDICINE

## 2022-11-03 PROCEDURE — 87804 INFLUENZA ASSAY W/OPTIC: CPT | Performed by: FAMILY MEDICINE

## 2022-11-03 PROCEDURE — 93000 ELECTROCARDIOGRAM COMPLETE: CPT | Performed by: FAMILY MEDICINE

## 2022-11-03 PROCEDURE — U0003 INFECTIOUS AGENT DETECTION BY NUCLEIC ACID (DNA OR RNA); SEVERE ACUTE RESPIRATORY SYNDROME CORONAVIRUS 2 (SARS-COV-2) (CORONAVIRUS DISEASE [COVID-19]), AMPLIFIED PROBE TECHNIQUE, MAKING USE OF HIGH THROUGHPUT TECHNOLOGIES AS DESCRIBED BY CMS-2020-01-R: HCPCS

## 2022-11-03 PROCEDURE — 96372 THER/PROPH/DIAG INJ SC/IM: CPT | Performed by: FAMILY MEDICINE

## 2022-11-03 PROCEDURE — 3078F DIAST BP <80 MM HG: CPT | Performed by: FAMILY MEDICINE

## 2022-11-03 PROCEDURE — A4641 RADIOPHARM DX AGENT NOC: HCPCS | Performed by: FAMILY MEDICINE

## 2022-11-03 PROCEDURE — 74177 CT ABD & PELVIS W/CONTRAST: CPT

## 2022-11-03 PROCEDURE — 99215 OFFICE O/P EST HI 40 MIN: CPT | Performed by: FAMILY MEDICINE

## 2022-11-03 RX ORDER — METHYLPREDNISOLONE ACETATE 80 MG/ML
80 INJECTION, SUSPENSION INTRA-ARTICULAR; INTRALESIONAL; INTRAMUSCULAR; SOFT TISSUE ONCE
Status: COMPLETED | OUTPATIENT
Start: 2022-11-03 | End: 2022-11-03

## 2022-11-03 RX ORDER — ALBUTEROL SULFATE 90 UG/1
2 AEROSOL, METERED RESPIRATORY (INHALATION) EVERY 4 HOURS PRN
Qty: 18 G | Refills: 1 | Status: SHIPPED | OUTPATIENT
Start: 2022-11-03 | End: 2022-11-04 | Stop reason: SDUPTHER

## 2022-11-03 RX ORDER — METHYLPREDNISOLONE 4 MG/1
TABLET ORAL
Qty: 1 KIT | Refills: 0 | Status: SHIPPED | OUTPATIENT
Start: 2022-11-03 | End: 2022-11-04 | Stop reason: SDUPTHER

## 2022-11-03 RX ADMIN — BARIUM SULFATE 900 ML: 21 SUSPENSION ORAL at 13:30

## 2022-11-03 RX ADMIN — IOPAMIDOL 80 ML: 755 INJECTION, SOLUTION INTRAVENOUS at 15:28

## 2022-11-03 RX ADMIN — METHYLPREDNISOLONE ACETATE 80 MG: 80 INJECTION, SUSPENSION INTRA-ARTICULAR; INTRALESIONAL; INTRAMUSCULAR; SOFT TISSUE at 12:34

## 2022-11-03 NOTE — PROGRESS NOTES
Patient ID: Kwadwo Sarmiento [id number]    Chief Complaint   Patient presents with    Asthma     Started x 2 days breathing acting up  Wants Albuterol- Ipratropium Inhaler     Shortness of Breath         HPI    Cough:  x 2 days. No F, chills, sweats. SOB. Productive clear. No fever chills or sweats    COPD/SOB:  difficulty sleeping. Sleeping sitting up. Onset was sudden. Smoked for  45 years. Has quit smoking. Cannot afford Combivent. ABdominal pain/ mass: every time he coughs, feels mass and pain in his abdomen. This makes the breathing more difficult. Hx CHF: see last 2 visits. He has seen cardiology and his medications were adjusted. Feeling better regarding his CHF      Patient Active Problem List   Diagnosis    ASCVD (arteriosclerotic cardiovascular disease)    History of PTCA    History of acute inferior wall myocardial infarction    Chest pain    B12 deficiency    Fatty liver    Colon, diverticulosis    Renal cyst    Adrenal adenoma    Hyperlipidemia LDL goal <70    Obesity (BMI 30-39. 9)    Cerebrovascular accident (CVA) due to thrombosis of left middle cerebral artery (HCC)    Hemiparesis of right dominant side as late effect of cerebral infarction ([address]    Gait disturbance    Essential hypertension    Hemiparesis affecting right side as late effect of cerebrovascular accident (CVA) ([address]    SOBOE (shortness of breath on exertion)    Gastroesophageal reflux disease    Unstable angina (HCC)    Non-ST elevation myocardial infarction (NSTEMI) ([address]    Hypertensive urgency    Frequent PVCs    Ischemic cardiomyopathy    History of Guillain-Brandywine syndrome    Acute pain of left shoulder    Dysarthria due to recent stroke    Aphasia due to acute stroke ([address]    Complication of coronary artery bypass graft surgery    S/P CABG (coronary artery bypass graft)    ANNA (obstructive sleep apnea)    Hypersomnia    Cigarette smoker    COPD (chronic obstructive pulmonary disease) (HCC)    Irritability and anger    Stable angina pectoris ([facility]    Noncompliance with medications    Stage 3b chronic kidney disease ([facility]    Class 1 obesity due to excess calories with body mass index (BMI) of 33.0 to 33.9 in adult    Hx of colonic polyps    Stage 3a chronic kidney disease ([facility]    Microalbuminuria       Past Surgical History:   Procedure Laterality Date    CARDIAC CATHETERIZATION  7/13 7/13-EF=55%. PATENT LAD AND RCA STENTS, OM2 50% stenosis, & Normal wall motion.     COLONOSCOPY  8/21/14    colon polyp, diverticulosis, internal hemorrhoids    CORONARY ANGIOPLASTY  6/03 6/14/03- PTCA w stents x2 mid LAD, 2/03-PTCA w stents x3, RCA    CORONARY ARTERY BYPASS GRAFT N/A 5/18/2020    CABG CORONARY ARTERY BYPASS x 2, INTRAOPERATIVE NGA, INDUCED HYPOTHERMIA, LEFT LEG ENDOSCOPIC VEIN HARVEST performed by Belen Morales MD at [address] [place] [address] Entrance, COLON, DIAGNOSTIC  8/21/14    irregular GE junction, gastritis,hiatal hernia, gastric polyps    HAND SURGERY      rt knuckle    PTCA  03/2018    LAD    SHOULDER SURGERY Right 2011    bone spurs       Family History   Problem Relation Age of Onset    Hypertension Mother     High Cholesterol Mother     Alcohol Abuse Mother     Depression Mother     Alcohol Abuse Father     Stomach Cancer Father     Depression Sister     Diabetes Brother     Depression Sister     Stomach Cancer Brother     Alcohol Abuse Brother        Current Outpatient Medications on File Prior to Visit   Medication Sig Dispense Refill    NONFORMULARY Albuterol- Ipratropium Inhaler       carvedilol (COREG) 6.25 MG tablet Take 1 tablet by mouth 2 times daily 180 tablet 3    clopidogrel (PLAVIX) 75 MG tablet Take 1 tablet by mouth daily 90 tablet 3    furosemide (LASIX) 40 MG tablet Take 1.5 tablets by mouth 2 times daily 270 tablet 3    lisinopril (PRINIVIL;ZESTRIL) 10 MG tablet Take 1 tablet by mouth daily 30 tablet 1    sertraline (ZOLOFT) 100 MG tablet Take 1 tablet by mouth daily 90 tablet 1    aspirin 81 MG chewable tablet Take 1 tablet by mouth daily 180 tablet 1    glimepiride (AMARYL) 2 MG tablet Take 1 tablet by mouth every morning 90 tablet 1    atorvastatin (LIPITOR) 80 MG tablet Take 1 tablet by mouth daily 90 tablet 1    Alcohol Swabs (ALCOHOL PADS) 70 % PADS 1 Units by Does not apply route 2 times daily 180 each 3    Blood Glucose Monitoring Suppl (BLOOD GLUCOSE MONITOR SYSTEM) w/Device KIT Use twice per day 1 kit 0    tamsulosin (FLOMAX) 0.4 MG capsule Take 1 capsule by mouth daily 30 capsule 0    pantoprazole (PROTONIX) 40 MG tablet Take 1 tablet by mouth daily 30 tablet 0     No current facility-administered medications on file prior to visit. Objective:           Physical Exam  Vitals and nursing note reviewed. Constitutional:       General: He is not in acute distress. Appearance: He is well-developed. He is ill-appearing. Comments: coughing   HENT:      Head: Normocephalic and atraumatic. Cardiovascular:      Rate and Rhythm: Normal rate and regular rhythm. Heart sounds: Normal heart sounds, S1 normal and S2 normal.   Pulmonary:      Effort: Tachypnea and accessory muscle usage present. Breath sounds: Decreased air movement present. Decreased breath sounds present. No wheezing or rales. Abdominal:      Tenderness: There is generalized abdominal tenderness. Hernia: A hernia is present. Hernia is present in the ventral area (?). Skin:     General: Skin is warm and dry. Neurological:      Mental Status: He is alert. Gait: Gait abnormal.   Psychiatric:         Attention and Perception: He is attentive. Mood and Affect: Mood is anxious. Speech: Speech normal.         Behavior: Behavior normal.         Thought Content:  Thought content normal.     Vitals:    11/03/22 1133 11/03/22 1142   BP: 120/86    Site: Left Upper Arm    Position: Sitting    Cuff Size: Medium Adult    Pulse: 77 72   Resp:  20   Temp: 97.2 °F (36.2 °C)    TempSrc: Temporal    SpO2: 97% 94%   Weight: 201 lb (91.2 kg)      Body mass index is 31.02 kg/m². Wt Readings from Last 3 Encounters:   11/03/22 201 lb (91.2 kg)   10/04/22 197 lb 3.2 oz (89.4 kg)   09/12/22 201 lb 6.4 oz (91.4 kg)     BP Readings from Last 3 Encounters:   11/03/22 120/86   10/04/22 126/76   09/12/22 (!) 162/94      EKG: unchanged from previous tracings. Results for orders placed or performed in visit on 11/03/22   POCT Influenza A/B   Result Value Ref Range    Influenza A Ab neg     Influenza B Ab neg        The clinical finding of a central abdominal mass likely corresponds to the   upper abdominal/supraumbilical midline fat containing ventral hernia   measuring up to 4.4 cm in diameter. Calcific coronary artery disease status post CABG. The heart is mildly   enlarged. Clear lungs. Stable 2.1 cm left adrenal gland nodule most consistent an adenoma. Calcific aorto iliac atherosclerotic disease with narrowing of the infrarenal   abdominal estimated up to 50-60%. Infrarenal abdominal aorta is mildly ectatic measuring 2.4 cm. Follow-up   recommendation as below. RECOMMENDATIONS:   For management of fusiform AAA:       <2.6 cm aorta, no follow-up is recommended. Note: Recommend Vascular consultation if a fusiform AAA enlarges by >0.5 cm   in 6 months or >1 cm in 1 year or for a saccular AAA of any size. References: Sandro Jonesy Radiol 2013; 91(47):764-335; J Vasc Surg. 2018; 67:2-77           Assessment:       Diagnosis Orders   1. Acute cough  POCT Influenza A/B    EKG 12 lead    COVID-19    methylPREDNISolone (MEDROL DOSEPACK) 4 MG tablet      2. SOB (shortness of breath)  EKG 12 lead    methylPREDNISolone (MEDROL DOSEPACK) 4 MG tablet      3.  Chronic obstructive pulmonary disease with acute exacerbation (HCC)  albuterol sulfate HFA (VENTOLIN HFA) 108 (90 Base) MCG/ACT inhaler    methylPREDNISolone acetate (DEPO-MEDROL) injection 80 mg    methylPREDNISolone (MEDROL DOSEPACK) 4 MG tablet      4. Central abdominal mass        5. Generalized abdominal pain                Plan:    Time: 45 minutes  STAT CT chest and abdomen. Possible copd exacerbation vs PE. See orders and re-check next week. Called patient at 7:30 PM.  Gave him results of the stat CT. Will review further at time of his appointment next week. Told him steroids and inhaler at the pharmacy for him. Return in about 1 week (around 11/10/2022) for SOB, abdominal pain.

## 2022-11-04 ENCOUNTER — TELEPHONE (OUTPATIENT)
Dept: FAMILY MEDICINE CLINIC | Age: 62
End: 2022-11-04

## 2022-11-04 DIAGNOSIS — J44.1 CHRONIC OBSTRUCTIVE PULMONARY DISEASE WITH ACUTE EXACERBATION (HCC): ICD-10-CM

## 2022-11-04 DIAGNOSIS — R06.02 SOB (SHORTNESS OF BREATH): ICD-10-CM

## 2022-11-04 DIAGNOSIS — R05.1 ACUTE COUGH: ICD-10-CM

## 2022-11-04 LAB
SARS-COV-2: NOT DETECTED
SOURCE: NORMAL

## 2022-11-04 RX ORDER — METHYLPREDNISOLONE 4 MG/1
TABLET ORAL
Qty: 1 KIT | Refills: 0 | Status: SHIPPED | OUTPATIENT
Start: 2022-11-04 | End: 2022-11-10 | Stop reason: ALTCHOICE

## 2022-11-04 RX ORDER — ALBUTEROL SULFATE 90 UG/1
2 AEROSOL, METERED RESPIRATORY (INHALATION) EVERY 4 HOURS PRN
Qty: 18 G | Refills: 1 | Status: SHIPPED | OUTPATIENT
Start: 2022-11-04

## 2022-11-04 NOTE — TELEPHONE ENCOUNTER
Patients scripts for Medrol dose pack and Ventolin inhaler went to [facility]  instead of [facility] on [address].  Please send new scripts

## 2022-11-10 ENCOUNTER — OFFICE VISIT (OUTPATIENT)
Dept: FAMILY MEDICINE CLINIC | Age: 62
End: 2022-11-10
Payer: MEDICARE

## 2022-11-10 VITALS
WEIGHT: 203 LBS | RESPIRATION RATE: 15 BRPM | BODY MASS INDEX: 30.77 KG/M2 | OXYGEN SATURATION: 97 % | HEIGHT: 68 IN | DIASTOLIC BLOOD PRESSURE: 88 MMHG | HEART RATE: 76 BPM | SYSTOLIC BLOOD PRESSURE: 128 MMHG

## 2022-11-10 DIAGNOSIS — J41.0 SIMPLE CHRONIC BRONCHITIS (HCC): Primary | ICD-10-CM

## 2022-11-10 DIAGNOSIS — K43.9 VENTRAL HERNIA WITHOUT OBSTRUCTION OR GANGRENE: ICD-10-CM

## 2022-11-10 PROBLEM — F17.210 CIGARETTE SMOKER: Status: RESOLVED | Noted: 2020-11-12 | Resolved: 2022-11-10

## 2022-11-10 PROBLEM — Z87.891 FORMER TOBACCO USE: Status: ACTIVE | Noted: 2022-11-10

## 2022-11-10 PROCEDURE — 3074F SYST BP LT 130 MM HG: CPT | Performed by: FAMILY MEDICINE

## 2022-11-10 PROCEDURE — 99213 OFFICE O/P EST LOW 20 MIN: CPT | Performed by: FAMILY MEDICINE

## 2022-11-10 PROCEDURE — 3078F DIAST BP <80 MM HG: CPT | Performed by: FAMILY MEDICINE

## 2022-11-10 RX ORDER — TIOTROPIUM BROMIDE 18 UG/1
18 CAPSULE ORAL; RESPIRATORY (INHALATION) DAILY
Qty: 30 CAPSULE | Refills: 0 | Status: SHIPPED | OUTPATIENT
Start: 2022-11-10 | End: 2022-12-10

## 2022-11-10 ASSESSMENT — ENCOUNTER SYMPTOMS
COUGH: 1
DIFFICULTY BREATHING: 1

## 2022-11-10 ASSESSMENT — COPD QUESTIONNAIRES: COPD: 1

## 2022-11-10 NOTE — PROGRESS NOTES
Patient ID: Naima Taylor 1960    Chief Complaint   Patient presents with    Shortness of Breath    Abdominal Pain         COPD  He complains of cough and difficulty breathing. This is a recurrent problem. The current episode started more than 1 year ago. The problem has been gradually improving. His symptoms are aggravated by climbing stairs. His symptoms are alleviated by beta-agonist (using albuterol daily). Risk factors for lung disease include smoking/tobacco exposure. His past medical history is significant for COPD. Abdominal Pain: here for f/u. Notices pain with lifting. Has to lift wife who is wheelchair dependent      Patient Active Problem List   Diagnosis    ASCVD (arteriosclerotic cardiovascular disease)    History of PTCA    History of acute inferior wall myocardial infarction    Chest pain    B12 deficiency    Fatty liver    Colon, diverticulosis    Renal cyst    Adrenal adenoma    Hyperlipidemia LDL goal <70    Obesity (BMI 30-39. 9)    Cerebrovascular accident (CVA) due to thrombosis of left middle cerebral artery (HCC)    Hemiparesis of right dominant side as late effect of cerebral infarction ([address]    Gait disturbance    Essential hypertension    Hemiparesis affecting right side as late effect of cerebrovascular accident (CVA) ([address]    SOBOE (shortness of breath on exertion)    Gastroesophageal reflux disease    Unstable angina (HCC)    Non-ST elevation myocardial infarction (NSTEMI) (HCC)    Frequent PVCs    Ischemic cardiomyopathy    History of Guillain-[place] syndrome    Acute pain of left shoulder    Dysarthria due to recent stroke    Aphasia due to acute stroke ([address]    Complication of coronary artery bypass graft surgery    S/P CABG (coronary artery bypass graft)    ANNA (obstructive sleep apnea)    Hypersomnia    COPD (chronic obstructive pulmonary disease) (HCC)    Irritability and anger    Stable angina pectoris ([address]    Noncompliance with medications    Stage 3b chronic kidney disease ([facility]    Class 1 obesity due to excess calories with body mass index (BMI) of 33.0 to 33.9 in adult    Hx of colonic polyps    Stage 3a chronic kidney disease (HCC)    Microalbuminuria    Ventral hernia without obstruction or gangrene       Past Surgical History:   Procedure Laterality Date    CARDIAC CATHETERIZATION  7/13 7/13-EF=55%. PATENT LAD AND RCA STENTS, OM2 50% stenosis, & Normal wall motion.     COLONOSCOPY  8/21/14    colon polyp, diverticulosis, internal hemorrhoids    CORONARY ANGIOPLASTY  6/03 6/14/03- PTCA w stents x2 mid LAD, 2/03-PTCA w stents x3, RCA    CORONARY ARTERY BYPASS GRAFT N/A 5/18/2020    CABG CORONARY ARTERY BYPASS x 2, INTRAOPERATIVE NGA, INDUCED HYPOTHERMIA, LEFT LEG ENDOSCOPIC VEIN HARVEST performed by Anyi Ascencio MD at [address] [place] [address] Entrance, COLON, DIAGNOSTIC  8/21/14    irregular GE junction, gastritis,hiatal hernia, gastric polyps    HAND SURGERY      rt knuckle    PTCA  03/2018    LAD    SHOULDER SURGERY Right 2011    bone spurs       Family History   Problem Relation Age of Onset    Hypertension Mother     High Cholesterol Mother     Alcohol Abuse Mother     Depression Mother     Alcohol Abuse Father     Stomach Cancer Father     Depression Sister     Diabetes Brother     Depression Sister     Stomach Cancer Brother     Alcohol Abuse Brother        Current Outpatient Medications on File Prior to Visit   Medication Sig Dispense Refill    albuterol sulfate HFA (VENTOLIN HFA) 108 (90 Base) MCG/ACT inhaler Inhale 2 puffs into the lungs every 4 hours as needed for Wheezing 18 g 1    carvedilol (COREG) 6.25 MG tablet Take 1 tablet by mouth 2 times daily 180 tablet 3    clopidogrel (PLAVIX) 75 MG tablet Take 1 tablet by mouth daily 90 tablet 3    furosemide (LASIX) 40 MG tablet Take 1.5 tablets by mouth 2 times daily 270 tablet 3    lisinopril (PRINIVIL;ZESTRIL) 10 MG tablet Take 1 tablet by mouth daily 30 tablet 1    sertraline (ZOLOFT) 100 MG tablet Take 1 tablet by mouth daily 90 tablet 1    aspirin 81 MG chewable tablet Take 1 tablet by mouth daily 180 tablet 1    glimepiride (AMARYL) 2 MG tablet Take 1 tablet by mouth every morning 90 tablet 1    atorvastatin (LIPITOR) 80 MG tablet Take 1 tablet by mouth daily 90 tablet 1    Alcohol Swabs (ALCOHOL PADS) 70 % PADS 1 Units by Does not apply route 2 times daily 180 each 3    Blood Glucose Monitoring Suppl (BLOOD GLUCOSE MONITOR SYSTEM) w/Device KIT Use twice per day 1 kit 0    tamsulosin (FLOMAX) 0.4 MG capsule Take 1 capsule by mouth daily 30 capsule 0    pantoprazole (PROTONIX) 40 MG tablet Take 1 tablet by mouth daily 30 tablet 0    NONFORMULARY Albuterol- Ipratropium Inhaler        No current facility-administered medications on file prior to visit. Objective:           Physical Exam  Vitals and nursing note reviewed. Constitutional:       Appearance: He is well-developed. HENT:      Head: Normocephalic and atraumatic. Cardiovascular:      Rate and Rhythm: Normal rate and regular rhythm. Heart sounds: Normal heart sounds, S1 normal and S2 normal.   Pulmonary:      Effort: No respiratory distress. Breath sounds: Normal breath sounds. No wheezing or rales. Skin:     General: Skin is warm and dry. Neurological:      Mental Status: He is alert. Psychiatric:         Speech: Speech normal.         Behavior: Behavior normal.     Vitals:    11/10/22 1146   BP: 128/88   Site: Left Upper Arm   Position: Sitting   Cuff Size: Medium Adult   Pulse: 76   Resp: 15   SpO2: 97%   Weight: 203 lb (92.1 kg)   Height: 5' 7.5\" (1.715 m)     Body mass index is 31.33 kg/m². Wt Readings from Last 3 Encounters:   11/10/22 203 lb (92.1 kg)   11/03/22 201 lb (91.2 kg)   10/04/22 197 lb 3.2 oz (89.4 kg)     BP Readings from Last 3 Encounters:   11/10/22 128/88   11/03/22 120/86   10/04/22 126/76          No results found for this visit on 11/10/22.     EXAMINATION:   CT OF THE CHEST, ABDOMEN, AND PELVIS WITH CONTRAST 11/3/2022 3:39 pm       TECHNIQUE:   CT of the chest, abdomen and pelvis was performed with the administration of   intravenous contrast. Multiplanar reformatted images are provided for review. Automated exposure control, iterative reconstruction, and/or weight based   adjustment of the mA/kV was utilized to reduce the radiation dose to as low   as reasonably achievable. COMPARISON:   CTA chest 09/08/2022 and CT abdomen/pelvis 07/10/2015       HISTORY:   ORDERING SYSTEM PROVIDED HISTORY: SOB (shortness of breath)   TECHNOLOGIST PROVIDED HISTORY:   Additional Contrast?->Radiologist Recommendation   STAT Creatinine as needed:->Yes   Reason for exam:->SOB (shortness of breath); central abdominal mass;   generalized abdominal pain   Reason for Exam: SOB (shortness of breath); central abdominal mass;   generalized abdominal pain       FINDINGS:       Chest:       Mediastinum: There are mildly prominent nonspecific mediastinal lymph nodes. Three-vessel calcific coronary artery disease status post sternal splitting   procedure for CABG. The heart is mildly enlarged with left ventricular   prominence. Lungs/pleura: The lungs are clear. No pneumothorax or pleural fluid. Soft Tissues/Bones: Prior sternal splitting procedure. No acute bone finding. Abdomen/Pelvis:       Organs: Mild diffuse fatty infiltration of the liver. No focal liver lesion. There are no calcified gallstones. No pericholecystic fluid or fat   stranding. The spleen, pancreas, right adrenal gland and kidneys are normal.   There is a small right renal cyst.  There is a 2.1 cm left adrenal gland   nodule that is unchanged from 07/10/2015, likely an adenoma. There is a   small focus of calcification in the left adrenal, unchanged. GI/Bowel: Mild sigmoid colon diverticulosis with no evidence of   diverticulitis. No evidence of bowel obstruction. Oral contrast passed to   the right colon.   The appendix is normal.       Pelvis: Urinary bladder was empty and therefore not visualized by CT. Prostate gland is not enlarged. Peritoneum/Retroperitoneum: There is no adenopathy, free air or free fluid. There is calcific aorto iliac atherosclerotic disease. The infrarenal   abdominal aorta is slightly ectatic with intraluminal thrombus causing   narrowing of approximately 50-60% (axial image 145). The infrarenal   abdominal aorta measures up to 2.4 cm in AP diameter. Bones/Soft Tissues: There is an upper abdominal midline supra umbilical fat   containing ventral hernia measuring 4.4 cm 3.9 cm. Ventral defect measures   1.2 cm (axial image 137-154). Multilevel degenerative changes in the lumbar   spine with no acute bone finding. Impression   The clinical finding of a central abdominal mass likely corresponds to the   upper abdominal/supraumbilical midline fat containing ventral hernia   measuring up to 4.4 cm in diameter. Calcific coronary artery disease status post CABG. The heart is mildly   enlarged. Clear lungs. Stable 2.1 cm left adrenal gland nodule most consistent an adenoma. Calcific aorto iliac atherosclerotic disease with narrowing of the infrarenal   abdominal estimated up to 50-60%. Infrarenal abdominal aorta is mildly ectatic measuring 2.4 cm. Follow-up   recommendation as below. RECOMMENDATIONS:   For management of fusiform AAA:       <2.6 cm aorta, no follow-up is recommended. Note: Recommend Vascular consultation if a fusiform AAA enlarges by >0.5 cm   in 6 months or >1 cm in 1 year or for a saccular AAA of any size. References: Brad Cristino Radiol 2013; 92(61):900-175; J Vasc Surg. 2018; 67:2-77       Assessment:       Diagnosis Orders   1. Simple chronic bronchitis (HCC)  tiotropium (SPIRIVA HANDIHALER) 18 MCG inhalation capsule      2.  Ventral hernia without obstruction or gangrene  Milena Nance MD, General Surgery, [place]              Plan:      COPD improved with albuterol. However we will add on Spiriva for daily use. Re-check in 1 month. No heavy lifting. Refer to general surgery for hernia repair. Return if symptoms worsen or fail to improve, for (AWV LPN, schedule on phone).

## 2022-11-14 RX ORDER — LISINOPRIL 10 MG/1
10 TABLET ORAL DAILY
Qty: 30 TABLET | Refills: 5 | Status: SHIPPED | OUTPATIENT
Start: 2022-11-14

## 2022-11-22 ENCOUNTER — OFFICE VISIT (OUTPATIENT)
Dept: BARIATRICS/WEIGHT MGMT | Age: 62
End: 2022-11-22
Payer: MEDICARE

## 2022-11-22 VITALS
DIASTOLIC BLOOD PRESSURE: 82 MMHG | WEIGHT: 196.5 LBS | OXYGEN SATURATION: 97 % | SYSTOLIC BLOOD PRESSURE: 128 MMHG | HEART RATE: 81 BPM | BODY MASS INDEX: 29.78 KG/M2 | HEIGHT: 68 IN

## 2022-11-22 DIAGNOSIS — K43.9 VENTRAL HERNIA WITHOUT OBSTRUCTION OR GANGRENE: Primary | ICD-10-CM

## 2022-11-22 PROCEDURE — 3078F DIAST BP <80 MM HG: CPT | Performed by: SURGERY

## 2022-11-22 PROCEDURE — 3074F SYST BP LT 130 MM HG: CPT | Performed by: SURGERY

## 2022-11-22 PROCEDURE — 99203 OFFICE O/P NEW LOW 30 MIN: CPT | Performed by: SURGERY

## 2022-11-22 RX ORDER — SODIUM CHLORIDE, SODIUM LACTATE, POTASSIUM CHLORIDE, CALCIUM CHLORIDE 600; 310; 30; 20 MG/100ML; MG/100ML; MG/100ML; MG/100ML
INJECTION, SOLUTION INTRAVENOUS CONTINUOUS
OUTPATIENT
Start: 2022-11-22

## 2022-11-22 RX ORDER — SODIUM CHLORIDE 9 MG/ML
INJECTION, SOLUTION INTRAVENOUS PRN
OUTPATIENT
Start: 2022-11-22

## 2022-11-22 RX ORDER — SODIUM CHLORIDE 0.9 % (FLUSH) 0.9 %
5-40 SYRINGE (ML) INJECTION PRN
OUTPATIENT
Start: 2022-11-22

## 2022-11-22 RX ORDER — SODIUM CHLORIDE 0.9 % (FLUSH) 0.9 %
5-40 SYRINGE (ML) INJECTION EVERY 12 HOURS SCHEDULED
OUTPATIENT
Start: 2022-11-22

## 2022-11-22 ASSESSMENT — PATIENT HEALTH QUESTIONNAIRE - PHQ9
SUM OF ALL RESPONSES TO PHQ QUESTIONS 1-9: 0
2. FEELING DOWN, DEPRESSED OR HOPELESS: 0
SUM OF ALL RESPONSES TO PHQ9 QUESTIONS 1 & 2: 0
SUM OF ALL RESPONSES TO PHQ QUESTIONS 1-9: 0
SUM OF ALL RESPONSES TO PHQ QUESTIONS 1-9: 0
1. LITTLE INTEREST OR PLEASURE IN DOING THINGS: 0
SUM OF ALL RESPONSES TO PHQ QUESTIONS 1-9: 0

## 2022-11-22 ASSESSMENT — ENCOUNTER SYMPTOMS
ABDOMINAL PAIN: 1
DIARRHEA: 0
VOMITING: 0

## 2022-11-22 NOTE — PROGRESS NOTES
Chief Complaint   Patient presents with    New Patient     NP- Ventral Hernia REF: Suly Stover MD         SUBJECTIVE:  HPI: Patient is here with complaints of:    Abdominal bulge. Present for years. Increasing in size. Worse with coughing. Some foods aggravate. No previous abdominal surgeries. On ASA and plavix. No obstructive symptoms. Recently had CT C/A/P. I have reviewed the patient's(pertinent information to this visit) medical history, family history(scanned in  the [address] [place] [address] under \"patient questioner\"), social history and review of systems with the patient today in the office. Past Surgical History:   Procedure Laterality Date    CARDIAC CATHETERIZATION  7/13 7/13-EF=55%. PATENT LAD AND RCA STENTS, OM2 50% stenosis, & Normal wall motion. COLONOSCOPY  8/21/14    colon polyp, diverticulosis, internal hemorrhoids    CORONARY ANGIOPLASTY  6/03 6/14/03- PTCA w stents x2 mid LAD, 2/03-PTCA w stents x3, RCA    CORONARY ARTERY BYPASS GRAFT N/A 5/18/2020    CABG CORONARY ARTERY BYPASS x 2, INTRAOPERATIVE NGA, INDUCED HYPOTHERMIA, LEFT LEG ENDOSCOPIC VEIN HARVEST performed by Adriel Castro MD at [address] [place] [address] Entrance, COLON, DIAGNOSTIC  8/21/14    irregular GE junction, gastritis,hiatal hernia, gastric polyps    HAND SURGERY      rt knuckle    PTCA  03/2018    LAD    SHOULDER SURGERY Right 2011    bone spurs     Past Medical History:   Diagnosis Date    Acute cerebrovascular accident (CVA) ([facility] 5/22/2020    Acute ST elevation myocardial infarction (STEMI) ([facility] 3/14/2018    B12 deficiency     CAD (coronary artery disease)     COPD (chronic obstructive pulmonary disease) ([facility] 12/11/2020    GERD (gastroesophageal reflux disease)     Guillain [place] syndrome ([facility]     H/O echocardiogram 7/30/13, 6/16/10    7/13-EF 50-55% WNL.  6/10-EF53%, mod LVH, MARCI, RVE, trace PI, mild MR/TR, 6/09    Heart murmur     History of echocardiogram 10/07/2020    EF 40%, No changed since previous 5/2020, Mid inferior hypokinesis noted, Grade I DD, Mod MR, Mild TR, Dilatation of the aortic root, No pericardial effusion     History of myocardial infarction 2003    History of nuclear MUGA test 03/03/2020    EF 53%, Normal, No ICD needed    History of nuclear stress test 06/23/2020    EF 30%, study suggestive of abnormal myocardial perfusion. History of PTCA 6/03;2/03 6/03-PTCA w/stents x2, 2/03-PTCA w/stent x3    History of PTCA 03/15/2018    LAD EF25%    Hx of cardiovascular stress test 04/25/2018    EF 28% Nuclear scintigraphy demonstartes inferior wall infarct. Hx of cardiovascular stress test 01/27/2020    Abnormal Study. Large area of inferior & apical-lateral infarct. No significant reversible ischemia. Severly reduced LVEF 23%    HX OTHER MEDICAL 03/14/2018    lifevest  d/c 7/2/2018    Hyperlipidemia     Hypertension     Impaired glucose tolerance 6/3/2016    MUGA 07/09/2018    EF 47%    NSTEMI (non-ST elevated myocardial infarction) ([facility] 03/15/2018    Patient in clinical research study 05/2018    3 year study dalcitripib vs placebo [phone]    Pneumonia due to organism     Renal cyst 5/23/2014    S/P cardiac catheterization 7/12/13 7/13-EF=55%. PATENT LAD AND RCA STENTS, OM2 50% stenosis, & Normal wall motion.     Stroke (cerebrum) [facility])     May 2016     Family History   Problem Relation Age of Onset    Hypertension Mother     High Cholesterol Mother     Alcohol Abuse Mother     Depression Mother     Alcohol Abuse Father     Stomach Cancer Father     Depression Sister     Diabetes Brother     Depression Sister     Stomach Cancer Brother     Alcohol Abuse Brother      Social History     Socioeconomic History    Marital status:      Spouse name: Not on file    Number of children: Not on file    Years of education: Not on file    Highest education level: Not on file   Occupational History    Occupation:      Comment: full time   Tobacco Use    Smoking status: Former Packs/day: 2.00     Years: 45.00     Pack years: 90.00     Types: Cigarettes     Quit date: 2016     Years since quittin.5    Smokeless tobacco: Never   Vaping Use    Vaping Use: Never used   Substance and Sexual Activity    Alcohol use: No     Alcohol/week: 0.0 standard drinks    Drug use: No    Sexual activity: Yes     Partners: Female   Other Topics Concern    Not on file   Social History Narrative    Wears glasses     Social Determinants of Health     Financial Resource Strain: Low Risk     Difficulty of Paying Living Expenses: Not very hard   Food Insecurity: No Food Insecurity    Worried About Running Out of Food in the Last Year: Never true    Ran Out of Food in the Last Year: Never true   Transportation Needs: Not on file   Physical Activity: Not on file   Stress: Not on file   Social Connections: Not on file   Intimate Partner Violence: Not on file   Housing Stability: Not on file       Current Outpatient Medications   Medication Sig Dispense Refill    lisinopril (PRINIVIL;ZESTRIL) 10 MG tablet Take 1 tablet by mouth daily 30 tablet 5    tiotropium (SPIRIVA HANDIHALER) 18 MCG inhalation capsule Inhale 1 capsule into the lungs daily for 30 doses 30 capsule 0    albuterol sulfate HFA (VENTOLIN HFA) 108 (90 Base) MCG/ACT inhaler Inhale 2 puffs into the lungs every 4 hours as needed for Wheezing 18 g 1    NONFORMULARY Albuterol- Ipratropium Inhaler       carvedilol (COREG) 6.25 MG tablet Take 1 tablet by mouth 2 times daily 180 tablet 3    aspirin 81 MG chewable tablet Take 1 tablet by mouth daily 180 tablet 1    Alcohol Swabs (ALCOHOL PADS) 70 % PADS 1 Units by Does not apply route 2 times daily 180 each 3    Blood Glucose Monitoring Suppl (BLOOD GLUCOSE MONITOR SYSTEM) w/Device KIT Use twice per day 1 kit 0    clopidogrel (PLAVIX) 75 MG tablet Take 1 tablet by mouth daily 90 tablet 3    furosemide (LASIX) 40 MG tablet Take 1.5 tablets by mouth 2 times daily 270 tablet 3    sertraline (ZOLOFT) 100 MG tablet Take 1 tablet by mouth daily 90 tablet 1    glimepiride (AMARYL) 2 MG tablet Take 1 tablet by mouth every morning 90 tablet 1    atorvastatin (LIPITOR) 80 MG tablet Take 1 tablet by mouth daily 90 tablet 1    tamsulosin (FLOMAX) 0.4 MG capsule Take 1 capsule by mouth daily 30 capsule 0    pantoprazole (PROTONIX) 40 MG tablet Take 1 tablet by mouth daily 30 tablet 0     No current facility-administered medications for this visit. Allergies   Allergen Reactions    Ibuprofen Other (See Comments)     Stomach irritation    Augmentin [Amoxicillin-Pot Clavulanate]     Codeine        Review of Systems:         Review of Systems   Gastrointestinal:  Positive for abdominal pain. Negative for diarrhea and vomiting. All other systems reviewed and are negative. OBJECTIVE:  Physical Exam:    /82 (Site: Left Upper Arm, Position: Sitting, Cuff Size: Large Adult)   Pulse 81   Ht 5' 7.5\" (1.715 m)   Wt 196 lb 8 oz (89.1 kg)   SpO2 97%   BMI 30.32 kg/m²      Physical Exam  Vitals reviewed. Constitutional:       General: He is not in acute distress. Appearance: He is not ill-appearing, toxic-appearing or diaphoretic. HENT:      Head: Normocephalic and atraumatic. Right Ear: External ear normal.      Left Ear: External ear normal.   Eyes:      General:         Right eye: No discharge. Extraocular Movements: Extraocular movements intact. Cardiovascular:      Rate and Rhythm: Normal rate. Abdominal:      General: There is distension. Palpations: Abdomen is soft. Tenderness: There is no abdominal tenderness. There is no guarding. Hernia: A hernia (supra umbilical, soft) is present. Skin:     Coloration: Skin is not jaundiced. Neurological:      General: No focal deficit present. Mental Status: He is alert. CT CAP:          ASSESSMENT:  1.  Ventral hernia without obstruction or gangrene          PLAN:  Treatment:    -Reviewed physical exam findings and CT findings with pt.   -Planned robotic-assisted ventral hernia repair. Consent obtained. Reviewed in detail with the patient and/or family the expected pre-operative, operative, and post-operative courses including risks, benefits, and alternatives to the procedure. The patient's questions were answered in detail and agreed to proceed with the procedure.   -Orders placed.   -Needs Cards clearance. On ASA and Plavix. Will need stopped 7 d prior to surgery.   -Call with any questions, concerns, or issues whatsoever. .  Patient counseled on risks, benefits, and alternatives of treatment plan at length today. Patient states an understanding and willingness to proceed with plan. No orders of the defined types were placed in this encounter. No orders of the defined types were placed in this encounter. Follow Up:  No follow-ups on file.       Cristal Banerjee MD

## 2022-11-23 ENCOUNTER — TELEMEDICINE (OUTPATIENT)
Dept: FAMILY MEDICINE CLINIC | Age: 62
End: 2022-11-23
Payer: MEDICARE

## 2022-11-23 DIAGNOSIS — J20.9 ACUTE BRONCHITIS, UNSPECIFIED ORGANISM: Primary | ICD-10-CM

## 2022-11-23 PROCEDURE — 99213 OFFICE O/P EST LOW 20 MIN: CPT | Performed by: FAMILY MEDICINE

## 2022-11-23 RX ORDER — AZITHROMYCIN 250 MG/1
250 TABLET, FILM COATED ORAL SEE ADMIN INSTRUCTIONS
Qty: 6 TABLET | Refills: 0 | Status: SHIPPED | OUTPATIENT
Start: 2022-11-23 | End: 2022-11-28

## 2022-11-23 NOTE — PROGRESS NOTES
2022    TELEHEALTH EVALUATION -- Audio/Visual (During [id number] public health emergency)    HPI:    Letcher Sandhoff (:  1960) has requested an audio/video evaluation for the following concern(s):    Chief Complaint   Patient presents with    Cough     Took covid test last week negative in office. Symptoms started 5 days     Shortness of Breath    Chest Congestion    Wheezing    Fatigue    Diarrhea     Cough: 6 days. Cough clear phelgmn. No F, chills, diarrhea. Tried mucinex which has not helped. Negative for covid last week    Review of Systems    Prior to Visit Medications    Medication Sig Taking?  Authorizing Provider   azithromycin (ZITHROMAX) 250 MG tablet Take 1 tablet by mouth See Admin Instructions for 5 days 500mg on day 1 followed by 250mg on days 2 - 5 Yes Francisco Aleman MD   lisinopril (PRINIVIL;ZESTRIL) 10 MG tablet Take 1 tablet by mouth daily Yes John Hayes MD   tiotropium (Rubina Moya) 18 MCG inhalation capsule Inhale 1 capsule into the lungs daily for 30 doses Yes Francisco Aleman MD   albuterol sulfate HFA (VENTOLIN HFA) 108 (90 Base) MCG/ACT inhaler Inhale 2 puffs into the lungs every 4 hours as needed for Wheezing Yes Francisco Aleman MD   NONFORMULARY Albuterol- Ipratropium Inhaler  Yes Historical Provider, MD   carvedilol (COREG) 6.25 MG tablet Take 1 tablet by mouth 2 times daily Yes JERED Ortiz CNP   clopidogrel (PLAVIX) 75 MG tablet Take 1 tablet by mouth daily Yes JERED Ortiz CNP   furosemide (LASIX) 40 MG tablet Take 1.5 tablets by mouth 2 times daily Yes JERED Ortiz CNP   sertraline (ZOLOFT) 100 MG tablet Take 1 tablet by mouth daily Yes Francisco Aleman MD   aspirin 81 MG chewable tablet Take 1 tablet by mouth daily Yes Francisco Aleman MD   glimepiride (AMARYL) 2 MG tablet Take 1 tablet by mouth every morning Yes Francisco Aleman MD   atorvastatin (LIPITOR) 80 MG tablet Take 1 tablet by mouth daily Yes Francisco Aleman MD   Alcohol Swabs (ALCOHOL PADS) 70 % PADS 1 Units by Does not apply route 2 times daily Yes Ailin Polo MD   Blood Glucose Monitoring Suppl (BLOOD GLUCOSE MONITOR SYSTEM) w/Device KIT Use twice per day Yes Ailin Polo MD   tamsulosin (FLOMAX) 0.4 MG capsule Take 1 capsule by mouth daily Yes Ailin Polo MD   pantoprazole (PROTONIX) 40 MG tablet Take 1 tablet by mouth daily Yes Ailin Polo MD       Social History     Tobacco Use    Smoking status: Former     Packs/day: 2.00     Years: 45.00     Pack years: 90.00     Types: Cigarettes     Quit date: 2016     Years since quittin.5    Smokeless tobacco: Never   Vaping Use    Vaping Use: Never used   Substance Use Topics    Alcohol use: No     Alcohol/week: 0.0 standard drinks    Drug use: No        Allergies   Allergen Reactions    Ibuprofen Other (See Comments)     Stomach irritation    Augmentin [Amoxicillin-Pot Clavulanate]     Codeine    ,   Past Medical History:   Diagnosis Date    Acute cerebrovascular accident (CVA) ([facility] 2020    Acute ST elevation myocardial infarction (STEMI) ([facility] 3/14/2018    B12 deficiency     CAD (coronary artery disease)     COPD (chronic obstructive pulmonary disease) ([facility] 2020    GERD (gastroesophageal reflux disease)     Guillain Barré syndrome ([facility]     H/O echocardiogram 13, 6/16/10    7/13-EF 50-55% WNL. 6/10-EF53%, mod LVH, MARCI, RVE, trace PI, mild MR/TR,     Heart murmur     History of echocardiogram 10/07/2020    EF 40%, No changed since previous 2020, Mid inferior hypokinesis noted, Grade I DD, Mod MR, Mild TR, Dilatation of the aortic root, No pericardial effusion     History of myocardial infarction     History of nuclear MUGA test 2020    EF 53%, Normal, No ICD needed    History of nuclear stress test 2020    EF 30%, study suggestive of abnormal myocardial perfusion.     History of PTCA ;-PTCA w/stents x2, -PTCA w/stent x3    History of PTCA 03/15/2018 LAD EF25%    Hx of cardiovascular stress test 04/25/2018    EF 28% Nuclear scintigraphy demonstartes inferior wall infarct. Hx of cardiovascular stress test 01/27/2020    Abnormal Study. Large area of inferior & apical-lateral infarct. No significant reversible ischemia. Severly reduced LVEF 23%    HX OTHER MEDICAL 03/14/2018    lifevest  d/c 7/2/2018    Hyperlipidemia     Hypertension     Impaired glucose tolerance 6/3/2016    MUGA 07/09/2018    EF 47%    NSTEMI (non-ST elevated myocardial infarction) ([facility] 03/15/2018    Patient in clinical research study 05/2018    3 year study dalcitripib vs placebo [phone]    Pneumonia due to organism     Renal cyst 5/23/2014    S/P cardiac catheterization 7/12/13 7/13-EF=55%. PATENT LAD AND RCA STENTS, OM2 50% stenosis, & Normal wall motion. Stroke (cerebrum) [facility])     May 2016       PHYSICAL EXAMINATION:  [ INSTRUCTIONS:  \"[x]\" Indicates a positive item  \"[]\" Indicates a negative item  -- DELETE ALL ITEMS NOT EXAMINED]  Vital Signs: (As obtained by patient/caregiver or practitioner observation)    Blood pressure-  Heart rate-    Respiratory rate-    Temperature-  Pulse oximetry-     Constitutional: [x] Appears well-developed and well-nourished [x] No apparent distress      [] Abnormal-   Mental status  [x] Alert and awake  [x] Oriented to person/place/time [x]Able to follow commands      Eyes:  EOM    [x]  Normal  [] Abnormal-  Sclera  []  Normal  [] Abnormal -         Discharge []  None visible  [] Abnormal -    HENT:   [x] Normocephalic, atraumatic.   [] Abnormal   [] Mouth/Throat: Mucous membranes are moist.     External Ears [x] Normal  [] Abnormal-     Neck: [x] No visualized mass     Pulmonary/Chest: [x] Respiratory effort normal.  [x] No visualized signs of difficulty breathing or respiratory distress        [] Abnormal-      Musculoskeletal:   [x] Normal gait with no signs of ataxia         [] Normal range of motion of neck        [] Abnormal- Neurological:        [x] No Facial Asymmetry (Cranial nerve 7 motor function) (limited exam to video visit)          [] No gaze palsy        [] Abnormal-         Skin:        [x] No significant exanthematous lesions or discoloration noted on facial skin         [] Abnormal-            Psychiatric:        []Normal Affect [x] No Hallucinations        [] Abnormal-     Other pertinent observable physical exam findings-    Diagnosis Orders   1. Acute bronchitis, unspecified organism  azithromycin (ZITHROMAX) 250 MG tablet        Unfortunately, the mucinex won't help with the cough    Return if symptoms worsen or fail to improve. Delilah Roman, was evaluated through a synchronous (real-time) audio-video encounter. The patient (or guardian if applicable) is aware that this is a billable service, which includes applicable co-pays. This Virtual Visit was conducted with patient's (and/or legal guardian's) consent. The visit was conducted pursuant to the emergency declaration under the [address] [place] [address], [address] [place] [address] authority and the [employer] and [employer] Act. Patient identification was verified, and a caregiver was present when appropriate. The patient was located at Home: [address] [place] [address]. Provider was located at Home ([place] [address]): [place]. Total time spent on this encounter: Not billed by time    --Lyle Moreira MD on 11/23/2022 at 1:17 PM    An electronic signature was used to authenticate this note.

## 2022-11-29 ENCOUNTER — TELEPHONE (OUTPATIENT)
Dept: CARDIOLOGY CLINIC | Age: 62
End: 2022-11-29

## 2022-11-29 ENCOUNTER — TELEPHONE (OUTPATIENT)
Dept: BARIATRICS/WEIGHT MGMT | Age: 62
End: 2022-11-29

## 2022-11-29 DIAGNOSIS — Z01.818 PREOPERATIVE CLEARANCE: Primary | ICD-10-CM

## 2022-11-29 NOTE — TELEPHONE ENCOUNTER
LEFT MESSAGE FOR  Rox Rosales REGARDING SURGERY ([address]) SCHEDULED @ [facility].  NOTIFIED OF DATES, TIMES AND LOCATION    PHONE ASSESSMENT   SURGERY - 12/8/22 @ 208  P/O - 12/22/22 @ 3636    NPO AFTER MIDNIGHT  HOLD BLOOD THINNERS - plavix hold 5 days prior

## 2022-11-29 NOTE — TELEPHONE ENCOUNTER
Sindy Chávez Patient  Member ID  [id number]    Date of Birth  [id number]    Gender  NA    Eligibility Status  A    Group Number  [id number]    Plan / Coverage Date  2022-01-01 - 2199-12-31    Transaction Type  Outpatient Authorization    Organization  [facility]    Payer  DENY  Darin Galindo logo  Transaction ID: [id number] ID: [id number] Date: 2022-11-29  ×    Based on information entered, no authorization is required.   No Authorization Required  Service Type  2 - Surgical    Place of Service  [address]    Service From - To Date  2022-12-08 - 2022-12-08    Quantity  1 Units  Level of Service  Elective    Diagnosis Code 1  I326 - Ventral hernia without obstruction or gangrene    Procedure Code 1  94031 - LAP VENT/ABD HERNIA REPAIR    Quantity  1 Units    Status  NO AUTH REQUIRED

## 2022-11-29 NOTE — LETTER
[facility]  [address]  [facility], [address] [facility],Third [address]  Phone: [phone]    Fax [phone]                  Le Marrero MD, Devin Sanchez MD, Bryant Vasquez MD, Sailaja Sanchez MD, MD Irvin Gould MD, MD Ean Baca, APRN-KELLEN Dillon, APRNKELLEN Patel, APRN-KELLEN Sawant APREULALIACNP      Cardiac Risk Assessment   11/29/2022        Surgery Date: 12/8/2022  Surgery: Robotic Ventral Hernia Repair  Anesthesia Type: Unk  Fax Number: Epic    To: Dr. Josias Blackwell  From : JERED GomezCNP    Patient Name: Yaw Andujar     YOB: 1960  MRN: [id number]    Yaw Andujar was evaluated from a cardiac standpoint for his surgery or procedure and based on his history, diagnosis, recent cardiac testing, he is considered a medium risk candidate for any freddie-operative cardiac complications. Patients with known CAD with moderate or high risk should have surgical procedures done where they have access to invasive cardiology services if emergently needed. Antiplatelet/anticoagulant therapy can be held prior to the surgery or procedure at the discretion of the surgeon to be resumed as soon as possible if held. Please call with any further questions. Respectfully,     Celso Dillon APREULALIAKELLEN  Mk/bl    This cardiac clearance is good for 6 months from 11/29/2022 unless new cardiac symptoms arise.

## 2022-11-30 ENCOUNTER — TELEPHONE (OUTPATIENT)
Dept: FAMILY MEDICINE CLINIC | Age: 62
End: 2022-11-30

## 2022-11-30 NOTE — PROGRESS NOTES
11/30/22 - . LM with my call-back # concerning  surgery @ [facility] on  12/8/22. Please call the PAT Nurse for a phone assessment and surgery instructions.

## 2022-12-01 NOTE — PROGRESS NOTES
.Surgery @ [facility] on 12/8/22 you will be called 12/7/22 with times               1. Do not eat or drink anything after midnight - unless instructed by your doctor prior to surgery. This includes                   no water, chewing gum or mints. 2. Follow your directions as prescribed by the doctor for your procedure and medications. Take Carvedilol and Pantoprazole morning of surgery with                   Sips of water. Use your inhaler and bring with you. 3. Check with your Doctor regarding stopping vitamins, supplements, blood thinners and follow their instructions. Stop vitamins, supplements and NSAIDS: 12/1/22                  Last dose Plavix: 12/2/22   4. Do not smoke, vape or use chewing tobacco morning of surgery. Do not drink any alcoholic beverages 24 hours prior to surgery. This includes NA Beer. No street drugs 7 days prior to surgery. 5. You may brush your teeth and gargle the morning of surgery. DO NOT SWALLOW WATER   6. You MUST make arrangements for a responsible adult to take you home after your surgery and be able to check on you every couple                   hours for the day. You will not be allowed to leave alone or drive yourself home. It is strongly suggested someone stay with you the first 24                   hrs. Your surgery will be cancelled if you do not have a ride home. 7. Please wear simple, loose fitting clothing to the hospital.  Leia Hernandez not bring valuables (money, credit cards, checkbooks, etc.) Do not wear any                   makeup (including no eye makeup) or nail polish on your fingers or toes. 8. DO NOT wear any jewelry or piercings on day of surgery. All body piercing jewelry must be removed. 9. If you have dentures, they will be removed before going to the OR; we will provide you a container. If you wear contact lenses or glasses,                  they will be removed; please bring a case for them.            10. If you have a Living Will and Durable Power of  for Healthcare, please bring in a copy. 11. Please bring picture ID,  insurance card, paperwork from the doctors office    (H & P, Consent, & card for implantable devices). 12. Take a shower the morning of your procedure with Hibiclens or an anti-bacterial soap. Do not apply any make-up, deodorant, lotion, oil or powder. 15.  Enter thru the main entrance on the day of surgery.

## 2022-12-01 NOTE — PROGRESS NOTES
12/1/22 - 2nd message: . BALWINDER on patient & spouse's phones with my call-back # concerning  surgery @ [facility] on  12/8/22. Please call the PAT Nurse TODAY for a phone assessment and surgery instructions. Clarice Hagan (spouse) returned my call, she will have Orvie call me today.

## 2022-12-02 ENCOUNTER — TELEPHONE (OUTPATIENT)
Dept: PHARMACY | Facility: CLINIC | Age: 62
End: 2022-12-02

## 2022-12-02 NOTE — TELEPHONE ENCOUNTER
[facility]: ADHERENCE REVIEW  Identified care gap per [place]: fills at [facility]: Diabetes adherence    Last Visit: 11/22/22    [address] Records claims through 11/21/22  YTD [place] =  78%; Potential Fail Date: 12/10/22 ):   GLIMEPIRIDE 2 MG TABLET  90 day supply. Next refill due: 12/7/22    Per Reconciled Dispense Report:  GLIMEPIRIDE 2 MG TABLET last filled on 9/8/22 for 90 day supply. Lab Results   Component Value Date    LABA1C 6.5 09/08/2022    LABA1C 6.6 (H) 07/08/2022    LABA1C 7.2 (H) 03/12/2022           PLAN  The following are interventions that have been identified:     Per anthem report shows patient filling at [facility]. Per chart review patient is now filling with mail order    No further outreach planned at this time. For [address] [facility] in place:  No  Time Spent (min): 15         Future Appointments   Date Time Provider Carmen Díaz   12/8/2022  9:00 AM Te Varghese MD [facility]   12/22/2022 11:45 AM Lucy Delvalle MD [facility]   1/12/2023  8:10 AM Kiara Granado MD [facility]   3/9/2023  9:10 AM Kiara Granado MD [facility].   [address] [facility] free: [phone]

## 2022-12-06 ENCOUNTER — ANESTHESIA EVENT (OUTPATIENT)
Dept: OPERATING ROOM | Age: 62
End: 2022-12-06
Payer: MEDICARE

## 2022-12-06 NOTE — ANESTHESIA PRE PROCEDURE
MG capsule Take 1 capsule by mouth daily 3/14/22 11/23/22  Eduarda Field MD   pantoprazole (PROTONIX) 40 MG tablet Take 1 tablet by mouth daily 3/14/22 11/23/22  Eduarda Field MD       Current medications:    No current facility-administered medications for this encounter. Current Outpatient Medications   Medication Sig Dispense Refill    lisinopril (PRINIVIL;ZESTRIL) 10 MG tablet Take 1 tablet by mouth daily 30 tablet 5    tiotropium (SPIRIVA HANDIHALER) 18 MCG inhalation capsule Inhale 1 capsule into the lungs daily for 30 doses 30 capsule 0    albuterol sulfate HFA (VENTOLIN HFA) 108 (90 Base) MCG/ACT inhaler Inhale 2 puffs into the lungs every 4 hours as needed for Wheezing 18 g 1    NONFORMULARY Albuterol- Ipratropium Inhaler       carvedilol (COREG) 6.25 MG tablet Take 1 tablet by mouth 2 times daily 180 tablet 3    clopidogrel (PLAVIX) 75 MG tablet Take 1 tablet by mouth daily 90 tablet 3    furosemide (LASIX) 40 MG tablet Take 1.5 tablets by mouth 2 times daily 270 tablet 3    sertraline (ZOLOFT) 100 MG tablet Take 1 tablet by mouth daily 90 tablet 1    aspirin 81 MG chewable tablet Take 1 tablet by mouth daily 180 tablet 1    glimepiride (AMARYL) 2 MG tablet Take 1 tablet by mouth every morning 90 tablet 1    atorvastatin (LIPITOR) 80 MG tablet Take 1 tablet by mouth daily 90 tablet 1    Alcohol Swabs (ALCOHOL PADS) 70 % PADS 1 Units by Does not apply route 2 times daily 180 each 3    Blood Glucose Monitoring Suppl (BLOOD GLUCOSE MONITOR SYSTEM) w/Device KIT Use twice per day 1 kit 0    tamsulosin (FLOMAX) 0.4 MG capsule Take 1 capsule by mouth daily 30 capsule 0    pantoprazole (PROTONIX) 40 MG tablet Take 1 tablet by mouth daily 30 tablet 0       Allergies:     Allergies   Allergen Reactions    Ibuprofen Other (See Comments)     Stomach irritation    Augmentin [Amoxicillin-Pot Clavulanate] Rash    Codeine Other (See Comments)     \"I'm out of it\"       Problem List: Patient Active Problem List   Diagnosis Code    ASCVD (arteriosclerotic cardiovascular disease) I25.10    History of PTCA Z98.61    History of acute inferior wall myocardial infarction I25.2    Chest pain R07.9    B12 deficiency E53.8    Fatty liver K76.0    Colon, diverticulosis K57.30    Renal cyst N28.1    Adrenal adenoma D35.00    Hyperlipidemia LDL goal <70 E78.5    Obesity (BMI 30-39. 9) E66.9    Cerebrovascular accident (CVA) due to thrombosis of left middle cerebral artery (HCC) I63.312    Hemiparesis of right dominant side as late effect of cerebral infarction (HCC) I69.351    Gait disturbance R26.9    Essential hypertension I10    Hemiparesis affecting right side as late effect of cerebrovascular accident (CVA) ([facility] I69.351    SOBOE (shortness of breath on exertion) R06.02    Gastroesophageal reflux disease K21.9    Unstable angina ([facility]) I20.0    Non-ST elevation myocardial infarction (NSTEMI) ([facility]) I21.4    Frequent PVCs I49.3    Ischemic cardiomyopathy I25.5    History of Guillain-[place]port syndrome Z86.69    Acute pain of left shoulder M25.512    Dysarthria due to recent stroke I69.322    Aphasia due to acute stroke ([facility]) I63.9, X67.32    Complication of coronary artery bypass graft surgery T82. 9XXA    S/P CABG (coronary artery bypass graft) Z95.1    ANNA (obstructive sleep apnea) G47.33    Hypersomnia G47.10    COPD (chronic obstructive pulmonary disease) ([facility]) J44.9    Irritability and anger R45.4    Stable angina pectoris ([facility]) I20.8    Noncompliance with medications Z91.14    Stage 3b chronic kidney disease (HCC) N18.32    Class 1 obesity due to excess calories with body mass index (BMI) of 33.0 to 33.9 in adult E66.09, Z68.33    Hx of colonic polyps Z86.010    Stage 3a chronic kidney disease (HCC) N18.31    Microalbuminuria R80.9    Ventral hernia without obstruction or gangrene K43.9    Former tobacco use Z87.891       Past Medical History:        Diagnosis Date    Acute cerebrovascular accident (CVA) ([facility] 05/22/2020    Acute ST elevation myocardial infarction (STEMI) ([facility] 03/14/2018    B12 deficiency     CAD (coronary artery disease)     COPD (chronic obstructive pulmonary disease) ([facility] 12/11/2020    GERD (gastroesophageal reflux disease)     Guillain Barré syndrome ([facility]     H/O echocardiogram 7/30/13, 6/16/10    7/13-EF 50-55% WNL. 6/10-EF53%, mod LVH, MARCI, RVE, trace PI, mild MR/TR, 6/09    Heart murmur     History of echocardiogram 10/07/2020    EF 40%, No changed since previous 5/2020, Mid inferior hypokinesis noted, Grade I DD, Mod MR, Mild TR, Dilatation of the aortic root, No pericardial effusion     History of myocardial infarction 2003    History of nuclear MUGA test 03/03/2020    EF 53%, Normal, No ICD needed    History of nuclear stress test 06/23/2020    EF 30%, study suggestive of abnormal myocardial perfusion.  History of PTCA 6/03;2/03 6/03-PTCA w/stents x2, 2/03-PTCA w/stent x3    History of PTCA 03/15/2018    LAD EF25%    Hx of cardiovascular stress test 04/25/2018    EF 28% Nuclear scintigraphy demonstartes inferior wall infarct.  Hx of cardiovascular stress test 01/27/2020    Abnormal Study. Large area of inferior & apical-lateral infarct. No significant reversible ischemia. Severly reduced LVEF 23%    HX OTHER MEDICAL 03/14/2018    lifevest  d/c 7/2/2018    Hyperlipidemia     Hypertension     Impaired glucose tolerance 06/03/2016    MUGA 07/09/2018    EF 47%    NSTEMI (non-ST elevated myocardial infarction) ([facility] 03/15/2018    Patient in clinical research study 05/2018    3 year study dalcitripib vs placebo [phone]    Pneumonia due to organism     Renal cyst 05/23/2014    S/P cardiac catheterization 07/12/2013 7/13-EF=55%. PATENT LAD AND RCA STENTS, OM2 50% stenosis, & Normal wall motion.     Stroke (cerebrum) [facility])     May 2016       Past Surgical History:        Procedure Laterality Date    CARDIAC CATHETERIZATION  -EF=55%. PATENT LAD AND RCA STENTS, OM2 50% stenosis, & Normal wall motion.  COLONOSCOPY  14    colon polyp, diverticulosis, internal hemorrhoids    CORONARY ANGIOPLASTY  03- PTCA w stents x2 mid LAD, -PTCA w stents x3, RCA    CORONARY ARTERY BYPASS GRAFT N/A 2020    CABG CORONARY ARTERY BYPASS x 2, INTRAOPERATIVE NGA, INDUCED HYPOTHERMIA, LEFT LEG ENDOSCOPIC VEIN HARVEST performed by Chandni Mabry MD at [address], COLON, DIAGNOSTIC  14    irregular GE junction, gastritis,hiatal hernia, gastric polyps    HAND SURGERY      rt knuckle    PTCA  2018    LAD    SHOULDER SURGERY Right 2011    bone spurs       Social History:    Social History     Tobacco Use    Smoking status: Former     Packs/day: 2.00     Years: 45.00     Pack years: 90.00     Types: Cigarettes     Quit date: 2016     Years since quittin.5    Smokeless tobacco: Never   Substance Use Topics    Alcohol use: No     Alcohol/week: 0.0 standard drinks                                Counseling given: Not Answered      Vital Signs (Current):   Vitals:    22 0921   Weight: 196 lb (88.9 kg)   Height: 5' 7\" (1.702 m)                                              BP Readings from Last 3 Encounters:   22 128/82   11/10/22 128/88   22 120/86       NPO Status:                                                                                 BMI:   Wt Readings from Last 3 Encounters:   22 196 lb 8 oz (89.1 kg)   11/10/22 203 lb (92.1 kg)   22 201 lb (91.2 kg)     Body mass index is 30.7 kg/m².     CBC:   Lab Results   Component Value Date/Time    WBC 8.6 2022 11:43 AM    RBC 4.84 2022 11:43 AM    HGB 15.8 2022 11:43 AM    HCT 47.7 2022 11:43 AM    MCV 98.6 2022 11:43 AM    RDW 13.5 2022 11:43 AM     2022 11:43 AM       CMP:   Lab Results   Component Value Date/Time     2022 03:01 PM CABG/stent: no interval change, hyperlipidemia        Rhythm: regular  Rate: normal  Echocardiogram reviewed         Beta Blocker:  Dose within 24 Hrs      ROS comment: Echo 3/2022:    Left ventricular systolic function is abnormal.   Ejection fraction is visually estimated at 40-45%. Akinesis of the inferior and inferoseptal walls. Moderate left ventricular hypertrophy. Mild to moderate mitral regurgitation. No evidence of any pericardial effusion. Neuro/Psych:   (+) CVA: residual symptoms, neuromuscular disease:,             GI/Hepatic/Renal:   (+) GERD:, liver disease ([place]):, renal disease: CRI,           Endo/Other:    (+) DiabetesType II DM, , blood dyscrasia: anticoagulation therapy:., .                 Abdominal:             Vascular: negative vascular ROS. Other Findings:           Anesthesia Plan      general     ASA 3       Induction: intravenous. MIPS: Postoperative opioids intended and Prophylactic antiemetics administered. Anesthetic plan and risks discussed with patient. Plan discussed with CRNA. Pre Anesthesia Evaluation complete. Anesthesia plan, risks, benefits, alternatives, and personal involved discussed with patient. Patients and/or legal guardian verbalized an understanding  and agreed to proceed.   Skip Remy, DO  12/8/2022

## 2022-12-07 NOTE — PROGRESS NOTES
12/7/22 - Notified patient surgery @ [facility] on 12/8/22 @ 0930, arrival 0730. Understanding verbalized.

## 2022-12-08 ENCOUNTER — HOSPITAL ENCOUNTER (OUTPATIENT)
Age: 62
Setting detail: OUTPATIENT SURGERY
Discharge: HOME | End: 2022-12-08
Attending: SURGERY | Admitting: SURGERY
Payer: MEDICARE

## 2022-12-08 ENCOUNTER — ANESTHESIA (OUTPATIENT)
Dept: OPERATING ROOM | Age: 62
End: 2022-12-08
Payer: MEDICARE

## 2022-12-08 VITALS
TEMPERATURE: 98.3 F | HEIGHT: 67 IN | SYSTOLIC BLOOD PRESSURE: 175 MMHG | WEIGHT: 200 LBS | DIASTOLIC BLOOD PRESSURE: 88 MMHG | RESPIRATION RATE: 16 BRPM | OXYGEN SATURATION: 98 % | HEART RATE: 73 BPM | BODY MASS INDEX: 31.39 KG/M2

## 2022-12-08 DIAGNOSIS — Z87.19 S/P HERNIA REPAIR: Primary | ICD-10-CM

## 2022-12-08 DIAGNOSIS — Z98.890 S/P HERNIA REPAIR: Primary | ICD-10-CM

## 2022-12-08 LAB
ANION GAP SERPL CALCULATED.3IONS-SCNC: 8 MMOL/L (ref 4–16)
BASOPHILS ABSOLUTE: 0.1 K/CU MM
BASOPHILS RELATIVE PERCENT: 0.9 % (ref 0–1)
BUN BLDV-MCNC: 24 MG/DL (ref 6–23)
CALCIUM SERPL-MCNC: 8.4 MG/DL (ref 8.3–10.6)
CHLORIDE BLD-SCNC: 104 MMOL/L (ref 99–110)
CO2: 28 MMOL/L (ref 21–32)
CREAT SERPL-MCNC: 1.5 MG/DL (ref 0.9–1.3)
DIFFERENTIAL TYPE: ABNORMAL
EOSINOPHILS ABSOLUTE: 0.1 K/CU MM
EOSINOPHILS RELATIVE PERCENT: 1.9 % (ref 0–3)
GFR SERPL CREATININE-BSD FRML MDRD: 52 ML/MIN/1.73M2
GLUCOSE BLD-MCNC: 127 MG/DL (ref 70–99)
GLUCOSE BLD-MCNC: 133 MG/DL (ref 70–99)
HCT VFR BLD CALC: 44.5 % (ref 42–52)
HEMOGLOBIN: 14.8 GM/DL (ref 13.5–18)
IMMATURE NEUTROPHIL %: 0.3 % (ref 0–0.43)
LYMPHOCYTES ABSOLUTE: 1.8 K/CU MM
LYMPHOCYTES RELATIVE PERCENT: 28.6 % (ref 24–44)
MCH RBC QN AUTO: 32.6 PG (ref 27–31)
MCHC RBC AUTO-ENTMCNC: 33.3 % (ref 32–36)
MCV RBC AUTO: 98 FL (ref 78–100)
MONOCYTES ABSOLUTE: 0.5 K/CU MM
MONOCYTES RELATIVE PERCENT: 8.3 % (ref 0–4)
NUCLEATED RBC %: 0 %
PDW BLD-RTO: 12.9 % (ref 11.7–14.9)
PLATELET # BLD: 147 K/CU MM (ref 140–440)
PMV BLD AUTO: 10.7 FL (ref 7.5–11.1)
POTASSIUM SERPL-SCNC: 4.1 MMOL/L (ref 3.5–5.1)
RBC # BLD: 4.54 M/CU MM (ref 4.6–6.2)
SEGMENTED NEUTROPHILS ABSOLUTE COUNT: 3.8 K/CU MM
SEGMENTED NEUTROPHILS RELATIVE PERCENT: 60 % (ref 36–66)
SODIUM BLD-SCNC: 140 MMOL/L (ref 135–145)
TOTAL IMMATURE NEUTOROPHIL: 0.02 K/CU MM
TOTAL NUCLEATED RBC: 0 K/CU MM
WBC # BLD: 6.4 K/CU MM (ref 4–10.5)

## 2022-12-08 PROCEDURE — 6360000002 HC RX W HCPCS: Performed by: NURSE ANESTHETIST, CERTIFIED REGISTERED

## 2022-12-08 PROCEDURE — 85025 COMPLETE CBC W/AUTO DIFF WBC: CPT

## 2022-12-08 PROCEDURE — 2709999900 HC NON-CHARGEABLE SUPPLY: Performed by: SURGERY

## 2022-12-08 PROCEDURE — 2580000003 HC RX 258: Performed by: SURGERY

## 2022-12-08 PROCEDURE — 80048 BASIC METABOLIC PNL TOTAL CA: CPT

## 2022-12-08 PROCEDURE — 6360000002 HC RX W HCPCS: Performed by: ANESTHESIOLOGY

## 2022-12-08 PROCEDURE — 3700000001 HC ADD 15 MINUTES (ANESTHESIA): Performed by: SURGERY

## 2022-12-08 PROCEDURE — 49653 PR LAP, VENTRAL HERNIA REPAIR,INCARCERATED: CPT | Performed by: SURGERY

## 2022-12-08 PROCEDURE — C1781 MESH (IMPLANTABLE): HCPCS | Performed by: SURGERY

## 2022-12-08 PROCEDURE — 2500000003 HC RX 250 WO HCPCS: Performed by: NURSE ANESTHETIST, CERTIFIED REGISTERED

## 2022-12-08 PROCEDURE — 6370000000 HC RX 637 (ALT 250 FOR IP): Performed by: ANESTHESIOLOGY

## 2022-12-08 PROCEDURE — 82962 GLUCOSE BLOOD TEST: CPT

## 2022-12-08 PROCEDURE — 2500000003 HC RX 250 WO HCPCS: Performed by: SURGERY

## 2022-12-08 PROCEDURE — 7100000000 HC PACU RECOVERY - FIRST 15 MIN: Performed by: SURGERY

## 2022-12-08 PROCEDURE — S2900 ROBOTIC SURGICAL SYSTEM: HCPCS | Performed by: SURGERY

## 2022-12-08 PROCEDURE — 7100000010 HC PHASE II RECOVERY - FIRST 15 MIN: Performed by: SURGERY

## 2022-12-08 PROCEDURE — 3700000000 HC ANESTHESIA ATTENDED CARE: Performed by: SURGERY

## 2022-12-08 PROCEDURE — 7100000011 HC PHASE II RECOVERY - ADDTL 15 MIN: Performed by: SURGERY

## 2022-12-08 PROCEDURE — 3600000019 HC SURGERY ROBOT ADDTL 15MIN: Performed by: SURGERY

## 2022-12-08 PROCEDURE — 7100000001 HC PACU RECOVERY - ADDTL 15 MIN: Performed by: SURGERY

## 2022-12-08 PROCEDURE — 2500000003 HC RX 250 WO HCPCS: Performed by: ANESTHESIOLOGY

## 2022-12-08 PROCEDURE — 6360000002 HC RX W HCPCS: Performed by: SURGERY

## 2022-12-08 PROCEDURE — 3600000009 HC SURGERY ROBOT BASE: Performed by: SURGERY

## 2022-12-08 RX ORDER — DEXAMETHASONE SODIUM PHOSPHATE 4 MG/ML
INJECTION, SOLUTION INTRA-ARTICULAR; INTRALESIONAL; INTRAMUSCULAR; INTRAVENOUS; SOFT TISSUE PRN
Status: DISCONTINUED | OUTPATIENT
Start: 2022-12-08 | End: 2022-12-08 | Stop reason: SDUPTHER

## 2022-12-08 RX ORDER — OXYCODONE HYDROCHLORIDE 5 MG/1
5 TABLET ORAL
Status: COMPLETED | OUTPATIENT
Start: 2022-12-08 | End: 2022-12-08

## 2022-12-08 RX ORDER — ROCURONIUM BROMIDE 10 MG/ML
INJECTION, SOLUTION INTRAVENOUS PRN
Status: DISCONTINUED | OUTPATIENT
Start: 2022-12-08 | End: 2022-12-08 | Stop reason: SDUPTHER

## 2022-12-08 RX ORDER — MIDAZOLAM HYDROCHLORIDE 1 MG/ML
INJECTION INTRAMUSCULAR; INTRAVENOUS PRN
Status: DISCONTINUED | OUTPATIENT
Start: 2022-12-08 | End: 2022-12-08 | Stop reason: SDUPTHER

## 2022-12-08 RX ORDER — LABETALOL HYDROCHLORIDE 5 MG/ML
10 INJECTION, SOLUTION INTRAVENOUS
Status: DISCONTINUED | OUTPATIENT
Start: 2022-12-08 | End: 2022-12-08 | Stop reason: HOSPADM

## 2022-12-08 RX ORDER — LIDOCAINE HYDROCHLORIDE 20 MG/ML
INJECTION, SOLUTION INTRAVENOUS PRN
Status: DISCONTINUED | OUTPATIENT
Start: 2022-12-08 | End: 2022-12-08 | Stop reason: SDUPTHER

## 2022-12-08 RX ORDER — PROPOFOL 10 MG/ML
INJECTION, EMULSION INTRAVENOUS PRN
Status: DISCONTINUED | OUTPATIENT
Start: 2022-12-08 | End: 2022-12-08 | Stop reason: SDUPTHER

## 2022-12-08 RX ORDER — HYDROCODONE BITARTRATE AND ACETAMINOPHEN 5; 325 MG/1; MG/1
1 TABLET ORAL EVERY 6 HOURS PRN
Qty: 12 TABLET | Refills: 0 | Status: SHIPPED | OUTPATIENT
Start: 2022-12-08 | End: 2022-12-11

## 2022-12-08 RX ORDER — SODIUM CHLORIDE 9 MG/ML
INJECTION, SOLUTION INTRAVENOUS PRN
Status: DISCONTINUED | OUTPATIENT
Start: 2022-12-08 | End: 2022-12-08 | Stop reason: HOSPADM

## 2022-12-08 RX ORDER — PROCHLORPERAZINE EDISYLATE 5 MG/ML
5 INJECTION INTRAMUSCULAR; INTRAVENOUS
Status: DISCONTINUED | OUTPATIENT
Start: 2022-12-08 | End: 2022-12-08 | Stop reason: HOSPADM

## 2022-12-08 RX ORDER — BUPIVACAINE HYDROCHLORIDE 5 MG/ML
INJECTION, SOLUTION EPIDURAL; INTRACAUDAL
Status: COMPLETED | OUTPATIENT
Start: 2022-12-08 | End: 2022-12-08

## 2022-12-08 RX ORDER — ONDANSETRON 2 MG/ML
INJECTION INTRAMUSCULAR; INTRAVENOUS PRN
Status: DISCONTINUED | OUTPATIENT
Start: 2022-12-08 | End: 2022-12-08 | Stop reason: SDUPTHER

## 2022-12-08 RX ORDER — SODIUM CHLORIDE 0.9 % (FLUSH) 0.9 %
5-40 SYRINGE (ML) INJECTION PRN
Status: DISCONTINUED | OUTPATIENT
Start: 2022-12-08 | End: 2022-12-08 | Stop reason: HOSPADM

## 2022-12-08 RX ORDER — IPRATROPIUM BROMIDE AND ALBUTEROL SULFATE 2.5; .5 MG/3ML; MG/3ML
1 SOLUTION RESPIRATORY (INHALATION)
Status: DISCONTINUED | OUTPATIENT
Start: 2022-12-08 | End: 2022-12-08 | Stop reason: HOSPADM

## 2022-12-08 RX ORDER — SODIUM CHLORIDE, SODIUM LACTATE, POTASSIUM CHLORIDE, CALCIUM CHLORIDE 600; 310; 30; 20 MG/100ML; MG/100ML; MG/100ML; MG/100ML
INJECTION, SOLUTION INTRAVENOUS CONTINUOUS
Status: DISCONTINUED | OUTPATIENT
Start: 2022-12-08 | End: 2022-12-08 | Stop reason: HOSPADM

## 2022-12-08 RX ORDER — MIDAZOLAM HYDROCHLORIDE 2 MG/2ML
2 INJECTION, SOLUTION INTRAMUSCULAR; INTRAVENOUS
Status: DISCONTINUED | OUTPATIENT
Start: 2022-12-08 | End: 2022-12-08 | Stop reason: HOSPADM

## 2022-12-08 RX ORDER — HALOPERIDOL 5 MG/ML
1 INJECTION INTRAMUSCULAR
Status: DISCONTINUED | OUTPATIENT
Start: 2022-12-08 | End: 2022-12-08 | Stop reason: HOSPADM

## 2022-12-08 RX ORDER — GLYCOPYRROLATE 0.2 MG/ML
INJECTION INTRAMUSCULAR; INTRAVENOUS PRN
Status: DISCONTINUED | OUTPATIENT
Start: 2022-12-08 | End: 2022-12-08 | Stop reason: SDUPTHER

## 2022-12-08 RX ORDER — DIPHENHYDRAMINE HYDROCHLORIDE 50 MG/ML
12.5 INJECTION INTRAMUSCULAR; INTRAVENOUS
Status: DISCONTINUED | OUTPATIENT
Start: 2022-12-08 | End: 2022-12-08 | Stop reason: HOSPADM

## 2022-12-08 RX ORDER — ONDANSETRON 4 MG/1
4 TABLET, FILM COATED ORAL EVERY 8 HOURS PRN
Qty: 20 TABLET | Refills: 3 | Status: SHIPPED | OUTPATIENT
Start: 2022-12-08

## 2022-12-08 RX ORDER — FENTANYL CITRATE 50 UG/ML
50 INJECTION, SOLUTION INTRAMUSCULAR; INTRAVENOUS EVERY 5 MIN PRN
Status: DISCONTINUED | OUTPATIENT
Start: 2022-12-08 | End: 2022-12-08 | Stop reason: HOSPADM

## 2022-12-08 RX ORDER — FENTANYL CITRATE 50 UG/ML
INJECTION, SOLUTION INTRAMUSCULAR; INTRAVENOUS PRN
Status: DISCONTINUED | OUTPATIENT
Start: 2022-12-08 | End: 2022-12-08 | Stop reason: SDUPTHER

## 2022-12-08 RX ORDER — METOPROLOL TARTRATE 5 MG/5ML
INJECTION INTRAVENOUS PRN
Status: DISCONTINUED | OUTPATIENT
Start: 2022-12-08 | End: 2022-12-08 | Stop reason: SDUPTHER

## 2022-12-08 RX ORDER — SUCCINYLCHOLINE CHLORIDE 20 MG/ML
INJECTION INTRAMUSCULAR; INTRAVENOUS PRN
Status: DISCONTINUED | OUTPATIENT
Start: 2022-12-08 | End: 2022-12-08 | Stop reason: SDUPTHER

## 2022-12-08 RX ORDER — SODIUM CHLORIDE 0.9 % (FLUSH) 0.9 %
5-40 SYRINGE (ML) INJECTION EVERY 12 HOURS SCHEDULED
Status: DISCONTINUED | OUTPATIENT
Start: 2022-12-08 | End: 2022-12-08 | Stop reason: HOSPADM

## 2022-12-08 RX ORDER — AMOXICILLIN 250 MG
2 CAPSULE ORAL DAILY
Qty: 28 TABLET | Refills: 3 | Status: SHIPPED | OUTPATIENT
Start: 2022-12-08 | End: 2022-12-22

## 2022-12-08 RX ORDER — HYDRALAZINE HYDROCHLORIDE 20 MG/ML
10 INJECTION INTRAMUSCULAR; INTRAVENOUS
Status: DISCONTINUED | OUTPATIENT
Start: 2022-12-08 | End: 2022-12-08 | Stop reason: HOSPADM

## 2022-12-08 RX ADMIN — ROCURONIUM BROMIDE 10 MG: 10 INJECTION INTRAVENOUS at 11:26

## 2022-12-08 RX ADMIN — OXYCODONE HYDROCHLORIDE 5 MG: 5 TABLET ORAL at 13:18

## 2022-12-08 RX ADMIN — DEXAMETHASONE SODIUM PHOSPHATE 4 MG: 4 INJECTION, SOLUTION INTRAMUSCULAR; INTRAVENOUS at 10:10

## 2022-12-08 RX ADMIN — ONDANSETRON 4 MG: 2 INJECTION INTRAMUSCULAR; INTRAVENOUS at 11:36

## 2022-12-08 RX ADMIN — SUCCINYLCHOLINE CHLORIDE 200 MG: 20 INJECTION, SOLUTION INTRAMUSCULAR; INTRAVENOUS at 09:53

## 2022-12-08 RX ADMIN — GLYCOPYRROLATE 0.2 MG: 1 INJECTION INTRAMUSCULAR; INTRAVENOUS at 10:26

## 2022-12-08 RX ADMIN — LABETALOL HYDROCHLORIDE 10 MG: 5 INJECTION, SOLUTION INTRAVENOUS at 12:08

## 2022-12-08 RX ADMIN — METOPROLOL TARTRATE 5 MG: 1 INJECTION, SOLUTION INTRAVENOUS at 10:43

## 2022-12-08 RX ADMIN — SODIUM CHLORIDE, POTASSIUM CHLORIDE, SODIUM LACTATE AND CALCIUM CHLORIDE: 600; 310; 30; 20 INJECTION, SOLUTION INTRAVENOUS at 08:06

## 2022-12-08 RX ADMIN — FENTANYL CITRATE 100 MCG: 50 INJECTION, SOLUTION INTRAMUSCULAR; INTRAVENOUS at 09:53

## 2022-12-08 RX ADMIN — FENTANYL CITRATE 50 MCG: 50 INJECTION, SOLUTION INTRAMUSCULAR; INTRAVENOUS at 12:10

## 2022-12-08 RX ADMIN — HYDROMORPHONE HYDROCHLORIDE 0.5 MG: 1 INJECTION, SOLUTION INTRAMUSCULAR; INTRAVENOUS; SUBCUTANEOUS at 10:20

## 2022-12-08 RX ADMIN — ROCURONIUM BROMIDE 10 MG: 10 INJECTION INTRAVENOUS at 09:53

## 2022-12-08 RX ADMIN — MIDAZOLAM 2 MG: 1 INJECTION INTRAMUSCULAR; INTRAVENOUS at 09:46

## 2022-12-08 RX ADMIN — PROPOFOL 80 MG: 10 INJECTION, EMULSION INTRAVENOUS at 09:53

## 2022-12-08 RX ADMIN — ROCURONIUM BROMIDE 10 MG: 10 INJECTION INTRAVENOUS at 10:26

## 2022-12-08 RX ADMIN — CEFAZOLIN 2000 MG: 2 INJECTION, POWDER, FOR SOLUTION INTRAMUSCULAR; INTRAVENOUS at 09:46

## 2022-12-08 RX ADMIN — SUGAMMADEX 200 MG: 100 INJECTION, SOLUTION INTRAVENOUS at 11:42

## 2022-12-08 RX ADMIN — LIDOCAINE HYDROCHLORIDE 100 MG: 20 INJECTION, SOLUTION INTRAVENOUS at 09:53

## 2022-12-08 RX ADMIN — ROCURONIUM BROMIDE 40 MG: 10 INJECTION INTRAVENOUS at 10:00

## 2022-12-08 RX ADMIN — ROCURONIUM BROMIDE 10 MG: 10 INJECTION INTRAVENOUS at 11:04

## 2022-12-08 ASSESSMENT — PAIN DESCRIPTION - PAIN TYPE
TYPE: SURGICAL PAIN

## 2022-12-08 ASSESSMENT — PAIN - FUNCTIONAL ASSESSMENT
PAIN_FUNCTIONAL_ASSESSMENT: PREVENTS OR INTERFERES SOME ACTIVE ACTIVITIES AND ADLS
PAIN_FUNCTIONAL_ASSESSMENT: ACTIVITIES ARE NOT PREVENTED
PAIN_FUNCTIONAL_ASSESSMENT: PREVENTS OR INTERFERES SOME ACTIVE ACTIVITIES AND ADLS
PAIN_FUNCTIONAL_ASSESSMENT: PREVENTS OR INTERFERES SOME ACTIVE ACTIVITIES AND ADLS
PAIN_FUNCTIONAL_ASSESSMENT: 0-10

## 2022-12-08 ASSESSMENT — PAIN DESCRIPTION - LOCATION
LOCATION: ABDOMEN

## 2022-12-08 ASSESSMENT — PAIN DESCRIPTION - ORIENTATION
ORIENTATION: MID
ORIENTATION: ANTERIOR
ORIENTATION: MID
ORIENTATION: MID

## 2022-12-08 ASSESSMENT — PAIN DESCRIPTION - FREQUENCY
FREQUENCY: INTERMITTENT
FREQUENCY: CONTINUOUS

## 2022-12-08 ASSESSMENT — PAIN DESCRIPTION - DESCRIPTORS
DESCRIPTORS: ACHING
DESCRIPTORS: ACHING;DISCOMFORT
DESCRIPTORS: ACHING
DESCRIPTORS: ACHING

## 2022-12-08 ASSESSMENT — PAIN SCALES - GENERAL
PAINLEVEL_OUTOF10: 10
PAINLEVEL_OUTOF10: 8
PAINLEVEL_OUTOF10: 0
PAINLEVEL_OUTOF10: 7
PAINLEVEL_OUTOF10: 3

## 2022-12-08 NOTE — OP NOTE
Patient ID:  Janice Dill  [id number]  60 y.o.  1960      Pre-Operative Diagnosis: Incarcerated ventral hernia    Post-Operative Diagnosis: Same    Procedure:  Robotic-assisted incarcerated ventral hernia repair with mesh (Ventralight ST 11.4 cm)    Surgeon: Petros Brennan MD, FACS    Assistant(s): Rose Alford CNP The skilled assistance of the CNP was necessary for the successful completion of this case. She was essential for the proper positioning, manipulation of instruments, proper exposure, manipulation of tissue, and wound closure. Findings:  Consistent with post-operative diagnosis    IV Fluids: 700 mL IV Crystalloid     Estimated Blood Loss:  Less than 30 mL           Drains, Lines, Tube: None         Complications:  None apparent            Disposition: PACU - hemodynamically stable. Indication:  The above mentioned patient was seen pre-operative in clinic with symptomatic complaints of ventral hernia. The patient desired operative repair. At that time, the procedure--including risks, benefits, and alternatives--were discussed in detail with the patient. The patient agreed to proceed. Operative Details: The patient was seen in the pre-operative area. Again, the risks, benefits, complications, treatment options, and expected outcomes were discussed with the patient. These complications were, but not limited to: the possibilities of reaction to medication, pulmonary aspiration, perforation of viscus, bleeding, recurrent infection, the need for additional procedures, and development of a complication requiring transfusion or further operation. The site of surgery was properly noted/marked. The patient was transported to the operating room and transferred onto the operating room table in the supine position. Multiple straps secured the patient to the table and all pressure points were well-padded. The left arm was tucked appropriately.     General anesthesia was induced without complications. The abdomen was prepped and draped in the standard sterile fashion. An ioban dressing was applied to the skin. An approved timeout was held with all members of the operating room team present and in agreement. Using a 5-mm zero degree laparoscope house in an optical trocar, the right upper quadrant was entered without incidence. Pneumoperitoneum was established at 15 mm Hg using CO2 insufflation. Additional 8-mm trocars were placed in the right lower quadrant and right lateral mid abdomen under direct vision. The initial 5-mm port was upsized to a right 12-mm port. The robot patient cart was docked on the left side of the patient. On general inspection of the abdomen, a hernia was identified at supraumbilical region with incarcerated omentum. Using robotic scissors and atraumatic graspers, adhesiolysis was performed. The adhesiolysis took additional 20 minutes. The hernia contents and sac were reduced. After reducing the pneumatic pressure, the fascial defect was primarily closed using intracorporal suturing in a tension free manor using STRATAFIX Symmetric PDS suture. After measuring the hernia repair defect, an 11.4 cm Ventralite was used to reinforce the hernia. The mesh was secured to the fascia with runing v-lock suture over the entire circumference of the mesh. There was minimal bleeding. After the repair, the omentum was draped over the bowel. The ports were removed under direct vision. The 12-mm camera port was closed using 0 vicryl under direct vision. The 8-mm port sites were approximated using 4-0 Vicryl in subcuticular fashion. Steri-Strips and a Band-Aid were applied at the port sites. At the end of the case, the patient was extubated and transported to the PACU in stable condition. At the end of the case, the instrument count, sponge count, and needle count were correct times two.     At the end of the case, Dr. Primitivo Jiménez personally informed the patient's family of the outcome of the procedure. Dr. Refugio Villalobos was present, scrubbed, and supervised the entire procedure.     Marcial Hanna MD

## 2022-12-08 NOTE — PROGRESS NOTES
Pt returned to room from pacu    Report received from eyad  Pt A&O, call light placed within reach, side rails up x's 2, pt given pepsi to drink and placed ice to incision  1315 pt continued to c/o pain at surgical site  1318 po pain med given to pt  1345 offered to assist pt up to side of bed, pt stated he is not ready, rates pain 10 at this time  1415 assisted pt up to side of bed, assisted pt to get dressed,   1420 AMB pt in room pt em well  1438 Discharge instructions given to Romero Plascencia, pt's wife over the phone, explained to wife who is wheelchair bound that pt is unable to lift until cleared by Dr Otto Hatch, all instructions given to wife, pt, and sister, all voiced understanding  1500 Pt escorted to main entrance via wheelchair for discharge with sister

## 2022-12-08 NOTE — DISCHARGE INSTRUCTIONS
Patient Discharge Instructions      Discharge Date:  12/8/2022    Discharged To: Home      RESUME ACTIVITY:      BATHING:   OK to shower but no bath tub or submerging incision under water    Wound:    Keep wound dry and clean. May shower as instructed above. Dressing:    Change outer dressing daily after shower or if soiled. DRIVING:   3-5 days. No driving until off narcotic pain medications and     walking comfortably    RETURN TO WORK: when cleared after your follow up office visit    WALKING:    As tolerated     STAIRS:    As tolerated    LIFTING:   Less than 10 pounds for one week    DIET:    Gerhardt Fendt diet on day of surgery then regular diet    SPECIAL INSTRUCTIONS:     Call the office at [phone]  if you have a fever greater than or equal to 101 oF or if your incision becomes red, tender, or has drainage of pus. If follow up appointment was not given to you, call the Surgical Clinic at [phone] for follow up appointment with Dr. Ele Jaimes in:  1-2 weeks. [facility]  [phone]    Do not drive, work around [address] [place] [address] or use equipment. Do not drink any alcoholic beverages. Do not smoke while alone. Avoid making important decisions. Plan to spend a quiet, relaxed evening @ home. Resume normal activities as you begin to feel better. Eat lightly for your first meal, then gradually increase your diet to what is normal for you. In case of nausea, avoid food and drink only clear liquids. Resume food as nausea ceases. Notify your surgeon if you experience fever, chills, large amount of bleeding, difficulty breathing, persistent nausea and vomiting or any other disturbing problem. Call for a follow-up appointment with your surgeon. Advance Care Planning  People with COVID-19 may have no symptoms, mild symptoms, such as fever, cough, and shortness of breath or they may have more severe illness, developing severe and fatal pneumonia.   As a result, Advance Care Planning with attention to naming a health care decision maker (someone you trust to make healthcare decisions for you if you could not speak for yourself) and sharing other health care preferences is important BEFORE a possible health crisis. Please contact your Primary Care Provider to discuss Advance Care Planning. Preventing the Spread of Coronavirus Disease 2019 in Homes and Residential Communities  For the most recent information go to [email or web address]    Prevention steps for People with confirmed or suspected COVID-19 (including persons under investigation) who do not need to be hospitalized  and   People with confirmed COVID-19 who were hospitalized and determined to be medically stable to go home    Your healthcare provider and public health staff will evaluate whether you can be cared for at home. If it is determined that you do not need to be hospitalized and can be isolated at home, you will be monitored by staff from your local or state health department. You should follow the prevention steps below until a healthcare provider or local or state health department says you can return to your normal activities. Stay home except to get medical care  People who are mildly ill with COVID-19 are able to isolate at home during their illness. You should restrict activities outside your home, except for getting medical care. Do not go to work, school, or public areas. Avoid using public transportation, ride-sharing, or taxis. Separate yourself from other people and animals in your home  People: As much as possible, you should stay in a specific room and away from other people in your home. Also, you should use a separate bathroom, if available. Animals: You should restrict contact with pets and other animals while you are sick with COVID-19, just like you would around other people.  Although there have not been reports of pets or other animals becoming sick with COVID-19, it is still recommended that people sick with COVID-19 limit contact with animals until more information is known about the virus. When possible, have another member of your household care for your animals while you are sick. If you are sick with COVID-19, avoid contact with your pet, including petting, snuggling, being kissed or licked, and sharing food. If you must care for your pet or be around animals while you are sick, wash your hands before and after you interact with pets and wear a facemask. Call ahead before visiting your doctor  If you have a medical appointment, call the healthcare provider and tell them that you have or may have COVID-19. This will help the healthcare providers office take steps to keep other people from getting infected or exposed. Wear a facemask  You should wear a facemask when you are around other people (e.g., sharing a room or vehicle) or pets and before you enter a healthcare providers office. If you are not able to wear a facemask (for example, because it causes trouble breathing), then people who live with you should not stay in the same room with you, or they should wear a facemask if they enter your room. Cover your coughs and sneezes  Cover your mouth and nose with a tissue when you cough or sneeze. Throw used tissues in a lined trash can. Immediately wash your hands with soap and water for at least 20 seconds or, if soap and water are not available, clean your hands with an alcohol-based hand  that contains at least 60% alcohol. Clean your hands often  Wash your hands often with soap and water for at least 20 seconds, especially after blowing your nose, coughing, or sneezing; going to the bathroom; and before eating or preparing food. If soap and water are not readily available, use an alcohol-based hand  with at least 60% alcohol, covering all surfaces of your hands and rubbing them together until they feel dry.   Soap and water are the best option if hands are visibly dirty. Avoid touching your eyes, nose, and mouth with unwashed hands. Avoid sharing personal household items  You should not share dishes, drinking glasses, cups, eating utensils, towels, or bedding with other people or pets in your home. After using these items, they should be washed thoroughly with soap and water. Clean all high-touch surfaces everyday  High touch surfaces include counters, tabletops, doorknobs, bathroom fixtures, toilets, phones, keyboards, tablets, and bedside tables. Also, clean any surfaces that may have blood, stool, or body fluids on them. Use a household cleaning spray or wipe, according to the label instructions. Labels contain instructions for safe and effective use of the cleaning product including precautions you should take when applying the product, such as wearing gloves and making sure you have good ventilation during use of the product. Monitor your symptoms  Seek prompt medical attention if your illness is worsening (e.g., difficulty breathing). Before seeking care, call your healthcare provider and tell them that you have, or are being evaluated for, COVID-19. Put on a facemask before you enter the facility. These steps will help the healthcare providers office to keep other people in the office or waiting room from getting infected or exposed. Ask your healthcare provider to call the local or state health department. Persons who are placed under active monitoring or facilitated self-monitoring should follow instructions provided by their local health department or occupational health professionals, as appropriate. When working with your local health department check their available hours. If you have a medical emergency and need to call 911, notify the dispatch personnel that you have, or are being evaluated for COVID-19. If possible, put on a facemask before emergency medical services arrive.   Discontinuing home isolation  Patients with confirmed COVID-19 should remain under home isolation precautions until the risk of secondary transmission to others is thought to be low. The decision to discontinue home isolation precautions should be made on a case-by-case basis, in consultation with healthcare providers and state and local health departments.

## 2022-12-08 NOTE — PROGRESS NOTES
1152 - transferred from OR on cart, monitor applied, alarms on and verified, bedside handoff provided by Georgina Cali and Kadeem Galvan CRNA  1210 - medicated for complaint of surgical pain/discomfort  1225 - tolerating ice chips  1235 - phase one care complete; transferred to [facility] 14

## 2022-12-08 NOTE — ANESTHESIA POSTPROCEDURE EVALUATION
Department of Anesthesiology  Postprocedure Note    Patient: Isabel Tolentino  MRN: [id number]  YOB: 1960  Date of evaluation: 12/8/2022      Procedure Summary     Date: 12/08/22 Room / Location: [address]    Anesthesia Start: 5236 Anesthesia Stop: 4780    Procedure:  HERNIA VENTRAL REPAIR LAPAROSCOPIC ROBOTIC (Abdomen) Diagnosis:       Ventral hernia without obstruction or gangrene      (Ventral hernia without obstruction or gangrene [K43.9])    Surgeons: Ari Robbins II, MD Responsible Provider: Kailee Thomas DO    Anesthesia Type: General ASA Status: 3          Anesthesia Type: General    Suri Phase I:      Suri Phase II:        Anesthesia Post Evaluation    Patient location during evaluation: PACU  Patient participation: waiting for patient participation  Level of consciousness: sleepy but conscious  Pain score: 0  Airway patency: patent  Nausea & Vomiting: no nausea and no vomiting  Complications: no  Cardiovascular status: hemodynamically stable  Respiratory status: airway suctioned, acceptable, nonlabored ventilation, spontaneous ventilation and face mask  Hydration status: euvolemic  Multimodal analgesia pain management approach

## 2022-12-08 NOTE — H&P
History and Physical Update    Original H&P done in office on 11/22/22 (less than 30 days ago). Pt reports the following changes in health since being seen last:    None    Vitals:    12/08/22 0741   BP: (!) 174/95   Pulse: 62   Resp: 16   Temp: 96.8 °F (36 °C)   SpO2: 98%       Alert and oriented x 3, no apparent distress at rest  Atraumatic, normocephalic. EOMI. Breathing unlabored. RRR. Soft, non-tender, non-distended. Moves all extremities. Warm, dry. 61 y/o M with ventral hernia    -Consent obtained in office. -Abx ordered in office.  -Reviewed expected pre-operative, operative, and post-operative courses. -Answered questions to patient's satisfaction.   -Reviewed risks, benefits, alternative to procedure.   -Proceed as scheduled. -The patient was counseled at length about the risks of charito Covid-19 during their perioperative period and any recovery window from their procedure. The patient was made aware that charito Covid-19  may worsen their prognosis for recovering from their procedure  and lend to a higher morbidity and/or mortality risk. All material risks, benefits, and reasonable alternatives including postponing the procedure were discussed. The patient does wish to proceed with the procedure at this time.         Sonu Holguin MD

## 2022-12-19 DIAGNOSIS — J41.0 SIMPLE CHRONIC BRONCHITIS (HCC): ICD-10-CM

## 2022-12-21 RX ORDER — TIOTROPIUM BROMIDE 18 UG/1
CAPSULE ORAL; RESPIRATORY (INHALATION)
Qty: 30 CAPSULE | Refills: 0 | OUTPATIENT
Start: 2022-12-21

## 2023-01-16 ENCOUNTER — OFFICE VISIT (OUTPATIENT)
Dept: CARDIOLOGY CLINIC | Age: 63
End: 2023-01-16
Payer: MEDICARE

## 2023-01-16 VITALS
HEART RATE: 64 BPM | WEIGHT: 201 LBS | BODY MASS INDEX: 31.55 KG/M2 | DIASTOLIC BLOOD PRESSURE: 100 MMHG | SYSTOLIC BLOOD PRESSURE: 164 MMHG | HEIGHT: 67 IN

## 2023-01-16 DIAGNOSIS — I50.20 HFREF (HEART FAILURE WITH REDUCED EJECTION FRACTION) (HCC): ICD-10-CM

## 2023-01-16 DIAGNOSIS — I25.10 ASCVD (ARTERIOSCLEROTIC CARDIOVASCULAR DISEASE): ICD-10-CM

## 2023-01-16 DIAGNOSIS — E78.5 HYPERLIPIDEMIA LDL GOAL <70: ICD-10-CM

## 2023-01-16 DIAGNOSIS — I10 ESSENTIAL HYPERTENSION: ICD-10-CM

## 2023-01-16 PROCEDURE — 3077F SYST BP >= 140 MM HG: CPT | Performed by: NURSE PRACTITIONER

## 2023-01-16 PROCEDURE — 99214 OFFICE O/P EST MOD 30 MIN: CPT | Performed by: NURSE PRACTITIONER

## 2023-01-16 PROCEDURE — 3080F DIAST BP >= 90 MM HG: CPT | Performed by: NURSE PRACTITIONER

## 2023-01-16 RX ORDER — EZETIMIBE 10 MG/1
10 TABLET ORAL DAILY
Qty: 90 TABLET | Refills: 1 | Status: SHIPPED | OUTPATIENT
Start: 2023-01-16

## 2023-01-16 ASSESSMENT — ENCOUNTER SYMPTOMS
ORTHOPNEA: 0
SHORTNESS OF BREATH: 0

## 2023-01-16 NOTE — PROGRESS NOTES
1/16/2023  Primary cardiologist: Dr. Hesham White:   Nico Still  is an established 60 y.o.  male here for a 2 week follow up on CHF      SUBJECTIVE/OBJECTIVE:  Nico Still is a 60 y.o. male with a history of HFrEF, STEMI, coronary artery disease s/p CABG hypertension, hyperlipidemia, COPD, obstructive sleep apnea and diabetes mellitus type 2. HPI :   Nico Still reports he is feeling. He denies chest pain or shortness of breath. There have been no episodes of denies dizziness or near syncope. Review of Systems   Constitutional: Negative for diaphoresis and malaise/fatigue. Cardiovascular:  Negative for chest pain, claudication, dyspnea on exertion, irregular heartbeat, leg swelling, near-syncope, orthopnea, palpitations and paroxysmal nocturnal dyspnea. Respiratory:  Negative for shortness of breath. Neurological:  Negative for dizziness and light-headedness. All other systems reviewed and are negative. Vitals:    01/16/23 0937 01/16/23 0944   BP: (!) 164/100 (!) 164/100   Site: Left Upper Arm    Position: Sitting    Cuff Size: Large Adult    Pulse: 64    Weight: 201 lb (91.2 kg)    Height: 5' 7\" (1.702 m)      Patient-Reported Vitals 2/25/2021   Patient-Reported Weight -   Patient-Reported Height -   Patient-Reported Systolic 746   Patient-Reported Diastolic 66   Patient-Reported Pulse 69     Wt Readings from Last 3 Encounters:   01/16/23 201 lb (91.2 kg)   12/08/22 200 lb (90.7 kg)   11/22/22 196 lb 8 oz (89.1 kg)     Body mass index is 31.48 kg/m². Physical Exam  Vitals reviewed. HENT:      Head: Normocephalic. Eyes:      Extraocular Movements: Extraocular movements intact. Pupils: Pupils are equal, round, and reactive to light. Neck:      Vascular: No carotid bruit. Cardiovascular:      Rate and Rhythm: Normal rate and regular rhythm. Pulses: Normal pulses. Pulmonary:      Effort: Pulmonary effort is normal. No respiratory distress. Breath sounds: No wheezing.    Abdominal: General: There is no distension. Palpations: Abdomen is soft. Tenderness: There is no abdominal tenderness. Musculoskeletal:         General: Normal range of motion. Cervical back: No tenderness. Right lower leg: No edema. Left lower leg: No edema. Skin:     General: Skin is warm and dry. Capillary Refill: Capillary refill takes less than 2 seconds. Neurological:      Mental Status: He is alert and oriented to person, place, and time.    Psychiatric:         Mood and Affect: Mood normal.         Behavior: Behavior normal.              Current Outpatient Medications   Medication Sig Dispense Refill    ondansetron (ZOFRAN) 4 MG tablet Take 1 tablet by mouth every 8 hours as needed for Nausea or Vomiting 20 tablet 3    lisinopril (PRINIVIL;ZESTRIL) 10 MG tablet Take 1 tablet by mouth daily 30 tablet 5    tiotropium (SPIRIVA HANDIHALER) 18 MCG inhalation capsule Inhale 1 capsule into the lungs daily for 30 doses 30 capsule 0    albuterol sulfate HFA (VENTOLIN HFA) 108 (90 Base) MCG/ACT inhaler Inhale 2 puffs into the lungs every 4 hours as needed for Wheezing 18 g 1    NONFORMULARY Albuterol- Ipratropium Inhaler       carvedilol (COREG) 6.25 MG tablet Take 1 tablet by mouth 2 times daily 180 tablet 3    clopidogrel (PLAVIX) 75 MG tablet Take 1 tablet by mouth daily 90 tablet 3    furosemide (LASIX) 40 MG tablet Take 1.5 tablets by mouth 2 times daily 270 tablet 3    sertraline (ZOLOFT) 100 MG tablet Take 1 tablet by mouth daily 90 tablet 1    aspirin 81 MG chewable tablet Take 1 tablet by mouth daily 180 tablet 1    glimepiride (AMARYL) 2 MG tablet Take 1 tablet by mouth every morning 90 tablet 1    atorvastatin (LIPITOR) 80 MG tablet Take 1 tablet by mouth daily 90 tablet 1    Alcohol Swabs (ALCOHOL PADS) 70 % PADS 1 Units by Does not apply route 2 times daily 180 each 3    Blood Glucose Monitoring Suppl (BLOOD GLUCOSE MONITOR SYSTEM) w/Device KIT Use twice per day 1 kit 0 tamsulosin (FLOMAX) 0.4 MG capsule Take 1 capsule by mouth daily 30 capsule 0    pantoprazole (PROTONIX) 40 MG tablet Take 1 tablet by mouth daily 30 tablet 0     No current facility-administered medications for this visit. All pertinent data reviewed and discussed with patient       ASSESSMENT/PLAN:    HFrEF  NYHA class II  EF 40-45% : previous EF 35-40%  He appears to be euvolemic. Denies shortness of breath is resolved. Continue with Lasix at 60 mg twice daily. Continue with lisinopril and carvedilol      Bradycardia  resolved    Coronary artery disease  Denies chest pain or shortness of breath. Stable   Continue ASA - Plavix and atorvastatin     Hypertension  Blood pressure elevated today. States took his medications prior to walking into the the office. Will return to clinic in 2 weeks for blood pressure check and if it continues to be elevated will adjust medications. Advised a low-sodium diet    Hyperlipidemia  Lipids noted from July 2022  HDL low 32,  elevated  Not at goal - add Zetia 10 mg daily   Continue with atorvastatin 10 mg     Tests ordered:  none  Follow-up  3 mo     Signed:  JERED York CNP, 1/16/2023, 9:59 AM    An electronic signature was used to authenticate this note. Please note this report has been partially produced using speech recognition software and may contain errors related to that system including errors in grammar, punctuation, and spelling, as well as words and phrases that may be inappropriate. If there are any questions or concerns please feel free to contact the dictating provider for clarification.

## 2023-01-31 ENCOUNTER — NURSE ONLY (OUTPATIENT)
Dept: CARDIOLOGY CLINIC | Age: 63
End: 2023-01-31

## 2023-01-31 VITALS — SYSTOLIC BLOOD PRESSURE: 130 MMHG | DIASTOLIC BLOOD PRESSURE: 82 MMHG

## 2023-01-31 DIAGNOSIS — I25.10 ASCVD (ARTERIOSCLEROTIC CARDIOVASCULAR DISEASE): Primary | ICD-10-CM

## 2023-01-31 PROBLEM — I21.4 NON-ST ELEVATION MYOCARDIAL INFARCTION (NSTEMI) (HCC): Status: RESOLVED | Noted: 2018-03-15 | Resolved: 2023-01-31

## 2023-01-31 RX ORDER — CARVEDILOL 12.5 MG/1
12.5 TABLET ORAL 2 TIMES DAILY
Qty: 180 TABLET | Refills: 3 | Status: SHIPPED | OUTPATIENT
Start: 2023-01-31

## 2023-01-31 NOTE — PROGRESS NOTES
Kwadwo Sarmiento is here for a 2 week BP check  Last visit bp was elevated 164/100      BP Readings from Last 3 Encounters:   01/31/23 130/82   01/16/23 (!) 164/100   12/08/22 (!) 175/88     Pulse Readings from Last 3 Encounters:   01/16/23 64   12/08/22 73   11/22/22 81     Wt Readings from Last 3 Encounters:   01/16/23 201 lb (91.2 kg)   12/08/22 200 lb (90.7 kg)   11/22/22 196 lb 8 oz (89.1 kg)           Assessment and Plan:   Kwadwo Sarmiento is to increase coreg to 12.5 mg bid

## 2023-02-07 ENCOUNTER — OFFICE VISIT (OUTPATIENT)
Dept: BARIATRICS/WEIGHT MGMT | Age: 63
End: 2023-02-07

## 2023-02-07 VITALS
WEIGHT: 200.3 LBS | SYSTOLIC BLOOD PRESSURE: 120 MMHG | DIASTOLIC BLOOD PRESSURE: 86 MMHG | OXYGEN SATURATION: 100 % | HEIGHT: 67 IN | HEART RATE: 64 BPM | BODY MASS INDEX: 31.44 KG/M2

## 2023-02-07 DIAGNOSIS — Z98.890 S/P HERNIA REPAIR: Primary | ICD-10-CM

## 2023-02-07 DIAGNOSIS — Z87.19 S/P HERNIA REPAIR: Primary | ICD-10-CM

## 2023-02-07 PROCEDURE — 99024 POSTOP FOLLOW-UP VISIT: CPT | Performed by: SURGERY

## 2023-02-07 ASSESSMENT — PATIENT HEALTH QUESTIONNAIRE - PHQ9
SUM OF ALL RESPONSES TO PHQ QUESTIONS 1-9: 0
SUM OF ALL RESPONSES TO PHQ9 QUESTIONS 1 & 2: 0
SUM OF ALL RESPONSES TO PHQ QUESTIONS 1-9: 0
1. LITTLE INTEREST OR PLEASURE IN DOING THINGS: 0
2. FEELING DOWN, DEPRESSED OR HOPELESS: 0

## 2023-02-07 NOTE — PROGRESS NOTES
Post-Operative Clinic Note    Chief Complaint   Patient presents with    Follow-up     1st fu [facility] w mesh @[facility] 12/08/22         SUBJECTIVE:  Patient is here today for a post-operative visit. Patient is s/p robotic-assisted incarcerated ventral hernia repair on 12/8/22. No complaints. No issues. Doing well. Tolerating PO. +BM. No pain. Past Surgical History:   Procedure Laterality Date    CARDIAC CATHETERIZATION  7/13 7/13-EF=55%. PATENT LAD AND RCA STENTS, OM2 50% stenosis, & Normal wall motion. COLONOSCOPY  8/21/14    colon polyp, diverticulosis, internal hemorrhoids    CORONARY ANGIOPLASTY  6/03 6/14/03- PTCA w stents x2 mid LAD, 2/03-PTCA w stents x3, RCA    CORONARY ARTERY BYPASS GRAFT N/A 5/18/2020    CABG CORONARY ARTERY BYPASS x 2, INTRAOPERATIVE NGA, INDUCED HYPOTHERMIA, LEFT LEG ENDOSCOPIC VEIN HARVEST performed by Esthela Chen MD at [address], COLON, DIAGNOSTIC  8/21/14    irregular GE junction, gastritis,hiatal hernia, gastric polyps    HAND SURGERY      rt knuckle    HERNIA REPAIR N/A 12/8/2022    HERNIA VENTRAL REPAIR LAPAROSCOPIC ROBOTIC performed by Michael Olmos MD at [address] [facility]  03/2018    LAD    SHOULDER SURGERY Right 2011    bone spurs     Past Medical History:   Diagnosis Date    Acute cerebrovascular accident (CVA) ([address] 05/22/2020    Acute ST elevation myocardial infarction (STEMI) ([address] 03/14/2018    B12 deficiency     CAD (coronary artery disease)     COPD (chronic obstructive pulmonary disease) ([address] 12/11/2020    GERD (gastroesophageal reflux disease)     Guillain Barré syndrome ([address]     H/O echocardiogram 7/30/13, 6/16/10    7/13-EF 50-55% WNL.  6/10-EF53%, mod LVH, MARCI, RVE, trace PI, mild MR/TR, 6/09    Heart murmur     History of echocardiogram 10/07/2020    EF 40%, No changed since previous 5/2020, Mid inferior hypokinesis noted, Grade I DD, Mod MR, Mild TR, Dilatation of the aortic root, No pericardial effusion     History of myocardial infarction 2003    History of nuclear MUGA test 2020    EF 53%, Normal, No ICD needed    History of nuclear stress test 2020    EF 30%, study suggestive of abnormal myocardial perfusion. History of PTCA ;-PTCA w/stents x2, -PTCA w/stent x3    History of PTCA 03/15/2018    LAD EF25%    Hx of cardiovascular stress test 2018    EF 28% Nuclear scintigraphy demonstartes inferior wall infarct. Hx of cardiovascular stress test 2020    Abnormal Study. Large area of inferior & apical-lateral infarct. No significant reversible ischemia. Severly reduced LVEF 23%    HX OTHER MEDICAL 2018    lifevest  d/c 2018    Hyperlipidemia     Hypertension     Impaired glucose tolerance 2016    MUGA 2018    EF 47%    NSTEMI (non-ST elevated myocardial infarction) ([facility] [address] 03/15/2018    Patient in clinical research study 2018    3 year study dalcitripib vs placebo [phone]    Pneumonia due to organism     Renal cyst 2014    S/P cardiac catheterization 2013-EF=55%. PATENT LAD AND RCA STENTS, OM2 50% stenosis, & Normal wall motion.     Stroke (cerebrum) [facility])     May 2016     Family History   Problem Relation Age of Onset    Hypertension Mother     High Cholesterol Mother     Alcohol Abuse Mother     Depression Mother     Alcohol Abuse Father     Stomach Cancer Father     Depression Sister     Diabetes Brother     Depression Sister     Stomach Cancer Brother     Alcohol Abuse Brother      Social History     Socioeconomic History    Marital status:      Spouse name: Not on file    Number of children: Not on file    Years of education: Not on file    Highest education level: Not on file   Occupational History    Occupation:      Comment: full time   Tobacco Use    Smoking status: Former     Packs/day: 2.00     Years: 45.00     Pack years: 90.00     Types: Cigarettes     Quit date: 2016     Years since quittin.7 Smokeless tobacco: Never   Vaping Use    Vaping Use: Never used   Substance and Sexual Activity    Alcohol use: No     Alcohol/week: 0.0 standard drinks    Drug use: No    Sexual activity: Yes     Partners: Female   Other Topics Concern    Not on file   Social History Narrative    Wears glasses     Social Determinants of Health     Financial Resource Strain: Low Risk     Difficulty of Paying Living Expenses: Not very hard   Food Insecurity: No Food Insecurity    Worried About Running Out of Food in the Last Year: Never true    Ran Out of Food in the Last Year: Never true   Transportation Needs: Not on file   Physical Activity: Not on file   Stress: Not on file   Social Connections: Not on file   Intimate Partner Violence: Not on file   Housing Stability: Not on file       OBJECTIVE:  Physical Exam  A&Ox3, NAD at rest.  AT. NC. Breathing unlabored. Soft, NT, ND, no PS, incisions healing appropriately. TAI, Warm, dry. Pathology report reveals:   N/    ASSESSMENT:      1. S/P hernia repair        PLAN:  1. Reviewed pathology report - n/a  2. Diet as tolerated, regular  3. Activity - full  4. Follow up PRN  5. Call with any questions, concerns, or issues whatsoever. No orders of the defined types were placed in this encounter. No orders of the defined types were placed in this encounter. Follow Up: No follow-ups on file.     Anya Berger MD

## 2023-03-01 ENCOUNTER — APPOINTMENT (OUTPATIENT)
Dept: GENERAL RADIOLOGY | Age: 63
DRG: 291 | End: 2023-03-01
Payer: MEDICARE

## 2023-03-01 ENCOUNTER — HOSPITAL ENCOUNTER (INPATIENT)
Age: 63
LOS: 2 days | Discharge: HOME | DRG: 291 | End: 2023-03-05
Attending: STUDENT IN AN ORGANIZED HEALTH CARE EDUCATION/TRAINING PROGRAM | Admitting: STUDENT IN AN ORGANIZED HEALTH CARE EDUCATION/TRAINING PROGRAM
Payer: MEDICARE

## 2023-03-01 DIAGNOSIS — R07.9 CHEST PAIN, UNSPECIFIED TYPE: Primary | ICD-10-CM

## 2023-03-01 DIAGNOSIS — I50.23 ACUTE ON CHRONIC SYSTOLIC CONGESTIVE HEART FAILURE (HCC): ICD-10-CM

## 2023-03-01 DIAGNOSIS — N17.9 AKI (ACUTE KIDNEY INJURY) (HCC): ICD-10-CM

## 2023-03-01 DIAGNOSIS — N18.31 STAGE 3A CHRONIC KIDNEY DISEASE (HCC): ICD-10-CM

## 2023-03-01 PROBLEM — R06.02 SHORTNESS OF BREATH: Status: ACTIVE | Noted: 2023-03-01

## 2023-03-01 PROBLEM — R06.02 SOB (SHORTNESS OF BREATH): Status: ACTIVE | Noted: 2023-03-01

## 2023-03-01 LAB
ALBUMIN SERPL-MCNC: 4.3 GM/DL (ref 3.4–5)
ALP BLD-CCNC: 71 IU/L (ref 40–129)
ALT SERPL-CCNC: 12 U/L (ref 10–40)
ANION GAP SERPL CALCULATED.3IONS-SCNC: 10 MMOL/L (ref 4–16)
AST SERPL-CCNC: 15 IU/L (ref 15–37)
BASOPHILS ABSOLUTE: 0.1 K/CU MM
BASOPHILS RELATIVE PERCENT: 0.8 % (ref 0–1)
BILIRUB SERPL-MCNC: 0.4 MG/DL (ref 0–1)
BUN SERPL-MCNC: 19 MG/DL (ref 6–23)
CALCIUM SERPL-MCNC: 8.5 MG/DL (ref 8.3–10.6)
CHLORIDE BLD-SCNC: 104 MMOL/L (ref 99–110)
CO2: 24 MMOL/L (ref 21–32)
CREAT SERPL-MCNC: 1.7 MG/DL (ref 0.9–1.3)
DIFFERENTIAL TYPE: ABNORMAL
EKG ATRIAL RATE: 74 BPM
EKG DIAGNOSIS: NORMAL
EKG P AXIS: 46 DEGREES
EKG P-R INTERVAL: 158 MS
EKG Q-T INTERVAL: 426 MS
EKG QRS DURATION: 96 MS
EKG QTC CALCULATION (BAZETT): 472 MS
EKG R AXIS: -18 DEGREES
EKG T AXIS: 137 DEGREES
EKG VENTRICULAR RATE: 74 BPM
EOSINOPHILS ABSOLUTE: 0.2 K/CU MM
EOSINOPHILS RELATIVE PERCENT: 3.3 % (ref 0–3)
ESTIMATED AVERAGE GLUCOSE: 143 MG/DL
GFR SERPL CREATININE-BSD FRML MDRD: 45 ML/MIN/1.73M2
GLUCOSE BLD-MCNC: 118 MG/DL (ref 70–99)
GLUCOSE BLD-MCNC: 194 MG/DL (ref 70–99)
GLUCOSE BLD-MCNC: 206 MG/DL (ref 70–99)
GLUCOSE SERPL-MCNC: 141 MG/DL (ref 70–99)
HBA1C MFR BLD: 6.6 % (ref 4.2–6.3)
HCT VFR BLD CALC: 44.2 % (ref 42–52)
HEMOGLOBIN: 14.4 GM/DL (ref 13.5–18)
IMMATURE NEUTROPHIL %: 0.3 % (ref 0–0.43)
LIPASE: 31 IU/L (ref 13–60)
LYMPHOCYTES ABSOLUTE: 2.1 K/CU MM
LYMPHOCYTES RELATIVE PERCENT: 33.5 % (ref 24–44)
MAGNESIUM: 2.3 MG/DL (ref 1.8–2.4)
MCH RBC QN AUTO: 31.5 PG (ref 27–31)
MCHC RBC AUTO-ENTMCNC: 32.6 % (ref 32–36)
MCV RBC AUTO: 96.7 FL (ref 78–100)
MONOCYTES ABSOLUTE: 0.5 K/CU MM
MONOCYTES RELATIVE PERCENT: 8 % (ref 0–4)
NUCLEATED RBC %: 0 %
PDW BLD-RTO: 13.6 % (ref 11.7–14.9)
PLATELET # BLD: 170 K/CU MM (ref 140–440)
PMV BLD AUTO: 11.8 FL (ref 7.5–11.1)
POTASSIUM SERPL-SCNC: 3.9 MMOL/L (ref 3.5–5.1)
PRO-BNP: 3943 PG/ML
PROCALCITONIN SERPL-MCNC: 0.07 NG/ML
RBC # BLD: 4.57 M/CU MM (ref 4.6–6.2)
SEGMENTED NEUTROPHILS ABSOLUTE COUNT: 3.3 K/CU MM
SEGMENTED NEUTROPHILS RELATIVE PERCENT: 54.1 % (ref 36–66)
SODIUM BLD-SCNC: 138 MMOL/L (ref 135–145)
TOTAL IMMATURE NEUTOROPHIL: 0.02 K/CU MM
TOTAL NUCLEATED RBC: 0 K/CU MM
TOTAL PROTEIN: 6.7 GM/DL (ref 6.4–8.2)
TROPONIN T: 0.01 NG/ML
TROPONIN T: 0.01 NG/ML
WBC # BLD: 6.2 K/CU MM (ref 4–10.5)

## 2023-03-01 PROCEDURE — 83036 HEMOGLOBIN GLYCOSYLATED A1C: CPT

## 2023-03-01 PROCEDURE — 85025 COMPLETE CBC W/AUTO DIFF WBC: CPT

## 2023-03-01 PROCEDURE — 84145 PROCALCITONIN (PCT): CPT

## 2023-03-01 PROCEDURE — 93005 ELECTROCARDIOGRAM TRACING: CPT | Performed by: NURSE PRACTITIONER

## 2023-03-01 PROCEDURE — 36415 COLL VENOUS BLD VENIPUNCTURE: CPT

## 2023-03-01 PROCEDURE — 96374 THER/PROPH/DIAG INJ IV PUSH: CPT

## 2023-03-01 PROCEDURE — G0378 HOSPITAL OBSERVATION PER HR: HCPCS

## 2023-03-01 PROCEDURE — 6360000002 HC RX W HCPCS: Performed by: STUDENT IN AN ORGANIZED HEALTH CARE EDUCATION/TRAINING PROGRAM

## 2023-03-01 PROCEDURE — 6370000000 HC RX 637 (ALT 250 FOR IP): Performed by: STUDENT IN AN ORGANIZED HEALTH CARE EDUCATION/TRAINING PROGRAM

## 2023-03-01 PROCEDURE — 6360000002 HC RX W HCPCS: Performed by: INTERNAL MEDICINE

## 2023-03-01 PROCEDURE — 2580000003 HC RX 258: Performed by: STUDENT IN AN ORGANIZED HEALTH CARE EDUCATION/TRAINING PROGRAM

## 2023-03-01 PROCEDURE — 83735 ASSAY OF MAGNESIUM: CPT

## 2023-03-01 PROCEDURE — 96372 THER/PROPH/DIAG INJ SC/IM: CPT

## 2023-03-01 PROCEDURE — 93010 ELECTROCARDIOGRAM REPORT: CPT | Performed by: INTERNAL MEDICINE

## 2023-03-01 PROCEDURE — 99221 1ST HOSP IP/OBS SF/LOW 40: CPT | Performed by: INTERNAL MEDICINE

## 2023-03-01 PROCEDURE — 96376 TX/PRO/DX INJ SAME DRUG ADON: CPT

## 2023-03-01 PROCEDURE — 84484 ASSAY OF TROPONIN QUANT: CPT

## 2023-03-01 PROCEDURE — 80053 COMPREHEN METABOLIC PANEL: CPT

## 2023-03-01 PROCEDURE — 71045 X-RAY EXAM CHEST 1 VIEW: CPT

## 2023-03-01 PROCEDURE — 82962 GLUCOSE BLOOD TEST: CPT

## 2023-03-01 PROCEDURE — 94640 AIRWAY INHALATION TREATMENT: CPT

## 2023-03-01 PROCEDURE — 99285 EMERGENCY DEPT VISIT HI MDM: CPT

## 2023-03-01 PROCEDURE — 6370000000 HC RX 637 (ALT 250 FOR IP): Performed by: NURSE PRACTITIONER

## 2023-03-01 PROCEDURE — 83880 ASSAY OF NATRIURETIC PEPTIDE: CPT

## 2023-03-01 PROCEDURE — 83690 ASSAY OF LIPASE: CPT

## 2023-03-01 RX ORDER — DEXTROSE MONOHYDRATE 100 MG/ML
INJECTION, SOLUTION INTRAVENOUS CONTINUOUS PRN
Status: DISCONTINUED | OUTPATIENT
Start: 2023-03-01 | End: 2023-03-05 | Stop reason: HOSPADM

## 2023-03-01 RX ORDER — CARVEDILOL 6.25 MG/1
12.5 TABLET ORAL ONCE
Status: COMPLETED | OUTPATIENT
Start: 2023-03-01 | End: 2023-03-01

## 2023-03-01 RX ORDER — LISINOPRIL 5 MG/1
10 TABLET ORAL ONCE
Status: COMPLETED | OUTPATIENT
Start: 2023-03-01 | End: 2023-03-01

## 2023-03-01 RX ORDER — FUROSEMIDE 40 MG/1
20 TABLET ORAL NIGHTLY
Status: ON HOLD | COMMUNITY
End: 2023-03-05 | Stop reason: HOSPADM

## 2023-03-01 RX ORDER — INSULIN LISPRO 100 [IU]/ML
0-4 INJECTION, SOLUTION INTRAVENOUS; SUBCUTANEOUS NIGHTLY
Status: DISCONTINUED | OUTPATIENT
Start: 2023-03-01 | End: 2023-03-05 | Stop reason: HOSPADM

## 2023-03-01 RX ORDER — SODIUM CHLORIDE 0.9 % (FLUSH) 0.9 %
5-40 SYRINGE (ML) INJECTION PRN
Status: DISCONTINUED | OUTPATIENT
Start: 2023-03-01 | End: 2023-03-05 | Stop reason: HOSPADM

## 2023-03-01 RX ORDER — TAMSULOSIN HYDROCHLORIDE 0.4 MG/1
0.4 CAPSULE ORAL DAILY
Status: DISCONTINUED | OUTPATIENT
Start: 2023-03-01 | End: 2023-03-05 | Stop reason: HOSPADM

## 2023-03-01 RX ORDER — ACETAMINOPHEN 325 MG/1
650 TABLET ORAL EVERY 6 HOURS PRN
Status: DISCONTINUED | OUTPATIENT
Start: 2023-03-01 | End: 2023-03-05 | Stop reason: HOSPADM

## 2023-03-01 RX ORDER — ENOXAPARIN SODIUM 100 MG/ML
40 INJECTION SUBCUTANEOUS DAILY
Status: DISCONTINUED | OUTPATIENT
Start: 2023-03-01 | End: 2023-03-03

## 2023-03-01 RX ORDER — ONDANSETRON 2 MG/ML
4 INJECTION INTRAMUSCULAR; INTRAVENOUS EVERY 6 HOURS PRN
Status: DISCONTINUED | OUTPATIENT
Start: 2023-03-01 | End: 2023-03-05 | Stop reason: HOSPADM

## 2023-03-01 RX ORDER — FUROSEMIDE 10 MG/ML
60 INJECTION INTRAMUSCULAR; INTRAVENOUS 3 TIMES DAILY
Status: DISCONTINUED | OUTPATIENT
Start: 2023-03-01 | End: 2023-03-05 | Stop reason: HOSPADM

## 2023-03-01 RX ORDER — ASPIRIN 81 MG/1
324 TABLET, CHEWABLE ORAL ONCE
Status: COMPLETED | OUTPATIENT
Start: 2023-03-01 | End: 2023-03-01

## 2023-03-01 RX ORDER — POLYETHYLENE GLYCOL 3350 17 G/17G
17 POWDER, FOR SOLUTION ORAL DAILY PRN
Status: DISCONTINUED | OUTPATIENT
Start: 2023-03-01 | End: 2023-03-05 | Stop reason: HOSPADM

## 2023-03-01 RX ORDER — ONDANSETRON 4 MG/1
4 TABLET, ORALLY DISINTEGRATING ORAL EVERY 8 HOURS PRN
Status: DISCONTINUED | OUTPATIENT
Start: 2023-03-01 | End: 2023-03-05 | Stop reason: HOSPADM

## 2023-03-01 RX ORDER — FUROSEMIDE 10 MG/ML
60 INJECTION INTRAMUSCULAR; INTRAVENOUS ONCE
Status: COMPLETED | OUTPATIENT
Start: 2023-03-01 | End: 2023-03-01

## 2023-03-01 RX ORDER — INSULIN LISPRO 100 [IU]/ML
0-4 INJECTION, SOLUTION INTRAVENOUS; SUBCUTANEOUS
Status: DISCONTINUED | OUTPATIENT
Start: 2023-03-01 | End: 2023-03-05 | Stop reason: HOSPADM

## 2023-03-01 RX ORDER — ACETAMINOPHEN 650 MG/1
650 SUPPOSITORY RECTAL EVERY 6 HOURS PRN
Status: DISCONTINUED | OUTPATIENT
Start: 2023-03-01 | End: 2023-03-05 | Stop reason: HOSPADM

## 2023-03-01 RX ORDER — ASPIRIN 81 MG/1
81 TABLET, CHEWABLE ORAL DAILY
Status: DISCONTINUED | OUTPATIENT
Start: 2023-03-01 | End: 2023-03-05 | Stop reason: HOSPADM

## 2023-03-01 RX ORDER — LISINOPRIL 20 MG/1
40 TABLET ORAL DAILY
Status: DISCONTINUED | OUTPATIENT
Start: 2023-03-02 | End: 2023-03-05 | Stop reason: HOSPADM

## 2023-03-01 RX ORDER — CLOPIDOGREL BISULFATE 75 MG/1
75 TABLET ORAL DAILY
Status: DISCONTINUED | OUTPATIENT
Start: 2023-03-01 | End: 2023-03-05 | Stop reason: HOSPADM

## 2023-03-01 RX ORDER — ISOSORBIDE MONONITRATE 30 MG/1
30 TABLET, EXTENDED RELEASE ORAL DAILY
Status: DISCONTINUED | OUTPATIENT
Start: 2023-03-01 | End: 2023-03-05 | Stop reason: HOSPADM

## 2023-03-01 RX ORDER — SODIUM CHLORIDE 0.9 % (FLUSH) 0.9 %
5-40 SYRINGE (ML) INJECTION EVERY 12 HOURS SCHEDULED
Status: DISCONTINUED | OUTPATIENT
Start: 2023-03-01 | End: 2023-03-05 | Stop reason: HOSPADM

## 2023-03-01 RX ORDER — ATORVASTATIN CALCIUM 40 MG/1
80 TABLET, FILM COATED ORAL DAILY
Status: DISCONTINUED | OUTPATIENT
Start: 2023-03-01 | End: 2023-03-05 | Stop reason: HOSPADM

## 2023-03-01 RX ORDER — IPRATROPIUM BROMIDE AND ALBUTEROL SULFATE 2.5; .5 MG/3ML; MG/3ML
1 SOLUTION RESPIRATORY (INHALATION)
Status: DISCONTINUED | OUTPATIENT
Start: 2023-03-01 | End: 2023-03-02

## 2023-03-01 RX ORDER — EZETIMIBE 10 MG/1
10 TABLET ORAL DAILY
Status: DISCONTINUED | OUTPATIENT
Start: 2023-03-01 | End: 2023-03-05 | Stop reason: HOSPADM

## 2023-03-01 RX ORDER — LISINOPRIL 5 MG/1
10 TABLET ORAL DAILY
Status: DISCONTINUED | OUTPATIENT
Start: 2023-03-02 | End: 2023-03-01

## 2023-03-01 RX ORDER — SODIUM CHLORIDE 9 MG/ML
INJECTION, SOLUTION INTRAVENOUS PRN
Status: DISCONTINUED | OUTPATIENT
Start: 2023-03-01 | End: 2023-03-05 | Stop reason: HOSPADM

## 2023-03-01 RX ORDER — FUROSEMIDE 10 MG/ML
60 INJECTION INTRAMUSCULAR; INTRAVENOUS 2 TIMES DAILY
Status: DISCONTINUED | OUTPATIENT
Start: 2023-03-02 | End: 2023-03-01

## 2023-03-01 RX ORDER — CARVEDILOL 6.25 MG/1
12.5 TABLET ORAL 2 TIMES DAILY
Status: DISCONTINUED | OUTPATIENT
Start: 2023-03-02 | End: 2023-03-05 | Stop reason: HOSPADM

## 2023-03-01 RX ORDER — LABETALOL HYDROCHLORIDE 5 MG/ML
10 INJECTION, SOLUTION INTRAVENOUS EVERY 6 HOURS PRN
Status: DISCONTINUED | OUTPATIENT
Start: 2023-03-01 | End: 2023-03-03

## 2023-03-01 RX ADMIN — FUROSEMIDE 60 MG: 10 INJECTION, SOLUTION INTRAVENOUS at 18:36

## 2023-03-01 RX ADMIN — CARVEDILOL 12.5 MG: 6.25 TABLET, FILM COATED ORAL at 14:44

## 2023-03-01 RX ADMIN — FUROSEMIDE 60 MG: 10 INJECTION, SOLUTION INTRAMUSCULAR; INTRAVENOUS at 20:59

## 2023-03-01 RX ADMIN — IPRATROPIUM BROMIDE AND ALBUTEROL SULFATE 1 AMPULE: 2.5; .5 SOLUTION RESPIRATORY (INHALATION) at 19:17

## 2023-03-01 RX ADMIN — ENOXAPARIN SODIUM 40 MG: 100 INJECTION SUBCUTANEOUS at 18:36

## 2023-03-01 RX ADMIN — LISINOPRIL 10 MG: 5 TABLET ORAL at 14:43

## 2023-03-01 RX ADMIN — ASPIRIN 324 MG: 81 TABLET, CHEWABLE ORAL at 14:43

## 2023-03-01 RX ADMIN — SODIUM CHLORIDE, PRESERVATIVE FREE 10 ML: 5 INJECTION INTRAVENOUS at 21:37

## 2023-03-01 ASSESSMENT — PAIN SCALES - GENERAL: PAINLEVEL_OUTOF10: 0

## 2023-03-01 NOTE — ED NOTES
ED TO INPATIENT SBAR HANDOFF    Patient Name: Lacie Mcfarlane   :  1960  60 y.o. MRN:  [id number]  Preferred Name  [address] [facility],[address]  ED Room #:  ED09/ED-09  Family/Caregiver Present no   Restraints no   Sitter no   Sepsis Risk Score Sepsis Risk Score: 1.35    Situation  Code Status: Prior No additional code details. Allergies: Ibuprofen, Augmentin [amoxicillin-pot clavulanate], and Codeine  Weight: No data found. Arrived from: home  Chief Complaint:   Chief Complaint   Patient presents with    Shortness of Breath     Onset yesterday     Hospital Problem/Diagnosis:  Principal Problem:    Shortness of breath  Resolved Problems:    * No resolved hospital problems. *    Imaging:   XR CHEST PORTABLE   Final Result   Findings consistent with congestive heart failure and/or bilateral   mid-basilar pneumonia worse on the right. Findings may be accentuated by   underlying chronic lung disease.   Otherwise, radiographically nonacute   portable chest           Abnormal labs:   Abnormal Labs Reviewed   CBC WITH AUTO DIFFERENTIAL - Abnormal; Notable for the following components:       Result Value    RBC 4.57 (*)     MCH 31.5 (*)     MPV 11.8 (*)     Monocytes % 8.0 (*)     Eosinophils % 3.3 (*)     All other components within normal limits   COMPREHENSIVE METABOLIC PANEL - Abnormal; Notable for the following components:    Creatinine 1.7 (*)     Est, Glom Filt Rate 45 (*)     Glucose 141 (*)     All other components within normal limits   TROPONIN - Abnormal; Notable for the following components:    Troponin T 0.013 (*)     All other components within normal limits   BRAIN NATRIURETIC PEPTIDE - Abnormal; Notable for the following components:    Pro-BNP 3,943 (*)     All other components within normal limits     Critical values: no     Abnormal Assessment Findings:     Background  History:   Past Medical History:   Diagnosis Date    Acute cerebrovascular accident (CVA) ([facility] [facility] 2020    Acute ST elevation myocardial infarction (STEMI) ([facility] 03/14/2018    B12 deficiency     CAD (coronary artery disease)     COPD (chronic obstructive pulmonary disease) ([facility] 12/11/2020    GERD (gastroesophageal reflux disease)     Guillain Barré syndrome ([facility]     H/O echocardiogram 7/30/13, 6/16/10    7/13-EF 50-55% WNL. 6/10-EF53%, mod LVH, MARCI, RVE, trace PI, mild MR/TR, 6/09    Heart murmur     History of echocardiogram 10/07/2020    EF 40%, No changed since previous 5/2020, Mid inferior hypokinesis noted, Grade I DD, Mod MR, Mild TR, Dilatation of the aortic root, No pericardial effusion     History of myocardial infarction 2003    History of nuclear MUGA test 03/03/2020    EF 53%, Normal, No ICD needed    History of nuclear stress test 06/23/2020    EF 30%, study suggestive of abnormal myocardial perfusion. History of PTCA 6/03;2/03 6/03-PTCA w/stents x2, 2/03-PTCA w/stent x3    History of PTCA 03/15/2018    LAD EF25%    Hx of cardiovascular stress test 04/25/2018    EF 28% Nuclear scintigraphy demonstartes inferior wall infarct. Hx of cardiovascular stress test 01/27/2020    Abnormal Study. Large area of inferior & apical-lateral infarct. No significant reversible ischemia. Severly reduced LVEF 23%    HX OTHER MEDICAL 03/14/2018    lifevest  d/c 7/2/2018    Hyperlipidemia     Hypertension     Impaired glucose tolerance 06/03/2016    MUGA 07/09/2018    EF 47%    NSTEMI (non-ST elevated myocardial infarction) ([facility] 03/15/2018    Patient in clinical research study 05/2018    3 year study dalcitripib vs placebo [phone]    Pneumonia due to organism     Renal cyst 05/23/2014    S/P cardiac catheterization 07/12/2013 7/13-EF=55%. PATENT LAD AND RCA STENTS, OM2 50% stenosis, & Normal wall motion.     Stroke (cerebrum) [facility])     May 2016       Assessment    Vitals/MEWS: MEWS Score: 0  Level of Consciousness: Alert (0)   Vitals:    03/01/23 1503 03/01/23 1533 03/01/23 1602 03/01/23 1633   BP: (!) 167/84 (!) 161/85 (!) 178/103 (!) 165/96   Pulse: 78 71 66 70   Resp: 20 14 16 12   Temp:    98 °F (36.7 °C)   TempSrc:    Temporal   SpO2: 96% 95% 97% 98%     FiO2 (%):   O2 Flow Rate:      Cardiac Rhythm: nsr  Pain Assessment:  [x] Verbal [] Verla Natalie Scale  Pain Scale:    Last documented pain score (0-10 scale)    Last documented pain medication administered:   Mental Status: oriented, alert, and coherent  NIH Score: NIH     C-SSRS: Risk of Suicide: No Risk  Bedside swallow:    Atkinson Coma Scale (GCS): Active LDA's:   Peripheral IV 03/01/23 Left Antecubital (Active)   Site Assessment Clean, dry & intact 03/01/23 1442   Line Status Brisk blood return;Flushed;Normal saline locked 03/01/23 1442   Dressing Status New dressing applied;Clean, dry & intact 03/01/23 1442   Dressing Type Transparent 03/01/23 1442   Dressing Intervention New 03/01/23 1442     PO Status: Regular  Pertinent or High Risk Medications/Drips: no   If Yes, please provide details:   Pending Blood Product Administration: no     You may also review the ED PT Care Timeline found under the Summary Nursing Index tab. Recommendation    Pending orders   Plan for Discharge (if known):    Additional Comments:    If any further questions, please call Sending RN at [phone]    Electronically signed by: Electronically signed by Mercy Escalante RN on 3/1/2023 at 4:46 PM      Mercy Escalante [facility]  03/01/23 7860

## 2023-03-01 NOTE — ED PROVIDER NOTES
[address] ENCOUNTER        Pt Name: Sejal Jalloh  MRN: [id number]  [place] 1960  Date of evaluation: 3/1/2023  Provider: JERED Sena - CNP  PCP: Hany Norman MD    TODD. I have evaluated this patient. My supervising physician was available for consultation. Triage CHIEF COMPLAINT       Chief Complaint   Patient presents with    Shortness of Breath     Onset yesterday         HISTORY OF PRESENT ILLNESS      Chief Complaint: left chest pain, SOB    Sejal Jalloh is a 60 y.o. male who presents for evaluation of a constant left chest heaviness and pressure and SOB that started yesterday. He has been orthopneic. Denies dizziness or near syncope. Denies radiation of pain. Denies cough or congestion. Denies fever chills. He has history of CAD with prior CABG as well as CHF, COPD. Denies worsening peripheral edema or abdominal distension. He has had COPD exacerbation before but this feels different. It is also a different pain that he had when he had a heart attack in the past.      Nursing Notes were all reviewed and agreed with or any disagreements were addressed in the HPI. REVIEW OF SYSTEMS     Pertinent ROS as noted in HPI. PAST MEDICAL HISTORY     Past Medical History:   Diagnosis Date    Acute cerebrovascular accident (CVA) ([facility] [address] 05/22/2020    Acute ST elevation myocardial infarction (STEMI) ([facility] [address] 03/14/2018    B12 deficiency     CAD (coronary artery disease)     COPD (chronic obstructive pulmonary disease) ([facility] 12/11/2020    GERD (gastroesophageal reflux disease)     Guillain Barré syndrome ([facility]     H/O echocardiogram 7/30/13, 6/16/10    7/13-EF 50-55% WNL.  6/10-EF53%, mod LVH, MARCI, RVE, trace PI, mild MR/TR, 6/09    Heart murmur     History of echocardiogram 10/07/2020    EF 40%, No changed since previous 5/2020, Mid inferior hypokinesis noted, Grade I DD, Mod MR, Mild TR, Dilatation of the aortic root, No pericardial effusion     History of myocardial infarction 2003    History of nuclear MUGA test 03/03/2020    EF 53%, Normal, No ICD needed    History of nuclear stress test 06/23/2020    EF 30%, study suggestive of abnormal myocardial perfusion. History of PTCA 6/03;2/03 6/03-PTCA w/stents x2, 2/03-PTCA w/stent x3    History of PTCA 03/15/2018    LAD EF25%    Hx of cardiovascular stress test 04/25/2018    EF 28% Nuclear scintigraphy demonstartes inferior wall infarct. Hx of cardiovascular stress test 01/27/2020    Abnormal Study. Large area of inferior & apical-lateral infarct. No significant reversible ischemia. Severly reduced LVEF 23%    HX OTHER MEDICAL 03/14/2018    lifevest  d/c 7/2/2018    Hyperlipidemia     Hypertension     Impaired glucose tolerance 06/03/2016    MUGA 07/09/2018    EF 47%    NSTEMI (non-ST elevated myocardial infarction) ([facility] [address] 03/15/2018    Patient in clinical research study 05/2018    3 year study dalcitripib vs placebo [phone]    Pneumonia due to organism     Renal cyst 05/23/2014    S/P cardiac catheterization 07/12/2013 7/13-EF=55%. PATENT LAD AND RCA STENTS, OM2 50% stenosis, & Normal wall motion. Stroke (cerebrum) [facility])     May 2016       SURGICAL HISTORY     Past Surgical History:   Procedure Laterality Date    CARDIAC CATHETERIZATION  7/13 7/13-EF=55%. PATENT LAD AND RCA STENTS, OM2 50% stenosis, & Normal wall motion.     COLONOSCOPY  8/21/14    colon polyp, diverticulosis, internal hemorrhoids    CORONARY ANGIOPLASTY  6/03 6/14/03- PTCA w stents x2 mid LAD, 2/03-PTCA w stents x3, RCA    CORONARY ARTERY BYPASS GRAFT N/A 5/18/2020    CABG CORONARY ARTERY BYPASS x 2, INTRAOPERATIVE NGA, INDUCED HYPOTHERMIA, LEFT LEG ENDOSCOPIC VEIN HARVEST performed by Susana Cavazos MD at [address] [place] [address], COLON, DIAGNOSTIC  8/21/14    irregular GE junction, gastritis,hiatal hernia, gastric polyps    HAND SURGERY      rt [facility] HERNIA REPAIR N/A 12/8/2022    HERNIA VENTRAL REPAIR LAPAROSCOPIC ROBOTIC performed by Puneet Ryan MD at [address]  03/2018    LAD    SHOULDER SURGERY Right 2011    bone spurs       CURRENTMEDICATIONS       Previous Medications    ALBUTEROL SULFATE HFA (VENTOLIN HFA) 108 (90 BASE) MCG/ACT INHALER    Inhale 2 puffs into the lungs every 4 hours as needed for Wheezing    ALCOHOL SWABS (ALCOHOL PADS) 70 % PADS    1 Units by Does not apply route 2 times daily    ASPIRIN 81 MG CHEWABLE TABLET    Take 1 tablet by mouth daily    ATORVASTATIN (LIPITOR) 80 MG TABLET    Take 1 tablet by mouth daily    BLOOD GLUCOSE MONITORING SUPPL (BLOOD GLUCOSE MONITOR SYSTEM) W/DEVICE KIT    Use twice per day    CARVEDILOL (COREG) 12.5 MG TABLET    Take 1 tablet by mouth 2 times daily    CLOPIDOGREL (PLAVIX) 75 MG TABLET    Take 1 tablet by mouth daily    EZETIMIBE (ZETIA) 10 MG TABLET    Take 1 tablet by mouth daily    FUROSEMIDE (LASIX) 40 MG TABLET    Take 1.5 tablets by mouth 2 times daily    GLIMEPIRIDE (AMARYL) 2 MG TABLET    Take 1 tablet by mouth every morning    LISINOPRIL (PRINIVIL;ZESTRIL) 10 MG TABLET    Take 1 tablet by mouth daily    NONFORMULARY    Albuterol- Ipratropium Inhaler     ONDANSETRON (ZOFRAN) 4 MG TABLET    Take 1 tablet by mouth every 8 hours as needed for Nausea or Vomiting    PANTOPRAZOLE (PROTONIX) 40 MG TABLET    Take 1 tablet by mouth daily    SERTRALINE (ZOLOFT) 100 MG TABLET    Take 1 tablet by mouth daily    TAMSULOSIN (FLOMAX) 0.4 MG CAPSULE    Take 1 capsule by mouth daily    TIOTROPIUM (SPIRIVA HANDIHALER) 18 MCG INHALATION CAPSULE    Inhale 1 capsule into the lungs daily for 30 doses       ALLERGIES     Ibuprofen, Augmentin [amoxicillin-pot clavulanate], and Codeine    FAMILYHISTORY       Family History   Problem Relation Age of Onset    Hypertension Mother     High Cholesterol Mother     Alcohol Abuse Mother     Depression Mother     Alcohol Abuse Father     Stomach Cancer Father Depression Sister     Diabetes Brother     Depression Sister     Stomach Cancer Brother     Alcohol Abuse Brother         SOCIAL HISTORY       Social History     Socioeconomic History    Marital status:      Spouse name: None    Number of children: None    Years of education: None    Highest education level: None   Occupational History    Occupation:      Comment: full time   Tobacco Use    Smoking status: Former     Packs/day: 2.00     Years: 45.00     Pack years: 90.00     Types: Cigarettes     Quit date: 2016     Years since quittin.8    Smokeless tobacco: Never   Vaping Use    Vaping Use: Never used   Substance and Sexual Activity    Alcohol use: No     Alcohol/week: 0.0 standard drinks    Drug use: No    Sexual activity: Yes     Partners: Female   Social History Narrative    Wears glasses     Social Determinants of Health     Financial Resource Strain: Low Risk     Difficulty of Paying Living Expenses: Not very hard   Food Insecurity: No Food Insecurity    Worried About Running Out of Food in the Last Year: Never true    Ran Out of Food in the Last Year: Never true       SCREENINGS           PHYSICAL EXAM       ED Triage Vitals [23 1345]   BP Temp Temp src Heart Rate Resp SpO2 Height Weight   (!) 182/87 -- -- 76 18 98 % -- --        Constitutional:  Well developed, Well nourished. No distress  HENT:  Normocephalic, Atraumatic  Neck/Lymphatics: supple, no swollen nodes  Cardiovascular:   RRR, no murmurs/rubs/gallops. Distal cap refill and pulses intact bilateral upper and lower extremities. no peripheral edema. Respiratory:   Nonlabored breathing. Diffuse expiratory wheezing with basilar crackles   abdomen: Bowel sounds normal, Soft, No tenderness, no masses. Musculoskeletal: Bilateral upper and lower extremity ROM intact without pain or obvious deficit  Integument:   Warm, Dry, No rashes.   Psychiatric:  Affect normal, Mood normal.     DIAGNOSTIC RESULTS LABS:    Labs Reviewed   CBC WITH AUTO DIFFERENTIAL - Abnormal; Notable for the following components:       Result Value    RBC 4.57 (*)     MCH 31.5 (*)     MPV 11.8 (*)     Monocytes % 8.0 (*)     Eosinophils % 3.3 (*)     All other components within normal limits   COMPREHENSIVE METABOLIC PANEL - Abnormal; Notable for the following components:    Creatinine 1.7 (*)     Est, Glom Filt Rate 45 (*)     Glucose 141 (*)     All other components within normal limits   TROPONIN - Abnormal; Notable for the following components:    Troponin T 0.013 (*)     All other components within normal limits   BRAIN NATRIURETIC PEPTIDE - Abnormal; Notable for the following components:    Pro-BNP 3,943 (*)     All other components within normal limits   MAGNESIUM   LIPASE   PROCALCITONIN       When ordered, only abnormal lab results are displayed. All other labs were within normal range or not returned as of this dictation. RADIOLOGY:   Non-plain film images such as CT, Ultrasound and MRI are read by the radiologist. Plain radiographic images are visualized and preliminarily interpreted by the  ED Provider with the below findings:    Interpretation perthe Radiologist below, if available at the time of this note:    XR CHEST PORTABLE   Final Result   Findings consistent with congestive heart failure and/or bilateral   mid-basilar pneumonia worse on the right. Findings may be accentuated by   underlying chronic lung disease. Otherwise, radiographically nonacute   portable chest           No results found. PROCEDURES   Unless otherwise noted below, none         CRITICAL CARE   CRITICAL CARE NOTE:  N/A    CONSULTS:  None      VITALS:   Vitals:    Vitals:    03/01/23 1345 03/01/23 1443 03/01/23 1444   BP: (!) 182/87 (!) 182/87 (!) 182/87   Pulse: 76  83   Resp: 18     SpO2: 98%         EMERGENCY DEPARTMENT COURSE and MDM:   Patient presents as above. Emergent etiologies considered. Patient seen and examined.   Work-up initiated secondary to presentation, physical exam findings, vital signs and medical chart review. Sepsis Consideration:   Exclusion criteria - the patient is NOT to be included for SEP-1 Core Measure due to: Infection is not suspected     History from : Patient    Limitations to history : None    Patient was given the following medications:  Medications   aspirin chewable tablet 324 mg (324 mg Oral Given 3/1/23 1443)   lisinopril (PRINIVIL;ZESTRIL) tablet 10 mg (10 mg Oral Given 3/1/23 1443)   carvedilol (COREG) tablet 12.5 mg (12.5 mg Oral Given 3/1/23 1444)       Imaging Interpretation: Not applicable    Telemetry: Patient was placed on telemetry for cardiac monitoring due to complaint of chest pain and SOB. ECG Interpretation: ECG showed NSR without changes concerning for ACS. Please see attending note for complete interpretation    Chronic conditions affecting care: CAD, COPD, HTN, HLD, prior CVA    Discussion with Other Profesionals : Admitting Team HM who will accept patient. Social Determinants : None    Records Reviewed : Outpatient Notes reviewed last echocardiogram from March 2022 which demonstrated an EF of 40 to 45% with akinesis of the inferior and inferior septal walls, moderate LVH and mild to moderate MR. Last diagnostic cath was in March 2022 which demonstrated 100% occlusions in the mid LAD as well as RCA with patent LIMA to the LAD and SVG to the OM2. In brief, patient presents for evaluation of creased shortness of breath since last night as well as intermittent left-sided chest pain. His exam revealed diffuse expiratory wheezing and basilar crackles but no peripheral edema. Laboratory studies including a CMP and CBC were significant for mild elevation of creatinine to 1.7 from baseline of 1.5. Magnesium was normal.  Lipase was normal.  Troponin was 0.013. It has been the same the last couple of times it has been measured so unclear if chronic or acutely elevated.   BNP is over 3900 and also has been elevated the last few times it is been measured so unclear chronicity. Chest x-ray demonstrated cardiomegaly with findings consistent with congestive heart failure and/or a bilateral mid basilar pneumonia, worse on right. Patient has not been having any cough or URI symptoms that would support pneumonia and more likely has congestive heart failure. Given patient's history of prior coronary disease with CABG, recommended admission for further evaluation by cardiology and continued monitoring. Patient verbalized understanding and is agreeable to this plan. I am the Primary Clinician of Record. CLINICAL IMPRESSION      1. Chest pain, unspecified type    2. Acute on chronic systolic congestive heart failure [facility])          DISPOSITION/PLAN   DISPOSITION Decision To Admit 03/01/2023 04:27:11 PM      PATIENT REFERREDTO:  No follow-up provider specified.     DISCHARGE MEDICATIONS:  New Prescriptions    No medications on file       DISCONTINUED MEDICATIONS:  Discontinued Medications    No medications on file              (Please note that portions ofthis note were completed with a voice recognition program.  Efforts were made to edit the dictations but occasionally words are mis-transcribed.)    JERED Mata CNP (electronically signed)             JERED Forrest CNP  03/01/23 5916

## 2023-03-01 NOTE — H&P
V2.0  History and Physical      Name:  Justin Guzman /Age/Sex: 1960  (60 y.o. male)   MRN & CSN:  [id number] & [id number] Encounter Date/Time: 3/1/2023 4:46 PM EST   Location:  [facility] PCP: Graciela Ross MD       Hospital Day: 1      History of Present Illness:     Chief Complaint: SOB, chest pain    Justin Guzman is a 60 y.o. male with PMH of CAD (s/p CABG), CHF,  COPD, ANNA, HTN, DM II, CKD who presents with SOB and chest pain. Pt states his symptoms started last night. Felt SOB at rest sitting or lying flat. Also endorses non-productive cough. Pt reports chest pain more with cough and deep breath described as pressure like. Pt is compliant with his medications but doesn't follow diet low in sodium. He doesn't check his blood pressure at home. Pt takes lasix 60 mg PO BID but has noticed he has been going more frequently but with less urine. At ED, pt was afebrile, hemodynamically stable but BP high 180's; on room air. Labs revealed Cr 1.7 (around baseline), ProBNP 3943 (was 4270 in 2022), troponin 0.013 (same as 2022),  WBC 6.2, Hgb 14.4, normal liver panel. CXR suggestive of pulm congestion vs PNA. Pt received his home dose of Coreg, lisinopril and ASA. His blood pressure improved to the 160's at time of evaluation. Assessment and Plan:   Acute on Chronic Systolic and Diastolic heart failure  Presented with  dyspnea, pleuritic chest pain. ProBNP 3943. CXR findings suggestive of CHF vs PNA. Compliant with meds but not with low Na diet. Last ECHO 3/23/2022 EF 40-45%, akinesis of inferior and inferoseptal walls, moderate LVH, mild to moderate MR.   -  60 mg IV lasix (double home dose)  - continue home Coreg, lisinopril; will likely need to increase dose  - consult cardiology; appreciate recs  - strict intake/output, daily weights, tele  - repeat ECHO Complete  - check TSH    Hypertensive urgency. Hx of HTN. On home Coreg, lisinopril and lasix.  Came with 's improved to 160's after home meds today. - continue home meds  - lasix as above  - will need to better BP optimization  - if BP sustaining > 180, may need nitro drip    COPD. Not in excerebration (no wheezing, no productive cough, no hypoxia). On home albuterol, Spiriva  - will do breathing treatment for now  - no indication for steroids    CKD. Cr  1.7 on admission baseline 1.5-1.7. Signs of volume overload mainly in lungs but no edema. - diuretics as above  - avoid nephrotoxins  - strict intake/output    CAD. Hx of STEMI. S/p CABG. On home ASA. No ischemic changes on ECG. Troponin unchanged from 7/2022. Has chest pain but appears pleuritic.  - continue home ASA, Plavix, statin  - check troponin once    DM Type II. HA1C 6.5 9/8/2022. On home glimepiride   - hypoglycemia protocol  - low dose sliding scale  - POCT glucose  - check HA1C    HLD. On ezetimibe and atorvastatin   - continue home meds  - check lipid panel tomorrow AM      BPH. Endorses urinary frequency. Was On home flomax but seems pt not taking anymore. - resume Flomax      Disposition:   Current Living situation: home  Expected Disposition: home  Estimated D/C: 1-2 days    Diet ADULT DIET; Regular; No Added Salt (3-4 gm)   DVT Prophylaxis [x] Lovenox, []  Heparin, [] SCDs, [] Ambulation,  [] Eliquis, [] Xarelto   Code Status Full Code   Surrogate Decision Maker/ POA      History from:     patient     Review of Systems: Need 10 Elements   See above           Objective:   No intake or output data in the 24 hours ending 03/01/23 1808   Vitals:   Vitals:    03/01/23 1503 03/01/23 1533 03/01/23 1602 03/01/23 1633   BP: (!) 167/84 (!) 161/85 (!) 178/103 (!) 165/96   Pulse: 78 71 66 70   Resp: 20 14 16 12   Temp:    98 °F (36.7 °C)   TempSrc:    Temporal   SpO2: 96% 95% 97% 98%       Medications Prior to Admission     Prior to Admission medications    Medication Sig Start Date End Date Taking?  Authorizing Provider   furosemide (LASIX) 40 MG tablet Take 20 mg by mouth nightly   Yes Historical Provider, MD   carvedilol (COREG) 12.5 MG tablet Take 1 tablet by mouth 2 times daily 1/31/23   [place] Primes, APRN - CNP   ezetimibe (ZETIA) 10 MG tablet Take 1 tablet by mouth daily 1/16/23   [place] Primes, APRN - CNP   ondansetron (ZOFRAN) 4 MG tablet Take 1 tablet by mouth every 8 hours as needed for Nausea or Vomiting 12/8/22   Tania Rivas II, MD   lisinopril (PRINIVIL;ZESTRIL) 10 MG tablet Take 1 tablet by mouth daily 11/14/22   Tobin Oshea MD   tiotropium (Lauralyn Lull) 18 MCG inhalation capsule Inhale 1 capsule into the lungs daily for 30 doses 11/10/22 1/16/23  Socorro Allan MD   albuterol sulfate HFA (VENTOLIN HFA) 108 (90 Base) MCG/ACT inhaler Inhale 2 puffs into the lungs every 4 hours as needed for Wheezing 11/4/22   Socorro Allan MD   NONFORMULARY Albuterol- Ipratropium Inhaler     Historical Provider, MD   clopidogrel (PLAVIX) 75 MG tablet Take 1 tablet by mouth daily 10/4/22 1/16/23  Pablo Primes, APRN - CNP   furosemide (LASIX) 40 MG tablet Take 1.5 tablets by mouth 2 times daily  Patient taking differently: Take 40 mg by mouth every morning 10/4/22 1/16/23  [place] Primes, APRN - CNP   sertraline (ZOLOFT) 100 MG tablet Take 1 tablet by mouth daily 9/8/22 1/16/23  Socorro Allan MD   aspirin 81 MG chewable tablet Take 1 tablet by mouth daily 9/8/22 3/7/23  Socorro Allan MD   glimepiride (AMARYL) 2 MG tablet Take 1 tablet by mouth every morning 9/8/22 1/16/23  Socorro Allan MD   atorvastatin (LIPITOR) 80 MG tablet Take 1 tablet by mouth daily 9/8/22 1/16/23  Socorro Allan MD   Alcohol Swabs (ALCOHOL PADS) 70 % PADS 1 Units by Does not apply route 2 times daily 3/14/22   Ruth Damon MD   Blood Glucose Monitoring Suppl (BLOOD GLUCOSE MONITOR SYSTEM) w/Device KIT Use twice per day 3/14/22   April MD Nelson       Physical Exam: Need 8 Elements   Physical Exam     General: NAD  Eyes: EOMI  ENT: neck supple  Cardiovascular: Regular rate, no JVD appreciated  Respiratory: diminished breathing air, mild basilar rales  Gastrointestinal: Soft, non tender  Genitourinary: no suprapubic tenderness  Musculoskeletal: No edema  Skin: warm, dry  Neuro: Alert. Psych: Mood appropriate. Past Medical History:   PMHx   Past Medical History:   Diagnosis Date    Acute cerebrovascular accident (CVA) ([facility] 05/22/2020    Acute ST elevation myocardial infarction (STEMI) ([facility] 03/14/2018    B12 deficiency     CAD (coronary artery disease)     COPD (chronic obstructive pulmonary disease) ([facility] 12/11/2020    GERD (gastroesophageal reflux disease)     Guillain Barré syndrome ([facility]     H/O echocardiogram 7/30/13, 6/16/10    7/13-EF 50-55% WNL. 6/10-EF53%, mod LVH, MARCI, RVE, trace PI, mild MR/TR, 6/09    Heart murmur     History of echocardiogram 10/07/2020    EF 40%, No changed since previous 5/2020, Mid inferior hypokinesis noted, Grade I DD, Mod MR, Mild TR, Dilatation of the aortic root, No pericardial effusion     History of myocardial infarction 2003    History of nuclear MUGA test 03/03/2020    EF 53%, Normal, No ICD needed    History of nuclear stress test 06/23/2020    EF 30%, study suggestive of abnormal myocardial perfusion. History of PTCA 6/03;2/03 6/03-PTCA w/stents x2, 2/03-PTCA w/stent x3    History of PTCA 03/15/2018    LAD EF25%    Hx of cardiovascular stress test 04/25/2018    EF 28% Nuclear scintigraphy demonstartes inferior wall infarct. Hx of cardiovascular stress test 01/27/2020    Abnormal Study. Large area of inferior & apical-lateral infarct. No significant reversible ischemia.   Severly reduced LVEF 23%    HX OTHER MEDICAL 03/14/2018    lifevest  d/c 7/2/2018    Hyperlipidemia     Hypertension     Impaired glucose tolerance 06/03/2016    MUGA 07/09/2018    EF 47%    NSTEMI (non-ST elevated myocardial infarction) ([facility] 03/15/2018    Patient in clinical research study 05/2018    3 year study dalcitripib vs placebo [phone]    Pneumonia due to organism     Renal cyst 2014    S/P cardiac catheterization 2013-EF=55%. PATENT LAD AND RCA STENTS, OM2 50% stenosis, & Normal wall motion. Stroke (cerebrum) [facility])     May 2016     PSHX:  has a past surgical history that includes Hand surgery; Coronary angioplasty (); shoulder surgery (Right, ); Colonoscopy (14); Endoscopy, colon, diagnostic (14); Cardiac catheterization (); Percutaneous Transluminal Coronary Angio (2018); Coronary artery bypass graft (N/A, 2020); and hernia repair (N/A, 2022). Allergies: Allergies   Allergen Reactions    Ibuprofen Other (See Comments)     Stomach irritation    Augmentin [Amoxicillin-Pot Clavulanate] Rash    Codeine Other (See Comments)     \"I'm out of it\"     Fam HX:  family history includes Alcohol Abuse in his brother, father, and mother; Depression in his mother, sister, and sister; Diabetes in his brother; High Cholesterol in his mother; Hypertension in his mother; Stomach Cancer in his brother and father.   Soc HX:   Social History     Socioeconomic History    Marital status:      Spouse name: None    Number of children: None    Years of education: None    Highest education level: None   Occupational History    Occupation:      Comment: full time   Tobacco Use    Smoking status: Former     Packs/day: 2.00     Years: 45.00     Pack years: 90.00     Types: Cigarettes     Quit date: 2016     Years since quittin.8    Smokeless tobacco: Never   Vaping Use    Vaping Use: Never used   Substance and Sexual Activity    Alcohol use: No     Alcohol/week: 0.0 standard drinks    Drug use: No    Sexual activity: Yes     Partners: Female   Social History Narrative    Wears glasses     Social Determinants of Health     Financial Resource Strain: Low Risk     Difficulty of Paying Living Expenses: Not very hard   Food Insecurity: No Food Insecurity    Worried About Running Out of Food in the Last Year: Never true    Ran Out of Food in the Last Year: Never true       Medications:   Medications:    Infusions:   PRN Meds:     Labs      CBC:   Recent Labs     03/01/23  1353   WBC 6.2   HGB 14.4        BMP:    Recent Labs     03/01/23  1353      K 3.9      CO2 24   BUN 19   CREATININE 1.7*   GLUCOSE 141*     Hepatic:   Recent Labs     03/01/23  1353   AST 15   ALT 12   BILITOT 0.4   ALKPHOS 71     Lipids:   Lab Results   Component Value Date/Time    CHOL 174 07/08/2022 12:58 AM    CHOL 177 04/16/2019 11:36 AM    HDL 32 07/08/2022 12:58 AM    TRIG 90 07/08/2022 12:58 AM     Hemoglobin A1C:   Lab Results   Component Value Date/Time    LABA1C 6.5 09/08/2022 10:10 AM     TSH: No results found for: TSH  Troponin:   Lab Results   Component Value Date/Time    TROPONINT 0.013 03/01/2023 01:53 PM    TROPONINT 0.013 07/08/2022 12:58 AM    TROPONINT 0.028 07/07/2022 05:32 PM     Lactic Acid: No results for input(s): LACTA in the last 72 hours.   BNP:   Recent Labs     03/01/23  1353   PROBNP 3,943*     UA:  Lab Results   Component Value Date/Time    NITRU NEGATIVE 05/17/2020 01:25 AM    COLORU YELLOW 05/17/2020 01:25 AM    WBCUA 2 05/17/2020 01:25 AM    RBCUA NONE SEEN 05/17/2020 01:25 AM    MUCUS RARE 05/17/2020 01:25 AM    TRICHOMONAS NONE SEEN 05/17/2020 01:25 AM    BACTERIA NEGATIVE 05/17/2020 01:25 AM    CLARITYU CLEAR 05/17/2020 01:25 AM    SPECGRAV 1.016 05/17/2020 01:25 AM    LEUKOCYTESUR NEGATIVE 05/17/2020 01:25 AM    UROBILINOGEN NORMAL 05/17/2020 01:25 AM    BILIRUBINUR NEGATIVE 05/17/2020 01:25 AM    BLOODU NEGATIVE 05/17/2020 01:25 AM    KETUA NEGATIVE 05/17/2020 01:25 AM     Urine Cultures: No results found for: Good Marva  Blood Cultures: No results found for: BC  No results found for: BLOODCULT2  Organism: No results found for: ORG    Imaging/Diagnostics Last 24 Hours   XR CHEST PORTABLE    Result Date: 3/1/2023  EXAMINATION: ONE XRAY VIEW OF THE CHEST 3/1/2023 2:45 pm COMPARISON: 07/07/2022 HISTORY: ORDERING SYSTEM PROVIDED HISTORY: chest pain TECHNOLOGIST PROVIDED HISTORY: Reason for exam:->chest pain Reason for Exam: chest pain FINDINGS: Cardiomegaly, bronchovascular marking prominence worst in the right basilar region. Findings are consistent with congestive heart failure and/or bilateral mid-basilar pneumonia worst in the right basilar region. Findings may be accentuated by underlying chronic lung disease. No large areas of pulmonary consolidation, detectable pleural effusion or pneumothorax. Findings consistent with congestive heart failure and/or bilateral mid-basilar pneumonia worse on the right. Findings may be accentuated by underlying chronic lung disease.   Otherwise, radiographically nonacute portable chest       Personally reviewed Lab Studies, Imaging, and discussed case with patient    Electronically signed by Miah Garner MD on 3/1/2023 at 6:08 PM

## 2023-03-01 NOTE — ED NOTES
Medication History  [facility]    Patient Name: Catalina Delgado 1960     Medication history has been completed by: Aman Aparicio CPhT    Source(s) of information: patient and insurance claims     Primary Care Physician: Mariette Oppenheim, MD     Pharmacy: [facility]    Allergies as of 03/01/2023 - Fully Reviewed 03/01/2023   Allergen Reaction Noted    Ibuprofen Other (See Comments) 01/10/2014    Augmentin [amoxicillin-pot clavulanate] Rash     Codeine Other (See Comments)         Prior to Admission medications    Medication Sig Start Date End Date Taking?  Authorizing Provider   furosemide (LASIX) 40 MG tablet Take 20 mg by mouth nightly   Yes Historical Provider, MD   carvedilol (COREG) 12.5 MG tablet Take 1 tablet by mouth 2 times daily 1/31/23   JERED Alfredo CNP   ezetimibe (ZETIA) 10 MG tablet Take 1 tablet by mouth daily 1/16/23   JERED Alfredo CNP   ondansetron (ZOFRAN) 4 MG tablet Take 1 tablet by mouth every 8 hours as needed for Nausea or Vomiting 12/8/22   Liv Blankenship II, MD   lisinopril (PRINIVIL;ZESTRIL) 10 MG tablet Take 1 tablet by mouth daily 11/14/22   Bertha Juarez MD   tiotropium (Shadia Herman) 18 MCG inhalation capsule Inhale 1 capsule into the lungs daily for 30 doses 11/10/22 1/16/23  Mariette Oppenheim, MD   albuterol sulfate HFA (VENTOLIN HFA) 108 (90 Base) MCG/ACT inhaler Inhale 2 puffs into the lungs every 4 hours as needed for Wheezing 11/4/22   Mariette Oppenheim, MD   NONFORMULARY Albuterol- Ipratropium Inhaler     Historical Provider, MD   clopidogrel (PLAVIX) 75 MG tablet Take 1 tablet by mouth daily 10/4/22 1/16/23  JERED Alfredo CNP   furosemide (LASIX) 40 MG tablet Take 1.5 tablets by mouth 2 times daily  Patient taking differently: Take 40 mg by mouth every morning 10/4/22 1/16/23  JERED Alfredo CNP   sertraline (ZOLOFT) 100 MG tablet Take 1 tablet by mouth daily 9/8/22 1/16/23  Mariette Oppenheim, MD   aspirin 81 MG chewable tablet Take 1 tablet by mouth daily 9/8/22 3/7/23  Socorro Allan MD   glimepiride (AMARYL) 2 MG tablet Take 1 tablet by mouth every morning 9/8/22 1/16/23  Socorro Allan MD   atorvastatin (LIPITOR) 80 MG tablet Take 1 tablet by mouth daily 9/8/22 1/16/23  Socorro Allan MD   Alcohol Swabs (ALCOHOL PADS) 70 % PADS 1 Units by Does not apply route 2 times daily 3/14/22   April MD Nelson   Blood Glucose Monitoring Suppl (BLOOD GLUCOSE MONITOR SYSTEM) w/Device KIT Use twice per day 3/14/22   April MD Nelson     Medications added or changed (ex. new medication, dosage change, interval change, formulation change):  Furosemide  dosage change, per patient he takes 40 mg every morning and 20 mg at night    Medications removed from list (include reason, ex. noncompliance, medication cost, therapy complete etc.):   Pantoprazole no longer taking  Tamsulosin no longer taking    Comments:  Medication list reviewed with patient and insurance claims verified. Patient with several medications he reports he is taking, per claims patient would be out if taking as noted per prescription. Last fill dates provided. Patient informed interviewer that he has taken no meds for a couple of days.     To my knowledge the above medication history is accurate as of 3/1/2023 5:00 PM.   Dejuan Buitrago CPhT   3/1/2023 5:00 PM

## 2023-03-01 NOTE — ED PROVIDER NOTES
EKG interpreted by me  Normal sinus rhythm with rate of 74 bpm.  Left ventricular hypertrophy. Does have T wave inversions noted in lead I and aVL. Does have ST segment abnormality but when compared to previous from 11/3/2022 this appears to be stable, likely related to the left ventricular hypertrophy, no fredy ST elevation. T wave inversions in V4 and V5 from then have normalized, with upright T waves today.   Does have a prolonged QT interval     Cruzita Duverney, MD  03/01/23 1400

## 2023-03-02 LAB
ANION GAP SERPL CALCULATED.3IONS-SCNC: 15 MMOL/L (ref 4–16)
BASOPHILS ABSOLUTE: 0.1 K/CU MM
BASOPHILS RELATIVE PERCENT: 0.9 % (ref 0–1)
BUN SERPL-MCNC: 21 MG/DL (ref 6–23)
CALCIUM SERPL-MCNC: 8.8 MG/DL (ref 8.3–10.6)
CHLORIDE BLD-SCNC: 101 MMOL/L (ref 99–110)
CHOLEST SERPL-MCNC: 180 MG/DL
CO2: 23 MMOL/L (ref 21–32)
CREAT SERPL-MCNC: 1.9 MG/DL (ref 0.9–1.3)
DIFFERENTIAL TYPE: ABNORMAL
EOSINOPHILS ABSOLUTE: 0.3 K/CU MM
EOSINOPHILS RELATIVE PERCENT: 4.3 % (ref 0–3)
GFR SERPL CREATININE-BSD FRML MDRD: 39 ML/MIN/1.73M2
GLUCOSE BLD-MCNC: 143 MG/DL (ref 70–99)
GLUCOSE BLD-MCNC: 191 MG/DL (ref 70–99)
GLUCOSE BLD-MCNC: 201 MG/DL (ref 70–99)
GLUCOSE BLD-MCNC: 246 MG/DL (ref 70–99)
GLUCOSE SERPL-MCNC: 182 MG/DL (ref 70–99)
HCT VFR BLD CALC: 44.2 % (ref 42–52)
HDLC SERPL-MCNC: 33 MG/DL
HEMOGLOBIN: 14.3 GM/DL (ref 13.5–18)
IMMATURE NEUTROPHIL %: 0.3 % (ref 0–0.43)
LDLC SERPL CALC-MCNC: 113 MG/DL
LV EF: 33 %
LVEF MODALITY: NORMAL
LYMPHOCYTES ABSOLUTE: 1.7 K/CU MM
LYMPHOCYTES RELATIVE PERCENT: 29.6 % (ref 24–44)
MAGNESIUM: 2.3 MG/DL (ref 1.8–2.4)
MCH RBC QN AUTO: 31.5 PG (ref 27–31)
MCHC RBC AUTO-ENTMCNC: 32.4 % (ref 32–36)
MCV RBC AUTO: 97.4 FL (ref 78–100)
MONOCYTES ABSOLUTE: 0.6 K/CU MM
MONOCYTES RELATIVE PERCENT: 10.1 % (ref 0–4)
NUCLEATED RBC %: 0 %
PDW BLD-RTO: 13.8 % (ref 11.7–14.9)
PLATELET # BLD: 158 K/CU MM (ref 140–440)
PMV BLD AUTO: 12.2 FL (ref 7.5–11.1)
POTASSIUM SERPL-SCNC: 3.8 MMOL/L (ref 3.5–5.1)
RBC # BLD: 4.54 M/CU MM (ref 4.6–6.2)
SEGMENTED NEUTROPHILS ABSOLUTE COUNT: 3.2 K/CU MM
SEGMENTED NEUTROPHILS RELATIVE PERCENT: 54.8 % (ref 36–66)
SODIUM BLD-SCNC: 139 MMOL/L (ref 135–145)
TOTAL IMMATURE NEUTOROPHIL: 0.02 K/CU MM
TOTAL NUCLEATED RBC: 0 K/CU MM
TRIGL SERPL-MCNC: 169 MG/DL
TROPONIN T: 0.02 NG/ML
TSH SERPL DL<=0.005 MIU/L-ACNC: 1.22 UIU/ML (ref 0.27–4.2)
WBC # BLD: 5.8 K/CU MM (ref 4–10.5)

## 2023-03-02 PROCEDURE — G0378 HOSPITAL OBSERVATION PER HR: HCPCS

## 2023-03-02 PROCEDURE — 84484 ASSAY OF TROPONIN QUANT: CPT

## 2023-03-02 PROCEDURE — 6370000000 HC RX 637 (ALT 250 FOR IP): Performed by: STUDENT IN AN ORGANIZED HEALTH CARE EDUCATION/TRAINING PROGRAM

## 2023-03-02 PROCEDURE — 36415 COLL VENOUS BLD VENIPUNCTURE: CPT

## 2023-03-02 PROCEDURE — 6360000002 HC RX W HCPCS: Performed by: INTERNAL MEDICINE

## 2023-03-02 PROCEDURE — 93306 TTE W/DOPPLER COMPLETE: CPT

## 2023-03-02 PROCEDURE — 6370000000 HC RX 637 (ALT 250 FOR IP): Performed by: INTERNAL MEDICINE

## 2023-03-02 PROCEDURE — 6360000004 HC RX CONTRAST MEDICATION

## 2023-03-02 PROCEDURE — 94640 AIRWAY INHALATION TREATMENT: CPT

## 2023-03-02 PROCEDURE — 96376 TX/PRO/DX INJ SAME DRUG ADON: CPT

## 2023-03-02 PROCEDURE — 80061 LIPID PANEL: CPT

## 2023-03-02 PROCEDURE — 6360000002 HC RX W HCPCS: Performed by: STUDENT IN AN ORGANIZED HEALTH CARE EDUCATION/TRAINING PROGRAM

## 2023-03-02 PROCEDURE — 6370000000 HC RX 637 (ALT 250 FOR IP): Performed by: NURSE PRACTITIONER

## 2023-03-02 PROCEDURE — 80048 BASIC METABOLIC PNL TOTAL CA: CPT

## 2023-03-02 PROCEDURE — 82962 GLUCOSE BLOOD TEST: CPT

## 2023-03-02 PROCEDURE — 96372 THER/PROPH/DIAG INJ SC/IM: CPT

## 2023-03-02 PROCEDURE — APPNB60 APP NON BILLABLE TIME 46-60 MINS: Performed by: NURSE PRACTITIONER

## 2023-03-02 PROCEDURE — 94761 N-INVAS EAR/PLS OXIMETRY MLT: CPT

## 2023-03-02 PROCEDURE — 2580000003 HC RX 258: Performed by: STUDENT IN AN ORGANIZED HEALTH CARE EDUCATION/TRAINING PROGRAM

## 2023-03-02 PROCEDURE — 85025 COMPLETE CBC W/AUTO DIFF WBC: CPT

## 2023-03-02 PROCEDURE — 84443 ASSAY THYROID STIM HORMONE: CPT

## 2023-03-02 PROCEDURE — 83735 ASSAY OF MAGNESIUM: CPT

## 2023-03-02 PROCEDURE — 99233 SBSQ HOSP IP/OBS HIGH 50: CPT | Performed by: INTERNAL MEDICINE

## 2023-03-02 RX ORDER — IPRATROPIUM BROMIDE AND ALBUTEROL SULFATE 2.5; .5 MG/3ML; MG/3ML
1 SOLUTION RESPIRATORY (INHALATION) EVERY 4 HOURS PRN
Status: DISCONTINUED | OUTPATIENT
Start: 2023-03-02 | End: 2023-03-04

## 2023-03-02 RX ORDER — METOLAZONE 2.5 MG/1
2.5 TABLET ORAL DAILY
Status: DISCONTINUED | OUTPATIENT
Start: 2023-03-02 | End: 2023-03-05 | Stop reason: HOSPADM

## 2023-03-02 RX ORDER — ALBUTEROL SULFATE 90 UG/1
2 AEROSOL, METERED RESPIRATORY (INHALATION) 2 TIMES DAILY
Status: DISCONTINUED | OUTPATIENT
Start: 2023-03-02 | End: 2023-03-04

## 2023-03-02 RX ADMIN — ACETAMINOPHEN 650 MG: 325 TABLET ORAL at 03:22

## 2023-03-02 RX ADMIN — METOLAZONE 2.5 MG: 2.5 TABLET ORAL at 12:53

## 2023-03-02 RX ADMIN — LISINOPRIL 40 MG: 20 TABLET ORAL at 09:42

## 2023-03-02 RX ADMIN — FUROSEMIDE 60 MG: 10 INJECTION, SOLUTION INTRAMUSCULAR; INTRAVENOUS at 20:36

## 2023-03-02 RX ADMIN — ALBUTEROL SULFATE 2 PUFF: 90 AEROSOL, METERED RESPIRATORY (INHALATION) at 20:09

## 2023-03-02 RX ADMIN — FUROSEMIDE 60 MG: 10 INJECTION, SOLUTION INTRAMUSCULAR; INTRAVENOUS at 09:44

## 2023-03-02 RX ADMIN — IPRATROPIUM BROMIDE AND ALBUTEROL SULFATE 1 AMPULE: 2.5; .5 SOLUTION RESPIRATORY (INHALATION) at 07:32

## 2023-03-02 RX ADMIN — SODIUM CHLORIDE, PRESERVATIVE FREE 10 ML: 5 INJECTION INTRAVENOUS at 20:36

## 2023-03-02 RX ADMIN — CLOPIDOGREL BISULFATE 75 MG: 75 TABLET ORAL at 09:43

## 2023-03-02 RX ADMIN — ATORVASTATIN CALCIUM 80 MG: 40 TABLET, FILM COATED ORAL at 09:43

## 2023-03-02 RX ADMIN — ALBUTEROL SULFATE 2 PUFF: 90 AEROSOL, METERED RESPIRATORY (INHALATION) at 15:31

## 2023-03-02 RX ADMIN — CARVEDILOL 12.5 MG: 6.25 TABLET, FILM COATED ORAL at 09:43

## 2023-03-02 RX ADMIN — IPRATROPIUM BROMIDE AND ALBUTEROL SULFATE 1 AMPULE: 2.5; .5 SOLUTION RESPIRATORY (INHALATION) at 11:34

## 2023-03-02 RX ADMIN — CARVEDILOL 12.5 MG: 6.25 TABLET, FILM COATED ORAL at 20:35

## 2023-03-02 RX ADMIN — PERFLUTREN 2.2 ML: 6.52 INJECTION, SUSPENSION INTRAVENOUS at 08:05

## 2023-03-02 RX ADMIN — ASPIRIN 81 MG 81 MG: 81 TABLET ORAL at 09:43

## 2023-03-02 RX ADMIN — IPRATROPIUM BROMIDE AND ALBUTEROL SULFATE 1 AMPULE: 2.5; .5 SOLUTION RESPIRATORY (INHALATION) at 01:16

## 2023-03-02 RX ADMIN — EZETIMIBE 10 MG: 10 TABLET ORAL at 09:44

## 2023-03-02 RX ADMIN — FUROSEMIDE 60 MG: 10 INJECTION, SOLUTION INTRAMUSCULAR; INTRAVENOUS at 15:08

## 2023-03-02 RX ADMIN — ISOSORBIDE MONONITRATE 30 MG: 30 TABLET, EXTENDED RELEASE ORAL at 09:44

## 2023-03-02 RX ADMIN — TAMSULOSIN HYDROCHLORIDE 0.4 MG: 0.4 CAPSULE ORAL at 09:43

## 2023-03-02 RX ADMIN — ENOXAPARIN SODIUM 40 MG: 100 INJECTION SUBCUTANEOUS at 09:44

## 2023-03-02 RX ADMIN — SERTRALINE HYDROCHLORIDE 100 MG: 50 TABLET ORAL at 09:43

## 2023-03-02 RX ADMIN — SODIUM CHLORIDE, PRESERVATIVE FREE 10 ML: 5 INJECTION INTRAVENOUS at 09:56

## 2023-03-02 RX ADMIN — INSULIN LISPRO 1 UNITS: 100 INJECTION, SOLUTION INTRAVENOUS; SUBCUTANEOUS at 12:53

## 2023-03-02 ASSESSMENT — PAIN SCALES - GENERAL
PAINLEVEL_OUTOF10: 1
PAINLEVEL_OUTOF10: 0
PAINLEVEL_OUTOF10: 3

## 2023-03-02 ASSESSMENT — PAIN DESCRIPTION - DESCRIPTORS: DESCRIPTORS: ACHING

## 2023-03-02 ASSESSMENT — PAIN DESCRIPTION - ORIENTATION: ORIENTATION: MID

## 2023-03-02 ASSESSMENT — PAIN DESCRIPTION - LOCATION: LOCATION: CHEST

## 2023-03-02 NOTE — RT PROTOCOL NOTE
RT Inhaler-Nebulizer Bronchodilator Protocol Note    There is a bronchodilator order in the chart from a provider indicating to follow the RT Bronchodilator Protocol and there is an Initiate RT Inhaler-Nebulizer Bronchodilator Protocol order as well (see protocol at bottom of note). CXR Findings:  XR CHEST PORTABLE    Result Date: 3/1/2023  Findings consistent with congestive heart failure and/or bilateral mid-basilar pneumonia worse on the right. Findings may be accentuated by underlying chronic lung disease. Otherwise, radiographically nonacute portable chest       The findings from the last RT Protocol Assessment were as follows:   History Pulmonary Disease: Chronic pulmonary disease  Respiratory Pattern: Dyspnea on exertion or RR 21-25 bpm  Breath Sounds: Slightly diminished and/or crackles  Cough: Strong, spontaneous, non-productive  Indication for Bronchodilator Therapy:    Bronchodilator Assessment Score: 6    Aerosolized bronchodilator medication orders have been revised according to the RT Inhaler-Nebulizer Bronchodilator Protocol below. Respiratory Therapist to perform RT Therapy Protocol Assessment initially then follow the protocol. Repeat RT Therapy Protocol Assessment PRN for score 0-3 or on second treatment, BID, and PRN for scores above 3. No Indications - adjust the frequency to every 6 hours PRN wheezing or bronchospasm, if no treatments needed after 48 hours then discontinue using Per Protocol order mode. If indication present, adjust the RT bronchodilator orders based on the Bronchodilator Assessment Score as indicated below. Use Inhaler orders unless patient has one or more of the following: on home nebulizer, not able to hold breath for 10 seconds, is not alert and oriented, cannot activate and use MDI correctly, or respiratory rate 25 breaths per minute or more, then use the equivalent nebulizer order(s) with same Frequency and PRN reasons based on the score.   If a patient is on this medication at home then do not decrease Frequency below that used at home. 0-3 - enter or revise RT bronchodilator order(s) to equivalent RT Bronchodilator order with Frequency of every 4 hours PRN for wheezing or increased work of breathing using Per Protocol order mode. 4-6 - enter or revise RT Bronchodilator order(s) to two equivalent RT bronchodilator orders with one order with BID Frequency and one order with Frequency of every 4 hours PRN wheezing or increased work of breathing using Per Protocol order mode. 7-10 - enter or revise RT Bronchodilator order(s) to two equivalent RT bronchodilator orders with one order with TID Frequency and one order with Frequency of every 4 hours PRN wheezing or increased work of breathing using Per Protocol order mode. 11-13 - enter or revise RT Bronchodilator order(s) to one equivalent RT bronchodilator order with QID Frequency and an Albuterol order with Frequency of every 4 hours PRN wheezing or increased work of breathing using Per Protocol order mode. Greater than 13 - enter or revise RT Bronchodilator order(s) to one equivalent RT bronchodilator order with every 4 hours Frequency and an Albuterol order with Frequency of every 2 hours PRN wheezing or increased work of breathing using Per Protocol order mode. RT to enter RT Home Evaluation for COPD & MDI Assessment order using Per Protocol order mode.     Electronically signed by Kendal Pretty RCP on 3/2/2023 at 1:46 PM

## 2023-03-02 NOTE — CONSULTS
CARDIOLOGY CONSULT NOTE   Reason for consultation:  SOB    Referring physician:  Jozef Umanzor MD     Primary care physician: Radha Payan MD      Dear  Dr. Jozef Umanzor MD   Thanks for the consult. Chief Complaints :  Chief Complaint   Patient presents with    Shortness of Breath     Onset yesterday        History of present illness:Hilaria is a 60 y. o.year old who presents with ongoing shortness of breath and chest tightness he says he cannot lie flat has been having some cough reports some chest pressure which gets worse when he takes in a deep breath he has been compliant with his medication normally takes Lasix 60 twice daily in the emergency department his blood pressure was elevated in 180s admitted with decompensated heart failure with elevated BNP and chest x-ray concerning for pulmonary edema he says his chest pain is more or less persistent on the left side without any radiation  He is known to cardiology for history of ischemic cardiomyopathy ejection fraction of 40 to 45% previous history of coronary artery disease s/p CABG with significant native vessel disease but patent LIMA to LAD and SVG to OM on cardiac cath in March 2022  He also has renal insufficiency      Past medical history:    has a past medical history of Acute cerebrovascular accident (CVA) ([facility] [address] Acute ST elevation myocardial infarction (STEMI) ([facility] [address] B12 deficiency, CAD (coronary artery disease), COPD (chronic obstructive pulmonary disease) ([facility] [address] GERD (gastroesophageal reflux disease), Guillain Barré syndrome ([facility] [address] H/O echocardiogram, Heart murmur, History of echocardiogram, History of myocardial infarction, History of nuclear MUGA test, History of nuclear stress test, History of PTCA, History of PTCA, Hx of cardiovascular stress test, Hx of cardiovascular stress test, HX OTHER MEDICAL, Hyperlipidemia, Hypertension, Impaired glucose tolerance, MUGA, NSTEMI (non-ST elevated myocardial infarction) [facility]), Patient in clinical research study, Pneumonia due to organism, Renal cyst, S/P cardiac catheterization, and Stroke (cerebrum) ([facility] [address] Past surgical history:   has a past surgical history that includes Hand surgery; Coronary angioplasty (6/03); shoulder surgery (Right, 2011); Colonoscopy (8/21/14); Endoscopy, colon, diagnostic (8/21/14); Cardiac catheterization (7/13); Percutaneous Transluminal Coronary Angio (03/2018); Coronary artery bypass graft (N/A, 5/18/2020); and hernia repair (N/A, 12/8/2022). Social History:   reports that he quit smoking about 6 years ago. His smoking use included cigarettes. He has a 90.00 pack-year smoking history. He has never used smokeless tobacco. He reports that he does not drink alcohol and does not use drugs.   Family history:   no family history of CAD, STROKE of DM at early age    Allergies   Allergen Reactions    Ibuprofen Other (See Comments)     Stomach irritation    Augmentin [Amoxicillin-Pot Clavulanate] Rash    Codeine Other (See Comments)     \"I'm out of it\"       aspirin chewable tablet 81 mg, Daily  atorvastatin (LIPITOR) tablet 80 mg, Daily  [START ON 3/2/2023] carvedilol (COREG) tablet 12.5 mg, BID  ezetimibe (ZETIA) tablet 10 mg, Daily  [START ON 3/2/2023] lisinopril (PRINIVIL;ZESTRIL) tablet 10 mg, Daily  sertraline (ZOLOFT) tablet 100 mg, Daily  sodium chloride flush 0.9 % injection 5-40 mL, 2 times per day  sodium chloride flush 0.9 % injection 5-40 mL, PRN  0.9 % sodium chloride infusion, PRN  ondansetron (ZOFRAN-ODT) disintegrating tablet 4 mg, Q8H PRN   Or  ondansetron (ZOFRAN) injection 4 mg, Q6H PRN  polyethylene glycol (GLYCOLAX) packet 17 g, Daily PRN  acetaminophen (TYLENOL) tablet 650 mg, Q6H PRN   Or  acetaminophen (TYLENOL) suppository 650 mg, Q6H PRN  enoxaparin (LOVENOX) injection 40 mg, Daily  [START ON 3/2/2023] furosemide (LASIX) injection 60 mg, BID  labetalol (NORMODYNE;TRANDATE) injection 10 mg, Q6H PRN  ipratropium-albuterol (DUONEB) nebulizer solution 1 ampule, Q4H WA  glucose chewable tablet 16 g, PRN  dextrose bolus 10% 125 mL, PRN   Or  dextrose bolus 10% 250 mL, PRN  glucagon (rDNA) injection 1 mg, PRN  dextrose 10 % infusion, Continuous PRN  insulin lispro (HUMALOG) injection vial 0-4 Units, TID WC  insulin lispro (HUMALOG) injection vial 0-4 Units, Nightly  clopidogrel (PLAVIX) tablet 75 mg, Daily  tamsulosin (FLOMAX) capsule 0.4 mg, Daily    Current Facility-Administered Medications   Medication Dose Route Frequency Provider Last Rate Last Admin    aspirin chewable tablet 81 mg  81 mg Oral Daily Cade Gonzalez MD        atorvastatin (LIPITOR) tablet 80 mg  80 mg Oral Daily Cade Gonzalez MD        [START ON 3/2/2023] carvedilol (COREG) tablet 12.5 mg  12.5 mg Oral BID Cade Gonzalez MD        ezetimibe (ZETIA) tablet 10 mg  10 mg Oral Daily MD Sheri Arroyo [place] ON 3/2/2023] lisinopril (PRINIVIL;ZESTRIL) tablet 10 mg  10 mg Oral Daily Cade Gonzalez MD        sertraline (ZOLOFT) tablet 100 mg  100 mg Oral Daily Cade Gonzalez MD        sodium chloride flush 0.9 % injection 5-40 mL  5-40 mL IntraVENous 2 times per day Cade Gonzalez MD        sodium chloride flush 0.9 % injection 5-40 mL  5-40 mL IntraVENous PRN Cade Gonzalez MD        0.9 % sodium chloride infusion   IntraVENous PRN Cade Gonzalez MD        ondansetron (ZOFRAN-ODT) disintegrating tablet 4 mg  4 mg Oral Q8H PRN Cade Gonzalez MD        Or    ondansetron (ZOFRAN) injection 4 mg  4 mg IntraVENous Q6H PRN Cade Gonzalez MD        polyethylene glycol (GLYCOLAX) packet 17 g  17 g Oral Daily PRN Cade Gonzalez MD        acetaminophen (TYLENOL) tablet 650 mg  650 mg Oral Q6H PRN Cade Gonzalez MD        Or    acetaminophen (TYLENOL) suppository 650 mg  650 mg Rectal Q6H PRN Cade Gonzalez MD        enoxaparin (LOVENOX) injection 40 mg  40 mg SubCUTAneous Daily Cade Gonzalez MD   40 mg at 03/01/23 1836    [START ON 3/2/2023] furosemide (LASIX) injection 60 mg  60 mg IntraVENous BID Cade Gonzalez MD        labetalol (NORMODYNE;TRANDATE) injection 10 mg  10 mg IntraVENous Q6H PRN Blake Peck MD        ipratropium-albuterol (DUONEB) nebulizer solution 1 ampule  1 ampule Inhalation Q4H WA Blake Peck MD        glucose chewable tablet 16 g  4 tablet Oral PRN Blake Peck MD        dextrose bolus 10% 125 mL  125 mL IntraVENous PRN Blake Peck MD        Or    dextrose bolus 10% 250 mL  250 mL IntraVENous PRN Blake Peck MD        glucagon (rDNA) injection 1 mg  1 mg SubCUTAneous PRN Blake Peck MD        dextrose 10 % infusion   IntraVENous Continuous PRN Blake Peck MD        insulin lispro (HUMALOG) injection vial 0-4 Units  0-4 Units SubCUTAneous TID WC Blake Peck MD        insulin lispro (HUMALOG) injection vial 0-4 Units  0-4 Units SubCUTAneous Nightly Blake Peck MD        clopidogrel (PLAVIX) tablet 75 mg  75 mg Oral Daily Blake Peck MD        tamsulosin (FLOMAX) capsule 0.4 mg  0.4 mg Oral Daily Blake Peck MD         Review of Systems:   Constitutional: No Fever or Weight Loss   Eyes: No Decreased Vision  ENT: No Headaches, Hearing Loss or Vertigo  Cardiovascular: As per HPI  Respiratory: As per HPI  Gastrointestinal: No abdominal pain, appetite loss, blood in stools, constipation, diarrhea or heartburn  Genitourinary: No dysuria, trouble voiding, or hematuria  Musculoskeletal:  No gait disturbance, weakness or joint complaints  Integumentary: No rash or pruritis  Neurological: No TIA or stroke symptoms  Psychiatric: No anxiety or depression  Endocrine: No malaise, fatigue or temperature intolerance  Hematologic/Lymphatic: No bleeding problems, blood clots or swollen lymph nodes  Allergic/Immunologic: No nasal congestion or hives  All systems negative except as marked.         Physical Examination:    Vitals:    03/01/23 1533 03/01/23 1602 03/01/23 1633 03/01/23 1800   BP: (!) 161/85 (!) 178/103 (!) 165/96 (!) 160/124   Pulse: 71 66 70 59   Resp: 14 16 12 18   Temp:   98 °F (36.7 °C) 97.8 °F (36.6 °C)   TempSrc:   Temporal Oral   SpO2: 95% 97% 98% 99% General Appearance:  No distress, conversant    Constitutional:  Well developed, Well nourished, No acute distress, Non-toxic appearance. HENT:  Normocephalic, Atraumatic, Bilateral external ears normal, Oropharynx moist, No oral exudates, Nose normal. Neck- Normal range of motion, No tenderness, Supple, No stridor,no apical-carotid delay  Lymphatics : no palpable lymph nodes  Eyes:  PERRL, EOMI, Conjunctiva normal, No discharge. Respiratory:  Normal breath sounds, No respiratory distress, No wheezing, No chest tenderness. ,no use of accessory muscles, crackles Present  Cardiovascular: (PMI) apex non displaced,no lifts no thrills, ankle swelling Present , 1+, s1 and s2 audible,Murmur. Present, JVD not noted    Abdomen /GI:  Bowel sounds normal, Soft, No tenderness, No masses, No gross visceromegaly   :  No costovertebral angle tenderness   Musculoskeletal:  No edema, no tenderness, no deformities.  Back- no tenderness  Integument:  Well hydrated, no rash   Lymphatic:  No lymphadenopathy noted   Neurologic:  Alert & oriented x 3, CN 2-12 normal, normal motor function, normal sensory function, no focal deficits noted           Medical decision making and Data review:    Lab Review   Recent Labs     03/01/23  1353   WBC 6.2   HGB 14.4   HCT 44.2         Recent Labs     03/01/23  1353      K 3.9      CO2 24   BUN 19   CREATININE 1.7*     Recent Labs     03/01/23  1353   AST 15   ALT 12   BILITOT 0.4   ALKPHOS 71     Recent Labs     03/01/23  1353   TROPONINT 0.013*       Recent Labs     03/01/23  1353   PROBNP 3,943*     Lab Results   Component Value Date    INR 0.90 03/12/2022    PROTIME 11.6 (L) 03/12/2022       EKG: (reviewed by myself)    ECHO:(reviewed by myself)    Chest Xray:(reviewed by myself)  XR CHEST PORTABLE    Result Date: 3/1/2023  EXAMINATION: ONE XRAY VIEW OF THE CHEST 3/1/2023 2:45 pm COMPARISON: 07/07/2022 HISTORY: ORDERING SYSTEM PROVIDED HISTORY: chest pain TECHNOLOGIST PROVIDED HISTORY: Reason for exam:->chest pain Reason for Exam: chest pain FINDINGS: Cardiomegaly, bronchovascular marking prominence worst in the right basilar region. Findings are consistent with congestive heart failure and/or bilateral mid-basilar pneumonia worst in the right basilar region. Findings may be accentuated by underlying chronic lung disease. No large areas of pulmonary consolidation, detectable pleural effusion or pneumothorax. Findings consistent with congestive heart failure and/or bilateral mid-basilar pneumonia worse on the right. Findings may be accentuated by underlying chronic lung disease. Otherwise, radiographically nonacute portable chest       All labs, medications and tests reviewed by myself including data  from outside source , patient and available family . Continue all other medications of all above medical condition listed as is. Impression:  Principal Problem:    Shortness of breath  Active Problems:    Stage 3a chronic kidney disease (HCC)    ASCVD (arteriosclerotic cardiovascular disease)    SOB (shortness of breath)    Ischemic cardiomyopathy    S/P CABG (coronary artery bypass graft)  Resolved Problems:    * No resolved hospital problems. *      Assessment: 60 y. o.year old with PMH of  has a past medical history of Acute cerebrovascular accident (CVA) ([facility] [address] Acute ST elevation myocardial infarction (STEMI) ([facility] [address] B12 deficiency, CAD (coronary artery disease), COPD (chronic obstructive pulmonary disease) ([facility] [address] GERD (gastroesophageal reflux disease), Guillain Barré syndrome ([facility] [address] H/O echocardiogram, Heart murmur, History of echocardiogram, History of myocardial infarction, History of nuclear MUGA test, History of nuclear stress test, History of PTCA, History of PTCA, Hx of cardiovascular stress test, Hx of cardiovascular stress test, HX OTHER MEDICAL, Hyperlipidemia, Hypertension, Impaired glucose tolerance, MUGA, NSTEMI (non-ST elevated myocardial infarction) [facility]), Patient in clinical research study, Pneumonia due to organism, Renal cyst, S/P cardiac catheterization, and Stroke (cerebrum) ([facility] [address] Plan and Recommendations:    Elevated Troponin : Check serial troponin if they are not rising we will continue medical management for now. If troponin are rising , patient may need further invasive / non invasive work up . Continue Aspirin and add  anti anginal therapy for chest pain . Start Imdur 30 mg daily  Nonischemic cardiomyopathy ejection fraction of 40% with renal failure  Chest Pain: serial cardiac enzymes, EKG reviewed, further plan as per enzymes and clinical course  CHF: Acute Systolic/ Diastolic decompensated heart failure. Appears to be volume overloaded . Agree with diuresis. We can try IV Lasix 60mg  TIDID. Depending on response we can titrate diuretics accordingly. Please continue [facility] recommended medical thearpy including Coreg/ Toprol XL  , ACE/ARB  and Aldactone 25 mg daily. Strict Is and Os and Daily weights. chf nurse consult  Uncontrolled hypertension increase lisinopril 40 mg daily  Chronic kidney disease monitor closely  History of CVA on aspirin and Plavix statins  DVT prophylaxis if no contraindication  6. Dyslipidemia: continue statins           Thank you  much for consult and giving us the opportunity in contributing in the care of this patient. Please feel free to call me for any questions.        Silvestre Farrar MD, 3/1/2023 7:04 PM

## 2023-03-02 NOTE — PROGRESS NOTES
V2.0  [facility] [place]      Name:  Sejal Jalloh /Age/Sex: 1960  (60 y.o. male)   MRN & CSN:  [id number] & [id number] Encounter Date/Time: 3/2/2023 9:25 AM EST    [facility]  [facility] PCP: Hany Norman MD       Hospital Day: 2      Subjective:     Chief Complaint: Shortness of Breath (Onset yesterday)     No acute event overnight. Only 690cc of urine since yesterday. Pt states he hasn't been urinating much. Reports no change in his breathing; still feels SOB but pt remains on room air. Endorses pleuritic pressure like chest pain worse on breathing. Denies cough. Denies lightheadedness, dizziness, fever, night sweats, chills,  palpitations, abd pain, nausea, vomiting, diarrhea, dysuria. Assessment and Plan:   Acute on Chronic Systolic and Diastolic heart failure  Presented with  dyspnea, pleuritic chest pain. ProBNP 3943. CXR findings suggestive of CHF vs PNA. Compliant with meds but not with low Na diet. Last ECHO 3/23/2022 EF 40-45%, akinesis of inferior and inferoseptal walls, moderate LVH, mild to moderate MR. Still has SOB but remains on room air. Uoutput 690cc since admission.  -  60 mg IV lasix TID (on home lasix 60 mg PO BID); may need to increase dose will defer to cardiology  - continue home Coreg  - continue lisinopril dose increased to 40 mg daily  - started on Imdur by cardiology  - cardiology consulted; appreciate recs  - strict intake/output, daily weights, tele  - f/u ECHO Complete     Hypertensive urgency  - improving  Hx of HTN. On home Coreg, lisinopril and lasix. Came with 's improved to 160's after home meds in ED. Today 's. - continue lisinopril, Coreg, Imdur   - lasix as above     COPD. Not in excerebration (no wheezing, no productive cough, no hypoxia). On home albuterol, Spiriva  - will do breathing treatment for now  - no indication for steroids     CKD. Cr  1.7 on admission baseline 1.5-1.7.  Signs of volume overload mainly in lungs but no edema. Today Cr 1.8  - diuretics as above  - if Cr is worsened, will consult nephrology  - avoid nephrotoxins  - strict intake/output     CAD. Hx of STEMI. S/p CABG. On home ASA. No ischemic changes on ECG. Troponin unchanged from 7/2022. Has chest pain but appears pleuritic.  - continue home ASA, Plavix, statin  - troponin ordered yesterday still pending      DM Type II. HA1C 6.6 3/1/2023. On home glimepiride   - hypoglycemia protocol  - low dose sliding scale  - POCT glucose       HLD. On ezetimibe and atorvastatin    3/1/2023  - continue home meds        BPH. Endorses urinary frequency. Was On home flomax but seems pt not taking anymore. - resume Flomax       Diet ADULT DIET; Regular; No Added Salt (3-4 gm)   DVT Prophylaxis [x] Lovenox, []  Heparin, [] SCDs, [] Ambulation,  [] Eliquis, [] Xarelto  [] Coumadin   Code Status Full Code   Disposition From: home  Expected Disposition: home  Estimated Date of Discharge: 1-2 days  Patient requires continued admission due to need more diuresis, await cardiology clearance   Surrogate Decision Maker/ POA      Review of Systems:    Review of Systems    See above    Objective: Intake/Output Summary (Last 24 hours) at 3/2/2023 0931  Last data filed at 3/2/2023 0155  Gross per 24 hour   Intake --   Output 690 ml   Net -690 ml        Vitals:   Vitals:    03/02/23 0732   BP:    Pulse:    Resp: 18   Temp:    SpO2:        Physical Exam:     General: NAD  Eyes: EOMI  ENT: neck supple  Cardiovascular: Regular rate. Respiratory: diminished breathing sounds  Gastrointestinal: Soft, non tender  Genitourinary: no suprapubic tenderness  Musculoskeletal: No edema  Skin: warm, dry  Neuro: Alert. Psych: Mood appropriate.      Medications:   Medications:    metOLazone  2.5 mg Oral Daily    aspirin  81 mg Oral Daily    atorvastatin  80 mg Oral Daily    carvedilol  12.5 mg Oral BID    ezetimibe  10 mg Oral Daily    sertraline  100 mg Oral Daily    sodium chloride flush  5-40 mL IntraVENous 2 times per day    enoxaparin  40 mg SubCUTAneous Daily    ipratropium-albuterol  1 ampule Inhalation Q4H WA    insulin lispro  0-4 Units SubCUTAneous TID WC    insulin lispro  0-4 Units SubCUTAneous Nightly    clopidogrel  75 mg Oral Daily    tamsulosin  0.4 mg Oral Daily    furosemide  60 mg IntraVENous TID    lisinopril  40 mg Oral Daily    isosorbide mononitrate  30 mg Oral Daily      Infusions:    sodium chloride      dextrose       PRN Meds: sodium chloride flush, 5-40 mL, PRN  sodium chloride, , PRN  ondansetron, 4 mg, Q8H PRN   Or  ondansetron, 4 mg, Q6H PRN  polyethylene glycol, 17 g, Daily PRN  acetaminophen, 650 mg, Q6H PRN   Or  acetaminophen, 650 mg, Q6H PRN  labetalol, 10 mg, Q6H PRN  glucose, 4 tablet, PRN  dextrose bolus, 125 mL, PRN   Or  dextrose bolus, 250 mL, PRN  glucagon (rDNA), 1 mg, PRN  dextrose, , Continuous PRN      Labs      Recent Results (from the past 24 hour(s))   EKG 12 Lead    Collection Time: 03/01/23  1:47 PM   Result Value Ref Range    Ventricular Rate 74 BPM    Atrial Rate 74 BPM    P-R Interval 158 ms    QRS Duration 96 ms    Q-T Interval 426 ms    QTc Calculation (Bazett) 472 ms    P Axis 46 degrees    R Axis -18 degrees    T Axis 137 degrees    Diagnosis       Normal sinus rhythm  Possible Left atrial enlargement  Left ventricular hypertrophy  ST & T wave abnormality, consider lateral ischemia  Prolonged QT  Abnormal ECG  When compared with ECG of 08-JUL-2022 06:21,  QRS duration has decreased  Minimal criteria for Septal infarct are no longer present  T wave inversion less evident in Anterolateral leads  Confirmed by FANI Gtz ([id number]) on 3/1/2023 6:59:36 PM     CBC with Auto Differential    Collection Time: 03/01/23  1:53 PM   Result Value Ref Range    WBC 6.2 4.0 - 10.5 K/CU MM    RBC 4.57 (L) 4.6 - 6.2 M/CU MM    Hemoglobin 14.4 13.5 - 18.0 GM/DL    Hematocrit 44.2 42 - 52 %    MCV 96.7 78 - 100 FL    MCH 31.5 (H) 27 - 31 PG MCHC 32.6 32.0 - 36.0 %    RDW 13.6 11.7 - 14.9 %    Platelets 198 063 - 617 K/CU MM    MPV 11.8 (H) 7.5 - 11.1 FL    Differential Type AUTOMATED DIFFERENTIAL     Segs Relative 54.1 36 - 66 %    Lymphocytes % 33.5 24 - 44 %    Monocytes % 8.0 (H) 0 - 4 %    Eosinophils % 3.3 (H) 0 - 3 %    Basophils % 0.8 0 - 1 %    Segs Absolute 3.3 K/CU MM    Lymphocytes Absolute 2.1 K/CU MM    Monocytes Absolute 0.5 K/CU MM    Eosinophils Absolute 0.2 K/CU MM    Basophils Absolute 0.1 K/CU MM    Nucleated RBC % 0.0 %    Total Nucleated RBC 0.0 K/CU MM    Total Immature Neutrophil 0.02 K/CU MM    Immature Neutrophil % 0.3 0 - 0.43 %   CMP    Collection Time: 03/01/23  1:53 PM   Result Value Ref Range    Sodium 138 135 - 145 MMOL/L    Potassium 3.9 3.5 - 5.1 MMOL/L    Chloride 104 99 - 110 mMol/L    CO2 24 21 - 32 MMOL/L    BUN 19 6 - 23 MG/DL    Creatinine 1.7 (H) 0.9 - 1.3 MG/DL    Est, Glom Filt Rate 45 (L) >60 mL/min/1.73m2    Glucose 141 (H) 70 - 99 MG/DL    Calcium 8.5 8.3 - 10.6 MG/DL    Albumin 4.3 3.4 - 5.0 GM/DL    Total Protein 6.7 6.4 - 8.2 GM/DL    Total Bilirubin 0.4 0.0 - 1.0 MG/DL    ALT 12 10 - 40 U/L    AST 15 15 - 37 IU/L    Alkaline Phosphatase 71 40 - 129 IU/L    Anion Gap 10 4 - 16   Magnesium    Collection Time: 03/01/23  1:53 PM   Result Value Ref Range    Magnesium 2.3 1.8 - 2.4 mg/dl   Lipase    Collection Time: 03/01/23  1:53 PM   Result Value Ref Range    Lipase 31 13 - 60 IU/L   Troponin    Collection Time: 03/01/23  1:53 PM   Result Value Ref Range    Troponin T 0.013 (H) <0.01 NG/ML   Brain Natriuretic Peptide    Collection Time: 03/01/23  1:53 PM   Result Value Ref Range    Pro-BNP 3,943 (H) <300 PG/ML   Procalcitonin    Collection Time: 03/01/23  1:53 PM   Result Value Ref Range    Procalcitonin 0.069    Hemoglobin A1C    Collection Time: 03/01/23  2:43 PM   Result Value Ref Range    Hemoglobin A1C 6.6 (H) 4.2 - 6.3 %    eAG 143 mg/dL   POCT Glucose    Collection Time: 03/01/23  6:31 PM   Result Value Ref Range    POC Glucose 118 (H) 70 - 99 MG/DL   POCT Glucose    Collection Time: 03/01/23  9:35 PM   Result Value Ref Range    POC Glucose 206 (H) 70 - 99 MG/DL   Troponin    Collection Time: 03/01/23  9:54 PM   Result Value Ref Range    Troponin T 0.015 (H) <0.01 NG/ML   POCT Glucose    Collection Time: 03/01/23 10:05 PM   Result Value Ref Range    POC Glucose 194 (H) 70 - 99 MG/DL   Basic Metabolic Panel    Collection Time: 03/02/23  2:47 AM   Result Value Ref Range    Sodium 139 135 - 145 MMOL/L    Potassium 3.8 3.5 - 5.1 MMOL/L    Chloride 101 99 - 110 mMol/L    CO2 23 21 - 32 MMOL/L    Anion Gap 15 4 - 16    BUN 21 6 - 23 MG/DL    Creatinine 1.9 (H) 0.9 - 1.3 MG/DL    Est, Glom Filt Rate 39 (L) >60 mL/min/1.73m2    Glucose 182 (H) 70 - 99 MG/DL    Calcium 8.8 8.3 - 10.6 MG/DL   Magnesium    Collection Time: 03/02/23  2:47 AM   Result Value Ref Range    Magnesium 2.3 1.8 - 2.4 mg/dl   Lipid Panel    Collection Time: 03/02/23  2:47 AM   Result Value Ref Range    Triglycerides 169 (H) <150 MG/DL    Cholesterol 180 <200 MG/DL    HDL 33 (L) >40 MG/DL    LDL Calculated 113 (H) <100 MG/DL   TSH with Reflex    Collection Time: 03/02/23  2:47 AM   Result Value Ref Range    TSH, High Sensitivity 1.220 0.270 - 4.20 uIu/ml   CBC with Auto Differential    Collection Time: 03/02/23  2:47 AM   Result Value Ref Range    WBC 5.8 4.0 - 10.5 K/CU MM    RBC 4.54 (L) 4.6 - 6.2 M/CU MM    Hemoglobin 14.3 13.5 - 18.0 GM/DL    Hematocrit 44.2 42 - 52 %    MCV 97.4 78 - 100 FL    MCH 31.5 (H) 27 - 31 PG    MCHC 32.4 32.0 - 36.0 %    RDW 13.8 11.7 - 14.9 %    Platelets 006 733 - 010 K/CU MM    MPV 12.2 (H) 7.5 - 11.1 FL    Differential Type AUTOMATED DIFFERENTIAL     Segs Relative 54.8 36 - 66 %    Lymphocytes % 29.6 24 - 44 %    Monocytes % 10.1 (H) 0 - 4 %    Eosinophils % 4.3 (H) 0 - 3 %    Basophils % 0.9 0 - 1 %    Segs Absolute 3.2 K/CU MM    Lymphocytes Absolute 1.7 K/CU MM    Monocytes Absolute 0.6 K/CU MM    Eosinophils Absolute 0.3 K/CU MM    Basophils Absolute 0.1 K/CU MM    Nucleated RBC % 0.0 %    Total Nucleated RBC 0.0 K/CU MM    Total Immature Neutrophil 0.02 K/CU MM    Immature Neutrophil % 0.3 0 - 0.43 %   POCT Glucose    Collection Time: 03/02/23  8:17 AM   Result Value Ref Range    POC Glucose 143 (H) 70 - 99 MG/DL        Imaging/Diagnostics Last 24 Hours   XR CHEST PORTABLE    Result Date: 3/1/2023  EXAMINATION: ONE XRAY VIEW OF THE CHEST 3/1/2023 2:45 pm COMPARISON: 07/07/2022 HISTORY: ORDERING SYSTEM PROVIDED HISTORY: chest pain TECHNOLOGIST PROVIDED HISTORY: Reason for exam:->chest pain Reason for Exam: chest pain FINDINGS: Cardiomegaly, bronchovascular marking prominence worst in the right basilar region. Findings are consistent with congestive heart failure and/or bilateral mid-basilar pneumonia worst in the right basilar region. Findings may be accentuated by underlying chronic lung disease. No large areas of pulmonary consolidation, detectable pleural effusion or pneumothorax. Findings consistent with congestive heart failure and/or bilateral mid-basilar pneumonia worse on the right. Findings may be accentuated by underlying chronic lung disease.   Otherwise, radiographically nonacute portable chest       Electronically signed by Rafael Urena MD on 3/2/2023 at 9:31 AM

## 2023-03-02 NOTE — PLAN OF CARE
Problem: Discharge Planning  Goal: Discharge to home or other facility with appropriate resources  Outcome: Progressing  Flowsheets  Taken 3/1/2023 2026 by Heide Le RN  Discharge to home or other facility with appropriate resources: Identify barriers to discharge with patient and caregiver  Taken 3/1/2023 1800 by Genesis Arambula RN  Discharge to home or other facility with appropriate resources:   Identify barriers to discharge with patient and caregiver   Arrange for needed discharge resources and transportation as appropriate   Identify discharge learning needs (meds, wound care, etc)   Arrange for interpreters to assist at discharge as needed   Refer to discharge planning if patient needs post-hospital services based on physician order or complex needs related to functional status, cognitive ability or social support system     Problem: Pain  Goal: Verbalizes/displays adequate comfort level or baseline comfort level  Outcome: Progressing

## 2023-03-02 NOTE — PROGRESS NOTES
Cardiology Progress Note     Admit Date:  3/1/2023    Consult reason/ Seen today for :       Subjective and  Overnight Events : He is feeling much better today blood pressure is better controlled he tells me that he was not taking his heart medication regularly at home and often missing them      Chief complain on admission : 60 y. o.year old who is admitted for  Chief Complaint   Patient presents with    Shortness of Breath     Onset yesterday      Assessment / Plan:  Elevated Troponin : Chronically elevated start Imdur 30 mg daily  Ischemic cardiomyopathy ejection fraction of 40% with renal failure, cath images from March 2022 were reviewed he has 100% occluded native vessels but LIMA to LAD and SVG to OM are patent inferior wall was not revascularized  Pressure was elevated on admission but improved now  CHF: Acute Systolic/ Diastolic decompensated heart failure. Appears to be volume overloaded . Agree with diuresis. We can try IV Lasix 60mg  TIDID. Depending on response we can titrate diuretics accordingly. Please continue [facility] recommended medical thearpy including Coreg/ Toprol XL  , ACE/ARB  and Aldactone 25 mg daily. Strict Is and Os and Daily weights. chf nurse consult  Uncontrolled hypertension increase lisinopril 40 mg daily  Chronic kidney disease monitor closely  History of CVA on aspirin and Plavix statins  DVT prophylaxis if no contraindication  6.    Dyslipidemia: continue statins     Past medical history:    has a past medical history of Acute cerebrovascular accident (CVA) ([facility] [address] Acute ST elevation myocardial infarction (STEMI) ([facility] [address] B12 deficiency, CAD (coronary artery disease), COPD (chronic obstructive pulmonary disease) ([facility] [address] GERD (gastroesophageal reflux disease), Guillain Barré syndrome ([facility] [address] H/O echocardiogram, Heart murmur, History of echocardiogram, History of myocardial infarction, History of nuclear MUGA test, History of nuclear stress test, History of PTCA, History of PTCA, Hx of cardiovascular stress test, Hx of cardiovascular stress test, HX OTHER MEDICAL, Hyperlipidemia, Hypertension, Impaired glucose tolerance, MUGA, NSTEMI (non-ST elevated myocardial infarction) [facility]), Patient in clinical research study, Pneumonia due to organism, Renal cyst, S/P cardiac catheterization, and Stroke (cerebrum) ([facility] [address] Past surgical history:   has a past surgical history that includes Hand surgery; Coronary angioplasty (6/03); shoulder surgery (Right, 2011); Colonoscopy (8/21/14); Endoscopy, colon, diagnostic (8/21/14); Cardiac catheterization (7/13); Percutaneous Transluminal Coronary Angio (03/2018); Coronary artery bypass graft (N/A, 5/18/2020); and hernia repair (N/A, 12/8/2022). Social History:   reports that he quit smoking about 6 years ago. His smoking use included cigarettes. He has a 90.00 pack-year smoking history. He has never used smokeless tobacco. He reports that he does not drink alcohol and does not use drugs. Family history:  family history includes Alcohol Abuse in his brother, father, and mother; Depression in his mother, sister, and sister; Diabetes in his brother; High Cholesterol in his mother; Hypertension in his mother; Stomach Cancer in his brother and father.     Allergies   Allergen Reactions    Ibuprofen Other (See Comments)     Stomach irritation    Augmentin [Amoxicillin-Pot Clavulanate] Rash    Codeine Other (See Comments)     \"I'm out of it\"       Review of Systems:    All 14 systems were reviewed and are negative  Except for the positive findings  which as documented     /66   Pulse 66   Temp 97.7 °F (36.5 °C) (Oral)   Resp 17   Ht 5' 7\" (1.702 m)   Wt 203 lb (92.1 kg)   SpO2 94%   BMI 31.79 kg/m²     Intake/Output Summary (Last 24 hours) at 3/2/2023 1625  Last data filed at 3/2/2023 0155  Gross per 24 hour   Intake --   Output 690 ml   Net -690 ml     Physical Exam:  Constitutional:  Well developed, Well nourished, No acute distress, Non-toxic appearance. HENT:  Normocephalic, Atraumatic, Bilateral external ears normal, Oropharynx moist, No oral exudates, Nose normal. Neck- Normal range of motion, No tenderness, Supple, No stridor. Eyes:  PERRL, EOMI, Conjunctiva normal, No discharge. Respiratory:  Normal breath sounds, No respiratory distress, No wheezing, No chest tenderness. Cardiovascular:  Normal heart rate, Normal rhythm, No murmurs, No rubs, No gallops, JVP not elevated  Abdomen/GI:  Bowel sounds normal, Soft, No tenderness, No masses, No pulsatile masses. Musculoskeletal:  Intact distal pulses, No edema, No tenderness, No cyanosis, No clubbing. Good range of motion in all major joints. No tenderness to palpation or major deformities noted. Back- No tenderness. Integument:  Warm, Dry, No erythema, No rash. Lymphatic:  No lymphadenopathy noted. Neurologic:  Alert & oriented x 3, Normal motor function, Normal sensory function, No focal deficits noted.    Psychiatric:  Affect  and  Mood :no change    Medications:    metOLazone  2.5 mg Oral Daily    albuterol sulfate HFA  2 puff Inhalation BID    aspirin  81 mg Oral Daily    atorvastatin  80 mg Oral Daily    carvedilol  12.5 mg Oral BID    ezetimibe  10 mg Oral Daily    sertraline  100 mg Oral Daily    sodium chloride flush  5-40 mL IntraVENous 2 times per day    enoxaparin  40 mg SubCUTAneous Daily    insulin lispro  0-4 Units SubCUTAneous TID WC    insulin lispro  0-4 Units SubCUTAneous Nightly    clopidogrel  75 mg Oral Daily    tamsulosin  0.4 mg Oral Daily    furosemide  60 mg IntraVENous TID    lisinopril  40 mg Oral Daily    isosorbide mononitrate  30 mg Oral Daily      sodium chloride      dextrose       ipratropium-albuterol, sodium chloride flush, sodium chloride, ondansetron **OR** ondansetron, polyethylene glycol, acetaminophen **OR** acetaminophen, labetalol, glucose, dextrose bolus **OR** dextrose bolus, glucagon (rDNA), dextrose    Lab Data:  CBC:   Recent Labs     03/01/23  1353 03/02/23  0247   WBC 6.2 5.8   HGB 14.4 14.3   HCT 44.2 44.2   MCV 96.7 97.4    158     BMP:   Recent Labs     03/01/23  1353 03/02/23  0247    139   K 3.9 3.8    101   CO2 24 23   BUN 19 21   CREATININE 1.7* 1.9*     PT/INR: No results for input(s): PROTIME, INR in the last 72 hours. BNP:    Recent Labs     03/01/23  1353   PROBNP 3,943*     TROPONIN:   Recent Labs     03/01/23  1353 03/01/23  2154   TROPONINT 0.013* 0.015*        ECHO :   echocardiogram    Assessment:  60 y. o.year old who is admitted for  Chief Complaint   Patient presents with    Shortness of Breath     Onset yesterday    , active issues as noted below:  Impression:  Principal Problem:    Shortness of breath  Active Problems:    Stage 3a chronic kidney disease (HCC)    ASCVD (arteriosclerotic cardiovascular disease)    SOB (shortness of breath)    Ischemic cardiomyopathy    S/P CABG (coronary artery bypass graft)  Resolved Problems:    * No resolved hospital problems. *            All labs, medications and tests reviewed by myself , continue all other medications of all above medical condition listed as is except for changes mentioned above. Thank you very much for consult , please call with questions.     Rosemary Houser MD, MD 3/2/2023 4:25 PM

## 2023-03-02 NOTE — CARE COORDINATION
- Room 4112 --[facility] criteria for Shortness of breath / (Heart Failure / Pneumonia) reviewed at this time, criteria supports the OBSERVATION  status Vs the INPATIENT submitted --PC to MD Alina Walker / Yennifer Jones

## 2023-03-02 NOTE — DISCHARGE INSTRUCTIONS
- please schedule an appointment to see your PCP  - please schedule an appointment to see cardiologist, nephrologist, pulmonologist   - please call your nephrologist if you see any weight gain > 2 pounds  - please go to lab in one week for blood work  - please take medications as prescribed    [employer]. JOANN. ROSALINA. .... [phone] Jovita Garcia  [place] [employer]. ... [phone]  [employer]. [phone]  [employer]. [phone]  [employer]. [phone]  [employer]. [facility] [phone]

## 2023-03-02 NOTE — CARE COORDINATION
Case Management Assessment  Initial Evaluation    Date/Time of Evaluation: 3/2/2023 4:06 PM  Assessment Completed by: Dipika Phillips RN, BSN    If patient is discharged prior to next notation, then this note serves as note for discharge by case management. Patient Name: Donaldo Eddy                   YOB: 1960  Diagnosis: Shortness of breath [R06.02]  Acute on chronic systolic congestive heart failure ([facility] [address] [I50.23]  Chest pain, unspecified type [R07.9]  SOB (shortness of breath) [R06.02]                   Date / Time: 3/1/2023  2:12 PM    Patient Admission Status: Observation   Readmission Risk (Low < 19, Mod (19-27), High > 27): Readmission Risk Score: 16.7    Current PCP: Caridad Peterson MD  PCP verified by CM? Yes    Chart Reviewed: Yes      History Provided by: Patient  Patient Orientation: Alert and Oriented    Patient Cognition: Alert    Hospitalization in the last 30 days (Readmission):  No    If yes, Readmission Assessment in CM Navigator will be completed. Advance Directives:      Code Status: Full Code   Patient's Primary Decision Maker is:        Discharge Planning:    Patient lives with:   Type of Home:    Primary Care Giver: Self  Patient Support Systems include: Family Members (son and sister live in [facility])   Current Financial resources: Medicare  Current community resources:    Current services prior to admission:              Current DME:              Type of Home Care services:       ADLS  Prior functional level: Independent in ADLs/IADLs  Current functional level: Independent in ADLs/IADLs    PT AM-PAC:   /24  OT AM-PAC:   /24    Family can provide assistance at DC: No (wife is disabled)  Would you like Case Management to discuss the discharge plan with any other family members/significant others, and if so, who?  No  Plans to Return to Present Housing: Yes  Other Identified Issues/Barriers to RETURNING to current housing: Home with wife  Potential Assistance needed at discharge:              Potential DME:    Patient expects to discharge to: [facility] for transportation at discharge:      Financial    Payor: Fifi Lozano / Plan: Femi Small ESSENTIAL/PLUS / Product Type: *No Product type* /     Does insurance require precert for SNF: Yes    Potential assistance Purchasing Medications:    [facility] [phone] Chloé Burns, [facility] [address] [place] [address] [phone] [facility] [phone]  [address] [address]  Phone: [phone] Fax: [phone]      Notes:    Factors facilitating achievement of predicted outcomes: Motivated, Cooperative, and Pleasant    Barriers to discharge: Limited family support    Additional Case Management Notes: Discussed in IDR then spoke with pt about discharge needs/plans. Pt lives with his wife who is disabled. He is independent with ADL's and transportation. He uses no DME but wife has lots , he would use hers if needed. He drives and family jennifer be able to help with transportation if needed. Placed CC transportation options into AVS.  Pt has a PCP and insurance that covers medications. We discussed HC but does not feel needed at this time. Plan is home with wife, denied any needs at this time. CM available as needed. The Plan for Transition of Care is related to the following treatment goals of Shortness of breath [R06.02]  Acute on chronic systolic congestive heart failure (HCC) [I50.23]  Chest pain, unspecified type [R07.9]  SOB (shortness of breath) [Q53.17]    IF APPLICABLE: The Patient and/or patient representative Ace Li and his family were provided with a choice of provider and agrees with the discharge plan.  Freedom of choice list with basic dialogue that supports the patient's individualized plan of care/goals and shares the quality data associated with the providers was provided to:     Patient Representative Name:       The Patient and/or Patient Representative Agree with the Discharge Plan?  Yes    Jules Augustin RN, BSN  Case Management Department  Ph: [phone]

## 2023-03-02 NOTE — CONSULTS
Nutrition Education    Educated on Heart Failure Nutrition Therapy   Learners: Patient  Method: Handout  Pt resting, left him w/ handout, noted to contact RD w/ any questions. Contact name and number provided.     Stephan Chan, [address] [place] [address]  Contact Number: [phone]

## 2023-03-02 NOTE — PROGRESS NOTES
Patient states that he is still having chest pressure. He does not think his breathing is improved. He only had 650 ml out last night. Plan:   Chest pain with Hx of CAD: troponin has slight elevation which could be secondary to CHF, will recheck for trend. He did not get his Imdur last evening. Asked nurse to give this morning. CHF: appears overloaded. Continue diuresis with lasix 60 mg IVP TID. Will add metolazone. EF 40-45% 3/2022. Echo today. Continue coreg. Low sodium diet, restrict fluids, strict intake and output documentation with daily weights. Ask CHF nurse to see. HTN: uncontrolled on admission, suspect anxiety from difficulty breathing and fluid overload, as he only received his home dose of coreg lisinopril and IVP lasix. Currently blood pressure is controlled. Will monitor during diuresis.

## 2023-03-03 LAB
AMPHETAMINES: NEGATIVE
ANION GAP SERPL CALCULATED.3IONS-SCNC: 17 MMOL/L (ref 4–16)
BACTERIA: NEGATIVE /HPF
BARBITURATE SCREEN URINE: NEGATIVE
BASOPHILS ABSOLUTE: 0.1 K/CU MM
BASOPHILS RELATIVE PERCENT: 0.9 % (ref 0–1)
BENZODIAZEPINE SCREEN, URINE: NEGATIVE
BILIRUBIN URINE: NEGATIVE MG/DL
BLOOD, URINE: NEGATIVE
BUN SERPL-MCNC: 28 MG/DL (ref 6–23)
CALCIUM SERPL-MCNC: 9.2 MG/DL (ref 8.3–10.6)
CANNABINOID SCREEN URINE: NEGATIVE
CHLORIDE BLD-SCNC: 98 MMOL/L (ref 99–110)
CLARITY: CLEAR
CO2: 22 MMOL/L (ref 21–32)
COCAINE METABOLITE: NEGATIVE
COLOR: YELLOW
CREAT SERPL-MCNC: 2.3 MG/DL (ref 0.9–1.3)
DIFFERENTIAL TYPE: ABNORMAL
EOSINOPHILS ABSOLUTE: 0.2 K/CU MM
EOSINOPHILS RELATIVE PERCENT: 2.9 % (ref 0–3)
GFR SERPL CREATININE-BSD FRML MDRD: 31 ML/MIN/1.73M2
GLUCOSE BLD-MCNC: 161 MG/DL (ref 70–99)
GLUCOSE BLD-MCNC: 170 MG/DL (ref 70–99)
GLUCOSE BLD-MCNC: 183 MG/DL (ref 70–99)
GLUCOSE BLD-MCNC: 203 MG/DL (ref 70–99)
GLUCOSE SERPL-MCNC: 205 MG/DL (ref 70–99)
GLUCOSE, URINE: 500 MG/DL
HCT VFR BLD CALC: 45.3 % (ref 42–52)
HEMOGLOBIN: 14.5 GM/DL (ref 13.5–18)
HYALINE CASTS: 5 /LPF
IMMATURE NEUTROPHIL %: 0.3 % (ref 0–0.43)
KETONES, URINE: NEGATIVE MG/DL
LEUKOCYTE ESTERASE, URINE: NEGATIVE
LYMPHOCYTES ABSOLUTE: 1.4 K/CU MM
LYMPHOCYTES RELATIVE PERCENT: 21.2 % (ref 24–44)
MAGNESIUM: 2.2 MG/DL (ref 1.8–2.4)
MCH RBC QN AUTO: 32.2 PG (ref 27–31)
MCHC RBC AUTO-ENTMCNC: 32 % (ref 32–36)
MCV RBC AUTO: 100.4 FL (ref 78–100)
MONOCYTES ABSOLUTE: 0.6 K/CU MM
MONOCYTES RELATIVE PERCENT: 8.7 % (ref 0–4)
MUCUS: ABNORMAL HPF
NITRITE URINE, QUANTITATIVE: NEGATIVE
NUCLEATED RBC %: 0 %
OPIATES, URINE: NEGATIVE
OXYCODONE: NEGATIVE
PDW BLD-RTO: 14 % (ref 11.7–14.9)
PH, URINE: 5.5 (ref 5–8)
PHENCYCLIDINE, URINE: NEGATIVE
PLATELET # BLD: 175 K/CU MM (ref 140–440)
PMV BLD AUTO: 12.3 FL (ref 7.5–11.1)
POTASSIUM SERPL-SCNC: 3.8 MMOL/L (ref 3.5–5.1)
PROCALCITONIN SERPL-MCNC: 0.12 NG/ML
PROTEIN UA: 100 MG/DL
RBC # BLD: 4.51 M/CU MM (ref 4.6–6.2)
RBC URINE: ABNORMAL /HPF (ref 0–3)
SEGMENTED NEUTROPHILS ABSOLUTE COUNT: 4.5 K/CU MM
SEGMENTED NEUTROPHILS RELATIVE PERCENT: 66 % (ref 36–66)
SODIUM BLD-SCNC: 137 MMOL/L (ref 135–145)
SODIUM URINE: 71 MMOL/L (ref 35–167)
SPECIFIC GRAVITY UA: >1.03 (ref 1–1.03)
SQUAMOUS EPITHELIAL: 3 /HPF
TOTAL IMMATURE NEUTOROPHIL: 0.02 K/CU MM
TOTAL NUCLEATED RBC: 0 K/CU MM
TRICHOMONAS: ABNORMAL /HPF
UROBILINOGEN, URINE: 0.2 MG/DL (ref 0.2–1)
WBC # BLD: 6.8 K/CU MM (ref 4–10.5)
WBC UA: ABNORMAL /HPF (ref 0–2)

## 2023-03-03 PROCEDURE — 83735 ASSAY OF MAGNESIUM: CPT

## 2023-03-03 PROCEDURE — 6370000000 HC RX 637 (ALT 250 FOR IP): Performed by: STUDENT IN AN ORGANIZED HEALTH CARE EDUCATION/TRAINING PROGRAM

## 2023-03-03 PROCEDURE — 84145 PROCALCITONIN (PCT): CPT

## 2023-03-03 PROCEDURE — 96372 THER/PROPH/DIAG INJ SC/IM: CPT

## 2023-03-03 PROCEDURE — 80048 BASIC METABOLIC PNL TOTAL CA: CPT

## 2023-03-03 PROCEDURE — 84300 ASSAY OF URINE SODIUM: CPT

## 2023-03-03 PROCEDURE — 82962 GLUCOSE BLOOD TEST: CPT

## 2023-03-03 PROCEDURE — 94761 N-INVAS EAR/PLS OXIMETRY MLT: CPT

## 2023-03-03 PROCEDURE — 94640 AIRWAY INHALATION TREATMENT: CPT

## 2023-03-03 PROCEDURE — 6360000002 HC RX W HCPCS: Performed by: STUDENT IN AN ORGANIZED HEALTH CARE EDUCATION/TRAINING PROGRAM

## 2023-03-03 PROCEDURE — 6370000000 HC RX 637 (ALT 250 FOR IP): Performed by: NURSE PRACTITIONER

## 2023-03-03 PROCEDURE — G0378 HOSPITAL OBSERVATION PER HR: HCPCS

## 2023-03-03 PROCEDURE — 85025 COMPLETE CBC W/AUTO DIFF WBC: CPT

## 2023-03-03 PROCEDURE — 36415 COLL VENOUS BLD VENIPUNCTURE: CPT

## 2023-03-03 PROCEDURE — 99233 SBSQ HOSP IP/OBS HIGH 50: CPT | Performed by: INTERNAL MEDICINE

## 2023-03-03 PROCEDURE — 84166 PROTEIN E-PHORESIS/URINE/CSF: CPT

## 2023-03-03 PROCEDURE — 1200000000 HC SEMI PRIVATE

## 2023-03-03 PROCEDURE — 84156 ASSAY OF PROTEIN URINE: CPT

## 2023-03-03 PROCEDURE — 2580000003 HC RX 258: Performed by: STUDENT IN AN ORGANIZED HEALTH CARE EDUCATION/TRAINING PROGRAM

## 2023-03-03 PROCEDURE — 6370000000 HC RX 637 (ALT 250 FOR IP): Performed by: INTERNAL MEDICINE

## 2023-03-03 PROCEDURE — 81001 URINALYSIS AUTO W/SCOPE: CPT

## 2023-03-03 PROCEDURE — 80307 DRUG TEST PRSMV CHEM ANLYZR: CPT

## 2023-03-03 RX ORDER — GUAIFENESIN/DEXTROMETHORPHAN 100-10MG/5
5 SYRUP ORAL EVERY 4 HOURS PRN
Status: DISCONTINUED | OUTPATIENT
Start: 2023-03-03 | End: 2023-03-05 | Stop reason: HOSPADM

## 2023-03-03 RX ORDER — GUAIFENESIN 600 MG/1
600 TABLET, EXTENDED RELEASE ORAL 2 TIMES DAILY
Status: DISCONTINUED | OUTPATIENT
Start: 2023-03-03 | End: 2023-03-05 | Stop reason: HOSPADM

## 2023-03-03 RX ORDER — HEPARIN SODIUM 5000 [USP'U]/ML
5000 INJECTION, SOLUTION INTRAVENOUS; SUBCUTANEOUS EVERY 8 HOURS SCHEDULED
Status: DISCONTINUED | OUTPATIENT
Start: 2023-03-03 | End: 2023-03-05 | Stop reason: HOSPADM

## 2023-03-03 RX ADMIN — GUAIFENESIN 600 MG: 600 TABLET, EXTENDED RELEASE ORAL at 20:44

## 2023-03-03 RX ADMIN — SODIUM CHLORIDE, PRESERVATIVE FREE 10 ML: 5 INJECTION INTRAVENOUS at 20:45

## 2023-03-03 RX ADMIN — ALBUTEROL SULFATE 2 PUFF: 90 AEROSOL, METERED RESPIRATORY (INHALATION) at 08:01

## 2023-03-03 RX ADMIN — EMPAGLIFLOZIN 10 MG: 10 TABLET, FILM COATED ORAL at 11:24

## 2023-03-03 RX ADMIN — EZETIMIBE 10 MG: 10 TABLET ORAL at 09:22

## 2023-03-03 RX ADMIN — SODIUM CHLORIDE, PRESERVATIVE FREE 10 ML: 5 INJECTION INTRAVENOUS at 09:30

## 2023-03-03 RX ADMIN — ATORVASTATIN CALCIUM 80 MG: 40 TABLET, FILM COATED ORAL at 09:22

## 2023-03-03 RX ADMIN — CLOPIDOGREL BISULFATE 75 MG: 75 TABLET ORAL at 09:22

## 2023-03-03 RX ADMIN — GUAIFENESIN 600 MG: 600 TABLET, EXTENDED RELEASE ORAL at 11:24

## 2023-03-03 RX ADMIN — ASPIRIN 81 MG 81 MG: 81 TABLET ORAL at 09:22

## 2023-03-03 RX ADMIN — CARVEDILOL 12.5 MG: 6.25 TABLET, FILM COATED ORAL at 09:22

## 2023-03-03 RX ADMIN — HEPARIN SODIUM 5000 UNITS: 5000 INJECTION INTRAVENOUS; SUBCUTANEOUS at 14:28

## 2023-03-03 RX ADMIN — HEPARIN SODIUM 5000 UNITS: 5000 INJECTION INTRAVENOUS; SUBCUTANEOUS at 20:45

## 2023-03-03 RX ADMIN — HEPARIN SODIUM 5000 UNITS: 5000 INJECTION INTRAVENOUS; SUBCUTANEOUS at 09:23

## 2023-03-03 RX ADMIN — CARVEDILOL 12.5 MG: 6.25 TABLET, FILM COATED ORAL at 20:44

## 2023-03-03 RX ADMIN — SERTRALINE HYDROCHLORIDE 100 MG: 50 TABLET ORAL at 09:22

## 2023-03-03 RX ADMIN — TAMSULOSIN HYDROCHLORIDE 0.4 MG: 0.4 CAPSULE ORAL at 09:22

## 2023-03-03 ASSESSMENT — PAIN DESCRIPTION - LOCATION: LOCATION: CHEST

## 2023-03-03 ASSESSMENT — PAIN DESCRIPTION - DESCRIPTORS: DESCRIPTORS: PRESSURE

## 2023-03-03 ASSESSMENT — PAIN DESCRIPTION - ORIENTATION: ORIENTATION: LEFT

## 2023-03-03 NOTE — CARE COORDINATION
Reviewed chart and discussed in IDR, plan remains home with wife, still no needs identified. CM will continue to follow.

## 2023-03-03 NOTE — PROGRESS NOTES
Met with patient and re-introduced myself as the Heart failure education RNOEL. I saw this patient for inpatient HF education on 7/7/2022. 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 His spouse requires lifting in and out of bed. Pt. Has recently acquired a anna lift. He does all shopping and cooking. They have only 3 meals a week provided by meal service. Home Health comes 3x a week for spouse. A son and grandson assist in spouse's care. States he does not cook with salt, but does not read labels. Admitting diagnosis- Shortness of breath  Cardiologist- Raymond  Heart Failure Education Nurse consulted- yes   Ejection fraction 30-35% as of 3/1/23  Pro BNP- 3,943 on 3/1/23  Hospital follow up appt-  3/14 at the [facility]   Patient informed of appointment and appointment added to AVS  Cardiac rehabilitation referral-   ICD information-N/A   Patient informed of Heart Failure Clinic at the [facility]  Smoking Cessation information and referral-  Pneumonia vaccine- Pneumovax 23 in 2020  PCP- Donovan Baker  Patient has a digital scale? Yes   Transportation- has transportation    Able to obtain medications without difficulty? - Yes- Patient denies difficulty in obtaining or taking medications. Advised not to stop taking a medication without notifying provider. Heart failure specific Medications-  Lasix, coreg, Lisinopril  Reviewed Heart failure patient education book with patient. Questions answered. Patient's heart failure medications reviewed and information given on each. Reviewed the Stop Light Handout with patient and instructed when to call his provider. Patient was engaged and attentive during education session.     The following handouts were reviewed with patient and patient was given a copy of the following : Heart Failure education booklet, a link to the American [employer] Association's Healthier Living with Heart Failure Interactive workbook and a list of heart failure related education available on the hospital TV and how to access it.

## 2023-03-03 NOTE — PROGRESS NOTES
V2.0  [facility]ist Progress Note      Name:  Ralph Landeros /Age/Sex: 1960  (60 y.o. male)   MRN & CSN:  [id number] & [id number] Encounter Date/Time: 3/3/2023 9:25 AM EST    [facility] PCP: Lindsay Oliver MD       Hospital Day: 3      Subjective:     Chief Complaint: Shortness of Breath (Onset yesterday)     No acute event overnight. Only 1250cc of urine since yesterday unsure if accurate. Pt states he hasn't been urinating much. Reports no change in his breathing; still feels SOB with chest tightness. Remains on room air. Endorses pleuritic pressure like chest pain worse on breathing. Occasional cough but no sputum. Denies lightheadedness, dizziness, fever, night sweats, chills,  palpitations, abd pain, nausea, vomiting, diarrhea, dysuria. Assessment and Plan:   Acute on Chronic Systolic and Diastolic heart failure  Presented with  dyspnea, pleuritic chest pain. ProBNP 3943. CXR findings suggestive of CHF vs PNA. Compliant with meds but not with low Na diet. Last ECHO 3/2/2023 EF 30-35% (was 40-45% 3/2022),  hypokinetic basal lateral/septal and inferior walls, severely dilated LVH,  mildly dilated LA, moderate MR  Still has SOB but remains on room air. Uoutput 1250cc past 24h unsure of accuracy.   -  was on lasix 60 mg IV lasix TID; currently held by nephro given worsening kidney function  - was started on metolazone; currently held  - continue home Coreg  - hold  lisinopril   - started on Imdur by cardiology but held by nephrology  - cardiology consulted; appreciate recs  - strict intake/output, daily weights, tele  - check resp viral panel, procalc to rule out viral/bacterial PNA although less likely    KARUNA on CKD III  Likely cardiorenal type I.   Cr  1.7 on admission baseline 1.5-1.7 but today 2.3.   - diuretics held as above  - nephrology consulted; appreciate recs  - started on Jardiance by nephro  - urine studies ordered by nephro  - avoid nephrotoxins  - strict intake/output       Hypertensive urgency  - improving  Hx of HTN. On home Coreg, lisinopril and lasix. Came with 's improved to 160's after home meds in ED. Today 's. - continue Coreg  -  lisinopril, , Imdur and diuretics being held as above       COPD. Not in excerebration (no wheezing, no productive cough, no hypoxia). On home albuterol, Spiriva  - will do breathing treatment for now  - no indication for steroids          CAD. Hx of STEMI. S/p CABG. On home ASA. No ischemic changes on ECG. Troponin slightly trending upard but likely from KARUNA around his baseline. Has chest pain but appears pleuritic. Cath 3/2022 revealed occluded native vessels but LIMA to LAD and SVG to OM are patent inferior wall was not revascularized  - continue home ASA, Plavix, statin     DM Type II. HA1C 6.6 3/1/2023. On home glimepiride   - hypoglycemia protocol  - low dose sliding scale  - POCT glucose  - started on Jardiance for CKD/CHF by nephrology     HLD. On ezetimibe and atorvastatin    3/1/2023  - continue home meds        BPH. Endorses urinary frequency. Was On home flomax but seems pt not taking anymore. - continue Flomax       Diet ADULT DIET; Regular; No Added Salt (3-4 gm)   DVT Prophylaxis [] Lovenox, [x]  Heparin, [] SCDs, [] Ambulation,  [] Eliquis, [] Xarelto  [] Coumadin   Code Status Full Code   Disposition From: home  Expected Disposition: home  Estimated Date of Discharge: 2-3 days  Patient requires continued admission due to KARUNA, CHF excerebration   Surrogate Decision Maker/ POA      Review of Systems:    Review of Systems    See above    Objective:      Intake/Output Summary (Last 24 hours) at 3/3/2023 0947  Last data filed at 3/3/2023 0930  Gross per 24 hour   Intake 10 ml   Output 1250 ml   Net -1240 ml        Vitals:   Vitals:    03/03/23 0922   BP: 115/65   Pulse: 67   Resp:    Temp:    SpO2:        Physical Exam:     General: NAD  Eyes: EOMI  ENT: neck supple  Cardiovascular: Regular rate.  Respiratory: clear breathing sounds  Gastrointestinal: Soft, non tender  Genitourinary: no suprapubic tenderness  Musculoskeletal: No edema  Skin: warm, dry  Neuro: Alert. Psych: Mood appropriate.      Medications:   Medications:    heparin (porcine)  5,000 Units SubCUTAneous 3 times per day    empagliflozin  10 mg Oral Daily    guaiFENesin  600 mg Oral BID    [Held by provider] metOLazone  2.5 mg Oral Daily    albuterol sulfate HFA  2 puff Inhalation BID    aspirin  81 mg Oral Daily    atorvastatin  80 mg Oral Daily    carvedilol  12.5 mg Oral BID    ezetimibe  10 mg Oral Daily    sertraline  100 mg Oral Daily    sodium chloride flush  5-40 mL IntraVENous 2 times per day    insulin lispro  0-4 Units SubCUTAneous TID WC    insulin lispro  0-4 Units SubCUTAneous Nightly    clopidogrel  75 mg Oral Daily    tamsulosin  0.4 mg Oral Daily    [Held by provider] furosemide  60 mg IntraVENous TID    [Held by provider] lisinopril  40 mg Oral Daily    [Held by provider] isosorbide mononitrate  30 mg Oral Daily      Infusions:    sodium chloride      dextrose       PRN Meds: guaiFENesin-dextromethorphan, 5 mL, Q4H PRN  ipratropium-albuterol, 1 ampule, Q4H PRN  sodium chloride flush, 5-40 mL, PRN  sodium chloride, , PRN  ondansetron, 4 mg, Q8H PRN   Or  ondansetron, 4 mg, Q6H PRN  polyethylene glycol, 17 g, Daily PRN  acetaminophen, 650 mg, Q6H PRN   Or  acetaminophen, 650 mg, Q6H PRN  glucose, 4 tablet, PRN  dextrose bolus, 125 mL, PRN   Or  dextrose bolus, 250 mL, PRN  glucagon (rDNA), 1 mg, PRN  dextrose, , Continuous PRN      Labs      Recent Results (from the past 24 hour(s))   POCT Glucose    Collection Time: 03/02/23 12:26 PM   Result Value Ref Range    POC Glucose 246 (H) 70 - 99 MG/DL   POCT Glucose    Collection Time: 03/02/23  4:30 PM   Result Value Ref Range    POC Glucose 191 (H) 70 - 99 MG/DL   POCT Glucose    Collection Time: 03/02/23  7:38 PM   Result Value Ref Range    POC Glucose 201 (H) 70 - 99 MG/DL   Basic Metabolic Panel    Collection Time: 03/03/23  2:21 AM   Result Value Ref Range    Sodium 137 135 - 145 MMOL/L    Potassium 3.8 3.5 - 5.1 MMOL/L    Chloride 98 (L) 99 - 110 mMol/L    CO2 22 21 - 32 MMOL/L    Anion Gap 17 (H) 4 - 16    BUN 28 (H) 6 - 23 MG/DL    Creatinine 2.3 (H) 0.9 - 1.3 MG/DL    Est, Glom Filt Rate 31 (L) >60 mL/min/1.73m2    Glucose 205 (H) 70 - 99 MG/DL    Calcium 9.2 8.3 - 10.6 MG/DL   Magnesium    Collection Time: 03/03/23  2:21 AM   Result Value Ref Range    Magnesium 2.2 1.8 - 2.4 mg/dl   POCT Glucose    Collection Time: 03/03/23  7:54 AM   Result Value Ref Range    POC Glucose 161 (H) 70 - 99 MG/DL        Imaging/Diagnostics Last 24 Hours   XR CHEST PORTABLE    Result Date: 3/1/2023  EXAMINATION: ONE XRAY VIEW OF THE CHEST 3/1/2023 2:45 pm COMPARISON: 07/07/2022 HISTORY: ORDERING SYSTEM PROVIDED HISTORY: chest pain TECHNOLOGIST PROVIDED HISTORY: Reason for exam:->chest pain Reason for Exam: chest pain FINDINGS: Cardiomegaly, bronchovascular marking prominence worst in the right basilar region. Findings are consistent with congestive heart failure and/or bilateral mid-basilar pneumonia worst in the right basilar region. Findings may be accentuated by underlying chronic lung disease. No large areas of pulmonary consolidation, detectable pleural effusion or pneumothorax. Findings consistent with congestive heart failure and/or bilateral mid-basilar pneumonia worse on the right. Findings may be accentuated by underlying chronic lung disease.   Otherwise, radiographically nonacute portable chest       Electronically signed by Cade Gonzalez MD on 3/3/2023 at 9:47 AM

## 2023-03-03 NOTE — CONSULTS
Patient:   Lacie Mcfarlane    Date:  23  :  1960, 60 y.o. Nephrologist: Waldo Ledezma MD  Provider: Cheryl Jose MD    Reason for Consult: Acute kidney injury with underlying CKD stage III    Consult requested by : Dr Abbey Alejandre MD    Chief Complaint:   Shortness of breath and chest tightness for couple of days    HISTORY OF PRESENT ILLNESS/Brief hospital course  AND  brief background history    Mr. Osmel Gregg, is a 69-year-old male, who drove himself in the emergency department on 2023 with 1 to 2 days history of shortness of breath and chest tightness. On arrival in our emergency department, his blood pressure was rather high initially 180s over 80s, otherwise he was afebrile and oxygen saturation was 98% while breathing ambient air. He underwent several diagnostic tests which include imaging, biochemical and electrographic. Electrocardiogram showed ST and T wave abnormality along with left ventricular hypertrophy which probably not new compared to the old one. Chest x-ray showed bihilar congestion, more infiltrative process in the right than the left. Biochemical testing showed creatinine 1.7 with acceptable electrolyte, high-protein with level almost close to 4000. Intravenous furosemide 60 mg were given followed by 60 mg 3 times a day and he was subsequently admitted for further evaluation and management. He is eating but recorded about 1.25 L for the last 24 hours, when I went to see him he is little bit uncomfortable, he said he has some hot flashes, not feeling himself. He was rather restless. He could not recognize me of since I have seen him since 2020.   Looks like he lost significant amount of weight since then      Creatinine trend/ Kidney data :    His creatinine was 1.0 to 1.1 mg/dL range back in 2010, he had an acute kidney injury with peak creatinine of 1.6 back in May 2020, looks like since then his creatinine is fluctuating but lately running in the vicinity of 1.5 to 1.8 mg/dL range    Heart/ cardiac data :     2D echo on March 2, 2023    Summary   Definity contrast utilized. Left ventricular systolic function is abnormal.   Ejection fraction is visually estimated at 30-35 %. Basal lateral wall, basal septal wall, and inferior wall segments are   hypokinetic. Left ventricle is severely dilated. Mildly dilated left atrium. Moderate mitral regurgitation. No evidence of any pericardial effusion. Abdominal aorta is dilated measuring 2.3 cm. Heart catheterization in March 2022    Conclusions      Procedure Summary   Indication : stemi activation Access Femoral   1. LAD and RCA are 100 % occluded   2. Circ is patent   LIMA to LAd and SVG to OM2 are patent   LVEDP was 28      Recommendations   Continue medical management    Open heart surgery in May 2020    Procedure:  CABG X 2  LIMA to LAD  SVG to OM2     Additional procedure:  Triple lumen CVP  [employer] Ale catheter  Vein Mapping         Kidney imaging :     CT of the abdomen and pelvis without definite contrast in the level 2022    right adrenal gland and kidneys are normal.   There is a small right renal cyst.  There is a 2.1 cm left adrenal gland   nodule that is unchanged from 07/10/2015, likely an adenoma. There is a   small focus of calcification in the left adrenal, unchanged. PAST MEDICAL HISTORY:  1.  CKD stage IIIa/b A1.  2.  Atherosclerotic cardiovascular disease, of course he had bypass on  05/18/2020 with LIMA to LAD as well as saphenous vein graft to obtuse  marginal 2. He obviously had several stents prior to that. His last EF  was about 35% to 40% or so. He did have decompensated heart failure  before. 3.  Apparently, he had Guillain-Spicer syndrome about roughly in 2016     4. He also he had a CVA in the distant past without much residual  symptoms.   5.  Diabetes mellitus type 2, diagnosed roughly in 2015, did not  have any proteinuria and no known retinopathy. 6.  Hypertension. 7.  Gastroesophageal reflux disorder. 8.  Hyperlipidemia. HABITS:  He quit smoking roughly in 2015, has roughly 40-pack-year  history. He used to drink a lot, but quit roughly in 2018. No  history of illicit drug abuse. PAST SURGICAL HISTORY:     CABG as mentioned earlier x2. Several  stents prior to that of coronary artery disease, also had orthopedic  surgery in the distant past as well as surgery for his fingers injury. Hernia repair     SOCIAL HISTORY:  The patient is  for almost 23 years. He is  living with his wife here in town. He does have three biological  children. He is apparently on disability since 2016 .   he used to drive truck. He does have some activities of daily  living obviously, he drove himself to the emergency room. FAMILY MEDICAL HISTORY:  cancer runs in the family, mainly    No advanced kidney disease. Nobody is on dialysis in his  family.               REVIEW OF SYSTEMS:     All pertinent ROS neg except   Shortness of breath, chest pressure, restlessness, diaphoresis, hot flashes    Medication :     Reviewed, see in epic    BP (!) 108/58   Pulse 55   Temp 97.7 °F (36.5 °C) (Oral)   Resp 18   Ht 5' 7\" (1.702 m)   Wt 202 lb 2.6 oz (91.7 kg)   SpO2 95%   BMI 31.66 kg/m²     PHYSICAL EXAM:  General appearance: He is little restless but alert awake does not seem like he is comfortable  HEENT: At least 1+ conjunctival pallor no scleral icterus  Neck: Seems supple  Heart: Seems regular rate and rhythm, well-healed incision from previous sternotomy  LUNGS: No gross crackles on auscultation  Abdomen: Soft nontender  Extremities: No edema    LABS:  Reviewed, see in epic            IMPRESSION:  Acute kidney injury with underlying CKD stage IIIa/b- likely from cardiorenal syndrome type I  Acute decompensated heart failure with underlying coronary artery disease and cardiomyopathy  Underlying diabetes hypertension and multiple other chronic disease  Likely left adrenal adenoma    PLAN:    Start with urine urinary indicis-biochemical work-up for functionality of left adrenal adenoma-Jardiance 10 mg daily-hold diuretics-Daily weight, accurately measure urine output and low-salt and DASH diet-good glycemic control-I am unsure why he is so restless and uncomfortable-he denies any illicit drug abuse-we will check urine drug screen just in case-also explore for any other medication or substance withdrawal-or additional underlying acute medical process-follow clinically and biochemically

## 2023-03-03 NOTE — PROGRESS NOTES
Cardiology Progress Note     Admit Date:  3/1/2023    Consult reason/ Seen today for :       Subjective and  Overnight Events : He says that his chest hurts when he takes in a deep breath on the left side he feels congested and full  Renal Function is slightly gotten worse probably due to increasing ACE inhibitor's   blood pressure is better controlled he tells me that he was not taking his heart medication regularly at home and often missing them      Chief complain on admission : 60 y. o.year old who is admitted for  Chief Complaint   Patient presents with    Shortness of Breath     Onset yesterday      Assessment / Plan:  Elevated Troponin : Chronically elevated start Imdur 30 mg daily no further cardiac work-up at this time I think his chest pain is more pleurisy etc. gets worse on deep inspiration, troponin abnormal in the setting of renal failure but flat  Ischemic cardiomyopathy ejection fraction of 40% with renal failure, cath images from March 2022 were reviewed he has 100% occluded native vessels but LIMA to LAD and SVG to OM are patent inferior wall was not revascularized  Pressure was elevated on admission but improved now  CHF: Acute Systolic/ Diastolic decompensated heart failure. Appears to be volume overloaded . Agree with diuresis. We can try IV Lasix 60mg  TIDID. Depending on response we can titrate diuretics accordingly. Please continue [facility] recommended medical thearpy including Coreg/ Toprol XL  , ACE/ARB  and Aldactone 25 mg daily. Strict Is and Os and Daily weights. chf nurse consult  Nephrology consulted due to renal failure  Uncontrolled hypertension increase lisinopril 40 mg daily  History of CVA on aspirin and Plavix statins  DVT prophylaxis if no contraindication  6.    Dyslipidemia: continue statins   We will see as needed basis call with questions    Past medical history:    has a past medical history of Acute cerebrovascular accident (CVA) ([facility]), Acute ST elevation myocardial infarction (STEMI) ([facility]), B12 deficiency, CAD (coronary artery disease), COPD (chronic obstructive pulmonary disease) ([facility]), GERD (gastroesophageal reflux disease), Guillain Barré syndrome ([facility]), H/O echocardiogram, Heart murmur, History of echocardiogram, History of myocardial infarction, History of nuclear MUGA test, History of nuclear stress test, History of PTCA, History of PTCA, Hx of cardiovascular stress test, Hx of cardiovascular stress test, HX OTHER MEDICAL, Hyperlipidemia, Hypertension, Impaired glucose tolerance, MUGA, NSTEMI (non-ST elevated myocardial infarction) ([facility]), Patient in clinical research study, Pneumonia due to organism, Renal cyst, S/P cardiac catheterization, and Stroke (cerebrum) ([facility]).  Past surgical history:   has a past surgical history that includes Hand surgery; Coronary angioplasty (6/03); shoulder surgery (Right, 2011); Colonoscopy (8/21/14); Endoscopy, colon, diagnostic (8/21/14); Cardiac catheterization (7/13); Percutaneous Transluminal Coronary Angio (03/2018); Coronary artery bypass graft (N/A, 5/18/2020); and hernia repair (N/A, 12/8/2022).  Social History:   reports that he quit smoking about 6 years ago. His smoking use included cigarettes. He has a 90.00 pack-year smoking history. He has never used smokeless tobacco. He reports that he does not drink alcohol and does not use drugs.  Family history:  family history includes Alcohol Abuse in his brother, father, and mother; Depression in his mother, sister, and sister; Diabetes in his brother; High Cholesterol in his mother; Hypertension in his mother; Stomach Cancer in his brother and father.    Allergies   Allergen Reactions    Ibuprofen Other (See Comments)     Stomach irritation    Augmentin [Amoxicillin-Pot Clavulanate] Rash    Codeine Other (See Comments)     \"I'm out of it\"       Review of Systems:    All 14 systems were reviewed and are negative  Except for the positive findings  which as documented     /65   Pulse 67   Temp 97.8 °F (36.6 °C) (Oral)   Resp 16   Ht 5' 7\" (1.702 m)   Wt 202 lb 2.6 oz (91.7 kg)   SpO2 91%   BMI 31.66 kg/m²     Intake/Output Summary (Last 24 hours) at 3/3/2023 1310  Last data filed at 3/3/2023 1134  Gross per 24 hour   Intake 10 ml   Output 1450 ml   Net -1440 ml     Physical Exam:  Constitutional:  Well developed, Well nourished, No acute distress, Non-toxic appearance. HENT:  Normocephalic, Atraumatic, Bilateral external ears normal, Oropharynx moist, No oral exudates, Nose normal. Neck- Normal range of motion, No tenderness, Supple, No stridor. Eyes:  PERRL, EOMI, Conjunctiva normal, No discharge. Respiratory:  Normal breath sounds, No respiratory distress, No wheezing, No chest tenderness. Cardiovascular:  Normal heart rate, Normal rhythm, No murmurs, No rubs, No gallops, JVP not elevated  Abdomen/GI:  Bowel sounds normal, Soft, No tenderness, No masses, No pulsatile masses. Musculoskeletal:  Intact distal pulses, No edema, No tenderness, No cyanosis, No clubbing. Good range of motion in all major joints. No tenderness to palpation or major deformities noted. Back- No tenderness. Integument:  Warm, Dry, No erythema, No rash. Lymphatic:  No lymphadenopathy noted. Neurologic:  Alert & oriented x 3, Normal motor function, Normal sensory function, No focal deficits noted.    Psychiatric:  Affect  and  Mood :no change    Medications:    heparin (porcine)  5,000 Units SubCUTAneous 3 times per day    empagliflozin  10 mg Oral Daily    guaiFENesin  600 mg Oral BID    [Held by provider] metOLazone  2.5 mg Oral Daily    albuterol sulfate HFA  2 puff Inhalation BID    aspirin  81 mg Oral Daily    atorvastatin  80 mg Oral Daily    carvedilol  12.5 mg Oral BID    ezetimibe  10 mg Oral Daily    sertraline  100 mg Oral Daily    sodium chloride flush  5-40 mL IntraVENous 2 times per day    insulin lispro  0-4 Units SubCUTAneous TID     insulin lispro  0-4 Units SubCUTAneous Nightly    clopidogrel  75 mg Oral Daily    tamsulosin  0.4 mg Oral Daily    [Held by provider] furosemide  60 mg IntraVENous TID    [Held by provider] lisinopril  40 mg Oral Daily    [Held by provider] isosorbide mononitrate  30 mg Oral Daily      sodium chloride      dextrose       guaiFENesin-dextromethorphan, ipratropium-albuterol, sodium chloride flush, sodium chloride, ondansetron **OR** ondansetron, polyethylene glycol, acetaminophen **OR** acetaminophen, glucose, dextrose bolus **OR** dextrose bolus, glucagon (rDNA), dextrose    Lab Data:  CBC:   Recent Labs     03/01/23  1353 03/02/23  0247 03/03/23  0221   WBC 6.2 5.8 6.8   HGB 14.4 14.3 14.5   HCT 44.2 44.2 45.3   MCV 96.7 97.4 100.4*    158 175     BMP:   Recent Labs     03/01/23  1353 03/02/23  0247 03/03/23  0221    139 137   K 3.9 3.8 3.8    101 98*   CO2 24 23 22   BUN 19 21 28*   CREATININE 1.7* 1.9* 2.3*     PT/INR: No results for input(s): PROTIME, INR in the last 72 hours. BNP:    Recent Labs     03/01/23  1353   PROBNP 3,943*     TROPONIN:   Recent Labs     03/01/23  1353 03/01/23  2154 03/02/23  0247   TROPONINT 0.013* 0.015* 0.018*        ECHO :   echocardiogram    Assessment:  60 y. o.year old who is admitted for  Chief Complaint   Patient presents with    Shortness of Breath     Onset yesterday    , active issues as noted below:  Impression:  Principal Problem:    Shortness of breath  Active Problems:    Stage 3a chronic kidney disease (HCC)    ASCVD (arteriosclerotic cardiovascular disease)    SOB (shortness of breath)    Ischemic cardiomyopathy    S/P CABG (coronary artery bypass graft)  Resolved Problems:    * No resolved hospital problems. *            All labs, medications and tests reviewed by myself , continue all other medications of all above medical condition listed as is except for changes mentioned above.     Thank you very much for consult , please call with questions.     Rufina Aase, MD, MD 3/3/2023 1:10 PM

## 2023-03-04 ENCOUNTER — APPOINTMENT (OUTPATIENT)
Dept: ULTRASOUND IMAGING | Age: 63
DRG: 291 | End: 2023-03-04
Payer: MEDICARE

## 2023-03-04 LAB
ALBUMIN SERPL-MCNC: 4.3 GM/DL (ref 3.4–5)
ALP BLD-CCNC: 70 IU/L (ref 40–128)
ALT SERPL-CCNC: 10 U/L (ref 10–40)
ANION GAP SERPL CALCULATED.3IONS-SCNC: 15 MMOL/L (ref 4–16)
AST SERPL-CCNC: 12 IU/L (ref 15–37)
B PARAP IS1001 DNA NPH QL NAA+NON-PROBE: NOT DETECTED
B PERT.PT PRMT NPH QL NAA+NON-PROBE: NOT DETECTED
BILIRUB SERPL-MCNC: 0.7 MG/DL (ref 0–1)
BUN SERPL-MCNC: 33 MG/DL (ref 6–23)
C PNEUM DNA NPH QL NAA+NON-PROBE: NOT DETECTED
CALCIUM SERPL-MCNC: 9.2 MG/DL (ref 8.3–10.6)
CHLORIDE BLD-SCNC: 98 MMOL/L (ref 99–110)
CO2: 24 MMOL/L (ref 21–32)
CREAT SERPL-MCNC: 2.1 MG/DL (ref 0.9–1.3)
D DIMER: 327 NG/ML(DDU)
FLUAV H1 2009 PAN RNA NPH NAA+NON-PROBE: NOT DETECTED
FLUAV H1 RNA NPH QL NAA+NON-PROBE: NOT DETECTED
FLUAV H3 RNA NPH QL NAA+NON-PROBE: NOT DETECTED
FLUAV RNA NPH QL NAA+NON-PROBE: NOT DETECTED
FLUBV RNA NPH QL NAA+NON-PROBE: NOT DETECTED
GFR SERPL CREATININE-BSD FRML MDRD: 35 ML/MIN/1.73M2
GLUCOSE BLD-MCNC: 128 MG/DL (ref 70–99)
GLUCOSE BLD-MCNC: 133 MG/DL (ref 70–99)
GLUCOSE BLD-MCNC: 179 MG/DL (ref 70–99)
GLUCOSE BLD-MCNC: 243 MG/DL (ref 70–99)
GLUCOSE SERPL-MCNC: 173 MG/DL (ref 70–99)
HADV DNA NPH QL NAA+NON-PROBE: NOT DETECTED
HCOV 229E RNA NPH QL NAA+NON-PROBE: NOT DETECTED
HCOV HKU1 RNA NPH QL NAA+NON-PROBE: NOT DETECTED
HCOV NL63 RNA NPH QL NAA+NON-PROBE: NOT DETECTED
HCOV OC43 RNA NPH QL NAA+NON-PROBE: NOT DETECTED
HMPV RNA NPH QL NAA+NON-PROBE: NOT DETECTED
HPIV1 RNA NPH QL NAA+NON-PROBE: NOT DETECTED
HPIV2 RNA NPH QL NAA+NON-PROBE: NOT DETECTED
HPIV3 RNA NPH QL NAA+NON-PROBE: NOT DETECTED
HPIV4 RNA NPH QL NAA+NON-PROBE: NOT DETECTED
M PNEUMO DNA NPH QL NAA+NON-PROBE: NOT DETECTED
MAGNESIUM: 2.3 MG/DL (ref 1.8–2.4)
PHOSPHORUS: 3.8 MG/DL (ref 2.5–4.9)
POTASSIUM SERPL-SCNC: 3.8 MMOL/L (ref 3.5–5.1)
PRO-BNP: 540 PG/ML
RSV RNA NPH QL NAA+NON-PROBE: NOT DETECTED
RV+EV RNA NPH QL NAA+NON-PROBE: NOT DETECTED
SARS-COV-2 RNA NPH QL NAA+NON-PROBE: NOT DETECTED
SODIUM BLD-SCNC: 137 MMOL/L (ref 135–145)
TOTAL PROTEIN: 6.4 GM/DL (ref 6.4–8.2)

## 2023-03-04 PROCEDURE — 84100 ASSAY OF PHOSPHORUS: CPT

## 2023-03-04 PROCEDURE — 36415 COLL VENOUS BLD VENIPUNCTURE: CPT

## 2023-03-04 PROCEDURE — 93970 EXTREMITY STUDY: CPT

## 2023-03-04 PROCEDURE — 94664 DEMO&/EVAL PT USE INHALER: CPT

## 2023-03-04 PROCEDURE — 83880 ASSAY OF NATRIURETIC PEPTIDE: CPT

## 2023-03-04 PROCEDURE — 83735 ASSAY OF MAGNESIUM: CPT

## 2023-03-04 PROCEDURE — 6370000000 HC RX 637 (ALT 250 FOR IP): Performed by: NURSE PRACTITIONER

## 2023-03-04 PROCEDURE — 1200000000 HC SEMI PRIVATE

## 2023-03-04 PROCEDURE — 6360000002 HC RX W HCPCS: Performed by: STUDENT IN AN ORGANIZED HEALTH CARE EDUCATION/TRAINING PROGRAM

## 2023-03-04 PROCEDURE — 80053 COMPREHEN METABOLIC PANEL: CPT

## 2023-03-04 PROCEDURE — 6370000000 HC RX 637 (ALT 250 FOR IP): Performed by: STUDENT IN AN ORGANIZED HEALTH CARE EDUCATION/TRAINING PROGRAM

## 2023-03-04 PROCEDURE — 6370000000 HC RX 637 (ALT 250 FOR IP): Performed by: INTERNAL MEDICINE

## 2023-03-04 PROCEDURE — 83835 ASSAY OF METANEPHRINES: CPT

## 2023-03-04 PROCEDURE — 84244 ASSAY OF RENIN: CPT

## 2023-03-04 PROCEDURE — 2580000003 HC RX 258: Performed by: STUDENT IN AN ORGANIZED HEALTH CARE EDUCATION/TRAINING PROGRAM

## 2023-03-04 PROCEDURE — 94640 AIRWAY INHALATION TREATMENT: CPT

## 2023-03-04 PROCEDURE — 0202U NFCT DS 22 TRGT SARS-COV-2: CPT

## 2023-03-04 PROCEDURE — 82962 GLUCOSE BLOOD TEST: CPT

## 2023-03-04 PROCEDURE — 82088 ASSAY OF ALDOSTERONE: CPT

## 2023-03-04 PROCEDURE — 85379 FIBRIN DEGRADATION QUANT: CPT

## 2023-03-04 PROCEDURE — 94761 N-INVAS EAR/PLS OXIMETRY MLT: CPT

## 2023-03-04 RX ORDER — IPRATROPIUM BROMIDE AND ALBUTEROL SULFATE 2.5; .5 MG/3ML; MG/3ML
1 SOLUTION RESPIRATORY (INHALATION) EVERY 4 HOURS PRN
Status: DISCONTINUED | OUTPATIENT
Start: 2023-03-04 | End: 2023-03-04

## 2023-03-04 RX ORDER — IPRATROPIUM BROMIDE AND ALBUTEROL SULFATE 2.5; .5 MG/3ML; MG/3ML
1 SOLUTION RESPIRATORY (INHALATION) 4 TIMES DAILY
Status: DISCONTINUED | OUTPATIENT
Start: 2023-03-04 | End: 2023-03-04

## 2023-03-04 RX ORDER — ALBUTEROL SULFATE 90 UG/1
2 AEROSOL, METERED RESPIRATORY (INHALATION) 4 TIMES DAILY
Status: DISCONTINUED | OUTPATIENT
Start: 2023-03-04 | End: 2023-03-05

## 2023-03-04 RX ADMIN — GUAIFENESIN 600 MG: 600 TABLET, EXTENDED RELEASE ORAL at 21:45

## 2023-03-04 RX ADMIN — GUAIFENESIN 600 MG: 600 TABLET, EXTENDED RELEASE ORAL at 08:43

## 2023-03-04 RX ADMIN — ALBUTEROL SULFATE 2 PUFF: 90 AEROSOL, METERED RESPIRATORY (INHALATION) at 15:36

## 2023-03-04 RX ADMIN — ASPIRIN 81 MG 81 MG: 81 TABLET ORAL at 08:43

## 2023-03-04 RX ADMIN — SODIUM CHLORIDE, PRESERVATIVE FREE 10 ML: 5 INJECTION INTRAVENOUS at 21:48

## 2023-03-04 RX ADMIN — CLOPIDOGREL BISULFATE 75 MG: 75 TABLET ORAL at 08:43

## 2023-03-04 RX ADMIN — TAMSULOSIN HYDROCHLORIDE 0.4 MG: 0.4 CAPSULE ORAL at 08:43

## 2023-03-04 RX ADMIN — CARVEDILOL 12.5 MG: 6.25 TABLET, FILM COATED ORAL at 21:45

## 2023-03-04 RX ADMIN — INSULIN LISPRO 1 UNITS: 100 INJECTION, SOLUTION INTRAVENOUS; SUBCUTANEOUS at 12:21

## 2023-03-04 RX ADMIN — HEPARIN SODIUM 5000 UNITS: 5000 INJECTION INTRAVENOUS; SUBCUTANEOUS at 06:02

## 2023-03-04 RX ADMIN — SODIUM CHLORIDE, PRESERVATIVE FREE 10 ML: 5 INJECTION INTRAVENOUS at 08:43

## 2023-03-04 RX ADMIN — HEPARIN SODIUM 5000 UNITS: 5000 INJECTION INTRAVENOUS; SUBCUTANEOUS at 14:22

## 2023-03-04 RX ADMIN — ATORVASTATIN CALCIUM 80 MG: 40 TABLET, FILM COATED ORAL at 08:43

## 2023-03-04 RX ADMIN — ALBUTEROL SULFATE 2 PUFF: 90 AEROSOL, METERED RESPIRATORY (INHALATION) at 08:49

## 2023-03-04 RX ADMIN — SERTRALINE HYDROCHLORIDE 100 MG: 50 TABLET ORAL at 08:43

## 2023-03-04 RX ADMIN — HEPARIN SODIUM 5000 UNITS: 5000 INJECTION INTRAVENOUS; SUBCUTANEOUS at 21:45

## 2023-03-04 RX ADMIN — CARVEDILOL 12.5 MG: 6.25 TABLET, FILM COATED ORAL at 08:43

## 2023-03-04 RX ADMIN — EZETIMIBE 10 MG: 10 TABLET ORAL at 08:43

## 2023-03-04 RX ADMIN — EMPAGLIFLOZIN 10 MG: 10 TABLET, FILM COATED ORAL at 08:43

## 2023-03-04 NOTE — PROGRESS NOTES
Nephrology Progress Note  3/4/2023 12:58 PM        Subjective:   Admit Date: 3/1/2023  PCP: Demetrio Reinoso MD    Interval History: Patient seen early morning, this is a late entry  He still was little restless and not feeling well    Diet: Not up to the par    ROS: Reported some shortness of breath, not feeling well and restless  Urine output recorded 800 cc for the last 24 hours,  No fever and acceptable blood pressure recorded in the epic    Data:     Current meds:    ipratropium-albuterol  1 ampule Inhalation 4x daily    heparin (porcine)  5,000 Units SubCUTAneous 3 times per day    empagliflozin  10 mg Oral Daily    guaiFENesin  600 mg Oral BID    [Held by provider] metOLazone  2.5 mg Oral Daily    aspirin  81 mg Oral Daily    atorvastatin  80 mg Oral Daily    carvedilol  12.5 mg Oral BID    ezetimibe  10 mg Oral Daily    sertraline  100 mg Oral Daily    sodium chloride flush  5-40 mL IntraVENous 2 times per day    insulin lispro  0-4 Units SubCUTAneous TID WC    insulin lispro  0-4 Units SubCUTAneous Nightly    clopidogrel  75 mg Oral Daily    tamsulosin  0.4 mg Oral Daily    [Held by provider] furosemide  60 mg IntraVENous TID    [Held by provider] lisinopril  40 mg Oral Daily    [Held by provider] isosorbide mononitrate  30 mg Oral Daily      sodium chloride      dextrose           I/O last 3 completed shifts:   In: 10 [I.V.:10]  Out: 2050 [Urine:2050]    CBC:   Recent Labs     03/01/23  1353 03/02/23  0247 03/03/23  0221   WBC 6.2 5.8 6.8   HGB 14.4 14.3 14.5    158 175          Recent Labs     03/02/23  0247 03/03/23  0221 03/04/23  0327    137 137   K 3.8 3.8 3.8    98* 98*   CO2 23 22 24   BUN 21 28* 33*   CREATININE 1.9* 2.3* 2.1*   GLUCOSE 182* 205* 173*       Lab Results   Component Value Date    CALCIUM 9.2 03/04/2023    PHOS 3.8 03/04/2023       Objective:     Vitals: BP (!) 140/85   Pulse 77   Temp 97.9 °F (36.6 °C) (Oral)   Resp 16   Ht 5' 7\" (1.702 m)   Wt 202 lb 13.2 oz (92 kg)   SpO2 93%   BMI 31.77 kg/m² ,    General appearance: Alert, awake and oriented but a little restless and not feeling well although he could not specify the symptoms  HEENT: No gross conjunctival pallor or scleral icterus  Neck: Supple  Lungs: Coarse breath sound  Heart: Seemed regular rate and rhythm  Abdomen: Soft nontender  Extremities: No edema      Problem List :         Impression :     Acute kidney injury with underlying CKD stage G3 A/B- his urine had no active sediment but had glycosuria and proteinuria-creatinine did blood to-likely from cardiorenal syndrome type I  Acute decompensated heart failure with underlying significant atherosclerotic cardiovascular disease and cardiomyopathy  Underlying diabetes with likely proteinuria  Also incidental left adrenal adenoma    Recommendation/Plan  :     From kidney standpoint he can be discharged-May discharge with Jardiance and his home furosemide-he should do low-salt, DASH diet-Daily weight and call me with weight gain more than 2 pounds , I can review the pending biochemical testing for adrenal adenoma functionality-he can also restart his metolazone at home-CMP, magnesium, phosphorus in 1 week-follow-up with me in 2 weeks-I am unsure why he is feeling the way he is feeling .   As long as there is no other acute medical illness-    Shin Hdez MD MD    I have discussed with Dr. Daniele Wolfe decided to at least  D-dimer-if is high then do VQ scan and leg ultrasound-if all negative and no suspicion for venous thromboembolism-then will plan on DC with close outpatient follow-up

## 2023-03-04 NOTE — PROGRESS NOTES
V2.0  [facility] [place]      Name:  Franny Lung /Age/Sex: 1960  (60 y.o. male)   MRN & CSN:  [id number] & [id number] Encounter Date/Time: 3/4/2023 9:25 AM EST    Location:  [facility] PCP: Jose Angel Ray MD       Hospital Day: 4      Subjective:     Chief Complaint: Shortness of Breath (Onset yesterday)     No acute event overnight. Only 800cc of urine since yesterday. .   Reports no change in his breathing; still feels SOB with chest tightness. Occasional cough feels has phlegm but unable to cough it out. Remains on room air. Still endorses pleuritic pressure like chest pain worse on breathing. Denies lightheadedness, dizziness, fever, night sweats, chills,  palpitations, abd pain, nausea, vomiting, diarrhea, dysuria. Assessment and Plan:   Acute on Chronic Systolic and Diastolic heart failure  Shortness of breath  Presented with  dyspnea, pleuritic chest pain. ProBNP 3943. CXR findings suggestive of CHF vs PNA. Compliant with meds but not with low Na diet. Last ECHO 3/2/2023 EF 30-35% (was 40-45% 3/2022),  hypokinetic basal lateral/septal and inferior walls, severely dilated LVH,  mildly dilated LA, moderate MR  Still has SOB but remains on room air. resp viral panel negative  Unsure why pt still feels SOB with pleuritic pain. Will need to rule out PE although pt doesn't have the typical features (no hypoxia, no tachycardia)  - order d-dimer, if positive, will get doppler, v/q scan  -  was on lasix 60 mg IV lasix TID; currently held by nephro given worsening kidney function  - was started on metolazone; currently held  - continue home Coreg  - hold  lisinopril   - started on Imdur by cardiology but held by nephrology  - cardiology consulted; appreciate recs  - strict intake/output, daily weights, tele      KARUNA on CKD III  - improving  Likely cardiorenal type I.    Today 2.1 from 2.3  Cr  1.7 on admission baseline 1.5-1.7.   - diuretics held as above  - nephrology consulted; appreciate recs  - started on Jardiance by nephro  - urine studies ordered by nephro  - avoid nephrotoxins  - strict intake/output       Hypertensive urgency  - improving  Hx of HTN. On home Coreg, lisinopril and lasix. Came with 's improved to 160's after home meds in ED. Today 's. - continue Coreg  -  lisinopril, Imdur and diuretics being held as above       COPD. Not in excerebration (no wheezing, no productive cough, no hypoxia). On home albuterol, Spiriva  - will do breathing treatment for now  - no indication for steroids          CAD. Hx of STEMI. S/p CABG. On home ASA. No ischemic changes on ECG. Troponin slightly trending upard but likely from KARUNA around his baseline. Has chest pain but appears pleuritic. Cath 3/2022 revealed occluded native vessels but LIMA to LAD and SVG to OM are patent inferior wall was not revascularized  - continue home ASA, Plavix, statin     DM Type II. HA1C 6.6 3/1/2023. On home glimepiride   - hypoglycemia protocol  - low dose sliding scale  - POCT glucose  - started on Jardiance for CKD/CHF by nephrology     HLD. On ezetimibe and atorvastatin    3/1/2023  - continue home meds        BPH. Endorses urinary frequency. Was On home flomax but seems pt not taking anymore. - continue Flomax       Diet ADULT DIET; Regular; No Added Salt (3-4 gm)   DVT Prophylaxis [] Lovenox, [x]  Heparin, [] SCDs, [] Ambulation,  [] Eliquis, [] Xarelto  [] Coumadin   Code Status Full Code   Disposition From: home  Expected Disposition: home  Estimated Date of Discharge: 2-3 days  Patient requires continued admission due to KARUNA, SOB/cough, CHF excerebration, will discharge today if d-dimer is negative   Surrogate Decision Maker/ POA      Review of Systems:    Review of Systems    See above    Objective:      Intake/Output Summary (Last 24 hours) at 3/4/2023 1621  Last data filed at 3/4/2023 1455  Gross per 24 hour   Intake 240 ml   Output 600 ml   Net -360 ml Vitals:   Vitals:    03/04/23 1615   BP: 128/62   Pulse: 66   Resp: 16   Temp: 98.5 °F (36.9 °C)   SpO2: 96%       Physical Exam:     General: NAD  Eyes: EOMI  ENT: neck supple  Cardiovascular: Regular rate. Respiratory: clear breathing sounds  Gastrointestinal: Soft, non tender  Genitourinary: no suprapubic tenderness  Musculoskeletal: No edema  Skin: warm, dry  Neuro: Alert. Psych: Mood appropriate.      Medications:   Medications:    albuterol sulfate HFA  2 puff Inhalation 4x daily    [START ON 3/5/2023] tiotropium  2 puff Inhalation Daily    heparin (porcine)  5,000 Units SubCUTAneous 3 times per day    empagliflozin  10 mg Oral Daily    guaiFENesin  600 mg Oral BID    [Held by provider] metOLazone  2.5 mg Oral Daily    aspirin  81 mg Oral Daily    atorvastatin  80 mg Oral Daily    carvedilol  12.5 mg Oral BID    ezetimibe  10 mg Oral Daily    sertraline  100 mg Oral Daily    sodium chloride flush  5-40 mL IntraVENous 2 times per day    insulin lispro  0-4 Units SubCUTAneous TID WC    insulin lispro  0-4 Units SubCUTAneous Nightly    clopidogrel  75 mg Oral Daily    tamsulosin  0.4 mg Oral Daily    [Held by provider] furosemide  60 mg IntraVENous TID    [Held by provider] lisinopril  40 mg Oral Daily    [Held by provider] isosorbide mononitrate  30 mg Oral Daily      Infusions:    sodium chloride      dextrose       PRN Meds: guaiFENesin-dextromethorphan, 5 mL, Q4H PRN  sodium chloride flush, 5-40 mL, PRN  sodium chloride, , PRN  ondansetron, 4 mg, Q8H PRN   Or  ondansetron, 4 mg, Q6H PRN  polyethylene glycol, 17 g, Daily PRN  acetaminophen, 650 mg, Q6H PRN   Or  acetaminophen, 650 mg, Q6H PRN  glucose, 4 tablet, PRN  dextrose bolus, 125 mL, PRN   Or  dextrose bolus, 250 mL, PRN  glucagon (rDNA), 1 mg, PRN  dextrose, , Continuous PRN      Labs      Recent Results (from the past 24 hour(s))   POCT Glucose    Collection Time: 03/03/23  5:08 PM   Result Value Ref Range    POC Glucose 183 (H) 70 - 99 MG/DL   POCT Glucose    Collection Time: 03/03/23  8:07 PM   Result Value Ref Range    POC Glucose 203 (H) 70 - 99 MG/DL   Magnesium    Collection Time: 03/04/23  3:27 AM   Result Value Ref Range    Magnesium 2.3 1.8 - 2.4 mg/dl   Brain Natriuretic Peptide    Collection Time: 03/04/23  3:27 AM   Result Value Ref Range    Pro-.0 (H) <300 PG/ML   Comprehensive Metabolic Panel    Collection Time: 03/04/23  3:27 AM   Result Value Ref Range    Sodium 137 135 - 145 MMOL/L    Potassium 3.8 3.5 - 5.1 MMOL/L    Chloride 98 (L) 99 - 110 mMol/L    CO2 24 21 - 32 MMOL/L    BUN 33 (H) 6 - 23 MG/DL    Creatinine 2.1 (H) 0.9 - 1.3 MG/DL    Est, Glom Filt Rate 35 (L) >60 mL/min/1.73m2    Glucose 173 (H) 70 - 99 MG/DL    Calcium 9.2 8.3 - 10.6 MG/DL    Albumin 4.3 3.4 - 5.0 GM/DL    Total Protein 6.4 6.4 - 8.2 GM/DL    Total Bilirubin 0.7 0.0 - 1.0 MG/DL    ALT 10 10 - 40 U/L    AST 12 (L) 15 - 37 IU/L    Alkaline Phosphatase 70 40 - 128 IU/L    Anion Gap 15 4 - 16   Phosphorus    Collection Time: 03/04/23  3:27 AM   Result Value Ref Range    Phosphorus 3.8 2.5 - 4.9 MG/DL   POCT Glucose    Collection Time: 03/04/23  7:54 AM   Result Value Ref Range    POC Glucose 133 (H) 70 - 99 MG/DL   POCT Glucose    Collection Time: 03/04/23 12:08 PM   Result Value Ref Range    POC Glucose 243 (H) 70 - 99 MG/DL   Respiratory Panel, Molecular, with COVID-19 (Restricted: peds pts or suitable admitted adults)    Collection Time: 03/04/23  1:00 PM    Specimen: Nasopharyngeal   Result Value Ref Range    Adenovirus Detection by PCR NOT DETECTED NOT DETECTED    Coronavirus 229E PCR NOT DETECTED NOT DETECTED    Coronavirus HKU1 PCR NOT DETECTED NOT DETECTED    Coronavirus NL63 PCR NOT DETECTED NOT DETECTED    Coronavirus OC43 PCR NOT DETECTED NOT DETECTED    SARS-CoV-2 NOT DETECTED NOT DETECTED    Human Metapneumovirus PCR NOT DETECTED NOT DETECTED    Rhinovirus Enterovirus PCR NOT DETECTED NOT DETECTED    Influenza A by PCR NOT DETECTED NOT DETECTED  Influenza A H1 Pandemic PCR NOT DETECTED NOT DETECTED    Influenza A H1 (2009) PCR NOT DETECTED NOT DETECTED    Influenza A H3 PCR NOT DETECTED NOT DETECTED    Influenza B by PCR NOT DETECTED NOT DETECTED    Parainfluenza 1 PCR NOT DETECTED NOT DETECTED    Parainfluenza 2 PCR NOT DETECTED NOT DETECTED    Parainfluenza 3 PCR NOT DETECTED NOT DETECTED    Parainfluenza 4 PCR NOT DETECTED NOT DETECTED    RSV PCR NOT DETECTED NOT DETECTED    Bordetella parapertussis by PCR NOT DETECTED NOT DETECTED    B Pertussis by PCR NOT DETECTED NOT DETECTED    Chlamydophila Pneumonia PCR NOT DETECTED NOT DETECTED    Mycoplasma pneumo by PCR NOT DETECTED NOT DETECTED        Imaging/Diagnostics Last 24 Hours   XR CHEST PORTABLE    Result Date: 3/1/2023  EXAMINATION: ONE XRAY VIEW OF THE CHEST 3/1/2023 2:45 pm COMPARISON: 07/07/2022 HISTORY: ORDERING SYSTEM PROVIDED HISTORY: chest pain TECHNOLOGIST PROVIDED HISTORY: Reason for exam:->chest pain Reason for Exam: chest pain FINDINGS: Cardiomegaly, bronchovascular marking prominence worst in the right basilar region. Findings are consistent with congestive heart failure and/or bilateral mid-basilar pneumonia worst in the right basilar region. Findings may be accentuated by underlying chronic lung disease. No large areas of pulmonary consolidation, detectable pleural effusion or pneumothorax. Findings consistent with congestive heart failure and/or bilateral mid-basilar pneumonia worse on the right. Findings may be accentuated by underlying chronic lung disease.   Otherwise, radiographically nonacute portable chest       Electronically signed by Will Morton MD on 3/4/2023 at 4:21 PM

## 2023-03-05 VITALS
HEART RATE: 83 BPM | TEMPERATURE: 98.6 F | WEIGHT: 202.82 LBS | HEIGHT: 67 IN | SYSTOLIC BLOOD PRESSURE: 146 MMHG | DIASTOLIC BLOOD PRESSURE: 94 MMHG | RESPIRATION RATE: 16 BRPM | BODY MASS INDEX: 31.83 KG/M2 | OXYGEN SATURATION: 94 %

## 2023-03-05 LAB
ALBUMIN SERPL-MCNC: 4.2 GM/DL (ref 3.4–5)
ALP BLD-CCNC: 59 IU/L (ref 40–128)
ALT SERPL-CCNC: 29 U/L (ref 10–40)
ANION GAP SERPL CALCULATED.3IONS-SCNC: 15 MMOL/L (ref 4–16)
AST SERPL-CCNC: 38 IU/L (ref 15–37)
BASOPHILS ABSOLUTE: 0 K/CU MM
BASOPHILS RELATIVE PERCENT: 0.9 % (ref 0–1)
BILIRUB SERPL-MCNC: 0.5 MG/DL (ref 0–1)
BUN SERPL-MCNC: 33 MG/DL (ref 6–23)
CALCIUM SERPL-MCNC: 8.9 MG/DL (ref 8.3–10.6)
CHLORIDE BLD-SCNC: 95 MMOL/L (ref 99–110)
CO2: 25 MMOL/L (ref 21–32)
CREAT SERPL-MCNC: 2.2 MG/DL (ref 0.9–1.3)
DIFFERENTIAL TYPE: ABNORMAL
EOSINOPHILS ABSOLUTE: 0.1 K/CU MM
EOSINOPHILS RELATIVE PERCENT: 1.1 % (ref 0–3)
GFR SERPL CREATININE-BSD FRML MDRD: 33 ML/MIN/1.73M2
GLUCOSE BLD-MCNC: 130 MG/DL (ref 70–99)
GLUCOSE BLD-MCNC: 151 MG/DL (ref 70–99)
GLUCOSE SERPL-MCNC: 214 MG/DL (ref 70–99)
HCT VFR BLD CALC: 44.1 % (ref 42–52)
HEMOGLOBIN: 14.6 GM/DL (ref 13.5–18)
IMMATURE NEUTROPHIL %: 0.4 % (ref 0–0.43)
LYMPHOCYTES ABSOLUTE: 0.7 K/CU MM
LYMPHOCYTES RELATIVE PERCENT: 15.4 % (ref 24–44)
MAGNESIUM: 2.2 MG/DL (ref 1.8–2.4)
MCH RBC QN AUTO: 32 PG (ref 27–31)
MCHC RBC AUTO-ENTMCNC: 33.1 % (ref 32–36)
MCV RBC AUTO: 96.7 FL (ref 78–100)
MONOCYTES ABSOLUTE: 0.8 K/CU MM
MONOCYTES RELATIVE PERCENT: 17.3 % (ref 0–4)
NUCLEATED RBC %: 0 %
PDW BLD-RTO: 13.6 % (ref 11.7–14.9)
PLATELET # BLD: 156 K/CU MM (ref 140–440)
PMV BLD AUTO: 12.3 FL (ref 7.5–11.1)
POTASSIUM SERPL-SCNC: 3.8 MMOL/L (ref 3.5–5.1)
RBC # BLD: 4.56 M/CU MM (ref 4.6–6.2)
SEGMENTED NEUTROPHILS ABSOLUTE COUNT: 3 K/CU MM
SEGMENTED NEUTROPHILS RELATIVE PERCENT: 64.9 % (ref 36–66)
SODIUM BLD-SCNC: 135 MMOL/L (ref 135–145)
TOTAL IMMATURE NEUTOROPHIL: 0.02 K/CU MM
TOTAL NUCLEATED RBC: 0 K/CU MM
TOTAL PROTEIN: 6.7 GM/DL (ref 6.4–8.2)
WBC # BLD: 4.6 K/CU MM (ref 4–10.5)

## 2023-03-05 PROCEDURE — 6370000000 HC RX 637 (ALT 250 FOR IP): Performed by: INTERNAL MEDICINE

## 2023-03-05 PROCEDURE — 85025 COMPLETE CBC W/AUTO DIFF WBC: CPT

## 2023-03-05 PROCEDURE — 83735 ASSAY OF MAGNESIUM: CPT

## 2023-03-05 PROCEDURE — 6370000000 HC RX 637 (ALT 250 FOR IP): Performed by: STUDENT IN AN ORGANIZED HEALTH CARE EDUCATION/TRAINING PROGRAM

## 2023-03-05 PROCEDURE — 6360000002 HC RX W HCPCS: Performed by: STUDENT IN AN ORGANIZED HEALTH CARE EDUCATION/TRAINING PROGRAM

## 2023-03-05 PROCEDURE — 80053 COMPREHEN METABOLIC PANEL: CPT

## 2023-03-05 PROCEDURE — 94640 AIRWAY INHALATION TREATMENT: CPT

## 2023-03-05 PROCEDURE — 94664 DEMO&/EVAL PT USE INHALER: CPT

## 2023-03-05 PROCEDURE — 36415 COLL VENOUS BLD VENIPUNCTURE: CPT

## 2023-03-05 PROCEDURE — 94761 N-INVAS EAR/PLS OXIMETRY MLT: CPT

## 2023-03-05 PROCEDURE — 82962 GLUCOSE BLOOD TEST: CPT

## 2023-03-05 PROCEDURE — 6370000000 HC RX 637 (ALT 250 FOR IP): Performed by: NURSE PRACTITIONER

## 2023-03-05 RX ORDER — GUAIFENESIN 600 MG/1
600 TABLET, EXTENDED RELEASE ORAL 2 TIMES DAILY
Qty: 28 TABLET | Refills: 0 | Status: SHIPPED | OUTPATIENT
Start: 2023-03-05 | End: 2023-03-19

## 2023-03-05 RX ORDER — ALBUTEROL SULFATE 90 UG/1
2 AEROSOL, METERED RESPIRATORY (INHALATION) 2 TIMES DAILY
Status: DISCONTINUED | OUTPATIENT
Start: 2023-03-05 | End: 2023-03-05 | Stop reason: HOSPADM

## 2023-03-05 RX ORDER — TAMSULOSIN HYDROCHLORIDE 0.4 MG/1
0.4 CAPSULE ORAL DAILY
Qty: 30 CAPSULE | Refills: 0 | Status: SHIPPED | OUTPATIENT
Start: 2023-03-05 | End: 2023-04-04

## 2023-03-05 RX ORDER — GUAIFENESIN/DEXTROMETHORPHAN 100-10MG/5
5 SYRUP ORAL EVERY 4 HOURS PRN
Qty: 120 ML | Refills: 0 | Status: SHIPPED | OUTPATIENT
Start: 2023-03-05 | End: 2023-03-15

## 2023-03-05 RX ADMIN — HEPARIN SODIUM 5000 UNITS: 5000 INJECTION INTRAVENOUS; SUBCUTANEOUS at 05:39

## 2023-03-05 RX ADMIN — EMPAGLIFLOZIN 10 MG: 10 TABLET, FILM COATED ORAL at 10:14

## 2023-03-05 RX ADMIN — GUAIFENESIN 600 MG: 600 TABLET, EXTENDED RELEASE ORAL at 10:15

## 2023-03-05 RX ADMIN — ALBUTEROL SULFATE 2 PUFF: 90 AEROSOL, METERED RESPIRATORY (INHALATION) at 11:49

## 2023-03-05 RX ADMIN — ALBUTEROL SULFATE 2 PUFF: 90 AEROSOL, METERED RESPIRATORY (INHALATION) at 09:06

## 2023-03-05 RX ADMIN — ASPIRIN 81 MG 81 MG: 81 TABLET ORAL at 10:15

## 2023-03-05 RX ADMIN — SERTRALINE HYDROCHLORIDE 100 MG: 50 TABLET ORAL at 10:14

## 2023-03-05 RX ADMIN — TIOTROPIUM BROMIDE INHALATION SPRAY 2 PUFF: 3.12 SPRAY, METERED RESPIRATORY (INHALATION) at 09:06

## 2023-03-05 RX ADMIN — CARVEDILOL 12.5 MG: 6.25 TABLET, FILM COATED ORAL at 10:14

## 2023-03-05 RX ADMIN — CLOPIDOGREL BISULFATE 75 MG: 75 TABLET ORAL at 10:14

## 2023-03-05 RX ADMIN — EZETIMIBE 10 MG: 10 TABLET ORAL at 10:14

## 2023-03-05 RX ADMIN — TAMSULOSIN HYDROCHLORIDE 0.4 MG: 0.4 CAPSULE ORAL at 10:14

## 2023-03-05 RX ADMIN — ATORVASTATIN CALCIUM 80 MG: 40 TABLET, FILM COATED ORAL at 10:14

## 2023-03-05 NOTE — PLAN OF CARE
Problem: Discharge Planning  Goal: Discharge to home or other facility with appropriate resources  3/4/2023 2232 by Angel Munguia RN  Outcome: Progressing  3/4/2023 1455 by Yuliana Nolen LPN  Outcome: Progressing     Problem: Pain  Goal: Verbalizes/displays adequate comfort level or baseline comfort level  3/4/2023 2232 by Angel Munguia RN  Outcome: Progressing  3/4/2023 1455 by Yuliana Nolen LPN  Outcome: Progressing     Problem: ABCDS Injury Assessment  Goal: Absence of physical injury  3/4/2023 2232 by Angel Munguia RN  Outcome: Progressing  3/4/2023 1455 by Yuliana Nolen LPN  Outcome: Progressing

## 2023-03-05 NOTE — RT PROTOCOL NOTE
RT Inhaler-Nebulizer Bronchodilator Protocol Note    There is a bronchodilator order in the chart from a provider indicating to follow the RT Bronchodilator Protocol and there is an Initiate RT Inhaler-Nebulizer Bronchodilator Protocol order as well (see protocol at bottom of note). CXR Findings:  No results found. The findings from the last RT Protocol Assessment were as follows:   History Pulmonary Disease: Chronic pulmonary disease  Respiratory Pattern: Dyspnea on exertion or RR 21-25 bpm  Breath Sounds: Clear breath sounds  Cough: Strong, productive  Indication for Bronchodilator Therapy: On home bronchodilators (albuterol PRN)  Bronchodilator Assessment Score: 5    Aerosolized bronchodilator medication orders have been revised according to the RT Inhaler-Nebulizer Bronchodilator Protocol below. Respiratory Therapist to perform RT Therapy Protocol Assessment initially then follow the protocol. Repeat RT Therapy Protocol Assessment PRN for score 0-3 or on second treatment, BID, and PRN for scores above 3. No Indications - adjust the frequency to every 6 hours PRN wheezing or bronchospasm, if no treatments needed after 48 hours then discontinue using Per Protocol order mode. If indication present, adjust the RT bronchodilator orders based on the Bronchodilator Assessment Score as indicated below. Use Inhaler orders unless patient has one or more of the following: on home nebulizer, not able to hold breath for 10 seconds, is not alert and oriented, cannot activate and use MDI correctly, or respiratory rate 25 breaths per minute or more, then use the equivalent nebulizer order(s) with same Frequency and PRN reasons based on the score. If a patient is on this medication at home then do not decrease Frequency below that used at home.     0-3 - enter or revise RT bronchodilator order(s) to equivalent RT Bronchodilator order with Frequency of every 4 hours PRN for wheezing or increased work of breathing using Per Protocol order mode. 4-6 - enter or revise RT Bronchodilator order(s) to two equivalent RT bronchodilator orders with one order with BID Frequency and one order with Frequency of every 4 hours PRN wheezing or increased work of breathing using Per Protocol order mode. 7-10 - enter or revise RT Bronchodilator order(s) to two equivalent RT bronchodilator orders with one order with TID Frequency and one order with Frequency of every 4 hours PRN wheezing or increased work of breathing using Per Protocol order mode. 11-13 - enter or revise RT Bronchodilator order(s) to one equivalent RT bronchodilator order with QID Frequency and an Albuterol order with Frequency of every 4 hours PRN wheezing or increased work of breathing using Per Protocol order mode. Greater than 13 - enter or revise RT Bronchodilator order(s) to one equivalent RT bronchodilator order with every 4 hours Frequency and an Albuterol order with Frequency of every 2 hours PRN wheezing or increased work of breathing using Per Protocol order mode. RT to enter RT Home Evaluation for COPD & MDI Assessment order using Per Protocol order mode.     Electronically signed by Jeremie Gomez RCP on 3/5/2023 at 11:55 AM

## 2023-03-06 LAB — ALDOST SERPL-MCNC: 8.9 NG/DL

## 2023-03-06 NOTE — DISCHARGE SUMMARY
Discharge Summary    Name:  Herrera Aguilar /Age/Sex: 1960  (60 y.o. male)   MRN & CSN:  [id number] & [id number] Admission Date/Time: 3/1/2023  2:12 PM   Attending:  No att. providers found Discharging Physician: Sulaiman Lim MD     Hospital Course:   Herrera Aguilar is a 60 y.o.  male  who presents with Shortness of breath    HPI  Herrera Aguilar is a 60 y.o. male with PMH of CAD (s/p CABG), CHF,  COPD, ANNA, HTN, DM II, CKD who presents with SOB and chest pain. Pt states his symptoms started last night. Felt SOB at rest sitting or lying flat. Also endorses non-productive cough. Pt reports chest pain more with cough and deep breath described as pressure like. Pt is compliant with his medications but doesn't follow diet low in sodium. He doesn't check his blood pressure at home. Pt takes lasix 60 mg PO BID but has noticed he has been going more frequently but with less urine. At ED, pt was afebrile, hemodynamically stable but BP high 180's; on room air. Labs revealed Cr 1.7 (around baseline), ProBNP 3943 (was 4270 in 2022), troponin 0.013 (same as 2022),  WBC 6.2, Hgb 14.4, normal liver panel. CXR suggestive of pulm congestion vs PNA. Pt received his home dose of Coreg, lisinopril and ASA. His blood pressure improved to the 160's at time of evaluation. The following problems have been addressed during this hospitalization. Acute on chronic systolic and diastolic heart failure  Presented with  dyspnea, pleuritic chest pain. ProBNP 3943. CXR findings suggestive of CHF vs PNA. Compliant with meds but not with low Na diet. Last ECHO 3/2/2023 EF 30-35% (was 40-45% 3/2022),  hypokinetic basal lateral/septal and inferior walls, severely dilated LVH,  mildly dilated LA, moderate MR. resp viral panel negative. D-dimer negative. Doppler negative. Has been on room air. Pt was having SOB, pleuritic pain with no improvement until day of discharge.    -  was on lasix 60 mg IV lasix TID; held given KARUNA, but discharged on home lasix (60 mg PO BID); discussed with nephro  - was started on metolazone; but held given KARUNA  - pt on home Coreg  - home lisinopril held given KARUNA  - started on Imdur by cardiology but held by nephrology  - cardiology consulted; was managing; pt to follow up outpatient          KARUNA on CKD III  - improving  Likely cardiorenal type I.   Today 2.2 from 2.1  Cr  1.7 on admission baseline 1.5-1.7.   - IV diuretics held as above but discharged on home lasix as above  - nephrology consulted; was managing  - started on Jardiance by nephro        Hypertensive urgency  - improving  Hx of HTN.   On home Coreg, lisinopril and lasix. Came with 's improved to 160's after home meds in ED.   Today 's.   - continue Coreg  -  lisinopril, Imdur and diuretics being held during admission as above  - pt to follow up with cardiology and nephrology        COPD. Not in excerebration (no wheezing, no productive cough, no hypoxia). On home albuterol, Spiriva  - received breathing treatment   - no indication for steroids  - discharged on mucinex and robitussin for cough        CAD. Hx of STEMI. S/p CABG. On home ASA. No ischemic changes on ECG. Troponin slightly trending upard but likely from KARUNA around his baseline. Has chest pain but appears pleuritic.  Cath 3/2022 revealed occluded native vessels but LIMA to LAD and SVG to OM are patent inferior wall was not revascularized  - continue home ASA, Plavix, statin  - cardiology was following     DM Type II. HA1C 6.6 3/1/2023. On home glimepiride   - was hypoglycemia protocol and  low dose sliding scale  - started on Jardiance for CKD/CHF by nephrology  - home glimepiride d/elfego on discharge    HLD. On ezetimibe and atorvastatin    3/1/2023  - pt to continue home meds        BPH. Endorses urinary frequency. Was On home flomax but seems pt not taking anymore.  - continue Flomax    Patient was seen and examined at bedside on day of discharge. This note can  be used as the progress note for today's visit. Pt states he feels better today. States his cough, and SOB have improved. Feels better to go home. D-dimer came borderline corrected for age, had doppler but was negative. No indication for further studies to rule out PE as d-dimer was almost negative, no hypoxia, no tachycardia and pleuritic pain was improving. Denies lightheadedness, dizziness, fever, night sweats, chills, palpitations, abd pain, nausea, vomiting, diarrhea, dysuria. Physical Exam  Vitals:   Vitals:    03/05/23 1014   BP: (!) 146/94   Pulse: 83   Resp:    Temp:    SpO2:        General: NAD  Eyes: EOMI  ENT: neck supple  Cardiovascular: Regular rate. Respiratory: Clear to auscultation  Gastrointestinal: Soft, non tender  Genitourinary: no suprapubic tenderness  Musculoskeletal: No edema  Skin: warm, dry  Neuro: Alert. Psych: Mood appropriate. The patient expressed appropriate understanding of and agreement with the discharge recommendations, medications, and plan.      Consults this admission:  IP CONSULT TO HEART FAILURE NURSE/COORDINATOR  IP CONSULT TO DIETITIAN  IP CONSULT TO CARDIOLOGY  IP CONSULT TO NEPHROLOGY      Discharge Instruction:   Handoff to PCP:   - monitor glucose  - monitor blood pressure    Follow up appointments: nephrology, cardiology  Primary care physician: Michael Gee MD      Diet:  cardiac diet and diabetic diet   Activity: activity as tolerated  Disposition: Discharged to:   [x]Home, [][facility], []SNF, []Acute Rehab, []Hospice   Condition on discharge: Stable    Discharge Medications:        Medication List        START taking these medications      empagliflozin 10 MG tablet  Commonly known as: Dasha Ray  Take 1 tablet by mouth daily     guaiFENesin 600 MG extended release tablet  Commonly known as: MUCINEX  Take 1 tablet by mouth 2 times daily for 14 days     guaiFENesin-dextromethorphan 100-10 MG/5ML syrup  Commonly known as: ROBITUSSIN DM  Take 5 mLs by mouth every 4 hours as needed for Cough            CHANGE how you take these medications      furosemide 40 MG tablet  Commonly known as: LASIX  Take 1.5 tablets by mouth 2 times daily  What changed:   how much to take  when to take this  Another medication with the same name was removed. Continue taking this medication, and follow the directions you see here.             CONTINUE taking these medications      albuterol sulfate  (90 Base) MCG/ACT inhaler  Commonly known as: Ventolin HFA  Inhale 2 puffs into the lungs every 4 hours as needed for Wheezing     Alcohol Pads 70 % Pads  1 Units by Does not apply route 2 times daily     aspirin 81 MG chewable tablet  Take 1 tablet by mouth daily     atorvastatin 80 MG tablet  Commonly known as: Lipitor  Take 1 tablet by mouth daily     Blood Glucose Monitor System w/Device Kit  Use twice per day     carvedilol 12.5 MG tablet  Commonly known as: COREG  Take 1 tablet by mouth 2 times daily     clopidogrel 75 MG tablet  Commonly known as: PLAVIX  Take 1 tablet by mouth daily     ezetimibe 10 MG tablet  Commonly known as: ZETIA  Take 1 tablet by mouth daily     NONFORMULARY     sertraline 100 MG tablet  Commonly known as: ZOLOFT  Take 1 tablet by mouth daily     Spiriva HandiHaler 18 MCG inhalation capsule  Generic drug: tiotropium  Inhale 1 capsule into the lungs daily for 30 doses     tamsulosin 0.4 MG capsule  Commonly known as: FLOMAX  Take 1 capsule by mouth daily            STOP taking these medications      glimepiride 2 MG tablet  Commonly known as: Amaryl     lisinopril 10 MG tablet  Commonly known as: PRINIVIL;ZESTRIL     ondansetron 4 MG tablet  Commonly known as: Zofran               Where to Get Your Medications        These medications were sent to [facility] [id number], [address] [place] [address] [address] [facility] [phone]  [address]      Phone: [phone]   empagliflozin 10 MG tablet  guaiFENesin 600 MG extended release tablet  guaiFENesin-dextromethorphan 100-10 MG/5ML syrup  tamsulosin 0.4 MG capsule         Objective Findings at Discharge:       BMP/CBC  Recent Labs     03/04/23  0327 03/05/23  0941    135   K 3.8 3.8   CL 98* 95*   CO2 24 25   BUN 33* 33*   CREATININE 2.1* 2.2*   WBC  --  4.6   HCT  --  44.1   PLT  --  156       IMAGING:      Additional Information: Patient seen and examined day of discharge.  For more information regarding patient's care please contact [facility] records [phone]    Discharge Time of 35 minutes    Electronically signed by Will Morton MD on 3/6/2023 at 4:27 PM

## 2023-03-07 LAB
ALBUMIN, U: 74 %
ALPHA-1-GLOBULIN, U: 3 %
ALPHA-2-GLOBULIN, U: 6 %
BETA GLOBULIN, U: 11 %
GAMMA GLOBULIN, U: 7 %
RENIN PLAS-CCNC: 0.9 NG/ML/HR
SPEP INTERPRETATION: NORMAL
URINE TOTAL PROTEIN: 63.8 MG/DL

## 2023-03-08 ENCOUNTER — TELEPHONE (OUTPATIENT)
Dept: FAMILY MEDICINE CLINIC | Age: 63
End: 2023-03-08

## 2023-03-08 LAB
ANNOTATION COMMENT IMP: ABNORMAL
METANEPHS SERPL-SCNC: 0.11 NMOL/L (ref 0–0.49)
NORMETANEPHRINE SERPL-SCNC: 0.99 NMOL/L (ref 0–0.89)

## 2023-03-17 ENCOUNTER — OFFICE VISIT (OUTPATIENT)
Dept: CARDIOLOGY CLINIC | Age: 63
End: 2023-03-17

## 2023-03-17 ENCOUNTER — NURSE ONLY (OUTPATIENT)
Dept: CARDIOLOGY CLINIC | Age: 63
End: 2023-03-17

## 2023-03-17 VITALS
DIASTOLIC BLOOD PRESSURE: 90 MMHG | HEIGHT: 68 IN | SYSTOLIC BLOOD PRESSURE: 140 MMHG | BODY MASS INDEX: 30.31 KG/M2 | WEIGHT: 200 LBS

## 2023-03-17 DIAGNOSIS — I50.20 HFREF (HEART FAILURE WITH REDUCED EJECTION FRACTION) (HCC): Primary | ICD-10-CM

## 2023-03-17 DIAGNOSIS — I10 PRIMARY HYPERTENSION: Primary | ICD-10-CM

## 2023-03-17 DIAGNOSIS — I25.10 ASCVD (ARTERIOSCLEROTIC CARDIOVASCULAR DISEASE): ICD-10-CM

## 2023-03-17 DIAGNOSIS — I50.20 HFREF (HEART FAILURE WITH REDUCED EJECTION FRACTION) (HCC): ICD-10-CM

## 2023-03-17 LAB
ALBUMIN SERPL-MCNC: 4 G/DL (ref 3.4–5)
ALBUMIN/GLOB SERPL: 1.5 {RATIO} (ref 1.1–2.2)
ALP SERPL-CCNC: 64 U/L (ref 40–129)
ALT SERPL-CCNC: 11 U/L (ref 10–40)
ANION GAP SERPL CALCULATED.3IONS-SCNC: 12 MMOL/L (ref 3–16)
AST SERPL-CCNC: 12 U/L (ref 15–37)
BILIRUB SERPL-MCNC: 0.4 MG/DL (ref 0–1)
BUN SERPL-MCNC: 17 MG/DL (ref 7–20)
CALCIUM SERPL-MCNC: 8.8 MG/DL (ref 8.3–10.6)
CHLORIDE SERPL-SCNC: 108 MMOL/L (ref 99–110)
CO2 SERPL-SCNC: 23 MMOL/L (ref 21–32)
CREAT SERPL-MCNC: 1.7 MG/DL (ref 0.8–1.3)
GFR SERPLBLD CREATININE-BSD FMLA CKD-EPI: 45 ML/MIN/{1.73_M2}
GLUCOSE SERPL-MCNC: 107 MG/DL (ref 70–99)
MAGNESIUM SERPL-MCNC: 2 MG/DL (ref 1.8–2.4)
PHOSPHATE SERPL-MCNC: 2.9 MG/DL (ref 2.5–4.9)
POTASSIUM SERPL-SCNC: 5.5 MMOL/L (ref 3.5–5.1)
PROT SERPL-MCNC: 6.7 G/DL (ref 6.4–8.2)
SODIUM SERPL-SCNC: 143 MMOL/L (ref 136–145)

## 2023-03-17 RX ORDER — ISOSORBIDE MONONITRATE 30 MG/1
30 TABLET, EXTENDED RELEASE ORAL DAILY
Qty: 30 TABLET | Refills: 3 | Status: SHIPPED | OUTPATIENT
Start: 2023-03-17

## 2023-03-17 ASSESSMENT — ENCOUNTER SYMPTOMS
ORTHOPNEA: 0
SHORTNESS OF BREATH: 0
COUGH: 1

## 2023-03-17 NOTE — PROGRESS NOTES
.  3/17/2023  Primary cardiologist: Dr. Correia Filter:   Catalina Rinne  is an established 60 y.o.  male here for a  follow up on hospital for decompensated HF      SUBJECTIVE/OBJECTIVE:  Catalina Rinne is a 60 y.o. male with a history of  HFrEF, STEMI, coronary artery disease s/p CABG hypertension, hyperlipidemia, COPD, obstructive sleep apnea, CKD stage 3 and diabetes mellitus type 2. HPI :   Catalina Rinne reports he was recently hospital for increased shortness of breath and noted to be in decompensated heart failure. There is a concern with compliance with medications. He tells me today he is feeling fair. He reports pain with taking a deep breath. States that he was unable to  his Jardiance from the pharmacy. He also was not given a prescription for Imdur that was to be started in the hospital.    Review of Systems   Constitutional: Negative for diaphoresis and malaise/fatigue. Cardiovascular:  Negative for chest pain, claudication, dyspnea on exertion, irregular heartbeat, leg swelling, near-syncope, orthopnea, palpitations and paroxysmal nocturnal dyspnea. Respiratory:  Positive for cough. Negative for shortness of breath. Pain with deep breath breath   Neurological:  Negative for dizziness and light-headedness. Vitals:    03/17/23 0836 03/17/23 0842   BP: (!) 148/90 (!) 140/90   Site: Left Upper Arm Left Upper Arm   Position: Sitting Sitting   Cuff Size: Medium Adult Medium Adult   Weight: 200 lb (90.7 kg)    Height: 5' 8\" (1.727 m)      Patient-Reported Vitals 2/25/2021   Patient-Reported Weight -   Patient-Reported Height -   Patient-Reported Systolic 884   Patient-Reported Diastolic 66   Patient-Reported Pulse 69     Wt Readings from Last 3 Encounters:   03/17/23 200 lb (90.7 kg)   03/04/23 202 lb 13.2 oz (92 kg)   02/07/23 200 lb 4.8 oz (90.9 kg)     Body mass index is 30.41 kg/m². Physical Exam  Vitals reviewed. HENT:      Head: Normocephalic and atraumatic.       Mouth/Throat:      Mouth: Mucous membranes are moist.   Eyes:      Extraocular Movements: Extraocular movements intact. Pupils: Pupils are equal, round, and reactive to light. Neck:      Vascular: No carotid bruit. Cardiovascular:      Rate and Rhythm: Normal rate and regular rhythm. Pulses: Normal pulses. Pulmonary:      Effort: Pulmonary effort is normal.      Breath sounds: Normal breath sounds. Abdominal:      General: There is no distension. Palpations: Abdomen is soft. Tenderness: There is no abdominal tenderness. Musculoskeletal:         General: Normal range of motion. Cervical back: No tenderness. Right lower leg: No edema. Left lower leg: No edema. Skin:     General: Skin is warm and dry. Capillary Refill: Capillary refill takes less than 2 seconds. Neurological:      Mental Status: He is alert and oriented to person, place, and time.    Psychiatric:         Mood and Affect: Mood normal.         Behavior: Behavior normal.              Current Outpatient Medications   Medication Sig Dispense Refill    empagliflozin (JARDIANCE) 10 MG tablet Take 1 tablet by mouth daily 30 tablet 3    guaiFENesin (MUCINEX) 600 MG extended release tablet Take 1 tablet by mouth 2 times daily for 14 days 28 tablet 0    tamsulosin (FLOMAX) 0.4 MG capsule Take 1 capsule by mouth daily 30 capsule 0    carvedilol (COREG) 12.5 MG tablet Take 1 tablet by mouth 2 times daily 180 tablet 3    ezetimibe (ZETIA) 10 MG tablet Take 1 tablet by mouth daily 90 tablet 1    tiotropium (SPIRIVA HANDIHALER) 18 MCG inhalation capsule Inhale 1 capsule into the lungs daily for 30 doses 30 capsule 0    albuterol sulfate HFA (VENTOLIN HFA) 108 (90 Base) MCG/ACT inhaler Inhale 2 puffs into the lungs every 4 hours as needed for Wheezing 18 g 1    NONFORMULARY Albuterol- Ipratropium Inhaler       clopidogrel (PLAVIX) 75 MG tablet Take 1 tablet by mouth daily 90 tablet 3    furosemide (LASIX) 40 MG tablet Take 1.5 tablets by mouth 2 times daily (Patient taking differently: Take 40 mg by mouth every morning) 270 tablet 3    sertraline (ZOLOFT) 100 MG tablet Take 1 tablet by mouth daily 90 tablet 1    aspirin 81 MG chewable tablet Take 1 tablet by mouth daily 180 tablet 1    atorvastatin (LIPITOR) 80 MG tablet Take 1 tablet by mouth daily 90 tablet 1    Alcohol Swabs (ALCOHOL PADS) 70 % PADS 1 Units by Does not apply route 2 times daily 180 each 3    Blood Glucose Monitoring Suppl (BLOOD GLUCOSE MONITOR SYSTEM) w/Device KIT Use twice per day 1 kit 0     No current facility-administered medications for this visit. All pertinent data reviewed and discussed with patient       ASSESSMENT/PLAN:    HFrEF  Clinically does not appear to be in volume overload. His shortness of breath has improved. He has discomfort with taking a deep breath is more pleuritic in nature. Unfortunately he was not able to get his medications at discharge. I did send a new prescription for Jardiance to a secondary pharmacy. Stressed the importance of compliance with medications and if unable to get him to notify office so that medications can be obtained. His EF is 30 to 35% which is lower than previously reported 44%-we will refer to Dr. Dustin Vallejo for possible placement of ICD. Continue with coreg  Holding on ARB and ACE d/t underlying CKD stage 3   Low salt diet 2 gm daily     ASCVD  known history of cardiovascular disease with CABG. Today notes pain with deep inspiration which is more pleuritic in nature.   Cardiac catheterization March 2022 shows LAD and RCA to be 100% occluded, circumflex is patent the LIMA to the LAD and SVG and OM 2 patent   Start Imdur   Continue atorvastatin / coreg / plavix    Hypetesnison  Not well controlled  Did not get meds at discharge  Start Imdur 30 mg daily     Prolonged QTc  QTc 472 ms- avoid QT prolonging medications  Refer to EP     CKD stage 3  Follows with nephrology   Has not yet seen since discharge  Will check CMP MAG and phos: advised to keep apt     Tests ordered:  labs   Follow-up  3 mo      Signed:  JERED Kim CNP, 3/17/2023, 8:48 AM    An electronic signature was used to authenticate this note. Please note this report has been partially produced using speech recognition software and may contain errors related to that system including errors in grammar, punctuation, and spelling, as well as words and phrases that may be inappropriate. If there are any questions or concerns please feel free to contact the dictating provider for clarification.

## 2023-03-17 NOTE — PATIENT INSTRUCTIONS
**It is YOUR responsibilty to bring medication bottles and/or updated medication list to [address] [place] [address]. This will allow us to better serve you and all your healthcare needs**    [facility] Locations - No appointment necessary. Sites open Monday to Friday. Call your preferred location for test preparation, business   hours and other information you need. [employer] accepts [employer]. [address] Preeti Maddox, [address] [facility]  Phone: [phone] [address]  [address] [facility]  [address] [place] barnhart, [address] [facility]  Phone: [phone]       Please be informed that if you contact our office outside of normal business hours the physician on call cannot help with any scheduling or rescheduling issues, procedure instruction questions or any type of medication issue. We advise you for any urgent/emergency that you go to the nearest emergency room! PLEASE CALL OUR OFFICE DURING NORMAL BUSINESS HOURS    Monday - Friday   8 am to 5 pm    [place] [address]: [phone]    [place]:  [phone]    Thank you for allowing us to care for you today! We want to ensure we can follow your treatment plan and we strive to give you the best outcomes and experience possible. If you ever have a life threatening emergency and call 911 - for an ambulance (EMS)   Our providers can only care for you at:   [address] [facility] or [facility]. Even if you have someone take you or you drive yourself we can only care for you in a [facility]. Our providers are not setup at the other healthcare locations!

## 2023-03-20 ENCOUNTER — TELEPHONE (OUTPATIENT)
Dept: CARDIOLOGY CLINIC | Age: 63
End: 2023-03-20

## 2023-03-20 NOTE — TELEPHONE ENCOUNTER
Called and left VM for pt to call back for labs.  Increase fluids for next few days  Repeat K in 1 week

## 2023-03-21 ENCOUNTER — TELEPHONE (OUTPATIENT)
Dept: CARDIOLOGY CLINIC | Age: 63
End: 2023-03-21

## 2023-03-22 ENCOUNTER — TELEPHONE (OUTPATIENT)
Dept: CARDIOLOGY CLINIC | Age: 63
End: 2023-03-22

## 2023-03-28 ENCOUNTER — TELEPHONE (OUTPATIENT)
Dept: CARDIOLOGY CLINIC | Age: 63
End: 2023-03-28

## 2023-03-28 LAB
BUN / CREAT RATIO: 15 (ref 10–24)
BUN BLDV-MCNC: 22 MG/DL (ref 8–27)
CALCIUM SERPL-MCNC: 8.9 MG/DL (ref 8.6–10.2)
CHLORIDE BLD-SCNC: 107 MMOL/L (ref 96–106)
CO2: 21 MMOL/L (ref 20–29)
CREAT SERPL-MCNC: 1.49 MG/DL (ref 0.76–1.27)
ESTIMATED GLOMERULAR FILTRATION RATE CREATININE EQUATION: 52 ML/MIN/1.73
GLUCOSE BLD-MCNC: 140 MG/DL (ref 70–99)
MAGNESIUM: 2.1 MG/DL (ref 1.6–2.3)
POTASSIUM SERPL-SCNC: 4.1 MMOL/L (ref 3.5–5.2)
POTASSIUM SERPL-SCNC: 4.2 MMOL/L (ref 3.5–5.2)
SODIUM BLD-SCNC: 140 MMOL/L (ref 134–144)

## 2023-03-29 ENCOUNTER — TELEPHONE (OUTPATIENT)
Dept: CARDIOLOGY CLINIC | Age: 63
End: 2023-03-29

## 2023-04-26 ENCOUNTER — TELEPHONE (OUTPATIENT)
Dept: CARDIOLOGY CLINIC | Age: 63
End: 2023-04-26

## 2023-06-23 ENCOUNTER — TELEPHONE (OUTPATIENT)
Dept: PHARMACY | Facility: CLINIC | Age: 63
End: 2023-06-23

## 2023-06-26 ENCOUNTER — OFFICE VISIT (OUTPATIENT)
Dept: CARDIOLOGY CLINIC | Age: 63
End: 2023-06-26
Payer: MEDICARE

## 2023-06-26 ENCOUNTER — PROCEDURE VISIT (OUTPATIENT)
Dept: CARDIOLOGY CLINIC | Age: 63
End: 2023-06-26
Payer: MEDICARE

## 2023-06-26 VITALS
HEIGHT: 67 IN | DIASTOLIC BLOOD PRESSURE: 60 MMHG | WEIGHT: 201 LBS | BODY MASS INDEX: 31.55 KG/M2 | HEART RATE: 73 BPM | SYSTOLIC BLOOD PRESSURE: 102 MMHG

## 2023-06-26 DIAGNOSIS — I50.20 HFREF (HEART FAILURE WITH REDUCED EJECTION FRACTION) (HCC): Primary | ICD-10-CM

## 2023-06-26 DIAGNOSIS — I50.20 SYSTOLIC CONGESTIVE HEART FAILURE, UNSPECIFIED HF CHRONICITY (HCC): ICD-10-CM

## 2023-06-26 DIAGNOSIS — I73.9 CLAUDICATION (HCC): Primary | ICD-10-CM

## 2023-06-26 DIAGNOSIS — I50.20 HFREF (HEART FAILURE WITH REDUCED EJECTION FRACTION) (HCC): ICD-10-CM

## 2023-06-26 DIAGNOSIS — R94.31 ABNORMAL ELECTROCARDIOGRAM (ECG) (EKG): ICD-10-CM

## 2023-06-26 DIAGNOSIS — I73.9 CLAUDICATION (HCC): ICD-10-CM

## 2023-06-26 PROCEDURE — 93922 UPR/L XTREMITY ART 2 LEVELS: CPT | Performed by: INTERNAL MEDICINE

## 2023-06-26 PROCEDURE — 99214 OFFICE O/P EST MOD 30 MIN: CPT | Performed by: INTERNAL MEDICINE

## 2023-06-26 PROCEDURE — 3078F DIAST BP <80 MM HG: CPT | Performed by: INTERNAL MEDICINE

## 2023-06-26 PROCEDURE — 3074F SYST BP LT 130 MM HG: CPT | Performed by: INTERNAL MEDICINE

## 2023-06-26 RX ORDER — GLIMEPIRIDE 2 MG/1
2 TABLET ORAL
COMMUNITY

## 2023-06-26 RX ORDER — LISINOPRIL 10 MG/1
10 TABLET ORAL DAILY
COMMUNITY

## 2023-06-26 RX ORDER — PANTOPRAZOLE SODIUM 40 MG/10ML
40 INJECTION, POWDER, LYOPHILIZED, FOR SOLUTION INTRAVENOUS DAILY
COMMUNITY

## 2023-06-26 RX ORDER — HYDRALAZINE HYDROCHLORIDE 10 MG/1
10 TABLET, FILM COATED ORAL 3 TIMES DAILY
COMMUNITY

## 2023-06-27 ENCOUNTER — TELEPHONE (OUTPATIENT)
Dept: CARDIOLOGY CLINIC | Age: 63
End: 2023-06-27

## 2023-06-27 DIAGNOSIS — I73.9 CLAUDICATION (HCC): Primary | ICD-10-CM

## 2023-06-27 DIAGNOSIS — R68.89 ABNORMAL ANKLE BRACHIAL INDEX (ABI): ICD-10-CM

## 2023-06-28 DIAGNOSIS — M79.605 LEG PAIN, BILATERAL: ICD-10-CM

## 2023-06-28 DIAGNOSIS — I73.9 PAD (PERIPHERAL ARTERY DISEASE) (HCC): Primary | ICD-10-CM

## 2023-06-28 DIAGNOSIS — M79.604 LEG PAIN, BILATERAL: ICD-10-CM

## 2023-06-28 DIAGNOSIS — I10 HYPERTENSION, UNSPECIFIED TYPE: ICD-10-CM

## 2023-06-28 DIAGNOSIS — R68.89 ABNORMAL ANKLE BRACHIAL INDEX (ABI): ICD-10-CM

## 2023-06-28 DIAGNOSIS — I70.90 ARTERIAL OCCLUSIVE DISEASE: ICD-10-CM

## 2023-06-28 DIAGNOSIS — I73.9 CLAUDICATION (HCC): ICD-10-CM

## 2023-06-28 DIAGNOSIS — I50.20 HFREF (HEART FAILURE WITH REDUCED EJECTION FRACTION) (HCC): ICD-10-CM

## 2023-07-12 ENCOUNTER — PROCEDURE VISIT (OUTPATIENT)
Dept: CARDIOLOGY CLINIC | Age: 63
End: 2023-07-12

## 2023-07-12 DIAGNOSIS — R94.31 ABNORMAL ELECTROCARDIOGRAM (ECG) (EKG): ICD-10-CM

## 2023-07-12 DIAGNOSIS — I50.20 HFREF (HEART FAILURE WITH REDUCED EJECTION FRACTION) (HCC): ICD-10-CM

## 2023-07-12 DIAGNOSIS — I50.20 SYSTOLIC CONGESTIVE HEART FAILURE, UNSPECIFIED HF CHRONICITY (HCC): ICD-10-CM

## 2023-07-12 DIAGNOSIS — R06.02 SOB (SHORTNESS OF BREATH): Primary | ICD-10-CM

## 2023-07-12 LAB
LV EF: 32 %
LVEF MODALITY: NORMAL

## 2023-07-13 ENCOUNTER — TELEPHONE (OUTPATIENT)
Dept: CARDIOLOGY CLINIC | Age: 63
End: 2023-07-13

## 2023-07-13 NOTE — TELEPHONE ENCOUNTER
Pt needs to see EP for ICD per Dr. Tiana Daniels. Left ventricular ejection fraction of 32 %. Supervising physician Dr. Emily Steele .       Recommendation   see EP for ICD

## 2023-07-21 ENCOUNTER — HOSPITAL ENCOUNTER (OUTPATIENT)
Dept: CT IMAGING | Age: 63
Discharge: HOME | End: 2023-07-21
Attending: INTERNAL MEDICINE
Payer: MEDICARE

## 2023-07-21 DIAGNOSIS — I73.9 CLAUDICATION (HCC): ICD-10-CM

## 2023-07-21 DIAGNOSIS — I10 HYPERTENSION, UNSPECIFIED TYPE: ICD-10-CM

## 2023-07-21 DIAGNOSIS — R68.89 ABNORMAL ANKLE BRACHIAL INDEX (ABI): ICD-10-CM

## 2023-07-21 DIAGNOSIS — I73.9 PAD (PERIPHERAL ARTERY DISEASE) (HCC): ICD-10-CM

## 2023-07-21 LAB
EGFR, POC: 48 ML/MIN/1.73M2
POC CREATININE: 1.6 MG/DL (ref 0.9–1.3)

## 2023-07-21 PROCEDURE — 2580000003 HC RX 258: Performed by: INTERNAL MEDICINE

## 2023-07-21 PROCEDURE — 6360000004 HC RX CONTRAST MEDICATION: Performed by: INTERNAL MEDICINE

## 2023-07-21 PROCEDURE — 82565 ASSAY OF CREATININE: CPT

## 2023-07-21 PROCEDURE — 75635 CT ANGIO ABDOMINAL ARTERIES: CPT

## 2023-07-21 RX ORDER — SODIUM CHLORIDE 9 MG/ML
10 INJECTION INTRAVENOUS PRN
Status: COMPLETED | OUTPATIENT
Start: 2023-07-21 | End: 2023-07-21

## 2023-07-21 RX ADMIN — IOPAMIDOL 95 ML: 755 INJECTION, SOLUTION INTRAVENOUS at 14:00

## 2023-07-21 RX ADMIN — SODIUM CHLORIDE, PRESERVATIVE FREE 10 ML: 5 INJECTION INTRAVENOUS at 14:00

## 2023-07-31 ENCOUNTER — OFFICE VISIT (OUTPATIENT)
Dept: CARDIOLOGY CLINIC | Age: 63
End: 2023-07-31
Payer: MEDICARE

## 2023-07-31 VITALS
HEIGHT: 67 IN | DIASTOLIC BLOOD PRESSURE: 88 MMHG | WEIGHT: 202.2 LBS | HEART RATE: 82 BPM | SYSTOLIC BLOOD PRESSURE: 152 MMHG | OXYGEN SATURATION: 97 % | BODY MASS INDEX: 31.74 KG/M2

## 2023-07-31 DIAGNOSIS — Z01.810 PRE-OPERATIVE CARDIOVASCULAR EXAMINATION: Primary | ICD-10-CM

## 2023-07-31 DIAGNOSIS — K55.069 OMENTAL INFARCTION (HCC): Primary | ICD-10-CM

## 2023-07-31 PROCEDURE — 3077F SYST BP >= 140 MM HG: CPT | Performed by: INTERNAL MEDICINE

## 2023-07-31 PROCEDURE — 99214 OFFICE O/P EST MOD 30 MIN: CPT | Performed by: INTERNAL MEDICINE

## 2023-07-31 PROCEDURE — 3079F DIAST BP 80-89 MM HG: CPT | Performed by: INTERNAL MEDICINE

## 2023-07-31 NOTE — PATIENT INSTRUCTIONS
Please be informed that if you contact our office outside of normal business hours the physician on call cannot help with any scheduling or rescheduling issues, procedure instruction questions or any type of medication issue. We advise you for any urgent/emergency that you go to the nearest emergency room! PLEASE CALL OUR OFFICE DURING NORMAL BUSINESS HOURS    Monday - Friday   8 am to 5 pm    [place]: [address] [place]: [phone]    [place]:  [phone]    **It is YOUR responsibilty to bring medication bottles and/or updated medication list to [facility]. This will allow us to better serve you and all your healthcare needs**    Thank you for allowing us to care for you today! We want to ensure we can follow your treatment plan and we strive to give you the best outcomes and experience possible. If you ever have a life threatening emergency and call 911 - for an ambulance (EMS)   Our providers can only care for you at:   [facility] or [facility]. Even if you have someone take you or you drive yourself we can only care for you in a [facility] facility. Our providers are not setup at the other healthcare locations!

## 2023-08-01 ENCOUNTER — TELEPHONE (OUTPATIENT)
Dept: CARDIOLOGY CLINIC | Age: 63
End: 2023-08-01

## 2023-08-01 NOTE — TELEPHONE ENCOUNTER
[address] [facility]        Patient Name: Bennett Selby   : 1960  MRN# [id number]    Date of Procedure: 23 Time: 8am Arrival Time: 6am    The catheterization and angiogram are usually outpatient procedures, however if stenting is needed you may need to stay overnight. You will need to arrive at the hospital two hours before the procedure. You will go to registration in the main lobby. You will need to arrange for someone to drive you home. HOSPITAL:  [facility])      X   If you have received orders for blood work and or a chest x-ray, please have         them done on assigned date at [facility],           [facility], or [facility].     X Please do not have anything by mouth after midnight prior to or 8 hours before   the procedure. X You may take your medications with a sip of water in the morning of your               procedure or take them with you to the hospital                    X If you are taking Lasix (furosemide)   please do not take it the morning of your procedure. X If you take Viagra (Sildenafil) or Cialis (Tadalafil) you will need to hold it for 3 days before your procedure.

## 2023-08-01 NOTE — TELEPHONE ENCOUNTER
Pt notified and confirmed procedure for 8/17/23 @8am, arrival time 6am. Instruction call scheduled for 8/14/23 after 830am.

## 2023-08-11 ENCOUNTER — INITIAL CONSULT (OUTPATIENT)
Dept: CARDIOLOGY CLINIC | Age: 63
End: 2023-08-11

## 2023-08-11 VITALS
HEART RATE: 64 BPM | BODY MASS INDEX: 31.27 KG/M2 | HEIGHT: 67 IN | SYSTOLIC BLOOD PRESSURE: 140 MMHG | WEIGHT: 199.2 LBS | DIASTOLIC BLOOD PRESSURE: 96 MMHG

## 2023-08-11 DIAGNOSIS — I25.5 ISCHEMIC CARDIOMYOPATHY: Primary | ICD-10-CM

## 2023-08-11 DIAGNOSIS — I50.20 HFREF (HEART FAILURE WITH REDUCED EJECTION FRACTION) (HCC): ICD-10-CM

## 2023-08-11 RX ORDER — HYDRALAZINE HYDROCHLORIDE 25 MG/1
25 TABLET, FILM COATED ORAL 3 TIMES DAILY
Qty: 90 TABLET | Refills: 3 | Status: SHIPPED | OUTPATIENT
Start: 2023-08-11

## 2023-08-11 NOTE — PROGRESS NOTES
Cardiovascular:      Rate and Rhythm: Normal rate and regular rhythm. Heart sounds: Murmur (grade 2/6 systolic murmur) heard. Pulmonary:      Effort: Pulmonary effort is normal.      Breath sounds: No rales. Abdominal:      General: Abdomen is flat. Palpations: Abdomen is soft. Musculoskeletal:         General: No tenderness. Normal range of motion. Cervical back: Normal range of motion. Right lower leg: No edema. Left lower leg: No edema. Skin:     General: Skin is warm and dry. Neurological:      General: No focal deficit present. Mental Status: He is alert and oriented to person, place, and time. CBC:   Lab Results   Component Value Date/Time    WBC 4.6 03/05/2023 09:41 AM    HGB 14.6 03/05/2023 09:41 AM    HCT 44.1 03/05/2023 09:41 AM     03/05/2023 09:41 AM     Lipids:  Lab Results   Component Value Date    CHOL 180 03/02/2023    TRIG 169 (H) 03/02/2023    HDL 33 (L) 03/02/2023    LDLCALC 113 (H) 03/02/2023    LDLDIRECT 47 10/14/2020     PT/INR:   Lab Results   Component Value Date/Time    INR 0.90 03/12/2022 07:31 AM        BMP:    Lab Results   Component Value Date     03/27/2023    K 4.1 03/27/2023    K 4.2 03/27/2023     (H) 03/27/2023    CO2 21 03/27/2023    BUN 22 03/27/2023     CMP:   Lab Results   Component Value Date    AST 12 (L) 03/17/2023    PROT 6.7 03/17/2023    BILITOT 0.4 03/17/2023    ALKPHOS 64 03/17/2023     TSH:  No results found for: TSH, T4      LHC 3/12/2022    LMCA: Normal (no stenosis %) and No Angiographicalyl Significant  Disease. widely patent     LAD: Abnormal, Focal stenosis and Chronic occlusion. 100 % occluded in mid  segmentThere is a previous stent on Mid LAD Mid subsection. There is a  previous stent on Prox LAD. LCx: Normal (no stenosis %) and No Angiographicalyl Significant Disease. Circ  and Om are patent     RCA: Abnormal and Chronic occlusion. 100 % occludedThere is a previous stent on  Mid RCA

## 2023-08-11 NOTE — PATIENT INSTRUCTIONS
Please be informed that if you contact our office outside of normal business hours the physician on call cannot help with any scheduling or rescheduling issues, procedure instruction questions or any type of medication issue. We advise you for any urgent/emergency that you go to the nearest emergency room! PLEASE CALL OUR OFFICE DURING NORMAL BUSINESS HOURS    Monday - Friday   8 am to 5 pm    [place]: [address]: [phone]    [place]:  [phone]  **It is YOUR responsibilty to bring medication bottles and/or updated medication list to [address] [place] [address]. This will allow us to better serve you and all your healthcare needs**  Thank you for allowing us to care for you today! We want to ensure we can follow your treatment plan and we strive to give you the best outcomes and experience possible. If you ever have a life threatening emergency and call 911 - for an ambulance (EMS)   Our providers can only care for you at:   [facility] or [facility]. Even if you have someone take you or you drive yourself we can only care for you in a [address] [place] [address] facility. Our providers are not setup at the other healthcare locations!

## 2023-08-14 ENCOUNTER — TELEPHONE (OUTPATIENT)
Dept: CARDIOLOGY CLINIC | Age: 63
End: 2023-08-14

## 2023-08-14 ENCOUNTER — HOSPITAL ENCOUNTER (OUTPATIENT)
Age: 63
Discharge: HOME | End: 2023-08-14
Payer: MEDICARE

## 2023-08-14 DIAGNOSIS — Z01.810 PRE-OPERATIVE CARDIOVASCULAR EXAMINATION: ICD-10-CM

## 2023-08-14 LAB
ABO/RH: NORMAL
ANION GAP SERPL CALCULATED.3IONS-SCNC: 11 MMOL/L (ref 4–16)
ANTIBODY SCREEN: NEGATIVE
BUN SERPL-MCNC: 25 MG/DL (ref 6–23)
CALCIUM SERPL-MCNC: 9 MG/DL (ref 8.3–10.6)
CHLORIDE BLD-SCNC: 104 MMOL/L (ref 99–110)
CO2: 25 MMOL/L (ref 21–32)
COMMENT: NORMAL
CREAT SERPL-MCNC: 1.8 MG/DL (ref 0.9–1.3)
GFR SERPL CREATININE-BSD FRML MDRD: 42 ML/MIN/1.73M2
GLUCOSE SERPL-MCNC: 121 MG/DL (ref 70–99)
HCT VFR BLD CALC: 49.3 % (ref 42–52)
HEMOGLOBIN: 15.8 GM/DL (ref 13.5–18)
MCH RBC QN AUTO: 32 PG (ref 27–31)
MCHC RBC AUTO-ENTMCNC: 32 % (ref 32–36)
MCV RBC AUTO: 99.8 FL (ref 78–100)
PDW BLD-RTO: 12.8 % (ref 11.7–14.9)
PLATELET # BLD: 173 K/CU MM (ref 140–440)
PMV BLD AUTO: 12.5 FL (ref 7.5–11.1)
POTASSIUM SERPL-SCNC: 4.7 MMOL/L (ref 3.5–5.1)
RBC # BLD: 4.94 M/CU MM (ref 4.6–6.2)
SODIUM BLD-SCNC: 140 MMOL/L (ref 135–145)
WBC # BLD: 6.1 K/CU MM (ref 4–10.5)

## 2023-08-14 PROCEDURE — 36415 COLL VENOUS BLD VENIPUNCTURE: CPT

## 2023-08-14 PROCEDURE — 80048 BASIC METABOLIC PNL TOTAL CA: CPT

## 2023-08-14 PROCEDURE — 85027 COMPLETE CBC AUTOMATED: CPT

## 2023-08-14 PROCEDURE — 86901 BLOOD TYPING SEROLOGIC RH(D): CPT

## 2023-08-14 PROCEDURE — 86900 BLOOD TYPING SEROLOGIC ABO: CPT

## 2023-08-14 PROCEDURE — 86850 RBC ANTIBODY SCREEN: CPT

## 2023-08-15 ENCOUNTER — TELEPHONE (OUTPATIENT)
Dept: CARDIOLOGY CLINIC | Age: 63
End: 2023-08-15

## 2023-08-17 ENCOUNTER — TELEPHONE (OUTPATIENT)
Dept: CARDIOTHORACIC SURGERY | Age: 63
End: 2023-08-17

## 2023-08-17 ENCOUNTER — HOSPITAL ENCOUNTER (OUTPATIENT)
Dept: CARDIAC CATH/INVASIVE PROCEDURES | Age: 63
Discharge: HOME | End: 2023-08-17
Attending: INTERNAL MEDICINE | Admitting: INTERNAL MEDICINE
Payer: MEDICARE

## 2023-08-17 VITALS
DIASTOLIC BLOOD PRESSURE: 83 MMHG | RESPIRATION RATE: 19 BRPM | WEIGHT: 201 LBS | HEART RATE: 62 BPM | BODY MASS INDEX: 31.55 KG/M2 | OXYGEN SATURATION: 97 % | TEMPERATURE: 96.3 F | SYSTOLIC BLOOD PRESSURE: 146 MMHG | HEIGHT: 67 IN

## 2023-08-17 PROBLEM — I73.9 PVD (PERIPHERAL VASCULAR DISEASE) (HCC): Status: ACTIVE | Noted: 2023-08-17

## 2023-08-17 LAB
ANION GAP SERPL CALCULATED.3IONS-SCNC: 11 MMOL/L (ref 4–16)
BUN SERPL-MCNC: 21 MG/DL (ref 6–23)
CALCIUM SERPL-MCNC: 8.6 MG/DL (ref 8.3–10.6)
CHLORIDE BLD-SCNC: 105 MMOL/L (ref 99–110)
CO2: 21 MMOL/L (ref 21–32)
CREAT SERPL-MCNC: 1.6 MG/DL (ref 0.9–1.3)
GFR SERPL CREATININE-BSD FRML MDRD: 48 ML/MIN/1.73M2
GLUCOSE BLD-MCNC: 145 MG/DL (ref 70–99)
GLUCOSE SERPL-MCNC: 224 MG/DL (ref 70–99)
POTASSIUM SERPL-SCNC: 3.8 MMOL/L (ref 3.5–5.1)
SODIUM BLD-SCNC: 137 MMOL/L (ref 135–145)

## 2023-08-17 PROCEDURE — 2500000003 HC RX 250 WO HCPCS

## 2023-08-17 PROCEDURE — 6360000004 HC RX CONTRAST MEDICATION

## 2023-08-17 PROCEDURE — 6360000002 HC RX W HCPCS: Performed by: INTERNAL MEDICINE

## 2023-08-17 PROCEDURE — 75625 CONTRAST EXAM ABDOMINL AORTA: CPT | Performed by: INTERNAL MEDICINE

## 2023-08-17 PROCEDURE — 75716 ARTERY X-RAYS ARMS/LEGS: CPT | Performed by: INTERNAL MEDICINE

## 2023-08-17 PROCEDURE — 82962 GLUCOSE BLOOD TEST: CPT

## 2023-08-17 PROCEDURE — C1769 GUIDE WIRE: HCPCS

## 2023-08-17 PROCEDURE — 2580000003 HC RX 258: Performed by: INTERNAL MEDICINE

## 2023-08-17 PROCEDURE — 36245 INS CATH ABD/L-EXT ART 1ST: CPT | Performed by: INTERNAL MEDICINE

## 2023-08-17 PROCEDURE — 6370000000 HC RX 637 (ALT 250 FOR IP): Performed by: INTERNAL MEDICINE

## 2023-08-17 PROCEDURE — 2709999900 HC NON-CHARGEABLE SUPPLY

## 2023-08-17 PROCEDURE — 6360000002 HC RX W HCPCS

## 2023-08-17 PROCEDURE — 80048 BASIC METABOLIC PNL TOTAL CA: CPT

## 2023-08-17 PROCEDURE — 36245 INS CATH ABD/L-EXT ART 1ST: CPT

## 2023-08-17 PROCEDURE — C1894 INTRO/SHEATH, NON-LASER: HCPCS

## 2023-08-17 RX ORDER — SODIUM CHLORIDE 0.9 % (FLUSH) 0.9 %
5-40 SYRINGE (ML) INJECTION EVERY 12 HOURS SCHEDULED
Status: DISCONTINUED | OUTPATIENT
Start: 2023-08-17 | End: 2023-08-17 | Stop reason: HOSPADM

## 2023-08-17 RX ORDER — ACETAMINOPHEN 325 MG/1
650 TABLET ORAL EVERY 4 HOURS PRN
Status: DISCONTINUED | OUTPATIENT
Start: 2023-08-17 | End: 2023-08-17 | Stop reason: HOSPADM

## 2023-08-17 RX ORDER — SODIUM CHLORIDE 9 MG/ML
INJECTION, SOLUTION INTRAVENOUS CONTINUOUS
Status: DISCONTINUED | OUTPATIENT
Start: 2023-08-17 | End: 2023-08-17 | Stop reason: HOSPADM

## 2023-08-17 RX ORDER — DIAZEPAM 5 MG/1
5 TABLET ORAL ONCE
Status: COMPLETED | OUTPATIENT
Start: 2023-08-17 | End: 2023-08-17

## 2023-08-17 RX ORDER — FUROSEMIDE 40 MG/1
40 TABLET ORAL EVERY MORNING
Qty: 10 TABLET | Refills: 0 | Status: SHIPPED | OUTPATIENT
Start: 2023-08-17 | End: 2023-09-16

## 2023-08-17 RX ORDER — SODIUM CHLORIDE 9 MG/ML
INJECTION, SOLUTION INTRAVENOUS PRN
Status: DISCONTINUED | OUTPATIENT
Start: 2023-08-17 | End: 2023-08-17 | Stop reason: HOSPADM

## 2023-08-17 RX ORDER — DIPHENHYDRAMINE HCL 25 MG
25 TABLET ORAL ONCE
Status: COMPLETED | OUTPATIENT
Start: 2023-08-17 | End: 2023-08-17

## 2023-08-17 RX ORDER — SODIUM CHLORIDE 0.9 % (FLUSH) 0.9 %
5-40 SYRINGE (ML) INJECTION PRN
Status: DISCONTINUED | OUTPATIENT
Start: 2023-08-17 | End: 2023-08-17 | Stop reason: HOSPADM

## 2023-08-17 RX ORDER — HYDRALAZINE HYDROCHLORIDE 20 MG/ML
10 INJECTION INTRAMUSCULAR; INTRAVENOUS ONCE
Status: COMPLETED | OUTPATIENT
Start: 2023-08-17 | End: 2023-08-17

## 2023-08-17 RX ADMIN — DIPHENHYDRAMINE HYDROCHLORIDE 25 MG: 25 TABLET ORAL at 06:24

## 2023-08-17 RX ADMIN — SODIUM CHLORIDE: 9 INJECTION, SOLUTION INTRAVENOUS at 06:24

## 2023-08-17 RX ADMIN — DIAZEPAM 5 MG: 5 TABLET ORAL at 06:24

## 2023-08-17 RX ADMIN — HYDRALAZINE HYDROCHLORIDE 10 MG: 20 INJECTION INTRAMUSCULAR; INTRAVENOUS at 08:43

## 2023-08-17 NOTE — DISCHARGE INSTRUCTIONS
Cardiac catheterization  Home Instuctions  Name:Hilaria Levin  :1960  Your instructions:    Shower only for the first 5 days, NO TUB BATHS. Wash procedure site with mild soap, rinse and pat dry. Do not rub over the procedure site for two (2) days. Remove dressing and replace with a band-aid in 2 days. Site observations-Observe the area for bleeding or hematoma (lump under the skin caused by bleeding.)      A. Painless bruising is normal.  B. If a firmness/swelling seems to be increasing or there is bright red bleeding from site       (hold pressure over procedure site) and  CALL 911 IMMEDIATELY. What to do after you leave the hospital:    Recommended activity: no lifting, Driving, or Strenuous exercise for 3 days. DO NOT lift more than 10lbs. Follow-up with physician in 7-10 days. Take your medication as ordered. If you have any questions, you may call [phone] or your physicians office    Information obtained by:  By signing below, I understand that if any problems occur once I leave the hospital I am to contact my cardiologist or family physician. I understand and acknowledge receipt of the instructions indicated above.

## 2023-08-17 NOTE — FLOWSHEET NOTE
Pt to pt  in wheelchair per volunteer for d/c home in private vehicle with friend.  Right groin free of bleeding/hematoma at time of d/c

## 2023-08-17 NOTE — H&P
Kristy Murdock is a 58 y.o. male who was seen today for management of coronary artery disease                        Fu on pvd            HPI:                       Pt has h/o coronary artery disease, hypertension, hyperlipidemia, diabetes, obesity, HFrEF, seen today for follow-up.  Pt has cardiac complaints has no chest pain shortness of breath and is compliant with his meds  Feels better since DC from hospital  His calves tighten up on walking short distances  Nena Levine has the following history recorded in care path:       Patient Active Problem List     Diagnosis Date Noted    Former tobacco use 11/10/2022    Microalbuminuria 09/09/2022    Hx of colonic polyps 09/08/2022    Stage 3a chronic kidney disease ([address]) 09/08/2022    Hemiparesis affecting right side as late effect of cerebrovascular accident (CVA) ([address]) 07/07/2016    Essential hypertension 05/24/2016    Cerebrovascular accident (CVA) due to thrombosis of left middle cerebral artery ([address]) 05/14/2016    Obesity (BMI 30-39.9) 06/26/2015    History of acute inferior wall myocardial infarction      HFrEF (heart failure with reduced ejection fraction) ([address]) 03/17/2023    Shortness of breath 03/01/2023    SOB (shortness of breath) 03/01/2023    Ventral hernia without obstruction or gangrene 11/10/2022    History of Guillain[place] syndrome 03/08/2019    Adrenal adenoma 05/23/2014    ASCVD (arteriosclerotic cardiovascular disease)      Frequent PVCs      Unstable angina (HCC) 03/15/2018    Gastroesophageal reflux disease 04/19/2017    SOBOE (shortness of breath on exertion) 10/25/2016    Hemiparesis of right dominant side as late effect of cerebral infarction ([address]) 05/14/2016    Gait disturbance 05/14/2016    Hyperlipidemia LDL goal <70 12/19/2014    Fatty liver 05/23/2014    Colon, diverticulosis 05/23/2014    Renal cyst 05/23/2014    B12 deficiency 01/10/2014    Chest pain 07/30/2013    History of PTCA      Stable angina pectoris ([address]) 03/14/2022 Date     LABA1C 6.6 (H) 03/01/2023     LABA1C 6.5 09/08/2022            Lab Results   Component Value Date     WBC 4.6 03/05/2023     HCT 44.1 03/05/2023     MCV 96.7 03/05/2023      03/05/2023            Lab Results   Component Value Date     CHOL 180 03/02/2023     TRIG 169 (H) 03/02/2023     HDL 33 (L) 03/02/2023     LDLCALC 113 (H) 03/02/2023     LDLDIRECT 47 10/14/2020            Lab Results   Component Value Date     ALT 11 03/17/2023     AST 12 (L) 03/17/2023      BMP:          Lab Results   Component Value Date/Time      03/27/2023 10:47 AM     K 4.1 03/27/2023 10:47 AM     K 4.2 03/27/2023 10:47 AM     K 3.8 03/16/2018 10:06 AM      03/27/2023 10:47 AM     CO2 21 03/27/2023 10:47 AM     BUN 22 03/27/2023 10:47 AM     CREATININE 1.6 07/21/2023 01:51 PM     CREATININE 1.49 03/27/2023 10:47 AM      CMP:         Lab Results   Component Value Date/Time      03/27/2023 10:47 AM     K 4.1 03/27/2023 10:47 AM     K 4.2 03/27/2023 10:47 AM     K 3.8 03/16/2018 10:06 AM      03/27/2023 10:47 AM     CO2 21 03/27/2023 10:47 AM     BUN 22 03/27/2023 10:47 AM     PROT 6.7 03/17/2023 10:05 AM      TSH:          Lab Results   Component Value Date/Time     TSHHS 1.220 03/02/2023 02:47 AM               Assessment & Plan:     - pvd        Moderate to severe soft plaque throughout the abdominal aorta. Chronically  occluded proximal and mid left common iliac artery with reconstitution of the  distal segment. Patent right common iliac artery. Patent bilateral external  iliac arteries. Right side: Patent vasculature through the popliteal artery. Occlusion of  the anterior tibial artery beyond the proximal segment and occluded dorsalis  pedis artery. Posterior tibial artery is the dominant supply to the foot. Left side: Patent arterial supply through the popliteal artery. Very small  caliber posterior tibial artery which likely occludes distally.   Anterior  tibial artery and peroneal

## 2023-08-17 NOTE — PLAN OF CARE
New BMP sent to lab. Awaiting results.  [facility] [place], [address] [place] [address] 8/17/2023

## 2023-08-21 NOTE — OP NOTE
Operative Note      Patient: Niesha Niño  YOB: 1960  MRN: [id number]        Procedure note  Access through the right common femoral artery  Abdominal aortogram  Distal runoff  Selective injection of the right lower extremity    EBL 5 cc    Access was obtained to the right common femoral artery pigtail was placed in the descending aorta abdominal echogram was performed catheter was pulled back above the bifurcation and then abdominal pelvic angiogram was performed  Then a distal runoff was performed keeping the catheter at the bifurcation  Then right common iliac artery was injected through the sheath and images of the right lower extremity was obtained    Findings  Renals are patent has mild disease  Abdominal aorta is mildly atherosclerotic  The left common iliac artery is totally occluded and then there is no stump as well at the bifurcation  The left common iliac and external iliac are filling from collaterals from the right which were visualized on the pelvic injection  It is filling the left external iliac and left external iliac left common femoral and left SFA well visualized however the flow was very diminished into the left infrapopliteal vessel probably underfilled given that it is filling from collaterals    The right common iliac has mild disease  The right external iliac is mild disease  The right common femoral artery is patent  The right SFA is patent has mild disease  The right infrapopliteal vessel has some disease which does not appear to be flow-limiting    Assessment and plan  Patient has totally occluded left common iliac which is probably causing his symptoms in the left lower extremity  I discussed with vascular surgery and the options are either to femorofemoral bypass or try to intervene upon the totally occluded left common iliac  It was discussed in detail with the patient patient is opting for surgery  We will be seen in the office for further discussion

## 2023-08-23 ENCOUNTER — PROCEDURE VISIT (OUTPATIENT)
Dept: CARDIOLOGY CLINIC | Age: 63
End: 2023-08-23
Payer: MEDICARE

## 2023-08-23 ENCOUNTER — OFFICE VISIT (OUTPATIENT)
Dept: CARDIOLOGY CLINIC | Age: 63
End: 2023-08-23
Payer: MEDICARE

## 2023-08-23 VITALS
DIASTOLIC BLOOD PRESSURE: 64 MMHG | HEART RATE: 67 BPM | WEIGHT: 201 LBS | HEIGHT: 67 IN | SYSTOLIC BLOOD PRESSURE: 102 MMHG | BODY MASS INDEX: 31.55 KG/M2 | OXYGEN SATURATION: 97 %

## 2023-08-23 DIAGNOSIS — I72.9 PSEUDOANEURYSM (HCC): ICD-10-CM

## 2023-08-23 DIAGNOSIS — I73.9 CLAUDICATION (HCC): ICD-10-CM

## 2023-08-23 DIAGNOSIS — I72.9 PSEUDOANEURYSM (HCC): Primary | ICD-10-CM

## 2023-08-23 PROCEDURE — 93926 LOWER EXTREMITY STUDY: CPT | Performed by: INTERNAL MEDICINE

## 2023-08-23 PROCEDURE — 99214 OFFICE O/P EST MOD 30 MIN: CPT | Performed by: INTERNAL MEDICINE

## 2023-08-23 PROCEDURE — 3078F DIAST BP <80 MM HG: CPT | Performed by: INTERNAL MEDICINE

## 2023-08-23 PROCEDURE — 3074F SYST BP LT 130 MM HG: CPT | Performed by: INTERNAL MEDICINE

## 2023-08-23 NOTE — PROGRESS NOTES
common iliac artery. Patent bilateral external  iliac arteries. Right side: Patent vasculature through the popliteal artery. Occlusion of  the anterior tibial artery beyond the proximal segment and occluded dorsalis  pedis artery. Posterior tibial artery is the dominant supply to the foot. Left side: Patent arterial supply through the popliteal artery. Very small  caliber posterior tibial artery which likely occludes distally. Anterior  tibial artery and peroneal artery are patent. Dorsalis pedis artery may be  patent in the foot but is very small caliber and faintly opacified. Rell Gomes MD    [facility]    Please note this report has been partially produced using speech recognition software and may contain errors related to that system including errors in grammar, punctuation, and spelling, as well as words and phrases that may be inappropriate. If there are any questions or concerns please feel free to contact the dictating provider for clarification.

## 2023-08-23 NOTE — PROGRESS NOTES
in patient education and counseling as described above, in reviewing his studies and coordinating his future care.

## 2023-08-23 NOTE — H&P (VIEW-ONLY)
Right side: Patent vasculature through the popliteal artery. Occlusion of  the anterior tibial artery beyond the proximal segment and occluded dorsalis  pedis artery. Posterior tibial artery is the dominant supply to the foot. Left side: Patent arterial supply through the popliteal artery. Very small  caliber posterior tibial artery which likely occludes distally. Anterior  tibial artery and peroneal artery are patent. Dorsalis pedis artery may be  patent in the foot but is very small caliber and faintly opacified. Postoperative changes from ventral hernia repair. New 5.2 cm area of  encapsulated fat in the anterior abdomen suggestive of omental infarction. VL DUP LOWER EXTREMITY ARTERIES RIGHT 07/21/2023    Right Impression  No evidence of pseudoaneurysm, hematoma or AVF in the right groin. Triphasic right CFA. Normal right CFV with good compressibility, augmentation, and respirophasic  flow. Assessment and Plan:   Mr. Roula Valdez has a left iliac occlusion and has disabling claudication. I do think that he will be best served with a crossover femoral-femoral bypass as his vascular anatomy is not conducive for a endovascular revascularization. In terms of timing, the surgical infection rates are significantly higher within 2 weeks of a femoral puncture, and I will therefore schedule his crossover femoral-femoral bypass on September 13, 2023. He will stop his Plavix 3 days prior to his surgery. He was given Hibiclens and the instructions to use. The stop date for plavix was given to him in writing. Thank you for the opportunity to participate in his care. Please don't hesitate to contact me should you have any questions or concerns regarding Mr. Sharon Nance. I remain with best regards, yours        Barb Newsome. Sherral Bamberger, MD    Today, I personally spent 45 minutes with the patient, of which greater than 50% of the time was in direct face to face contact with the patient.  The time was spent

## 2023-08-25 ENCOUNTER — INITIAL CONSULT (OUTPATIENT)
Dept: CARDIOTHORACIC SURGERY | Age: 63
End: 2023-08-25
Payer: MEDICARE

## 2023-08-25 ENCOUNTER — TELEPHONE (OUTPATIENT)
Dept: CARDIOLOGY CLINIC | Age: 63
End: 2023-08-25

## 2023-08-25 VITALS
SYSTOLIC BLOOD PRESSURE: 172 MMHG | BODY MASS INDEX: 31.48 KG/M2 | HEART RATE: 72 BPM | HEIGHT: 67 IN | OXYGEN SATURATION: 99 % | WEIGHT: 200.6 LBS | DIASTOLIC BLOOD PRESSURE: 94 MMHG

## 2023-08-25 DIAGNOSIS — I73.9 PVD (PERIPHERAL VASCULAR DISEASE) (HCC): Primary | ICD-10-CM

## 2023-08-25 PROCEDURE — 99204 OFFICE O/P NEW MOD 45 MIN: CPT | Performed by: THORACIC SURGERY (CARDIOTHORACIC VASCULAR SURGERY)

## 2023-08-25 PROCEDURE — 3077F SYST BP >= 140 MM HG: CPT | Performed by: THORACIC SURGERY (CARDIOTHORACIC VASCULAR SURGERY)

## 2023-08-25 PROCEDURE — 3080F DIAST BP >= 90 MM HG: CPT | Performed by: THORACIC SURGERY (CARDIOTHORACIC VASCULAR SURGERY)

## 2023-08-28 ENCOUNTER — HOSPITAL ENCOUNTER (OUTPATIENT)
Age: 63
Discharge: HOME | End: 2023-08-28
Payer: MEDICARE

## 2023-08-28 ENCOUNTER — HOSPITAL ENCOUNTER (OUTPATIENT)
Dept: GENERAL RADIOLOGY | Age: 63
Discharge: HOME | End: 2023-08-28
Payer: MEDICARE

## 2023-08-28 ENCOUNTER — TELEPHONE (OUTPATIENT)
Dept: CARDIOTHORACIC SURGERY | Age: 63
End: 2023-08-28

## 2023-08-28 ENCOUNTER — NURSE ONLY (OUTPATIENT)
Dept: CARDIOLOGY CLINIC | Age: 63
End: 2023-08-28

## 2023-08-28 DIAGNOSIS — I25.5 ISCHEMIC CARDIOMYOPATHY: Primary | ICD-10-CM

## 2023-08-28 DIAGNOSIS — Z01.818 PRE-PROCEDURAL EXAMINATION: ICD-10-CM

## 2023-08-28 LAB
ANION GAP SERPL CALCULATED.3IONS-SCNC: 14 MMOL/L (ref 4–16)
APTT: 30.3 SECONDS (ref 25.1–37.1)
BUN SERPL-MCNC: 24 MG/DL (ref 6–23)
CALCIUM SERPL-MCNC: 9.2 MG/DL (ref 8.3–10.6)
CHLORIDE BLD-SCNC: 104 MMOL/L (ref 99–110)
CO2: 23 MMOL/L (ref 21–32)
CREAT SERPL-MCNC: 1.6 MG/DL (ref 0.9–1.3)
GFR SERPL CREATININE-BSD FRML MDRD: 48 ML/MIN/1.73M2
GLUCOSE SERPL-MCNC: 111 MG/DL (ref 70–99)
HCT VFR BLD CALC: 49.3 % (ref 42–52)
HEMOGLOBIN: 15.9 GM/DL (ref 13.5–18)
INR BLD: 1 INDEX
MAGNESIUM: 2.3 MG/DL (ref 1.8–2.4)
MCH RBC QN AUTO: 32.1 PG (ref 27–31)
MCHC RBC AUTO-ENTMCNC: 32.3 % (ref 32–36)
MCV RBC AUTO: 99.4 FL (ref 78–100)
PDW BLD-RTO: 12.9 % (ref 11.7–14.9)
PHOSPHORUS: 2.4 MG/DL (ref 2.5–4.9)
PLATELET # BLD: 181 K/CU MM (ref 140–440)
PMV BLD AUTO: 12.2 FL (ref 7.5–11.1)
POTASSIUM SERPL-SCNC: 4.3 MMOL/L (ref 3.5–5.1)
PROTHROMBIN TIME: 13.3 SECONDS (ref 11.7–14.5)
RBC # BLD: 4.96 M/CU MM (ref 4.6–6.2)
SODIUM BLD-SCNC: 141 MMOL/L (ref 135–145)
WBC # BLD: 6.3 K/CU MM (ref 4–10.5)

## 2023-08-28 PROCEDURE — 85730 THROMBOPLASTIN TIME PARTIAL: CPT

## 2023-08-28 PROCEDURE — 84100 ASSAY OF PHOSPHORUS: CPT

## 2023-08-28 PROCEDURE — 83735 ASSAY OF MAGNESIUM: CPT

## 2023-08-28 PROCEDURE — 80048 BASIC METABOLIC PNL TOTAL CA: CPT

## 2023-08-28 PROCEDURE — 85027 COMPLETE CBC AUTOMATED: CPT

## 2023-08-28 PROCEDURE — 36415 COLL VENOUS BLD VENIPUNCTURE: CPT

## 2023-08-28 PROCEDURE — 85610 PROTHROMBIN TIME: CPT

## 2023-08-28 PROCEDURE — 71046 X-RAY EXAM CHEST 2 VIEWS: CPT

## 2023-08-28 NOTE — PROGRESS NOTES
Patient here in office and educated on Dual ICD Implant, schedule for 8/31/23 @ 1030, with arrival @ 0830, @ [facility]; risk explained; and consents signed. Also copy of orders given for labs and CXR due 8/28/23 at [facility]. Instruction given to patient to :  NPO after midnight the night before procedure; call hospital at [phone] to pre-register. May take rest of morning meds of procedure except LASIX. Patient voiced understanding. Copies of consent & info scanned in chart.

## 2023-08-29 ASSESSMENT — ENCOUNTER SYMPTOMS
BACK PAIN: 0
DIARRHEA: 0
ABDOMINAL PAIN: 0
COLOR CHANGE: 0
COUGH: 0
EYE PAIN: 0
BLOOD IN STOOL: 0
CHEST TIGHTNESS: 0
NAUSEA: 0
SHORTNESS OF BREATH: 1
CONSTIPATION: 0
PHOTOPHOBIA: 0
WHEEZING: 0
VOMITING: 0

## 2023-08-30 ENCOUNTER — TELEPHONE (OUTPATIENT)
Dept: CARDIOTHORACIC SURGERY | Age: 63
End: 2023-08-30

## 2023-08-30 NOTE — TELEPHONE ENCOUNTER
Called and spoke with patient , gave him his PAT date of 09/06/23 at 8:00am and also his surgery date 09/13/23 at 10:00am , told him to plan on being there about 2 hours early, so 8:00am arrival, he understood and agreed

## 2023-08-31 ENCOUNTER — HOSPITAL ENCOUNTER (OUTPATIENT)
Dept: CARDIAC CATH/INVASIVE PROCEDURES | Age: 63
Discharge: HOME | End: 2023-09-01
Attending: INTERNAL MEDICINE | Admitting: INTERNAL MEDICINE
Payer: MEDICARE

## 2023-08-31 ENCOUNTER — APPOINTMENT (OUTPATIENT)
Dept: GENERAL RADIOLOGY | Age: 63
End: 2023-08-31
Attending: INTERNAL MEDICINE
Payer: MEDICARE

## 2023-08-31 ENCOUNTER — PREP FOR PROCEDURE (OUTPATIENT)
Dept: CARDIOTHORACIC SURGERY | Age: 63
End: 2023-08-31

## 2023-08-31 DIAGNOSIS — Z01.818 PREOP EXAMINATION: Primary | ICD-10-CM

## 2023-08-31 PROBLEM — Z95.810 S/P ICD (INTERNAL CARDIAC DEFIBRILLATOR) PROCEDURE: Status: ACTIVE | Noted: 2023-08-31

## 2023-08-31 LAB
ABO/RH: NORMAL
ANTIBODY SCREEN: NEGATIVE
GLUCOSE BLD-MCNC: 103 MG/DL (ref 70–99)
GLUCOSE BLD-MCNC: 142 MG/DL (ref 70–99)

## 2023-08-31 PROCEDURE — C1721 AICD, DUAL CHAMBER: HCPCS

## 2023-08-31 PROCEDURE — 33249 INSJ/RPLCMT DEFIB W/LEAD(S): CPT | Performed by: INTERNAL MEDICINE

## 2023-08-31 PROCEDURE — 6360000002 HC RX W HCPCS

## 2023-08-31 PROCEDURE — 86900 BLOOD TYPING SEROLOGIC ABO: CPT

## 2023-08-31 PROCEDURE — 86901 BLOOD TYPING SEROLOGIC RH(D): CPT

## 2023-08-31 PROCEDURE — 6360000002 HC RX W HCPCS: Performed by: NURSE PRACTITIONER

## 2023-08-31 PROCEDURE — 33249 INSJ/RPLCMT DEFIB W/LEAD(S): CPT

## 2023-08-31 PROCEDURE — 2500000003 HC RX 250 WO HCPCS

## 2023-08-31 PROCEDURE — 6360000004 HC RX CONTRAST MEDICATION

## 2023-08-31 PROCEDURE — C1892 INTRO/SHEATH,FIXED,PEEL-AWAY: HCPCS

## 2023-08-31 PROCEDURE — 6370000000 HC RX 637 (ALT 250 FOR IP): Performed by: NURSE PRACTITIONER

## 2023-08-31 PROCEDURE — C1898 LEAD, PMKR, OTHER THAN TRANS: HCPCS

## 2023-08-31 PROCEDURE — C1777 LEAD, AICD, ENDO SINGLE COIL: HCPCS

## 2023-08-31 PROCEDURE — C9113 INJ PANTOPRAZOLE SODIUM, VIA: HCPCS | Performed by: NURSE PRACTITIONER

## 2023-08-31 PROCEDURE — 71045 X-RAY EXAM CHEST 1 VIEW: CPT

## 2023-08-31 PROCEDURE — 94761 N-INVAS EAR/PLS OXIMETRY MLT: CPT

## 2023-08-31 PROCEDURE — 86850 RBC ANTIBODY SCREEN: CPT

## 2023-08-31 PROCEDURE — 2580000003 HC RX 258

## 2023-08-31 PROCEDURE — 2580000003 HC RX 258: Performed by: NURSE PRACTITIONER

## 2023-08-31 PROCEDURE — 82962 GLUCOSE BLOOD TEST: CPT

## 2023-08-31 PROCEDURE — 2709999900 HC NON-CHARGEABLE SUPPLY

## 2023-08-31 RX ORDER — SODIUM CHLORIDE 0.9 % (FLUSH) 0.9 %
5-40 SYRINGE (ML) INJECTION EVERY 12 HOURS SCHEDULED
Status: DISCONTINUED | OUTPATIENT
Start: 2023-08-31 | End: 2023-09-01 | Stop reason: HOSPADM

## 2023-08-31 RX ORDER — DEXTROSE MONOHYDRATE 100 MG/ML
INJECTION, SOLUTION INTRAVENOUS CONTINUOUS PRN
Status: DISCONTINUED | OUTPATIENT
Start: 2023-08-31 | End: 2023-09-01 | Stop reason: HOSPADM

## 2023-08-31 RX ORDER — GLUCAGON 1 MG/ML
1 KIT INJECTION PRN
Status: DISCONTINUED | OUTPATIENT
Start: 2023-08-31 | End: 2023-09-01 | Stop reason: HOSPADM

## 2023-08-31 RX ORDER — SODIUM CHLORIDE 9 MG/ML
INJECTION, SOLUTION INTRAVENOUS PRN
Status: CANCELLED | OUTPATIENT
Start: 2023-08-31

## 2023-08-31 RX ORDER — PANTOPRAZOLE SODIUM 40 MG/10ML
40 INJECTION, POWDER, LYOPHILIZED, FOR SOLUTION INTRAVENOUS DAILY
Status: DISCONTINUED | OUTPATIENT
Start: 2023-08-31 | End: 2023-09-01 | Stop reason: HOSPADM

## 2023-08-31 RX ORDER — CLOPIDOGREL BISULFATE 75 MG/1
75 TABLET ORAL DAILY
Status: DISCONTINUED | OUTPATIENT
Start: 2023-08-31 | End: 2023-09-01 | Stop reason: HOSPADM

## 2023-08-31 RX ORDER — FUROSEMIDE 40 MG/1
40 TABLET ORAL EVERY MORNING
Status: DISCONTINUED | OUTPATIENT
Start: 2023-09-01 | End: 2023-09-01 | Stop reason: HOSPADM

## 2023-08-31 RX ORDER — ASPIRIN 81 MG/1
81 TABLET, CHEWABLE ORAL DAILY
Status: DISCONTINUED | OUTPATIENT
Start: 2023-09-01 | End: 2023-09-01 | Stop reason: HOSPADM

## 2023-08-31 RX ORDER — LISINOPRIL 5 MG/1
10 TABLET ORAL DAILY
Status: DISCONTINUED | OUTPATIENT
Start: 2023-08-31 | End: 2023-09-01 | Stop reason: HOSPADM

## 2023-08-31 RX ORDER — ACETAMINOPHEN 325 MG/1
650 TABLET ORAL EVERY 4 HOURS PRN
Status: DISCONTINUED | OUTPATIENT
Start: 2023-08-31 | End: 2023-09-01 | Stop reason: HOSPADM

## 2023-08-31 RX ORDER — ATORVASTATIN CALCIUM 40 MG/1
80 TABLET, FILM COATED ORAL DAILY
Status: DISCONTINUED | OUTPATIENT
Start: 2023-09-01 | End: 2023-09-01 | Stop reason: HOSPADM

## 2023-08-31 RX ORDER — ALBUTEROL SULFATE 90 UG/1
2 AEROSOL, METERED RESPIRATORY (INHALATION) EVERY 4 HOURS PRN
Status: DISCONTINUED | OUTPATIENT
Start: 2023-08-31 | End: 2023-09-01 | Stop reason: HOSPADM

## 2023-08-31 RX ORDER — SODIUM CHLORIDE 0.9 % (FLUSH) 0.9 %
5-40 SYRINGE (ML) INJECTION PRN
Status: CANCELLED | OUTPATIENT
Start: 2023-08-31

## 2023-08-31 RX ORDER — CHLORHEXIDINE GLUCONATE 4 G/100ML
SOLUTION TOPICAL ONCE
Status: CANCELLED | OUTPATIENT
Start: 2023-08-31 | End: 2023-08-31

## 2023-08-31 RX ORDER — SODIUM CHLORIDE 9 MG/ML
INJECTION, SOLUTION INTRAVENOUS PRN
Status: DISCONTINUED | OUTPATIENT
Start: 2023-08-31 | End: 2023-09-01 | Stop reason: HOSPADM

## 2023-08-31 RX ORDER — SODIUM CHLORIDE, SODIUM LACTATE, POTASSIUM CHLORIDE, CALCIUM CHLORIDE 600; 310; 30; 20 MG/100ML; MG/100ML; MG/100ML; MG/100ML
INJECTION, SOLUTION INTRAVENOUS CONTINUOUS
Status: CANCELLED | OUTPATIENT
Start: 2023-09-13

## 2023-08-31 RX ORDER — IPRATROPIUM BROMIDE AND ALBUTEROL SULFATE 2.5; .5 MG/3ML; MG/3ML
1 SOLUTION RESPIRATORY (INHALATION) EVERY 6 HOURS PRN
Status: DISCONTINUED | OUTPATIENT
Start: 2023-08-31 | End: 2023-09-01 | Stop reason: HOSPADM

## 2023-08-31 RX ORDER — HYDRALAZINE HYDROCHLORIDE 25 MG/1
25 TABLET, FILM COATED ORAL 3 TIMES DAILY
Status: DISCONTINUED | OUTPATIENT
Start: 2023-08-31 | End: 2023-09-01 | Stop reason: HOSPADM

## 2023-08-31 RX ORDER — EZETIMIBE 10 MG/1
10 TABLET ORAL DAILY
Status: DISCONTINUED | OUTPATIENT
Start: 2023-08-31 | End: 2023-09-01 | Stop reason: HOSPADM

## 2023-08-31 RX ORDER — ISOSORBIDE MONONITRATE 30 MG/1
30 TABLET, EXTENDED RELEASE ORAL DAILY
Status: DISCONTINUED | OUTPATIENT
Start: 2023-08-31 | End: 2023-09-01 | Stop reason: HOSPADM

## 2023-08-31 RX ORDER — GLIMEPIRIDE 2 MG/1
2 TABLET ORAL
Status: DISCONTINUED | OUTPATIENT
Start: 2023-09-01 | End: 2023-09-01 | Stop reason: HOSPADM

## 2023-08-31 RX ORDER — SODIUM CHLORIDE 0.9 % (FLUSH) 0.9 %
5-40 SYRINGE (ML) INJECTION PRN
Status: DISCONTINUED | OUTPATIENT
Start: 2023-08-31 | End: 2023-09-01 | Stop reason: HOSPADM

## 2023-08-31 RX ORDER — SODIUM CHLORIDE 0.9 % (FLUSH) 0.9 %
5-40 SYRINGE (ML) INJECTION EVERY 12 HOURS SCHEDULED
Status: CANCELLED | OUTPATIENT
Start: 2023-08-31

## 2023-08-31 RX ORDER — CARVEDILOL 6.25 MG/1
12.5 TABLET ORAL 2 TIMES DAILY
Status: DISCONTINUED | OUTPATIENT
Start: 2023-08-31 | End: 2023-09-01 | Stop reason: HOSPADM

## 2023-08-31 RX ADMIN — CEFAZOLIN 2000 MG: 2 INJECTION, POWDER, FOR SOLUTION INTRAMUSCULAR; INTRAVENOUS at 21:21

## 2023-08-31 RX ADMIN — ISOSORBIDE MONONITRATE 30 MG: 30 TABLET, EXTENDED RELEASE ORAL at 17:01

## 2023-08-31 RX ADMIN — LISINOPRIL 10 MG: 5 TABLET ORAL at 17:01

## 2023-08-31 RX ADMIN — PANTOPRAZOLE SODIUM 40 MG: 40 INJECTION, POWDER, FOR SOLUTION INTRAVENOUS at 17:02

## 2023-08-31 RX ADMIN — EMPAGLIFLOZIN 10 MG: 10 TABLET, FILM COATED ORAL at 17:01

## 2023-08-31 RX ADMIN — CARVEDILOL 12.5 MG: 6.25 TABLET, FILM COATED ORAL at 21:17

## 2023-08-31 RX ADMIN — HYDRALAZINE HYDROCHLORIDE 25 MG: 25 TABLET, FILM COATED ORAL at 17:00

## 2023-08-31 RX ADMIN — EZETIMIBE 10 MG: 10 TABLET ORAL at 17:00

## 2023-08-31 RX ADMIN — SERTRALINE HYDROCHLORIDE 100 MG: 50 TABLET ORAL at 17:01

## 2023-08-31 RX ADMIN — CLOPIDOGREL BISULFATE 75 MG: 75 TABLET ORAL at 17:01

## 2023-08-31 RX ADMIN — SODIUM CHLORIDE, PRESERVATIVE FREE 10 ML: 5 INJECTION INTRAVENOUS at 21:17

## 2023-08-31 RX ADMIN — HYDRALAZINE HYDROCHLORIDE 25 MG: 25 TABLET, FILM COATED ORAL at 21:17

## 2023-08-31 ASSESSMENT — ENCOUNTER SYMPTOMS
EYE PAIN: 0
BACK PAIN: 0
DIARRHEA: 0
BLOOD IN STOOL: 0
PHOTOPHOBIA: 0
COUGH: 0
SHORTNESS OF BREATH: 1
WHEEZING: 0
COLOR CHANGE: 0
ABDOMINAL PAIN: 0
VOMITING: 0
NAUSEA: 0
CHEST TIGHTNESS: 0
CONSTIPATION: 0

## 2023-08-31 NOTE — OP NOTE
[facility]     Electrophysiology Procedure Note       Date of Procedure: 8/31/2023  Patient's Name: Amber Mcgovern  YOB: 1960   Medical Record Number: [id number]  Procedure Performed by: Puma Curtis MD     Procedures performed:  Insertion of right ventricular defibrillator lead under fluoroscopy. Insertion of right atrial lead under fluoroscopy  Insertion of a dual chamber ICD. Electronic analysis of lead and device. IV sedation. Selective venography of left upper extremity. Indication of the procedure:       Amber Mcgovern is a 58 y.o. male with ischemic cardiomyopathy, HTN, HLD, DM-2, with severe LV systolic dysfunction 74-15% with nyha class III symptoms despite optimal medical therapy here for ICD implantation    Sedation : versed and fentanyl    Blood loss : 20 cc    Details of procedure: The patient was brought to the electrophysiology laboratory in stable condition. The patient was in a fasting and non-sedated state. The risks, benefits and alternatives of the procedure were discussed with the patient. The risks including, but not limited to, the risks of vascular injury, bleeding, infection, device malfunction, lead dislodgement, radiation exposure, injury to cardiac and surrounding structures (including pneumothorax), stroke, myocardial infarction and death were discussed in detail. The patient opted to proceed with the device implantation. Written informed consent was signed and placed in the chart. Prophylactic antibiotic was given. Selective venography of the left upper extremity was done to assess vein patency. The patient was prepped and draped in a sterile fashion. A timeout protocol was completed to identify the patient and the procedure being performed. IV sedation was provided with IV Versed, Fentanyl. An incision was made in the left pectoral area after administration of lidocaine.  Using electrocautery and blunt dissection, a

## 2023-08-31 NOTE — PROGRESS NOTES
Patient while moving to the bed had a vasovagal response  BP was down and [place]  Atropine and neosynephrine given and patient improved  No effusion  Patient moved all his limbs  Patient able to answer questions appropriately  Patient BP was high when he came in and did not take any medications  Hydralazine and probably sedation also caused his BP to drop with vagal response  Now his BP was 471 systolic  Will continue to monitor

## 2023-08-31 NOTE — PLAN OF CARE
Handoff given to [facility]. Left chest site assessed and no bleeding or hematoma noted and pressure dressing now off.  Patients vitals assessed and patient now wanting to rest. Fabi Smith RN 8/31/2023

## 2023-09-01 VITALS
BODY MASS INDEX: 31.55 KG/M2 | WEIGHT: 201 LBS | HEART RATE: 60 BPM | TEMPERATURE: 97.6 F | OXYGEN SATURATION: 98 % | RESPIRATION RATE: 16 BRPM | HEIGHT: 67 IN | SYSTOLIC BLOOD PRESSURE: 136 MMHG | DIASTOLIC BLOOD PRESSURE: 101 MMHG

## 2023-09-01 PROCEDURE — 99238 HOSP IP/OBS DSCHRG MGMT 30/<: CPT | Performed by: NURSE PRACTITIONER

## 2023-09-01 PROCEDURE — 6360000002 HC RX W HCPCS: Performed by: NURSE PRACTITIONER

## 2023-09-01 PROCEDURE — 6370000000 HC RX 637 (ALT 250 FOR IP): Performed by: NURSE PRACTITIONER

## 2023-09-01 PROCEDURE — C9113 INJ PANTOPRAZOLE SODIUM, VIA: HCPCS | Performed by: NURSE PRACTITIONER

## 2023-09-01 PROCEDURE — 94761 N-INVAS EAR/PLS OXIMETRY MLT: CPT

## 2023-09-01 PROCEDURE — 2580000003 HC RX 258: Performed by: NURSE PRACTITIONER

## 2023-09-01 RX ADMIN — SODIUM CHLORIDE, PRESERVATIVE FREE 10 ML: 5 INJECTION INTRAVENOUS at 10:53

## 2023-09-01 RX ADMIN — LISINOPRIL 10 MG: 5 TABLET ORAL at 10:48

## 2023-09-01 RX ADMIN — SERTRALINE HYDROCHLORIDE 100 MG: 50 TABLET ORAL at 10:47

## 2023-09-01 RX ADMIN — ATORVASTATIN CALCIUM 80 MG: 40 TABLET, FILM COATED ORAL at 10:47

## 2023-09-01 RX ADMIN — EMPAGLIFLOZIN 10 MG: 10 TABLET, FILM COATED ORAL at 10:47

## 2023-09-01 RX ADMIN — FUROSEMIDE 40 MG: 40 TABLET ORAL at 10:47

## 2023-09-01 RX ADMIN — GLIMEPIRIDE 2 MG: 2 TABLET ORAL at 10:49

## 2023-09-01 RX ADMIN — ASPIRIN 81 MG: 81 TABLET, CHEWABLE ORAL at 10:47

## 2023-09-01 RX ADMIN — PANTOPRAZOLE SODIUM 40 MG: 40 INJECTION, POWDER, FOR SOLUTION INTRAVENOUS at 10:53

## 2023-09-01 RX ADMIN — CEFAZOLIN 2000 MG: 2 INJECTION, POWDER, FOR SOLUTION INTRAMUSCULAR; INTRAVENOUS at 05:43

## 2023-09-01 RX ADMIN — EZETIMIBE 10 MG: 10 TABLET ORAL at 10:47

## 2023-09-01 RX ADMIN — CARVEDILOL 12.5 MG: 6.25 TABLET, FILM COATED ORAL at 10:47

## 2023-09-01 RX ADMIN — CLOPIDOGREL BISULFATE 75 MG: 75 TABLET ORAL at 10:47

## 2023-09-01 RX ADMIN — HYDRALAZINE HYDROCHLORIDE 25 MG: 25 TABLET, FILM COATED ORAL at 10:52

## 2023-09-01 RX ADMIN — ISOSORBIDE MONONITRATE 30 MG: 30 TABLET, EXTENDED RELEASE ORAL at 10:47

## 2023-09-01 NOTE — CARE COORDINATION
09/01/23 0834   Service Assessment   Patient Orientation Alert and Oriented   Cognition Alert   History Provided By Medical Record   Primary Caregiver Self   Support Systems Spouse/Significant Other   Patient's Healthcare Decision Maker is: Legal Next of Kin   PCP Verified by CM No  (PCP list added to discharge instructions)   Prior Functional Level Independent in ADLs/IADLs   Current Functional Level Independent in ADLs/IADLs   Can patient return to prior living arrangement Yes   Ability to make needs known: Good   Family able to assist with home care needs: Yes   Would you like for me to discuss the discharge plan with any other family members/significant others, and if so, who? No   Financial Resources [employer] None     Chart reviewed, screened for discharge planning. Pt from home with spouse. No needs identified at this time.

## 2023-09-01 NOTE — PROGRESS NOTES
Left VM with reminder of of pre-testing on 9/6/23 @0800. Bring insurance card, picture ID and a list of your home medications. Check in at the information desk in the lobby upon your arrival. You can eat and take morning medications.

## 2023-09-01 NOTE — DISCHARGE INSTRUCTIONS
1. Avoid raising the arm above shoulder for 4 weeks  2. No driving for 10 days  3. No lifting more than 10 lbs with the left hand  4. Do not wet the area of surgery for 10 days  5. Follow up appointment with Doctor for wound check in 10 days  6. Notify Doctor if pain, fever, chills, swelling or bleeding in the surgical area  7. Please avoid sleeping on the surgical side. 8. Please do not allow anyone other than Dr Beatriz Nobles staff to remove or redress the surgical site. If the dressing were to fall off or fall partially off before the 10 day follow up appointment, please call the office ASAP. 1. Call to schedule follow up hospital follow up appointment:   [address] at [address]   [address], [address] [facility] [phone]   [address], [address] [facility] [phone]   [address]   2.  Call for new patient Primary Care Physician appointment:     Physician Finder [phone]   Dr. Usha Caballero and Dr. Cipriano Nicholson [phone]   [address], On license of [facility] [address] [phone]   [address] [place], [place]   Dr. Ned Tinoco and Yosi Dias [phone]   [address] [facility], [address] [facility]   Dr. Chris Wong and Sherri JOYNER [phone]   [facility], [address] [facility] [address]   [address], [address] [facility] Direct Primary Care [phone]   [facility], [address] [facility]   Dr. Karolina JOYNER and [facility] [phone]   [address], [address] [place], [address] [place], [place] [address]Active Waiting List*) [phone]   [address] [place], [place]   Dr. Stevie Monroy [phone]   [place], [address] [phone]   [address] [facility]   Dr. Oshea [facility] [phone]   [address] [facility], [address] [place] [address]

## 2023-09-01 NOTE — DISCHARGE SUMMARY
[facility]  Discharge Summary    Elliot Minaya  :  1960  MRN:  [id number]    Admit date:  2023  Discharge date:      Admitting Physician:  Geoff Leal MD    Discharge Diagnoses:     1. SP implantation of dual chamber ICD      Admission Condition:  fair    Discharged Condition:  good    Hospital Course:   Patient with hx of ischemic cardiomyopathy, HTN, HLD, DM type 2, with severe LV systolic dysfunction 63-55% with NYHA class III symptoms presented for implantation of dual chamber ICD. Patient tolerated the procedure well. Observed overnight. Patient device area was clean, no hematoma and no tenderness. Patient device interrogation was stable. CXR confirmed placement of device with no complications. Patient stable for discharge with out patient follow up.     Current Discharge Medication List             Details   furosemide (LASIX) 40 MG tablet Take 1 tablet by mouth every morning  Qty: 10 tablet, Refills: 0      hydrALAZINE (APRESOLINE) 25 MG tablet Take 1 tablet by mouth 3 times daily  Qty: 90 tablet, Refills: 3      lisinopril (PRINIVIL;ZESTRIL) 10 MG tablet Take 1 tablet by mouth daily      pantoprazole (PROTONIX) 40 MG injection Infuse 40 mg intravenously daily      glimepiride (AMARYL) 2 MG tablet Take 1 tablet by mouth every morning (before breakfast)      albuterol-ipratropium (COMBIVENT RESPIMAT)  MCG/ACT AERS inhaler Inhale 1 puff into the lungs every 6 hours as needed for Wheezing      empagliflozin (JARDIANCE) 10 MG tablet Take 1 tablet by mouth daily  Qty: 90 tablet, Refills: 3      isosorbide mononitrate (IMDUR) 30 MG extended release tablet Take 1 tablet by mouth daily  Qty: 30 tablet, Refills: 3      carvedilol (COREG) 12.5 MG tablet Take 1 tablet by mouth 2 times daily  Qty: 180 tablet, Refills: 3      ezetimibe (ZETIA) 10 MG tablet Take 1 tablet by mouth daily  Qty: 90 tablet, Refills: 1      tiotropium (SPIRIVA HANDIHALER) 18 MCG inhalation capsule Inhale 1 capsule into the

## 2023-09-06 ENCOUNTER — HOSPITAL ENCOUNTER (OUTPATIENT)
Dept: PREADMISSION TESTING | Age: 63
Discharge: HOME | End: 2023-09-06
Payer: MEDICARE

## 2023-09-06 ENCOUNTER — HOSPITAL ENCOUNTER (OUTPATIENT)
Age: 63
Discharge: HOME | End: 2023-09-06
Payer: MEDICARE

## 2023-09-06 ENCOUNTER — HOSPITAL ENCOUNTER (OUTPATIENT)
Dept: PULMONOLOGY | Age: 63
Discharge: HOME | End: 2023-09-06
Payer: MEDICARE

## 2023-09-06 ENCOUNTER — TELEPHONE (OUTPATIENT)
Dept: CARDIOTHORACIC SURGERY | Age: 63
End: 2023-09-06

## 2023-09-06 VITALS
SYSTOLIC BLOOD PRESSURE: 152 MMHG | TEMPERATURE: 97.1 F | BODY MASS INDEX: 30.46 KG/M2 | WEIGHT: 201 LBS | DIASTOLIC BLOOD PRESSURE: 87 MMHG | HEIGHT: 68 IN | RESPIRATION RATE: 20 BRPM | HEART RATE: 67 BPM

## 2023-09-06 DIAGNOSIS — Z01.818 PREOP EXAMINATION: ICD-10-CM

## 2023-09-06 LAB
ABO/RH: NORMAL
ALBUMIN SERPL-MCNC: 4.8 GM/DL (ref 3.4–5)
ALP BLD-CCNC: 78 IU/L (ref 40–129)
ALT SERPL-CCNC: 11 U/L (ref 10–40)
ANION GAP SERPL CALCULATED.3IONS-SCNC: 13 MMOL/L (ref 4–16)
ANTIBODY SCREEN: NEGATIVE
APTT: 24.4 SECONDS (ref 25.1–37.1)
AST SERPL-CCNC: 18 IU/L (ref 15–37)
BACTERIA: NEGATIVE /HPF
BASOPHILS ABSOLUTE: 0.1 K/CU MM
BASOPHILS RELATIVE PERCENT: 1.2 % (ref 0–1)
BILIRUB SERPL-MCNC: 0.6 MG/DL (ref 0–1)
BILIRUBIN URINE: NEGATIVE MG/DL
BLOOD, URINE: NEGATIVE
BUN SERPL-MCNC: 25 MG/DL (ref 6–23)
CALCIUM SERPL-MCNC: 9.3 MG/DL (ref 8.3–10.6)
CHLORIDE BLD-SCNC: 102 MMOL/L (ref 99–110)
CLARITY: CLEAR
CO2: 24 MMOL/L (ref 21–32)
COLOR: YELLOW
COMMENT: NORMAL
CREAT SERPL-MCNC: 2 MG/DL (ref 0.9–1.3)
DIFFERENTIAL TYPE: ABNORMAL
EOSINOPHILS ABSOLUTE: 0.3 K/CU MM
EOSINOPHILS RELATIVE PERCENT: 4.4 % (ref 0–3)
ESTIMATED AVERAGE GLUCOSE: 151 MG/DL
GFR SERPL CREATININE-BSD FRML MDRD: 37 ML/MIN/1.73M2
GLUCOSE SERPL-MCNC: 113 MG/DL (ref 70–99)
GLUCOSE, URINE: >1000 MG/DL
HBA1C MFR BLD: 6.9 % (ref 4.2–6.3)
HCT VFR BLD CALC: 47.6 % (ref 42–52)
HEMOGLOBIN: 15.6 GM/DL (ref 13.5–18)
IMMATURE NEUTROPHIL %: 0.3 % (ref 0–0.43)
INR BLD: 1.1 INDEX
KETONES, URINE: NEGATIVE MG/DL
LEUKOCYTE ESTERASE, URINE: NEGATIVE
LYMPHOCYTES ABSOLUTE: 1.6 K/CU MM
LYMPHOCYTES RELATIVE PERCENT: 22.8 % (ref 24–44)
MAGNESIUM: 2.3 MG/DL (ref 1.8–2.4)
MCH RBC QN AUTO: 32 PG (ref 27–31)
MCHC RBC AUTO-ENTMCNC: 32.8 % (ref 32–36)
MCV RBC AUTO: 97.5 FL (ref 78–100)
MONOCYTES ABSOLUTE: 0.5 K/CU MM
MONOCYTES RELATIVE PERCENT: 7.2 % (ref 0–4)
MRSA, DNA, NASAL: NEGATIVE
MUCUS: ABNORMAL HPF
NITRITE URINE, QUANTITATIVE: NEGATIVE
NUCLEATED RBC %: 0 %
PDW BLD-RTO: 13.1 % (ref 11.7–14.9)
PH, URINE: 5.5 (ref 5–8)
PLATELET # BLD: 165 K/CU MM (ref 140–440)
PMV BLD AUTO: 11.9 FL (ref 7.5–11.1)
POTASSIUM SERPL-SCNC: 4.2 MMOL/L (ref 3.5–5.1)
PROTEIN UA: 30 MG/DL
PROTHROMBIN TIME: 14.2 SECONDS (ref 11.7–14.5)
RBC # BLD: 4.88 M/CU MM (ref 4.6–6.2)
RBC URINE: 0 /HPF (ref 0–3)
SEGMENTED NEUTROPHILS ABSOLUTE COUNT: 4.4 K/CU MM
SEGMENTED NEUTROPHILS RELATIVE PERCENT: 64.1 % (ref 36–66)
SODIUM BLD-SCNC: 139 MMOL/L (ref 135–145)
SPECIFIC GRAVITY UA: 1.01 (ref 1–1.03)
SPECIMEN DESCRIPTION: NORMAL
SQUAMOUS EPITHELIAL: 2 /HPF
TOTAL IMMATURE NEUTOROPHIL: 0.02 K/CU MM
TOTAL NUCLEATED RBC: 0 K/CU MM
TOTAL PROTEIN: 7 GM/DL (ref 6.4–8.2)
TRICHOMONAS: ABNORMAL /HPF
UROBILINOGEN, URINE: 0.2 MG/DL (ref 0.2–1)
WBC # BLD: 6.8 K/CU MM (ref 4–10.5)
WBC UA: <1 /HPF (ref 0–2)

## 2023-09-06 PROCEDURE — 36415 COLL VENOUS BLD VENIPUNCTURE: CPT

## 2023-09-06 PROCEDURE — 87641 MR-STAPH DNA AMP PROBE: CPT

## 2023-09-06 PROCEDURE — 86922 COMPATIBILITY TEST ANTIGLOB: CPT

## 2023-09-06 PROCEDURE — 85025 COMPLETE CBC W/AUTO DIFF WBC: CPT

## 2023-09-06 PROCEDURE — 94010 BREATHING CAPACITY TEST: CPT

## 2023-09-06 PROCEDURE — 81001 URINALYSIS AUTO W/SCOPE: CPT

## 2023-09-06 PROCEDURE — 83735 ASSAY OF MAGNESIUM: CPT

## 2023-09-06 PROCEDURE — 93005 ELECTROCARDIOGRAM TRACING: CPT

## 2023-09-06 PROCEDURE — 80048 BASIC METABOLIC PNL TOTAL CA: CPT

## 2023-09-06 PROCEDURE — 83036 HEMOGLOBIN GLYCOSYLATED A1C: CPT

## 2023-09-06 PROCEDURE — 86901 BLOOD TYPING SEROLOGIC RH(D): CPT

## 2023-09-06 PROCEDURE — 86900 BLOOD TYPING SEROLOGIC ABO: CPT

## 2023-09-06 PROCEDURE — 85730 THROMBOPLASTIN TIME PARTIAL: CPT

## 2023-09-06 PROCEDURE — 86850 RBC ANTIBODY SCREEN: CPT

## 2023-09-06 PROCEDURE — 85610 PROTHROMBIN TIME: CPT

## 2023-09-06 PROCEDURE — 80053 COMPREHEN METABOLIC PANEL: CPT

## 2023-09-06 PROCEDURE — 87081 CULTURE SCREEN ONLY: CPT

## 2023-09-06 NOTE — TELEPHONE ENCOUNTER
Called and left vm for pt to call back to go over medications to hold before surgery. Medications to hold are Lisinopril- hold 48 hrs before surgery. Plavix starting today 9/6/23 until after surgery. Pt is to hold everything else the day of surgery.

## 2023-09-06 NOTE — PROGRESS NOTES
Bedside Spirometry    FVC             2.90   Actual  ---  77   %Predicted  FEV1           2.00  Actual   ---   68  %Predicted  FEV1/FVC     69   Actual   ---  91  %Predicted  FEF 25-75   1.30  Actual   ---  40   %Predicted     Instructed and pt demonstrated use of IS  Achieved 2000 ml

## 2023-09-07 ENCOUNTER — TELEPHONE (OUTPATIENT)
Dept: CARDIOLOGY CLINIC | Age: 63
End: 2023-09-07

## 2023-09-07 LAB
EKG ATRIAL RATE: 60 BPM
EKG DIAGNOSIS: NORMAL
EKG P AXIS: 4 DEGREES
EKG P-R INTERVAL: 198 MS
EKG Q-T INTERVAL: 466 MS
EKG QRS DURATION: 128 MS
EKG QTC CALCULATION (BAZETT): 466 MS
EKG R AXIS: 13 DEGREES
EKG T AXIS: 155 DEGREES
EKG VENTRICULAR RATE: 60 BPM

## 2023-09-07 PROCEDURE — 93010 ELECTROCARDIOGRAM REPORT: CPT | Performed by: INTERNAL MEDICINE

## 2023-09-07 NOTE — TELEPHONE ENCOUNTER
Patient given medication instructions over telephone on Crossover Fem-Fem bypass. Surgery is scheduled for 9/13/23 @ 1000 am, w/arrival @ 800 am, @ [facility]. Patient is to hold the following medications: Lisinopril 48 hours before surgery, Plavix starting 9/7/23 and all other medications the day of surgery. Patient wrote this down and voiced understanding. Patient advised to review instructions given. Patient was notified that surgery date or time could be changed due to an emergency. Patient voiced understanding.

## 2023-09-11 ENCOUNTER — TELEPHONE (OUTPATIENT)
Dept: CARDIOTHORACIC SURGERY | Age: 63
End: 2023-09-11

## 2023-09-11 NOTE — TELEPHONE ENCOUNTER
Please advise   Contains abnormal data Comprehensive Metabolic Panel  Order: [id number]  Status: Final result     Visible to patient: Yes (not seen)     Next appt: 09/12/2023 at 04:10 PM in Cardiology (schedule [facility] pacers spr)     Dx: Preop examination     0 Result Notes    1 HM Topic             Component Ref Range & Units 9/6/23 0834 8/28/23 1121 8/17/23 1100 8/14/23 1232 3/27/23 1047 3/27/23 1047 3/17/23 1005   Sodium 135 - 145 MMOL/L 139  141  137  140   140 R  143 R    Potassium 3.5 - 5.1 MMOL/L 4.2  4.3  3.8  4.7  4.2 R, CM  4.1 R  5.5 High  R    Chloride 99 - 110 mMol/L 102  104  105  104   107 High  R  108 R    CO2 21 - 32 MMOL/L 24  23  21  25   21 R  23 R    BUN 6 - 23 MG/DL 25 High   24 High   21  25 High    22 R  17 R    Creatinine 0.9 - 1.3 MG/DL 2.0 High   1.6 High   1.6 High   1.8 High    1.49 High  R  1.7 High  R    Est, Glom Filt Rate >60 mL/min/1.73m2 37 Low   48 Low  CM  48 Low  CM  42 Low  CM    45 Abnormal  R, CM    Comment:          These results are not intended for use in patients <25years of age. eGFR results are calculated without a race factor using the 2021 CKD-EPI equation. Careful clinical correlation is recommended, particularly when comparing to results   calculated using previous equations. The CKD-EPI equation is less accurate in patients with extremes of muscle mass, extra-renal   metabolism of creatine, excessive creatine ingestion, or following therapy that affects   renal tubular secretion.     Glucose 70 - 99 MG/ High   111 High   224 High   121 High    140 High  R  107 High  R    Calcium 8.3 - 10.6 MG/DL 9.3  9.2  8.6  9.0   8.9 R  8.8 R    Albumin 3.4 - 5.0 GM/DL 4.8       4.0 R    Total Protein 6.4 - 8.2 GM/DL 7.0       6.7 R    Total Bilirubin 0.0 - 1.0 MG/DL 0.6       0.4 R    ALT 10 - 40 U/L 11       11    AST 15 - 37 IU/L 18       12 Low  R    Alkaline Phosphatase 40 - 129 IU/L 78       64 R    Anion Gap 4 - 16 13  14  11  11    12 R    Estimated

## 2023-09-12 ENCOUNTER — TELEPHONE (OUTPATIENT)
Dept: CARDIOTHORACIC SURGERY | Age: 63
End: 2023-09-12

## 2023-09-12 ENCOUNTER — NURSE ONLY (OUTPATIENT)
Dept: CARDIOLOGY CLINIC | Age: 63
End: 2023-09-12

## 2023-09-12 ENCOUNTER — ANESTHESIA EVENT (OUTPATIENT)
Dept: OPERATING ROOM | Age: 63
DRG: 253 | End: 2023-09-12
Payer: MEDICARE

## 2023-09-12 VITALS — TEMPERATURE: 97.4 F

## 2023-09-12 DIAGNOSIS — Z95.810 STATUS POST IMPLANTATION OF AUTOMATIC CARDIOVERTER/DEFIBRILLATOR (AICD): Primary | ICD-10-CM

## 2023-09-12 PROCEDURE — 99024 POSTOP FOLLOW-UP VISIT: CPT | Performed by: INTERNAL MEDICINE

## 2023-09-12 ASSESSMENT — ENCOUNTER SYMPTOMS: SHORTNESS OF BREATH: 1

## 2023-09-12 NOTE — PROGRESS NOTES
Patient seen for site check post ICD implant. Dressing removed. No signs of inflammation or infection noted. Edges well approximated. Patient has no complaints of pain or discomfort. Single steri strip applied. Patient instructed to not lift arm higher than shoulder level. Instructions given on the use of [place] monitor.

## 2023-09-12 NOTE — ANESTHESIA PRE PROCEDURE
Department of Anesthesiology  Preprocedure Note       Name:  Rosa Alfaro   Age:  58 y.o.  :  1960                                          MRN:  [id number]         Date:  2023      Surgeon: Samira Voss):  Avelina Mcelroy MD    Procedure: Procedure(s):  CROSSEVER FEMORAL TO  FEMORAL BYPASS    Medications prior to admission:   Prior to Admission medications    Medication Sig Start Date End Date Taking?  Authorizing Provider   furosemide (LASIX) 40 MG tablet Take 1 tablet by mouth every morning 23  Cliff Marie MD   hydrALAZINE (APRESOLINE) 25 MG tablet Take 1 tablet by mouth 3 times daily 23   Heidi White MD   lisinopril (PRINIVIL;ZESTRIL) 10 MG tablet Take 1 tablet by mouth daily    Historical Provider, MD   pantoprazole (PROTONIX) 40 MG injection Infuse 40 mg intravenously daily    Historical Provider, MD   glimepiride (AMARYL) 2 MG tablet Take 1 tablet by mouth every morning (before breakfast)    Historical Provider, MD   albuterol-ipratropium (COMBIVENT RESPIMAT)  MCG/ACT AERS inhaler Inhale 1 puff into the lungs every 6 hours as needed for Wheezing    Historical Provider, MD   empagliflozin (JARDIANCE) 10 MG tablet Take 1 tablet by mouth daily 3/17/23   JERED Oseguera CNP   isosorbide mononitrate (IMDUR) 30 MG extended release tablet Take 1 tablet by mouth daily 3/17/23   JERED Oseguera CNP   carvedilol (COREG) 12.5 MG tablet Take 1 tablet by mouth 2 times daily 23   JERED Oseguera CNP   ezetimibe (ZETIA) 10 MG tablet Take 1 tablet by mouth daily 23   JERED Oseguera CNP   tiotropium (SPIRIVA HANDIHALER) 18 MCG inhalation capsule Inhale 1 capsule into the lungs daily for 30 doses  Patient not taking: Reported on 2023 11/10/22 8/25/23  Catalina Briceño MD   albuterol sulfate HFA (VENTOLIN HFA) 108 (90 Base) MCG/ACT inhaler Inhale 2 puffs into the lungs every 4 hours as needed for Wheezing 22   Catalina Briceño MD

## 2023-09-12 NOTE — TELEPHONE ENCOUNTER
Dr. Beryle Paganini notified of abnormal labs, Per Dr. Beryle Paganini pt okay to proceed with surgery.

## 2023-09-13 ENCOUNTER — HOSPITAL ENCOUNTER (INPATIENT)
Age: 63
LOS: 1 days | Discharge: HOME | DRG: 253 | End: 2023-09-14
Attending: THORACIC SURGERY (CARDIOTHORACIC VASCULAR SURGERY) | Admitting: THORACIC SURGERY (CARDIOTHORACIC VASCULAR SURGERY)
Payer: MEDICARE

## 2023-09-13 ENCOUNTER — ANESTHESIA (OUTPATIENT)
Dept: OPERATING ROOM | Age: 63
DRG: 253 | End: 2023-09-13
Payer: MEDICARE

## 2023-09-13 DIAGNOSIS — I73.9 PVD (PERIPHERAL VASCULAR DISEASE) (HCC): ICD-10-CM

## 2023-09-13 DIAGNOSIS — I73.9 PAD (PERIPHERAL ARTERY DISEASE) (HCC): Primary | ICD-10-CM

## 2023-09-13 LAB
ABO/RH: NORMAL
ANION GAP SERPL CALCULATED.3IONS-SCNC: 10 MMOL/L (ref 4–16)
ANION GAP SERPL CALCULATED.3IONS-SCNC: 7 MMOL/L (ref 4–16)
ANTIBODY SCREEN: NEGATIVE
APTT: 33.5 SECONDS (ref 25.1–37.1)
BASOPHILS ABSOLUTE: 0.1 K/CU MM
BASOPHILS RELATIVE PERCENT: 1.2 % (ref 0–1)
BUN SERPL-MCNC: 20 MG/DL (ref 6–23)
BUN SERPL-MCNC: 20 MG/DL (ref 6–23)
CALCIUM SERPL-MCNC: 8.5 MG/DL (ref 8.3–10.6)
CALCIUM SERPL-MCNC: 9 MG/DL (ref 8.3–10.6)
CHLORIDE BLD-SCNC: 108 MMOL/L (ref 99–110)
CHLORIDE BLD-SCNC: 110 MMOL/L (ref 99–110)
CO2: 21 MMOL/L (ref 21–32)
CO2: 26 MMOL/L (ref 21–32)
COMMENT: NORMAL
COMPONENT: NORMAL
COMPONENT: NORMAL
CREAT SERPL-MCNC: 1.5 MG/DL (ref 0.9–1.3)
CREAT SERPL-MCNC: 1.6 MG/DL (ref 0.9–1.3)
CROSSMATCH RESULT: NORMAL
CROSSMATCH RESULT: NORMAL
DIFFERENTIAL TYPE: ABNORMAL
EKG ATRIAL RATE: 63 BPM
EKG DIAGNOSIS: NORMAL
EKG P AXIS: 19 DEGREES
EKG P-R INTERVAL: 192 MS
EKG Q-T INTERVAL: 474 MS
EKG QRS DURATION: 122 MS
EKG QTC CALCULATION (BAZETT): 485 MS
EKG R AXIS: 52 DEGREES
EKG T AXIS: 150 DEGREES
EKG VENTRICULAR RATE: 63 BPM
EOSINOPHILS ABSOLUTE: 0.4 K/CU MM
EOSINOPHILS RELATIVE PERCENT: 5.5 % (ref 0–3)
GFR SERPL CREATININE-BSD FRML MDRD: 48 ML/MIN/1.73M2
GFR SERPL CREATININE-BSD FRML MDRD: 52 ML/MIN/1.73M2
GLUCOSE BLD-MCNC: 125 MG/DL (ref 70–99)
GLUCOSE BLD-MCNC: 178 MG/DL (ref 70–99)
GLUCOSE BLD-MCNC: 187 MG/DL (ref 70–99)
GLUCOSE SERPL-MCNC: 128 MG/DL (ref 70–99)
GLUCOSE SERPL-MCNC: 151 MG/DL (ref 70–99)
HCT VFR BLD CALC: 46.2 % (ref 42–52)
HCT VFR BLD CALC: 46.2 % (ref 42–52)
HEMOGLOBIN: 15 GM/DL (ref 13.5–18)
HEMOGLOBIN: 15.1 GM/DL (ref 13.5–18)
IMMATURE NEUTROPHIL %: 0.3 % (ref 0–0.43)
INR BLD: 1 INDEX
LYMPHOCYTES ABSOLUTE: 1.8 K/CU MM
LYMPHOCYTES RELATIVE PERCENT: 26.4 % (ref 24–44)
MAGNESIUM: 2.3 MG/DL (ref 1.8–2.4)
MCH RBC QN AUTO: 32 PG (ref 27–31)
MCHC RBC AUTO-ENTMCNC: 32.5 % (ref 32–36)
MCV RBC AUTO: 98.5 FL (ref 78–100)
MONOCYTES ABSOLUTE: 0.5 K/CU MM
MONOCYTES RELATIVE PERCENT: 7.6 % (ref 0–4)
NUCLEATED RBC %: 0 %
PDW BLD-RTO: 13.1 % (ref 11.7–14.9)
PHOSPHORUS: 2.1 MG/DL (ref 2.5–4.9)
PLATELET # BLD: 160 K/CU MM (ref 140–440)
PMV BLD AUTO: 11.5 FL (ref 7.5–11.1)
POTASSIUM SERPL-SCNC: 4.5 MMOL/L (ref 3.5–5.1)
POTASSIUM SERPL-SCNC: 5.1 MMOL/L (ref 3.5–5.1)
PROTHROMBIN TIME: 13.7 SECONDS (ref 11.7–14.5)
RBC # BLD: 4.69 M/CU MM (ref 4.6–6.2)
SEGMENTED NEUTROPHILS ABSOLUTE COUNT: 4 K/CU MM
SEGMENTED NEUTROPHILS RELATIVE PERCENT: 59 % (ref 36–66)
SODIUM BLD-SCNC: 141 MMOL/L (ref 135–145)
SODIUM BLD-SCNC: 141 MMOL/L (ref 135–145)
STATUS: NORMAL
STATUS: NORMAL
TOTAL IMMATURE NEUTOROPHIL: 0.02 K/CU MM
TOTAL NUCLEATED RBC: 0 K/CU MM
TRANSFUSION STATUS: NORMAL
TRANSFUSION STATUS: NORMAL
UNIT DIVISION: 0
UNIT DIVISION: 0
UNIT NUMBER: NORMAL
UNIT NUMBER: NORMAL
WBC # BLD: 6.7 K/CU MM (ref 4–10.5)

## 2023-09-13 PROCEDURE — 35661 BPG FEMORAL-FEMORAL: CPT | Performed by: THORACIC SURGERY (CARDIOTHORACIC VASCULAR SURGERY)

## 2023-09-13 PROCEDURE — 2709999900 HC NON-CHARGEABLE SUPPLY: Performed by: THORACIC SURGERY (CARDIOTHORACIC VASCULAR SURGERY)

## 2023-09-13 PROCEDURE — C9399 UNCLASSIFIED DRUGS OR BIOLOG: HCPCS

## 2023-09-13 PROCEDURE — 3700000001 HC ADD 15 MINUTES (ANESTHESIA): Performed by: THORACIC SURGERY (CARDIOTHORACIC VASCULAR SURGERY)

## 2023-09-13 PROCEDURE — 3600000006 HC SURGERY LEVEL 6 BASE: Performed by: THORACIC SURGERY (CARDIOTHORACIC VASCULAR SURGERY)

## 2023-09-13 PROCEDURE — 6370000000 HC RX 637 (ALT 250 FOR IP): Performed by: PHYSICIAN ASSISTANT

## 2023-09-13 PROCEDURE — 84100 ASSAY OF PHOSPHORUS: CPT

## 2023-09-13 PROCEDURE — 2500000003 HC RX 250 WO HCPCS: Performed by: PHYSICIAN ASSISTANT

## 2023-09-13 PROCEDURE — 85025 COMPLETE CBC W/AUTO DIFF WBC: CPT

## 2023-09-13 PROCEDURE — 2720000010 HC SURG SUPPLY STERILE: Performed by: THORACIC SURGERY (CARDIOTHORACIC VASCULAR SURGERY)

## 2023-09-13 PROCEDURE — C1768 GRAFT, VASCULAR: HCPCS | Performed by: THORACIC SURGERY (CARDIOTHORACIC VASCULAR SURGERY)

## 2023-09-13 PROCEDURE — 85730 THROMBOPLASTIN TIME PARTIAL: CPT

## 2023-09-13 PROCEDURE — 80048 BASIC METABOLIC PNL TOTAL CA: CPT

## 2023-09-13 PROCEDURE — 041L0JH BYPASS LEFT FEMORAL ARTERY TO RIGHT FEMORAL ARTERY WITH SYNTHETIC SUBSTITUTE, OPEN APPROACH: ICD-10-PCS | Performed by: THORACIC SURGERY (CARDIOTHORACIC VASCULAR SURGERY)

## 2023-09-13 PROCEDURE — 2500000003 HC RX 250 WO HCPCS

## 2023-09-13 PROCEDURE — 2580000003 HC RX 258: Performed by: PHYSICIAN ASSISTANT

## 2023-09-13 PROCEDURE — 2580000003 HC RX 258: Performed by: ANESTHESIOLOGY

## 2023-09-13 PROCEDURE — 85018 HEMOGLOBIN: CPT

## 2023-09-13 PROCEDURE — 6370000000 HC RX 637 (ALT 250 FOR IP): Performed by: THORACIC SURGERY (CARDIOTHORACIC VASCULAR SURGERY)

## 2023-09-13 PROCEDURE — 88307 TISSUE EXAM BY PATHOLOGIST: CPT | Performed by: PATHOLOGY

## 2023-09-13 PROCEDURE — 6360000002 HC RX W HCPCS

## 2023-09-13 PROCEDURE — 6360000002 HC RX W HCPCS: Performed by: THORACIC SURGERY (CARDIOTHORACIC VASCULAR SURGERY)

## 2023-09-13 PROCEDURE — 2100000000 HC CCU R&B

## 2023-09-13 PROCEDURE — 3700000000 HC ANESTHESIA ATTENDED CARE: Performed by: THORACIC SURGERY (CARDIOTHORACIC VASCULAR SURGERY)

## 2023-09-13 PROCEDURE — 85610 PROTHROMBIN TIME: CPT

## 2023-09-13 PROCEDURE — 85014 HEMATOCRIT: CPT

## 2023-09-13 PROCEDURE — 2500000003 HC RX 250 WO HCPCS: Performed by: THORACIC SURGERY (CARDIOTHORACIC VASCULAR SURGERY)

## 2023-09-13 PROCEDURE — A4217 STERILE WATER/SALINE, 500 ML: HCPCS | Performed by: THORACIC SURGERY (CARDIOTHORACIC VASCULAR SURGERY)

## 2023-09-13 PROCEDURE — 88341 IMHCHEM/IMCYTCHM EA ADD ANTB: CPT | Performed by: PATHOLOGY

## 2023-09-13 PROCEDURE — 83735 ASSAY OF MAGNESIUM: CPT

## 2023-09-13 PROCEDURE — 6360000002 HC RX W HCPCS: Performed by: NURSE PRACTITIONER

## 2023-09-13 PROCEDURE — 6370000000 HC RX 637 (ALT 250 FOR IP): Performed by: NURSE PRACTITIONER

## 2023-09-13 PROCEDURE — 2580000003 HC RX 258: Performed by: NURSE PRACTITIONER

## 2023-09-13 PROCEDURE — 3600000016 HC SURGERY LEVEL 6 ADDTL 15MIN: Performed by: THORACIC SURGERY (CARDIOTHORACIC VASCULAR SURGERY)

## 2023-09-13 PROCEDURE — 88342 IMHCHEM/IMCYTCHM 1ST ANTB: CPT | Performed by: PATHOLOGY

## 2023-09-13 PROCEDURE — 93005 ELECTROCARDIOGRAM TRACING: CPT | Performed by: PHYSICIAN ASSISTANT

## 2023-09-13 PROCEDURE — 2580000003 HC RX 258: Performed by: THORACIC SURGERY (CARDIOTHORACIC VASCULAR SURGERY)

## 2023-09-13 PROCEDURE — 82962 GLUCOSE BLOOD TEST: CPT

## 2023-09-13 PROCEDURE — 6360000002 HC RX W HCPCS: Performed by: PHYSICIAN ASSISTANT

## 2023-09-13 DEVICE — GRAFT VASC L50CM DIA8MM RNG L40CM EPTFE THN WALLED CBAS HEP: Type: IMPLANTABLE DEVICE | Site: LEG | Status: FUNCTIONAL

## 2023-09-13 RX ORDER — SODIUM CHLORIDE 0.9 % (FLUSH) 0.9 %
5-40 SYRINGE (ML) INJECTION EVERY 12 HOURS SCHEDULED
Status: DISCONTINUED | OUTPATIENT
Start: 2023-09-13 | End: 2023-09-13 | Stop reason: HOSPADM

## 2023-09-13 RX ORDER — LIDOCAINE HYDROCHLORIDE 20 MG/ML
INJECTION, SOLUTION INTRAVENOUS PRN
Status: DISCONTINUED | OUTPATIENT
Start: 2023-09-13 | End: 2023-09-14 | Stop reason: SDUPTHER

## 2023-09-13 RX ORDER — SODIUM CHLORIDE 9 MG/ML
INJECTION, SOLUTION INTRAVENOUS CONTINUOUS
Status: DISCONTINUED | OUTPATIENT
Start: 2023-09-13 | End: 2023-09-14 | Stop reason: HOSPADM

## 2023-09-13 RX ORDER — FUROSEMIDE 40 MG/1
40 TABLET ORAL EVERY MORNING
Status: DISCONTINUED | OUTPATIENT
Start: 2023-09-13 | End: 2023-09-14 | Stop reason: HOSPADM

## 2023-09-13 RX ORDER — BUPIVACAINE HYDROCHLORIDE 5 MG/ML
INJECTION, SOLUTION PERINEURAL
Status: COMPLETED | OUTPATIENT
Start: 2023-09-13 | End: 2023-09-13

## 2023-09-13 RX ORDER — POTASSIUM CHLORIDE 1.5 G/1.58G
40 POWDER, FOR SOLUTION ORAL PRN
Status: DISCONTINUED | OUTPATIENT
Start: 2023-09-13 | End: 2023-09-14 | Stop reason: HOSPADM

## 2023-09-13 RX ORDER — DEXAMETHASONE SODIUM PHOSPHATE 4 MG/ML
INJECTION, SOLUTION INTRA-ARTICULAR; INTRALESIONAL; INTRAMUSCULAR; INTRAVENOUS; SOFT TISSUE PRN
Status: DISCONTINUED | OUTPATIENT
Start: 2023-09-13 | End: 2023-09-14 | Stop reason: SDUPTHER

## 2023-09-13 RX ORDER — INSULIN LISPRO 100 [IU]/ML
0-4 INJECTION, SOLUTION INTRAVENOUS; SUBCUTANEOUS
Status: DISCONTINUED | OUTPATIENT
Start: 2023-09-13 | End: 2023-09-14 | Stop reason: HOSPADM

## 2023-09-13 RX ORDER — ASPIRIN 81 MG/1
81 TABLET, CHEWABLE ORAL DAILY
Status: DISCONTINUED | OUTPATIENT
Start: 2023-09-13 | End: 2023-09-14 | Stop reason: HOSPADM

## 2023-09-13 RX ORDER — HEPARIN SODIUM 1000 [USP'U]/ML
INJECTION, SOLUTION INTRAVENOUS; SUBCUTANEOUS PRN
Status: DISCONTINUED | OUTPATIENT
Start: 2023-09-13 | End: 2023-09-14 | Stop reason: SDUPTHER

## 2023-09-13 RX ORDER — SODIUM CHLORIDE 9 MG/ML
INJECTION, SOLUTION INTRAVENOUS PRN
Status: DISCONTINUED | OUTPATIENT
Start: 2023-09-13 | End: 2023-09-13

## 2023-09-13 RX ORDER — LABETALOL HYDROCHLORIDE 5 MG/ML
10 INJECTION, SOLUTION INTRAVENOUS
Status: DISCONTINUED | OUTPATIENT
Start: 2023-09-13 | End: 2023-09-14 | Stop reason: HOSPADM

## 2023-09-13 RX ORDER — SODIUM CHLORIDE 0.9 % (FLUSH) 0.9 %
5-40 SYRINGE (ML) INJECTION PRN
Status: DISCONTINUED | OUTPATIENT
Start: 2023-09-13 | End: 2023-09-14 | Stop reason: HOSPADM

## 2023-09-13 RX ORDER — SODIUM CHLORIDE 9 MG/ML
INJECTION, SOLUTION INTRAVENOUS PRN
Status: DISCONTINUED | OUTPATIENT
Start: 2023-09-13 | End: 2023-09-13 | Stop reason: HOSPADM

## 2023-09-13 RX ORDER — ROCURONIUM BROMIDE 10 MG/ML
INJECTION, SOLUTION INTRAVENOUS PRN
Status: DISCONTINUED | OUTPATIENT
Start: 2023-09-13 | End: 2023-09-14 | Stop reason: SDUPTHER

## 2023-09-13 RX ORDER — GLUCAGON 1 MG/ML
1 KIT INJECTION PRN
Status: DISCONTINUED | OUTPATIENT
Start: 2023-09-13 | End: 2023-09-14 | Stop reason: HOSPADM

## 2023-09-13 RX ORDER — LISINOPRIL 5 MG/1
10 TABLET ORAL DAILY
Status: DISCONTINUED | OUTPATIENT
Start: 2023-09-13 | End: 2023-09-14 | Stop reason: HOSPADM

## 2023-09-13 RX ORDER — OXYCODONE HYDROCHLORIDE 5 MG/1
5 TABLET ORAL EVERY 4 HOURS PRN
Status: DISCONTINUED | OUTPATIENT
Start: 2023-09-13 | End: 2023-09-14 | Stop reason: HOSPADM

## 2023-09-13 RX ORDER — ONDANSETRON 4 MG/1
4 TABLET, ORALLY DISINTEGRATING ORAL EVERY 8 HOURS PRN
Status: DISCONTINUED | OUTPATIENT
Start: 2023-09-13 | End: 2023-09-14 | Stop reason: HOSPADM

## 2023-09-13 RX ORDER — EZETIMIBE 10 MG/1
10 TABLET ORAL DAILY
Status: DISCONTINUED | OUTPATIENT
Start: 2023-09-13 | End: 2023-09-14 | Stop reason: HOSPADM

## 2023-09-13 RX ORDER — DEXTROSE MONOHYDRATE 100 MG/ML
INJECTION, SOLUTION INTRAVENOUS CONTINUOUS PRN
Status: DISCONTINUED | OUTPATIENT
Start: 2023-09-13 | End: 2023-09-14 | Stop reason: HOSPADM

## 2023-09-13 RX ORDER — ATORVASTATIN CALCIUM 40 MG/1
80 TABLET, FILM COATED ORAL DAILY
Status: DISCONTINUED | OUTPATIENT
Start: 2023-09-13 | End: 2023-09-14 | Stop reason: HOSPADM

## 2023-09-13 RX ORDER — ONDANSETRON 2 MG/ML
4 INJECTION INTRAMUSCULAR; INTRAVENOUS
Status: ACTIVE | OUTPATIENT
Start: 2023-09-13 | End: 2023-09-14

## 2023-09-13 RX ORDER — MAGNESIUM SULFATE IN WATER 40 MG/ML
2000 INJECTION, SOLUTION INTRAVENOUS PRN
Status: DISCONTINUED | OUTPATIENT
Start: 2023-09-13 | End: 2023-09-14 | Stop reason: HOSPADM

## 2023-09-13 RX ORDER — PROPOFOL 10 MG/ML
INJECTION, EMULSION INTRAVENOUS PRN
Status: DISCONTINUED | OUTPATIENT
Start: 2023-09-13 | End: 2023-09-14 | Stop reason: SDUPTHER

## 2023-09-13 RX ORDER — HEPARIN SODIUM 10000 [USP'U]/100ML
600 INJECTION, SOLUTION INTRAVENOUS CONTINUOUS
Status: DISCONTINUED | OUTPATIENT
Start: 2023-09-13 | End: 2023-09-14

## 2023-09-13 RX ORDER — HEPARIN SODIUM 5000 [USP'U]/ML
5000 INJECTION, SOLUTION INTRAVENOUS; SUBCUTANEOUS EVERY 8 HOURS SCHEDULED
Status: DISCONTINUED | OUTPATIENT
Start: 2023-09-13 | End: 2023-09-13

## 2023-09-13 RX ORDER — ENOXAPARIN SODIUM 100 MG/ML
40 INJECTION SUBCUTANEOUS DAILY
Status: DISCONTINUED | OUTPATIENT
Start: 2023-09-13 | End: 2023-09-13

## 2023-09-13 RX ORDER — SODIUM CHLORIDE, SODIUM LACTATE, POTASSIUM CHLORIDE, CALCIUM CHLORIDE 600; 310; 30; 20 MG/100ML; MG/100ML; MG/100ML; MG/100ML
INJECTION, SOLUTION INTRAVENOUS CONTINUOUS
Status: DISCONTINUED | OUTPATIENT
Start: 2023-09-13 | End: 2023-09-13

## 2023-09-13 RX ORDER — DIPHENHYDRAMINE HYDROCHLORIDE 50 MG/ML
12.5 INJECTION INTRAMUSCULAR; INTRAVENOUS
Status: DISCONTINUED | OUTPATIENT
Start: 2023-09-13 | End: 2023-09-14

## 2023-09-13 RX ORDER — BISACODYL 5 MG/1
5 TABLET, DELAYED RELEASE ORAL DAILY PRN
Status: DISCONTINUED | OUTPATIENT
Start: 2023-09-13 | End: 2023-09-14 | Stop reason: HOSPADM

## 2023-09-13 RX ORDER — POTASSIUM CHLORIDE 20 MEQ/1
40 TABLET, EXTENDED RELEASE ORAL PRN
Status: DISCONTINUED | OUTPATIENT
Start: 2023-09-13 | End: 2023-09-14 | Stop reason: HOSPADM

## 2023-09-13 RX ORDER — SODIUM CHLORIDE 0.9 % (FLUSH) 0.9 %
5-40 SYRINGE (ML) INJECTION EVERY 12 HOURS SCHEDULED
Status: DISCONTINUED | OUTPATIENT
Start: 2023-09-13 | End: 2023-09-14 | Stop reason: HOSPADM

## 2023-09-13 RX ORDER — FAMOTIDINE 10 MG/ML
20 INJECTION, SOLUTION INTRAVENOUS 2 TIMES DAILY
Status: DISCONTINUED | OUTPATIENT
Start: 2023-09-13 | End: 2023-09-14 | Stop reason: HOSPADM

## 2023-09-13 RX ORDER — ONDANSETRON 2 MG/ML
4 INJECTION INTRAMUSCULAR; INTRAVENOUS EVERY 6 HOURS PRN
Status: DISCONTINUED | OUTPATIENT
Start: 2023-09-13 | End: 2023-09-14 | Stop reason: HOSPADM

## 2023-09-13 RX ORDER — CLOPIDOGREL BISULFATE 75 MG/1
75 TABLET ORAL DAILY
Status: DISCONTINUED | OUTPATIENT
Start: 2023-09-13 | End: 2023-09-14 | Stop reason: HOSPADM

## 2023-09-13 RX ORDER — TAMSULOSIN HYDROCHLORIDE 0.4 MG/1
0.4 CAPSULE ORAL DAILY
Status: DISCONTINUED | OUTPATIENT
Start: 2023-09-13 | End: 2023-09-14 | Stop reason: HOSPADM

## 2023-09-13 RX ORDER — ONDANSETRON 2 MG/ML
INJECTION INTRAMUSCULAR; INTRAVENOUS PRN
Status: DISCONTINUED | OUTPATIENT
Start: 2023-09-13 | End: 2023-09-14 | Stop reason: SDUPTHER

## 2023-09-13 RX ORDER — POLYETHYLENE GLYCOL 3350 17 G/17G
17 POWDER, FOR SOLUTION ORAL DAILY
Status: DISCONTINUED | OUTPATIENT
Start: 2023-09-13 | End: 2023-09-14 | Stop reason: HOSPADM

## 2023-09-13 RX ORDER — OXYCODONE HYDROCHLORIDE 5 MG/1
5 TABLET ORAL
Status: ACTIVE | OUTPATIENT
Start: 2023-09-13 | End: 2023-09-14

## 2023-09-13 RX ORDER — OXYCODONE HYDROCHLORIDE 10 MG/1
10 TABLET ORAL EVERY 4 HOURS PRN
Status: DISCONTINUED | OUTPATIENT
Start: 2023-09-13 | End: 2023-09-14

## 2023-09-13 RX ORDER — HEPARIN SODIUM 5000 [USP'U]/ML
5000 INJECTION, SOLUTION INTRAVENOUS; SUBCUTANEOUS ONCE
Status: COMPLETED | OUTPATIENT
Start: 2023-09-13 | End: 2023-09-13

## 2023-09-13 RX ORDER — HYDRALAZINE HYDROCHLORIDE 20 MG/ML
10 INJECTION INTRAMUSCULAR; INTRAVENOUS
Status: DISCONTINUED | OUTPATIENT
Start: 2023-09-13 | End: 2023-09-14 | Stop reason: HOSPADM

## 2023-09-13 RX ORDER — VANCOMYCIN HYDROCHLORIDE 1 G/20ML
INJECTION, POWDER, LYOPHILIZED, FOR SOLUTION INTRAVENOUS PRN
Status: DISCONTINUED | OUTPATIENT
Start: 2023-09-13 | End: 2023-09-14 | Stop reason: SDUPTHER

## 2023-09-13 RX ORDER — CARVEDILOL 6.25 MG/1
12.5 TABLET ORAL 2 TIMES DAILY
Status: DISCONTINUED | OUTPATIENT
Start: 2023-09-13 | End: 2023-09-14 | Stop reason: HOSPADM

## 2023-09-13 RX ORDER — SODIUM CHLORIDE 0.9 % (FLUSH) 0.9 %
5-40 SYRINGE (ML) INJECTION PRN
Status: DISCONTINUED | OUTPATIENT
Start: 2023-09-13 | End: 2023-09-13 | Stop reason: HOSPADM

## 2023-09-13 RX ORDER — POTASSIUM CHLORIDE 7.45 MG/ML
10 INJECTION INTRAVENOUS PRN
Status: DISCONTINUED | OUTPATIENT
Start: 2023-09-13 | End: 2023-09-14 | Stop reason: HOSPADM

## 2023-09-13 RX ORDER — FENTANYL CITRATE 50 UG/ML
25 INJECTION, SOLUTION INTRAMUSCULAR; INTRAVENOUS EVERY 5 MIN PRN
Status: DISCONTINUED | OUTPATIENT
Start: 2023-09-13 | End: 2023-09-14

## 2023-09-13 RX ORDER — FAMOTIDINE 20 MG/1
20 TABLET, FILM COATED ORAL 2 TIMES DAILY
Status: DISCONTINUED | OUTPATIENT
Start: 2023-09-13 | End: 2023-09-14 | Stop reason: HOSPADM

## 2023-09-13 RX ORDER — INSULIN LISPRO 100 [IU]/ML
0-4 INJECTION, SOLUTION INTRAVENOUS; SUBCUTANEOUS NIGHTLY
Status: DISCONTINUED | OUTPATIENT
Start: 2023-09-13 | End: 2023-09-14 | Stop reason: HOSPADM

## 2023-09-13 RX ORDER — HEPARIN SODIUM 5000 [USP'U]/ML
5000 INJECTION, SOLUTION INTRAVENOUS; SUBCUTANEOUS EVERY 8 HOURS SCHEDULED
Status: DISCONTINUED | OUTPATIENT
Start: 2023-09-14 | End: 2023-09-13

## 2023-09-13 RX ORDER — FENTANYL CITRATE 50 UG/ML
INJECTION, SOLUTION INTRAMUSCULAR; INTRAVENOUS PRN
Status: DISCONTINUED | OUTPATIENT
Start: 2023-09-13 | End: 2023-09-14 | Stop reason: SDUPTHER

## 2023-09-13 RX ORDER — DROPERIDOL 2.5 MG/ML
0.62 INJECTION, SOLUTION INTRAMUSCULAR; INTRAVENOUS EVERY 10 MIN PRN
Status: DISCONTINUED | OUTPATIENT
Start: 2023-09-13 | End: 2023-09-14

## 2023-09-13 RX ORDER — ACETAMINOPHEN 325 MG/1
650 TABLET ORAL EVERY 6 HOURS
Status: DISCONTINUED | OUTPATIENT
Start: 2023-09-13 | End: 2023-09-14 | Stop reason: HOSPADM

## 2023-09-13 RX ADMIN — CLOPIDOGREL BISULFATE 75 MG: 75 TABLET ORAL at 13:57

## 2023-09-13 RX ADMIN — SODIUM CHLORIDE, POTASSIUM CHLORIDE, SODIUM LACTATE AND CALCIUM CHLORIDE: 600; 310; 30; 20 INJECTION, SOLUTION INTRAVENOUS at 13:43

## 2023-09-13 RX ADMIN — Medication: at 08:59

## 2023-09-13 RX ADMIN — ROCURONIUM BROMIDE 50 MG: 10 INJECTION INTRAVENOUS at 10:07

## 2023-09-13 RX ADMIN — FENTANYL CITRATE 50 MCG: 50 INJECTION, SOLUTION INTRAMUSCULAR; INTRAVENOUS at 11:31

## 2023-09-13 RX ADMIN — HEPARIN SODIUM 5000 UNITS: 5000 INJECTION INTRAVENOUS; SUBCUTANEOUS at 15:50

## 2023-09-13 RX ADMIN — SODIUM CHLORIDE 1 MG/HR: 9 INJECTION, SOLUTION INTRAVENOUS at 12:10

## 2023-09-13 RX ADMIN — DEXAMETHASONE SODIUM PHOSPHATE 4 MG: 4 INJECTION, SOLUTION INTRAMUSCULAR; INTRAVENOUS at 10:35

## 2023-09-13 RX ADMIN — ASPIRIN 81 MG CHEWABLE TABLET 81 MG: 81 TABLET CHEWABLE at 13:57

## 2023-09-13 RX ADMIN — ROCURONIUM BROMIDE 20 MG: 10 INJECTION INTRAVENOUS at 11:30

## 2023-09-13 RX ADMIN — ATORVASTATIN CALCIUM 80 MG: 40 TABLET, FILM COATED ORAL at 15:53

## 2023-09-13 RX ADMIN — LIDOCAINE HYDROCHLORIDE 60 MG: 20 INJECTION, SOLUTION INTRAVENOUS at 10:07

## 2023-09-13 RX ADMIN — FENTANYL CITRATE 50 MCG: 50 INJECTION, SOLUTION INTRAMUSCULAR; INTRAVENOUS at 10:07

## 2023-09-13 RX ADMIN — PROPOFOL 150 MG: 10 INJECTION, EMULSION INTRAVENOUS at 10:07

## 2023-09-13 RX ADMIN — SODIUM CHLORIDE, POTASSIUM CHLORIDE, SODIUM LACTATE AND CALCIUM CHLORIDE: 600; 310; 30; 20 INJECTION, SOLUTION INTRAVENOUS at 08:38

## 2023-09-13 RX ADMIN — SUGAMMADEX 200 MG: 100 INJECTION, SOLUTION INTRAVENOUS at 12:13

## 2023-09-13 RX ADMIN — SODIUM CHLORIDE 11 MG/HR: 9 INJECTION, SOLUTION INTRAVENOUS at 14:55

## 2023-09-13 RX ADMIN — FENTANYL CITRATE 50 MCG: 50 INJECTION, SOLUTION INTRAMUSCULAR; INTRAVENOUS at 12:00

## 2023-09-13 RX ADMIN — FAMOTIDINE 20 MG: 20 TABLET ORAL at 13:57

## 2023-09-13 RX ADMIN — ONDANSETRON 4 MG: 2 INJECTION INTRAMUSCULAR; INTRAVENOUS at 10:35

## 2023-09-13 RX ADMIN — SODIUM CHLORIDE: 9 INJECTION, SOLUTION INTRAVENOUS at 18:14

## 2023-09-13 RX ADMIN — FENTANYL CITRATE 50 MCG: 50 INJECTION, SOLUTION INTRAMUSCULAR; INTRAVENOUS at 10:36

## 2023-09-13 RX ADMIN — HEPARIN SODIUM 500 UNITS/HR: 10000 INJECTION, SOLUTION INTRAVENOUS at 18:17

## 2023-09-13 RX ADMIN — ACETAMINOPHEN 650 MG: 325 TABLET ORAL at 13:57

## 2023-09-13 RX ADMIN — TAMSULOSIN HYDROCHLORIDE 0.4 MG: 0.4 CAPSULE ORAL at 18:22

## 2023-09-13 RX ADMIN — EZETIMIBE 10 MG: 10 TABLET ORAL at 15:52

## 2023-09-13 RX ADMIN — HEPARIN SODIUM 10000 UNITS: 1000 INJECTION INTRAVENOUS; SUBCUTANEOUS at 10:58

## 2023-09-13 RX ADMIN — VANCOMYCIN HYDROCHLORIDE 1500 MG: 1 INJECTION, POWDER, LYOPHILIZED, FOR SOLUTION INTRAVENOUS at 10:28

## 2023-09-13 RX ADMIN — CHLORHEXIDINE GLUCONATE 118 ML: 4 SOLUTION TOPICAL at 08:54

## 2023-09-13 RX ADMIN — CEFAZOLIN 2000 MG: 2 INJECTION, POWDER, FOR SOLUTION INTRAMUSCULAR; INTRAVENOUS at 10:12

## 2023-09-13 RX ADMIN — OXYCODONE HYDROCHLORIDE 5 MG: 5 TABLET ORAL at 13:53

## 2023-09-13 RX ADMIN — LISINOPRIL 10 MG: 5 TABLET ORAL at 14:43

## 2023-09-13 ASSESSMENT — PAIN DESCRIPTION - ORIENTATION: ORIENTATION: RIGHT;LEFT

## 2023-09-13 ASSESSMENT — PAIN - FUNCTIONAL ASSESSMENT
PAIN_FUNCTIONAL_ASSESSMENT: PREVENTS OR INTERFERES SOME ACTIVE ACTIVITIES AND ADLS
PAIN_FUNCTIONAL_ASSESSMENT: 0-10

## 2023-09-13 ASSESSMENT — PAIN SCALES - GENERAL
PAINLEVEL_OUTOF10: 4
PAINLEVEL_OUTOF10: 0
PAINLEVEL_OUTOF10: 0

## 2023-09-13 ASSESSMENT — PAIN DESCRIPTION - DESCRIPTORS: DESCRIPTORS: ACHING

## 2023-09-13 ASSESSMENT — PAIN DESCRIPTION - LOCATION: LOCATION: GROIN

## 2023-09-13 NOTE — INTERVAL H&P NOTE
Update History & Physical    The patient's History and Physical 8/25/2023 was reviewed with the patient and I did a cursory exam of the patient. There was no change. The surgical site was confirmed by the patient and me. Plan: The risks, benefits, expected outcome, and alternative to the recommended procedure have been discussed with the patient. Patient understands and wants to proceed with the procedure.      Electronically signed by Aurelia Lu MD on 9/13/2023 at 9:41 AM

## 2023-09-13 NOTE — OP NOTE
Operative Note      Patient: Joanie Valero  YOB: 1960  MRN: [id number]    Date of Procedure: 9/13/2023    Pre-Op Diagnosis Codes:     * PVD (peripheral vascular disease) ([address]) [I73.9]  Chronically occluded left common iliac artery  Disabling claudication of the left leg    Post-Op Diagnosis: Same       Procedure(s):  CROSSOVERRight  FEMORAL TO Left FEMORAL BYPASS USING A 8MM GORE PROPATEN VASCULAR GRAFT    Surgeon(s):  Anil Yarbrough MD    Assistant:   Surgical Assistant: Brayan Flores  First Assistant: Cristopher Berger RN    Anesthesia: General    Estimated Blood Loss (mL): 429 cc    Complications: None    Specimens:   ID Type Source Tests Collected by Time Destination   A : LEFT FEMORAL LYMPH NODE  Tissue Tissue SURGICAL PATHOLOGY Anil Yarbrough MD 9/13/2023 1052        Implants:  Implant Name Type Inv. Item Serial No.  Lot No. LRB No. Used Action   GRAFT VASC L50CM DIA8MM RNG L40CM EPTFE THN WALLED [employer] HEP - [id number] Vascular grafts GRAFT VASC L50CM DIA8MM RNG L40CM EPTFE THN WALLED [employer] HEP   [employer] [id number] N/A 1 Implanted         Drains:   Urinary Catheter 09/13/23 (Active)       Findings: The patient had dense scarring in the right groin. He had a good Doppler signal in the left foot at the end of the procedure. Detailed Description of Procedure:   After full informed consent was obtained the patient preoperatively. He was positioned in the supine position and after general anesthesia and endotracheal the patient successfully accomplished he was prepped and draped usual sterile fashion. A fullstop surgical timeout was performed and subsequently a longitudinal right groin incision was carried out. Down to the common femoral artery. There was dense scarring. There were multiple collateral side branches of the femoral circumflex with proximal distal control was obtained with Vesseloops.   The beginning of the tunnel was made in

## 2023-09-13 NOTE — CONSULTS
V2.0  [facility] Consult Note      Name:  Elliot Minaya /Age/Sex: 1960  (58 y.o. male)   MRN & CSN:  [id number] & [id number] Encounter Date/Time: 2023 2:34 PM EDT   Location:  -A PCP: No primary care provider on file. Attending:Aashish Hudson, *  Consulting Provider: Jonny Damon MD      Hospital Day: 1    Assessment and Recommendations   Elliot Minaya is a 58 y.o. male  who presents with PAD (peripheral artery disease) ([address])      1. Peripheral artery disease of left LE status post crossover right femoral to left femoral bypass 23  2. Chronic systolic and diastolic heart failure  3. CKD stage III  4. Essential hypertension  5. Diabetes mellitus type 2  6. Coronary artery disease status post CABG  7. Hyperlipidemia  8. BPH      Recommendations    -Continue postop management per CT surgery  -Hold home oral antidiabetics. Started on sliding scale insulin   -Lasix currently held. Resume as appropriate  -Resume statins  -On aspirin and Plavix  -Currently on Cardene and phenylephrine drip. Resume home antihypertensives with holding parameters as appropriate  -GI prophylaxis  -We will continue to follow    Hospital Problems             Last Modified POA    * (Principal) PAD (peripheral artery disease) ([address]) 2023 Yes         Diet ADULT DIET; Regular; 4 carb choices (60 gm/meal); Low Fat/Low Chol/High Fiber/2 gm Na   DVT Prophylaxis [] Lovenox, []  Heparin, [] SCDs, [] Ambulation,  [] Eliquis, [] Xarelto   Code Status Full Code   Surrogate Decision Maker/ POA       Personally reviewed Lab Studies and Imaging     D    History From:    History obtained from the patient. History of Present Illness:      Chief Complaint: Leg pain    Elliot Minaya is a 58 y.o. male who presents with a past medical history of CAD status post CABG, CHF, COPD, hypertension, type 2 diabetes mellitus, PAD who presented for a planned crossover femoral-femoral bypass.   Patient successfully

## 2023-09-13 NOTE — CONSENT
Informed Consent for Blood Component Transfusion Note    I have discussed with the patient the rationale for blood component transfusion; its benefits in treating or preventing fatigue, organ damage, or death; and its risk which includes mild transfusion reactions, rare risk of blood borne infection, or more serious but rare reactions. I have discussed the alternatives to transfusion, including the risk and consequences of not receiving transfusion. The patient had an opportunity to ask questions and had agreed to proceed with transfusion of blood components.     Electronically signed by Pelon Love MD on 9/13/23 at 9:40 AM EDT

## 2023-09-14 ENCOUNTER — APPOINTMENT (OUTPATIENT)
Dept: CT IMAGING | Age: 63
DRG: 253 | End: 2023-09-14
Attending: THORACIC SURGERY (CARDIOTHORACIC VASCULAR SURGERY)
Payer: MEDICARE

## 2023-09-14 ENCOUNTER — TELEPHONE (OUTPATIENT)
Dept: CARDIOTHORACIC SURGERY | Age: 63
End: 2023-09-14

## 2023-09-14 VITALS
BODY MASS INDEX: 31.86 KG/M2 | SYSTOLIC BLOOD PRESSURE: 149 MMHG | HEIGHT: 67 IN | DIASTOLIC BLOOD PRESSURE: 75 MMHG | WEIGHT: 203 LBS | HEART RATE: 74 BPM | RESPIRATION RATE: 16 BRPM | TEMPERATURE: 97.6 F | OXYGEN SATURATION: 98 %

## 2023-09-14 LAB
ANION GAP SERPL CALCULATED.3IONS-SCNC: 10 MMOL/L (ref 4–16)
APTT: 24.4 SECONDS (ref 25.1–37.1)
APTT: 35.1 SECONDS (ref 25.1–37.1)
BUN SERPL-MCNC: 19 MG/DL (ref 6–23)
CALCIUM SERPL-MCNC: 7.8 MG/DL (ref 8.3–10.6)
CHLORIDE BLD-SCNC: 107 MMOL/L (ref 99–110)
CO2: 19 MMOL/L (ref 21–32)
CREAT SERPL-MCNC: 1.4 MG/DL (ref 0.9–1.3)
GFR SERPL CREATININE-BSD FRML MDRD: 56 ML/MIN/1.73M2
GLUCOSE BLD-MCNC: 173 MG/DL (ref 70–99)
GLUCOSE BLD-MCNC: 178 MG/DL (ref 70–99)
GLUCOSE BLD-MCNC: 196 MG/DL (ref 70–99)
GLUCOSE SERPL-MCNC: 183 MG/DL (ref 70–99)
HCT VFR BLD CALC: 42.1 % (ref 42–52)
HEMOGLOBIN: 13.6 GM/DL (ref 13.5–18)
INR BLD: 1.1 INDEX
INR BLD: 1.1 INDEX
MAGNESIUM: 2.3 MG/DL (ref 1.8–2.4)
MCH RBC QN AUTO: 31.6 PG (ref 27–31)
MCHC RBC AUTO-ENTMCNC: 32.3 % (ref 32–36)
MCV RBC AUTO: 97.9 FL (ref 78–100)
PDW BLD-RTO: 13 % (ref 11.7–14.9)
PHOSPHORUS: 2.3 MG/DL (ref 2.5–4.9)
PLATELET # BLD: 148 K/CU MM (ref 140–440)
PMV BLD AUTO: 12.2 FL (ref 7.5–11.1)
POTASSIUM SERPL-SCNC: 4.7 MMOL/L (ref 3.5–5.1)
PROTHROMBIN TIME: 14.2 SECONDS (ref 11.7–14.5)
PROTHROMBIN TIME: 14.3 SECONDS (ref 11.7–14.5)
RBC # BLD: 4.3 M/CU MM (ref 4.6–6.2)
SODIUM BLD-SCNC: 136 MMOL/L (ref 135–145)
WBC # BLD: 8.5 K/CU MM (ref 4–10.5)

## 2023-09-14 PROCEDURE — 85610 PROTHROMBIN TIME: CPT

## 2023-09-14 PROCEDURE — 97162 PT EVAL MOD COMPLEX 30 MIN: CPT

## 2023-09-14 PROCEDURE — 85027 COMPLETE CBC AUTOMATED: CPT

## 2023-09-14 PROCEDURE — 2580000003 HC RX 258: Performed by: ANESTHESIOLOGY

## 2023-09-14 PROCEDURE — 75635 CT ANGIO ABDOMINAL ARTERIES: CPT

## 2023-09-14 PROCEDURE — 84100 ASSAY OF PHOSPHORUS: CPT

## 2023-09-14 PROCEDURE — 85730 THROMBOPLASTIN TIME PARTIAL: CPT

## 2023-09-14 PROCEDURE — 6370000000 HC RX 637 (ALT 250 FOR IP): Performed by: PHYSICIAN ASSISTANT

## 2023-09-14 PROCEDURE — 6360000004 HC RX CONTRAST MEDICATION: Performed by: STUDENT IN AN ORGANIZED HEALTH CARE EDUCATION/TRAINING PROGRAM

## 2023-09-14 PROCEDURE — 2580000003 HC RX 258: Performed by: PHYSICIAN ASSISTANT

## 2023-09-14 PROCEDURE — 6370000000 HC RX 637 (ALT 250 FOR IP): Performed by: THORACIC SURGERY (CARDIOTHORACIC VASCULAR SURGERY)

## 2023-09-14 PROCEDURE — 80048 BASIC METABOLIC PNL TOTAL CA: CPT

## 2023-09-14 PROCEDURE — 2580000003 HC RX 258: Performed by: THORACIC SURGERY (CARDIOTHORACIC VASCULAR SURGERY)

## 2023-09-14 PROCEDURE — 97166 OT EVAL MOD COMPLEX 45 MIN: CPT

## 2023-09-14 PROCEDURE — 97116 GAIT TRAINING THERAPY: CPT

## 2023-09-14 PROCEDURE — 83735 ASSAY OF MAGNESIUM: CPT

## 2023-09-14 PROCEDURE — 97530 THERAPEUTIC ACTIVITIES: CPT

## 2023-09-14 PROCEDURE — 82962 GLUCOSE BLOOD TEST: CPT

## 2023-09-14 RX ORDER — OXYCODONE HYDROCHLORIDE 5 MG/1
5 TABLET ORAL EVERY 4 HOURS PRN
Qty: 18 TABLET | Refills: 0 | Status: SHIPPED | OUTPATIENT
Start: 2023-09-14 | End: 2023-09-17

## 2023-09-14 RX ORDER — POTASSIUM CHLORIDE 20 MEQ/1
20 TABLET, EXTENDED RELEASE ORAL
Status: DISCONTINUED | OUTPATIENT
Start: 2023-09-14 | End: 2023-09-14 | Stop reason: HOSPADM

## 2023-09-14 RX ORDER — IBUPROFEN 200 MG
TABLET ORAL
Qty: 3.5 G | Refills: 1 | Status: CANCELLED | OUTPATIENT
Start: 2023-09-14

## 2023-09-14 RX ORDER — BUDESONIDE AND FORMOTEROL FUMARATE DIHYDRATE 160; 4.5 UG/1; UG/1
2 AEROSOL RESPIRATORY (INHALATION)
Status: DISCONTINUED | OUTPATIENT
Start: 2023-09-14 | End: 2023-09-14 | Stop reason: SDUPTHER

## 2023-09-14 RX ORDER — IBUPROFEN 200 MG
TABLET ORAL 2 TIMES DAILY
Status: DISCONTINUED | OUTPATIENT
Start: 2023-09-14 | End: 2023-09-14 | Stop reason: HOSPADM

## 2023-09-14 RX ORDER — BUDESONIDE AND FORMOTEROL FUMARATE DIHYDRATE 160; 4.5 UG/1; UG/1
2 AEROSOL RESPIRATORY (INHALATION)
Status: DISCONTINUED | OUTPATIENT
Start: 2023-09-14 | End: 2023-09-14 | Stop reason: HOSPADM

## 2023-09-14 RX ADMIN — ATORVASTATIN CALCIUM 80 MG: 40 TABLET, FILM COATED ORAL at 12:26

## 2023-09-14 RX ADMIN — LISINOPRIL 10 MG: 5 TABLET ORAL at 08:21

## 2023-09-14 RX ADMIN — FAMOTIDINE 20 MG: 20 TABLET ORAL at 12:29

## 2023-09-14 RX ADMIN — CARVEDILOL 12.5 MG: 6.25 TABLET, FILM COATED ORAL at 12:25

## 2023-09-14 RX ADMIN — SODIUM CHLORIDE, PRESERVATIVE FREE 10 ML: 5 INJECTION INTRAVENOUS at 08:00

## 2023-09-14 RX ADMIN — EZETIMIBE 10 MG: 10 TABLET ORAL at 12:25

## 2023-09-14 RX ADMIN — ACETAMINOPHEN 650 MG: 325 TABLET ORAL at 07:02

## 2023-09-14 RX ADMIN — TAMSULOSIN HYDROCHLORIDE 0.4 MG: 0.4 CAPSULE ORAL at 12:29

## 2023-09-14 RX ADMIN — CLOPIDOGREL BISULFATE 75 MG: 75 TABLET ORAL at 12:29

## 2023-09-14 RX ADMIN — ASPIRIN 81 MG CHEWABLE TABLET 81 MG: 81 TABLET CHEWABLE at 12:25

## 2023-09-14 RX ADMIN — IOPAMIDOL 75 ML: 755 INJECTION, SOLUTION INTRAVENOUS at 10:38

## 2023-09-14 RX ADMIN — SODIUM CHLORIDE: 9 INJECTION, SOLUTION INTRAVENOUS at 14:31

## 2023-09-14 ASSESSMENT — PAIN SCALES - GENERAL: PAINLEVEL_OUTOF10: 3

## 2023-09-14 ASSESSMENT — PAIN DESCRIPTION - FREQUENCY: FREQUENCY: CONTINUOUS

## 2023-09-14 ASSESSMENT — PAIN DESCRIPTION - DESCRIPTORS: DESCRIPTORS: ACHING

## 2023-09-14 ASSESSMENT — PAIN DESCRIPTION - PAIN TYPE: TYPE: SURGICAL PAIN

## 2023-09-14 ASSESSMENT — PAIN DESCRIPTION - ONSET: ONSET: ON-GOING

## 2023-09-14 ASSESSMENT — PAIN DESCRIPTION - LOCATION: LOCATION: FOOT;LEG

## 2023-09-14 ASSESSMENT — PAIN DESCRIPTION - ORIENTATION: ORIENTATION: RIGHT

## 2023-09-14 NOTE — DISCHARGE SUMMARY
Cardiothoracic and Vascular Surgery Discharge Summary  Today's Date: 23  Name:  Nena Levine /Age/Sex: 1960  (58 y.o. male)   MRN & CSN:  [id number] & [id number] Admission Date/Time: 2023  8:03 AM   Location:  -A PCP: No primary care provider on file. Admit date: 2023   Discharge date: 2023      Admitting Provider: Viji Herndon MD   Discharge Provider: Jaja Hoffman PA-C     Discharge Diagnoses: Active Hospital Problems    Diagnosis Date Noted    PAD (peripheral artery disease) ([address]) [I73.9] 2023     Discharged Condition: stable. Hospital Course:    S/p CROSSOVER FEMORAL TO FEMORAL BYPASS USING A 8MM GORE PROPATEN VASCULAR GRAFT on 23    Patient was extubated in the OR and admitted to ICU post op. Neurovascular checks completed Q1H until POD1 with no deficits noted. Dressing changed POD. Heparin  Aspirin   Plavix started for Right hip and leg pain. Heparin D/c'ed after ct angio results showed great runoff. PT/OT evaluated patient and is recommending be discharged to Home    Patient is doing well today and is ready for DC. Follow Up:    CT surgery office 23    Consults: none    VS at discharge   Vitals:    23 1300   BP: (!) 152/81   Pulse: 64   Resp: 16   Temp:    SpO2: 98%     CTA OF THE AORTA WITH LOWER EXTREMITY RUNOFF 2023 10:39 am     TECHNIQUE:  CTA of the pelvis and bilateral lower extremities was performed after the  administration of intravenous contrast.   Multiplanar reformatted images are  provided for review. MIP images are provided for review. Automated exposure  control, iterative reconstruction, and/or weight based adjustment of the  mA/kV was utilized to reduce the radiation dose to as low as reasonably  achievable. COMPARISON:  2023. HISTORY:  ORDERING SYSTEM PROVIDED HISTORY: s/p fem fem bypass on 23. c/o pain in  right hip radiating to his knee.   TECHNOLOGIST PROVIDED

## 2023-09-14 NOTE — ANESTHESIA POSTPROCEDURE EVALUATION
Department of Anesthesiology  Postprocedure Note    Patient: Bennett Selby  MRN: [id number]  YOB: 1960  Date of evaluation: 9/14/2023      Procedure Summary     Date: 09/13/23 Room / Location: [address] [place] [address]    Anesthesia Start: 1000 Anesthesia Stop: 200    Procedure: [address] BYPASS USING A 8MM GORE PROPATEN VASCULAR GRAFT (Groin) Diagnosis:       PVD (peripheral vascular disease) ([address])      (PVD (peripheral vascular disease) ([address]) [I73.9])    Surgeons: Rachna York MD Responsible Provider: Loly Smiley MD    Anesthesia Type: General ASA Status: 4          Anesthesia Type: General    Suri Phase I: Suri Score: 10    Suri Phase II:        Anesthesia Post Evaluation    Patient location during evaluation: ICU  Patient participation: waiting for patient participation  Level of consciousness: awake and alert  Airway patency: patent  Nausea & Vomiting: no nausea and no vomiting  Complications: no  Cardiovascular status: hemodynamically stable  Respiratory status: face mask and spontaneous ventilation  Hydration status: stable  Pain management: adequate

## 2023-09-14 NOTE — PLAN OF CARE
Problem: Chronic Conditions and Co-morbidities  Goal: Patient's chronic conditions and co-morbidity symptoms are monitored and maintained or improved  Outcome: Progressing     Problem: Discharge Planning  Goal: Discharge to home or other facility with appropriate resources  Outcome: Progressing     Problem: Pain  Goal: Verbalizes/displays adequate comfort level or baseline comfort level  Outcome: Progressing  Flowsheets (Taken 9/13/2023 4473)  Verbalizes/displays adequate comfort level or baseline comfort level: Assess pain using appropriate pain scale     Problem: Skin/Tissue Integrity  Goal: Absence of new skin breakdown  Description: 1. Monitor for areas of redness and/or skin breakdown  2. Assess vascular access sites hourly  3. Every 4-6 hours minimum:  Change oxygen saturation probe site  4. Every 4-6 hours:  If on nasal continuous positive airway pressure, respiratory therapy assess nares and determine need for appliance change or resting period.   Outcome: Progressing     Problem: ABCDS Injury Assessment  Goal: Absence of physical injury  Outcome: Progressing     Problem: Safety - Adult  Goal: Free from fall injury  Outcome: Progressing
Problem: Chronic Conditions and Co-morbidities  Goal: Patient's chronic conditions and co-morbidity symptoms are monitored and maintained or improved  Outcome: Progressing  Flowsheets  Taken 9/13/2023 1950 by Amaya Neff RN  Care Plan - Patient's Chronic Conditions and Co-Morbidity Symptoms are Monitored and Maintained or Improved: Monitor and assess patient's chronic conditions and comorbid symptoms for stability, deterioration, or improvement  Taken 9/13/2023 1600 by Genny Acuña RN  Care Plan - Patient's Chronic Conditions and Co-Morbidity Symptoms are Monitored and Maintained or Improved: Monitor and assess patient's chronic conditions and comorbid symptoms for stability, deterioration, or improvement     Problem: Pain  Goal: Verbalizes/displays adequate comfort level or baseline comfort level  Outcome: Progressing  Flowsheets (Taken 9/13/2023 1600 by Genny Acuña RN)  Verbalizes/displays adequate comfort level or baseline comfort level: Assess pain using appropriate pain scale     Problem: Skin/Tissue Integrity  Goal: Absence of new skin breakdown  Description: 1. Monitor for areas of redness and/or skin breakdown  2. Assess vascular access sites hourly  3. Every 4-6 hours minimum:  Change oxygen saturation probe site  4. Every 4-6 hours:  If on nasal continuous positive airway pressure, respiratory therapy assess nares and determine need for appliance change or resting period.   Outcome: Progressing     Problem: Safety - Adult  Goal: Free from fall injury  Outcome: Progressing
appropriate resources: Identify barriers to discharge with patient and caregiver  Taken 9/14/2023 0800  Discharge to home or other facility with appropriate resources: Identify barriers to discharge with patient and caregiver     Problem: Pain  Goal: Verbalizes/displays adequate comfort level or baseline comfort level  9/14/2023 1937 by Og Stewart RN  Outcome: Adequate for Discharge  Flowsheets  Taken 9/14/2023 1600  Verbalizes/displays adequate comfort level or baseline comfort level: Assess pain using appropriate pain scale  Taken 9/14/2023 1200  Verbalizes/displays adequate comfort level or baseline comfort level: Assess pain using appropriate pain scale  Taken 9/14/2023 0800  Verbalizes/displays adequate comfort level or baseline comfort level: Assess pain using appropriate pain scale  9/14/2023 0538 by Sukhi Eid RN  Outcome: Progressing  Flowsheets (Taken 9/13/2023 1600 by Og Stewart RN)  Verbalizes/displays adequate comfort level or baseline comfort level: Assess pain using appropriate pain scale     Problem: Skin/Tissue Integrity  Goal: Absence of new skin breakdown  Description: 1. Monitor for areas of redness and/or skin breakdown  2. Assess vascular access sites hourly  3. Every 4-6 hours minimum:  Change oxygen saturation probe site  4. Every 4-6 hours:  If on nasal continuous positive airway pressure, respiratory therapy assess nares and determine need for appliance change or resting period.   9/14/2023 1937 by Og Stewart RN  Outcome: Adequate for Discharge  9/14/2023 0538 by Sukhi Eid RN  Outcome: Progressing     Problem: ABCDS Injury Assessment  Goal: Absence of physical injury  Outcome: Adequate for Discharge     Problem: Safety - Adult  Goal: Free from fall injury  9/14/2023 1937 by Og Stewart RN  Outcome: Adequate for Discharge  9/14/2023 0538 by Sukhi Eid RN  Outcome: Progressing

## 2023-09-14 NOTE — TELEPHONE ENCOUNTER
Vickie Tovar called in to make appt for pt as pt is being discharged tomorrow.  Appt was made for 9/22/23 @ 1030 AM

## 2023-09-14 NOTE — PROGRESS NOTES
0800: ANYA Calix at bedside; updated on pt's hemodynamic status. /79 (100). Pt RLE remains cool to the touch, R foot less dusky in color, R post tibial and pedal pulses verified via doppler; pt has c/o numbness and pain in RLE, but states \"it hasn't worsened. \" Pt NPO d/t possible procedure.  Received verbal order with readback to administer 10 mg of lisinopril per ANYA Calix.
0830: pt's sister, Slava Rebeca See at bedside updated on pt's hemodynamic status and plan of care. All questions and concerns addressed at this time.
09/14/23 0844   Encounter Summary   Encounter Overview/Reason  Initial Encounter   Service Provided For: Patient and family together   Referral/Consult From: [address] Family members   Last Encounter  09/14/23  Salvatore Gallagher is man of binta and appreciated  Visit. No needs at this time and is optimistic. Solid support base in place. Good discussion and time of prayer.)   Complexity of Encounter Low   Begin Time 0835   End Time  0846   Total Time Calculated 11 min   Spiritual/Emotional needs   Type Spiritual Support   Assessment/Intervention/Outcome   Assessment Calm;Coping; Hopeful   Intervention Active listening;Prayer (assurance of)/[place];Nurtured Hope;Sustaining Presence/Ministry of presence   Outcome Comfort;Coping;Encouraged;Engaged in conversation;Expressed feelings, needs, and concerns;Expressed Gratitude   Plan and Referrals   Plan/Referrals Assisted with b[place] community resources;Continue Support (comment)  (Patient informed how to contact )
1100: Dr. Fatou Mahajan at bedside to review CTA results with pt; received verbal order with readback to discontinue NPO order, remove anna catheter, ambulate pt, and administer AM meds; stop heparin gtt 30 minutes after aspirin and plavix administration per Dr. Fatou Mahajan.
1220: received verbal order with readback to hold lasix and potassium chloride per Dr. Bailey Hidalgo.
1407: EKG completed at this time; ANYA Dover reviewed results. No new orders at this time.
1428: received verbal order with readback to give 10 mg of lisinopril now, and then titrate nicardipine as pt tolerates per ANYA Bustamante.
1456: this RN spoke with Dr. Tone Mayen via telephone and provided update on pt's hemodynamic status; pt had a BM, tolerated ambulation, and denies any c/o pain. Pt's genitals are completely bruised; MD aware. Received verbal order with readback to stop 0.9% NS gtt at this time and discharge pt per Dr. Tone Mayen.
1500: ANYA Garcia at bedside; updated on pt's hemodynamic status. Pt's RLE is cool to touch compared to LLE; R foot is dusky with a capillary refill greater than 3 seconds. R post tibial and R pedal pulse verified via doppler; pt denies any numbness, tingling, and pain.  Received verbal order with readback to give lipitor, zetia, give 5,000 units of heparin now, and pt to remain on bedrest overnight per ANYA Garcia.
1505: ANYA Ley at bedside; updated on pt's hemodynamic status. PA aware of bruising on genitals.  New order to follow for PTPTT per ANYA Ley.
1651: this RN provided Dr. Bita Castellon with update on pt's hemodynamic status via telephone call. Pt's RLE remains cool to touch and dusky with capillary refill greater than 3 seconds; R post tibial and R pedal pulse verified via doppler. Pt denies tingling and pain, but does have c/o numbness. Received telephone order with readback to discontinue 5,000 units of subcutaneous heparin, and initiate heparin gtt at this time; recheck PTT at 2100 per Dr. Bita Castellon. Per Dr. Bita Castellon, administer heparin gtt at 500 units/hr with no bolus.
1753: this RN spoke with Dr. Charissa Chavez via telephone; K 5.1, received verbal order with readback to discontinue LR gtt and initiate 0.9% NS gtt at 50 mL/hr; give 0.4 mg of flomax per Dr. Charissa Chavez.
1909: this RN provided pt with discharge education by utilizing the teach back method; this RN discussed femoral puncture site care, emphasizing the importance of inspecting, cleansing with antibacterial soap, and apply bacitracin bid, leaving site ARCHIE. This RN encouraged medication compliance and attending follow-up appointments. Pt instructed to notify MD, if RLE or LLE becomes cool to touch, discolored, numbness, tingling, or pain occurs. PIVs removed without any complications; pt tolerated well. All questions and concerns addressed at this time. This RN provided pt with CVICU Charge RN phone number and instructed pt to call for further questions.
4 Eyes Skin Assessment     NAME:  Gus Del Valle OF BIRTH:  1960  MEDICAL RECORD NUMBER:  [id number]    The patient is being assessed for  Post-Op Surgical    I agree that at least one RN has performed a thorough Head to Toe Skin Assessment on the patient. ALL assessment sites listed below have been assessed. Areas assessed by both nurses:    Head, Face, Ears, Shoulders, Back, Chest, Arms, Elbows, Hands, Sacrum. Buttock, Coccyx, Ischium, Legs. Feet and Heels, and Under Medical Devices         Does the Patient have a Wound?  Yes; bilateral femoral puncture sites       Mustapha Prevention initiated by RN: Yes  Wound Care Orders initiated by RN: No    Pressure Injury (Stage 3,4, Unstageable, DTI, NWPT, and Complex wounds) if present, place Wound referral order by RN under : No    New Ostomies, if present place, Ostomy referral order under : No     Nurse 1 eSignature: Electronically signed by Alexy Qiu RN on 9/13/23 at 12:41 PM EDT    **SHARE this note so that the co-signing nurse can place an eSignature**    Nurse 2 eSignature: Electronically signed by Donta Zavala RN on 9/13/23 at 5:47 PM EDT
Patient arrived to CVICU at 1235 and connected to monitor. Report received from Marisa Nieto and RIO MCGRATH. Gold and lavender top tubes collected and sent to laboratory. Assessment completed, see documentation. Care plan ongoing. Vital signs stable. All needs met at this time.       Drips infusing from OR: nicardipine 6 mg/hr and LR 50 mL/hr
Patient notified of surgery time at [facility] 9/13/23 1000 arrival 0800, understanding verbalized
Physical Therapy  Attempted to see pt for PT/OT eval. Pt just returned from [id number] [address] and RN stated pt is on bedrest currently and waiting to hear from the doctor if he wants him to get up. Will check back this afternoon as timer permits.
RN spoke with Dr. Ya Galdamez,  APTT came back at 33.5, increased Heparin gtt to 600 units per MD order.
This nurse spoke with Meek the pharmacist over telephone to get the correct Heparin gtt order - 500 units/ hr.
V2.0  [facility] [place]      Name:  Kylah Fall /Age/Sex: 1960  (58 y.o. male)   MRN & CSN:  [id number] & [id number] Encounter Date/Time: 2023 11:22 AM EDT    Location:  4831/0642-Z PCP: No primary care provider on file. Hospital Day: 2    Assessment and Plan:   Kylah Fall is a 58 y.o. male  who presents with PAD (peripheral artery disease) ([address])        1. Peripheral artery disease of left LE status post crossover right femoral to left femoral bypass 23  2. Chronic systolic and diastolic heart failure  3. CKD stage III  4. Essential hypertension  5. Diabetes mellitus type 2  6. Coronary artery disease status post CABG  7. Hyperlipidemia  8. BPH     Recommendations   -Continue postop management per CT surgery  -Hold home oral antidiabetics. Started on sliding scale insulin   -Lasix  Resume as appropriate  -Resume statins  -On aspirin and Plavix  -Currently on heparin drip. Off nicardipine and phenylephrine. Resume home antihypertensives with holding parameters as appropriate  -GI prophylaxis  -We will continue to follow    Comment: Please note this report has been produced using speech recognition software and may contain errors related to that system including errors in grammar, punctuation, and spelling, as well as words and phrases that may be inappropriate. If there are any questions or concerns please feel free to contact the dictating provider for clarification. Diet ADULT DIET; Regular; 4 carb choices (60 gm/meal); Low Fat/Low Chol/High Fiber/2 gm Na   DVT Prophylaxis [] Lovenox, []  Heparin, [] SCDs, [] Ambulation,  [] Eliquis, [] Xarelto  [] Coumadin   Code Status Full Code   Disposition From: Home  Expected Disposition: Home  Estimated Date of Discharge: tbd  Patient requires continued admission due to post op management   Surrogate Decision Maker/ POA      Subjective:     Chief Complaint: No chief complaint on file.        The patient was seen at
0-4 Units SubCUTAneous Nightly    tamsulosin  0.4 mg Oral Daily     Continuous Infusions:    niCARdipine Stopped (09/13/23 1630)    phenylephrine Stopped (09/13/23 1235)    dextrose      heparin (Porcine) 600 Units/hr (09/13/23 2345)    sodium chloride 50 mL/hr at 09/13/23 1814           Physical Exam:    General: A&O x 3, NAD noted  Neck:  supple, no carotid bruits, no JVD  ENT: CHLOE  Heart: Paced  Lungs:  BS bilaterally at the bases. Abdomen: Soft, non tender, non distended, Hypoactive BS x 4   : [place] in place   Extremities: No edema noted bilateral LE. Neuro: no focal deficits noted  DP, PT pulses present via doppler are equal on both extremities        Assessment:    Mr. Aron Delgado is a 58y.o. year-old male now status post CROSSOVER FEMORAL TO FEMORAL BYPASS USING A 8MM GORE [address] on 9/13/23. Plan:  cta of AA with runoff to check the anastamosis and arteries. Results show no signs of stenosis at anastomotic sight.   Resume diet  D/c heparin gtt  Continue ASA and Plavix  Further recommendations as per Dr. Jeremias Nickerson    The above recommendations including medications and orders were discussed and agreed upon with Dr. Tucker Esqueda PA-C 09/14/23 10:21 AM
Walk-in shower  Bathroom Toilet: Handicap height  Bathroom Equipment: Grab bars in shower  Bathroom Accessibility: Accessible  Home Equipment: ana Bueno (does not use)  Has the patient had two or more falls in the past year or any fall with injury in the past year?: No  Receives Help From:  (son and grandson come to help with pts spouse and can provide extra assist for his needs as well per pt)  ADL Assistance: Independent  Homemaking Assistance: Independent  Homemaking Responsibilities: Yes  Ambulation Assistance: Independent  Transfer Assistance: Independent  Active : Yes  Occupation: Retired  Additional Comments: Wife is disabled, gets help from family w/ her care    Examination of body systems (includes body structures/functions, activity/participation limitations):  Observation:  Supine in bed upon arrival. Cooperative with therapy. Vision:  Glasses  Hearing:  WFL   Cardiopulmonary:  stable vitals on room air     Musculoskeletal  ROM R[facility]L:  [facility]  Strength R/L:  Bilat ankles 4+/5, right knee 4/5, left knee 4-/5, right hip 4-/5, left hip 3+/5. Neuro:  hx of GBS and CVA with slight residual strength deficits to left side. Intact sensation to bilat LES. Mobility/treatment:   Rolling L/R:  NT   Supine to sit:  SBA for safety with HOB slightly elevated  Transfers:   Sit to stand: SBA for safety from EOB 3x   Stand to sit: SBA for safety to EOB and recliner. Dec control with sitting to lower recliner surface  Step pivot: CGA for safety, no AD   Sitting balance:  SBA for safety static and light dynamic without UE support while donning socks   Standing balance:  CGA for safety, static without UE support   Gait: ~200ft without AD CGA for safety. Initially slightly antalgic/guarded gait pattern with dec stance on left LE. Pt progressed to a more reciprocal and fluent gait pattern after ambulating a few feet. Minor deviations from path and intermittent use of wall for support.  Discussed
approx 200ft functional mobility in jane)    Balance:   Sitting balance: Good  Standing balance: Fair+    Pt educated on role of therapy, POC,  safety awareness     AM-PAC 6 click short form for inpatient daily activity:   How much help from another person does the patient currently need. .. Unable  Dep A Lot  Max A A Lot   Mod A A Little  Min A A Little   CGA  SBA None   Mod I  Indep  Sup   1. Putting on and taking off regular lower body clothing? [] 1    [] 2   [] 2   [] 3   [x] 3   [] 4      2. Bathing (including washing, rinsing, drying)? [] 1   [] 2   [] 2 [] 3 [] 3 [x] 4   3. Toileting, which includes using toilet, bedpan, or urinal? [] 1    [] 2   [] 2   [] 3   [x] 3   [] 4     4. Putting on and taking off regular upper body clothing? [] 1   [] 2   [] 2   [] 3   [] 3    [x] 4      5. Taking care of personal grooming such as brushing teeth? [] 1   [] 2    [] 2 [] 3    [] 3   [x] 4      6. Eating meals? [] 1   [] 2   [] 2   [] 3   [] 3   [x] 4      Raw Score:  22    [24=0% impaired(CH), 23=1-19%(CI), 20-22=20-39%(CJ), 15-19=40-59%(CK), 10-14=60-79%(CL), 7-9=80-99%(CM), 6=100%(CN)]     Treatment:  Therapeutic Activity Training:   Therapeutic activity training was instructed today. Cues were given for safety, sequence, UE/LE placement, awareness, and balance. Activities performed today included bed mobility training, sup-sit, sit-stand, functional mobility    Safety: Patient left in chair, call light within reach, RN notified, gait belt used. Assessment:  Pt is a 57 y/o male admitted from home for PAD. Pt at baseline is independent for ADLs and for functional transfers/mobility. Pt currently presents w/ deficits relating to UE strength/ROM, functional activity tolerance, and functional mobility. Pt would not benefit from continued acute care OT services and may discharge to home once medically appropriate. Complexity: Moderate   Prognosis: Good, no significant barriers to participation at this time.

## 2023-09-18 NOTE — PROGRESS NOTES
9/22/2023    Natasha Palacio MD  [address] [facility] MD Anatoly Nickerson MD                                                                                      Re: Manuel Metz     Dear Dr. Noel Nagy,  Dear Dr. Tiana Govea,  Dear Dr. Renetta Kaiser,    I had the pleasure of seeing your patient, Ivan Chavis (58 y.o. male) in follow up after  his crossover right to left fem-fem bypass using an 8mm [place] propaten vascular graft on 9/13/2023 for his chronically occluded left common iliac artery. He feels well and he has minimal incisional pain. He has no drainage from his groin incisions, and there has been no redness. He has no fevers and chills. His left leg feels much stronger. His appetite has been good. He is asking if he can drive now.    His   Current Outpatient Medications:     furosemide (LASIX) 40 MG tablet, Take 1 tablet by mouth every morning, Disp: 10 tablet, Rfl: 0    hydrALAZINE (APRESOLINE) 25 MG tablet, Take 1 tablet by mouth 3 times daily, Disp: 90 tablet, Rfl: 3    lisinopril (PRINIVIL;ZESTRIL) 10 MG tablet, Take 1 tablet by mouth daily, Disp: , Rfl:     pantoprazole (PROTONIX) 40 MG injection, Infuse 40 mg intravenously daily, Disp: , Rfl:     glimepiride (AMARYL) 2 MG tablet, Take 1 tablet by mouth every morning (before breakfast), Disp: , Rfl:     albuterol-ipratropium (COMBIVENT RESPIMAT)  MCG/ACT AERS inhaler, Inhale 1 puff into the lungs every 6 hours as needed for Wheezing, Disp: , Rfl:     empagliflozin (JARDIANCE) 10 MG tablet, Take 1 tablet by mouth daily, Disp: 90 tablet, Rfl: 3    isosorbide mononitrate (IMDUR) 30 MG extended release tablet, Take 1 tablet by mouth daily, Disp: 30 tablet, Rfl: 3    carvedilol (COREG) 12.5 MG tablet, Take 1 tablet by mouth 2 times daily, Disp: 180 tablet, Rfl: 3    ezetimibe (ZETIA) 10 MG tablet, Take 1 tablet by mouth daily, Disp: 90 tablet, Rfl: 1    tiotropium (SPIRIVA HANDIHALER) 18 MCG inhalation capsule, Inhale 1 capsule into the lungs daily for 30 doses

## 2023-09-20 ENCOUNTER — TELEPHONE (OUTPATIENT)
Dept: CARDIOTHORACIC SURGERY | Age: 63
End: 2023-09-20

## 2023-09-20 LAB
ABO/RH: NORMAL
ANTIBODY SCREEN: NEGATIVE
COMMENT: NORMAL
COMPONENT: NORMAL
COMPONENT: NORMAL
CROSSMATCH RESULT: NORMAL
CROSSMATCH RESULT: NORMAL
STATUS: NORMAL
STATUS: NORMAL
TRANSFUSION STATUS: NORMAL
TRANSFUSION STATUS: NORMAL
UNIT DIVISION: 0
UNIT DIVISION: 0
UNIT NUMBER: NORMAL
UNIT NUMBER: NORMAL

## 2023-09-21 ENCOUNTER — OFFICE VISIT (OUTPATIENT)
Dept: CARDIOTHORACIC SURGERY | Age: 63
End: 2023-09-21

## 2023-09-21 VITALS
BODY MASS INDEX: 31.01 KG/M2 | OXYGEN SATURATION: 97 % | WEIGHT: 197.6 LBS | DIASTOLIC BLOOD PRESSURE: 88 MMHG | HEIGHT: 67 IN | SYSTOLIC BLOOD PRESSURE: 160 MMHG | HEART RATE: 76 BPM

## 2023-09-21 DIAGNOSIS — I73.9 PVD (PERIPHERAL VASCULAR DISEASE) (HCC): Primary | ICD-10-CM

## 2023-09-21 PROCEDURE — 99024 POSTOP FOLLOW-UP VISIT: CPT | Performed by: THORACIC SURGERY (CARDIOTHORACIC VASCULAR SURGERY)

## 2023-09-24 ENCOUNTER — APPOINTMENT (OUTPATIENT)
Dept: CT IMAGING | Age: 63
DRG: 300 | End: 2023-09-24
Payer: MEDICARE

## 2023-09-24 ENCOUNTER — HOSPITAL ENCOUNTER (INPATIENT)
Age: 63
LOS: 2 days | Discharge: HOME | DRG: 300 | End: 2023-09-26
Attending: EMERGENCY MEDICINE | Admitting: STUDENT IN AN ORGANIZED HEALTH CARE EDUCATION/TRAINING PROGRAM
Payer: MEDICARE

## 2023-09-24 DIAGNOSIS — R20.0 RIGHT LEG NUMBNESS: ICD-10-CM

## 2023-09-24 DIAGNOSIS — R29.898 RIGHT LEG WEAKNESS: ICD-10-CM

## 2023-09-24 DIAGNOSIS — I70.90 ARTERY OCCLUSION: Primary | ICD-10-CM

## 2023-09-24 PROBLEM — I70.209 TIBIAL ARTERY OCCLUSION (HCC): Status: ACTIVE | Noted: 2023-09-24

## 2023-09-24 LAB
ALBUMIN SERPL-MCNC: 3.9 GM/DL (ref 3.4–5)
ALP BLD-CCNC: 76 IU/L (ref 40–128)
ALT SERPL-CCNC: 20 U/L (ref 10–40)
ANION GAP SERPL CALCULATED.3IONS-SCNC: 13 MMOL/L (ref 4–16)
ANISOCYTOSIS: ABNORMAL
ANTI-XA UNFRAC HEPARIN: 0.2 IU/ML (ref 0.3–0.7)
ANTI-XA UNFRAC HEPARIN: <0.1 IU/ML (ref 0.3–0.7)
APTT: 29.8 SECONDS (ref 25.1–37.1)
AST SERPL-CCNC: 19 IU/L (ref 15–37)
BANDED NEUTROPHILS ABSOLUTE COUNT: 0.89 K/CU MM
BANDED NEUTROPHILS RELATIVE PERCENT: 9 % (ref 5–11)
BASOPHILS ABSOLUTE: 0.1 K/CU MM
BASOPHILS RELATIVE PERCENT: 1 % (ref 0–1)
BILIRUB SERPL-MCNC: 0.3 MG/DL (ref 0–1)
BUN SERPL-MCNC: 27 MG/DL (ref 6–23)
CALCIUM SERPL-MCNC: 9.4 MG/DL (ref 8.3–10.6)
CHLORIDE BLD-SCNC: 105 MMOL/L (ref 99–110)
CO2: 25 MMOL/L (ref 21–32)
CREAT SERPL-MCNC: 2 MG/DL (ref 0.9–1.3)
DIFFERENTIAL TYPE: ABNORMAL
EOSINOPHILS ABSOLUTE: 0.4 K/CU MM
EOSINOPHILS RELATIVE PERCENT: 4 % (ref 0–3)
GFR SERPL CREATININE-BSD FRML MDRD: 37 ML/MIN/1.73M2
GLUCOSE BLD-MCNC: 105 MG/DL (ref 70–99)
GLUCOSE BLD-MCNC: 150 MG/DL (ref 70–99)
GLUCOSE BLD-MCNC: 185 MG/DL (ref 70–99)
GLUCOSE SERPL-MCNC: 160 MG/DL (ref 70–99)
HCT VFR BLD CALC: 40.1 % (ref 42–52)
HEMOGLOBIN: 12.6 GM/DL (ref 13.5–18)
IMMATURE NEUTROPHIL %: 0 % (ref 0–0.43)
INR BLD: 1.2 INDEX
LYMPHOCYTES ABSOLUTE: 1.7 K/CU MM
LYMPHOCYTES RELATIVE PERCENT: 17 % (ref 24–44)
MACROCYTES: ABNORMAL
MCH RBC QN AUTO: 31.2 PG (ref 27–31)
MCHC RBC AUTO-ENTMCNC: 31.4 % (ref 32–36)
MCV RBC AUTO: 99.3 FL (ref 78–100)
MONOCYTES ABSOLUTE: 0.7 K/CU MM
MONOCYTES RELATIVE PERCENT: 7 % (ref 0–4)
PDW BLD-RTO: 13.2 % (ref 11.7–14.9)
PLATELET # BLD: 192 K/CU MM (ref 140–440)
PMV BLD AUTO: 11.2 FL (ref 7.5–11.1)
POTASSIUM SERPL-SCNC: 4.8 MMOL/L (ref 3.5–5.1)
PROTHROMBIN TIME: 15.6 SECONDS (ref 11.7–14.5)
RBC # BLD: 4.04 M/CU MM (ref 4.6–6.2)
SEGMENTED NEUTROPHILS ABSOLUTE COUNT: 6.1 K/CU MM
SEGMENTED NEUTROPHILS RELATIVE PERCENT: 62 % (ref 36–66)
SODIUM BLD-SCNC: 143 MMOL/L (ref 135–145)
TOTAL IMMATURE NEUTOROPHIL: 0 K/CU MM
TOTAL PROTEIN: 7.1 GM/DL (ref 6.4–8.2)
WBC # BLD: 9.9 K/CU MM (ref 4–10.5)

## 2023-09-24 PROCEDURE — 75635 CT ANGIO ABDOMINAL ARTERIES: CPT

## 2023-09-24 PROCEDURE — 94761 N-INVAS EAR/PLS OXIMETRY MLT: CPT

## 2023-09-24 PROCEDURE — 2140000000 HC CCU INTERMEDIATE R&B

## 2023-09-24 PROCEDURE — 6360000002 HC RX W HCPCS: Performed by: STUDENT IN AN ORGANIZED HEALTH CARE EDUCATION/TRAINING PROGRAM

## 2023-09-24 PROCEDURE — C9113 INJ PANTOPRAZOLE SODIUM, VIA: HCPCS | Performed by: STUDENT IN AN ORGANIZED HEALTH CARE EDUCATION/TRAINING PROGRAM

## 2023-09-24 PROCEDURE — 2500000003 HC RX 250 WO HCPCS: Performed by: STUDENT IN AN ORGANIZED HEALTH CARE EDUCATION/TRAINING PROGRAM

## 2023-09-24 PROCEDURE — 85610 PROTHROMBIN TIME: CPT

## 2023-09-24 PROCEDURE — 99285 EMERGENCY DEPT VISIT HI MDM: CPT

## 2023-09-24 PROCEDURE — 36415 COLL VENOUS BLD VENIPUNCTURE: CPT

## 2023-09-24 PROCEDURE — 85730 THROMBOPLASTIN TIME PARTIAL: CPT

## 2023-09-24 PROCEDURE — 80053 COMPREHEN METABOLIC PANEL: CPT

## 2023-09-24 PROCEDURE — 2580000003 HC RX 258: Performed by: EMERGENCY MEDICINE

## 2023-09-24 PROCEDURE — 85025 COMPLETE CBC W/AUTO DIFF WBC: CPT

## 2023-09-24 PROCEDURE — 96374 THER/PROPH/DIAG INJ IV PUSH: CPT

## 2023-09-24 PROCEDURE — 6370000000 HC RX 637 (ALT 250 FOR IP): Performed by: STUDENT IN AN ORGANIZED HEALTH CARE EDUCATION/TRAINING PROGRAM

## 2023-09-24 PROCEDURE — 82962 GLUCOSE BLOOD TEST: CPT

## 2023-09-24 PROCEDURE — 6360000004 HC RX CONTRAST MEDICATION: Performed by: EMERGENCY MEDICINE

## 2023-09-24 RX ORDER — HEPARIN SODIUM 1000 [USP'U]/ML
80 INJECTION, SOLUTION INTRAVENOUS; SUBCUTANEOUS PRN
Status: DISCONTINUED | OUTPATIENT
Start: 2023-09-24 | End: 2023-09-25

## 2023-09-24 RX ORDER — HEPARIN SODIUM 10000 [USP'U]/100ML
5-30 INJECTION, SOLUTION INTRAVENOUS CONTINUOUS
Status: DISCONTINUED | OUTPATIENT
Start: 2023-09-24 | End: 2023-09-25

## 2023-09-24 RX ORDER — SODIUM CHLORIDE 0.9 % (FLUSH) 0.9 %
5-40 SYRINGE (ML) INJECTION EVERY 12 HOURS SCHEDULED
Status: DISCONTINUED | OUTPATIENT
Start: 2023-09-24 | End: 2023-09-26 | Stop reason: HOSPADM

## 2023-09-24 RX ORDER — ONDANSETRON 4 MG/1
4 TABLET, ORALLY DISINTEGRATING ORAL EVERY 8 HOURS PRN
Status: DISCONTINUED | OUTPATIENT
Start: 2023-09-24 | End: 2023-09-26 | Stop reason: HOSPADM

## 2023-09-24 RX ORDER — SODIUM CHLORIDE 9 MG/ML
INJECTION, SOLUTION INTRAVENOUS PRN
Status: DISCONTINUED | OUTPATIENT
Start: 2023-09-24 | End: 2023-09-26 | Stop reason: HOSPADM

## 2023-09-24 RX ORDER — ATORVASTATIN CALCIUM 40 MG/1
80 TABLET, FILM COATED ORAL DAILY
Status: DISCONTINUED | OUTPATIENT
Start: 2023-09-24 | End: 2023-09-26 | Stop reason: HOSPADM

## 2023-09-24 RX ORDER — HYDRALAZINE HYDROCHLORIDE 25 MG/1
25 TABLET, FILM COATED ORAL 3 TIMES DAILY
Status: DISCONTINUED | OUTPATIENT
Start: 2023-09-24 | End: 2023-09-26 | Stop reason: HOSPADM

## 2023-09-24 RX ORDER — ALBUTEROL SULFATE 90 UG/1
2 AEROSOL, METERED RESPIRATORY (INHALATION) EVERY 4 HOURS PRN
Status: DISCONTINUED | OUTPATIENT
Start: 2023-09-24 | End: 2023-09-26 | Stop reason: HOSPADM

## 2023-09-24 RX ORDER — CARVEDILOL 6.25 MG/1
12.5 TABLET ORAL 2 TIMES DAILY
Status: DISCONTINUED | OUTPATIENT
Start: 2023-09-24 | End: 2023-09-26 | Stop reason: HOSPADM

## 2023-09-24 RX ORDER — ACETAMINOPHEN 325 MG/1
650 TABLET ORAL EVERY 6 HOURS PRN
Status: DISCONTINUED | OUTPATIENT
Start: 2023-09-24 | End: 2023-09-26 | Stop reason: HOSPADM

## 2023-09-24 RX ORDER — HEPARIN SODIUM 1000 [USP'U]/ML
40 INJECTION, SOLUTION INTRAVENOUS; SUBCUTANEOUS PRN
Status: DISCONTINUED | OUTPATIENT
Start: 2023-09-24 | End: 2023-09-25

## 2023-09-24 RX ORDER — ISOSORBIDE MONONITRATE 30 MG/1
30 TABLET, EXTENDED RELEASE ORAL DAILY
Status: DISCONTINUED | OUTPATIENT
Start: 2023-09-24 | End: 2023-09-26 | Stop reason: HOSPADM

## 2023-09-24 RX ORDER — PANTOPRAZOLE SODIUM 40 MG/10ML
40 INJECTION, POWDER, LYOPHILIZED, FOR SOLUTION INTRAVENOUS DAILY
Status: DISCONTINUED | OUTPATIENT
Start: 2023-09-24 | End: 2023-09-26 | Stop reason: HOSPADM

## 2023-09-24 RX ORDER — SODIUM CHLORIDE 0.9 % (FLUSH) 0.9 %
5-40 SYRINGE (ML) INJECTION 2 TIMES DAILY
Status: DISCONTINUED | OUTPATIENT
Start: 2023-09-24 | End: 2023-09-26 | Stop reason: HOSPADM

## 2023-09-24 RX ORDER — ONDANSETRON 2 MG/ML
4 INJECTION INTRAMUSCULAR; INTRAVENOUS EVERY 6 HOURS PRN
Status: DISCONTINUED | OUTPATIENT
Start: 2023-09-24 | End: 2023-09-26 | Stop reason: HOSPADM

## 2023-09-24 RX ORDER — DEXTROSE MONOHYDRATE 100 MG/ML
INJECTION, SOLUTION INTRAVENOUS CONTINUOUS PRN
Status: DISCONTINUED | OUTPATIENT
Start: 2023-09-24 | End: 2023-09-26 | Stop reason: HOSPADM

## 2023-09-24 RX ORDER — CLOPIDOGREL BISULFATE 75 MG/1
75 TABLET ORAL DAILY
Status: DISCONTINUED | OUTPATIENT
Start: 2023-09-24 | End: 2023-09-25

## 2023-09-24 RX ORDER — ACETAMINOPHEN 650 MG/1
650 SUPPOSITORY RECTAL EVERY 6 HOURS PRN
Status: DISCONTINUED | OUTPATIENT
Start: 2023-09-24 | End: 2023-09-26 | Stop reason: HOSPADM

## 2023-09-24 RX ORDER — INSULIN LISPRO 100 [IU]/ML
0-8 INJECTION, SOLUTION INTRAVENOUS; SUBCUTANEOUS
Status: DISCONTINUED | OUTPATIENT
Start: 2023-09-24 | End: 2023-09-26 | Stop reason: HOSPADM

## 2023-09-24 RX ORDER — GLUCAGON 1 MG/ML
1 KIT INJECTION PRN
Status: DISCONTINUED | OUTPATIENT
Start: 2023-09-24 | End: 2023-09-26 | Stop reason: HOSPADM

## 2023-09-24 RX ORDER — INSULIN LISPRO 100 [IU]/ML
0-4 INJECTION, SOLUTION INTRAVENOUS; SUBCUTANEOUS NIGHTLY
Status: DISCONTINUED | OUTPATIENT
Start: 2023-09-24 | End: 2023-09-26 | Stop reason: HOSPADM

## 2023-09-24 RX ORDER — 0.9 % SODIUM CHLORIDE 0.9 %
500 INTRAVENOUS SOLUTION INTRAVENOUS ONCE
Status: COMPLETED | OUTPATIENT
Start: 2023-09-24 | End: 2023-09-24

## 2023-09-24 RX ORDER — POLYETHYLENE GLYCOL 3350 17 G/17G
17 POWDER, FOR SOLUTION ORAL DAILY PRN
Status: DISCONTINUED | OUTPATIENT
Start: 2023-09-24 | End: 2023-09-26 | Stop reason: HOSPADM

## 2023-09-24 RX ORDER — SODIUM CHLORIDE 0.9 % (FLUSH) 0.9 %
5-40 SYRINGE (ML) INJECTION PRN
Status: DISCONTINUED | OUTPATIENT
Start: 2023-09-24 | End: 2023-09-26 | Stop reason: HOSPADM

## 2023-09-24 RX ADMIN — CARVEDILOL 12.5 MG: 6.25 TABLET, FILM COATED ORAL at 13:16

## 2023-09-24 RX ADMIN — HEPARIN SODIUM 3580 UNITS: 1000 INJECTION INTRAVENOUS; SUBCUTANEOUS at 18:48

## 2023-09-24 RX ADMIN — ATORVASTATIN CALCIUM 80 MG: 40 TABLET, FILM COATED ORAL at 20:43

## 2023-09-24 RX ADMIN — CLOPIDOGREL BISULFATE 75 MG: 75 TABLET ORAL at 13:17

## 2023-09-24 RX ADMIN — SODIUM CHLORIDE 500 ML: 9 INJECTION, SOLUTION INTRAVENOUS at 06:04

## 2023-09-24 RX ADMIN — PANTOPRAZOLE SODIUM 40 MG: 40 INJECTION, POWDER, FOR SOLUTION INTRAVENOUS at 13:16

## 2023-09-24 RX ADMIN — IOPAMIDOL 100 ML: 755 INJECTION, SOLUTION INTRAVENOUS at 05:15

## 2023-09-24 RX ADMIN — HYDRALAZINE HYDROCHLORIDE 25 MG: 25 TABLET, FILM COATED ORAL at 13:17

## 2023-09-24 RX ADMIN — ISOSORBIDE MONONITRATE 30 MG: 30 TABLET, EXTENDED RELEASE ORAL at 16:32

## 2023-09-24 RX ADMIN — ACETAMINOPHEN 650 MG: 325 TABLET ORAL at 13:17

## 2023-09-24 RX ADMIN — HEPARIN SODIUM 18 UNITS/KG/HR: 10000 INJECTION, SOLUTION INTRAVENOUS at 11:33

## 2023-09-24 ASSESSMENT — ENCOUNTER SYMPTOMS
NAUSEA: 0
WHEEZING: 0
CONSTIPATION: 0
EYE DISCHARGE: 0
COUGH: 0
DIARRHEA: 0
SHORTNESS OF BREATH: 0
COLOR CHANGE: 0
ABDOMINAL DISTENTION: 0
SORE THROAT: 0
BACK PAIN: 0

## 2023-09-24 ASSESSMENT — PAIN DESCRIPTION - PAIN TYPE: TYPE: ACUTE PAIN

## 2023-09-24 ASSESSMENT — PAIN DESCRIPTION - FREQUENCY: FREQUENCY: CONTINUOUS

## 2023-09-24 ASSESSMENT — PAIN SCALES - GENERAL
PAINLEVEL_OUTOF10: 5
PAINLEVEL_OUTOF10: 5
PAINLEVEL_OUTOF10: 6

## 2023-09-24 ASSESSMENT — PAIN DESCRIPTION - LOCATION
LOCATION: LEG;HIP
LOCATION: LEG

## 2023-09-24 ASSESSMENT — PAIN DESCRIPTION - ONSET: ONSET: ON-GOING

## 2023-09-24 ASSESSMENT — PAIN DESCRIPTION - ORIENTATION
ORIENTATION: RIGHT
ORIENTATION: RIGHT;LOWER

## 2023-09-24 ASSESSMENT — PAIN DESCRIPTION - DESCRIPTORS: DESCRIPTORS: ACHING

## 2023-09-24 NOTE — H&P
rhythm     Imaging that was interpreted personally includes CTA abdominal aorta with runoff     Drugs that require monitoring for toxicity include heparin and monitor with CBC, BMP    Discussed management of the case in detail w/ the patient    Comment: Please note this report has been produced using speech recognition software and may contain errors related to that system including errors in grammar, punctuation, and spelling, as well as words and phrases that may be inappropriate. If there are any questions or concerns please feel free to contact the dictating provider for clarification. Review of Systems:    Review of Systems   Constitutional:  Negative for activity change, appetite change, fatigue and fever. HENT:  Negative for congestion and sore throat. Eyes:  Negative for discharge and visual disturbance. Respiratory:  Negative for cough, shortness of breath and wheezing. Cardiovascular:  Negative for chest pain, palpitations and leg swelling. Gastrointestinal:  Negative for abdominal distention, constipation, diarrhea and nausea. Genitourinary:  Negative for difficulty urinating and hematuria. Musculoskeletal:  Negative for arthralgias, back pain and myalgias. Right foot numbness and pain   Skin:  Negative for color change. Neurological:  Negative for dizziness, syncope and headaches. Hematological:  Negative for adenopathy. Psychiatric/Behavioral:  Negative for agitation and confusion. Objective:   No intake or output data in the 24 hours ending 09/24/23 1207     Vitals:   Vitals:    09/24/23 1132   BP: (!) 156/80   Pulse:    Resp:    Temp:    SpO2: 98%       Physical Exam:   Physical Exam  Vitals and nursing note reviewed. Constitutional:       General: He is not in acute distress. Appearance: He is obese. He is not ill-appearing. HENT:      Head: Normocephalic and atraumatic.       Mouth/Throat:      Mouth: Mucous membranes are moist.   Eyes:

## 2023-09-24 NOTE — CARE COORDINATION
Pt noted for possible readmission. Pt was recently admitted 9/13-9/14/23 for PAD. Pt is from home with his spouse, has insurance, PCP and is independent in ADL's. Pt returns for leg swelling and numbness in leg. Pt is being admitted for further treatment. No CM needs noted at this time on chart review.

## 2023-09-24 NOTE — ED NOTES
Report given to [facility], care transferred at this time.       [place], [place]  09/24/23 1148

## 2023-09-24 NOTE — CARE COORDINATION
MCG criteria for DVT reviewed at this time, criteria supports Inpatient Admission. 

## 2023-09-24 NOTE — ED NOTES
ED TO INPATIENT SBAR HANDOFF    Patient Name: Yobany Gillette   :  1960  58 y.o. Preferred Name  Orvie  Family/Caregiver Present no   Restraints no   C-SSRS: Risk of Suicide: No Risk  Sitter no   Sepsis Risk Score Sepsis Risk Score: 1.15      Situation  Chief Complaint   Patient presents with    Leg Swelling    Numbness     In leg     Brief Description of Patient's Condition: leg swelling and numbness  Mental Status: oriented and alert  Arrived from: home    Imaging:   CTA ABDOMINAL AORTA W BILAT RUNOFF W CONTRAST   Final Result   1. Postsurgical changes of right to left femoral femoral artery bypass graft   which is patent without complication. 2.  Diffuse atherosclerotic disease with moderate to severe stenosis of the   mid to distal aorta secondary to atherosclerotic plaque      3. No significant disease or stenosis involving the superficial femoral or   popliteal arteries. 4.  Two vessel runoff to the right ankle with occlusion of the anterior   tibial artery at the level of the mid calf. 5.  Three-vessel runoff to the left ankle. 6.  There is increased fluid and gas adjacent to the origin of the   femoral-femoral bypass in the right groin. Correlate for clinical signs of   infection.            Abnormal labs:   Abnormal Labs Reviewed   COMPREHENSIVE METABOLIC PANEL - Abnormal; Notable for the following components:       Result Value    Glucose 160 (*)     BUN 27 (*)     Creatinine 2.0 (*)     Est, Glom Filt Rate 37 (*)     All other components within normal limits   CBC WITH AUTO DIFFERENTIAL - Abnormal; Notable for the following components:    RBC 4.04 (*)     Hemoglobin 12.6 (*)     Hematocrit 40.1 (*)     MCH 31.2 (*)     MCHC 31.4 (*)     MPV 11.2 (*)     Eosinophils % 4.0 (*)     Lymphocytes % 17.0 (*)     Monocytes % 7.0 (*)     All other components within normal limits   PROTIME-INR - Abnormal; Notable for the following components:    Protime 15.6 (*)     All other

## 2023-09-25 ENCOUNTER — APPOINTMENT (OUTPATIENT)
Dept: ULTRASOUND IMAGING | Age: 63
DRG: 300 | End: 2023-09-25
Payer: MEDICARE

## 2023-09-25 LAB
ANION GAP SERPL CALCULATED.3IONS-SCNC: 9 MMOL/L (ref 4–16)
ANTI-XA UNFRAC HEPARIN: 0.2 IU/ML (ref 0.3–0.7)
ANTI-XA UNFRAC HEPARIN: 0.7 IU/ML (ref 0.3–0.7)
ANTI-XA UNFRAC HEPARIN: 1 IU/ML (ref 0.3–0.7)
BASOPHILS ABSOLUTE: 0.1 K/CU MM
BASOPHILS RELATIVE PERCENT: 0.7 % (ref 0–1)
BUN SERPL-MCNC: 21 MG/DL (ref 6–23)
CALCIUM SERPL-MCNC: 8.6 MG/DL (ref 8.3–10.6)
CHLORIDE BLD-SCNC: 105 MMOL/L (ref 99–110)
CO2: 23 MMOL/L (ref 21–32)
CREAT SERPL-MCNC: 1.6 MG/DL (ref 0.9–1.3)
DIFFERENTIAL TYPE: ABNORMAL
EOSINOPHILS ABSOLUTE: 0.3 K/CU MM
EOSINOPHILS RELATIVE PERCENT: 3.6 % (ref 0–3)
GFR SERPL CREATININE-BSD FRML MDRD: 48 ML/MIN/1.73M2
GLUCOSE BLD-MCNC: 116 MG/DL (ref 70–99)
GLUCOSE BLD-MCNC: 161 MG/DL (ref 70–99)
GLUCOSE BLD-MCNC: 173 MG/DL (ref 70–99)
GLUCOSE SERPL-MCNC: 151 MG/DL (ref 70–99)
HCT VFR BLD CALC: 35.3 % (ref 42–52)
HEMOGLOBIN: 11.2 GM/DL (ref 13.5–18)
IMMATURE NEUTROPHIL %: 0.6 % (ref 0–0.43)
LYMPHOCYTES ABSOLUTE: 1.8 K/CU MM
LYMPHOCYTES RELATIVE PERCENT: 24.5 % (ref 24–44)
MCH RBC QN AUTO: 31.6 PG (ref 27–31)
MCHC RBC AUTO-ENTMCNC: 31.7 % (ref 32–36)
MCV RBC AUTO: 99.7 FL (ref 78–100)
MONOCYTES ABSOLUTE: 0.6 K/CU MM
MONOCYTES RELATIVE PERCENT: 7.6 % (ref 0–4)
NUCLEATED RBC %: 0 %
PDW BLD-RTO: 13.3 % (ref 11.7–14.9)
PLATELET # BLD: 174 K/CU MM (ref 140–440)
PMV BLD AUTO: 11 FL (ref 7.5–11.1)
POTASSIUM SERPL-SCNC: 4.6 MMOL/L (ref 3.5–5.1)
RBC # BLD: 3.54 M/CU MM (ref 4.6–6.2)
SEGMENTED NEUTROPHILS ABSOLUTE COUNT: 4.6 K/CU MM
SEGMENTED NEUTROPHILS RELATIVE PERCENT: 63 % (ref 36–66)
SODIUM BLD-SCNC: 137 MMOL/L (ref 135–145)
TOTAL IMMATURE NEUTOROPHIL: 0.04 K/CU MM
TOTAL NUCLEATED RBC: 0 K/CU MM
WBC # BLD: 7.3 K/CU MM (ref 4–10.5)

## 2023-09-25 PROCEDURE — 94761 N-INVAS EAR/PLS OXIMETRY MLT: CPT

## 2023-09-25 PROCEDURE — 85730 THROMBOPLASTIN TIME PARTIAL: CPT

## 2023-09-25 PROCEDURE — 2580000003 HC RX 258: Performed by: STUDENT IN AN ORGANIZED HEALTH CARE EDUCATION/TRAINING PROGRAM

## 2023-09-25 PROCEDURE — 6360000002 HC RX W HCPCS: Performed by: STUDENT IN AN ORGANIZED HEALTH CARE EDUCATION/TRAINING PROGRAM

## 2023-09-25 PROCEDURE — 2500000003 HC RX 250 WO HCPCS: Performed by: STUDENT IN AN ORGANIZED HEALTH CARE EDUCATION/TRAINING PROGRAM

## 2023-09-25 PROCEDURE — 2140000000 HC CCU INTERMEDIATE R&B

## 2023-09-25 PROCEDURE — 85610 PROTHROMBIN TIME: CPT

## 2023-09-25 PROCEDURE — 36415 COLL VENOUS BLD VENIPUNCTURE: CPT

## 2023-09-25 PROCEDURE — 6370000000 HC RX 637 (ALT 250 FOR IP): Performed by: STUDENT IN AN ORGANIZED HEALTH CARE EDUCATION/TRAINING PROGRAM

## 2023-09-25 PROCEDURE — 82962 GLUCOSE BLOOD TEST: CPT

## 2023-09-25 PROCEDURE — 93970 EXTREMITY STUDY: CPT

## 2023-09-25 PROCEDURE — 85025 COMPLETE CBC W/AUTO DIFF WBC: CPT

## 2023-09-25 PROCEDURE — C9113 INJ PANTOPRAZOLE SODIUM, VIA: HCPCS | Performed by: STUDENT IN AN ORGANIZED HEALTH CARE EDUCATION/TRAINING PROGRAM

## 2023-09-25 PROCEDURE — 80048 BASIC METABOLIC PNL TOTAL CA: CPT

## 2023-09-25 PROCEDURE — 2580000003 HC RX 258: Performed by: EMERGENCY MEDICINE

## 2023-09-25 RX ORDER — HEPARIN SODIUM 1000 [USP'U]/ML
40 INJECTION, SOLUTION INTRAVENOUS; SUBCUTANEOUS PRN
Status: DISCONTINUED | OUTPATIENT
Start: 2023-09-25 | End: 2023-09-25

## 2023-09-25 RX ORDER — ASPIRIN 81 MG/1
81 TABLET, CHEWABLE ORAL DAILY
Status: DISCONTINUED | OUTPATIENT
Start: 2023-09-25 | End: 2023-09-26 | Stop reason: HOSPADM

## 2023-09-25 RX ORDER — HEPARIN SODIUM 5000 [USP'U]/ML
5000 INJECTION, SOLUTION INTRAVENOUS; SUBCUTANEOUS EVERY 8 HOURS SCHEDULED
Status: DISCONTINUED | OUTPATIENT
Start: 2023-09-25 | End: 2023-09-25

## 2023-09-25 RX ORDER — HEPARIN SODIUM 10000 [USP'U]/100ML
5-30 INJECTION, SOLUTION INTRAVENOUS CONTINUOUS
Status: DISCONTINUED | OUTPATIENT
Start: 2023-09-25 | End: 2023-09-25

## 2023-09-25 RX ORDER — HEPARIN SODIUM 1000 [USP'U]/ML
80 INJECTION, SOLUTION INTRAVENOUS; SUBCUTANEOUS ONCE
Status: DISCONTINUED | OUTPATIENT
Start: 2023-09-25 | End: 2023-09-25

## 2023-09-25 RX ORDER — HEPARIN SODIUM 1000 [USP'U]/ML
80 INJECTION, SOLUTION INTRAVENOUS; SUBCUTANEOUS PRN
Status: DISCONTINUED | OUTPATIENT
Start: 2023-09-25 | End: 2023-09-25

## 2023-09-25 RX ADMIN — HYDRALAZINE HYDROCHLORIDE 25 MG: 25 TABLET, FILM COATED ORAL at 10:26

## 2023-09-25 RX ADMIN — CARVEDILOL 12.5 MG: 6.25 TABLET, FILM COATED ORAL at 10:25

## 2023-09-25 RX ADMIN — SODIUM CHLORIDE, PRESERVATIVE FREE 10 ML: 5 INJECTION INTRAVENOUS at 22:01

## 2023-09-25 RX ADMIN — APIXABAN 10 MG: 5 TABLET, FILM COATED ORAL at 16:31

## 2023-09-25 RX ADMIN — CLOPIDOGREL BISULFATE 75 MG: 75 TABLET ORAL at 10:25

## 2023-09-25 RX ADMIN — PANTOPRAZOLE SODIUM 40 MG: 40 INJECTION, POWDER, FOR SOLUTION INTRAVENOUS at 10:27

## 2023-09-25 RX ADMIN — HEPARIN SODIUM 17 UNITS/KG/HR: 10000 INJECTION, SOLUTION INTRAVENOUS at 07:41

## 2023-09-25 RX ADMIN — ATORVASTATIN CALCIUM 80 MG: 40 TABLET, FILM COATED ORAL at 21:48

## 2023-09-25 RX ADMIN — HYDRALAZINE HYDROCHLORIDE 25 MG: 25 TABLET, FILM COATED ORAL at 16:22

## 2023-09-25 RX ADMIN — ISOSORBIDE MONONITRATE 30 MG: 30 TABLET, EXTENDED RELEASE ORAL at 10:25

## 2023-09-25 RX ADMIN — HEPARIN SODIUM 20 UNITS/KG/HR: 10000 INJECTION, SOLUTION INTRAVENOUS at 05:18

## 2023-09-25 RX ADMIN — HYDRALAZINE HYDROCHLORIDE 25 MG: 25 TABLET, FILM COATED ORAL at 21:48

## 2023-09-25 RX ADMIN — CARVEDILOL 12.5 MG: 6.25 TABLET, FILM COATED ORAL at 21:49

## 2023-09-25 RX ADMIN — SODIUM CHLORIDE, PRESERVATIVE FREE 10 ML: 5 INJECTION INTRAVENOUS at 10:28

## 2023-09-25 ASSESSMENT — ENCOUNTER SYMPTOMS
COUGH: 0
EYE DISCHARGE: 0
NAUSEA: 0
SORE THROAT: 0
DIARRHEA: 0
BACK PAIN: 0
CONSTIPATION: 0
ABDOMINAL DISTENTION: 0
WHEEZING: 0
SHORTNESS OF BREATH: 0

## 2023-09-25 ASSESSMENT — PAIN SCALES - GENERAL: PAINLEVEL_OUTOF10: 0

## 2023-09-25 NOTE — CARE COORDINATION
09/25/23 1535   Service Assessment   Patient Orientation Alert and Oriented   Cognition Alert   History Provided By Patient   Primary Caregiver Self   Support Systems Spouse/Significant Other   PCP Verified by CM No  (PCP list provided in discharge instructions)   Prior Functional Level Independent in ADLs/IADLs   Current Functional Level Independent in ADLs/IADLs   Can patient return to prior living arrangement Yes   Ability to make needs known: Good   Family able to assist with home care needs: Yes   Would you like for me to discuss the discharge plan with any other family members/significant others, and if so, who? No   Financial Resources [employer] None     CM in to see Pt to initiate discharge planning. Pt from home with his wife. Pt denies any needs at this time.   CM following

## 2023-09-25 NOTE — DISCHARGE INSTRUCTIONS
Frank, MD Golden Weinberg, KAREN Lebron  Swetha Guaman [address], [facility]  [phone]  [employer]  (does not take insurance/ monthly membership fee for service)     [address] [facility], [facility]   [phone]    [address] / [address] [facility], [address]  [phone]  [address] Gael Vazquez, DO Erica Carpenter MD  [facility], [address], [place], [address],  [address], DO  KAREN Rincon APRN-KELLEN Malagon, JERED-CNP    [address] [facility], [address] [place] [address]  [phone]  COLE [employer]

## 2023-09-26 ENCOUNTER — TELEPHONE (OUTPATIENT)
Dept: CARDIOTHORACIC SURGERY | Age: 63
End: 2023-09-26

## 2023-09-26 VITALS
OXYGEN SATURATION: 97 % | HEART RATE: 61 BPM | HEIGHT: 67 IN | TEMPERATURE: 98.2 F | SYSTOLIC BLOOD PRESSURE: 149 MMHG | RESPIRATION RATE: 20 BRPM | WEIGHT: 197.1 LBS | BODY MASS INDEX: 30.93 KG/M2 | DIASTOLIC BLOOD PRESSURE: 74 MMHG

## 2023-09-26 LAB
GLUCOSE BLD-MCNC: 135 MG/DL (ref 70–99)
GLUCOSE BLD-MCNC: 172 MG/DL (ref 70–99)

## 2023-09-26 PROCEDURE — 6370000000 HC RX 637 (ALT 250 FOR IP): Performed by: STUDENT IN AN ORGANIZED HEALTH CARE EDUCATION/TRAINING PROGRAM

## 2023-09-26 PROCEDURE — 6370000000 HC RX 637 (ALT 250 FOR IP): Performed by: THORACIC SURGERY (CARDIOTHORACIC VASCULAR SURGERY)

## 2023-09-26 PROCEDURE — C9113 INJ PANTOPRAZOLE SODIUM, VIA: HCPCS | Performed by: STUDENT IN AN ORGANIZED HEALTH CARE EDUCATION/TRAINING PROGRAM

## 2023-09-26 PROCEDURE — 97116 GAIT TRAINING THERAPY: CPT

## 2023-09-26 PROCEDURE — 2580000003 HC RX 258: Performed by: STUDENT IN AN ORGANIZED HEALTH CARE EDUCATION/TRAINING PROGRAM

## 2023-09-26 PROCEDURE — 94761 N-INVAS EAR/PLS OXIMETRY MLT: CPT

## 2023-09-26 PROCEDURE — A4216 STERILE WATER/SALINE, 10 ML: HCPCS

## 2023-09-26 PROCEDURE — 97165 OT EVAL LOW COMPLEX 30 MIN: CPT

## 2023-09-26 PROCEDURE — 82962 GLUCOSE BLOOD TEST: CPT

## 2023-09-26 PROCEDURE — 97535 SELF CARE MNGMENT TRAINING: CPT

## 2023-09-26 PROCEDURE — 2580000003 HC RX 258

## 2023-09-26 PROCEDURE — 97162 PT EVAL MOD COMPLEX 30 MIN: CPT

## 2023-09-26 PROCEDURE — 6360000002 HC RX W HCPCS: Performed by: STUDENT IN AN ORGANIZED HEALTH CARE EDUCATION/TRAINING PROGRAM

## 2023-09-26 RX ORDER — ASPIRIN 81 MG/1
81 TABLET, CHEWABLE ORAL DAILY
Qty: 180 TABLET | Refills: 0 | Status: SHIPPED | OUTPATIENT
Start: 2023-09-26 | End: 2024-03-24

## 2023-09-26 RX ORDER — ATORVASTATIN CALCIUM 80 MG/1
80 TABLET, FILM COATED ORAL DAILY
Qty: 90 TABLET | Refills: 1 | Status: SHIPPED | OUTPATIENT
Start: 2023-09-26 | End: 2024-03-24

## 2023-09-26 RX ORDER — SODIUM CHLORIDE 9 MG/ML
INJECTION INTRAVENOUS
Status: COMPLETED
Start: 2023-09-26 | End: 2023-09-26

## 2023-09-26 RX ADMIN — SODIUM CHLORIDE, PRESERVATIVE FREE 10 ML: 5 INJECTION INTRAVENOUS at 09:48

## 2023-09-26 RX ADMIN — ISOSORBIDE MONONITRATE 30 MG: 30 TABLET, EXTENDED RELEASE ORAL at 09:48

## 2023-09-26 RX ADMIN — HYDRALAZINE HYDROCHLORIDE 25 MG: 25 TABLET, FILM COATED ORAL at 09:48

## 2023-09-26 RX ADMIN — SODIUM CHLORIDE 10 ML: 9 INJECTION INTRAMUSCULAR; INTRAVENOUS; SUBCUTANEOUS at 09:49

## 2023-09-26 RX ADMIN — CARVEDILOL 12.5 MG: 6.25 TABLET, FILM COATED ORAL at 09:48

## 2023-09-26 RX ADMIN — ASPIRIN 81 MG 81 MG: 81 TABLET ORAL at 09:48

## 2023-09-26 RX ADMIN — APIXABAN 5 MG: 5 TABLET, FILM COATED ORAL at 09:48

## 2023-09-26 RX ADMIN — PANTOPRAZOLE SODIUM 40 MG: 40 INJECTION, POWDER, FOR SOLUTION INTRAVENOUS at 09:48

## 2023-09-26 ASSESSMENT — PAIN SCALES - GENERAL: PAINLEVEL_OUTOF10: 0

## 2023-09-26 NOTE — DISCHARGE INSTR - DIET

## 2023-09-26 NOTE — PLAN OF CARE
Problem: Discharge Planning  Goal: Discharge to home or other facility with appropriate resources  Outcome: Adequate for Discharge     Problem: Pain  Goal: Verbalizes/displays adequate comfort level or baseline comfort level  Outcome: Adequate for Discharge     Problem: Safety - Adult  Goal: Free from fall injury  Outcome: Adequate for Discharge     Problem: Skin/Tissue Integrity  Goal: Absence of new skin breakdown  Description: 1. Monitor for areas of redness and/or skin breakdown  2. Assess vascular access sites hourly  3. Every 4-6 hours minimum:  Change oxygen saturation probe site  4. Every 4-6 hours:  If on nasal continuous positive airway pressure, respiratory therapy assess nares and determine need for appliance change or resting period.   Outcome: Adequate for Discharge     Problem: ABCDS Injury Assessment  Goal: Absence of physical injury  Outcome: Adequate for Discharge

## 2023-09-26 NOTE — DISCHARGE SUMMARY
recommendations, medications, and plan. Consults this admission:  IP CONSULT TO [address] [place] [address] TO INTERNAL MEDICINE    Discharge Diagnosis:   Tibial artery occlusion [facility])        Discharge Instruction:   Follow up appointments: PCP, CT surgery  Primary care physician: No primary care provider on file.  within 2 weeks  Diet: cardiac diet   Activity: activity as tolerated  Disposition: Discharged to:   [x]Home, [][facility], []SNF, []Acute Rehab, []Hospice   Condition on discharge: Stable  Labs and Tests to be Followed up as an outpatient by PCP or Specialist:     Discharge Medications:        Medication List        START taking these medications      apixaban 5 MG Tabs tablet  Commonly known as: ELIQUIS  Take 1 tablet by mouth 2 times daily            CHANGE how you take these medications      furosemide 40 MG tablet  Commonly known as: LASIX  Take 1 tablet by mouth every morning  What changed: additional instructions            CONTINUE taking these medications      albuterol sulfate  (90 Base) MCG/ACT inhaler  Commonly known as: Ventolin HFA  Inhale 2 puffs into the lungs every 4 hours as needed for Wheezing     albuterol-ipratropium  MCG/ACT Aers inhaler  Commonly known as: COMBIVENT RESPIMAT     aspirin 81 MG chewable tablet  Take 1 tablet by mouth daily     atorvastatin 80 MG tablet  Commonly known as: Lipitor  Take 1 tablet by mouth daily     Blood Glucose Monitor System w/Device Kit  Use twice per day     carvedilol 12.5 MG tablet  Commonly known as: COREG  Take 1 tablet by mouth 2 times daily     empagliflozin 10 MG tablet  Commonly known as: JARDIANCE  Take 1 tablet by mouth daily     ezetimibe 10 MG tablet  Commonly known as: ZETIA  Take 1 tablet by mouth daily     glimepiride 2 MG tablet  Commonly known as: AMARYL     hydrALAZINE 25 MG tablet  Commonly known as: APRESOLINE  Take 1 tablet by mouth 3 times daily     isosorbide mononitrate 30 MG extended release tablet  Commonly

## 2023-09-27 ENCOUNTER — TELEPHONE (OUTPATIENT)
Dept: CARDIOTHORACIC SURGERY | Age: 63
End: 2023-09-27

## 2023-09-27 NOTE — TELEPHONE ENCOUNTER
Patient called in and stated that he could not afford his next month of eliquis. Med assist was offered to patient to try before changing medication.  Patient willing to contact med assist.

## 2023-09-28 ENCOUNTER — TELEPHONE (OUTPATIENT)
Dept: CARDIOLOGY CLINIC | Age: 63
End: 2023-09-28

## 2023-09-28 NOTE — TELEPHONE ENCOUNTER
Called patient to discuss future appointment that is scheduled. Left message for patient with the following information:  You may have received a notification through the mail from [facility] that Caridad Haywood CNP will be out of network starting 10/1/2023. Explained per the letter from [facility] they have the option to do a continuing of care request to receive care for the remainder of the year. We can assist with this request and send the clinical documentation needed!      Also left that they can go to [employer] can help them with their decisions for future Medicare Beneficiaries on Thursday October 5th from 10 am to 12 pm. The address is [address] [place] [address], [address] [place] [id number]

## 2023-10-02 ENCOUNTER — OFFICE VISIT (OUTPATIENT)
Dept: CARDIOLOGY CLINIC | Age: 63
End: 2023-10-02
Payer: MEDICARE

## 2023-10-02 VITALS
BODY MASS INDEX: 30.76 KG/M2 | WEIGHT: 196 LBS | HEIGHT: 67 IN | DIASTOLIC BLOOD PRESSURE: 86 MMHG | SYSTOLIC BLOOD PRESSURE: 156 MMHG | HEART RATE: 68 BPM

## 2023-10-02 DIAGNOSIS — I73.9 PVD (PERIPHERAL VASCULAR DISEASE) (HCC): Primary | ICD-10-CM

## 2023-10-02 PROCEDURE — 99214 OFFICE O/P EST MOD 30 MIN: CPT | Performed by: INTERNAL MEDICINE

## 2023-10-02 PROCEDURE — 3079F DIAST BP 80-89 MM HG: CPT | Performed by: INTERNAL MEDICINE

## 2023-10-02 PROCEDURE — 3077F SYST BP >= 140 MM HG: CPT | Performed by: INTERNAL MEDICINE

## 2023-10-02 NOTE — PROGRESS NOTES
CARDIOLOGY NOTE      10/2/2023    RE: Jack Oliveros  (1960)                               TO:  Dr. Agrawal primary care provider on file. Jewell Bullard is a 58 y.o. male who was seen today for management of coronary artery disease                        Fu on pvd    had     CROSSOVERRight  FEMORAL TO Left FEMORAL BYPASS USING A 8MM GORE PROPATEN VASCULAR GRAFT  HPI:                     Pt has h/o coronary artery disease, hypertension, hyperlipidemia, diabetes, obesity, HFrEF, seen today for follow-up.  Jack Oliveros has the following history recorded in care path:  Patient Active Problem List    Diagnosis Date Noted    Former tobacco use 11/10/2022    Microalbuminuria 09/09/2022    Hx of colonic polyps 09/08/2022    Stage 3a chronic kidney disease ([address]) 09/08/2022    Hemiparesis affecting right side as late effect of cerebrovascular accident (CVA) ([address]) 07/07/2016    Essential hypertension 05/24/2016    Cerebrovascular accident (CVA) due to thrombosis of left middle cerebral artery ([address]) 05/14/2016    Obesity (BMI 30-39.9) 06/26/2015    History of acute inferior wall myocardial infarction     HFrEF (heart failure with reduced ejection fraction) ([address]) 03/17/2023    Shortness of breath 03/01/2023    SOB (shortness of breath) 03/01/2023    Ventral hernia without obstruction or gangrene 11/10/2022    History of Guillain-Seadrift syndrome 03/08/2019    Adrenal adenoma 05/23/2014    ASCVD (arteriosclerotic cardiovascular disease)     Frequent PVCs     Unstable angina ([address]) 03/15/2018    Gastroesophageal reflux disease 04/19/2017    SOBOE (shortness of breath on exertion) 10/25/2016    Hemiparesis of right dominant side as late effect of cerebral infarction ([address]) 05/14/2016    Gait disturbance 05/14/2016    Hyperlipidemia LDL goal <70 12/19/2014    Fatty liver 05/23/2014    Colon, diverticulosis 05/23/2014    Renal cyst 05/23/2014    B12 deficiency 01/10/2014    Chest pain 07/30/2013

## 2023-10-02 NOTE — PATIENT INSTRUCTIONS
We are committed to providing you the best care possible. If you receive a survey after visiting one of our offices, please take time to share your experience concerning your physician office visit. These surveys are confidential and no health information about you is shared. We are eager to improve for you and we are counting on your feedback to help make that happen. Please be informed that if you contact our office outside of normal business hours the physician on call cannot help with any scheduling or rescheduling issues, procedure instruction questions or any type of medication issue. We advise you for any urgent/emergency that you go to the nearest emergency room! PLEASE CALL OUR OFFICE DURING NORMAL BUSINESS HOURS    Monday - Friday   8 am to 5 pm    [place]: [address] [place]: [phone]    [place]:  [phone]  Thank you for allowing us to care for you today! We want to ensure we can follow your treatment plan and we strive to give you the best outcomes and experience possible. If you ever have a life threatening emergency and call 911 - for an ambulance (EMS)   Our providers can only care for you at:   [facility] or [facility]. Even if you have someone take you or you drive yourself we can only care for you in a [facility] facility. Our providers are not setup at the other healthcare locations! **It is YOUR responsibilty to bring medication bottles and/or updated medication list to [address] [facility].  This will allow us to better serve you and all your healthcare needs**

## 2023-10-05 NOTE — PROGRESS NOTES
10/6/2023                 Re: Emily Gregory  Dear Dr. Charlene Rg,  Dear Dr. Cary Vega,  Dear Dr. Lizandro Espinal,        I had the pleasure of seeing your patient, Rand Granger (58 y.o. male) in follow up after his crossover right to left fem-fem bypass using an 8mm [place] propaten vascular graft on 9/13/2023 for his chronically occluded left common iliac artery. His pain is much better. He continues to have cramps at times when he is walking at a fast pace. But his leg definitely feels better and the he can walk further than before his surgery.     Current Outpatient Medications:     aspirin 81 MG chewable tablet, Take 1 tablet by mouth daily, Disp: 180 tablet, Rfl: 0    apixaban (ELIQUIS) 5 MG TABS tablet, Take 1 tablet by mouth 2 times daily, Disp: 60 tablet, Rfl: 0    atorvastatin (LIPITOR) 80 MG tablet, Take 1 tablet by mouth daily, Disp: 90 tablet, Rfl: 1    furosemide (LASIX) 40 MG tablet, Take 1 tablet by mouth every morning (Patient taking differently: Take 1 tablet by mouth every morning Patient takes 40 mg in the morning and then 10 mg in the evening.), Disp: 10 tablet, Rfl: 0    hydrALAZINE (APRESOLINE) 25 MG tablet, Take 1 tablet by mouth 3 times daily, Disp: 90 tablet, Rfl: 3    lisinopril (PRINIVIL;ZESTRIL) 10 MG tablet, Take 1 tablet by mouth daily, Disp: , Rfl:     pantoprazole (PROTONIX) 40 MG injection, Infuse 40 mg intravenously daily, Disp: , Rfl:     glimepiride (AMARYL) 2 MG tablet, Take 1 tablet by mouth every morning (before breakfast), Disp: , Rfl:     albuterol-ipratropium (COMBIVENT RESPIMAT)  MCG/ACT AERS inhaler, Inhale 1 puff into the lungs every 6 hours as needed for Wheezing, Disp: , Rfl:     empagliflozin (JARDIANCE) 10 MG tablet, Take 1 tablet by mouth daily, Disp: 90 tablet, Rfl: 3    isosorbide mononitrate (IMDUR) 30 MG extended release tablet, Take 1 tablet by mouth daily, Disp: 30 tablet, Rfl: 3    carvedilol (COREG) 12.5 MG tablet, Take 1 tablet by mouth 2 times daily, Disp: 180

## 2023-10-06 ENCOUNTER — OFFICE VISIT (OUTPATIENT)
Dept: CARDIOTHORACIC SURGERY | Age: 63
End: 2023-10-06

## 2023-10-06 VITALS
OXYGEN SATURATION: 100 % | HEART RATE: 75 BPM | WEIGHT: 194 LBS | SYSTOLIC BLOOD PRESSURE: 138 MMHG | BODY MASS INDEX: 30.45 KG/M2 | HEIGHT: 67 IN | DIASTOLIC BLOOD PRESSURE: 70 MMHG

## 2023-10-06 DIAGNOSIS — I73.9 PVD (PERIPHERAL VASCULAR DISEASE) (HCC): Primary | ICD-10-CM

## 2023-10-06 PROCEDURE — 99024 POSTOP FOLLOW-UP VISIT: CPT | Performed by: THORACIC SURGERY (CARDIOTHORACIC VASCULAR SURGERY)

## 2023-10-10 ENCOUNTER — OFFICE VISIT (OUTPATIENT)
Dept: CARDIOLOGY CLINIC | Age: 63
End: 2023-10-10

## 2023-10-10 VITALS
OXYGEN SATURATION: 98 % | DIASTOLIC BLOOD PRESSURE: 86 MMHG | HEART RATE: 64 BPM | HEIGHT: 67 IN | BODY MASS INDEX: 30.61 KG/M2 | SYSTOLIC BLOOD PRESSURE: 160 MMHG | WEIGHT: 195 LBS

## 2023-10-10 DIAGNOSIS — Z95.810 ICD (IMPLANTABLE CARDIOVERTER-DEFIBRILLATOR) IN PLACE: Primary | ICD-10-CM

## 2023-10-10 PROCEDURE — 99024 POSTOP FOLLOW-UP VISIT: CPT | Performed by: NURSE PRACTITIONER

## 2023-10-10 NOTE — PATIENT INSTRUCTIONS
Please be informed that if you contact our office outside of normal business hours the physician on call cannot help with any scheduling or rescheduling issues, procedure instruction questions or any type of medication issue. We advise you for any urgent/emergency that you go to the nearest emergency room! PLEASE CALL OUR OFFICE DURING NORMAL BUSINESS HOURS    Monday - Friday   8 am to 5 pm    [place]: [address] [place]: [phone]    [place]:  [phone]  **It is YOUR responsibilty to bring medication bottles and/or updated medication list to [address] [place] [address]. This will allow us to better serve you and all your healthcare needs**  Thank you for allowing us to care for you today! We want to ensure we can follow your treatment plan and we strive to give you the best outcomes and experience possible. If you ever have a life threatening emergency and call 911 - for an ambulance (EMS)   Our providers can only care for you at:   [facility] or [facility]. Even if you have someone take you or you drive yourself we can only care for you in a [address] [place] [address] facility. Our providers are not setup at the other healthcare locations!

## 2023-10-10 NOTE — PROGRESS NOTES
Patient is here today for 1 month follow-up status post implantation of dual-chamber ICD  He reports feeling well  He denies chest pain, palpitations, shortness of breath, lightheadedness, dizziness, edema or syncope  Blood pressure is elevated today. Patient states he has not taken his morning medications  We will continue Apresoline 25 mg 3 times daily, lisinopril 10 mg daily, Imdur 30 mg daily and Coreg 12.5 mg twice daily  Device interrogated  Device Assessment:     The device is Medtronic ICD - Dual Chamber chamber      MRI Compatible : yes    Device interrogation was performed. Mode :  AAI --- DDD     Sensing is normal. Impedence is normal.  Threshold is normal.     There has not been interval changes. Estimated battery life is 12.2 years    The underlying rhythm is AP,VS.      21 % atrial paced; 0.0 % ventricular paced. Atrial Arrhythmia : No    Non sustained VT episodes : No    Sustained VT episodes : No    The patient is pacemaker dependent.       Patient activity reported by the device to be 3.5 hr/ day     HF management parameter still collecting data    Patient to follow-up with cardiology as scheduled

## 2023-10-13 ENCOUNTER — TELEPHONE (OUTPATIENT)
Dept: CARDIOLOGY CLINIC | Age: 63
End: 2023-10-13

## 2023-10-13 ENCOUNTER — OFFICE VISIT (OUTPATIENT)
Dept: CARDIOTHORACIC SURGERY | Age: 63
End: 2023-10-13

## 2023-10-13 VITALS
SYSTOLIC BLOOD PRESSURE: 120 MMHG | OXYGEN SATURATION: 100 % | BODY MASS INDEX: 30.57 KG/M2 | HEART RATE: 65 BPM | HEIGHT: 67 IN | DIASTOLIC BLOOD PRESSURE: 68 MMHG | WEIGHT: 194.8 LBS

## 2023-10-13 DIAGNOSIS — I73.9 PVD (PERIPHERAL VASCULAR DISEASE) (HCC): Primary | ICD-10-CM

## 2023-10-13 PROBLEM — F33.1 MAJOR DEPRESSIVE DISORDER, RECURRENT, MODERATE (HCC): Status: ACTIVE | Noted: 2023-10-13

## 2023-10-13 PROBLEM — F33.0 MAJOR DEPRESSIVE DISORDER, RECURRENT, MILD (HCC): Status: ACTIVE | Noted: 2023-10-13

## 2023-10-13 PROBLEM — F33.9 MAJOR DEPRESSIVE DISORDER, RECURRENT, UNSPECIFIED (HCC): Status: ACTIVE | Noted: 2023-10-13

## 2023-10-13 PROBLEM — E11.9 TYPE 2 DIABETES MELLITUS (HCC): Status: ACTIVE | Noted: 2023-10-13

## 2023-10-13 PROCEDURE — 99024 POSTOP FOLLOW-UP VISIT: CPT | Performed by: THORACIC SURGERY (CARDIOTHORACIC VASCULAR SURGERY)

## 2023-10-16 ENCOUNTER — HOSPITAL ENCOUNTER (EMERGENCY)
Age: 63
Discharge: HOME | End: 2023-10-16
Payer: MEDICARE

## 2023-10-16 VITALS
WEIGHT: 194 LBS | BODY MASS INDEX: 30.45 KG/M2 | HEART RATE: 99 BPM | HEIGHT: 67 IN | DIASTOLIC BLOOD PRESSURE: 73 MMHG | OXYGEN SATURATION: 98 % | SYSTOLIC BLOOD PRESSURE: 125 MMHG | TEMPERATURE: 98.4 F | RESPIRATION RATE: 18 BRPM

## 2023-10-16 DIAGNOSIS — K05.219 PERIODONTAL ABSCESS: ICD-10-CM

## 2023-10-16 DIAGNOSIS — K08.89 PAIN, DENTAL: Primary | ICD-10-CM

## 2023-10-16 PROCEDURE — 99283 EMERGENCY DEPT VISIT LOW MDM: CPT

## 2023-10-16 PROCEDURE — 6370000000 HC RX 637 (ALT 250 FOR IP): Performed by: PHYSICIAN ASSISTANT

## 2023-10-16 RX ORDER — CLINDAMYCIN HYDROCHLORIDE 150 MG/1
300 CAPSULE ORAL ONCE
Status: COMPLETED | OUTPATIENT
Start: 2023-10-16 | End: 2023-10-16

## 2023-10-16 RX ORDER — HYDROCODONE BITARTRATE AND ACETAMINOPHEN 5; 325 MG/1; MG/1
1 TABLET ORAL EVERY 6 HOURS PRN
Qty: 12 TABLET | Refills: 0 | Status: SHIPPED | OUTPATIENT
Start: 2023-10-16 | End: 2023-10-21

## 2023-10-16 RX ORDER — CLINDAMYCIN HYDROCHLORIDE 300 MG/1
300 CAPSULE ORAL 3 TIMES DAILY
Qty: 21 CAPSULE | Refills: 0 | Status: SHIPPED | OUTPATIENT
Start: 2023-10-16 | End: 2023-10-23

## 2023-10-16 RX ORDER — CHLORHEXIDINE GLUCONATE ORAL RINSE 1.2 MG/ML
15 SOLUTION DENTAL 2 TIMES DAILY
Qty: 420 ML | Refills: 0 | Status: SHIPPED | OUTPATIENT
Start: 2023-10-16 | End: 2023-10-30

## 2023-10-16 RX ADMIN — CLINDAMYCIN HYDROCHLORIDE 300 MG: 150 CAPSULE ORAL at 15:58

## 2023-10-16 ASSESSMENT — PATIENT HEALTH QUESTIONNAIRE - PHQ9
1. LITTLE INTEREST OR PLEASURE IN DOING THINGS: 0
2. FEELING DOWN, DEPRESSED OR HOPELESS: 0
SUM OF ALL RESPONSES TO PHQ QUESTIONS 1-9: 0
SUM OF ALL RESPONSES TO PHQ9 QUESTIONS 1 & 2: 0
SUM OF ALL RESPONSES TO PHQ QUESTIONS 1-9: 0

## 2023-10-16 ASSESSMENT — LIFESTYLE VARIABLES
HOW MANY STANDARD DRINKS CONTAINING ALCOHOL DO YOU HAVE ON A TYPICAL DAY: PATIENT DOES NOT DRINK
HOW OFTEN DO YOU HAVE A DRINK CONTAINING ALCOHOL: NEVER

## 2023-10-16 NOTE — ED TRIAGE NOTES
Pharmacy: [facility]    Pt stated started having facial swelling last night and is worse this morning.

## 2023-10-16 NOTE — CARE COORDINATION
Pt noted for possible readmission. Pt was recently admitted 9/24-9/26/23 tibial artery occlusion. Pt is from home with his spouse, has insurance, PCP and is independent in ADL's. Pt returns for facial swelling. Pt was evaluated and d/c home. No CM needs on chart review at this time.

## 2023-10-16 NOTE — ED NOTES
Patient discharging home, AVS reviewed with no questions at this time. Patient instrcuted to follow up per discharge instructions and to return for worsening symptoms. Respirations equal and unlabored, skin PWD.        Ana Terry RN  10/16/23 1185

## 2023-10-20 ENCOUNTER — CARE COORDINATION (OUTPATIENT)
Dept: CARE COORDINATION | Age: 63
End: 2023-10-20

## 2023-11-13 NOTE — PROGRESS NOTES
Nephrology Progress Note  3/5/2023 11:02 AM        Subjective:   Admit Date: 3/1/2023  PCP: Myles Brown MD    Interval History: Patient seen in early morning, this is a late entry    Diet: Reported eating some    ROS: Finally he is feeling better  He is no longer restless  No overt shortness of breath or PND or orthopnea  Acceptable blood pressure has no fever    Data:     Current meds:    albuterol sulfate HFA  2 puff Inhalation 4x daily    tiotropium  2 puff Inhalation Daily    heparin (porcine)  5,000 Units SubCUTAneous 3 times per day    empagliflozin  10 mg Oral Daily    guaiFENesin  600 mg Oral BID    [Held by provider] metOLazone  2.5 mg Oral Daily    aspirin  81 mg Oral Daily    atorvastatin  80 mg Oral Daily    carvedilol  12.5 mg Oral BID    ezetimibe  10 mg Oral Daily    sertraline  100 mg Oral Daily    sodium chloride flush  5-40 mL IntraVENous 2 times per day    insulin lispro  0-4 Units SubCUTAneous TID WC    insulin lispro  0-4 Units SubCUTAneous Nightly    clopidogrel  75 mg Oral Daily    tamsulosin  0.4 mg Oral Daily    [Held by provider] furosemide  60 mg IntraVENous TID    [Held by provider] lisinopril  40 mg Oral Daily    [Held by provider] isosorbide mononitrate  30 mg Oral Daily      sodium chloride      dextrose           I/O last 3 completed shifts:   In: 240 [P.O.:240]  Out: 1370 [ADGRB:3841]    CBC:   Recent Labs     03/03/23 0221 03/05/23  0941   WBC 6.8 4.6   HGB 14.5 14.6    156          Recent Labs     03/03/23  0221 03/04/23  0327 03/05/23  0941    137 135   K 3.8 3.8 3.8   CL 98* 98* 95*   CO2 22 24 25   BUN 28* 33* 33*   CREATININE 2.3* 2.1* 2.2*   GLUCOSE 205* 173* 214*       Lab Results   Component Value Date    CALCIUM 8.9 03/05/2023    PHOS 3.8 03/04/2023       Objective:     Vitals: BP (!) 146/94   Pulse 83   Temp 98.6 °F (37 °C) (Oral)   Resp 16   Ht 5' 7\" (1.702 m)   Wt 202 lb 13.2 oz (92 kg)   SpO2 94%   BMI 31.77 kg/m² ,    General appearance: Alert, Spoke with Zoya at [facility], date correction needs to be made then initialed and dated by provider, Dr. Bee Watkins, and faxed back to [phone] ATT: Zoya. She states if they can be faxed before 11/17 pt will not have any para appt cancellations. Will follow-up with Dr. Watkins.    Salome Marie, RN Care Coordinator  [facility]  Hepatology Clinic/Specialty Program     awake and oriented  HEENT: Mild conjunctival pallor no scleral icterus  Neck: Seems supple  Lungs: No gross crackles  Heart: Seems regular rate and rhythm  Abdomen: Soft, nontender  Extremities: No gross edema      Problem List :         Impression :     Acute kidney injury with underlying CKD stage G3 A/B- stable so far  Acute decompensated heart failure-on exam and clinically better  He does have diabetes and left adrenal adenoma  D-dimer was not elevated for suspicion of PE is extremely low    Recommendation/Plan  :     I feel comfortable sending him home now-from kidney standpoint at least-he should resume his diuretics along with Jardiance at home-Daily weight, low-salt-call me with weight gain of 2 pounds-CMP, magnesium and phosphorus in 1 week-follow-up with me in 2 weeks      Chio Hodgson MD MD

## 2023-11-29 ENCOUNTER — OFFICE VISIT (OUTPATIENT)
Dept: FAMILY MEDICINE CLINIC | Age: 63
End: 2023-11-29
Payer: MEDICARE

## 2023-11-29 VITALS
HEIGHT: 67 IN | WEIGHT: 202.4 LBS | DIASTOLIC BLOOD PRESSURE: 98 MMHG | BODY MASS INDEX: 31.77 KG/M2 | OXYGEN SATURATION: 100 % | SYSTOLIC BLOOD PRESSURE: 186 MMHG | HEART RATE: 81 BPM

## 2023-11-29 DIAGNOSIS — J41.0 SIMPLE CHRONIC BRONCHITIS (HCC): ICD-10-CM

## 2023-11-29 DIAGNOSIS — F33.9 RECURRENT MAJOR DEPRESSIVE DISORDER, REMISSION STATUS UNSPECIFIED (HCC): ICD-10-CM

## 2023-11-29 DIAGNOSIS — I10 ESSENTIAL HYPERTENSION: ICD-10-CM

## 2023-11-29 DIAGNOSIS — E11.22 TYPE 2 DIABETES MELLITUS WITH CHRONIC KIDNEY DISEASE, WITHOUT LONG-TERM CURRENT USE OF INSULIN, UNSPECIFIED CKD STAGE (HCC): ICD-10-CM

## 2023-11-29 DIAGNOSIS — I10 ESSENTIAL HYPERTENSION: Primary | ICD-10-CM

## 2023-11-29 DIAGNOSIS — J44.1 CHRONIC OBSTRUCTIVE PULMONARY DISEASE WITH ACUTE EXACERBATION (HCC): ICD-10-CM

## 2023-11-29 DIAGNOSIS — I50.20 HFREF (HEART FAILURE WITH REDUCED EJECTION FRACTION) (HCC): ICD-10-CM

## 2023-11-29 DIAGNOSIS — E78.2 MIXED HYPERLIPIDEMIA: ICD-10-CM

## 2023-11-29 LAB
ALBUMIN SERPL-MCNC: 4.3 G/DL (ref 3.4–5)
ALBUMIN/GLOB SERPL: 1.7 {RATIO} (ref 1.1–2.2)
ALP SERPL-CCNC: 97 U/L (ref 40–129)
ALT SERPL-CCNC: 14 U/L (ref 10–40)
ANION GAP SERPL CALCULATED.3IONS-SCNC: 9 MMOL/L (ref 3–16)
AST SERPL-CCNC: 19 U/L (ref 15–37)
BASOPHILS # BLD: 0.1 K/UL (ref 0–0.2)
BASOPHILS NFR BLD: 1 %
BILIRUB SERPL-MCNC: <0.2 MG/DL (ref 0–1)
BUN SERPL-MCNC: 28 MG/DL (ref 7–20)
CALCIUM SERPL-MCNC: 9.4 MG/DL (ref 8.3–10.6)
CHLORIDE SERPL-SCNC: 106 MMOL/L (ref 99–110)
CO2 SERPL-SCNC: 27 MMOL/L (ref 21–32)
CREAT SERPL-MCNC: 1.7 MG/DL (ref 0.8–1.3)
CREAT UR-MCNC: 87.7 MG/DL (ref 39–259)
DEPRECATED RDW RBC AUTO: 15.9 % (ref 12.4–15.4)
EOSINOPHIL # BLD: 0.3 K/UL (ref 0–0.6)
EOSINOPHIL NFR BLD: 5.2 %
GFR SERPLBLD CREATININE-BSD FMLA CKD-EPI: 44 ML/MIN/{1.73_M2}
GLUCOSE SERPL-MCNC: 202 MG/DL (ref 70–99)
HCT VFR BLD AUTO: 43 % (ref 40.5–52.5)
HGB BLD-MCNC: 14.1 G/DL (ref 13.5–17.5)
LYMPHOCYTES # BLD: 1.5 K/UL (ref 1–5.1)
LYMPHOCYTES NFR BLD: 24.6 %
MCH RBC QN AUTO: 32 PG (ref 26–34)
MCHC RBC AUTO-ENTMCNC: 32.8 G/DL (ref 31–36)
MCV RBC AUTO: 97.5 FL (ref 80–100)
MICROALBUMIN UR DL<=1MG/L-MCNC: 34.8 MG/DL
MICROALBUMIN/CREAT UR: 396.8 MG/G (ref 0–30)
MONOCYTES # BLD: 0.5 K/UL (ref 0–1.3)
MONOCYTES NFR BLD: 8.9 %
NEUTROPHILS # BLD: 3.7 K/UL (ref 1.7–7.7)
NEUTROPHILS NFR BLD: 60.3 %
PLATELET # BLD AUTO: 142 K/UL (ref 135–450)
PMV BLD AUTO: 10.6 FL (ref 5–10.5)
POTASSIUM SERPL-SCNC: 5.2 MMOL/L (ref 3.5–5.1)
PROT SERPL-MCNC: 6.9 G/DL (ref 6.4–8.2)
RBC # BLD AUTO: 4.41 M/UL (ref 4.2–5.9)
SODIUM SERPL-SCNC: 142 MMOL/L (ref 136–145)
WBC # BLD AUTO: 6.1 K/UL (ref 4–11)

## 2023-11-29 PROCEDURE — 3080F DIAST BP >= 90 MM HG: CPT | Performed by: STUDENT IN AN ORGANIZED HEALTH CARE EDUCATION/TRAINING PROGRAM

## 2023-11-29 PROCEDURE — 3077F SYST BP >= 140 MM HG: CPT | Performed by: STUDENT IN AN ORGANIZED HEALTH CARE EDUCATION/TRAINING PROGRAM

## 2023-11-29 PROCEDURE — 99204 OFFICE O/P NEW MOD 45 MIN: CPT | Performed by: STUDENT IN AN ORGANIZED HEALTH CARE EDUCATION/TRAINING PROGRAM

## 2023-11-29 PROCEDURE — 3044F HG A1C LEVEL LT 7.0%: CPT | Performed by: STUDENT IN AN ORGANIZED HEALTH CARE EDUCATION/TRAINING PROGRAM

## 2023-11-29 RX ORDER — SERTRALINE HYDROCHLORIDE 100 MG/1
100 TABLET, FILM COATED ORAL DAILY
Qty: 90 TABLET | Refills: 1 | Status: CANCELLED | OUTPATIENT
Start: 2023-11-29 | End: 2024-05-27

## 2023-11-29 RX ORDER — ASPIRIN 81 MG/1
81 TABLET, CHEWABLE ORAL DAILY
Qty: 180 TABLET | Refills: 0 | Status: CANCELLED | OUTPATIENT
Start: 2023-11-29 | End: 2024-05-27

## 2023-11-29 RX ORDER — CARVEDILOL 12.5 MG/1
12.5 TABLET ORAL 2 TIMES DAILY
Qty: 180 TABLET | Refills: 3 | Status: CANCELLED | OUTPATIENT
Start: 2023-11-29

## 2023-11-29 RX ORDER — TIOTROPIUM BROMIDE 18 UG/1
18 CAPSULE ORAL; RESPIRATORY (INHALATION) DAILY
Qty: 30 CAPSULE | Refills: 0 | Status: CANCELLED | OUTPATIENT
Start: 2023-11-29 | End: 2023-12-29

## 2023-11-29 RX ORDER — EZETIMIBE 10 MG/1
10 TABLET ORAL DAILY
Qty: 90 TABLET | Refills: 1 | Status: CANCELLED | OUTPATIENT
Start: 2023-11-29

## 2023-11-29 RX ORDER — LISINOPRIL 40 MG/1
40 TABLET ORAL DAILY
Qty: 30 TABLET | Refills: 0 | Status: SHIPPED | OUTPATIENT
Start: 2023-11-29

## 2023-11-29 RX ORDER — ATORVASTATIN CALCIUM 80 MG/1
80 TABLET, FILM COATED ORAL DAILY
Qty: 90 TABLET | Refills: 1 | Status: CANCELLED | OUTPATIENT
Start: 2023-11-29 | End: 2024-05-27

## 2023-11-29 RX ORDER — ISOSORBIDE MONONITRATE 30 MG/1
30 TABLET, EXTENDED RELEASE ORAL DAILY
Qty: 30 TABLET | Refills: 3 | Status: CANCELLED | OUTPATIENT
Start: 2023-11-29

## 2023-11-29 RX ORDER — HYDRALAZINE HYDROCHLORIDE 25 MG/1
25 TABLET, FILM COATED ORAL 3 TIMES DAILY
Qty: 90 TABLET | Refills: 3 | Status: CANCELLED | OUTPATIENT
Start: 2023-11-29

## 2023-11-29 RX ORDER — ALBUTEROL SULFATE 90 UG/1
2 AEROSOL, METERED RESPIRATORY (INHALATION) EVERY 4 HOURS PRN
Qty: 18 G | Refills: 1 | Status: CANCELLED | OUTPATIENT
Start: 2023-11-29

## 2023-11-29 ASSESSMENT — ENCOUNTER SYMPTOMS
WHEEZING: 1
DIARRHEA: 0
BLOOD IN STOOL: 0
COUGH: 0
ABDOMINAL PAIN: 0
RHINORRHEA: 0
VOMITING: 0
SHORTNESS OF BREATH: 0
CHEST TIGHTNESS: 0
NAUSEA: 0
CONSTIPATION: 0
SORE THROAT: 0

## 2023-11-29 NOTE — ASSESSMENT & PLAN NOTE
1.  well controlled  2. GOLD Assessment = A (less symptoms and low risk of exacerbations)  3. Current Meds: LAMA  4. Tobacco screening: Quit smoking 7 ago. Has 90 pack years  5. Discussed monitoring symptoms and use of quick-relief medications, Warning signs of respiratory distress were reviewed with the patient. and Counseled to avoid exposure to cigarette smoke. Changes today: encouraged to take Spiriva daily instead of PRN.

## 2023-11-29 NOTE — PROGRESS NOTES
Subjective     Patient ID: Donny Hurley is a 58 y.o. male who presents for New Patient. Presents to establish care. PMH of HTN, obesity, DM, CKD, and HFrEF. Has defibriliator 7 stents, and previous bilateral femoral bypass for legs due to PAD. Has had 3 CVAs and 3 MI as well. HTN--  BP elevated today and reports that it always runs high. Does not check BP at home but thinks he has a cuff there. Currently only on lisinopril 10. Cardiology has told him that BP is elevated and needs to get established with PCP for management of this as well. Denies headache, vision changes, SOB, chest pain, palpitations. Has mild chronic bilateral lower extremity edema attributable to HFrEF. DM --  Last A1c was 2 months ago and 6.9%. Currently only on jardiance 10. Fasting BG usually 110. Last eye exam was 1 year ago. Has cut back on soda and swithced to 0 sugar soda. HFrEF-- followed by cardiology. Has leg swelling, but denies any SOB or cough. COPD -- currently on LAMA and BRIGIDO. Only having to use albuterol 1-2x/week. Only uses Spiriva occasionally as needed. Says that now it is colder weather, he does notice more wheezing. CKD -- has seen nephrology while in hospital for open heart surgery and leg bypass. Has concerns with medication costs. Says that current insurance is not covering his Eliquis and Vladislav Salts, but is going to switch insurances in January. Going to call new insurance to see if will cover these meds. Review of Systems   Constitutional:  Negative for activity change, appetite change and fever. HENT:  Negative for congestion, rhinorrhea and sore throat. Respiratory:  Positive for wheezing (worse with cold weather). Negative for cough, chest tightness and shortness of breath. Cardiovascular:  Positive for leg swelling. Negative for chest pain and palpitations. Gastrointestinal:  Negative for abdominal pain, blood in stool, constipation, diarrhea, nausea and vomiting.

## 2023-11-29 NOTE — ASSESSMENT & PLAN NOTE
Changes today = none  1. A1c = No components found for: \"HGBA1C\". This is stable compared to previous A1c. (Goal A1c <7%). 2. Meds:   A. DM Meds: oral agent (monotherapy): Jardiance   B. ACEI/ARB -- last microalbumin =   Lab Results   Component Value Date    MICROALBUR 50 04/11/2014   . C. Statin --Last lipid panel was 9 months ago. 3. Labs:  A. Yearly labs: BMP, lipid panel, microalbumin -- ordered today  4. Screenings:   A. Last eye exam = 1 year ago  B. BP Goal: <130/80   C. BMI Goal: <25   5. Reviewed home glucose records. Fasting BG of 110. (Goal: <110 fasting; <150 random)   6. Recommend lifestyle modification such as weight loss, 150min/wk of exercise, and Mediterranean diet.    7. Followed by: ophthalmology

## 2023-11-29 NOTE — ASSESSMENT & PLAN NOTE
Changes today = increasing lisinopril from 10mg to 40mg  BP is uncontrolled  Meds: lisinopril, coreg, lasix  Labs: last CMP and urine microalbumin ordered today. Last lipid panel was 9 months ago.     -He is asymptomatic and usually runs high for BP.   -Recommended he start checking morning BP and bring BP log to next visit.   -gave hypotension precautions and advised him to call us if he becomes hypotensive, dizzy, or light headed after medication change. -Recommend lifestyle modifications such as weight loss, exercising for at least 120min/wk, and low sodium/DASH diet.

## 2023-11-29 NOTE — ASSESSMENT & PLAN NOTE
1.  Symptoms are stable  2. NYH Class = II (slight limitation in physical activity. Symptomatic with ordinary activity)  3. Last Echo on March 2023 : EF = 30-35%. 4. Current Meds = ACEi, Beta Blocker, SGLT2i, Lasix, Aspirin and Statin  5. Continue current medications. Changes today: none.   Continue following with cardiology

## 2023-11-30 ENCOUNTER — TELEPHONE (OUTPATIENT)
Dept: FAMILY MEDICINE CLINIC | Age: 63
End: 2023-11-30

## 2023-11-30 DIAGNOSIS — E11.21 MACROALBUMINURIC DIABETIC NEPHROPATHY (HCC): Primary | ICD-10-CM

## 2023-11-30 DIAGNOSIS — E11.21 CONTROLLED TYPE 2 DIABETES MELLITUS WITH DIABETIC NEPHROPATHY, WITHOUT LONG-TERM CURRENT USE OF INSULIN (HCC): ICD-10-CM

## 2023-12-06 ENCOUNTER — PROCEDURE VISIT (OUTPATIENT)
Dept: CARDIOLOGY CLINIC | Age: 63
End: 2023-12-06

## 2023-12-06 DIAGNOSIS — Z95.810 ICD (IMPLANTABLE CARDIOVERTER-DEFIBRILLATOR), DUAL, IN SITU: Primary | ICD-10-CM

## 2023-12-12 ENCOUNTER — TELEPHONE (OUTPATIENT)
Dept: FAMILY MEDICINE CLINIC | Age: 63
End: 2023-12-12

## 2024-01-03 ENCOUNTER — TELEPHONE (OUTPATIENT)
Dept: CARDIOLOGY CLINIC | Age: 64
End: 2024-01-03

## 2024-01-03 ENCOUNTER — TELEPHONE (OUTPATIENT)
Dept: CARDIOTHORACIC SURGERY | Age: 64
End: 2024-01-03

## 2024-01-03 NOTE — TELEPHONE ENCOUNTER
----- Message from Anatoly Joe MD sent at 1/3/2024 12:49 PM EST -----  Device Assessment:         The device is Medtronic ICD - Dual Chamber chamber      MRI Compatible : yes    Device interrogation was performed.    Mode :  AAI --- DDD     Sensing is normal. Impedence is normal.  Threshold is normal.     There has not been interval changes.      Estimated battery life is 12.5 years    The underlying rhythm is AS, VS.      13.6 % atrial paced; 0.1 % ventricular paced.      Atrial Arrhythmia : 4 episodes noted sinus vs atrial tachycardia hr UPTO 136 BPM all on 11/29th upto 5 minutes    Non sustained VT episodes : No    Sustained VT episodes : No    The patient is partial atrial pacemaker dependent.      Patient activity reported by the device to be 4.1 hr/ day      HF management parameter are suggestive of clinical volume overload. Clinical correlation required. Recommend Heart failure clinic follow up          Interpreted by    Anatoly Joe M.D

## 2024-01-03 NOTE — TELEPHONE ENCOUNTER
Called and spoke with patient, gave him his date at [facility], he needs to arrive at 9:00am 01/17/24 test will be at 9:30am, needs to be NPO 4 hours prior and should bring copy of ins card, patient voiced understanding

## 2024-01-03 NOTE — TELEPHONE ENCOUNTER
Patient called regarding high optivol level. States he does not weigh daily. Reports swelling in legs, but no increase in SOB. Advised low sodium diet and daily weights. Patient sees Dr. Andrade 1/8/24. [facility] appt scheduled 1/29/24.

## 2024-01-08 ENCOUNTER — OFFICE VISIT (OUTPATIENT)
Dept: CARDIOLOGY CLINIC | Age: 64
End: 2024-01-08
Payer: COMMERCIAL

## 2024-01-08 ENCOUNTER — TELEPHONE (OUTPATIENT)
Dept: CARDIOLOGY CLINIC | Age: 64
End: 2024-01-08

## 2024-01-08 VITALS
OXYGEN SATURATION: 98 % | SYSTOLIC BLOOD PRESSURE: 124 MMHG | WEIGHT: 201.6 LBS | HEART RATE: 70 BPM | BODY MASS INDEX: 31.64 KG/M2 | DIASTOLIC BLOOD PRESSURE: 70 MMHG | HEIGHT: 67 IN

## 2024-01-08 DIAGNOSIS — K55.069 OMENTAL INFARCTION (HCC): ICD-10-CM

## 2024-01-08 DIAGNOSIS — I50.23 ACUTE ON CHRONIC SYSTOLIC CONGESTIVE HEART FAILURE (HCC): ICD-10-CM

## 2024-01-08 DIAGNOSIS — I50.20 HFREF (HEART FAILURE WITH REDUCED EJECTION FRACTION) (HCC): ICD-10-CM

## 2024-01-08 DIAGNOSIS — R06.02 SOBOE (SHORTNESS OF BREATH ON EXERTION): Primary | ICD-10-CM

## 2024-01-08 PROCEDURE — 3078F DIAST BP <80 MM HG: CPT | Performed by: INTERNAL MEDICINE

## 2024-01-08 PROCEDURE — 3074F SYST BP LT 130 MM HG: CPT | Performed by: INTERNAL MEDICINE

## 2024-01-08 PROCEDURE — 99214 OFFICE O/P EST MOD 30 MIN: CPT | Performed by: INTERNAL MEDICINE

## 2024-01-08 NOTE — PROGRESS NOTES
COLON, DIAGNOSTIC  2014    irregular GE junction, gastritis,hiatal hernia, gastric polyps    FEMORAL-FEMORAL BYPASS GRAFT N/A 2023    CROSSOVER FEMORAL TO FEMORAL BYPASS USING A 8MM GORE PROPATEN VASCULAR GRAFT performed by Aashish Gutierrez MD at [facility] OR    HAND SURGERY      rt knuckle    HERNIA REPAIR N/A 2022    HERNIA VENTRAL REPAIR LAPAROSCOPIC ROBOTIC performed by Josue Ybarra II, MD at [facility] OR    PTCA  2018    LAD    SHOULDER SURGERY Right 2011    bone spurs      As reviewed   Family History   Problem Relation Age of Onset    Hypertension Mother     High Cholesterol Mother     Alcohol Abuse Mother     Depression Mother     Alcohol Abuse Father     Stomach Cancer Father     Depression Sister     Diabetes Brother     Depression Sister     Stomach Cancer Brother     Alcohol Abuse Brother      Social History     Tobacco Use    Smoking status: Former     Current packs/day: 0.00     Average packs/day: 2.0 packs/day for 45.0 years (90.0 ttl pk-yrs)     Types: Cigarettes     Start date: 1971     Quit date: 2016     Years since quittin.6    Smokeless tobacco: Never   Substance Use Topics    Alcohol use: No     Alcohol/week: 0.0 standard drinks of alcohol        Objective:    There were no vitals filed for this visit.      There were no vitals taken for this visit.        2021    11:54 AM   Patient-Reported Vitals   Patient-Reported Systolic 144 mmHg   Patient-Reported Diastolic 66 mmHg   Patient-Reported Pulse 69        Wt Readings from Last 3 Encounters:   23 91.8 kg (202 lb 6.4 oz)   10/16/23 88 kg (194 lb)   10/13/23 88.4 kg (194 lb 12.8 oz)     There is no height or weight on file to calculate BMI.  GENERAL - Alert, oriented, pleasant, in no apparent distress.  EYES: No jaundice, no conjunctival pallor.  SKIN: It is warm & dry. No rashes. No Echhymosis    HEENT - No clinically significant abnormalities seen.  Neck - Supple.  No jugular venous distention noted. No

## 2024-01-08 NOTE — PATIENT INSTRUCTIONS
**It is YOUR responsibilty to bring medication bottles and/or updated medication list to EACH APPOINTMENT. This will allow us to better serve you and all your healthcare needs**   [facility] Laboratory Locations - No appointment necessary.  Sites open Monday to Friday. Call your preferred location for test preparation, business   hours and other information you need. [facility] accepts all insurances.  [place]   Jair Abdullahi.   [address] [place] [address]  Phone: [phone]   Thank you for allowing us to care for you today!   We want to ensure we can follow your treatment plan and we strive to give you the best outcomes and experience possible.   If you ever have a life threatening emergency and call 911 - for an ambulance (EMS)   Our providers can only care for you at:   [facility] or [facility].   Even if you have someone take you or you drive yourself we can only care for you in a [facility] facility. Our providers are not setup at the other healthcare locations!   Please be informed that if you contact our office outside of normal business hours the physician on call cannot help with any scheduling or rescheduling issues, procedure instruction questions or any type of medication issue.    We advise you for any urgent/emergency that you go to the nearest emergency room!    PLEASE CALL OUR OFFICE DURING NORMAL BUSINESS HOURS    Monday - Friday   8 am to 5 pm    [place]: [phone]    [place]: [phone]    [place]:  [phone]  We are committed to providing you the best care possible.    If you receive a survey after visiting one of our offices, please take time to share your experience concerning your physician office visit.  These surveys are confidential and no health information about you is shared.    We are eager to improve for you and we are counting on your feedback to help make that happen.

## 2024-01-15 ENCOUNTER — TELEPHONE (OUTPATIENT)
Dept: CARDIOLOGY CLINIC | Age: 64
End: 2024-01-15

## 2024-01-15 NOTE — TELEPHONE ENCOUNTER
Left Ventricle: Reduced left ventricular systolic function with a visually estimated EF of 35 - 40%. Left ventricle is dilated. Mild septal thickening. Severe hypokinesis of the following segments: basal inferior, basal inferoseptal, mid inferior, mid inferoseptal and apical inferior. Diastolic filling pattern is normal for age.    Mitral Valve: Mildly thickened leaflet, at the anterior and posterior leaflets. Moderate regurgitation.    Tricuspid Valve: Mild regurgitation. Mildly elevated RVSP. The estimated RVSP is 37 mmHg.    Pulmonic Valve: Trace regurgitation.    Left Atrium: Left atrium is mildly dilated.    Pericardium: No pericardial effusion.    Image quality is adequate.    Given that the patient has recurrent CHF and has moderate MR will need a transesophageal echo for further clarification please schedule the transesophageal echocardiogram    Left message for patient to return my call regarding results.

## 2024-01-16 ENCOUNTER — TELEPHONE (OUTPATIENT)
Dept: CARDIOLOGY CLINIC | Age: 64
End: 2024-01-16

## 2024-01-16 NOTE — TELEPHONE ENCOUNTER
Patient would like to get NGA done, advised Nataliia would call him to schedule. Patient advised and voices understanding.

## 2024-01-16 NOTE — TELEPHONE ENCOUNTER
Patient was educated by phone on NGA for Dx: abn echo.  Procedure is scheduled for 1/23/24 @ 9am, w/arrival @ 8am, @ [facility].       Procedure and risks were explained to patient. Will stop by the office and sign consent.      Patient was notified that procedure could be delayed due to an emergency. Patient voiced understanding.

## 2024-01-16 NOTE — TELEPHONE ENCOUNTER
[facility]     Dr. Tawana Andrade     Transesophageal Echocardiogram    Patient Name: Hilaria Rodriguez   : 1960   MRN# [id number]     Date of Procedure: 24 Time: 9am Arrival Time: 8am   (Arrival time is scheduled for one (1) hour before procedure is scheduled.)     Hospital: [facility] ([facility])     X   If you have received orders for blood work and or a chest x-ray, please have         them done on assigned date at [facility],         [facility], or [facility].    X   Please do not have anything by mouth after midnight prior to or 8 hours before         the procedure.    X   You may take your medication with a sip of water unless advised otherwise below.       X Please continue to take Eliquis (apixaban) as directed.     X If you are taking Lasix (furosemide) please do not take it the morning before your procedure.

## 2024-01-17 ENCOUNTER — TELEPHONE (OUTPATIENT)
Dept: CARDIOTHORACIC SURGERY | Age: 64
End: 2024-01-17

## 2024-01-17 ENCOUNTER — HOSPITAL ENCOUNTER (OUTPATIENT)
Dept: CT IMAGING | Age: 64
Discharge: HOME | End: 2024-01-17
Payer: COMMERCIAL

## 2024-01-17 DIAGNOSIS — I73.9 PVD (PERIPHERAL VASCULAR DISEASE) (HCC): ICD-10-CM

## 2024-01-17 DIAGNOSIS — I25.10 CORONARY ATHEROSCLEROSIS OF NATIVE CORONARY ARTERY: Primary | ICD-10-CM

## 2024-01-17 DIAGNOSIS — I25.10 CORONARY ATHEROSCLEROSIS OF NATIVE CORONARY ARTERY: ICD-10-CM

## 2024-01-17 LAB
EGFR, POC: 52 ML/MIN/1.73M2
POC CREATININE: 1.5 MG/DL (ref 0.5–1.2)

## 2024-01-17 PROCEDURE — 2580000003 HC RX 258: Performed by: NURSE PRACTITIONER

## 2024-01-17 PROCEDURE — 75635 CT ANGIO ABDOMINAL ARTERIES: CPT

## 2024-01-17 PROCEDURE — 82565 ASSAY OF CREATININE: CPT

## 2024-01-17 PROCEDURE — 6360000004 HC RX CONTRAST MEDICATION: Performed by: NURSE PRACTITIONER

## 2024-01-17 RX ORDER — SODIUM CHLORIDE 9 MG/ML
10 INJECTION INTRAVENOUS PRN
Status: DISCONTINUED | OUTPATIENT
Start: 2024-01-17 | End: 2024-01-18 | Stop reason: HOSPADM

## 2024-01-17 RX ADMIN — SODIUM CHLORIDE, PRESERVATIVE FREE 10 ML: 5 INJECTION INTRAVENOUS at 09:31

## 2024-01-17 RX ADMIN — IOPAMIDOL 110 ML: 755 INJECTION, SOLUTION INTRAVENOUS at 09:34

## 2024-01-22 ENCOUNTER — TELEPHONE (OUTPATIENT)
Dept: CARDIOLOGY CLINIC | Age: 64
End: 2024-01-22

## 2024-01-22 ENCOUNTER — TELEPHONE (OUTPATIENT)
Dept: FAMILY MEDICINE CLINIC | Age: 64
End: 2024-01-22

## 2024-01-22 NOTE — TELEPHONE ENCOUNTER
Needing lab orders.  Procedure is 1/23. Patient can come in today.  Mainly needing lab orders. If patient does not come in today, Lab orders need to be stat.

## 2024-01-23 ENCOUNTER — HOSPITAL ENCOUNTER (OUTPATIENT)
Dept: NON INVASIVE DIAGNOSTICS | Age: 64
Discharge: HOME | End: 2024-01-25
Payer: COMMERCIAL

## 2024-01-23 VITALS
HEIGHT: 67 IN | SYSTOLIC BLOOD PRESSURE: 171 MMHG | OXYGEN SATURATION: 97 % | WEIGHT: 201 LBS | RESPIRATION RATE: 18 BRPM | HEART RATE: 60 BPM | DIASTOLIC BLOOD PRESSURE: 81 MMHG | BODY MASS INDEX: 31.55 KG/M2

## 2024-01-23 DIAGNOSIS — I48.20 CHRONIC A-FIB (HCC): ICD-10-CM

## 2024-01-23 LAB
ECHO BSA: 2.08 M2
GLUCOSE BLD-MCNC: 83 MG/DL (ref 70–99)

## 2024-01-23 PROCEDURE — 99152 MOD SED SAME PHYS/QHP 5/>YRS: CPT | Performed by: INTERNAL MEDICINE

## 2024-01-23 PROCEDURE — 7100000010 HC PHASE II RECOVERY - FIRST 15 MIN: Performed by: INTERNAL MEDICINE

## 2024-01-23 PROCEDURE — 93325 DOPPLER ECHO COLOR FLOW MAPG: CPT | Performed by: INTERNAL MEDICINE

## 2024-01-23 PROCEDURE — 6360000002 HC RX W HCPCS: Performed by: INTERNAL MEDICINE

## 2024-01-23 PROCEDURE — 93320 DOPPLER ECHO COMPLETE: CPT

## 2024-01-23 PROCEDURE — 93320 DOPPLER ECHO COMPLETE: CPT | Performed by: INTERNAL MEDICINE

## 2024-01-23 PROCEDURE — 7100000011 HC PHASE II RECOVERY - ADDTL 15 MIN: Performed by: INTERNAL MEDICINE

## 2024-01-23 PROCEDURE — 82962 GLUCOSE BLOOD TEST: CPT

## 2024-01-23 PROCEDURE — 6370000000 HC RX 637 (ALT 250 FOR IP): Performed by: INTERNAL MEDICINE

## 2024-01-23 PROCEDURE — 93312 ECHO TRANSESOPHAGEAL: CPT | Performed by: INTERNAL MEDICINE

## 2024-01-23 PROCEDURE — 93005 ELECTROCARDIOGRAM TRACING: CPT | Performed by: INTERNAL MEDICINE

## 2024-01-23 RX ORDER — LIDOCAINE HYDROCHLORIDE 20 MG/ML
SOLUTION OROPHARYNGEAL PRN
Status: COMPLETED | OUTPATIENT
Start: 2024-01-23 | End: 2024-01-23

## 2024-01-23 RX ORDER — DIPHENHYDRAMINE HYDROCHLORIDE 25 MG/1
CAPSULE, LIQUID FILLED ORAL
Qty: 1 KIT | Refills: 0 | Status: SHIPPED | OUTPATIENT
Start: 2024-01-23

## 2024-01-23 RX ORDER — MIDAZOLAM HYDROCHLORIDE 1 MG/ML
INJECTION INTRAMUSCULAR; INTRAVENOUS PRN
Status: COMPLETED | OUTPATIENT
Start: 2024-01-23 | End: 2024-01-23

## 2024-01-23 RX ADMIN — MIDAZOLAM 2 MG: 1 INJECTION INTRAMUSCULAR; INTRAVENOUS at 09:00

## 2024-01-23 RX ADMIN — MIDAZOLAM 1 MG: 1 INJECTION INTRAMUSCULAR; INTRAVENOUS at 09:01

## 2024-01-23 RX ADMIN — LIDOCAINE HYDROCHLORIDE 15 ML: 20 SOLUTION ORAL; TOPICAL at 08:57

## 2024-01-23 NOTE — PROGRESS NOTES
Procedure     Moderate sedation for NGA    Physician performing the procedure: Tawana Andrade MD    Start time;7;58  Stop time;9;15    Medication used: versed  3   Mgm    Independent trained observer during the procedure;  belgica Lyn RN       ASA 2  Mallampati 2     After obtaining form consent patient was brought to the holding area and underwent conscious sedation with Versed, remained hemodynamically stable  Patient is hemodynamic status, neuro status was evaluated before conscious sedation  Patient's blood pressure, heart rate, pulse ox, neuro status was monitored for  post procedure till stop time as above and she remained stable  The details of the conscious sedation is in the flowsheet in epic

## 2024-01-24 NOTE — PROGRESS NOTES
distal and femoral pulses.  Edema: he has no peripheral edema.    Labs and studies:    I reviewed the CTA of the abdomen pelvis personally.  He has severe aortoiliac occlusive disease with lots of mural thrombus, but a patent path to the right groin. The left common iliac artery is chronically occluded.  Femorofemoral bypass which transports of blood from the right to the left, he is widely patent has a nice lady and no kinks.  There is no perigraft fluid.  His incisions are well-healed.    CTA OF THE AORTA WITH LOWER EXTREMITY RUNOFF 1/17/2024     FINDINGS:  Vasculature: Pacemaker wires.  Severe diffuse atherosclerosis of the  infrarenal aorta with extensive soft plaque, which causes approximately 50%  luminal narrowing.  The celiac, superior mesenteric, and renal arteries are  patent.  The inferior mesenteric artery is occluded at its origin with  proximal collateral reconstitution.  The right common and external iliac  arteries are patent with moderate to severe diffuse atherosclerosis.  The  right internal iliac artery is occluded proximally.  The left common iliac  artery is chronically occluded, with flow reconstituted at the iliac  bifurcation.  A fem-fem bypass graft is widely patent.  Moderate stenosis in  the distal right SFA on series 2, image 278.  There is two-vessel runoff to  the right foot via the peroneal and posterior tibial artery.  On the left, no  high-grade stenosis is seen.  The left calf arteries are not visualized the  on the mid calf.     Remainder of exam: Lingular atelectasis.  No acute process of the lung bases.  The gallbladder, spleen, kidneys, pancreas, bile ducts, and stomach are  without acute process.  2.0 cm left adrenal nodule with internal  calcifications, unchanged from prior exams and no further follow-up is  required.  The ureters are nondilated.  The bladder is within normal limits.  Mildly enlarged prostate.  Moderate diverticulosis of the distal colon.  The  appendix is

## 2024-01-25 ENCOUNTER — TELEPHONE (OUTPATIENT)
Dept: FAMILY MEDICINE CLINIC | Age: 64
End: 2024-01-25

## 2024-01-25 ENCOUNTER — OFFICE VISIT (OUTPATIENT)
Dept: CARDIOTHORACIC SURGERY | Age: 64
End: 2024-01-25
Payer: COMMERCIAL

## 2024-01-25 VITALS
DIASTOLIC BLOOD PRESSURE: 82 MMHG | BODY MASS INDEX: 31.52 KG/M2 | SYSTOLIC BLOOD PRESSURE: 152 MMHG | HEART RATE: 81 BPM | WEIGHT: 200.8 LBS | HEIGHT: 67 IN | OXYGEN SATURATION: 98 %

## 2024-01-25 DIAGNOSIS — I73.9 PAD (PERIPHERAL ARTERY DISEASE) (HCC): Primary | ICD-10-CM

## 2024-01-25 LAB
EKG ATRIAL RATE: 62 BPM
EKG DIAGNOSIS: NORMAL
EKG P AXIS: 32 DEGREES
EKG P-R INTERVAL: 172 MS
EKG Q-T INTERVAL: 452 MS
EKG QRS DURATION: 128 MS
EKG QTC CALCULATION (BAZETT): 458 MS
EKG R AXIS: 30 DEGREES
EKG T AXIS: 138 DEGREES
EKG VENTRICULAR RATE: 62 BPM

## 2024-01-25 PROCEDURE — 99214 OFFICE O/P EST MOD 30 MIN: CPT | Performed by: THORACIC SURGERY (CARDIOTHORACIC VASCULAR SURGERY)

## 2024-01-25 PROCEDURE — 3077F SYST BP >= 140 MM HG: CPT | Performed by: THORACIC SURGERY (CARDIOTHORACIC VASCULAR SURGERY)

## 2024-01-25 PROCEDURE — 3079F DIAST BP 80-89 MM HG: CPT | Performed by: THORACIC SURGERY (CARDIOTHORACIC VASCULAR SURGERY)

## 2024-01-25 RX ORDER — BLOOD-GLUCOSE SENSOR
1 EACH MISCELLANEOUS
Qty: 8 EACH | Refills: 3 | Status: SHIPPED | OUTPATIENT
Start: 2024-01-25

## 2024-01-25 RX ORDER — BLOOD-GLUCOSE,RECEIVER,CONT
1 EACH MISCELLANEOUS
Qty: 1 EACH | Refills: 0 | Status: SHIPPED | OUTPATIENT
Start: 2024-01-25

## 2024-01-29 ENCOUNTER — INITIAL CONSULT (OUTPATIENT)
Dept: CARDIOLOGY CLINIC | Age: 64
End: 2024-01-29
Payer: COMMERCIAL

## 2024-01-29 VITALS
OXYGEN SATURATION: 99 % | DIASTOLIC BLOOD PRESSURE: 100 MMHG | WEIGHT: 201.8 LBS | HEIGHT: 67 IN | SYSTOLIC BLOOD PRESSURE: 180 MMHG | RESPIRATION RATE: 16 BRPM | HEART RATE: 74 BPM | BODY MASS INDEX: 31.67 KG/M2

## 2024-01-29 DIAGNOSIS — I50.20 HFREF (HEART FAILURE WITH REDUCED EJECTION FRACTION) (HCC): Primary | ICD-10-CM

## 2024-01-29 DIAGNOSIS — I10 PRIMARY HYPERTENSION: ICD-10-CM

## 2024-01-29 PROCEDURE — 3077F SYST BP >= 140 MM HG: CPT | Performed by: NURSE PRACTITIONER

## 2024-01-29 PROCEDURE — 3080F DIAST BP >= 90 MM HG: CPT | Performed by: NURSE PRACTITIONER

## 2024-01-29 PROCEDURE — 99204 OFFICE O/P NEW MOD 45 MIN: CPT | Performed by: NURSE PRACTITIONER

## 2024-01-29 RX ORDER — CARVEDILOL 25 MG/1
25 TABLET ORAL 2 TIMES DAILY
Qty: 60 TABLET | Refills: 5 | Status: SHIPPED | OUTPATIENT
Start: 2024-01-29

## 2024-01-29 ASSESSMENT — ENCOUNTER SYMPTOMS
ABDOMINAL DISTENTION: 0
SHORTNESS OF BREATH: 0
WHEEZING: 0
CHEST TIGHTNESS: 0

## 2024-01-29 NOTE — PROGRESS NOTES
CHF CLINICAL STAFF DOCUMENTATION      Education provided: Met with patient and introduced myself as the Heart failure education R.N.     Reviewed Heart failure patient education book with patient. Heart failure education included: Type of heart failure, How it is diagnosed, causes, symptoms, medications, diet, daily weights, exercise and fluid control.   Questions answered.      Patient's heart failure medications reviewed and information given on each.     Reviewed the Stop Light Handout with patient and instructed when to call his provider.   Patient was engaged and attentive during education session.    The following handouts were reviewed with patient and patient was given a copy of the following : Heart Failure education booklet, the 'Stop Light' Handout,  a link to the American Heart Association's Healthier Living with Heart Failure Interactive workbook .             Have you had any Chest Pain? - No    Do you had any Shortness of Breath - Yes  If Yes - When on exertion    Have you had any dizziness - No  When do you feel dizzy none   How long does it last .none  none     Do you have any edema -  Yes  swelling in  slight       Are you on fluid restrictions -  instructed today     Amount - 64 oz     Are you on sodium restrictions -  instructed today   Amount - 2 gm        Do you weigh yourself daily? - No    Do you feel fatigued - No    Do you have a cough - No    Lung sounds -    Normal - Yes   Abnormal -     Do you have abdominal bloating - No    How is your appetite - good    Do you have difficulty sleeping - Yes  Able to lie flat? -  sleeps propped up with pillows     Do you have a history of sleep apnea - No  CPAP no    6 min. Walk:   Pre heart rate 74  Time walked  6 minutes   Distance  730 feet   Post heart rate  80       Have you had Flu Vaccine - No  Guillian [place]    Have you had Pneumonia Vaccine - Yes

## 2024-01-29 NOTE — PROGRESS NOTES
[facility]      Visit Date: 1/29/2024  Cardiologist:  Dr. Andrade  Primary Care Physician: Lindsey Pearce MD    Hilaria Rodriguez is a 63 y.o. male who presents today for:  CC: HFrEF    HPI:   Hilaria Rodriguez is a 63 y.o. male who presents to the office for a new patient visit in the heart failure clinic.    Hilaria has a history of ischemic cardiomyopathy, HFrEF,  s/p ICD, coronary artery disease s/p CABG, VHD with MR hypertension, hyperlipidemia, diabetes mellitus, DVT of RLE,  peripheral artery disease s/p crossover right to left fem-fem bypass using an 8mm Wellington propaten vascular graft on 9/13/2023.     Hilaria is being seen today for concerns of possible decompensated heart failure.  He was noted to have elevated OptiVol level on recent ICD check.  He currently states he is feeling fairly well.  Has no increased shortness of breath, orthopnea or PND.    Chief Complaint   Patient presents with    Congestive Heart Failure     Initial consult      Patient has:  Last hospital admission related to Heart Failure:  unknown      Device: Yes       Activity: good  Can you walk 1-2 blocks or do a moderate amount of house/yard work?No recent surgery on the leg has limited activity    NYHA Class: II       Sodium Restrictions: 2g  Fluid Restrictions: 48-64 oz/day  Sodium and fluid restriction compliance: no     Past Medical History:   Diagnosis Date    Acute cerebrovascular accident (CVA) ([facility]) 05/22/2020    Acute on chronic systolic congestive heart failure ([facility]) 1/8/2024    Acute ST elevation myocardial infarction (STEMI) ([facility]) 03/14/2018    B12 deficiency     CAD (coronary artery disease)     COPD (chronic obstructive pulmonary disease) ([facility]) 12/11/2020    GERD (gastroesophageal reflux disease)     Guillain Barré syndrome ([facility])     H/O echocardiogram 7/30/13, 6/16/10    7/13-EF 50-55% WNL. 6/10-EF53%, mod LVH, MARCI, RVE, trace PI, mild MR/TR, 6/09    Heart murmur     History of echocardiogram 10/07/2020

## 2024-01-29 NOTE — PATIENT INSTRUCTIONS
HEART FAILURE INSTRUCTIONS:         MEDICATIONS:  Please notify the the heart failure clinic R.N. or your doctor if you are not able to take your medications for any reason.       WEIGHT MONITORING:   Weigh yourself  everyday in the morning after urination and record the weight on your weight log.   Notify the doctor/clinic nurse of a weight gain of 3 pounds or more in 1 day   OR  a total of 5 pounds or more in 1 week             DIET  Cardiac heart healthy diet- Low saturated / low trans fat, no added salt, caffeine restricted,   Low sodium diet- no  more than 2,000mg (2 grams) of salt / sodium per day (which equals to a little less than  a teaspoon of salt)  The doctor may also recommend a fluid limit -  Fluid restriction- 2,000 ml (milliliters) = 64 ounces = you can have 8 glasses of fluid per day (each glass 8 ounces)    Avoid using salt at the table, avoid / limit use of canned soups, processed / packaged foods, salted snacks, olives and pickles.  Do not use a salt substitute without checking with the doctor. (Mrs. Torres is safe to use).       NOTIFY THE  DOCTOR THE FIRST DAY OF ONSET OF ANY OF THESE   SYMPTOMS:   Weight gain of 3 pounds or more in 1 day         OR 5 pounds or more in one week  More shortness of breath  More swelling in stomach, legs, ankles or feet  Feeling more tired, No energy  Dry hacky cough  Dizziness  More chest pain / discomfort           Please visit  American Heart Association Healthy Living with Heart Failure  at [email or web address]      [facility]  Heart Failure Clinic  RUTHIE ROCK, R.N.  Phone: [phone] or [place]

## 2024-02-05 ENCOUNTER — CARE COORDINATION (OUTPATIENT)
Dept: CARE COORDINATION | Age: 64
End: 2024-02-05

## 2024-02-06 PROBLEM — R80.1 PERSISTENT PROTEINURIA: Status: ACTIVE | Noted: 2024-02-06

## 2024-02-06 PROBLEM — N18.31 STAGE 3A CHRONIC KIDNEY DISEASE (HCC): Status: RESOLVED | Noted: 2022-09-08 | Resolved: 2024-02-06

## 2024-02-06 PROBLEM — N17.9 AKI (ACUTE KIDNEY INJURY) (HCC): Status: ACTIVE | Noted: 2024-02-06

## 2024-02-07 ENCOUNTER — OFFICE VISIT (OUTPATIENT)
Dept: CARDIOLOGY CLINIC | Age: 64
End: 2024-02-07
Payer: COMMERCIAL

## 2024-02-07 VITALS
WEIGHT: 199.4 LBS | DIASTOLIC BLOOD PRESSURE: 84 MMHG | BODY MASS INDEX: 31.3 KG/M2 | HEART RATE: 72 BPM | HEIGHT: 67 IN | SYSTOLIC BLOOD PRESSURE: 138 MMHG | OXYGEN SATURATION: 97 %

## 2024-02-07 DIAGNOSIS — I25.10 ASCVD (ARTERIOSCLEROTIC CARDIOVASCULAR DISEASE): Primary | ICD-10-CM

## 2024-02-07 PROCEDURE — 3079F DIAST BP 80-89 MM HG: CPT | Performed by: INTERNAL MEDICINE

## 2024-02-07 PROCEDURE — 99214 OFFICE O/P EST MOD 30 MIN: CPT | Performed by: INTERNAL MEDICINE

## 2024-02-07 PROCEDURE — 3075F SYST BP GE 130 - 139MM HG: CPT | Performed by: INTERNAL MEDICINE

## 2024-02-07 NOTE — PROGRESS NOTES
CARDIOLOGY NOTE      2/7/2024    RE: Hilaria Rodriguez  (1960)                               TO:  Lindsey Pearce MD            CHIEF COMPLAINT   Hilaria is a 63 y.o. male who was seen today for management of coronary artery disease                        Here for follow-up on NGA        HPI:                     Pt has h/o coronary artery disease, hypertension, hyperlipidemia, diabetes, obesity, HFrEF, seen today for follow-up.  Patient has been having shortness of breath and has high OptiVol Hilaria Rodriguez has the following history recorded in care path:  Patient Active Problem List    Diagnosis Date Noted    Former tobacco use 11/10/2022    Microalbuminuria 09/09/2022    Hx of colonic polyps 09/08/2022    Hemiparesis affecting right side as late effect of cerebrovascular accident (CVA) ([facility]) 07/07/2016    Essential hypertension 05/24/2016    Cerebrovascular accident (CVA) due to thrombosis of left middle cerebral artery ([facility]) 05/14/2016    Obesity (BMI 30-39.9) 06/26/2015    Controlled type 2 diabetes mellitus with diabetic nephropathy, without long-term current use of insulin ([facility])     History of acute inferior wall myocardial infarction     HFrEF (heart failure with reduced ejection fraction) ([facility]) 03/17/2023    Shortness of breath 03/01/2023    SOB (shortness of breath) 03/01/2023    Ventral hernia without obstruction or gangrene 11/10/2022    History of Guillain-[place] syndrome 03/08/2019    Adrenal adenoma 05/23/2014    ASCVD (arteriosclerotic cardiovascular disease)     Frequent PVCs     Unstable angina ([facility]) 03/15/2018    Gastroesophageal reflux disease 04/19/2017    SOBOE (shortness of breath on exertion) 10/25/2016    Hemiparesis of right dominant side as late effect of cerebral infarction ([facility]) 05/14/2016    Gait disturbance 05/14/2016    Mixed hyperlipidemia 12/19/2014    Fatty liver 05/23/2014    Colon, diverticulosis 05/23/2014    Renal cyst 05/23/2014    B12 deficiency 01/10/2014

## 2024-02-07 NOTE — PATIENT INSTRUCTIONS
**It is YOUR responsibilty to bring medication bottles and/or updated medication list to EACH APPOINTMENT. This will allow us to better serve you and all your healthcare needs**   [facility] Laboratory Locations - No appointment necessary.  Sites open Monday to Friday. Call your preferred location for test preparation, business   hours and other information you need. [facility] accepts all insurances.  [place]   Jair Abdullahi.   [address] [place] [address]  Phone: [phone]    Thank you for allowing us to care for you today!   We want to ensure we can follow your treatment plan and we strive to give you the best outcomes and experience possible.   If you ever have a life threatening emergency and call 911 - for an ambulance (EMS)   Our providers can only care for you at:   [facility] or [facility].   Even if you have someone take you or you drive yourself we can only care for you in a [facility] facility. Our providers are not setup at the other healthcare locations!   Please be informed that if you contact our office outside of normal business hours the physician on call cannot help with any scheduling or rescheduling issues, procedure instruction questions or any type of medication issue.    We advise you for any urgent/emergency that you go to the nearest emergency room!    PLEASE CALL OUR OFFICE DURING NORMAL BUSINESS HOURS    Monday - Friday   8 am to 5 pm    [place]: [phone]    [place]: [phone]    [place]:  [phone]  We are committed to providing you the best care possible.    If you receive a survey after visiting one of our offices, please take time to share your experience concerning your physician office visit.  These surveys are confidential and no health information about you is shared.    We are eager to improve for you and we are counting on your feedback to help make that happen.

## 2024-02-09 ENCOUNTER — HOSPITAL ENCOUNTER (OUTPATIENT)
Age: 64
Discharge: HOME | End: 2024-02-09
Attending: INTERNAL MEDICINE
Payer: COMMERCIAL

## 2024-02-09 ENCOUNTER — HOSPITAL ENCOUNTER (OUTPATIENT)
Dept: ULTRASOUND IMAGING | Age: 64
Discharge: HOME | End: 2024-02-09
Attending: INTERNAL MEDICINE
Payer: COMMERCIAL

## 2024-02-09 DIAGNOSIS — E11.22 TYPE 2 DIABETES MELLITUS WITH CHRONIC KIDNEY DISEASE, WITHOUT LONG-TERM CURRENT USE OF INSULIN, UNSPECIFIED CKD STAGE (HCC): ICD-10-CM

## 2024-02-09 DIAGNOSIS — N18.32 STAGE 3B CHRONIC KIDNEY DISEASE (HCC): ICD-10-CM

## 2024-02-09 DIAGNOSIS — N17.9 AKI (ACUTE KIDNEY INJURY) (HCC): ICD-10-CM

## 2024-02-09 DIAGNOSIS — R80.1 PERSISTENT PROTEINURIA: ICD-10-CM

## 2024-02-09 LAB
PHOSPHORUS: 3.4 MG/DL (ref 2.5–4.9)
URATE SERPL-MCNC: 5.3 MG/DL (ref 3.5–7.2)

## 2024-02-09 PROCEDURE — 84100 ASSAY OF PHOSPHORUS: CPT

## 2024-02-09 PROCEDURE — 83970 ASSAY OF PARATHORMONE: CPT

## 2024-02-09 PROCEDURE — 76775 US EXAM ABDO BACK WALL LIM: CPT

## 2024-02-09 PROCEDURE — 36415 COLL VENOUS BLD VENIPUNCTURE: CPT

## 2024-02-09 PROCEDURE — 84550 ASSAY OF BLOOD/URIC ACID: CPT

## 2024-02-11 LAB — PTH-INTACT SERPL-MCNC: 77 PG/ML (ref 15–65)

## 2024-02-12 ENCOUNTER — HOSPITAL ENCOUNTER (OUTPATIENT)
Age: 64
Setting detail: SPECIMEN
Discharge: HOME | End: 2024-02-12
Payer: COMMERCIAL

## 2024-02-12 LAB
BILIRUBIN URINE: NEGATIVE MG/DL
BLOOD, URINE: NEGATIVE
CLARITY: CLEAR
COLOR: YELLOW
COMMENT UA: ABNORMAL
GLUCOSE, URINE: >1000 MG/DL
KETONES, URINE: NEGATIVE MG/DL
LEUKOCYTE ESTERASE, URINE: NEGATIVE
Lab: 24 HRS
NITRITE URINE, QUANTITATIVE: NEGATIVE
PH, URINE: 6.5 (ref 5–8)
PROTEIN 24 HOUR URINE: 1437 MG/24 HR
PROTEIN UA: NEGATIVE MG/DL
SPECIFIC GRAVITY UA: 1.01 (ref 1–1.03)
URINE TOTAL PROTEIN: 13.8 MG/DL
UROBILINOGEN, URINE: 0.2 MG/DL (ref 0.2–1)
VOLUME, (UVOL): 3050 MLS

## 2024-02-12 PROCEDURE — 84166 PROTEIN E-PHORESIS/URINE/CSF: CPT

## 2024-02-12 PROCEDURE — 81003 URINALYSIS AUTO W/O SCOPE: CPT

## 2024-02-12 PROCEDURE — 84156 ASSAY OF PROTEIN URINE: CPT

## 2024-02-20 ENCOUNTER — CARE COORDINATION (OUTPATIENT)
Dept: CARE COORDINATION | Age: 64
End: 2024-02-20

## 2024-02-20 LAB
ALBUMIN, U: 68 %
ALPHA-1-GLOBULIN, U: 9 %
ALPHA-2-GLOBULIN, U: 3 %
BETA GLOBULIN, U: 14 %
GAMMA GLOBULIN, U: 7 %
SPEP INTERPRETATION: NORMAL
URINE TOTAL PROTEIN: 13.8 MG/DL

## 2024-03-07 ENCOUNTER — OFFICE VISIT (OUTPATIENT)
Dept: FAMILY MEDICINE CLINIC | Age: 64
End: 2024-03-07
Payer: COMMERCIAL

## 2024-03-07 VITALS
HEIGHT: 67 IN | WEIGHT: 201 LBS | BODY MASS INDEX: 31.55 KG/M2 | DIASTOLIC BLOOD PRESSURE: 76 MMHG | SYSTOLIC BLOOD PRESSURE: 126 MMHG | HEART RATE: 64 BPM | OXYGEN SATURATION: 98 %

## 2024-03-07 DIAGNOSIS — R80.9 POSITIVE FOR MACROALBUMINURIA: ICD-10-CM

## 2024-03-07 DIAGNOSIS — I50.20 HFREF (HEART FAILURE WITH REDUCED EJECTION FRACTION) (HCC): ICD-10-CM

## 2024-03-07 DIAGNOSIS — I48.20 CHRONIC A-FIB (HCC): ICD-10-CM

## 2024-03-07 DIAGNOSIS — I10 ESSENTIAL HYPERTENSION: ICD-10-CM

## 2024-03-07 DIAGNOSIS — E11.22 TYPE 2 DIABETES MELLITUS WITH CHRONIC KIDNEY DISEASE, WITHOUT LONG-TERM CURRENT USE OF INSULIN, UNSPECIFIED CKD STAGE (HCC): ICD-10-CM

## 2024-03-07 DIAGNOSIS — L02.611 ABSCESS OR CELLULITIS, TOE, RIGHT: Primary | ICD-10-CM

## 2024-03-07 DIAGNOSIS — R45.4 IRRITABILITY AND ANGER: ICD-10-CM

## 2024-03-07 DIAGNOSIS — J41.0 SIMPLE CHRONIC BRONCHITIS (HCC): ICD-10-CM

## 2024-03-07 DIAGNOSIS — D68.69 SECONDARY HYPERCOAGULABLE STATE (HCC): ICD-10-CM

## 2024-03-07 DIAGNOSIS — L03.031 ABSCESS OR CELLULITIS, TOE, RIGHT: Primary | ICD-10-CM

## 2024-03-07 DIAGNOSIS — S99.921A INJURY OF TOE ON RIGHT FOOT, INITIAL ENCOUNTER: ICD-10-CM

## 2024-03-07 DIAGNOSIS — Z86.73 HISTORY OF STROKE: ICD-10-CM

## 2024-03-07 DIAGNOSIS — N18.32 STAGE 3B CHRONIC KIDNEY DISEASE (HCC): ICD-10-CM

## 2024-03-07 PROCEDURE — 3078F DIAST BP <80 MM HG: CPT | Performed by: STUDENT IN AN ORGANIZED HEALTH CARE EDUCATION/TRAINING PROGRAM

## 2024-03-07 PROCEDURE — 99214 OFFICE O/P EST MOD 30 MIN: CPT | Performed by: STUDENT IN AN ORGANIZED HEALTH CARE EDUCATION/TRAINING PROGRAM

## 2024-03-07 PROCEDURE — 3074F SYST BP LT 130 MM HG: CPT | Performed by: STUDENT IN AN ORGANIZED HEALTH CARE EDUCATION/TRAINING PROGRAM

## 2024-03-07 RX ORDER — GLIMEPIRIDE 2 MG/1
2 TABLET ORAL
Qty: 90 TABLET | Refills: 1 | Status: SHIPPED | OUTPATIENT
Start: 2024-03-07 | End: 2024-09-03

## 2024-03-07 RX ORDER — CEPHALEXIN 500 MG/1
500 CAPSULE ORAL 4 TIMES DAILY
Qty: 28 CAPSULE | Refills: 0 | Status: SHIPPED | OUTPATIENT
Start: 2024-03-07 | End: 2024-03-14

## 2024-03-07 RX ORDER — SERTRALINE HYDROCHLORIDE 100 MG/1
100 TABLET, FILM COATED ORAL DAILY
Qty: 90 TABLET | Refills: 1 | Status: SHIPPED | OUTPATIENT
Start: 2024-03-07 | End: 2024-09-03

## 2024-03-07 RX ORDER — LISINOPRIL 40 MG/1
40 TABLET ORAL DAILY
Qty: 90 TABLET | Refills: 1 | Status: SHIPPED | OUTPATIENT
Start: 2024-03-07

## 2024-03-07 ASSESSMENT — PATIENT HEALTH QUESTIONNAIRE - PHQ9
4. FEELING TIRED OR HAVING LITTLE ENERGY: 3
SUM OF ALL RESPONSES TO PHQ QUESTIONS 1-9: 4
SUM OF ALL RESPONSES TO PHQ QUESTIONS 1-9: 4
9. THOUGHTS THAT YOU WOULD BE BETTER OFF DEAD, OR OF HURTING YOURSELF: 0
10. IF YOU CHECKED OFF ANY PROBLEMS, HOW DIFFICULT HAVE THESE PROBLEMS MADE IT FOR YOU TO DO YOUR WORK, TAKE CARE OF THINGS AT HOME, OR GET ALONG WITH OTHER PEOPLE: 0
SUM OF ALL RESPONSES TO PHQ QUESTIONS 1-9: 4
3. TROUBLE FALLING OR STAYING ASLEEP: 1
5. POOR APPETITE OR OVEREATING: 0
6. FEELING BAD ABOUT YOURSELF - OR THAT YOU ARE A FAILURE OR HAVE LET YOURSELF OR YOUR FAMILY DOWN: 0
1. LITTLE INTEREST OR PLEASURE IN DOING THINGS: 0
8. MOVING OR SPEAKING SO SLOWLY THAT OTHER PEOPLE COULD HAVE NOTICED. OR THE OPPOSITE, BEING SO FIGETY OR RESTLESS THAT YOU HAVE BEEN MOVING AROUND A LOT MORE THAN USUAL: 0
SUM OF ALL RESPONSES TO PHQ QUESTIONS 1-9: 4
SUM OF ALL RESPONSES TO PHQ9 QUESTIONS 1 & 2: 0
7. TROUBLE CONCENTRATING ON THINGS, SUCH AS READING THE NEWSPAPER OR WATCHING TELEVISION: 0
2. FEELING DOWN, DEPRESSED OR HOPELESS: 0

## 2024-03-07 ASSESSMENT — ENCOUNTER SYMPTOMS
WHEEZING: 0
SORE THROAT: 0
CONSTIPATION: 0
COUGH: 0
DIARRHEA: 0
SHORTNESS OF BREATH: 0
RHINORRHEA: 0

## 2024-03-07 NOTE — ASSESSMENT & PLAN NOTE
Changes today = none  BP is controlled  Meds: ACEi, beta-blocker, and lasix  Labs: last CMP, lipid panel, and urine microalbumin on 11/29/23.   Recommend lifestyle modifications such as weight loss, exercising for at least 120min/wk, and low sodium/DASH diet.

## 2024-03-07 NOTE — PROGRESS NOTES
Visual inspection:  Deformity/amputation: absent  Skin lesions/pre-ulcerative calluses: present - right foot 3rd toe small wound  Edema: right- negative, left- negative    Sensory exam:  Monofilament sensation: normal  (minimum of 5 random plantar locations tested, avoiding callused areas - > 1 area with absence of sensation is + for neuropathy)    Plus at least one of the following:  Pulses: abnormal - poorly palpable      
distress.     Appearance: Normal appearance. He is obese. He is not ill-appearing, toxic-appearing or diaphoretic.   HENT:      Head: Normocephalic and atraumatic.      Nose: Nose normal.      Mouth/Throat:      Mouth: Mucous membranes are moist.      Pharynx: Oropharynx is clear.   Eyes:      Extraocular Movements: Extraocular movements intact.      Conjunctiva/sclera: Conjunctivae normal.   Cardiovascular:      Rate and Rhythm: Normal rate and regular rhythm.      Heart sounds: Normal heart sounds.   Pulmonary:      Breath sounds: Normal breath sounds. No wheezing, rhonchi or rales.   Musculoskeletal:         General: Normal range of motion.      Cervical back: No tenderness.      Right lower leg: No edema.      Left lower leg: No edema.      Right foot: Normal range of motion. No deformity.      Left foot: Normal range of motion. No deformity.        Feet:    Feet:      Right foot:      Protective Sensation: 10 sites tested.  10 sites sensed.      Skin integrity: Ulcer present.      Toenail Condition: Right toenails are normal.      Left foot:      Protective Sensation: 10 sites tested.  10 sites sensed.      Skin integrity: Skin integrity normal.      Toenail Condition: Left toenails are normal.   Lymphadenopathy:      Cervical: No cervical adenopathy.   Skin:     General: Skin is warm.      Findings: No lesion or rash.   Neurological:      General: No focal deficit present.      Mental Status: He is alert and oriented to person, place, and time. Mental status is at baseline.   Psychiatric:         Mood and Affect: Mood normal.         Behavior: Behavior normal.         Thought Content: Thought content normal.         Judgment: Judgment normal.         Assessment & Plan     Problem List Items Addressed This Visit          Circulatory    Essential hypertension      Changes today = none  BP is controlled  Meds: ACEi, beta-blocker, and lasix  Labs: last CMP, lipid panel, and urine microalbumin on 11/29/23.

## 2024-03-07 NOTE — ASSESSMENT & PLAN NOTE
-continue eliquis  -referring to [employer] for financial assistance with eliquis  -continue to follow with cardiology  -rate controlled and stable

## 2024-03-07 NOTE — ASSESSMENT & PLAN NOTE
-continue jardiance and amaryl  -referred to med assist for financial assitance with jardiance  -followed by nephro, podiatry, and ophtho  -rechecking A1c today

## 2024-03-07 NOTE — ASSESSMENT & PLAN NOTE
-poorly controlled, but admits to poor medication compliance  -encouraged him to make sure that he is taking Spiriva daily and albuterol as needed.

## 2024-03-08 LAB
EST. AVERAGE GLUCOSE BLD GHB EST-MCNC: 134.1 MG/DL
HBA1C MFR BLD: 6.3 %

## 2024-03-08 RX ORDER — BLOOD-GLUCOSE SENSOR
1 EACH MISCELLANEOUS
Qty: 8 EACH | Refills: 3 | Status: SHIPPED | OUTPATIENT
Start: 2024-03-08

## 2024-03-11 ENCOUNTER — OFFICE VISIT (OUTPATIENT)
Dept: CARDIOLOGY CLINIC | Age: 64
End: 2024-03-11
Payer: COMMERCIAL

## 2024-03-11 VITALS
SYSTOLIC BLOOD PRESSURE: 128 MMHG | DIASTOLIC BLOOD PRESSURE: 76 MMHG | WEIGHT: 203 LBS | HEART RATE: 72 BPM | HEIGHT: 67 IN | BODY MASS INDEX: 31.86 KG/M2

## 2024-03-11 DIAGNOSIS — I25.10 ASCVD (ARTERIOSCLEROTIC CARDIOVASCULAR DISEASE): Primary | ICD-10-CM

## 2024-03-11 PROCEDURE — 99214 OFFICE O/P EST MOD 30 MIN: CPT | Performed by: INTERNAL MEDICINE

## 2024-03-11 PROCEDURE — 3074F SYST BP LT 130 MM HG: CPT | Performed by: INTERNAL MEDICINE

## 2024-03-11 PROCEDURE — 3078F DIAST BP <80 MM HG: CPT | Performed by: INTERNAL MEDICINE

## 2024-03-11 RX ORDER — CARVEDILOL 25 MG/1
25 TABLET ORAL 2 TIMES DAILY
Qty: 60 TABLET | Refills: 5 | Status: SHIPPED | OUTPATIENT
Start: 2024-03-11

## 2024-03-11 RX ORDER — FUROSEMIDE 40 MG/1
40 TABLET ORAL EVERY MORNING
Qty: 30 TABLET | Refills: 0 | Status: SHIPPED | OUTPATIENT
Start: 2024-03-11 | End: 2024-04-10

## 2024-03-11 NOTE — PROGRESS NOTES
CARDIOLOGY NOTE      3/11/2024    RE: Hilaria Rodriguez  (1960)                               TO:  Lindsey Pearce MD            CHIEF COMPLAINT   Hilaria is a 63 y.o. male who was seen today for management of coronary artery disease                        Here for follow-up         HPI:                     Pt has h/o coronary artery disease, hypertension, hyperlipidemia, diabetes, obesity, HFrEF, seen today for follow-up.  Patient has been having shortness of breath and has high OptiVol Hilaria Rodriguez has the following history recorded in care path:  Patient Active Problem List    Diagnosis Date Noted    Former tobacco use 11/10/2022    Positive for macroalbuminuria 09/09/2022    Hx of colonic polyps 09/08/2022    Hemiparesis affecting right side as late effect of cerebrovascular accident (CVA) ([facility]) 07/07/2016    Essential hypertension 05/24/2016    Cerebrovascular accident (CVA) due to thrombosis of left middle cerebral artery ([facility]) 05/14/2016    Obesity (BMI 30-39.9) 06/26/2015    Controlled type 2 diabetes mellitus with diabetic nephropathy, without long-term current use of insulin ([facility])     History of acute inferior wall myocardial infarction     HFrEF (heart failure with reduced ejection fraction) ([facility]) 03/17/2023    Shortness of breath 03/01/2023    SOB (shortness of breath) 03/01/2023    Ventral hernia without obstruction or gangrene 11/10/2022    History of Guillain-[place] syndrome 03/08/2019    Adrenal adenoma 05/23/2014    ASCVD (arteriosclerotic cardiovascular disease)     Frequent PVCs     Unstable angina ([facility]) 03/15/2018    Gastroesophageal reflux disease 04/19/2017    SOBOE (shortness of breath on exertion) 10/25/2016    Hemiparesis of right dominant side as late effect of cerebral infarction ([facility]) 05/14/2016    Gait disturbance 05/14/2016    Mixed hyperlipidemia 12/19/2014    Fatty liver 05/23/2014    Colon, diverticulosis 05/23/2014    Renal cyst 05/23/2014    B12 deficiency

## 2024-03-12 ENCOUNTER — PROCEDURE VISIT (OUTPATIENT)
Dept: CARDIOLOGY CLINIC | Age: 64
End: 2024-03-12

## 2024-03-12 ENCOUNTER — CARE COORDINATION (OUTPATIENT)
Dept: CARE COORDINATION | Age: 64
End: 2024-03-12

## 2024-03-12 DIAGNOSIS — Z95.810 ICD (IMPLANTABLE CARDIOVERTER-DEFIBRILLATOR), DUAL, IN SITU: Primary | ICD-10-CM

## 2024-03-15 NOTE — TELEPHONE ENCOUNTER
MA Rooming Questions  Patient: Afsaneh Martines  MRN: [id number]    Date: 3/15/2024        New Patient        5. Did the patient have a depression screening completed today? Yes    PHQ-9 Total Score: 0 (3/15/2024  3:00 PM)       PHQ-9 Given to (if applicable):               PHQ-9 Score (if applicable):                     [] Positive     [x]  Negative              Does question #9 need addressed (if applicable)                     [] Yes    []  No               Yee Barron CMA     Pt is refusing to go to the ER, pt stated he will go to the walk in clinic 3 weeks or sooner.    I have discussed the above stated plan with the patient and they verbalized understanding and agreed with the plan. Thank you for allowing us to participate in this patient's care.

## 2024-03-18 ENCOUNTER — CARE COORDINATION (OUTPATIENT)
Dept: CARE COORDINATION | Age: 64
End: 2024-03-18

## 2024-03-18 ENCOUNTER — TELEPHONE (OUTPATIENT)
Dept: CARDIOLOGY CLINIC | Age: 64
End: 2024-03-18

## 2024-03-18 NOTE — TELEPHONE ENCOUNTER
Patient called to check on date for a procedure   To correct a leaky valve please advise.  
Wife advised mitral clip is pending approval.  
Statement Selected

## 2024-03-19 PROBLEM — N25.81 SECONDARY HYPERPARATHYROIDISM OF RENAL ORIGIN (HCC): Status: ACTIVE | Noted: 2024-03-19

## 2024-03-21 DIAGNOSIS — I38 VHD (VALVULAR HEART DISEASE): Primary | ICD-10-CM

## 2024-03-22 ENCOUNTER — OFFICE VISIT (OUTPATIENT)
Dept: NEUROLOGY | Age: 64
End: 2024-03-22

## 2024-03-22 VITALS
DIASTOLIC BLOOD PRESSURE: 78 MMHG | HEIGHT: 67 IN | WEIGHT: 203 LBS | HEART RATE: 82 BPM | OXYGEN SATURATION: 98 % | BODY MASS INDEX: 31.86 KG/M2 | SYSTOLIC BLOOD PRESSURE: 138 MMHG

## 2024-03-22 DIAGNOSIS — G81.91 RIGHT HEMIPARESIS (HCC): ICD-10-CM

## 2024-03-22 DIAGNOSIS — Z86.73 HISTORY OF STROKE: Primary | ICD-10-CM

## 2024-03-22 DIAGNOSIS — I65.03 STENOSIS OF BOTH VERTEBRAL ARTERIES: ICD-10-CM

## 2024-03-22 DIAGNOSIS — I73.9 PVD (PERIPHERAL VASCULAR DISEASE) (HCC): ICD-10-CM

## 2024-03-22 NOTE — PROGRESS NOTES
packs/day: 0.00     Average packs/day: 2.0 packs/day for 45.0 years (90.0 ttl pk-yrs)     Types: Cigarettes     Start date: 1971     Quit date: 2016     Years since quittin.8    Smokeless tobacco: Never   Vaping Use    Vaping Use: Never used   Substance and Sexual Activity    Alcohol use: No     Alcohol/week: 0.0 standard drinks of alcohol    Drug use: No    Sexual activity: Yes     Partners: Female   Other Topics Concern    Not on file   Social History Narrative    Wears glasses     Social Determinants of Health     Financial Resource Strain: Low Risk  (2022)    Overall Financial Resource Strain (CARDIA)     Difficulty of Paying Living Expenses: Not very hard   Food Insecurity: No Food Insecurity (2022)    Hunger Vital Sign     Worried About Running Out of Food in the Last Year: Never true     Ran Out of Food in the Last Year: Never true   Transportation Needs: Not on file   Physical Activity: Not on file   Stress: Not on file   Social Connections: Not on file   Intimate Partner Violence: Not on file   Housing Stability: Not on file      Family History   Problem Relation Age of Onset    Hypertension Mother     High Cholesterol Mother     Alcohol Abuse Mother     Depression Mother     Alcohol Abuse Father     Stomach Cancer Father     Depression Sister     Diabetes Brother     Depression Sister     Stomach Cancer Brother     Alcohol Abuse Brother        Physical Exam:       [unfilled]   Wt Readings from Last 3 Encounters:   24 92.1 kg (203 lb)   24 91.6 kg (202 lb)   24 92.1 kg (203 lb)     Temp Readings from Last 3 Encounters:   10/16/23 98.4 °F (36.9 °C) (Oral)   23 98.2 °F (36.8 °C) (Oral)   23 97.6 °F (36.4 °C) (Oral)     BP Readings from Last 3 Encounters:   24 138/78   24 (!) 150/90   24 128/76     Pulse Readings from Last 3 Encounters:   24 82   24 68   24 72        Physical Exam:  Also present during visit:  None

## 2024-03-26 ENCOUNTER — TELEPHONE (OUTPATIENT)
Dept: CARDIOLOGY CLINIC | Age: 64
End: 2024-03-26

## 2024-03-29 ENCOUNTER — TELEPHONE (OUTPATIENT)
Dept: CARDIOLOGY CLINIC | Age: 64
End: 2024-03-29

## 2024-03-29 ENCOUNTER — TRANSCRIBE ORDERS (OUTPATIENT)
Dept: CARDIOLOGY CLINIC | Age: 64
End: 2024-03-29

## 2024-03-29 DIAGNOSIS — Z01.810 PRE-OPERATIVE CARDIOVASCULAR EXAMINATION: Primary | ICD-10-CM

## 2024-04-01 ENCOUNTER — TELEPHONE (OUTPATIENT)
Dept: CARDIOLOGY CLINIC | Age: 64
End: 2024-04-01

## 2024-04-01 DIAGNOSIS — R06.02 SHORTNESS OF BREATH: ICD-10-CM

## 2024-04-01 DIAGNOSIS — I38 VHD (VALVULAR HEART DISEASE): ICD-10-CM

## 2024-04-01 DIAGNOSIS — Z01.810 PRE-OPERATIVE CARDIOVASCULAR EXAMINATION: Primary | ICD-10-CM

## 2024-04-01 DIAGNOSIS — I25.10 CAD (CORONARY ARTERY DISEASE): ICD-10-CM

## 2024-04-01 NOTE — TELEPHONE ENCOUNTER
Patient was here educated on R&LHC for Dx: sob, cad, vhd.  Procedure is scheduled for 4/2/24 @ 11am, w/arrival @ 9am, @ [facility]. Pre-admission orders were given to patient for labs & CXR, which are due upon arrival @ [facility].       Procedure and risks were explained to patient. Consent forms will be signed upon arrival.     Patient was notified that procedure could be delayed due to an emergency.

## 2024-04-01 NOTE — TELEPHONE ENCOUNTER
[facility]     Dr. Tawana Andrade     LEFT HEART CATHETERIZATION & RIGHT HEART CATHETERIZATION WITH POSSIBLE PERCUTANEOUS CORONARY INTERVENTION      Patient Name: Hilaria Rodriguez   : 1960  MRN# [id number]    Date of Procedure: 24 Time: 11am Arrival Time: 9am    The catheterization and angiogram are usually outpatient procedures, however if stenting is needed you may need to stay overnight. You will need to arrive at the hospital two hours before the procedure.  You will go to registration in the main lobby.  You will need to arrange for someone to drive you home.      HOSPITAL:  [facility] ([facility])      X   If you have received orders for blood work and or a chest x-ray, please have         them done on assigned date at [facility],           [facility], or [facility].     X Please do not have anything by mouth after midnight prior to or 8 hours before   the procedure.    X You may take your medications with a sip of water in the morning of your               procedure or take them with you to the hospital                    X If you are taking Lasix (furosemide)   please do not take it the morning of your procedure.     X If you take  Eliquis, you should hold it for 48 hours before your procedure.     X If you take Viagra (Sildenafil) or Cialis (Tadalafil) you will need to hold it for 3 days before your procedure.

## 2024-04-02 ENCOUNTER — HOSPITAL ENCOUNTER (OUTPATIENT)
Age: 64
Setting detail: OUTPATIENT SURGERY
Discharge: HOME | End: 2024-04-02
Attending: INTERNAL MEDICINE | Admitting: INTERNAL MEDICINE
Payer: COMMERCIAL

## 2024-04-02 VITALS
TEMPERATURE: 96.7 F | WEIGHT: 203 LBS | HEIGHT: 67 IN | RESPIRATION RATE: 18 BRPM | SYSTOLIC BLOOD PRESSURE: 165 MMHG | DIASTOLIC BLOOD PRESSURE: 91 MMHG | HEART RATE: 61 BPM | BODY MASS INDEX: 31.86 KG/M2 | OXYGEN SATURATION: 100 %

## 2024-04-02 DIAGNOSIS — I25.10 CAD (CORONARY ARTERY DISEASE): ICD-10-CM

## 2024-04-02 DIAGNOSIS — I38 VHD (VALVULAR HEART DISEASE): ICD-10-CM

## 2024-04-02 DIAGNOSIS — R06.02 SOB (SHORTNESS OF BREATH): ICD-10-CM

## 2024-04-02 LAB
ANION GAP SERPL CALCULATED.3IONS-SCNC: 9 MMOL/L (ref 7–16)
APTT: 30.4 SECONDS (ref 25.1–37.1)
BASE EXCESS MIXED: 0.7 (ref 0–3)
BASE EXCESS MIXED: 1.4 (ref 0–3)
BUN SERPL-MCNC: 18 MG/DL (ref 6–23)
CALCIUM SERPL-MCNC: 9 MG/DL (ref 8.3–10.6)
CARBON MONOXIDE, BLOOD: 2.2 % (ref 0–5)
CARBON MONOXIDE, BLOOD: 2.3 % (ref 0–5)
CHLORIDE BLD-SCNC: 106 MMOL/L (ref 99–110)
CO2 CONTENT: 26.6 MMOL/L (ref 21–32)
CO2 CONTENT: 29.7 MMOL/L (ref 21–32)
CO2: 23 MMOL/L (ref 21–32)
COMMENT: ABNORMAL
COMMENT: ABNORMAL
CREAT SERPL-MCNC: 1.6 MG/DL (ref 0.9–1.3)
ECHO BSA: 2.09 M2
GFR SERPL CREATININE-BSD FRML MDRD: 48 ML/MIN/1.73M2
GLUCOSE SERPL-MCNC: 143 MG/DL (ref 70–99)
HCO3 ARTERIAL: 25.4 MMOL/L (ref 21–28)
HCO3 ARTERIAL: 28.1 MMOL/L (ref 21–28)
HCT VFR BLD CALC: 42.6 % (ref 42–52)
HEMOGLOBIN: 14.1 GM/DL (ref 13.5–18)
INR BLD: 1.2 INDEX
MCH RBC QN AUTO: 32 PG (ref 27–31)
MCHC RBC AUTO-ENTMCNC: 33.1 % (ref 32–36)
MCV RBC AUTO: 96.6 FL (ref 78–100)
METHEMOGLOBIN ARTERIAL: 0.9 %
METHEMOGLOBIN ARTERIAL: 1 %
O2 SATURATION: 87.2 % (ref 94–98)
O2 SATURATION: 93.3 % (ref 94–98)
PCO2 ARTERIAL: 40 MMHG (ref 35–48)
PCO2 ARTERIAL: 52 MMHG (ref 35–48)
PDW BLD-RTO: 14 % (ref 11.7–14.9)
PH BLOOD: 7.34 (ref 7.35–7.45)
PH BLOOD: 7.41 (ref 7.35–7.45)
PLATELET # BLD: 166 K/CU MM (ref 140–440)
PMV BLD AUTO: 11.7 FL (ref 7.5–11.1)
PO2 ARTERIAL: 56 MMHG (ref 83–108)
PO2 ARTERIAL: 73 MMHG (ref 83–108)
POTASSIUM SERPL-SCNC: 4.4 MMOL/L (ref 3.5–5.1)
PROTHROMBIN TIME: 15.2 SECONDS (ref 11.7–14.5)
RBC # BLD: 4.41 M/CU MM (ref 4.6–6.2)
SODIUM BLD-SCNC: 138 MMOL/L (ref 135–145)
WBC # BLD: 4.8 K/CU MM (ref 4–10.5)

## 2024-04-02 PROCEDURE — 80048 BASIC METABOLIC PNL TOTAL CA: CPT

## 2024-04-02 PROCEDURE — 93461 R&L HRT ART/VENTRICLE ANGIO: CPT | Performed by: INTERNAL MEDICINE

## 2024-04-02 PROCEDURE — 7100000010 HC PHASE II RECOVERY - FIRST 15 MIN: Performed by: INTERNAL MEDICINE

## 2024-04-02 PROCEDURE — 36600 WITHDRAWAL OF ARTERIAL BLOOD: CPT

## 2024-04-02 PROCEDURE — 85610 PROTHROMBIN TIME: CPT

## 2024-04-02 PROCEDURE — 2709999900 HC NON-CHARGEABLE SUPPLY: Performed by: INTERNAL MEDICINE

## 2024-04-02 PROCEDURE — C1751 CATH, INF, PER/CENT/MIDLINE: HCPCS | Performed by: INTERNAL MEDICINE

## 2024-04-02 PROCEDURE — 7100000011 HC PHASE II RECOVERY - ADDTL 15 MIN: Performed by: INTERNAL MEDICINE

## 2024-04-02 PROCEDURE — 6360000004 HC RX CONTRAST MEDICATION: Performed by: INTERNAL MEDICINE

## 2024-04-02 PROCEDURE — 85730 THROMBOPLASTIN TIME PARTIAL: CPT

## 2024-04-02 PROCEDURE — 6360000002 HC RX W HCPCS: Performed by: INTERNAL MEDICINE

## 2024-04-02 PROCEDURE — C1769 GUIDE WIRE: HCPCS | Performed by: INTERNAL MEDICINE

## 2024-04-02 PROCEDURE — 2580000003 HC RX 258: Performed by: INTERNAL MEDICINE

## 2024-04-02 PROCEDURE — C1894 INTRO/SHEATH, NON-LASER: HCPCS | Performed by: INTERNAL MEDICINE

## 2024-04-02 PROCEDURE — 93460 R&L HRT ART/VENTRICLE ANGIO: CPT | Performed by: INTERNAL MEDICINE

## 2024-04-02 PROCEDURE — 82803 BLOOD GASES ANY COMBINATION: CPT

## 2024-04-02 PROCEDURE — 2500000003 HC RX 250 WO HCPCS: Performed by: INTERNAL MEDICINE

## 2024-04-02 PROCEDURE — 85027 COMPLETE CBC AUTOMATED: CPT

## 2024-04-02 RX ORDER — SODIUM CHLORIDE 0.9 % (FLUSH) 0.9 %
5-40 SYRINGE (ML) INJECTION EVERY 12 HOURS SCHEDULED
Status: DISCONTINUED | OUTPATIENT
Start: 2024-04-02 | End: 2024-04-02 | Stop reason: HOSPADM

## 2024-04-02 RX ORDER — 0.9 % SODIUM CHLORIDE 0.9 %
INTRAVENOUS SOLUTION INTRAVENOUS CONTINUOUS PRN
Status: COMPLETED | OUTPATIENT
Start: 2024-04-02 | End: 2024-04-02

## 2024-04-02 RX ORDER — SODIUM CHLORIDE 0.9 % (FLUSH) 0.9 %
5-40 SYRINGE (ML) INJECTION PRN
Status: DISCONTINUED | OUTPATIENT
Start: 2024-04-02 | End: 2024-04-02 | Stop reason: HOSPADM

## 2024-04-02 RX ORDER — MIDAZOLAM HYDROCHLORIDE 1 MG/ML
INJECTION INTRAMUSCULAR; INTRAVENOUS PRN
Status: DISCONTINUED | OUTPATIENT
Start: 2024-04-02 | End: 2024-04-02 | Stop reason: HOSPADM

## 2024-04-02 RX ORDER — FUROSEMIDE 10 MG/ML
INJECTION INTRAMUSCULAR; INTRAVENOUS PRN
Status: DISCONTINUED | OUTPATIENT
Start: 2024-04-02 | End: 2024-04-02 | Stop reason: HOSPADM

## 2024-04-02 RX ORDER — ACETAMINOPHEN 325 MG/1
650 TABLET ORAL EVERY 4 HOURS PRN
Status: DISCONTINUED | OUTPATIENT
Start: 2024-04-02 | End: 2024-04-02 | Stop reason: HOSPADM

## 2024-04-02 RX ORDER — SODIUM CHLORIDE 9 MG/ML
INJECTION, SOLUTION INTRAVENOUS PRN
Status: DISCONTINUED | OUTPATIENT
Start: 2024-04-02 | End: 2024-04-02 | Stop reason: HOSPADM

## 2024-04-02 RX ORDER — HEPARIN SODIUM 200 [USP'U]/100ML
INJECTION, SOLUTION INTRAVENOUS PRN
Status: DISCONTINUED | OUTPATIENT
Start: 2024-04-02 | End: 2024-04-02 | Stop reason: HOSPADM

## 2024-04-02 NOTE — FLOWSHEET NOTE
Pt to pt  in wheelchair per volunteer for d/c home in private vehicle with family. Left radial/right groin sites free of bleeding/hematoma at time of d/c

## 2024-04-02 NOTE — PROGRESS NOTES
Discharge instructions reviewed. Questions answered and patient verbalized understanding. Ambulated in hallway without difficulty. PIV removed. Site WNL.

## 2024-04-02 NOTE — H&P
CHIEF COMPLAINT   Hilaria is a 63 y.o. male who was seen today for management of coronary artery disease                        Here for cath pre KAITY         HPI:                       Pt has h/o coronary artery disease, hypertension, hyperlipidemia, diabetes, obesity, HFrEF, seen today for follow-up.  Patient has been having shortness of breath and has high OptiVol Hilaria Rodriguez has the following history recorded in care path:       Patient Active Problem List     Diagnosis Date Noted    Former tobacco use 11/10/2022    Positive for macroalbuminuria 09/09/2022    Hx of colonic polyps 09/08/2022    Hemiparesis affecting right side as late effect of cerebrovascular accident (CVA) ([facility]) 07/07/2016    Essential hypertension 05/24/2016    Cerebrovascular accident (CVA) due to thrombosis of left middle cerebral artery ([facility]) 05/14/2016    Obesity (BMI 30-39.9) 06/26/2015    Controlled type 2 diabetes mellitus with diabetic nephropathy, without long-term current use of insulin ([facility])      History of acute inferior wall myocardial infarction      HFrEF (heart failure with reduced ejection fraction) ([facility]) 03/17/2023    Shortness of breath 03/01/2023    SOB (shortness of breath) 03/01/2023    Ventral hernia without obstruction or gangrene 11/10/2022    History of Guillain-Byron syndrome 03/08/2019    Adrenal adenoma 05/23/2014    ASCVD (arteriosclerotic cardiovascular disease)      Frequent PVCs      Unstable angina ([facility]) 03/15/2018    Gastroesophageal reflux disease 04/19/2017    SOBOE (shortness of breath on exertion) 10/25/2016    Hemiparesis of right dominant side as late effect of cerebral infarction ([facility]) 05/14/2016    Gait disturbance 05/14/2016    Mixed hyperlipidemia 12/19/2014    Fatty liver 05/23/2014    Colon, diverticulosis 05/23/2014    Renal cyst 05/23/2014    B12 deficiency 01/10/2014    Chest pain 07/30/2013    History of PTCA      Hyperlipidemia      Secondary hypercoagulable state ([facility]) 03/07/2024    History

## 2024-04-02 NOTE — DISCHARGE INSTRUCTIONS
Discharge Home Instuctions  Name:Hilaria Rodriguez  :1960    Your instructions:    Shower only for the first 5 days, NO TUB BATHS.      Wash procedure site with mild soap, rinse and pat dry.  Do not rub over the procedure site for two (2) days.  Remove dressing and replace with a band-aid in 2 days.    Site observations-Observe the area for bleeding or hematoma (lump under the skin caused by bleeding.)      A.  Painless bruising is normal.  B.  If a firmness/swelling seems to be increasing or there is bright red bleeding from site       (hold pressure over procedure site) and  CALL 911 IMMEDIATELY.    What to do after you leave the hospital:    Recommended activity: no lifting, Driving, or Strenuous exercise for 3 days.  DO NOT lift more than 10lbs.    For your procedure you may have been given a sedative to help you relax. This drug will make you sleepy. It is usually given in a vein (by IV).  Don't do anything for 24 hours that requires attention to detail. It takes time for the medicine effects to completely wear off.  Do not sign any legally binding contracts or documents over the next 24 hours.  For your safety, you should not drive or operate any machinery that could be dangerous until the medicine wears off and you can think clearly and react easily.    Follow-up with physician in 7-10 days.    Take your medication as ordered.      If you have any questions, you may call [phone] or your physicians office

## 2024-04-04 ENCOUNTER — OFFICE VISIT (OUTPATIENT)
Dept: CARDIOLOGY CLINIC | Age: 64
End: 2024-04-04
Payer: COMMERCIAL

## 2024-04-04 VITALS
WEIGHT: 199.2 LBS | SYSTOLIC BLOOD PRESSURE: 138 MMHG | OXYGEN SATURATION: 97 % | HEART RATE: 72 BPM | HEIGHT: 67 IN | BODY MASS INDEX: 31.27 KG/M2 | DIASTOLIC BLOOD PRESSURE: 88 MMHG

## 2024-04-04 DIAGNOSIS — I25.10 CORONARY ARTERY DISEASE INVOLVING NATIVE CORONARY ARTERY OF NATIVE HEART WITHOUT ANGINA PECTORIS: Primary | ICD-10-CM

## 2024-04-04 PROCEDURE — 99214 OFFICE O/P EST MOD 30 MIN: CPT | Performed by: INTERNAL MEDICINE

## 2024-04-04 PROCEDURE — 3075F SYST BP GE 130 - 139MM HG: CPT | Performed by: INTERNAL MEDICINE

## 2024-04-04 PROCEDURE — 3079F DIAST BP 80-89 MM HG: CPT | Performed by: INTERNAL MEDICINE

## 2024-04-04 NOTE — PATIENT INSTRUCTIONS
**It is YOUR responsibilty to bring medication bottles and/or updated medication list to EACH APPOINTMENT. This will allow us to better serve you and all your healthcare needs**  Thank you for allowing us to care for you today!   We want to ensure we can follow your treatment plan and we strive to give you the best outcomes and experience possible.   If you ever have a life threatening emergency and call 911 - for an ambulance (EMS)   Our providers can only care for you at:   [facility] or [facility].   Even if you have someone take you or you drive yourself we can only care for you in a [facility]. Our providers are not setup at the other healthcare locations!   Please be informed that if you contact our office outside of normal business hours the physician on call cannot help with any scheduling or rescheduling issues, procedure instruction questions or any type of medication issue.    We advise you for any urgent/emergency that you go to the nearest emergency room!    PLEASE CALL OUR OFFICE DURING NORMAL BUSINESS HOURS    Monday - Friday   8 am to 5 pm    [place]: [phone]    [place]: [phone]    [place]:  [phone]  We are committed to providing you the best care possible.    If you receive a survey after visiting one of our offices, please take time to share your experience concerning your physician office visit.  These surveys are confidential and no health information about you is shared.    We are eager to improve for you and we are counting on your feedback to help make that happen.

## 2024-04-04 NOTE — PROGRESS NOTES
CHIEF COMPLAINT   Hilaria is a 63 y.o. male who was seen today for management of coronary artery disease                        Here to discuss KAITY         HPI:                       Pt has h/o coronary artery disease, hypertension, hyperlipidemia, diabetes, obesity, HFrEF, seen today for follow-up.  Patient has been having shortness of breath can walk 20 feet Hilaria Rodriguez has the following history recorded in care path:       Patient Active Problem List     Diagnosis Date Noted    Former tobacco use 11/10/2022    Positive for macroalbuminuria 09/09/2022    Hx of colonic polyps 09/08/2022    Hemiparesis affecting right side as late effect of cerebrovascular accident (CVA) ([facility]) 07/07/2016    Essential hypertension 05/24/2016    Cerebrovascular accident (CVA) due to thrombosis of left middle cerebral artery ([facility]) 05/14/2016    Obesity (BMI 30-39.9) 06/26/2015    Controlled type 2 diabetes mellitus with diabetic nephropathy, without long-term current use of insulin ([facility])      History of acute inferior wall myocardial infarction      HFrEF (heart failure with reduced ejection fraction) ([facility]) 03/17/2023    Shortness of breath 03/01/2023    SOB (shortness of breath) 03/01/2023    Ventral hernia without obstruction or gangrene 11/10/2022    History of Guillain-Sherrill syndrome 03/08/2019    Adrenal adenoma 05/23/2014    ASCVD (arteriosclerotic cardiovascular disease)      Frequent PVCs      Unstable angina ([facility]) 03/15/2018    Gastroesophageal reflux disease 04/19/2017    SOBOE (shortness of breath on exertion) 10/25/2016    Hemiparesis of right dominant side as late effect of cerebral infarction ([facility]) 05/14/2016    Gait disturbance 05/14/2016    Mixed hyperlipidemia 12/19/2014    Fatty liver 05/23/2014    Colon, diverticulosis 05/23/2014    Renal cyst 05/23/2014    B12 deficiency 01/10/2014    Chest pain 07/30/2013    History of PTCA      Hyperlipidemia      Secondary hypercoagulable state ([facility]) 03/07/2024    History of

## 2024-04-09 ENCOUNTER — TELEPHONE (OUTPATIENT)
Dept: CARDIOLOGY CLINIC | Age: 64
End: 2024-04-09

## 2024-04-09 NOTE — TELEPHONE ENCOUNTER
[facility]     Dr. Tawana Andrade     Transesophageal Echocardiogram    Patient Name: Hilaria Rodriguez   : 1960   MRN# [id number]     Date of Procedure: 24 Time: 9:30am Arrival Time: 8:30am   (Arrival time is scheduled for one (1) hour before procedure is scheduled.)     Hospital: [facility])     X   If you have received orders for blood work and or a chest x-ray, please have         them done on assigned date at [facility],         [facility], or [facility].    X   Please do not have anything by mouth after midnight prior to or 8 hours before         the procedure.    X   You may take your medication with a sip of water unless advised otherwise below.       X Please continue to take Eliquis (apixaban) as directed.     X If you are taking Lasix (furosemide) please do not take it the morning before your procedure.

## 2024-04-09 NOTE — TELEPHONE ENCOUNTER
Patient notified and confirmed NGA scheduled for 4/23/24 @ 9:30 w/arrival 8:30. Instruction call scheduled 4/18/24.

## 2024-04-10 ENCOUNTER — OFFICE VISIT (OUTPATIENT)
Dept: CARDIOLOGY CLINIC | Age: 64
End: 2024-04-10
Payer: COMMERCIAL

## 2024-04-10 VITALS
WEIGHT: 193.6 LBS | DIASTOLIC BLOOD PRESSURE: 82 MMHG | SYSTOLIC BLOOD PRESSURE: 136 MMHG | HEIGHT: 67 IN | HEART RATE: 70 BPM | BODY MASS INDEX: 30.39 KG/M2

## 2024-04-10 DIAGNOSIS — I25.10 CORONARY ARTERY DISEASE INVOLVING NATIVE CORONARY ARTERY OF NATIVE HEART WITHOUT ANGINA PECTORIS: Primary | ICD-10-CM

## 2024-04-10 PROCEDURE — 93000 ELECTROCARDIOGRAM COMPLETE: CPT | Performed by: NURSE PRACTITIONER

## 2024-04-22 ENCOUNTER — ANESTHESIA EVENT (OUTPATIENT)
Dept: NON INVASIVE DIAGNOSTICS | Age: 64
End: 2024-04-22
Payer: COMMERCIAL

## 2024-04-22 NOTE — ANESTHESIA PRE PROCEDURE
Department of Anesthesiology  Preprocedure Note       Name:  Hilaria Rodriguez   Age:  63 y.o.  :  1960                                          MRN:  [id number]         Date:  2024      Surgeon: * Surgery not found *    Procedure:     Medications prior to admission:   Prior to Admission medications    Medication Sig Start Date End Date Taking? Authorizing Provider   pantoprazole (PROTONIX) 40 MG tablet Take 1 tablet by mouth daily    Provider, MD Leander   empagliflozin (JARDIANCE) 10 MG tablet Take 1 tablet by mouth daily 3/19/24 3/14/25  Gabe Barnes MD   furosemide (LASIX) 40 MG tablet Take 1 tablet by mouth every morning Patient takes 40 mg in the morning and then 20 mg in the evening. 3/11/24 4/10/24  Tawana Andrade MD   apixaban (ELIQUIS) 5 MG TABS tablet Take 1 tablet by mouth 2 times daily 3/11/24   Tawana Andrade MD   carvedilol (COREG) 25 MG tablet Take 1 tablet by mouth 2 times daily 3/11/24   Tawana Andrade MD   Continuous Blood Gluc Sensor (FREESTYLE JE 3 SENSOR) MISC 1 each by Does not apply route every 14 days 3/8/24   Lindsey Dos Santos MD   lisinopril (PRINIVIL;ZESTRIL) 40 MG tablet Take 1 tablet by mouth daily 3/7/24   Lindsey Dos Santos MD   sertraline (ZOLOFT) 100 MG tablet Take 1 tablet by mouth daily 3/7/24 9/3/24  Lindsey Dos Santos MD   glimepiride (AMARYL) 2 MG tablet Take 1 tablet by mouth every morning (before breakfast) 3/7/24 9/3/24  Lindsey Dos Santos MD   Continuous Blood Gluc  (FREESTYLE JE 3 READER) JUANITO 1 each by Does not apply route 4 times daily (before meals and nightly) 24   Lindsey Dos Santos MD   Blood Glucose Monitoring Suppl (BLOOD GLUCOSE MONITOR SYSTEM) w/Device KIT Use twice per day 24   Lindsey Dos Santos MD   aspirin 81 MG chewable tablet Take 1 tablet by mouth daily 9/26/23 4/10/24  Elaina Abdul MD   atorvastatin (LIPITOR) 80 MG tablet Take 1 tablet by mouth daily 9/26/23 4/10/24  Elaina Abdul MD   hydrALAZINE (APRESOLINE) 25 MG tablet Take 1

## 2024-04-23 ENCOUNTER — ANESTHESIA (OUTPATIENT)
Dept: NON INVASIVE DIAGNOSTICS | Age: 64
End: 2024-04-23
Payer: COMMERCIAL

## 2024-04-23 ENCOUNTER — HOSPITAL ENCOUNTER (OUTPATIENT)
Dept: NON INVASIVE DIAGNOSTICS | Age: 64
Discharge: HOME | End: 2024-04-25
Attending: INTERNAL MEDICINE
Payer: COMMERCIAL

## 2024-04-23 VITALS
BODY MASS INDEX: 30.29 KG/M2 | HEART RATE: 67 BPM | DIASTOLIC BLOOD PRESSURE: 81 MMHG | SYSTOLIC BLOOD PRESSURE: 148 MMHG | HEIGHT: 67 IN | RESPIRATION RATE: 19 BRPM | WEIGHT: 193 LBS | OXYGEN SATURATION: 93 %

## 2024-04-23 DIAGNOSIS — I05.9 MITRAL VALVE DISEASE: ICD-10-CM

## 2024-04-23 LAB
ECHO BSA: 2.03 M2
ECHO MV MAX VELOCITY: 1.5 M/S
ECHO MV MEAN GRADIENT: 2 MMHG
ECHO MV MEAN VELOCITY: 0.6 M/S
ECHO MV PEAK GRADIENT: 9 MMHG
ECHO MV REGURGITANT ALIASING (NYQUIST) VELOCITY: 37 CM/S
ECHO MV REGURGITANT PEAK GRADIENT: 196 MMHG
ECHO MV REGURGITANT PEAK VELOCITY: 7 M/S
ECHO MV REGURGITANT RADIUS PISA: 0.7 CM
ECHO MV REGURGITANT VTIA: 229 CM
ECHO MV VTI: 26.2 CM

## 2024-04-23 PROCEDURE — 99152 MOD SED SAME PHYS/QHP 5/>YRS: CPT | Performed by: INTERNAL MEDICINE

## 2024-04-23 PROCEDURE — 7100000010 HC PHASE II RECOVERY - FIRST 15 MIN: Performed by: INTERNAL MEDICINE

## 2024-04-23 PROCEDURE — 93325 DOPPLER ECHO COLOR FLOW MAPG: CPT

## 2024-04-23 PROCEDURE — 6360000002 HC RX W HCPCS: Performed by: INTERNAL MEDICINE

## 2024-04-23 PROCEDURE — 7100000011 HC PHASE II RECOVERY - ADDTL 15 MIN: Performed by: INTERNAL MEDICINE

## 2024-04-23 PROCEDURE — 6370000000 HC RX 637 (ALT 250 FOR IP): Performed by: INTERNAL MEDICINE

## 2024-04-23 RX ORDER — MIDAZOLAM HYDROCHLORIDE 1 MG/ML
INJECTION INTRAMUSCULAR; INTRAVENOUS PRN
Status: COMPLETED | OUTPATIENT
Start: 2024-04-23 | End: 2024-04-23

## 2024-04-23 RX ORDER — LIDOCAINE HYDROCHLORIDE 20 MG/ML
SOLUTION OROPHARYNGEAL PRN
Status: COMPLETED | OUTPATIENT
Start: 2024-04-23 | End: 2024-04-23

## 2024-04-23 RX ADMIN — LIDOCAINE HYDROCHLORIDE 15 ML: 20 SOLUTION ORAL; TOPICAL at 09:15

## 2024-04-23 RX ADMIN — MIDAZOLAM 1 MG: 1 INJECTION INTRAMUSCULAR; INTRAVENOUS at 09:27

## 2024-04-23 RX ADMIN — MIDAZOLAM 1 MG: 1 INJECTION INTRAMUSCULAR; INTRAVENOUS at 09:21

## 2024-04-23 RX ADMIN — MIDAZOLAM 1 MG: 1 INJECTION INTRAMUSCULAR; INTRAVENOUS at 09:25

## 2024-04-23 RX ADMIN — MIDAZOLAM 1 MG: 1 INJECTION INTRAMUSCULAR; INTRAVENOUS at 09:28

## 2024-04-23 RX ADMIN — MIDAZOLAM 1 MG: 1 INJECTION INTRAMUSCULAR; INTRAVENOUS at 09:18

## 2024-04-23 RX ADMIN — MIDAZOLAM 1 MG: 1 INJECTION INTRAMUSCULAR; INTRAVENOUS at 09:17

## 2024-04-23 NOTE — DISCHARGE INSTRUCTIONS
You are currently scheduled to come back to the hospital on Thursday for a Mitral Clip clinic appointment.  You will receive a call to confirm if that appointment needs rescheduled pending dental clearance.

## 2024-04-24 ENCOUNTER — TELEPHONE (OUTPATIENT)
Age: 64
End: 2024-04-24

## 2024-04-24 NOTE — TELEPHONE ENCOUNTER
Message left for sister, Sonja, to call valve clinic to discuss appt to proceed with Natalia Clip. [phone]

## 2024-04-25 ENCOUNTER — HOSPITAL ENCOUNTER (OUTPATIENT)
Age: 64
Discharge: HOME | End: 2024-04-25
Payer: COMMERCIAL

## 2024-04-25 ENCOUNTER — HOSPITAL ENCOUNTER (OUTPATIENT)
Dept: PULMONOLOGY | Age: 64
Discharge: HOME | End: 2024-04-25
Payer: COMMERCIAL

## 2024-04-25 ENCOUNTER — HOSPITAL ENCOUNTER (OUTPATIENT)
Age: 64
Discharge: HOME | End: 2024-04-27
Payer: COMMERCIAL

## 2024-04-25 ENCOUNTER — HOSPITAL ENCOUNTER (OUTPATIENT)
Dept: GENERAL RADIOLOGY | Age: 64
Discharge: HOME | End: 2024-04-25
Payer: COMMERCIAL

## 2024-04-25 VITALS
SYSTOLIC BLOOD PRESSURE: 202 MMHG | HEART RATE: 77 BPM | DIASTOLIC BLOOD PRESSURE: 119 MMHG | OXYGEN SATURATION: 100 % | WEIGHT: 199 LBS | HEIGHT: 67 IN | BODY MASS INDEX: 31.23 KG/M2

## 2024-04-25 DIAGNOSIS — I38 VHD (VALVULAR HEART DISEASE): Primary | ICD-10-CM

## 2024-04-25 DIAGNOSIS — Z01.811 PRE-OP CHEST EXAM: ICD-10-CM

## 2024-04-25 LAB
ABO/RH: NORMAL
ALBUMIN SERPL-MCNC: 4.4 GM/DL (ref 3.4–5)
ALP BLD-CCNC: 75 IU/L (ref 40–129)
ALT SERPL-CCNC: 14 U/L (ref 10–40)
ANION GAP SERPL CALCULATED.3IONS-SCNC: 10 MMOL/L (ref 7–16)
ANTIBODY SCREEN: NEGATIVE
APTT: 27.3 SECONDS (ref 25.1–37.1)
AST SERPL-CCNC: 17 IU/L (ref 15–37)
BACTERIA: NEGATIVE /HPF
BILIRUB SERPL-MCNC: 0.4 MG/DL (ref 0–1)
BILIRUBIN URINE: NEGATIVE MG/DL
BLOOD, URINE: ABNORMAL
BUN SERPL-MCNC: 29 MG/DL (ref 6–23)
CALCIUM SERPL-MCNC: 9.2 MG/DL (ref 8.3–10.6)
CHLORIDE BLD-SCNC: 103 MMOL/L (ref 99–110)
CLARITY: CLEAR
CO2: 26 MMOL/L (ref 21–32)
COLOR: YELLOW
COMMENT: NORMAL
CREAT SERPL-MCNC: 1.7 MG/DL (ref 0.9–1.3)
DLCO %PRED: NORMAL
DLCO PRED: NORMAL
DLCO/VA %PRED: NORMAL
DLCO/VA PRED: NORMAL
DLCO/VA: NORMAL
DLCO: NORMAL
EKG ATRIAL RATE: 79 BPM
EKG DIAGNOSIS: NORMAL
EKG P AXIS: 62 DEGREES
EKG P-R INTERVAL: 160 MS
EKG Q-T INTERVAL: 410 MS
EKG QRS DURATION: 126 MS
EKG QTC CALCULATION (BAZETT): 470 MS
EKG R AXIS: 21 DEGREES
EKG T AXIS: 191 DEGREES
EKG VENTRICULAR RATE: 79 BPM
EXPIRATORY TIME: NORMAL
FEF 25-75% %PRED-PRE: 19 L/SEC
FEF 25-75% PRED: NORMAL
FEF 25-75-PRE: NORMAL
FEV1 %PRED-PRE: 44 %
FEV1 PRED: NORMAL
FEV1/FVC %PRED-PRE: 87 %
FEV1/FVC PRED: NORMAL
FEV1/FVC: NORMAL
FEV1: NORMAL
FVC %PRED-PRE: 53 %
FVC PRED: NORMAL
FVC: NORMAL
GAW %PRED: NORMAL
GAW PRED: NORMAL
GAW: NORMAL
GFR SERPL CREATININE-BSD FRML MDRD: 44 ML/MIN/1.73M2
GLUCOSE SERPL-MCNC: 94 MG/DL (ref 70–99)
GLUCOSE, URINE: 250 MG/DL
HCT VFR BLD CALC: 45.5 % (ref 42–52)
HEMOGLOBIN: 14.6 GM/DL (ref 13.5–18)
HYALINE CASTS: 0 /LPF
IC PRE %PRED: NORMAL
IC PRED: NORMAL
IC: NORMAL
INR BLD: 1.1 INDEX
KETONES, URINE: NEGATIVE MG/DL
LEUKOCYTE ESTERASE, URINE: NEGATIVE
MCH RBC QN AUTO: 31.6 PG (ref 27–31)
MCHC RBC AUTO-ENTMCNC: 32.1 % (ref 32–36)
MCV RBC AUTO: 98.5 FL (ref 78–100)
MUCUS: ABNORMAL HPF
MVV %PRED-PRE: NORMAL
MVV PRED: NORMAL
MVV-PRE: NORMAL
NITRITE URINE, QUANTITATIVE: NEGATIVE
PDW BLD-RTO: 13.5 % (ref 11.7–14.9)
PEF %PRED-PRE: NORMAL
PEF PRED: NORMAL
PEF-PRE: NORMAL
PH, URINE: 5.5 (ref 5–8)
PLATELET # BLD: 145 K/CU MM (ref 140–440)
PMV BLD AUTO: 12.3 FL (ref 7.5–11.1)
POTASSIUM SERPL-SCNC: 4.6 MMOL/L (ref 3.5–5.1)
PRO-BNP: 5105 PG/ML
PROTEIN UA: 100 MG/DL
PROTHROMBIN TIME: 14.2 SECONDS (ref 11.7–14.5)
RAW %PRED: NORMAL
RAW PRED: NORMAL
RAW: NORMAL
RBC # BLD: 4.62 M/CU MM (ref 4.6–6.2)
RBC URINE: 1 /HPF (ref 0–3)
RV PRE %PRED: NORMAL
RV PRED: NORMAL
RV: NORMAL
SODIUM BLD-SCNC: 139 MMOL/L (ref 135–145)
SPECIFIC GRAVITY UA: >1.03 (ref 1–1.03)
SQUAMOUS EPITHELIAL: 1 /HPF
SVC %PRED: NORMAL
SVC PRED: NORMAL
SVC: NORMAL
TLC PRE %PRED: NORMAL
TLC PRED: NORMAL
TLC: NORMAL
TOTAL PROTEIN: 7 GM/DL (ref 6.4–8.2)
TRICHOMONAS: ABNORMAL /HPF
UROBILINOGEN, URINE: 0.2 MG/DL (ref 0.2–1)
VA %PRED: NORMAL
VA PRED: NORMAL
VA: NORMAL
VTG %PRED: NORMAL
VTG PRED: NORMAL
VTG: NORMAL
WBC # BLD: 7 K/CU MM (ref 4–10.5)
WBC UA: 1 /HPF (ref 0–2)

## 2024-04-25 PROCEDURE — 93005 ELECTROCARDIOGRAM TRACING: CPT | Performed by: INTERNAL MEDICINE

## 2024-04-25 PROCEDURE — 86901 BLOOD TYPING SEROLOGIC RH(D): CPT

## 2024-04-25 PROCEDURE — 36415 COLL VENOUS BLD VENIPUNCTURE: CPT

## 2024-04-25 PROCEDURE — 85027 COMPLETE CBC AUTOMATED: CPT

## 2024-04-25 PROCEDURE — 86900 BLOOD TYPING SEROLOGIC ABO: CPT

## 2024-04-25 PROCEDURE — 81001 URINALYSIS AUTO W/SCOPE: CPT

## 2024-04-25 PROCEDURE — 80053 COMPREHEN METABOLIC PANEL: CPT

## 2024-04-25 PROCEDURE — 85610 PROTHROMBIN TIME: CPT

## 2024-04-25 PROCEDURE — 83880 ASSAY OF NATRIURETIC PEPTIDE: CPT

## 2024-04-25 PROCEDURE — 94010 BREATHING CAPACITY TEST: CPT

## 2024-04-25 PROCEDURE — 85730 THROMBOPLASTIN TIME PARTIAL: CPT

## 2024-04-25 PROCEDURE — 86850 RBC ANTIBODY SCREEN: CPT

## 2024-04-25 PROCEDURE — 71045 X-RAY EXAM CHEST 1 VIEW: CPT

## 2024-04-25 ASSESSMENT — PULMONARY FUNCTION TESTS
FEV1/FVC_PERCENT_PREDICTED_PRE: 87
FVC_PERCENT_PREDICTED_PRE: 53
FEV1_PERCENT_PREDICTED_PRE: 44

## 2024-04-25 NOTE — PROGRESS NOTES
Bedside Spirometry    FVC               Actual  2.00 --- 53  %Predicted  FEV1             Actual  1.31 --- 44  %Predicted  FEV1/FVC     Actual  65.5 --- 87  %Predicted  FEF 25-75     Actual  0.63 --- 19  %Predicted    Machine Interpretation:   Obstruction and possible Restriction.

## 2024-04-26 ENCOUNTER — TELEPHONE (OUTPATIENT)
Dept: CARDIOTHORACIC SURGERY | Age: 64
End: 2024-04-26

## 2024-04-29 ENCOUNTER — OFFICE VISIT (OUTPATIENT)
Dept: CARDIOLOGY CLINIC | Age: 64
End: 2024-04-29
Payer: COMMERCIAL

## 2024-04-29 ENCOUNTER — OFFICE VISIT (OUTPATIENT)
Dept: CARDIOTHORACIC SURGERY | Age: 64
End: 2024-04-29
Payer: COMMERCIAL

## 2024-04-29 VITALS
HEIGHT: 67 IN | SYSTOLIC BLOOD PRESSURE: 134 MMHG | OXYGEN SATURATION: 96 % | WEIGHT: 194 LBS | DIASTOLIC BLOOD PRESSURE: 82 MMHG | BODY MASS INDEX: 30.45 KG/M2 | HEART RATE: 60 BPM

## 2024-04-29 VITALS
SYSTOLIC BLOOD PRESSURE: 138 MMHG | OXYGEN SATURATION: 98 % | HEART RATE: 73 BPM | DIASTOLIC BLOOD PRESSURE: 88 MMHG | WEIGHT: 194.8 LBS | HEIGHT: 67 IN | BODY MASS INDEX: 30.57 KG/M2

## 2024-04-29 DIAGNOSIS — I38 VHD (VALVULAR HEART DISEASE): Primary | ICD-10-CM

## 2024-04-29 DIAGNOSIS — Z01.818 PREOP EXAMINATION: Primary | ICD-10-CM

## 2024-04-29 PROCEDURE — 99214 OFFICE O/P EST MOD 30 MIN: CPT | Performed by: INTERNAL MEDICINE

## 2024-04-29 PROCEDURE — 3075F SYST BP GE 130 - 139MM HG: CPT | Performed by: THORACIC SURGERY (CARDIOTHORACIC VASCULAR SURGERY)

## 2024-04-29 PROCEDURE — 99205 OFFICE O/P NEW HI 60 MIN: CPT | Performed by: THORACIC SURGERY (CARDIOTHORACIC VASCULAR SURGERY)

## 2024-04-29 PROCEDURE — 3079F DIAST BP 80-89 MM HG: CPT | Performed by: THORACIC SURGERY (CARDIOTHORACIC VASCULAR SURGERY)

## 2024-04-29 PROCEDURE — 3075F SYST BP GE 130 - 139MM HG: CPT | Performed by: INTERNAL MEDICINE

## 2024-04-29 PROCEDURE — 3079F DIAST BP 80-89 MM HG: CPT | Performed by: INTERNAL MEDICINE

## 2024-04-29 NOTE — PROGRESS NOTES
Cardiothoracic Surgery    STS score    Procedure Type: Isolated MVR   PERIOPERATIVE OUTCOME ESTIMATE %   Operative Mortality 8.56%   Morbidity & Mortality 38.3%   Stroke 3.18%   Renal Failure 6.91%   Reoperation 6.93%   Prolonged Ventilation 22.7%   Deep Sternal Wound Infection 0.132%   Long Hospital Stay (>14 days) 16.2%   Short Hospital Stay (<6 days)* 15%         Clinical Summary       Planned Surgery: Isolated MVR, Elective, ReOp#1 cardiovascular surgery   Demographics: 63 year old, White, male, 88kg, 170cm, BMI: 30.4 kg/m²   Lab Values: Creatinine: 1.7 mg/dL, Hematocrit: 45%, WBC Count: 7 10³/?L, Platelet Count: 814874 cells/?L   PreOp Medications: Oral diabetes control   Substance Abuse: Former smoker   Risk Factors / Comorbidities: Diabetes Mellitus   Pulmonary RF: Moderate CLD   Vascular RF: Cerebrovascular Disease: CVA > 30 days, Peripheral Artery Disease   Cardiac Status: Chronic heart failure, NYHA Class II, Ejection Fraction = 35%   Coronary Artery Disease: 3 vessels diseased, Proximal LAD Stenosis ? 70%, No coronary symptoms, MI: > 21 Days   Valve Disease: Severe MR, Moderate TR   Prev. Cardiac Interv: Previous CABG; Previous PCI: At this facility, > 6 hours; Previous other: Permanent Pacemaker       Bismark Haque MD 04/29/24 3:08 PM      
BYPASS USING A 8MM GORE PROPATEN VASCULAR GRAFT performed by Aashish Gutierrez MD at [facility] OR    HAND SURGERY      rt knuckle    HERNIA REPAIR N/A 2022    HERNIA VENTRAL REPAIR LAPAROSCOPIC ROBOTIC performed by Josue Ybarra II, MD at [facility] OR    PTCA  2018    LAD    SHOULDER SURGERY Right 2011    bone spurs       Social Hx  Social History     Socioeconomic History    Marital status:      Spouse name: Not on file    Number of children: Not on file    Years of education: Not on file    Highest education level: Not on file   Occupational History    Occupation:      Comment: full time   Tobacco Use    Smoking status: Former     Current packs/day: 0.00     Average packs/day: 2.0 packs/day for 45.0 years (90.0 ttl pk-yrs)     Types: Cigarettes     Start date: 1971     Quit date: 2016     Years since quittin.9    Smokeless tobacco: Never   Vaping Use    Vaping Use: Never used   Substance and Sexual Activity    Alcohol use: No     Comment: caffeine  1-2 cups coffee    Drug use: No    Sexual activity: Yes     Partners: Female   Other Topics Concern    Not on file   Social History Narrative    Wears glasses     Social Determinants of Health     Financial Resource Strain: Low Risk  (2022)    Overall Financial Resource Strain (CARDIA)     Difficulty of Paying Living Expenses: Not very hard   Food Insecurity: No Food Insecurity (2022)    Hunger Vital Sign     Worried About Running Out of Food in the Last Year: Never true     Ran Out of Food in the Last Year: Never true   Transportation Needs: Not on file   Physical Activity: Not on file   Stress: Not on file   Social Connections: Not on file   Intimate Partner Violence: Not on file   Housing Stability: Not on file       FHx  Family History   Problem Relation Age of Onset    Hypertension Mother     High Cholesterol Mother     Alcohol Abuse Mother     Depression Mother     Alcohol Abuse Father     Stomach Cancer Father

## 2024-04-29 NOTE — PROGRESS NOTES
CHIEF COMPLAINT   Hilaria is a 63 y.o. male who was seen today for management of coronary artery disease                        Here to discuss mitral valve management        HPI:                       Pt has h/o coronary artery disease, hypertension, hyperlipidemia, diabetes, obesity, HFrEF, seen today for follow-up.  Patient has been having shortness of breath can walk 20 feet , lately patient has noticed that his breathing is getting worse on minimal exertion, he is here to discuss further management we discussed the options that included continuation of the medical therapy, the percutaneous valve repair that is [facility]AITY i.e. transcatheter [id number] repair, versus surgical repair, patient is going to see the cardiothoracic surgeon today as well for mitral valve repair versus replacement versus percutaneous  We discussed all the risk benefits of the procedure in detail all questions were answered Hilaria Rodriguez has the following history recorded in care path:       Patient Active Problem List     Diagnosis Date Noted    Former tobacco use 11/10/2022    Positive for macroalbuminuria 09/09/2022    Hx of colonic polyps 09/08/2022    Hemiparesis affecting right side as late effect of cerebrovascular accident (CVA) ([facility]) 07/07/2016    Essential hypertension 05/24/2016    Cerebrovascular accident (CVA) due to thrombosis of left middle cerebral artery ([facility]) 05/14/2016    Obesity (BMI 30-39.9) 06/26/2015    Controlled type 2 diabetes mellitus with diabetic nephropathy, without long-term current use of insulin ([facility])      History of acute inferior wall myocardial infarction      HFrEF (heart failure with reduced ejection fraction) ([facility]) 03/17/2023    Shortness of breath 03/01/2023    SOB (shortness of breath) 03/01/2023    Ventral hernia without obstruction or gangrene 11/10/2022    History of Guillain-Silverstreet syndrome 03/08/2019    Adrenal adenoma 05/23/2014    ASCVD (arteriosclerotic cardiovascular disease)      Frequent PVCs

## 2024-04-29 NOTE — PATIENT INSTRUCTIONS
We are committed to providing you the best care possible.    If you receive a survey after visiting one of our offices, please take time to share your experience concerning your physician office visit.  These surveys are confidential and no health information about you is shared.    We are eager to improve for you and we are counting on your feedback to help make that happen.      **It is YOUR responsibilty to bring medication bottles and/or updated medication list to EACH APPOINTMENT. This will allow us to better serve you and all your healthcare needs**  Thank you for allowing us to care for you today!   We want to ensure we can follow your treatment plan and we strive to give you the best outcomes and experience possible.   If you ever have a life threatening emergency and call 911 - for an ambulance (EMS)   Our providers can only care for you at:   [facility] or [facility].   Even if you have someone take you or you drive yourself we can only care for you in a [facility] facility. Our providers are not setup at the other healthcare locations!   Please be informed that if you contact our office outside of normal business hours the physician on call cannot help with any scheduling or rescheduling issues, procedure instruction questions or any type of medication issue.    We advise you for any urgent/emergency that you go to the nearest emergency room!    PLEASE CALL OUR OFFICE DURING NORMAL BUSINESS HOURS    Monday - Friday   8 am to 5 pm    [place]: [phone]    [place]: [phone]    [place]:  [phone]

## 2024-04-30 ENCOUNTER — ANESTHESIA EVENT (OUTPATIENT)
Age: 64
DRG: 267 | End: 2024-04-30
Payer: COMMERCIAL

## 2024-04-30 ENCOUNTER — TELEPHONE (OUTPATIENT)
Dept: CARDIOLOGY CLINIC | Age: 64
End: 2024-04-30

## 2024-04-30 DIAGNOSIS — I82.403 DEEP VEIN THROMBOSIS (DVT) OF BOTH LOWER EXTREMITIES, UNSPECIFIED CHRONICITY, UNSPECIFIED VEIN (HCC): Primary | ICD-10-CM

## 2024-04-30 DIAGNOSIS — M79.89 SWELLING OF EXTREMITY: ICD-10-CM

## 2024-05-01 ENCOUNTER — HOSPITAL ENCOUNTER (INPATIENT)
Age: 64
LOS: 1 days | Discharge: HOME | DRG: 267 | End: 2024-05-02
Attending: INTERNAL MEDICINE | Admitting: INTERNAL MEDICINE
Payer: COMMERCIAL

## 2024-05-01 ENCOUNTER — APPOINTMENT (OUTPATIENT)
Dept: NON INVASIVE DIAGNOSTICS | Age: 64
DRG: 267 | End: 2024-05-01
Attending: INTERNAL MEDICINE
Payer: COMMERCIAL

## 2024-05-01 ENCOUNTER — TELEPHONE (OUTPATIENT)
Dept: CARDIOLOGY CLINIC | Age: 64
End: 2024-05-01

## 2024-05-01 ENCOUNTER — ANESTHESIA (OUTPATIENT)
Age: 64
DRG: 267 | End: 2024-05-01
Payer: COMMERCIAL

## 2024-05-01 DIAGNOSIS — N17.9 AKI (ACUTE KIDNEY INJURY) (HCC): ICD-10-CM

## 2024-05-01 DIAGNOSIS — I38 VHD (VALVULAR HEART DISEASE): Primary | ICD-10-CM

## 2024-05-01 DIAGNOSIS — I34.0 SEVERE MITRAL VALVE REGURGITATION: ICD-10-CM

## 2024-05-01 LAB
ALBUMIN SERPL-MCNC: 4.3 GM/DL (ref 3.4–5)
ALP BLD-CCNC: 66 IU/L (ref 40–128)
ALT SERPL-CCNC: 12 U/L (ref 10–40)
ANION GAP SERPL CALCULATED.3IONS-SCNC: 13 MMOL/L (ref 7–16)
AST SERPL-CCNC: 18 IU/L (ref 15–37)
BILIRUB SERPL-MCNC: 0.4 MG/DL (ref 0–1)
BUN SERPL-MCNC: 27 MG/DL (ref 6–23)
CALCIUM IONIZED: 3.64 MG/DL (ref 4.48–5.28)
CALCIUM SERPL-MCNC: 8.3 MG/DL (ref 8.3–10.6)
CHLORIDE BLD-SCNC: 109 MMOL/L (ref 99–110)
CO2: 18 MMOL/L (ref 21–32)
CREAT SERPL-MCNC: 2 MG/DL (ref 0.9–1.3)
ECHO BSA: 2.09 M2
ECHO BSA: 2.09 M2
ECHO MV MAX VELOCITY: 1.3 M/S
ECHO MV MEAN GRADIENT: 2 MMHG
ECHO MV MEAN VELOCITY: 0.6 M/S
ECHO MV PEAK GRADIENT: 7 MMHG
ECHO MV REGURGITANT ALIASING (NYQUIST) VELOCITY: 30 CM/S
ECHO MV REGURGITANT PEAK GRADIENT: 85 MMHG
ECHO MV REGURGITANT PEAK VELOCITY: 4.6 M/S
ECHO MV REGURGITANT RADIUS PISA: 0.5 CM
ECHO MV REGURGITANT VTIA: 170 CM
ECHO MV VTI: 30.5 CM
GFR, ESTIMATED: 37 ML/MIN/1.73M2
GLUCOSE BLD-MCNC: 113 MG/DL (ref 70–99)
GLUCOSE BLD-MCNC: 131 MG/DL (ref 70–99)
GLUCOSE BLD-MCNC: 88 MG/DL (ref 70–99)
GLUCOSE SERPL-MCNC: 79 MG/DL (ref 70–99)
IONIZED CA: 0.91 MMOL/L (ref 1.12–1.32)
MAGNESIUM: 2.3 MG/DL (ref 1.8–2.4)
PHOSPHORUS: 3.1 MG/DL (ref 2.5–4.9)
POTASSIUM SERPL-SCNC: 4.4 MMOL/L (ref 3.5–5.1)
SODIUM BLD-SCNC: 140 MMOL/L (ref 135–145)
TOTAL PROTEIN: 6.9 GM/DL (ref 6.4–8.2)

## 2024-05-01 PROCEDURE — 6360000002 HC RX W HCPCS: Performed by: INTERNAL MEDICINE

## 2024-05-01 PROCEDURE — 93325 DOPPLER ECHO COLOR FLOW MAPG: CPT | Performed by: INTERNAL MEDICINE

## 2024-05-01 PROCEDURE — 93312 ECHO TRANSESOPHAGEAL: CPT | Performed by: INTERNAL MEDICINE

## 2024-05-01 PROCEDURE — 93312 ECHO TRANSESOPHAGEAL: CPT

## 2024-05-01 PROCEDURE — 80053 COMPREHEN METABOLIC PANEL: CPT

## 2024-05-01 PROCEDURE — 84100 ASSAY OF PHOSPHORUS: CPT

## 2024-05-01 PROCEDURE — 82330 ASSAY OF CALCIUM: CPT

## 2024-05-01 PROCEDURE — C1889 IMPLANT/INSERT DEVICE, NOC: HCPCS | Performed by: INTERNAL MEDICINE

## 2024-05-01 PROCEDURE — 94761 N-INVAS EAR/PLS OXIMETRY MLT: CPT

## 2024-05-01 PROCEDURE — 3700000001 HC ADD 15 MINUTES (ANESTHESIA): Performed by: INTERNAL MEDICINE

## 2024-05-01 PROCEDURE — B24BZZ4 ULTRASONOGRAPHY OF HEART WITH AORTA, TRANSESOPHAGEAL: ICD-10-PCS | Performed by: INTERNAL MEDICINE

## 2024-05-01 PROCEDURE — 33418 REPAIR TCAT MITRAL VALVE: CPT | Performed by: INTERNAL MEDICINE

## 2024-05-01 PROCEDURE — C1894 INTRO/SHEATH, NON-LASER: HCPCS | Performed by: INTERNAL MEDICINE

## 2024-05-01 PROCEDURE — 82962 GLUCOSE BLOOD TEST: CPT

## 2024-05-01 PROCEDURE — 93320 DOPPLER ECHO COMPLETE: CPT

## 2024-05-01 PROCEDURE — 93321 DOPPLER ECHO F-UP/LMTD STD: CPT | Performed by: INTERNAL MEDICINE

## 2024-05-01 PROCEDURE — C1769 GUIDE WIRE: HCPCS | Performed by: INTERNAL MEDICINE

## 2024-05-01 PROCEDURE — 6360000002 HC RX W HCPCS: Performed by: NURSE ANESTHETIST, CERTIFIED REGISTERED

## 2024-05-01 PROCEDURE — 2580000003 HC RX 258: Performed by: INTERNAL MEDICINE

## 2024-05-01 PROCEDURE — 93355 ECHO TRANSESOPHAGEAL (TEE): CPT | Performed by: INTERNAL MEDICINE

## 2024-05-01 PROCEDURE — 2500000003 HC RX 250 WO HCPCS: Performed by: INTERNAL MEDICINE

## 2024-05-01 PROCEDURE — 2580000003 HC RX 258: Performed by: NURSE ANESTHETIST, CERTIFIED REGISTERED

## 2024-05-01 PROCEDURE — C1760 CLOSURE DEV, VASC: HCPCS | Performed by: INTERNAL MEDICINE

## 2024-05-01 PROCEDURE — 6370000000 HC RX 637 (ALT 250 FOR IP): Performed by: INTERNAL MEDICINE

## 2024-05-01 PROCEDURE — 3700000000 HC ANESTHESIA ATTENDED CARE: Performed by: INTERNAL MEDICINE

## 2024-05-01 PROCEDURE — 2709999900 HC NON-CHARGEABLE SUPPLY: Performed by: INTERNAL MEDICINE

## 2024-05-01 PROCEDURE — 6360000002 HC RX W HCPCS

## 2024-05-01 PROCEDURE — 02UG3JZ SUPPLEMENT MITRAL VALVE WITH SYNTHETIC SUBSTITUTE, PERCUTANEOUS APPROACH: ICD-10-PCS | Performed by: INTERNAL MEDICINE

## 2024-05-01 PROCEDURE — 2100000000 HC CCU R&B

## 2024-05-01 PROCEDURE — 83735 ASSAY OF MAGNESIUM: CPT

## 2024-05-01 PROCEDURE — 85025 COMPLETE CBC W/AUTO DIFF WBC: CPT

## 2024-05-01 PROCEDURE — 6360000004 HC RX CONTRAST MEDICATION: Performed by: INTERNAL MEDICINE

## 2024-05-01 PROCEDURE — C1725 CATH, TRANSLUMIN NON-LASER: HCPCS | Performed by: INTERNAL MEDICINE

## 2024-05-01 PROCEDURE — 93005 ELECTROCARDIOGRAM TRACING: CPT | Performed by: NURSE PRACTITIONER

## 2024-05-01 PROCEDURE — 2500000003 HC RX 250 WO HCPCS: Performed by: NURSE ANESTHETIST, CERTIFIED REGISTERED

## 2024-05-01 PROCEDURE — 36620 INSERTION CATHETER ARTERY: CPT

## 2024-05-01 DEVICE — G4 CLIP DELIVERY SYSTEM
Type: IMPLANTABLE DEVICE | Status: FUNCTIONAL
Brand: MITRACLIP™

## 2024-05-01 RX ORDER — VANCOMYCIN HYDROCHLORIDE 1 G/20ML
INJECTION, POWDER, LYOPHILIZED, FOR SOLUTION INTRAVENOUS PRN
Status: DISCONTINUED | OUTPATIENT
Start: 2024-05-01 | End: 2024-05-01 | Stop reason: SDUPTHER

## 2024-05-01 RX ORDER — FENTANYL CITRATE 50 UG/ML
INJECTION, SOLUTION INTRAMUSCULAR; INTRAVENOUS PRN
Status: DISCONTINUED | OUTPATIENT
Start: 2024-05-01 | End: 2024-05-01 | Stop reason: SDUPTHER

## 2024-05-01 RX ORDER — GLUCAGON 1 MG/ML
1 KIT INJECTION PRN
Status: DISCONTINUED | OUTPATIENT
Start: 2024-05-01 | End: 2024-05-02 | Stop reason: HOSPADM

## 2024-05-01 RX ORDER — SODIUM CHLORIDE 9 MG/ML
INJECTION, SOLUTION INTRAVENOUS CONTINUOUS PRN
Status: DISCONTINUED | OUTPATIENT
Start: 2024-05-01 | End: 2024-05-01 | Stop reason: SDUPTHER

## 2024-05-01 RX ORDER — INSULIN LISPRO 100 [IU]/ML
0-4 INJECTION, SOLUTION INTRAVENOUS; SUBCUTANEOUS NIGHTLY
Status: DISCONTINUED | OUTPATIENT
Start: 2024-05-01 | End: 2024-05-02 | Stop reason: HOSPADM

## 2024-05-01 RX ORDER — CARVEDILOL 25 MG/1
25 TABLET ORAL 2 TIMES DAILY
Status: DISCONTINUED | OUTPATIENT
Start: 2024-05-01 | End: 2024-05-02

## 2024-05-01 RX ORDER — ONDANSETRON 2 MG/ML
INJECTION INTRAMUSCULAR; INTRAVENOUS PRN
Status: DISCONTINUED | OUTPATIENT
Start: 2024-05-01 | End: 2024-05-01 | Stop reason: SDUPTHER

## 2024-05-01 RX ORDER — HYDRALAZINE HYDROCHLORIDE 20 MG/ML
10 INJECTION INTRAMUSCULAR; INTRAVENOUS EVERY 10 MIN PRN
Status: DISCONTINUED | OUTPATIENT
Start: 2024-05-01 | End: 2024-05-02 | Stop reason: HOSPADM

## 2024-05-01 RX ORDER — ISOSORBIDE MONONITRATE 30 MG/1
30 TABLET, EXTENDED RELEASE ORAL DAILY
Status: DISCONTINUED | OUTPATIENT
Start: 2024-05-01 | End: 2024-05-02 | Stop reason: HOSPADM

## 2024-05-01 RX ORDER — BISACODYL 10 MG
10 SUPPOSITORY, RECTAL RECTAL DAILY PRN
Status: DISCONTINUED | OUTPATIENT
Start: 2024-05-01 | End: 2024-05-02 | Stop reason: HOSPADM

## 2024-05-01 RX ORDER — ALBUTEROL SULFATE 90 UG/1
1 AEROSOL, METERED RESPIRATORY (INHALATION) EVERY 6 HOURS PRN
Status: DISCONTINUED | OUTPATIENT
Start: 2024-05-01 | End: 2024-05-02 | Stop reason: HOSPADM

## 2024-05-01 RX ORDER — DEXTROSE MONOHYDRATE 100 MG/ML
INJECTION, SOLUTION INTRAVENOUS CONTINUOUS PRN
Status: DISCONTINUED | OUTPATIENT
Start: 2024-05-01 | End: 2024-05-02 | Stop reason: HOSPADM

## 2024-05-01 RX ORDER — LIDOCAINE HYDROCHLORIDE 20 MG/ML
INJECTION, SOLUTION INTRAVENOUS PRN
Status: DISCONTINUED | OUTPATIENT
Start: 2024-05-01 | End: 2024-05-01 | Stop reason: SDUPTHER

## 2024-05-01 RX ORDER — ROCURONIUM BROMIDE 10 MG/ML
INJECTION, SOLUTION INTRAVENOUS PRN
Status: DISCONTINUED | OUTPATIENT
Start: 2024-05-01 | End: 2024-05-01 | Stop reason: SDUPTHER

## 2024-05-01 RX ORDER — PROPOFOL 10 MG/ML
INJECTION, EMULSION INTRAVENOUS PRN
Status: DISCONTINUED | OUTPATIENT
Start: 2024-05-01 | End: 2024-05-01 | Stop reason: SDUPTHER

## 2024-05-01 RX ORDER — HEPARIN SODIUM 10000 [USP'U]/ML
INJECTION, SOLUTION INTRAVENOUS; SUBCUTANEOUS PRN
Status: DISCONTINUED | OUTPATIENT
Start: 2024-05-01 | End: 2024-05-01 | Stop reason: HOSPADM

## 2024-05-01 RX ORDER — ASPIRIN 81 MG/1
81 TABLET, CHEWABLE ORAL DAILY
Status: DISCONTINUED | OUTPATIENT
Start: 2024-05-02 | End: 2024-05-02 | Stop reason: HOSPADM

## 2024-05-01 RX ORDER — SODIUM CHLORIDE 0.9 % (FLUSH) 0.9 %
5-40 SYRINGE (ML) INJECTION EVERY 12 HOURS SCHEDULED
Status: DISCONTINUED | OUTPATIENT
Start: 2024-05-01 | End: 2024-05-02 | Stop reason: HOSPADM

## 2024-05-01 RX ORDER — LISINOPRIL 20 MG/1
40 TABLET ORAL DAILY
Status: DISCONTINUED | OUTPATIENT
Start: 2024-05-01 | End: 2024-05-02 | Stop reason: HOSPADM

## 2024-05-01 RX ORDER — ALBUTEROL SULFATE 90 UG/1
2 AEROSOL, METERED RESPIRATORY (INHALATION) EVERY 4 HOURS PRN
Status: DISCONTINUED | OUTPATIENT
Start: 2024-05-01 | End: 2024-05-01

## 2024-05-01 RX ORDER — CALCIUM GLUCONATE 20 MG/ML
1000 INJECTION, SOLUTION INTRAVENOUS
Status: COMPLETED | OUTPATIENT
Start: 2024-05-01 | End: 2024-05-01

## 2024-05-01 RX ORDER — ATORVASTATIN CALCIUM 40 MG/1
80 TABLET, FILM COATED ORAL DAILY
Status: DISCONTINUED | OUTPATIENT
Start: 2024-05-01 | End: 2024-05-02 | Stop reason: HOSPADM

## 2024-05-01 RX ORDER — ASPIRIN 81 MG/1
81 TABLET, CHEWABLE ORAL DAILY
Status: DISCONTINUED | OUTPATIENT
Start: 2024-05-01 | End: 2024-05-01 | Stop reason: SDUPTHER

## 2024-05-01 RX ORDER — ACETAMINOPHEN 325 MG/1
650 TABLET ORAL EVERY 4 HOURS PRN
Status: DISCONTINUED | OUTPATIENT
Start: 2024-05-01 | End: 2024-05-02 | Stop reason: HOSPADM

## 2024-05-01 RX ORDER — HYDRALAZINE HYDROCHLORIDE 20 MG/ML
INJECTION INTRAMUSCULAR; INTRAVENOUS
Status: COMPLETED
Start: 2024-05-01 | End: 2024-05-01

## 2024-05-01 RX ORDER — ONDANSETRON 2 MG/ML
4 INJECTION INTRAMUSCULAR; INTRAVENOUS EVERY 6 HOURS PRN
Status: DISCONTINUED | OUTPATIENT
Start: 2024-05-01 | End: 2024-05-02 | Stop reason: HOSPADM

## 2024-05-01 RX ORDER — HEPARIN SODIUM 1000 [USP'U]/ML
INJECTION, SOLUTION INTRAVENOUS; SUBCUTANEOUS PRN
Status: DISCONTINUED | OUTPATIENT
Start: 2024-05-01 | End: 2024-05-01 | Stop reason: SDUPTHER

## 2024-05-01 RX ORDER — SODIUM CHLORIDE 0.9 % (FLUSH) 0.9 %
5-40 SYRINGE (ML) INJECTION PRN
Status: DISCONTINUED | OUTPATIENT
Start: 2024-05-01 | End: 2024-05-02 | Stop reason: HOSPADM

## 2024-05-01 RX ORDER — CLOPIDOGREL BISULFATE 75 MG/1
75 TABLET ORAL DAILY
Status: DISCONTINUED | OUTPATIENT
Start: 2024-05-02 | End: 2024-05-02 | Stop reason: HOSPADM

## 2024-05-01 RX ORDER — EZETIMIBE 10 MG/1
10 TABLET ORAL DAILY
Status: DISCONTINUED | OUTPATIENT
Start: 2024-05-01 | End: 2024-05-02 | Stop reason: HOSPADM

## 2024-05-01 RX ORDER — LABETALOL HYDROCHLORIDE 5 MG/ML
10 INJECTION, SOLUTION INTRAVENOUS EVERY 30 MIN PRN
Status: DISCONTINUED | OUTPATIENT
Start: 2024-05-01 | End: 2024-05-02 | Stop reason: HOSPADM

## 2024-05-01 RX ORDER — 0.9 % SODIUM CHLORIDE 0.9 %
250 INTRAVENOUS SOLUTION INTRAVENOUS ONCE
Status: COMPLETED | OUTPATIENT
Start: 2024-05-01 | End: 2024-05-01

## 2024-05-01 RX ORDER — MAGNESIUM SULFATE 1 G/100ML
1000 INJECTION INTRAVENOUS ONCE
Status: COMPLETED | OUTPATIENT
Start: 2024-05-01 | End: 2024-05-01

## 2024-05-01 RX ORDER — HYDRALAZINE HYDROCHLORIDE 25 MG/1
25 TABLET, FILM COATED ORAL 3 TIMES DAILY
Status: DISCONTINUED | OUTPATIENT
Start: 2024-05-01 | End: 2024-05-02

## 2024-05-01 RX ORDER — SODIUM CHLORIDE 9 MG/ML
INJECTION, SOLUTION INTRAVENOUS PRN
Status: DISCONTINUED | OUTPATIENT
Start: 2024-05-01 | End: 2024-05-02 | Stop reason: HOSPADM

## 2024-05-01 RX ORDER — INSULIN LISPRO 100 [IU]/ML
0-8 INJECTION, SOLUTION INTRAVENOUS; SUBCUTANEOUS
Status: DISCONTINUED | OUTPATIENT
Start: 2024-05-01 | End: 2024-05-02 | Stop reason: HOSPADM

## 2024-05-01 RX ORDER — PANTOPRAZOLE SODIUM 40 MG/1
40 TABLET, DELAYED RELEASE ORAL DAILY
Status: DISCONTINUED | OUTPATIENT
Start: 2024-05-01 | End: 2024-05-02 | Stop reason: HOSPADM

## 2024-05-01 RX ORDER — FUROSEMIDE 10 MG/ML
40 INJECTION INTRAMUSCULAR; INTRAVENOUS 2 TIMES DAILY
Status: DISCONTINUED | OUTPATIENT
Start: 2024-05-01 | End: 2024-05-02

## 2024-05-01 RX ADMIN — SODIUM CHLORIDE 250 ML: 9 INJECTION, SOLUTION INTRAVENOUS at 19:15

## 2024-05-01 RX ADMIN — HYDRALAZINE HYDROCHLORIDE 25 MG: 25 TABLET ORAL at 17:15

## 2024-05-01 RX ADMIN — HYDRALAZINE HYDROCHLORIDE 10 MG: 20 INJECTION INTRAMUSCULAR; INTRAVENOUS at 16:05

## 2024-05-01 RX ADMIN — APIXABAN 5 MG: 5 TABLET, FILM COATED ORAL at 21:09

## 2024-05-01 RX ADMIN — SODIUM CHLORIDE: 900 INJECTION INTRAVENOUS at 12:55

## 2024-05-01 RX ADMIN — PANTOPRAZOLE SODIUM 40 MG: 40 TABLET, DELAYED RELEASE ORAL at 17:15

## 2024-05-01 RX ADMIN — PHENYLEPHRINE HYDROCHLORIDE 100 MCG: 10 INJECTION INTRAVENOUS at 13:56

## 2024-05-01 RX ADMIN — LISINOPRIL 40 MG: 20 TABLET ORAL at 17:15

## 2024-05-01 RX ADMIN — SODIUM CHLORIDE, PRESERVATIVE FREE 10 ML: 5 INJECTION INTRAVENOUS at 21:10

## 2024-05-01 RX ADMIN — PHENYLEPHRINE HYDROCHLORIDE 50 MCG: 10 INJECTION INTRAVENOUS at 14:43

## 2024-05-01 RX ADMIN — SERTRALINE HYDROCHLORIDE 100 MG: 50 TABLET ORAL at 17:14

## 2024-05-01 RX ADMIN — PHENYLEPHRINE HYDROCHLORIDE 50 MCG: 10 INJECTION INTRAVENOUS at 14:05

## 2024-05-01 RX ADMIN — ATORVASTATIN CALCIUM 80 MG: 40 TABLET, FILM COATED ORAL at 17:14

## 2024-05-01 RX ADMIN — PROPOFOL 150 MG: 10 INJECTION, EMULSION INTRAVENOUS at 13:39

## 2024-05-01 RX ADMIN — ROCURONIUM BROMIDE 20 MG: 10 INJECTION INTRAVENOUS at 15:05

## 2024-05-01 RX ADMIN — PHENYLEPHRINE HYDROCHLORIDE 50 MCG: 10 INJECTION INTRAVENOUS at 14:13

## 2024-05-01 RX ADMIN — ROCURONIUM BROMIDE 50 MG: 10 INJECTION INTRAVENOUS at 13:39

## 2024-05-01 RX ADMIN — HYDRALAZINE HYDROCHLORIDE 25 MG: 25 TABLET ORAL at 21:10

## 2024-05-01 RX ADMIN — VANCOMYCIN HYDROCHLORIDE 1000 MG: 1 INJECTION, POWDER, LYOPHILIZED, FOR SOLUTION INTRAVENOUS at 13:50

## 2024-05-01 RX ADMIN — MAGNESIUM SULFATE HEPTAHYDRATE 1000 MG: 1 INJECTION, SOLUTION INTRAVENOUS at 18:13

## 2024-05-01 RX ADMIN — HEPARIN SODIUM 10000 UNITS: 1000 INJECTION INTRAVENOUS; SUBCUTANEOUS at 14:12

## 2024-05-01 RX ADMIN — ISOSORBIDE MONONITRATE 30 MG: 30 TABLET, EXTENDED RELEASE ORAL at 17:15

## 2024-05-01 RX ADMIN — FENTANYL CITRATE 50 MCG: 50 INJECTION, SOLUTION INTRAMUSCULAR; INTRAVENOUS at 13:46

## 2024-05-01 RX ADMIN — ROCURONIUM BROMIDE 20 MG: 10 INJECTION INTRAVENOUS at 14:26

## 2024-05-01 RX ADMIN — CALCIUM GLUCONATE 1000 MG: 20 INJECTION, SOLUTION INTRAVENOUS at 21:24

## 2024-05-01 RX ADMIN — FUROSEMIDE 40 MG: 10 INJECTION, SOLUTION INTRAMUSCULAR; INTRAVENOUS at 17:15

## 2024-05-01 RX ADMIN — ROCURONIUM BROMIDE 20 MG: 10 INJECTION INTRAVENOUS at 14:01

## 2024-05-01 RX ADMIN — ONDANSETRON 4 MG: 2 INJECTION INTRAMUSCULAR; INTRAVENOUS at 15:12

## 2024-05-01 RX ADMIN — ROCURONIUM BROMIDE 10 MG: 10 INJECTION INTRAVENOUS at 14:40

## 2024-05-01 RX ADMIN — EZETIMIBE 10 MG: 10 TABLET ORAL at 17:15

## 2024-05-01 RX ADMIN — LIDOCAINE HYDROCHLORIDE 100 MG: 20 INJECTION, SOLUTION INTRAVENOUS at 13:39

## 2024-05-01 RX ADMIN — EMPAGLIFLOZIN 10 MG: 10 TABLET, FILM COATED ORAL at 17:15

## 2024-05-01 RX ADMIN — CARVEDILOL 25 MG: 25 TABLET, FILM COATED ORAL at 21:10

## 2024-05-01 ASSESSMENT — ENCOUNTER SYMPTOMS: SHORTNESS OF BREATH: 1

## 2024-05-01 NOTE — PROGRESS NOTES
1617: Estelle Cath Lab RN Supervisor at bedside to remove brachial arterial line. Angel pressure held for 13 minutes. No complications; pt tolerated well. Dry gauze secured by tegaderm.

## 2024-05-01 NOTE — TELEPHONE ENCOUNTER
Results given to the pt. Patient advised and voices understanding.        Chronic occlusive deep vein thrombosis in the right distal femoral vein.    Minimal Chronic non-occlusive deep vein thrombosis in the left middle femoral vein.

## 2024-05-01 NOTE — ANESTHESIA POSTPROCEDURE EVALUATION
Department of Anesthesiology  Postprocedure Note    Patient: Hilaria Rodriguez  MRN: [id number]  YOB: 1960  Date of evaluation: 5/1/2024    Procedure Summary       Date: 05/01/24 Room / Location: [facility] CATH LAB  / [facility] CARDIAC CATH LAB    Anesthesia Start: 1255 Anesthesia Stop: 1600    Procedure: Transcatheter mitral valve repair Diagnosis:       Severe mitral valve regurgitation      (Severe mitral valve regurgitation [I34.0])    Providers: Tawana Andrade MD Responsible Provider: Aashish Torrez MD    Anesthesia Type: general ASA Status: 4            Anesthesia Type: No value filed.    Suri Phase I: Suri Score: 10    Suri Phase II:      Anesthesia Post Evaluation    Patient location during evaluation: ICU  Patient participation: complete - patient participated  Level of consciousness: awake and alert  Pain score: 0  Airway patency: patent  Nausea & Vomiting: no vomiting and no nausea  Cardiovascular status: blood pressure returned to baseline and hemodynamically stable  Respiratory status: acceptable, room air, spontaneous ventilation and nonlabored ventilation  Hydration status: stable  Pain management: adequate    No notable events documented.

## 2024-05-01 NOTE — PROCEDURES
Procedure  Transseptal placement of the mitral valve clip i.e. transcatheter [id number] repair of mitral valve  Transseptal access  Left brachial artery  access for hemodynamic monitoring  Right femoral vein access      Operators:  Gael PINEDA     After informed consent patient was brought to the Cath Lab the arterial line was placed in the left brachial artery given that both radial arteries were severely diseased  NGA was performed pre and post and intra procedure  Guidance was used for clip deployment  The right groin was anesthetized 2% lidocaine initially we placed a 6 Solomon Islander sheath and A1 Perclose was placed we sized it up with multiple dilators and finally we were able to place it 24 [place] Islander catheter delivery system and we did a transseptal prior to that using a PAYMEY system and RF needle after placing the CDS in the left atrium pressure was obtained it was noted that there was a large V wave patient was given 10,000 of heparin and ACT was checked which was more than 300 ms    NGA was then used for guidance throughout the procedure after transseptal and deployment of the mitral valve clip  Post clip placement mitral regurgitation was significantly reduced from severe to mild to moderate with a transmitral gradient measured at 3 mmHg    Please see separate note for clip placement by Dr. Andrade

## 2024-05-01 NOTE — PROGRESS NOTES
1907: this RN provided Dr. Felipe with an update; NBP 74/54 (62), K 4.4, Cret 2.0, CAIOB 0.91, Mg 2.3, Phos 3.1. Received verbal order with readback to give 250 mL bolus of 0.9% NS and 1g of calcium chloride per Dr. Felipe.

## 2024-05-01 NOTE — PROGRESS NOTES
4 Eyes Skin Assessment     NAME:  Hilaria Rodriguez  YOB: 1960  MEDICAL RECORD NUMBER:  [id number]    The patient is being assessed for  Cath Lab Post-Op    I agree that at least one RN has performed a thorough Head to Toe Skin Assessment on the patient. ALL assessment sites listed below have been assessed.      Areas assessed by both nurses:    Head, Face, Ears, Shoulders, Back, Chest, Arms, Elbows, Hands, Sacrum. Buttock, Coccyx, Ischium, Legs. Feet and Heels, and Under Medical Devices         Does the Patient have a Wound? No  Mustapha Prevention initiated by RN: Yes  Wound Care Orders initiated by RN: No    Pressure Injury (Stage 3,4, Unstageable, DTI, NWPT, and Complex wounds) if present, place Wound referral order by RN under : No    New Ostomies, if present place, Ostomy referral order under : No     Nurse 1 eSignature: Electronically signed by Yamilet Keith RN on 5/1/24 at 3:45 PM EDT    **SHARE this note so that the co-signing nurse can place an eSignature**    Nurse 2 eSignature: Electronically signed by Missy Mullen RN on 5/1/24 at 4:45 PM EDT

## 2024-05-01 NOTE — CONSENT
Informed Consent for Blood Component Transfusion Note    I have discussed with the patient the rationale for blood component transfusion; its benefits in treating or preventing fatigue, organ damage, or death; and its risk which includes mild transfusion reactions, rare risk of blood borne infection, or more serious but rare reactions. I have discussed the alternatives to transfusion, including the risk and consequences of not receiving transfusion. The patient had an opportunity to ask questions and had agreed to proceed with transfusion of blood components.    Electronically signed by Aashish Gutierrez MD on 5/1/24 at 12:13 PM EDT

## 2024-05-01 NOTE — ANESTHESIA PRE PROCEDURE
Department of Anesthesiology  Preprocedure Note       Name:  Hilaria Rodriguez   Age:  63 y.o.  :  1960                                          MRN:  [id number]         Date:  2024      Surgeon: Surgeon(s):  Tawana Andrade MD Tariq Rizvi, Sayed, MD Siegenthaler, Michael P, MD Rodriguez, Jose, MD    Procedure: Procedure(s):  Transcatheter mitral valve repair    Medications prior to admission:   Prior to Admission medications    Medication Sig Start Date End Date Taking? Authorizing Provider   pantoprazole (PROTONIX) 40 MG tablet Take 1 tablet by mouth daily    Provider, MD Leander   empagliflozin (JARDIANCE) 10 MG tablet Take 1 tablet by mouth daily 3/19/24 3/14/25  Gabe Barnes MD   furosemide (LASIX) 40 MG tablet Take 1 tablet by mouth every morning Patient takes 40 mg in the morning and then 20 mg in the evening.  Patient taking differently: Take 1 tablet by mouth 2 times daily Patient takes 40 mg in the morning and then 20 mg in the evening. 3/11/24 4/29/24  Tawana Andrade MD   apixaban (ELIQUIS) 5 MG TABS tablet Take 1 tablet by mouth 2 times daily 3/11/24   Tawana Andrade MD   carvedilol (COREG) 25 MG tablet Take 1 tablet by mouth 2 times daily 3/11/24   Tawana Andrade MD   Continuous Blood Gluc Sensor (FREESTYLE JE 3 SENSOR) MISC 1 each by Does not apply route every 14 days 3/8/24   Lindsey Dos Santos MD   lisinopril (PRINIVIL;ZESTRIL) 40 MG tablet Take 1 tablet by mouth daily 3/7/24   Lindsey Dos Santos MD   sertraline (ZOLOFT) 100 MG tablet Take 1 tablet by mouth daily 3/7/24 9/3/24  Lindsey Dos Santos MD   glimepiride (AMARYL) 2 MG tablet Take 1 tablet by mouth every morning (before breakfast) 3/7/24 9/3/24  Lindsey Dos Santos MD   Continuous Blood Gluc  (FREESTYLE JE 3 READER) JUANITO 1 each by Does not apply route 4 times daily (before meals and nightly) 24   Lindsey Dos Santos MD   Blood Glucose Monitoring Suppl (BLOOD GLUCOSE MONITOR SYSTEM) w/Device KIT Use twice per day 24

## 2024-05-01 NOTE — H&P
vessel is a diffusely diseased vessel has multiple areas of stenosis which were also noted on previous cath as well  The right coronary artery is known to be occluded filling from collaterals  The saphenous vein graft to the OM is widely patent  The LIMA to the LAD is widely patent  The right heart catheterization showed that the patient has moderate degree of pulmonary hypertension with elevated wedge pressure suggestive of left-sided failure  Right coronary artery was probably a dominant vessel in the past     Assessment and plan  And decide about the KAITY patient also needs a NGA for further clarification           - pvd  CROSSOVERRight  FEMORAL TO Left FEMORAL BYPASS USING A 8MM GORE PROPATEN VASCULAR GRAFT  Per CTS      - had DVT of RLE on anticoagulation  per CTS        -     CORONARY ARTERY DISEASE:  asymptomatic     All available  tests in chart reviewed. Management discussed .  Testing ordered  no       On PlCardiac catheterization March 2022 shows LAD and RCA to be 100% occluded, circumflex is patent the LIMA to the LAD and SVG and OM 2 patent                         -  Hypertension: Patients blood pressure is normal. Patient is advised about low sodium diet. Present medical regimen will not be changed.    On hydralazine, amlodipine, Coreg, Lasix compliant has no side effects                          -  LIPID MANAGEMENT:  Importance of lipid levels discussed with patient   and patient was given dietary advice. NCEP- ATP III guidelines reviewed with patient.    -   Changes  in medicines made: No     On Lipitor 80 mg p.o. daily compliant LDL of 47                             -   DIABETES MELLITUS: Available pertinent lab data reviewed   and  patient was given dietary advice . Advised to check blood glucose level on a regular basis.      -   Changes  in medicines made: No        On glimepiride and Jardiance compliant hemoglobin A1c of 6.7        - has ICD  per EP          -HFrEF on beta-blocker that is Coreg,  Lasix compliant  Patient was in the hospital with CHF exacerbation EF is decreased Per EP

## 2024-05-01 NOTE — PROGRESS NOTES
1542: pt arrived to CVICU at this time s/p TMVR. Pt connected to monitor; R brachial intact and in place. 2 20g PIVs intact and in place. Report received from Cath Lab GOLDEN Sharma, Cath Lab RN Lindsay, and ALCIDES Sosa. Assessment completed, see documentation. Care plan on going. All needs met at this time.

## 2024-05-02 ENCOUNTER — APPOINTMENT (OUTPATIENT)
Dept: NON INVASIVE DIAGNOSTICS | Age: 64
DRG: 267 | End: 2024-05-02
Attending: INTERNAL MEDICINE
Payer: COMMERCIAL

## 2024-05-02 ENCOUNTER — APPOINTMENT (OUTPATIENT)
Dept: GENERAL RADIOLOGY | Age: 64
DRG: 267 | End: 2024-05-02
Attending: INTERNAL MEDICINE
Payer: COMMERCIAL

## 2024-05-02 VITALS
HEART RATE: 60 BPM | DIASTOLIC BLOOD PRESSURE: 69 MMHG | WEIGHT: 193 LBS | HEIGHT: 67 IN | OXYGEN SATURATION: 96 % | SYSTOLIC BLOOD PRESSURE: 115 MMHG | RESPIRATION RATE: 17 BRPM | BODY MASS INDEX: 30.29 KG/M2 | TEMPERATURE: 97.5 F

## 2024-05-02 LAB
ABO/RH: NORMAL
ANION GAP SERPL CALCULATED.3IONS-SCNC: 9 MMOL/L (ref 7–16)
ANTIBODY SCREEN: NEGATIVE
BUN SERPL-MCNC: 32 MG/DL (ref 6–23)
CALCIUM SERPL-MCNC: 7.7 MG/DL (ref 8.3–10.6)
CHLORIDE BLD-SCNC: 106 MMOL/L (ref 99–110)
CO2: 23 MMOL/L (ref 21–32)
COMMENT: NORMAL
COMPONENT: NORMAL
COMPONENT: NORMAL
CREAT SERPL-MCNC: 2.2 MG/DL (ref 0.9–1.3)
CROSSMATCH RESULT: NORMAL
CROSSMATCH RESULT: NORMAL
ECHO AO ROOT DIAM: 3.6 CM
ECHO AO ROOT INDEX: 1.81 CM/M2
ECHO AV AREA PEAK VELOCITY: 3 CM2
ECHO AV AREA VTI: 2.9 CM2
ECHO AV AREA/BSA PEAK VELOCITY: 1.5 CM2/M2
ECHO AV AREA/BSA VTI: 1.5 CM2/M2
ECHO AV MEAN GRADIENT: 3 MMHG
ECHO AV MEAN VELOCITY: 0.8 M/S
ECHO AV PEAK GRADIENT: 6 MMHG
ECHO AV PEAK VELOCITY: 1.2 M/S
ECHO AV VELOCITY RATIO: 0.67
ECHO AV VTI: 23.9 CM
ECHO BSA: 2.03 M2
ECHO EST RA PRESSURE: 3 MMHG
ECHO IVC PROX: 2.1 CM
ECHO LA AREA 4C: 22.5 CM2
ECHO LA DIAMETER INDEX: 2.21 CM/M2
ECHO LA DIAMETER: 4.4 CM
ECHO LA MAJOR AXIS: 7 CM
ECHO LA TO AORTIC ROOT RATIO: 1.22
ECHO LA VOL MOD A4C: 61 ML (ref 18–58)
ECHO LA VOLUME INDEX MOD A4C: 31 ML/M2 (ref 16–34)
ECHO LV E' LATERAL VELOCITY: 7 CM/S
ECHO LV E' SEPTAL VELOCITY: 4 CM/S
ECHO LV EDV A4C: 272 ML
ECHO LV EDV INDEX A4C: 137 ML/M2
ECHO LV EJECTION FRACTION A4C: 23 %
ECHO LV ESV A4C: 210 ML
ECHO LV ESV INDEX A4C: 106 ML/M2
ECHO LV FRACTIONAL SHORTENING: 10 % (ref 28–44)
ECHO LV INTERNAL DIMENSION DIASTOLE INDEX: 3.07 CM/M2
ECHO LV INTERNAL DIMENSION DIASTOLIC: 6.1 CM (ref 4.2–5.9)
ECHO LV INTERNAL DIMENSION SYSTOLIC INDEX: 2.76 CM/M2
ECHO LV INTERNAL DIMENSION SYSTOLIC: 5.5 CM
ECHO LV IVSD: 1.2 CM (ref 0.6–1)
ECHO LV MASS 2D: 304.9 G (ref 88–224)
ECHO LV MASS INDEX 2D: 153.2 G/M2 (ref 49–115)
ECHO LV POSTERIOR WALL DIASTOLIC: 1.1 CM (ref 0.6–1)
ECHO LV RELATIVE WALL THICKNESS RATIO: 0.36
ECHO LVOT AREA: 4.5 CM2
ECHO LVOT AV VTI INDEX: 0.68
ECHO LVOT DIAM: 2.4 CM
ECHO LVOT MEAN GRADIENT: 1 MMHG
ECHO LVOT PEAK GRADIENT: 3 MMHG
ECHO LVOT PEAK VELOCITY: 0.8 M/S
ECHO LVOT STROKE VOLUME INDEX: 36.8 ML/M2
ECHO LVOT SV: 73.2 ML
ECHO LVOT VTI: 16.2 CM
ECHO MV A VELOCITY: 1.1 M/S
ECHO MV AREA VTI: 1.7 CM2
ECHO MV E DECELERATION TIME (DT): 158 MS
ECHO MV E VELOCITY: 1.27 M/S
ECHO MV E/A RATIO: 1.15
ECHO MV E/E' LATERAL: 18.14
ECHO MV E/E' RATIO (AVERAGED): 24.95
ECHO MV LVOT VTI INDEX: 2.61
ECHO MV MAX VELOCITY: 1.3 M/S
ECHO MV MEAN GRADIENT: 3 MMHG
ECHO MV MEAN VELOCITY: 0.8 M/S
ECHO MV PEAK GRADIENT: 7 MMHG
ECHO MV VTI: 42.3 CM
ECHO RIGHT VENTRICULAR SYSTOLIC PRESSURE (RVSP): 14 MMHG
ECHO RV MID DIMENSION: 3.2 CM
ECHO TV REGURGITANT MAX VELOCITY: 1.64 M/S
ECHO TV REGURGITANT PEAK GRADIENT: 11 MMHG
EKG ATRIAL RATE: 64 BPM
EKG DIAGNOSIS: NORMAL
EKG P AXIS: 65 DEGREES
EKG P-R INTERVAL: 178 MS
EKG Q-T INTERVAL: 468 MS
EKG QRS DURATION: 128 MS
EKG QTC CALCULATION (BAZETT): 482 MS
EKG R AXIS: 55 DEGREES
EKG T AXIS: 216 DEGREES
EKG VENTRICULAR RATE: 64 BPM
GFR, ESTIMATED: 33 ML/MIN/1.73M2
GLUCOSE BLD-MCNC: 130 MG/DL (ref 70–99)
GLUCOSE SERPL-MCNC: 125 MG/DL (ref 70–99)
HCT VFR BLD CALC: 38.3 % (ref 42–52)
HEMOGLOBIN: 12.3 GM/DL (ref 13.5–18)
MCH RBC QN AUTO: 31.9 PG (ref 27–31)
MCHC RBC AUTO-ENTMCNC: 32.1 % (ref 32–36)
MCV RBC AUTO: 99.5 FL (ref 78–100)
PDW BLD-RTO: 13.9 % (ref 11.7–14.9)
PLATELET # BLD: 141 K/CU MM (ref 140–440)
PMV BLD AUTO: 12.5 FL (ref 7.5–11.1)
POTASSIUM SERPL-SCNC: 4.1 MMOL/L (ref 3.5–5.1)
RBC # BLD: 3.85 M/CU MM (ref 4.6–6.2)
SODIUM BLD-SCNC: 138 MMOL/L (ref 135–145)
STATUS: NORMAL
STATUS: NORMAL
TRANSFUSION STATUS: NORMAL
TRANSFUSION STATUS: NORMAL
UNIT DIVISION: 0
UNIT DIVISION: 0
UNIT NUMBER: NORMAL
UNIT NUMBER: NORMAL
WBC # BLD: 7.6 K/CU MM (ref 4–10.5)

## 2024-05-02 PROCEDURE — 97162 PT EVAL MOD COMPLEX 30 MIN: CPT

## 2024-05-02 PROCEDURE — 99238 HOSP IP/OBS DSCHRG MGMT 30/<: CPT | Performed by: INTERNAL MEDICINE

## 2024-05-02 PROCEDURE — 94761 N-INVAS EAR/PLS OXIMETRY MLT: CPT

## 2024-05-02 PROCEDURE — 97116 GAIT TRAINING THERAPY: CPT

## 2024-05-02 PROCEDURE — 94640 AIRWAY INHALATION TREATMENT: CPT

## 2024-05-02 PROCEDURE — 97530 THERAPEUTIC ACTIVITIES: CPT

## 2024-05-02 PROCEDURE — 6370000000 HC RX 637 (ALT 250 FOR IP): Performed by: INTERNAL MEDICINE

## 2024-05-02 PROCEDURE — 71045 X-RAY EXAM CHEST 1 VIEW: CPT

## 2024-05-02 PROCEDURE — 85027 COMPLETE CBC AUTOMATED: CPT

## 2024-05-02 PROCEDURE — 6360000002 HC RX W HCPCS: Performed by: INTERNAL MEDICINE

## 2024-05-02 PROCEDURE — 80048 BASIC METABOLIC PNL TOTAL CA: CPT

## 2024-05-02 PROCEDURE — 2580000003 HC RX 258: Performed by: INTERNAL MEDICINE

## 2024-05-02 PROCEDURE — 93306 TTE W/DOPPLER COMPLETE: CPT | Performed by: INTERNAL MEDICINE

## 2024-05-02 PROCEDURE — 97166 OT EVAL MOD COMPLEX 45 MIN: CPT

## 2024-05-02 PROCEDURE — 93306 TTE W/DOPPLER COMPLETE: CPT

## 2024-05-02 PROCEDURE — 93010 ELECTROCARDIOGRAM REPORT: CPT | Performed by: INTERNAL MEDICINE

## 2024-05-02 PROCEDURE — 82962 GLUCOSE BLOOD TEST: CPT

## 2024-05-02 RX ORDER — CLOPIDOGREL BISULFATE 75 MG/1
75 TABLET ORAL DAILY
Qty: 30 TABLET | Refills: 3 | Status: SHIPPED | OUTPATIENT
Start: 2024-05-02

## 2024-05-02 RX ORDER — HYDRALAZINE HYDROCHLORIDE 25 MG/1
12.5 TABLET, FILM COATED ORAL ONCE
Status: COMPLETED | OUTPATIENT
Start: 2024-05-02 | End: 2024-05-02

## 2024-05-02 RX ORDER — HYDRALAZINE HYDROCHLORIDE 25 MG/1
12.5 TABLET, FILM COATED ORAL 3 TIMES DAILY
Qty: 90 TABLET | Refills: 3 | Status: SHIPPED | OUTPATIENT
Start: 2024-05-02

## 2024-05-02 RX ORDER — CARVEDILOL 6.25 MG/1
12.5 TABLET ORAL ONCE
Status: COMPLETED | OUTPATIENT
Start: 2024-05-02 | End: 2024-05-02

## 2024-05-02 RX ORDER — CARVEDILOL 6.25 MG/1
12.5 TABLET ORAL 2 TIMES DAILY
Status: DISCONTINUED | OUTPATIENT
Start: 2024-05-02 | End: 2024-05-02 | Stop reason: HOSPADM

## 2024-05-02 RX ORDER — HYDRALAZINE HYDROCHLORIDE 25 MG/1
12.5 TABLET, FILM COATED ORAL 3 TIMES DAILY
Status: DISCONTINUED | OUTPATIENT
Start: 2024-05-02 | End: 2024-05-02 | Stop reason: HOSPADM

## 2024-05-02 RX ORDER — FUROSEMIDE 40 MG/1
20 TABLET ORAL 2 TIMES DAILY
Qty: 30 TABLET | Refills: 0 | Status: SHIPPED | OUTPATIENT
Start: 2024-05-02 | End: 2024-06-01

## 2024-05-02 RX ORDER — CARVEDILOL 25 MG/1
12.5 TABLET ORAL 2 TIMES DAILY
Qty: 60 TABLET | Refills: 5 | Status: SHIPPED | OUTPATIENT
Start: 2024-05-02

## 2024-05-02 RX ORDER — FUROSEMIDE 10 MG/ML
20 INJECTION INTRAMUSCULAR; INTRAVENOUS 2 TIMES DAILY
Status: DISCONTINUED | OUTPATIENT
Start: 2024-05-02 | End: 2024-05-02 | Stop reason: HOSPADM

## 2024-05-02 RX ORDER — FUROSEMIDE 10 MG/ML
20 INJECTION INTRAMUSCULAR; INTRAVENOUS ONCE
Status: COMPLETED | OUTPATIENT
Start: 2024-05-02 | End: 2024-05-02

## 2024-05-02 RX ADMIN — ATORVASTATIN CALCIUM 80 MG: 40 TABLET, FILM COATED ORAL at 09:26

## 2024-05-02 RX ADMIN — TIOTROPIUM BROMIDE INHALATION SPRAY 2 PUFF: 3.12 SPRAY, METERED RESPIRATORY (INHALATION) at 07:02

## 2024-05-02 RX ADMIN — EMPAGLIFLOZIN 10 MG: 10 TABLET, FILM COATED ORAL at 09:27

## 2024-05-02 RX ADMIN — CARVEDILOL 12.5 MG: 6.25 TABLET, FILM COATED ORAL at 09:38

## 2024-05-02 RX ADMIN — APIXABAN 5 MG: 5 TABLET, FILM COATED ORAL at 09:27

## 2024-05-02 RX ADMIN — PANTOPRAZOLE SODIUM 40 MG: 40 TABLET, DELAYED RELEASE ORAL at 09:27

## 2024-05-02 RX ADMIN — HYDRALAZINE HYDROCHLORIDE 12.5 MG: 25 TABLET ORAL at 09:38

## 2024-05-02 RX ADMIN — CLOPIDOGREL BISULFATE 75 MG: 75 TABLET ORAL at 09:27

## 2024-05-02 RX ADMIN — SERTRALINE HYDROCHLORIDE 100 MG: 50 TABLET ORAL at 09:26

## 2024-05-02 RX ADMIN — FUROSEMIDE 20 MG: 10 INJECTION, SOLUTION INTRAMUSCULAR; INTRAVENOUS at 09:38

## 2024-05-02 RX ADMIN — EZETIMIBE 10 MG: 10 TABLET ORAL at 09:27

## 2024-05-02 RX ADMIN — SODIUM CHLORIDE, PRESERVATIVE FREE 10 ML: 5 INJECTION INTRAVENOUS at 08:00

## 2024-05-02 NOTE — PROGRESS NOTES
1100: this RN provided update to pt's sister, Sonja Liu. Pt is to be discharged today. All questions and concerns addressed at this time.

## 2024-05-02 NOTE — PROGRESS NOTES
1735: Dr. Andrade at bedside at this time; updated on pt's hemodynamic status. Pt had 5 beats of VT. Received verbal order with readback  to send STAT CMP, Mg, Phos, and CAIOB; give 1g of magnesium sulfate per Dr. Andrade.

## 2024-05-02 NOTE — PROGRESS NOTES
Suture removed from R groin using sterile procedure per verbal order from dr Andrade. Site clean, without drainage. Sterile dressing applied. Update give to RN

## 2024-05-02 NOTE — PROGRESS NOTES
0915: received verbal order with readback to move forward with discharge planning per Dr. Andrade.

## 2024-05-02 NOTE — PROGRESS NOTES
Outpatient Pharmacy Progress Note for Meds-to-Beds    Total number of Prescriptions Filled: 4    Additional Documentation:  Medication(s) were delivered to the patient's room prior to discharge      Thank you for letting us serve your patients.  [address] [address]    Phone: [phone]    Fax: [phone]

## 2024-05-02 NOTE — PLAN OF CARE
Problem: Chronic Conditions and Co-morbidities  Goal: Patient's chronic conditions and co-morbidity symptoms are monitored and maintained or improved  5/2/2024 1124 by Yamilet Keith RN  Outcome: Adequate for Discharge  5/2/2024 0935 by Yamilet Keith RN  Outcome: Progressing  Flowsheets (Taken 5/2/2024 0800)  Care Plan - Patient's Chronic Conditions and Co-Morbidity Symptoms are Monitored and Maintained or Improved: Monitor and assess patient's chronic conditions and comorbid symptoms for stability, deterioration, or improvement     Problem: ABCDS Injury Assessment  Goal: Absence of physical injury  5/2/2024 1124 by Yamilet Keith RN  Outcome: Adequate for Discharge  5/2/2024 0935 by Yamilet Keith RN  Outcome: Progressing     Problem: Safety - Adult  Goal: Free from fall injury  5/2/2024 1124 by Yamilet Keith RN  Outcome: Adequate for Discharge  5/2/2024 0935 by Yamilet Keith RN  Outcome: Progressing

## 2024-05-02 NOTE — PROGRESS NOTES
Patient was seen by nephrology creatinine was elevated hence ACE inhibitor on hold  Patient's blood pressure is on the lower side hence we will stop his Imdur decrease hydralazine  Will decrease the Coreg as well and the diuretics patient looks euvolemic this morning  Will be monitored will restart the medications once his blood pressure and other hemodynamics get better

## 2024-05-02 NOTE — PROGRESS NOTES
Occupational Therapy    [facility] ACUTE CARE OCCUPATIONAL THERAPY EVALUATION    Orcheo CRANE Rodriguez, 1960, 2007/2007-A, 5/2/2024    Discharge Recommendation: Home with initial 24 hour supervision/assistance    History:  [place]:  The primary encounter diagnosis was VHD (valvular heart disease). Diagnoses of Severe mitral valve regurgitation and KARUNA (acute kidney injury) (HCC) were also pertinent to this visit.    Subjective:  Patient states: \"The leg is just a little sore.\"  Pain: Pt reported 4/10 surgical pain in Rt LE  Communication with other providers: PT GOLDEN Rollins  Restrictions: General Precautions, Fall Risk, Telemetry, Pulse Ox, BP cuff, Bed/chair alarm    Home Setup/Prior level of function:  Social/Functional History  Lives With: Spouse (who is not in the best of health. Wife's son is planning to come stay and help out at d/c)  Type of Home: House  Home Layout: One level  Home Access: Stairs to enter without rails  Entrance Stairs - Number of Steps: 1  Bathroom Shower/Tub: Walk-in shower  Bathroom Toilet: Handicap height  Bathroom Equipment: Shower chair  Bathroom Accessibility: Accessible  Home Equipment: Rollator  ADL Assistance: Independent  Homemaking Assistance: Independent  Homemaking Responsibilities: Yes  Ambulation Assistance: Independent (uses no AD)  Transfer Assistance: Independent  Active : Yes  Occupation: On disability    Examination:  Observation: Sitting in chair upon arrival. Agreeable to evaluation.  Vision: WFL (Glasses)  Hearing: WFL  Vitals: Stable vitals throughout session on room air    Body Systems and functions:  ROM: Active shoulder flexion grossly 0-60' in BL UEs, WFL distally in BL UEs  Strength: 3-/5 MMT shoulder flexors, 4/5 MMT distally in BL UEs  Sensation: WFL (denies numbness/tingling)  Tone: Normal  Coordination: WFL for ADLs  Perception: WNL    Activities of Daily Living (ADLs):  Feeding: Independent   Grooming: SBA (able to complete in standing  at sink)  UB bathing: SBA   LB bathing: CGA (dynamic standing balance for safety with reaching bottom)  UB dressing: SBA (donning clean robe seated at chair level)  LB dressing: CGA (dynamic standing balance for safety with clothing mgmt to hips, able to don BL socks seated at chair level SBA by crossing legs into \"figure 4 position\")  Toileting: CGA    Cognitive and Psychosocial Functioning:  Overall cognitive status: WFL  Affect: Normal     Balance:   Sitting: SBA at edge of chair  Standing: CGA with no AD    Functional Mobility:  Bed Mobility: Not observed. Pt received sitting in chair upon arrival.  Transfers: CGA to/from chair (min cues for technique/safe hand placement each direction)  Ambulation: CGA with no AD ~175 ft in hallway (See PT evaluation for gait assessment)      AM-PAC 6 click short form for inpatient daily activity:   How much help from another person does the patient currently need... Unable  Dep A Lot  Max A A Lot   Mod A A Little  Min A A Little   CGA  SBA None   Mod I  Indep  Sup   1.  Putting on and taking off regular lower body clothing? [] 1    [] 2   [] 2   [] 3   [x] 3   [] 4      2. Bathing (including washing, rinsing, drying)? [] 1   [] 2   [] 2 [] 3 [x] 3 [] 4   3. Toileting, which includes using toilet, bedpan, or urinal? [] 1    [] 2   [] 2   [] 3   [x] 3   [] 4     4. Putting on and taking off regular upper body clothing? [] 1   [] 2   [] 2   [] 3   [x] 3    [] 4      5. Taking care of personal grooming such as brushing teeth? [] 1   [] 2    [] 2 [] 3    [x] 3   [] 4      6. Eating meals?   [] 1   [] 2   [] 2   [] 3   [] 3   [x] 4      Raw Score:  19   [24=0% impaired(CH), 23=1-19%(CI), 20-22=20-39%(CJ), 15-19=40-59%(CK), 10-14=60-79%(CL), 7-9=80-99%(CM), 6=100%(CN)]     Treatment:  Therapeutic Activity Training:   Therapeutic activity training was instructed today.  Cues were given for safety, sequence, UE/LE placement, awareness, and balance.    Activities performed today

## 2024-05-02 NOTE — PROGRESS NOTES
0855: Dr. Andrade at bedside at this time; updated on pt's hemodynamic status. Received verbal order with readback  to hold 81 mg of aspirin, 30 mg of isosorbide mononitrate, and 40 mg of lisinopril; modify lasix to 20 mg IVP, hydralazine to 12.5 mg tid, and coreg to 12.5 mg bid per Dr. Andrade.

## 2024-05-02 NOTE — FLOWSHEET NOTE
Per cath lab, pts valve card will be mailed to patient. IVs removed. Ride here for patient. Taken down in wheelchair.

## 2024-05-02 NOTE — CONSULTS
Communication with other providers:  Handoff to RN, co-eval with Vj STRATTON   Restrictions: general precautions, falls     Home Setup/Prior level of function  Social/Functional History  Lives With: Spouse (who is not in the best of health. Wifes son is planning to come stay and help out at d/c)  Type of Home: House  Home Layout: One level  Home Access: Stairs to enter without rails  Entrance Stairs - Number of Steps: 1  Bathroom Shower/Tub: Walk-in shower  Bathroom Toilet: Handicap height  Bathroom Equipment: Shower chair  Bathroom Accessibility: Accessible  Home Equipment: Rollator  ADL Assistance: Independent  Homemaking Assistance: Independent  Homemaking Responsibilities: Yes  Ambulation Assistance: Independent (uses no AD)  Transfer Assistance: Independent  Active : Yes  Occupation: On disability    Examination of body systems (includes body structures/functions, activity/participation limitations):  Observation:  Sitting in recliner upon arrival. Cooperative with therapy   Vision:  WFL  Hearing:  WFL  Cardiopulmonary:  stable vitals on room air   Orientation: WFL     Musculoskeletal  ROM R/L:  BLEs grossly WFL. Some limitations at right hip due to pain in groin area.   Strength R/L:  Minimal weakness to RLE due to pain in right groin.   Neuro:  WFL    Mobility/treatment:   Rolling L/R:  NT   Supine to sit:  NT, sitting in recliner upon arrival.   Transfers:   Sit to stand: CGA for safety from recliner. Slow to transition with inc pain in right groin  Stand to sit: SBA for safety to recliner  Step pivot: SBA for safety without AD   Sitting balance:  SBA at edge of recliner, static and light dynamic while donning/doffing sock   Standing balance:  CGA to SBA, static without UE support   Gait: ~175ft without AD CGA to close SBA. Dec pace with antalgic gait pattern due to pain in right groin. No major LOB noted.   Educated pt on POC, role of PT, DME use if needed at home, discharge.     [facility] 6 Clicks  Inpatient Mobility:  AM-PAC Inpatient Mobility Raw Score : 18    Safety: patient left in chair, call light within reach, RN notified, gait belt used.    Assessment:  Pt is a 63 year old male admitted with valvular heart disease s/p transcatheter mitral valve repair on 5/1. Recommend home with family support PRN once medically stable. At baseline he is indep with gross mobility and ADLs/IADLs. He performed well this date though slightly below baseline with dec activity tolerance and strength as well as inc pain limiting mobility. He would benefit from continued therapy while admitted to address his current deficits, dec burden of care, dec potential fall risk, and restore PLOF.    Complexity: Moderate  Prognosis: Good, no significant barriers to participation at this time.   General Plan: 2-3 times per week  Equipment: no needs     Goals:  Short Term Goals  Time Frame for Short Term Goals: 2 weeks  Short Term Goal 1: Pt will perform sit><supine [place]  Short Term Goal 2: Pt will transfer to all surfaces [place]  Short Term Goal 3: Pt will ambulate 200ft with LRAD [place]  Short Term Goal 4: Pt will ascend/descend 1 step, single UE support SBA       Treatment plan:  Bed mobility, transfers, balance, gait, TA, TX, stairs      Recommendations for NURSING mobility: ambulate with gait belt as time permits     Time:   Time in: 0902  Time out: 0918  Timed treatment minutes: 8  Total time: 16 minutes     Electronically signed by:    Ria Pena, [id number]  5/2/2024, 10:07 AM

## 2024-05-02 NOTE — CONSULTS
Nephrology Service Consultation      [address], [address]  [place] [address]  Phone: [phone]  Office Hours: 8:30AM - 4:30PM  Monday - Friday        MEDICAL DECISION MAKING and Recommendations         Renal Function Monitoring:  post op KARUNA with cr 2.2 from baseline 1.6. Hold RAAS blockade for now. Continue supportive post management//////Obtain daily biochemical profile; avoid nephrotoxins as possible    Dialysis Status: none needed currently    Blood Pressure Management: controlled on coreg and imdur so continue those for now.  Lisinopril on hold until KARNUA resolve    Volume Status: hypervolemic post op    Anemia status: hgb 14, monitor    Electrolytes: mostly normal, monitor    Acid/Base:  no metabolic acidosis today    Mineral/Bone Disease Management: calcium 7.7, obtain ionized calcium tomorrow    Reason for admission: mitral clip by his cardiologist    Thank you            Patient Active Problem List    Diagnosis Date Noted    Former tobacco use 11/10/2022    Positive for macroalbuminuria 09/09/2022    Hx of colonic polyps 09/08/2022    Hemiparesis affecting right side as late effect of cerebrovascular accident (CVA) ([facility]) 07/07/2016    Essential hypertension 05/24/2016    Cerebrovascular accident (CVA) due to thrombosis of left middle cerebral artery ([facility]) 05/14/2016    Obesity (BMI 30-39.9) 06/26/2015    Controlled type 2 diabetes mellitus with diabetic nephropathy, without long-term current use of insulin ([facility])     History of acute inferior wall myocardial infarction     HFrEF (heart failure with reduced ejection fraction) ([facility]) 03/17/2023    Shortness of breath 03/01/2023    SOB (shortness of breath) 03/01/2023    Ventral hernia without obstruction or gangrene 11/10/2022    History of Guillain-Marietta syndrome 03/08/2019    Adrenal adenoma 05/23/2014    ASCVD (arteriosclerotic cardiovascular disease)     CAD (coronary artery disease) 04/01/2024    VHD (valvular heart disease)

## 2024-05-02 NOTE — PROGRESS NOTES
1128: this RN educated pt on discharge instructions by utilizing the teach-back method. This RN emphasized the importance of medication compliance, assessing blood pressure prior to medication administration, reviewing new and modified medications, as well as, holding BP medications, if SBP is less than 90 mmHg, and notify doctor. Pt instructed to gently cleanse R brachial and femoral site twice a day with antibacterial soap, pat dry, and leave open to air; Dial bar soap placed in pt's belonging's bag. Pt instructed to be cautious with RUE and RLE; avoiding any strenuous activity. Education provided on signs and symptoms of infection, such as, redness, oozing from site, warm/pain when site is palpated, elevated temperature of 101 degrees Fahrenheit or greater, etc. Pt instructed to notify PCP if these s/s arise. This RN emphasized the importance of completing blood work prior to attending follow-up appointment. All questions and concerns addressed at this time. CVICU Charge RN phone number provided for any further questions.

## 2024-05-03 ENCOUNTER — TELEPHONE (OUTPATIENT)
Age: 64
End: 2024-05-03

## 2024-05-03 ENCOUNTER — TELEPHONE (OUTPATIENT)
Dept: FAMILY MEDICINE CLINIC | Age: 64
End: 2024-05-03

## 2024-05-03 NOTE — TELEPHONE ENCOUNTER
Care Transitions Initial Follow Up Call    Outreach made within 2 business days of discharge: Yes    Patient: Hilaria Rodriguez Patient : 1960   MRN: [id number]  Reason for Admission: There are no discharge diagnoses documented for the most recent discharge.  Discharge Date: 24       Spoke with: left message      Pritesh Larsen MA

## 2024-05-07 ENCOUNTER — HOSPITAL ENCOUNTER (OUTPATIENT)
Age: 64
Discharge: HOME | End: 2024-05-07
Payer: COMMERCIAL

## 2024-05-07 DIAGNOSIS — N17.9 AKI (ACUTE KIDNEY INJURY) (HCC): ICD-10-CM

## 2024-05-07 LAB
ALBUMIN SERPL-MCNC: 4.5 GM/DL (ref 3.4–5)
ALP BLD-CCNC: 75 IU/L (ref 40–128)
ALT SERPL-CCNC: 10 U/L (ref 10–40)
ANION GAP SERPL CALCULATED.3IONS-SCNC: 10 MMOL/L (ref 7–16)
AST SERPL-CCNC: 13 IU/L (ref 15–37)
BILIRUB SERPL-MCNC: 0.5 MG/DL (ref 0–1)
BUN SERPL-MCNC: 33 MG/DL (ref 6–23)
CALCIUM SERPL-MCNC: 9.1 MG/DL (ref 8.3–10.6)
CHLORIDE BLD-SCNC: 107 MMOL/L (ref 99–110)
CO2: 23 MMOL/L (ref 21–32)
CREAT SERPL-MCNC: 1.9 MG/DL (ref 0.9–1.3)
GFR, ESTIMATED: 39 ML/MIN/1.73M2
GLUCOSE SERPL-MCNC: 160 MG/DL (ref 70–99)
POTASSIUM SERPL-SCNC: 4.5 MMOL/L (ref 3.5–5.1)
SODIUM BLD-SCNC: 140 MMOL/L (ref 135–145)
TOTAL PROTEIN: 7.3 GM/DL (ref 6.4–8.2)

## 2024-05-07 PROCEDURE — 80053 COMPREHEN METABOLIC PANEL: CPT

## 2024-05-07 PROCEDURE — 36415 COLL VENOUS BLD VENIPUNCTURE: CPT

## 2024-05-08 ENCOUNTER — HOSPITAL ENCOUNTER (OUTPATIENT)
Age: 64
Discharge: HOME | End: 2024-05-10
Payer: COMMERCIAL

## 2024-05-08 VITALS
OXYGEN SATURATION: 100 % | BODY MASS INDEX: 32.02 KG/M2 | SYSTOLIC BLOOD PRESSURE: 155 MMHG | WEIGHT: 204 LBS | DIASTOLIC BLOOD PRESSURE: 75 MMHG | RESPIRATION RATE: 14 BRPM | HEIGHT: 67 IN | HEART RATE: 60 BPM

## 2024-05-08 LAB
ALBUMIN SERPL-MCNC: 4.5 GM/DL (ref 3.4–5)
ALP BLD-CCNC: 75 IU/L (ref 40–128)
ALT SERPL-CCNC: 11 U/L (ref 10–40)
ANION GAP SERPL CALCULATED.3IONS-SCNC: 12 MMOL/L (ref 7–16)
APTT: 30.1 SECONDS (ref 25.1–37.1)
AST SERPL-CCNC: 15 IU/L (ref 15–37)
BILIRUB SERPL-MCNC: 0.4 MG/DL (ref 0–1)
BUN SERPL-MCNC: 31 MG/DL (ref 6–23)
CALCIUM SERPL-MCNC: 8.9 MG/DL (ref 8.3–10.6)
CHLORIDE BLD-SCNC: 107 MMOL/L (ref 99–110)
CO2: 21 MMOL/L (ref 21–32)
CREAT SERPL-MCNC: 1.7 MG/DL (ref 0.9–1.3)
EKG ATRIAL RATE: 60 BPM
EKG DIAGNOSIS: NORMAL
EKG P AXIS: 34 DEGREES
EKG P-R INTERVAL: 194 MS
EKG Q-T INTERVAL: 458 MS
EKG QRS DURATION: 126 MS
EKG QTC CALCULATION (BAZETT): 458 MS
EKG R AXIS: 17 DEGREES
EKG T AXIS: 154 DEGREES
EKG VENTRICULAR RATE: 60 BPM
GFR, ESTIMATED: 44 ML/MIN/1.73M2
GLUCOSE SERPL-MCNC: 149 MG/DL (ref 70–99)
HCT VFR BLD CALC: 45.8 % (ref 42–52)
HEMOGLOBIN: 14.8 GM/DL (ref 13.5–18)
INR BLD: 1.1 INDEX
MCH RBC QN AUTO: 32.2 PG (ref 27–31)
MCHC RBC AUTO-ENTMCNC: 32.3 % (ref 32–36)
MCV RBC AUTO: 99.8 FL (ref 78–100)
PDW BLD-RTO: 13.4 % (ref 11.7–14.9)
PLATELET # BLD: 181 K/CU MM (ref 140–440)
PMV BLD AUTO: 12 FL (ref 7.5–11.1)
POTASSIUM SERPL-SCNC: 4.4 MMOL/L (ref 3.5–5.1)
PRO-BNP: 3436 PG/ML
PROTHROMBIN TIME: 14.9 SECONDS (ref 11.7–14.5)
RBC # BLD: 4.59 M/CU MM (ref 4.6–6.2)
SODIUM BLD-SCNC: 140 MMOL/L (ref 135–145)
TOTAL PROTEIN: 7.5 GM/DL (ref 6.4–8.2)
WBC # BLD: 7.7 K/CU MM (ref 4–10.5)

## 2024-05-08 PROCEDURE — 93010 ELECTROCARDIOGRAM REPORT: CPT | Performed by: INTERNAL MEDICINE

## 2024-05-08 PROCEDURE — 93005 ELECTROCARDIOGRAM TRACING: CPT | Performed by: INTERNAL MEDICINE

## 2024-05-08 PROCEDURE — 80053 COMPREHEN METABOLIC PANEL: CPT

## 2024-05-08 PROCEDURE — 85027 COMPLETE CBC AUTOMATED: CPT

## 2024-05-08 PROCEDURE — 83880 ASSAY OF NATRIURETIC PEPTIDE: CPT

## 2024-05-08 PROCEDURE — 85730 THROMBOPLASTIN TIME PARTIAL: CPT

## 2024-05-08 PROCEDURE — 36415 COLL VENOUS BLD VENIPUNCTURE: CPT

## 2024-05-08 PROCEDURE — 85610 PROTHROMBIN TIME: CPT

## 2024-05-29 ENCOUNTER — HOSPITAL ENCOUNTER (OUTPATIENT)
Age: 64
Discharge: HOME | End: 2024-05-31
Payer: COMMERCIAL

## 2024-05-29 ENCOUNTER — HOSPITAL ENCOUNTER (OUTPATIENT)
Dept: NON INVASIVE DIAGNOSTICS | Age: 64
Discharge: HOME | End: 2024-05-31
Attending: INTERNAL MEDICINE
Payer: COMMERCIAL

## 2024-05-29 VITALS
HEART RATE: 65 BPM | DIASTOLIC BLOOD PRESSURE: 101 MMHG | WEIGHT: 199 LBS | BODY MASS INDEX: 31.23 KG/M2 | HEIGHT: 67 IN | SYSTOLIC BLOOD PRESSURE: 180 MMHG

## 2024-05-29 VITALS
HEART RATE: 65 BPM | OXYGEN SATURATION: 100 % | SYSTOLIC BLOOD PRESSURE: 180 MMHG | HEIGHT: 67 IN | BODY MASS INDEX: 31.23 KG/M2 | WEIGHT: 199 LBS | RESPIRATION RATE: 14 BRPM | DIASTOLIC BLOOD PRESSURE: 101 MMHG

## 2024-05-29 DIAGNOSIS — I38 VHD (VALVULAR HEART DISEASE): ICD-10-CM

## 2024-05-29 DIAGNOSIS — I38 VHD (VALVULAR HEART DISEASE): Primary | ICD-10-CM

## 2024-05-29 LAB
ALBUMIN SERPL-MCNC: 4.5 GM/DL (ref 3.4–5)
ALP BLD-CCNC: 92 IU/L (ref 40–128)
ALT SERPL-CCNC: 12 U/L (ref 10–40)
ANION GAP SERPL CALCULATED.3IONS-SCNC: 11 MMOL/L (ref 7–16)
APTT: 30.6 SECONDS (ref 25.1–37.1)
AST SERPL-CCNC: 16 IU/L (ref 15–37)
BILIRUB SERPL-MCNC: 0.3 MG/DL (ref 0–1)
BUN SERPL-MCNC: 29 MG/DL (ref 6–23)
CALCIUM SERPL-MCNC: 8.8 MG/DL (ref 8.3–10.6)
CHLORIDE BLD-SCNC: 103 MMOL/L (ref 99–110)
CO2: 25 MMOL/L (ref 21–32)
CREAT SERPL-MCNC: 1.8 MG/DL (ref 0.9–1.3)
ECHO AO ROOT DIAM: 3.3 CM
ECHO AO ROOT INDEX: 1.63 CM/M2
ECHO AV AREA PEAK VELOCITY: 2.2 CM2
ECHO AV AREA VTI: 2 CM2
ECHO AV AREA/BSA PEAK VELOCITY: 1.1 CM2/M2
ECHO AV AREA/BSA VTI: 1 CM2/M2
ECHO AV MEAN GRADIENT: 5 MMHG
ECHO AV MEAN VELOCITY: 1.1 M/S
ECHO AV PEAK GRADIENT: 9 MMHG
ECHO AV PEAK VELOCITY: 1.5 M/S
ECHO AV VELOCITY RATIO: 0.67
ECHO AV VTI: 30.5 CM
ECHO BSA: 2.07 M2
ECHO EST RA PRESSURE: 3 MMHG
ECHO IVC PROX: 1.6 CM
ECHO LA AREA 4C: 25.8 CM2
ECHO LA DIAMETER INDEX: 2.23 CM/M2
ECHO LA DIAMETER: 4.5 CM
ECHO LA MAJOR AXIS: 5.9 CM
ECHO LA TO AORTIC ROOT RATIO: 1.36
ECHO LA VOL MOD A4C: 94 ML (ref 18–58)
ECHO LA VOLUME INDEX MOD A4C: 47 ML/M2 (ref 16–34)
ECHO LV E' LATERAL VELOCITY: 5 CM/S
ECHO LV E' SEPTAL VELOCITY: 4 CM/S
ECHO LV EDV A4C: 122 ML
ECHO LV EDV INDEX A4C: 60 ML/M2
ECHO LV EJECTION FRACTION A4C: 31 %
ECHO LV ESV A4C: 85 ML
ECHO LV ESV INDEX A4C: 42 ML/M2
ECHO LV FRACTIONAL SHORTENING: 15 % (ref 28–44)
ECHO LV INTERNAL DIMENSION DIASTOLE INDEX: 2.97 CM/M2
ECHO LV INTERNAL DIMENSION DIASTOLIC: 6 CM (ref 4.2–5.9)
ECHO LV INTERNAL DIMENSION SYSTOLIC INDEX: 2.52 CM/M2
ECHO LV INTERNAL DIMENSION SYSTOLIC: 5.1 CM
ECHO LV IVSD: 1.1 CM (ref 0.6–1)
ECHO LV MASS 2D: 279.6 G (ref 88–224)
ECHO LV MASS INDEX 2D: 138.4 G/M2 (ref 49–115)
ECHO LV POSTERIOR WALL DIASTOLIC: 1.1 CM (ref 0.6–1)
ECHO LV RELATIVE WALL THICKNESS RATIO: 0.37
ECHO LVOT AREA: 3.1 CM2
ECHO LVOT AV VTI INDEX: 0.64
ECHO LVOT DIAM: 2 CM
ECHO LVOT MEAN GRADIENT: 2 MMHG
ECHO LVOT PEAK GRADIENT: 4 MMHG
ECHO LVOT PEAK VELOCITY: 1 M/S
ECHO LVOT STROKE VOLUME INDEX: 30.5 ML/M2
ECHO LVOT SV: 61.5 ML
ECHO LVOT VTI: 19.6 CM
ECHO MV A VELOCITY: 0.83 M/S
ECHO MV AREA VTI: 1.2 CM2
ECHO MV E DECELERATION TIME (DT): 254 MS
ECHO MV E VELOCITY: 1.45 M/S
ECHO MV E/A RATIO: 1.75
ECHO MV E/E' LATERAL: 29
ECHO MV E/E' RATIO (AVERAGED): 32.63
ECHO MV E/E' SEPTAL: 36.25
ECHO MV LVOT VTI INDEX: 2.66
ECHO MV MAX VELOCITY: 1.5 M/S
ECHO MV MEAN GRADIENT: 4 MMHG
ECHO MV MEAN VELOCITY: 0.9 M/S
ECHO MV PEAK GRADIENT: 9 MMHG
ECHO MV VTI: 52.2 CM
ECHO RIGHT VENTRICULAR SYSTOLIC PRESSURE (RVSP): 33 MMHG
ECHO RV MID DIMENSION: 3.3 CM
ECHO TV REGURGITANT MAX VELOCITY: 2.76 M/S
ECHO TV REGURGITANT PEAK GRADIENT: 30 MMHG
EKG ATRIAL RATE: 63 BPM
EKG DIAGNOSIS: NORMAL
EKG P AXIS: 61 DEGREES
EKG P-R INTERVAL: 174 MS
EKG Q-T INTERVAL: 458 MS
EKG QRS DURATION: 130 MS
EKG QTC CALCULATION (BAZETT): 468 MS
EKG R AXIS: 39 DEGREES
EKG T AXIS: 173 DEGREES
EKG VENTRICULAR RATE: 63 BPM
GFR, ESTIMATED: 42 ML/MIN/1.73M2
GLUCOSE SERPL-MCNC: 195 MG/DL (ref 70–99)
HCT VFR BLD CALC: 47.7 % (ref 42–52)
HEMOGLOBIN: 15.4 GM/DL (ref 13.5–18)
INR BLD: 1.1 INDEX
MCH RBC QN AUTO: 32 PG (ref 27–31)
MCHC RBC AUTO-ENTMCNC: 32.3 % (ref 32–36)
MCV RBC AUTO: 99 FL (ref 78–100)
PDW BLD-RTO: 13.5 % (ref 11.7–14.9)
PLATELET # BLD: 143 K/CU MM (ref 140–440)
PMV BLD AUTO: 11.8 FL (ref 7.5–11.1)
POTASSIUM SERPL-SCNC: 4.8 MMOL/L (ref 3.5–5.1)
PRO-BNP: 2557 PG/ML
PROTHROMBIN TIME: 15 SECONDS (ref 11.7–14.5)
RBC # BLD: 4.82 M/CU MM (ref 4.6–6.2)
SODIUM BLD-SCNC: 139 MMOL/L (ref 135–145)
TOTAL PROTEIN: 7.3 GM/DL (ref 6.4–8.2)
WBC # BLD: 6.7 K/CU MM (ref 4–10.5)

## 2024-05-29 PROCEDURE — 36415 COLL VENOUS BLD VENIPUNCTURE: CPT

## 2024-05-29 PROCEDURE — 93306 TTE W/DOPPLER COMPLETE: CPT

## 2024-05-29 PROCEDURE — 83880 ASSAY OF NATRIURETIC PEPTIDE: CPT

## 2024-05-29 PROCEDURE — 85027 COMPLETE CBC AUTOMATED: CPT

## 2024-05-29 PROCEDURE — 80053 COMPREHEN METABOLIC PANEL: CPT

## 2024-05-29 PROCEDURE — 85610 PROTHROMBIN TIME: CPT

## 2024-05-29 PROCEDURE — 93005 ELECTROCARDIOGRAM TRACING: CPT | Performed by: INTERNAL MEDICINE

## 2024-05-29 PROCEDURE — 85730 THROMBOPLASTIN TIME PARTIAL: CPT

## 2024-05-29 NOTE — PLAN OF CARE
Discussed elevated blood pressure with Dr. Felipe; pt will increase Coreg to 25mg poBID, and hydralazine 25mg po TID. Pt acknowledges understanding. Pt will have follow up office visit for blood pressure check. Office will contact  
Pt arrives to valve clinic for 1 month post KAITY visit. Pt denies complaints.   
10

## 2024-06-04 ENCOUNTER — OFFICE VISIT (OUTPATIENT)
Dept: CARDIOLOGY CLINIC | Age: 64
End: 2024-06-04
Payer: COMMERCIAL

## 2024-06-04 VITALS
BODY MASS INDEX: 30.76 KG/M2 | HEIGHT: 67 IN | WEIGHT: 196 LBS | OXYGEN SATURATION: 95 % | DIASTOLIC BLOOD PRESSURE: 86 MMHG | HEART RATE: 70 BPM | SYSTOLIC BLOOD PRESSURE: 134 MMHG

## 2024-06-04 DIAGNOSIS — I38 VHD (VALVULAR HEART DISEASE): Primary | ICD-10-CM

## 2024-06-04 PROCEDURE — 3079F DIAST BP 80-89 MM HG: CPT | Performed by: INTERNAL MEDICINE

## 2024-06-04 PROCEDURE — 3075F SYST BP GE 130 - 139MM HG: CPT | Performed by: INTERNAL MEDICINE

## 2024-06-04 PROCEDURE — 99214 OFFICE O/P EST MOD 30 MIN: CPT | Performed by: INTERNAL MEDICINE

## 2024-06-04 NOTE — PROGRESS NOTES
glimepiride and Jardiance compliant hemoglobin A1c of 6.7        - has ICD  per EP     -HFrEF on beta-blocker that is Coreg, Lasix compliant   Ef BETTER NOW     Mortality from the morbid obesity is very high:  Wt loss advised     Body mass index is 31.79 kg/m².      -CKD stable      BEV MELLO MD    [facility]

## 2024-06-10 ENCOUNTER — OFFICE VISIT (OUTPATIENT)
Dept: FAMILY MEDICINE CLINIC | Age: 64
End: 2024-06-10
Payer: COMMERCIAL

## 2024-06-10 VITALS
HEART RATE: 60 BPM | DIASTOLIC BLOOD PRESSURE: 70 MMHG | BODY MASS INDEX: 30.7 KG/M2 | WEIGHT: 195.6 LBS | OXYGEN SATURATION: 96 % | RESPIRATION RATE: 16 BRPM | HEIGHT: 67 IN | SYSTOLIC BLOOD PRESSURE: 114 MMHG

## 2024-06-10 DIAGNOSIS — Z87.891 PERSONAL HISTORY OF TOBACCO USE: ICD-10-CM

## 2024-06-10 DIAGNOSIS — F33.1 MAJOR DEPRESSIVE DISORDER, RECURRENT, MODERATE (HCC): ICD-10-CM

## 2024-06-10 DIAGNOSIS — E78.2 MIXED HYPERLIPIDEMIA: ICD-10-CM

## 2024-06-10 DIAGNOSIS — I48.20 CHRONIC A-FIB (HCC): ICD-10-CM

## 2024-06-10 DIAGNOSIS — J44.9 CHRONIC OBSTRUCTIVE PULMONARY DISEASE, UNSPECIFIED COPD TYPE (HCC): ICD-10-CM

## 2024-06-10 DIAGNOSIS — I10 ESSENTIAL HYPERTENSION: Primary | ICD-10-CM

## 2024-06-10 DIAGNOSIS — Z12.11 COLON CANCER SCREENING: ICD-10-CM

## 2024-06-10 DIAGNOSIS — E66.09 CLASS 1 OBESITY DUE TO EXCESS CALORIES WITH SERIOUS COMORBIDITY AND BODY MASS INDEX (BMI) OF 30.0 TO 30.9 IN ADULT: ICD-10-CM

## 2024-06-10 DIAGNOSIS — I25.10 ASCVD (ARTERIOSCLEROTIC CARDIOVASCULAR DISEASE): ICD-10-CM

## 2024-06-10 DIAGNOSIS — Z23 NEED FOR PNEUMOCOCCAL VACCINATION: ICD-10-CM

## 2024-06-10 DIAGNOSIS — E11.21 CONTROLLED TYPE 2 DIABETES MELLITUS WITH DIABETIC NEPHROPATHY, WITHOUT LONG-TERM CURRENT USE OF INSULIN (HCC): ICD-10-CM

## 2024-06-10 DIAGNOSIS — I50.20 HFREF (HEART FAILURE WITH REDUCED EJECTION FRACTION) (HCC): ICD-10-CM

## 2024-06-10 DIAGNOSIS — R45.4 IRRITABILITY AND ANGER: ICD-10-CM

## 2024-06-10 LAB
CHOLEST SERPL-MCNC: 149 MG/DL (ref 0–199)
HDLC SERPL-MCNC: 38 MG/DL (ref 40–60)
LDLC SERPL CALC-MCNC: 94 MG/DL
TRIGL SERPL-MCNC: 87 MG/DL (ref 0–150)
VLDLC SERPL CALC-MCNC: 17 MG/DL

## 2024-06-10 PROCEDURE — G0296 VISIT TO DETERM LDCT ELIG: HCPCS | Performed by: STUDENT IN AN ORGANIZED HEALTH CARE EDUCATION/TRAINING PROGRAM

## 2024-06-10 PROCEDURE — 3078F DIAST BP <80 MM HG: CPT | Performed by: STUDENT IN AN ORGANIZED HEALTH CARE EDUCATION/TRAINING PROGRAM

## 2024-06-10 PROCEDURE — 3074F SYST BP LT 130 MM HG: CPT | Performed by: STUDENT IN AN ORGANIZED HEALTH CARE EDUCATION/TRAINING PROGRAM

## 2024-06-10 PROCEDURE — 90677 PCV20 VACCINE IM: CPT | Performed by: STUDENT IN AN ORGANIZED HEALTH CARE EDUCATION/TRAINING PROGRAM

## 2024-06-10 PROCEDURE — 3044F HG A1C LEVEL LT 7.0%: CPT | Performed by: STUDENT IN AN ORGANIZED HEALTH CARE EDUCATION/TRAINING PROGRAM

## 2024-06-10 PROCEDURE — G0009 ADMIN PNEUMOCOCCAL VACCINE: HCPCS | Performed by: STUDENT IN AN ORGANIZED HEALTH CARE EDUCATION/TRAINING PROGRAM

## 2024-06-10 PROCEDURE — 99214 OFFICE O/P EST MOD 30 MIN: CPT | Performed by: STUDENT IN AN ORGANIZED HEALTH CARE EDUCATION/TRAINING PROGRAM

## 2024-06-10 RX ORDER — ATORVASTATIN CALCIUM 80 MG/1
80 TABLET, FILM COATED ORAL DAILY
Qty: 90 TABLET | Refills: 1 | Status: SHIPPED | OUTPATIENT
Start: 2024-06-10 | End: 2024-12-07

## 2024-06-10 RX ORDER — SERTRALINE HYDROCHLORIDE 100 MG/1
100 TABLET, FILM COATED ORAL DAILY
Qty: 90 TABLET | Refills: 1 | Status: SHIPPED | OUTPATIENT
Start: 2024-06-10 | End: 2024-12-07

## 2024-06-10 RX ORDER — GLIMEPIRIDE 2 MG/1
2 TABLET ORAL
Qty: 90 TABLET | Refills: 1 | Status: SHIPPED | OUTPATIENT
Start: 2024-06-10 | End: 2024-12-07

## 2024-06-10 RX ORDER — CLOPIDOGREL BISULFATE 75 MG/1
75 TABLET ORAL DAILY
Qty: 30 TABLET | Refills: 3 | Status: SHIPPED | OUTPATIENT
Start: 2024-06-10

## 2024-06-10 RX ORDER — EZETIMIBE 10 MG/1
10 TABLET ORAL DAILY
Qty: 90 TABLET | Refills: 1 | Status: SHIPPED | OUTPATIENT
Start: 2024-06-10

## 2024-06-10 RX ORDER — HYDRALAZINE HYDROCHLORIDE 25 MG/1
12.5 TABLET, FILM COATED ORAL 3 TIMES DAILY
Qty: 90 TABLET | Refills: 3 | Status: SHIPPED | OUTPATIENT
Start: 2024-06-10

## 2024-06-10 RX ORDER — FUROSEMIDE 40 MG/1
20 TABLET ORAL 2 TIMES DAILY
Qty: 90 TABLET | Refills: 1 | Status: SHIPPED | OUTPATIENT
Start: 2024-06-10 | End: 2024-07-10

## 2024-06-10 RX ORDER — CARVEDILOL 25 MG/1
12.5 TABLET ORAL 2 TIMES DAILY
Qty: 60 TABLET | Refills: 5 | Status: SHIPPED | OUTPATIENT
Start: 2024-06-10

## 2024-06-10 ASSESSMENT — ENCOUNTER SYMPTOMS
COUGH: 0
WHEEZING: 0
SHORTNESS OF BREATH: 0
CONSTIPATION: 0
SORE THROAT: 0
DIARRHEA: 0
RHINORRHEA: 0

## 2024-06-10 NOTE — PROGRESS NOTES
Subjective     Patient ID: Hilaria is a 63 y.o. male who presents for 3 Month Follow-Up (3 month follow up on chronic conditions. No concerns today. ).     HTN -- Currently on lisinopril 40 and hydralazone 25 TID.  Increased lisinop[ril from 10 to 40 at last visit. Also, used to be on hydroalazine 12.5 TID, but increased to 25 TID.  Patient does check BP at home. Usually SBP runs at 200s, but he is unsure if his machine is working properly..  Denies headache, vision changes, SOB, chest pain, palpitations, or edema.     HFrEF-- currently on jardiance 10, lasix, and carvedilol 12.5 BID. Just recently had heart valve surgery.  Since surgery has had no chest pain, and breathing has improved.     DM -- Currently on jardiance 10, amaryl 2mg, .  Patient does check BG at home.  Fasting BG usually runs around 90.  Lifestyle consisting of follows a diabetic diet regularly.  Last eye exam was April 2024.      COPD -- Currently on albuterol.  Stable on room air.  Reports having to use albuterol inhaler rarely.  Patient is a former smoker.  Denies any chest pain, palpitations, SOB, cough, or wheezing.     Macroalbuminuria -- followed by nephrology    HLD -- currently on atorvastatin 80 and zetia.          Review of Systems   Constitutional:  Negative for activity change, appetite change and fever.   HENT:  Negative for congestion, rhinorrhea and sore throat.    Eyes:  Negative for visual disturbance.   Respiratory:  Negative for cough, shortness of breath and wheezing.    Cardiovascular:  Negative for chest pain, palpitations and leg swelling.   Gastrointestinal:  Negative for constipation and diarrhea.   Skin:  Negative for rash.   Neurological:  Negative for headaches.   All other systems reviewed and are negative.       Objective   Vitals:    06/10/24 0908   BP: 114/70   Pulse: 60   Resp: 16   SpO2: 96%       Physical Exam  Vitals and nursing note reviewed.   Constitutional:       General: He is not in acute distress.

## 2024-06-10 NOTE — ASSESSMENT & PLAN NOTE
-last A1c from March 2024 = 6.3%.  Well controlled at this time  -continue jardiance 10 and amaryl 2mg  -reviewed home BG logs, and well controlled  -continue ACEi and statin  -UTD on eye exam (April 2024)

## 2024-06-10 NOTE — ASSESSMENT & PLAN NOTE
-continue eliquis for anticoagulation  -rate controlled on carvedilol 12.5 BID  -continue to follow with cardiology

## 2024-06-10 NOTE — ASSESSMENT & PLAN NOTE
-after heart surgery was informed to discontinue spiriva  -breathing stable and well controlled  -continue albuterol PRN.  May consider restarting spiriva in future if breathing worsens.

## 2024-06-10 NOTE — ASSESSMENT & PLAN NOTE
Changes today = none  BP is controlled  Meds: ACEi, beta-blocker, and hydralazine  Labs: last CMP, lipid panel, and urine microalbumin on 11/29/23.   Recommend lifestyle modifications such as weight loss, exercising for at least 120min/wk, and low sodium/DASH diet.

## 2024-06-10 NOTE — ASSESSMENT & PLAN NOTE
-symptoms are stable and well controlled  -continue to follow with cardiology  -continue ACEi, BB, SGLT2i, lasix, aspirin, and statin

## 2024-06-11 ENCOUNTER — TELEPHONE (OUTPATIENT)
Dept: FAMILY MEDICINE CLINIC | Age: 64
End: 2024-06-11

## 2024-06-11 NOTE — TELEPHONE ENCOUNTER
To Dr. Dos Santos-    Patient wants to know why Zetia is on his list of medications----he said he does not take this and no one said anything taking new medicine.    Please call him about this.

## 2024-06-13 ENCOUNTER — TELEPHONE (OUTPATIENT)
Dept: FAMILY MEDICINE CLINIC | Age: 64
End: 2024-06-13

## 2024-06-13 ENCOUNTER — CARE COORDINATION (OUTPATIENT)
Dept: CARE COORDINATION | Age: 64
End: 2024-06-13

## 2024-06-13 ENCOUNTER — COMMUNITY OUTREACH (OUTPATIENT)
Dept: CARE COORDINATION | Age: 64
End: 2024-06-13

## 2024-06-13 NOTE — CARE COORDINATION
ACM received the below staff message [facility] with [facility] referral for Care Management (CM)/RPM:    \"Madonna Graves,    This patient was referred by PCP for RPM/ACM enrollment. Please outreach to patient.     Thanks   Liyah\"    This ACM placed call to patient today for enrollment outreach, but patient did not answer. ACM left voice message with call back number provided. ACM also left regular ACM's contact information, as she will be back from [facility] on 6/17/24.     Patient's chronic conditions include CHF, COPD, HTN/CKD3 & T2DM.    Next scheduled enrollment outreach assigned to MILADYS Jones.

## 2024-06-19 ENCOUNTER — PROCEDURE VISIT (OUTPATIENT)
Dept: CARDIOLOGY CLINIC | Age: 64
End: 2024-06-19

## 2024-06-19 DIAGNOSIS — Z95.810 ICD (IMPLANTABLE CARDIOVERTER-DEFIBRILLATOR), DUAL, IN SITU: Primary | ICD-10-CM

## 2024-06-26 ENCOUNTER — HOSPITAL ENCOUNTER (OUTPATIENT)
Dept: CT IMAGING | Age: 64
Discharge: HOME | End: 2024-06-26
Attending: STUDENT IN AN ORGANIZED HEALTH CARE EDUCATION/TRAINING PROGRAM
Payer: COMMERCIAL

## 2024-06-26 DIAGNOSIS — Z87.891 PERSONAL HISTORY OF TOBACCO USE: ICD-10-CM

## 2024-06-26 PROCEDURE — 71271 CT THORAX LUNG CANCER SCR C-: CPT

## 2024-06-28 ENCOUNTER — CARE COORDINATION (OUTPATIENT)
Dept: CARE COORDINATION | Age: 64
End: 2024-06-28

## 2024-07-03 ENCOUNTER — CARE COORDINATION (OUTPATIENT)
Dept: CARE COORDINATION | Age: 64
End: 2024-07-03

## 2024-07-08 ENCOUNTER — TELEPHONE (OUTPATIENT)
Dept: CARDIOTHORACIC SURGERY | Age: 64
End: 2024-07-08

## 2024-07-08 NOTE — TELEPHONE ENCOUNTER
LM for patient to return my call. Patient's scheduled appointment on 7/26 needs rescheduled as Dr. Gutierrez is no longer working Fridays.

## 2024-07-09 RX ORDER — LISINOPRIL 2.5 MG/1
2.5 TABLET ORAL DAILY
Qty: 90 TABLET | Refills: 1 | Status: SHIPPED | OUTPATIENT
Start: 2024-07-09

## 2024-07-17 ENCOUNTER — TELEPHONE (OUTPATIENT)
Dept: CARDIOTHORACIC SURGERY | Age: 64
End: 2024-07-17

## 2024-07-17 NOTE — TELEPHONE ENCOUNTER
LM for patient to return my call to reschedule his appointment on 7/29 as Dr. Gutierrez is out of office.

## 2024-07-29 ENCOUNTER — TELEPHONE (OUTPATIENT)
Dept: CARDIOTHORACIC SURGERY | Age: 64
End: 2024-07-29

## 2024-07-29 ENCOUNTER — TELEPHONE (OUTPATIENT)
Dept: CARDIOLOGY CLINIC | Age: 64
End: 2024-07-29

## 2024-07-29 NOTE — TELEPHONE ENCOUNTER
Patient called regarding missed [facility] appt. LVM inquiring on status of patient and to reschedule appt.

## 2024-08-01 ENCOUNTER — TELEMEDICINE (OUTPATIENT)
Dept: FAMILY MEDICINE CLINIC | Age: 64
End: 2024-08-01
Payer: COMMERCIAL

## 2024-08-01 DIAGNOSIS — Z00.00 MEDICARE ANNUAL WELLNESS VISIT, SUBSEQUENT: Primary | ICD-10-CM

## 2024-08-01 PROCEDURE — G0439 PPPS, SUBSEQ VISIT: HCPCS | Performed by: STUDENT IN AN ORGANIZED HEALTH CARE EDUCATION/TRAINING PROGRAM

## 2024-08-01 PROCEDURE — 1123F ACP DISCUSS/DSCN MKR DOCD: CPT | Performed by: STUDENT IN AN ORGANIZED HEALTH CARE EDUCATION/TRAINING PROGRAM

## 2024-08-01 SDOH — ECONOMIC STABILITY: HOUSING INSECURITY
IN THE LAST 12 MONTHS, WAS THERE A TIME WHEN YOU DID NOT HAVE A STEADY PLACE TO SLEEP OR SLEPT IN A SHELTER (INCLUDING NOW)?: NO

## 2024-08-01 SDOH — ECONOMIC STABILITY: FOOD INSECURITY: WITHIN THE PAST 12 MONTHS, THE FOOD YOU BOUGHT JUST DIDN'T LAST AND YOU DIDN'T HAVE MONEY TO GET MORE.: NEVER TRUE

## 2024-08-01 SDOH — ECONOMIC STABILITY: FOOD INSECURITY: WITHIN THE PAST 12 MONTHS, YOU WORRIED THAT YOUR FOOD WOULD RUN OUT BEFORE YOU GOT MONEY TO BUY MORE.: NEVER TRUE

## 2024-08-01 SDOH — ECONOMIC STABILITY: INCOME INSECURITY: HOW HARD IS IT FOR YOU TO PAY FOR THE VERY BASICS LIKE FOOD, HOUSING, MEDICAL CARE, AND HEATING?: HARD

## 2024-08-01 ASSESSMENT — PATIENT HEALTH QUESTIONNAIRE - PHQ9
4. FEELING TIRED OR HAVING LITTLE ENERGY: NOT AT ALL
3. TROUBLE FALLING OR STAYING ASLEEP: SEVERAL DAYS
SUM OF ALL RESPONSES TO PHQ9 QUESTIONS 1 & 2: 0
SUM OF ALL RESPONSES TO PHQ QUESTIONS 1-9: 4
SUM OF ALL RESPONSES TO PHQ QUESTIONS 1-9: 4
5. POOR APPETITE OR OVEREATING: NEARLY EVERY DAY
7. TROUBLE CONCENTRATING ON THINGS, SUCH AS READING THE NEWSPAPER OR WATCHING TELEVISION: NOT AT ALL
1. LITTLE INTEREST OR PLEASURE IN DOING THINGS: NOT AT ALL
SUM OF ALL RESPONSES TO PHQ QUESTIONS 1-9: 4
10. IF YOU CHECKED OFF ANY PROBLEMS, HOW DIFFICULT HAVE THESE PROBLEMS MADE IT FOR YOU TO DO YOUR WORK, TAKE CARE OF THINGS AT HOME, OR GET ALONG WITH OTHER PEOPLE: NOT DIFFICULT AT ALL
9. THOUGHTS THAT YOU WOULD BE BETTER OFF DEAD, OR OF HURTING YOURSELF: NOT AT ALL
8. MOVING OR SPEAKING SO SLOWLY THAT OTHER PEOPLE COULD HAVE NOTICED. OR THE OPPOSITE, BEING SO FIGETY OR RESTLESS THAT YOU HAVE BEEN MOVING AROUND A LOT MORE THAN USUAL: NOT AT ALL
6. FEELING BAD ABOUT YOURSELF - OR THAT YOU ARE A FAILURE OR HAVE LET YOURSELF OR YOUR FAMILY DOWN: NOT AT ALL
SUM OF ALL RESPONSES TO PHQ QUESTIONS 1-9: 4
2. FEELING DOWN, DEPRESSED OR HOPELESS: NOT AT ALL

## 2024-08-01 NOTE — PROGRESS NOTES
Medicare Annual Wellness Visit    Hilaria ROSA MARIA Rodriguez is here for Medicare AWV    Assessment & Plan   Medicare annual wellness visit, subsequent  Recommendations for Preventive Services Due: see orders and patient instructions/AVS.  Recommended screening schedule for the next 5-10 years is provided to the patient in written form: see Patient Instructions/AVS.     No follow-ups on file.     Subjective       Patient's complete Health Risk Assessment and screening values have been reviewed and are found in Flowsheets. The following problems were reviewed today and where indicated follow up appointments were made and/or referrals ordered.    Positive Risk Factor Screenings with Interventions:    Fall Risk:  Do you feel unsteady or are you worried about falling? : (!) yes (worries, but is very careful-has cane/walker prn)  2 or more falls in past year?: no  Fall with injury in past year?: no     Interventions:    Patient comments: states he does worry about falling, but is very careful. He has a cane or walker he can use as needed.  Reviewed medications, home hazards, visual acuity, and co-morbidities that can increase risk for falls  Patient declines any further evaluation or treatment               Abnormal BMI (obese):  There is no height or weight on file to calculate BMI. (!) Abnormal  Interventions:  Patient declines any further evaluation or treatment        Dentist Screen:  Have you seen the dentist within the past year?: (!) No (needs)    Intervention:  Advised to schedule with their dentist        Advanced Directives:  Do you have a Living Will?: (!) No    Intervention:  has NO advanced directive - not interested in additional information                     Objective    Patient-Reported Vitals  No data recorded     Unable to obtain 3 vital signs due to patient not having equipment to take blood pressure/temperature.                Allergies   Allergen Reactions    Ibuprofen Other (See Comments)     Stomach

## 2024-08-01 NOTE — PATIENT INSTRUCTIONS
Personalized Preventive Plan for Hilaria Rodriguez - 8/1/2024  Medicare offers a range of preventive health benefits. Some of the tests and screenings are paid in full while other may be subject to a deductible, co-insurance, and/or copay.    Some of these benefits include a comprehensive review of your medical history including lifestyle, illnesses that may run in your family, and various assessments and screenings as appropriate.    After reviewing your medical record and screening and assessments performed today your provider may have ordered immunizations, labs, imaging, and/or referrals for you.  A list of these orders (if applicable) as well as your Preventive Care list are included within your After Visit Summary for your review.    Other Preventive Recommendations:    A preventive eye exam performed by an eye specialist is recommended every 1-2 years to screen for glaucoma; cataracts, macular degeneration, and other eye disorders.  A preventive dental visit is recommended every 6 months.  Try to get at least 150 minutes of exercise per week or 10,000 steps per day on a pedometer .  Order or download the FREE \"Exercise & Physical Activity: Your Everyday Guide\" from [employer]. Call 1-740.387.5860 or search [employer] online.  You need 7251-1303 mg of calcium and 1024-0060 IU of vitamin D per day. It is possible to meet your calcium requirement with diet alone, but a vitamin D supplement is usually necessary to meet this goal.  When exposed to the sun, use a sunscreen that protects against both UVA and UVB radiation with an SPF of 30 or greater. Reapply every 2 to 3 hours or after sweating, drying off with a towel, or swimming.  Always wear a seat belt when traveling in a car. Always wear a helmet when riding a bicycle or motorcycle.

## 2024-08-02 ENCOUNTER — TELEPHONE (OUTPATIENT)
Dept: CARDIOTHORACIC SURGERY | Age: 64
End: 2024-08-02

## 2024-08-02 NOTE — TELEPHONE ENCOUNTER
BALWINDER for patient to return my call. Patiant is scheduled to see Dr. Gutierrez on 8/5 at 1420. Patient still has not completed his order: Vascular duplex lower extremity arteries bilateral with exercise PAULY. Patient needs to contact the  to schedule his testing. Appointment with Dr. Gutierrez needs to be rescheduled.

## 2024-08-12 ENCOUNTER — OFFICE VISIT (OUTPATIENT)
Dept: FAMILY MEDICINE CLINIC | Age: 64
End: 2024-08-12
Payer: COMMERCIAL

## 2024-08-12 ENCOUNTER — HOSPITAL ENCOUNTER (OUTPATIENT)
Age: 64
Discharge: HOME | End: 2024-08-12
Payer: COMMERCIAL

## 2024-08-12 VITALS
WEIGHT: 193.1 LBS | SYSTOLIC BLOOD PRESSURE: 138 MMHG | HEIGHT: 67 IN | DIASTOLIC BLOOD PRESSURE: 88 MMHG | HEART RATE: 65 BPM | OXYGEN SATURATION: 97 % | BODY MASS INDEX: 30.31 KG/M2

## 2024-08-12 DIAGNOSIS — I25.5 ISCHEMIC CARDIOMYOPATHY: Primary | ICD-10-CM

## 2024-08-12 DIAGNOSIS — E11.21 CONTROLLED TYPE 2 DIABETES MELLITUS WITH DIABETIC NEPHROPATHY, WITHOUT LONG-TERM CURRENT USE OF INSULIN (HCC): ICD-10-CM

## 2024-08-12 DIAGNOSIS — I25.5 ISCHEMIC CARDIOMYOPATHY: ICD-10-CM

## 2024-08-12 DIAGNOSIS — R06.01 ORTHOPNEA: ICD-10-CM

## 2024-08-12 DIAGNOSIS — I20.89 STABLE ANGINA PECTORIS (HCC): ICD-10-CM

## 2024-08-12 LAB
ALBUMIN SERPL-MCNC: 4.1 GM/DL (ref 3.4–5)
ALP BLD-CCNC: 78 IU/L (ref 40–129)
ALT SERPL-CCNC: 13 U/L (ref 10–40)
ANION GAP SERPL CALCULATED.3IONS-SCNC: 13 MMOL/L (ref 7–16)
AST SERPL-CCNC: 15 IU/L (ref 15–37)
BASOPHILS ABSOLUTE: 0.1 K/CU MM
BASOPHILS RELATIVE PERCENT: 0.8 % (ref 0–1)
BILIRUB SERPL-MCNC: 0.5 MG/DL (ref 0–1)
BUN SERPL-MCNC: 25 MG/DL (ref 6–23)
CALCIUM SERPL-MCNC: 8.7 MG/DL (ref 8.3–10.6)
CHLORIDE BLD-SCNC: 104 MMOL/L (ref 99–110)
CO2: 24 MMOL/L (ref 21–32)
CREAT SERPL-MCNC: 2.1 MG/DL (ref 0.9–1.3)
DIFFERENTIAL TYPE: ABNORMAL
EOSINOPHILS ABSOLUTE: 0.3 K/CU MM
EOSINOPHILS RELATIVE PERCENT: 4 % (ref 0–3)
GFR, ESTIMATED: 34 ML/MIN/1.73M2
GLUCOSE SERPL-MCNC: 136 MG/DL (ref 70–99)
HCT VFR BLD CALC: 45.9 % (ref 42–52)
HEMOGLOBIN: 14.9 GM/DL (ref 13.5–18)
IMMATURE NEUTROPHIL %: 0.2 % (ref 0–0.43)
LYMPHOCYTES ABSOLUTE: 1.7 K/CU MM
LYMPHOCYTES RELATIVE PERCENT: 27.2 % (ref 24–44)
MCH RBC QN AUTO: 31.9 PG (ref 27–31)
MCHC RBC AUTO-ENTMCNC: 32.5 % (ref 32–36)
MCV RBC AUTO: 98.3 FL (ref 78–100)
MONOCYTES ABSOLUTE: 0.5 K/CU MM
MONOCYTES RELATIVE PERCENT: 8.2 % (ref 0–4)
NEUTROPHILS ABSOLUTE: 3.8 K/CU MM
NEUTROPHILS RELATIVE PERCENT: 59.6 % (ref 36–66)
NUCLEATED RBC %: 0 %
PDW BLD-RTO: 13.8 % (ref 11.7–14.9)
PLATELET # BLD: 147 K/CU MM (ref 140–440)
PMV BLD AUTO: 12.7 FL (ref 7.5–11.1)
POTASSIUM SERPL-SCNC: 3.9 MMOL/L (ref 3.5–5.1)
RBC # BLD: 4.67 M/CU MM (ref 4.6–6.2)
SODIUM BLD-SCNC: 141 MMOL/L (ref 135–145)
TOTAL IMMATURE NEUTOROPHIL: 0.01 K/CU MM
TOTAL NUCLEATED RBC: 0 K/CU MM
TOTAL PROTEIN: 6.7 GM/DL (ref 6.4–8.2)
WBC # BLD: 6.3 K/CU MM (ref 4–10.5)

## 2024-08-12 PROCEDURE — 36415 COLL VENOUS BLD VENIPUNCTURE: CPT

## 2024-08-12 PROCEDURE — 99214 OFFICE O/P EST MOD 30 MIN: CPT | Performed by: INTERNAL MEDICINE

## 2024-08-12 PROCEDURE — 3075F SYST BP GE 130 - 139MM HG: CPT | Performed by: INTERNAL MEDICINE

## 2024-08-12 PROCEDURE — 80053 COMPREHEN METABOLIC PANEL: CPT

## 2024-08-12 PROCEDURE — 1123F ACP DISCUSS/DSCN MKR DOCD: CPT | Performed by: INTERNAL MEDICINE

## 2024-08-12 PROCEDURE — 85025 COMPLETE CBC W/AUTO DIFF WBC: CPT

## 2024-08-12 PROCEDURE — 3079F DIAST BP 80-89 MM HG: CPT | Performed by: INTERNAL MEDICINE

## 2024-08-12 PROCEDURE — 3044F HG A1C LEVEL LT 7.0%: CPT | Performed by: INTERNAL MEDICINE

## 2024-08-12 RX ORDER — NITROGLYCERIN 0.4 MG/1
0.4 TABLET SUBLINGUAL EVERY 5 MIN PRN
Qty: 25 TABLET | Refills: 3 | Status: SHIPPED | OUTPATIENT
Start: 2024-08-12

## 2024-08-12 NOTE — PROGRESS NOTES
Outpatient Clinic Visit Note    Patient: Hilaria Rodriguez  : 1960 (64 y.o.)  Date: 2024    CC:  Chief Complaint   Patient presents with    Other     Patient states he feels unsteady and weak x 1 week        HPI: he had a defibrillator installed inMay this year.  He gets short of breath around 4 am and has to sit up to breath.  He denies worsening leg swelling.  He has JEREZ as well, not worsened recently.  He also had mitral valve clipping, but recently has felt worse.  He had angioplasty on his legs this spring as well.     Past Medical History:    Past Medical History:   Diagnosis Date    Acute cerebrovascular accident (CVA) ([facility]) 2020    Acute on chronic systolic congestive heart failure ([facility]) 2024    Acute ST elevation myocardial infarction (STEMI) ([facility]) 2018    B12 deficiency     CAD (coronary artery disease)     COPD (chronic obstructive pulmonary disease) ([facility]) 2020    GERD (gastroesophageal reflux disease)     Guillain Barré syndrome ([facility])     H/O echocardiogram 13, 6/16/10    7/13-EF 50-55% WNL. 6/10-EF53%, mod LVH, MARCI, RVE, trace PI, mild MR/TR,     Heart murmur     History of echocardiogram 10/07/2020    EF 40%, No changed since previous 2020, Mid inferior hypokinesis noted, Grade I DD, Mod MR, Mild TR, Dilatation of the aortic root, No pericardial effusion     History of myocardial infarction     History of nuclear MUGA test 2020    EF 53%, Normal, No ICD needed    History of nuclear stress test 2020    EF 30%, study suggestive of abnormal myocardial perfusion.    History of PTCA ;-PTCA w/stents x2, -PTCA w/stent x3    History of PTCA 03/15/2018    LAD EF25%    Hx of cardiovascular stress test 2018    EF 28% Nuclear scintigraphy demonstartes inferior wall infarct.    Hx of cardiovascular stress test 2020    Abnormal Study. Large area of inferior & apical-lateral infarct.  No significant reversible ischemia.

## 2024-08-19 ENCOUNTER — OFFICE VISIT (OUTPATIENT)
Dept: CARDIOTHORACIC SURGERY | Age: 64
End: 2024-08-19
Payer: COMMERCIAL

## 2024-08-19 VITALS
HEIGHT: 67 IN | BODY MASS INDEX: 30.64 KG/M2 | SYSTOLIC BLOOD PRESSURE: 130 MMHG | OXYGEN SATURATION: 97 % | HEART RATE: 62 BPM | DIASTOLIC BLOOD PRESSURE: 74 MMHG | WEIGHT: 195.25 LBS

## 2024-08-19 DIAGNOSIS — I73.9 PVD (PERIPHERAL VASCULAR DISEASE) (HCC): Primary | ICD-10-CM

## 2024-08-19 DIAGNOSIS — I79.8 OTHER DISORDERS OF ARTERIES, ARTERIOLES AND CAPILLARIES IN DISEASES CLASSIFIED ELSEWHERE (HCC): ICD-10-CM

## 2024-08-19 PROCEDURE — 99215 OFFICE O/P EST HI 40 MIN: CPT | Performed by: THORACIC SURGERY (CARDIOTHORACIC VASCULAR SURGERY)

## 2024-08-19 PROCEDURE — 3075F SYST BP GE 130 - 139MM HG: CPT | Performed by: THORACIC SURGERY (CARDIOTHORACIC VASCULAR SURGERY)

## 2024-08-19 PROCEDURE — 3078F DIAST BP <80 MM HG: CPT | Performed by: THORACIC SURGERY (CARDIOTHORACIC VASCULAR SURGERY)

## 2024-08-19 PROCEDURE — 1123F ACP DISCUSS/DSCN MKR DOCD: CPT | Performed by: THORACIC SURGERY (CARDIOTHORACIC VASCULAR SURGERY)

## 2024-10-20 ENCOUNTER — APPOINTMENT (OUTPATIENT)
Dept: GENERAL RADIOLOGY | Age: 64
End: 2024-10-20
Payer: COMMERCIAL

## 2024-10-20 ENCOUNTER — HOSPITAL ENCOUNTER (EMERGENCY)
Age: 64
Discharge: HOME | End: 2024-10-20
Attending: EMERGENCY MEDICINE
Payer: COMMERCIAL

## 2024-10-20 VITALS
TEMPERATURE: 98 F | BODY MASS INDEX: 30.22 KG/M2 | RESPIRATION RATE: 22 BRPM | DIASTOLIC BLOOD PRESSURE: 84 MMHG | HEART RATE: 86 BPM | WEIGHT: 193 LBS | SYSTOLIC BLOOD PRESSURE: 160 MMHG | OXYGEN SATURATION: 98 %

## 2024-10-20 DIAGNOSIS — R06.02 SHORTNESS OF BREATH: Primary | ICD-10-CM

## 2024-10-20 DIAGNOSIS — J18.9 PNEUMONIA DUE TO INFECTIOUS ORGANISM, UNSPECIFIED LATERALITY, UNSPECIFIED PART OF LUNG: ICD-10-CM

## 2024-10-20 LAB
ALBUMIN SERPL-MCNC: 3.8 G/DL (ref 3.4–5)
ALBUMIN/GLOB SERPL: 1.8 {RATIO} (ref 1.1–2.2)
ALP SERPL-CCNC: 77 U/L (ref 40–129)
ALT SERPL-CCNC: 28 U/L (ref 10–40)
ANION GAP SERPL CALCULATED.3IONS-SCNC: 12 MMOL/L (ref 9–17)
AST SERPL-CCNC: 37 U/L (ref 15–37)
BASOPHILS # BLD: 0.07 K/UL
BASOPHILS NFR BLD: 1 % (ref 0–1)
BILIRUB SERPL-MCNC: 0.4 MG/DL (ref 0–1)
BNP SERPL-MCNC: 7810 PG/ML (ref 0–125)
BUN SERPL-MCNC: 22 MG/DL (ref 7–20)
CALCIUM SERPL-MCNC: 8.7 MG/DL (ref 8.3–10.6)
CHLORIDE SERPL-SCNC: 107 MMOL/L (ref 99–110)
CO2 SERPL-SCNC: 21 MMOL/L (ref 21–32)
CREAT SERPL-MCNC: 1.7 MG/DL (ref 0.8–1.3)
EOSINOPHIL # BLD: 0.22 K/UL
EOSINOPHILS RELATIVE PERCENT: 3 % (ref 0–3)
ERYTHROCYTE [DISTWIDTH] IN BLOOD BY AUTOMATED COUNT: 14.2 % (ref 11.7–14.9)
GFR, ESTIMATED: 40 ML/MIN/1.73M2
GLUCOSE SERPL-MCNC: 171 MG/DL (ref 74–99)
HCT VFR BLD AUTO: 41.3 % (ref 42–52)
HGB BLD-MCNC: 13.4 G/DL (ref 13.5–18)
IMM GRANULOCYTES # BLD AUTO: 0.02 K/UL
IMM GRANULOCYTES NFR BLD: 0 %
LYMPHOCYTES NFR BLD: 1.26 K/UL
LYMPHOCYTES RELATIVE PERCENT: 17 % (ref 24–44)
MCH RBC QN AUTO: 31.9 PG (ref 27–31)
MCHC RBC AUTO-ENTMCNC: 32.4 G/DL (ref 32–36)
MCV RBC AUTO: 98.3 FL (ref 78–100)
MONOCYTES NFR BLD: 0.52 K/UL
MONOCYTES NFR BLD: 7 % (ref 0–4)
NEUTROPHILS NFR BLD: 72 % (ref 36–66)
NEUTS SEG NFR BLD: 5.29 K/UL
PLATELET # BLD AUTO: 143 K/UL (ref 140–440)
PMV BLD AUTO: 12.4 FL (ref 7.5–11.1)
POTASSIUM SERPL-SCNC: 4.2 MMOL/L (ref 3.5–5.1)
PROT SERPL-MCNC: 5.9 G/DL (ref 6.4–8.2)
RBC # BLD AUTO: 4.2 M/UL (ref 4.6–6.2)
SODIUM SERPL-SCNC: 140 MMOL/L (ref 136–145)
TROPONIN I SERPL HS-MCNC: 31 NG/L (ref 0–22)
TROPONIN I SERPL HS-MCNC: 36 NG/L (ref 0–22)
WBC OTHER # BLD: 7.4 K/UL (ref 4–10.5)

## 2024-10-20 PROCEDURE — 85025 COMPLETE CBC W/AUTO DIFF WBC: CPT

## 2024-10-20 PROCEDURE — 6370000000 HC RX 637 (ALT 250 FOR IP): Performed by: EMERGENCY MEDICINE

## 2024-10-20 PROCEDURE — 6360000002 HC RX W HCPCS: Performed by: EMERGENCY MEDICINE

## 2024-10-20 PROCEDURE — 99285 EMERGENCY DEPT VISIT HI MDM: CPT

## 2024-10-20 PROCEDURE — 84484 ASSAY OF TROPONIN QUANT: CPT

## 2024-10-20 PROCEDURE — 96365 THER/PROPH/DIAG IV INF INIT: CPT

## 2024-10-20 PROCEDURE — 36415 COLL VENOUS BLD VENIPUNCTURE: CPT

## 2024-10-20 PROCEDURE — 71045 X-RAY EXAM CHEST 1 VIEW: CPT

## 2024-10-20 PROCEDURE — 83880 ASSAY OF NATRIURETIC PEPTIDE: CPT

## 2024-10-20 PROCEDURE — 96375 TX/PRO/DX INJ NEW DRUG ADDON: CPT

## 2024-10-20 PROCEDURE — 94640 AIRWAY INHALATION TREATMENT: CPT

## 2024-10-20 PROCEDURE — 93005 ELECTROCARDIOGRAM TRACING: CPT | Performed by: EMERGENCY MEDICINE

## 2024-10-20 PROCEDURE — 2580000003 HC RX 258: Performed by: EMERGENCY MEDICINE

## 2024-10-20 PROCEDURE — 80053 COMPREHEN METABOLIC PANEL: CPT

## 2024-10-20 RX ORDER — IPRATROPIUM BROMIDE AND ALBUTEROL SULFATE 2.5; .5 MG/3ML; MG/3ML
1 SOLUTION RESPIRATORY (INHALATION) ONCE
Status: COMPLETED | OUTPATIENT
Start: 2024-10-20 | End: 2024-10-20

## 2024-10-20 RX ORDER — DOXYCYCLINE HYCLATE 100 MG
100 TABLET ORAL 2 TIMES DAILY
Qty: 10 TABLET | Refills: 0 | Status: SHIPPED | OUTPATIENT
Start: 2024-10-20 | End: 2024-10-25

## 2024-10-20 RX ORDER — NITROGLYCERIN 0.4 MG/1
0.4 TABLET SUBLINGUAL ONCE
Status: COMPLETED | OUTPATIENT
Start: 2024-10-20 | End: 2024-10-20

## 2024-10-20 RX ORDER — ALBUTEROL SULFATE 0.83 MG/ML
2.5 SOLUTION RESPIRATORY (INHALATION) ONCE
Status: COMPLETED | OUTPATIENT
Start: 2024-10-20 | End: 2024-10-20

## 2024-10-20 RX ORDER — DOXYCYCLINE HYCLATE 100 MG
100 TABLET ORAL 2 TIMES DAILY
Qty: 10 TABLET | Refills: 0 | Status: SHIPPED | OUTPATIENT
Start: 2024-10-20 | End: 2024-10-20

## 2024-10-20 RX ADMIN — IPRATROPIUM BROMIDE AND ALBUTEROL SULFATE 1 DOSE: 2.5; .5 SOLUTION RESPIRATORY (INHALATION) at 05:31

## 2024-10-20 RX ADMIN — WATER 1000 MG: 1 INJECTION INTRAMUSCULAR; INTRAVENOUS; SUBCUTANEOUS at 06:59

## 2024-10-20 RX ADMIN — ALBUTEROL SULFATE 2.5 MG: 2.5 SOLUTION RESPIRATORY (INHALATION) at 05:31

## 2024-10-20 RX ADMIN — AZITHROMYCIN MONOHYDRATE 500 MG: 500 INJECTION, POWDER, LYOPHILIZED, FOR SOLUTION INTRAVENOUS at 07:05

## 2024-10-20 RX ADMIN — NITROGLYCERIN 0.4 MG: 0.4 TABLET SUBLINGUAL at 07:23

## 2024-10-20 RX ADMIN — WATER 40 MG: 1 INJECTION INTRAMUSCULAR; INTRAVENOUS; SUBCUTANEOUS at 05:45

## 2024-10-20 ASSESSMENT — PAIN SCALES - GENERAL: PAINLEVEL_OUTOF10: 0

## 2024-10-20 ASSESSMENT — PAIN DESCRIPTION - LOCATION: LOCATION: CHEST

## 2024-10-20 ASSESSMENT — LIFESTYLE VARIABLES
HOW OFTEN DO YOU HAVE A DRINK CONTAINING ALCOHOL: NEVER
HOW MANY STANDARD DRINKS CONTAINING ALCOHOL DO YOU HAVE ON A TYPICAL DAY: PATIENT DOES NOT DRINK

## 2024-10-20 ASSESSMENT — PAIN - FUNCTIONAL ASSESSMENT: PAIN_FUNCTIONAL_ASSESSMENT: NONE - DENIES PAIN

## 2024-10-20 NOTE — ED NOTES
Lab notified of hemolyzed specimen. Red, Green, Blue, Violet re-sent, pt was straight stick in right hand.

## 2024-10-20 NOTE — ED PROVIDER NOTES
Emergency Department Encounter  Location: [facility] EMERGENCY DEPARTMENT    Patient: Hilaria Rodriguez  MRN: [id number]  : 1960  Date of evaluation: 10/20/2024  ED Provider: Donal Jacobs DO    Hilaria Rodriguez was checked out to me by Dr. Gamal Ferro. Please see his/her initial documentation for details of the patient's initial ED presentation, physical exam and completed studies.    In brief, Hilaria Rodriguez is a 64 y.o. male that presented to the emergency department with shortness of breath for the last day.  History of COPD, ischemic cardiomyopathy status post defibrillator, CAD, valvular heart disease, peripheral vascular disease.  At time of signout to me pending lab work.  Chest x-ray did show evidence of pneumonia.  Patient has also been hypertensive.      I have reviewed and interpreted all of the currently available lab results and diagnostics from this visit:  Results for orders placed or performed during the hospital encounter of 10/20/24   CBC with Auto Differential   Result Value Ref Range    WBC 7.4 4.0 - 10.5 k/uL    RBC 4.20 (L) 4.60 - 6.20 m/uL    Hemoglobin 13.4 (L) 13.5 - 18.0 g/dL    Hematocrit 41.3 (L) 42.0 - 52.0 %    MCV 98.3 78.0 - 100.0 fL    MCH 31.9 (H) 27.0 - 31.0 pg    MCHC 32.4 32.0 - 36.0 g/dL    RDW 14.2 11.7 - 14.9 %    Platelets 143 140 - 440 k/uL    MPV 12.4 (H) 7.5 - 11.1 fL    Neutrophils % 72 (H) 36 - 66 %    Lymphocytes % 17 (L) 24 - 44 %    Monocytes % 7 (H) 0 - 4 %    Eosinophils % 3 0.0 - 3.0 %    Basophils % 1 0 - 1 %    Immature Granulocytes % 0 0 %    Neutrophils Absolute 5.29 k/uL    Lymphocytes Absolute 1.26 k/uL    Monocytes Absolute 0.52 k/uL    Eosinophils Absolute 0.22 k/uL    Basophils Absolute 0.07 k/uL    Immature Granulocytes Absolute 0.02 k/uL   Troponin Now and Q1Hr   Result Value Ref Range    Troponin, High Sensitivity 36 (H) 0 - 22 ng/L   Troponin Now and Q1Hr   Result Value Ref Range    Troponin, High 
nondeviated, no remarkable ST elevations or depressions, does have some PVCs, WY interval 156, QRS ration 126, QTc of 498.  Fine impression, nonspecific EKG      Francia Ferro MD       RADIOLOGY:     Non-plain film images such as CT, Ultrasound and MRI are read by the radiologist. Plain radiographic images are visualized and preliminarily interpreted by the emergency physician.       Interpretation per the Radiologist below, if available at the time of this note:    XR CHEST PORTABLE   Final Result      1.   Mild hazy airspace disease/infiltrate involving the right lower lobe.   Correlate for an infectious or inflammatory process. Follow-up to resolution   recommended.    2.  Evidence of prior cardiac surgery         Electronically signed by Faisal Jeffery            ED BEDSIDE ULTRASOUND:   Performed by ED Physician Francia Ferro MD       LABS:  Labs Reviewed   CBC WITH AUTO DIFFERENTIAL - Abnormal; Notable for the following components:       Result Value    RBC 4.20 (*)     Hemoglobin 13.4 (*)     Hematocrit 41.3 (*)     MCH 31.9 (*)     MPV 12.4 (*)     Neutrophils % 72 (*)     Lymphocytes % 17 (*)     Monocytes % 7 (*)     All other components within normal limits   TROPONIN   TROPONIN   SPECIMEN REJECTION   BRAIN NATRIURETIC PEPTIDE   COMPREHENSIVE METABOLIC PANEL       All other labs were within normal range or not returned as of this dictation.    EMERGENCY DEPARTMENT COURSE and DIFFERENTIAL DIAGNOSIS/MDM:   Vitals:    Vitals:    10/20/24 0509 10/20/24 0515 10/20/24 0542 10/20/24 0630   BP: (!) 206/139  (!) 172/92 (!) 185/140   Pulse: 98  83 91   Resp: 24      Temp: 97.9 °F (36.6 °C) 98 °F (36.7 °C)     TempSrc: Oral Oral     SpO2: 98%  100% 93%   Weight: 87.5 kg (193 lb)              MDM  Number of Diagnoses or Management Options  Pneumonia due to infectious organism, unspecified laterality, unspecified part of lung  Shortness of breath  Diagnosis management comments: 64 yom with h/o PAD/PVD s/p

## 2024-10-20 NOTE — ED TRIAGE NOTES
PT reports shortness of breath, pt denies any pain in chest. Pt states \"I have no pain, jsut problems breathing\". Pt noticed breathing becoming any issues last night 8:00 pm 10/19. PT states was coughing during the day yesterday, and denies being around anyone sick. Pt is on blood thinners.

## 2024-10-20 NOTE — ED NOTES
Repositioned pt and asking to sit in chair. Assisted by this RN. Pt denies further needs. Call light within reach

## 2024-10-22 LAB
EKG ATRIAL RATE: 89 BPM
EKG DIAGNOSIS: NORMAL
EKG P AXIS: 72 DEGREES
EKG P-R INTERVAL: 156 MS
EKG Q-T INTERVAL: 410 MS
EKG QRS DURATION: 126 MS
EKG QTC CALCULATION (BAZETT): 498 MS
EKG R AXIS: 43 DEGREES
EKG T AXIS: 227 DEGREES
EKG VENTRICULAR RATE: 89 BPM

## 2024-10-22 PROCEDURE — 93010 ELECTROCARDIOGRAM REPORT: CPT | Performed by: INTERNAL MEDICINE

## 2024-10-24 ENCOUNTER — APPOINTMENT (OUTPATIENT)
Dept: GENERAL RADIOLOGY | Age: 64
End: 2024-10-24
Payer: COMMERCIAL

## 2024-10-24 ENCOUNTER — HOSPITAL ENCOUNTER (OUTPATIENT)
Age: 64
Setting detail: OBSERVATION
Discharge: HOME | End: 2024-10-25
Attending: STUDENT IN AN ORGANIZED HEALTH CARE EDUCATION/TRAINING PROGRAM | Admitting: STUDENT IN AN ORGANIZED HEALTH CARE EDUCATION/TRAINING PROGRAM
Payer: COMMERCIAL

## 2024-10-24 DIAGNOSIS — I10 HYPERTENSION, UNSPECIFIED TYPE: ICD-10-CM

## 2024-10-24 DIAGNOSIS — I50.23 ACUTE ON CHRONIC SYSTOLIC CONGESTIVE HEART FAILURE (HCC): Primary | ICD-10-CM

## 2024-10-24 DIAGNOSIS — R07.9 CHEST PAIN, UNSPECIFIED TYPE: ICD-10-CM

## 2024-10-24 PROBLEM — J44.1 COPD EXACERBATION (HCC): Status: ACTIVE | Noted: 2024-10-24

## 2024-10-24 LAB
ALBUMIN SERPL-MCNC: 3.5 G/DL (ref 3.4–5)
ALBUMIN/GLOB SERPL: 1.4 {RATIO} (ref 1.1–2.2)
ALP SERPL-CCNC: 68 U/L (ref 40–129)
ALT SERPL-CCNC: 24 U/L (ref 10–40)
ANION GAP SERPL CALCULATED.3IONS-SCNC: 12 MMOL/L (ref 9–17)
ARTERIAL PATENCY WRIST A: ABNORMAL
AST SERPL-CCNC: 23 U/L (ref 15–37)
BASOPHILS # BLD: 0.06 K/UL
BASOPHILS NFR BLD: 1 % (ref 0–1)
BILIRUB SERPL-MCNC: 0.7 MG/DL (ref 0–1)
BNP SERPL-MCNC: ABNORMAL PG/ML (ref 0–125)
BODY TEMPERATURE: 37
BUN SERPL-MCNC: 31 MG/DL (ref 7–20)
CALCIUM SERPL-MCNC: 8.6 MG/DL (ref 8.3–10.6)
CHLORIDE SERPL-SCNC: 108 MMOL/L (ref 99–110)
CO2 SERPL-SCNC: 20 MMOL/L (ref 21–32)
COHGB MFR BLD: 1.1 % (ref 0.5–1.5)
CREAT SERPL-MCNC: 1.6 MG/DL (ref 0.8–1.3)
EOSINOPHIL # BLD: 0.13 K/UL
EOSINOPHILS RELATIVE PERCENT: 2 % (ref 0–3)
ERYTHROCYTE [DISTWIDTH] IN BLOOD BY AUTOMATED COUNT: 14.1 % (ref 11.7–14.9)
GFR, ESTIMATED: 43 ML/MIN/1.73M2
GLUCOSE SERPL-MCNC: 103 MG/DL (ref 74–99)
HCO3 VENOUS: 23.6 MMOL/L (ref 22–29)
HCT VFR BLD AUTO: 41.7 % (ref 42–52)
HGB BLD-MCNC: 13.8 G/DL (ref 13.5–18)
IMM GRANULOCYTES # BLD AUTO: 0.02 K/UL
IMM GRANULOCYTES NFR BLD: 0 %
LYMPHOCYTES NFR BLD: 1.46 K/UL
LYMPHOCYTES RELATIVE PERCENT: 21 % (ref 24–44)
MAGNESIUM SERPL-MCNC: 2.1 MG/DL (ref 1.8–2.4)
MCH RBC QN AUTO: 32.3 PG (ref 27–31)
MCHC RBC AUTO-ENTMCNC: 33.1 G/DL (ref 32–36)
MCV RBC AUTO: 97.7 FL (ref 78–100)
METHEMOGLOBIN: 0.3 % (ref 0.5–1.5)
MONOCYTES NFR BLD: 0.48 K/UL
MONOCYTES NFR BLD: 7 % (ref 0–4)
NEGATIVE BASE EXCESS, VEN: 0.6 MMOL/L (ref 0–3)
NEUTROPHILS NFR BLD: 70 % (ref 36–66)
NEUTS SEG NFR BLD: 4.91 K/UL
OXYHGB MFR BLD: 88 %
PCO2 VENOUS: 37.6 MM HG (ref 38–54)
PH VENOUS: 7.42 (ref 7.32–7.43)
PLATELET, FLUORESCENCE: 133 K/UL (ref 140–440)
PMV BLD AUTO: 12.5 FL (ref 7.5–11.1)
PO2 VENOUS: 57.4 MM HG (ref 23–48)
POTASSIUM SERPL-SCNC: 4 MMOL/L (ref 3.5–5.1)
PROT SERPL-MCNC: 6.1 G/DL (ref 6.4–8.2)
RBC # BLD AUTO: 4.27 M/UL (ref 4.6–6.2)
SODIUM SERPL-SCNC: 140 MMOL/L (ref 136–145)
TROPONIN I SERPL HS-MCNC: 38 NG/L (ref 0–22)
TROPONIN I SERPL HS-MCNC: 38 NG/L (ref 0–22)
WBC OTHER # BLD: 7.1 K/UL (ref 4–10.5)

## 2024-10-24 PROCEDURE — 94640 AIRWAY INHALATION TREATMENT: CPT

## 2024-10-24 PROCEDURE — G0378 HOSPITAL OBSERVATION PER HR: HCPCS

## 2024-10-24 PROCEDURE — 82805 BLOOD GASES W/O2 SATURATION: CPT

## 2024-10-24 PROCEDURE — 71045 X-RAY EXAM CHEST 1 VIEW: CPT

## 2024-10-24 PROCEDURE — 6370000000 HC RX 637 (ALT 250 FOR IP): Performed by: STUDENT IN AN ORGANIZED HEALTH CARE EDUCATION/TRAINING PROGRAM

## 2024-10-24 PROCEDURE — 94664 DEMO&/EVAL PT USE INHALER: CPT

## 2024-10-24 PROCEDURE — 36415 COLL VENOUS BLD VENIPUNCTURE: CPT

## 2024-10-24 PROCEDURE — 96375 TX/PRO/DX INJ NEW DRUG ADDON: CPT

## 2024-10-24 PROCEDURE — 6360000002 HC RX W HCPCS: Performed by: STUDENT IN AN ORGANIZED HEALTH CARE EDUCATION/TRAINING PROGRAM

## 2024-10-24 PROCEDURE — 89220 SPUTUM SPECIMEN COLLECTION: CPT

## 2024-10-24 PROCEDURE — 85025 COMPLETE CBC W/AUTO DIFF WBC: CPT

## 2024-10-24 PROCEDURE — 84484 ASSAY OF TROPONIN QUANT: CPT

## 2024-10-24 PROCEDURE — 96374 THER/PROPH/DIAG INJ IV PUSH: CPT

## 2024-10-24 PROCEDURE — 80053 COMPREHEN METABOLIC PANEL: CPT

## 2024-10-24 PROCEDURE — 83735 ASSAY OF MAGNESIUM: CPT

## 2024-10-24 PROCEDURE — 99285 EMERGENCY DEPT VISIT HI MDM: CPT

## 2024-10-24 PROCEDURE — 83880 ASSAY OF NATRIURETIC PEPTIDE: CPT

## 2024-10-24 PROCEDURE — 93005 ELECTROCARDIOGRAM TRACING: CPT | Performed by: STUDENT IN AN ORGANIZED HEALTH CARE EDUCATION/TRAINING PROGRAM

## 2024-10-24 RX ORDER — SODIUM CHLORIDE 0.9 % (FLUSH) 0.9 %
5-40 SYRINGE (ML) INJECTION EVERY 12 HOURS SCHEDULED
Status: DISCONTINUED | OUTPATIENT
Start: 2024-10-24 | End: 2024-10-25 | Stop reason: HOSPADM

## 2024-10-24 RX ORDER — NITROGLYCERIN 0.4 MG/1
0.4 TABLET SUBLINGUAL ONCE
Status: DISCONTINUED | OUTPATIENT
Start: 2024-10-24 | End: 2024-10-25 | Stop reason: HOSPADM

## 2024-10-24 RX ORDER — MAGNESIUM SULFATE IN WATER 40 MG/ML
2000 INJECTION, SOLUTION INTRAVENOUS PRN
Status: DISCONTINUED | OUTPATIENT
Start: 2024-10-24 | End: 2024-10-25 | Stop reason: HOSPADM

## 2024-10-24 RX ORDER — ONDANSETRON 4 MG/1
4 TABLET, ORALLY DISINTEGRATING ORAL EVERY 8 HOURS PRN
Status: DISCONTINUED | OUTPATIENT
Start: 2024-10-24 | End: 2024-10-25 | Stop reason: HOSPADM

## 2024-10-24 RX ORDER — ACETAMINOPHEN 325 MG/1
650 TABLET ORAL EVERY 6 HOURS PRN
Status: DISCONTINUED | OUTPATIENT
Start: 2024-10-24 | End: 2024-10-25 | Stop reason: HOSPADM

## 2024-10-24 RX ORDER — FUROSEMIDE 10 MG/ML
20 INJECTION INTRAMUSCULAR; INTRAVENOUS ONCE
Status: COMPLETED | OUTPATIENT
Start: 2024-10-24 | End: 2024-10-24

## 2024-10-24 RX ORDER — CARVEDILOL 6.25 MG/1
12.5 TABLET ORAL 2 TIMES DAILY
Status: DISCONTINUED | OUTPATIENT
Start: 2024-10-24 | End: 2024-10-25 | Stop reason: HOSPADM

## 2024-10-24 RX ORDER — HYDRALAZINE HYDROCHLORIDE 25 MG/1
12.5 TABLET, FILM COATED ORAL 3 TIMES DAILY
Status: DISCONTINUED | OUTPATIENT
Start: 2024-10-24 | End: 2024-10-25 | Stop reason: HOSPADM

## 2024-10-24 RX ORDER — IPRATROPIUM BROMIDE AND ALBUTEROL SULFATE 2.5; .5 MG/3ML; MG/3ML
1 SOLUTION RESPIRATORY (INHALATION)
Status: COMPLETED | OUTPATIENT
Start: 2024-10-24 | End: 2024-10-24

## 2024-10-24 RX ORDER — LISINOPRIL 5 MG/1
2.5 TABLET ORAL DAILY
Status: DISCONTINUED | OUTPATIENT
Start: 2024-10-24 | End: 2024-10-25 | Stop reason: HOSPADM

## 2024-10-24 RX ORDER — ONDANSETRON 2 MG/ML
4 INJECTION INTRAMUSCULAR; INTRAVENOUS EVERY 6 HOURS PRN
Status: DISCONTINUED | OUTPATIENT
Start: 2024-10-24 | End: 2024-10-25 | Stop reason: HOSPADM

## 2024-10-24 RX ORDER — CLOPIDOGREL BISULFATE 75 MG/1
75 TABLET ORAL DAILY
Status: DISCONTINUED | OUTPATIENT
Start: 2024-10-24 | End: 2024-10-25 | Stop reason: HOSPADM

## 2024-10-24 RX ORDER — ACETAMINOPHEN 650 MG/1
650 SUPPOSITORY RECTAL EVERY 6 HOURS PRN
Status: DISCONTINUED | OUTPATIENT
Start: 2024-10-24 | End: 2024-10-25 | Stop reason: HOSPADM

## 2024-10-24 RX ORDER — IPRATROPIUM BROMIDE AND ALBUTEROL SULFATE 2.5; .5 MG/3ML; MG/3ML
1 SOLUTION RESPIRATORY (INHALATION)
Status: DISCONTINUED | OUTPATIENT
Start: 2024-10-24 | End: 2024-10-25 | Stop reason: HOSPADM

## 2024-10-24 RX ORDER — LISINOPRIL 5 MG/1
2.5 TABLET ORAL ONCE
Status: COMPLETED | OUTPATIENT
Start: 2024-10-24 | End: 2024-10-24

## 2024-10-24 RX ORDER — SODIUM CHLORIDE 9 MG/ML
INJECTION, SOLUTION INTRAVENOUS PRN
Status: DISCONTINUED | OUTPATIENT
Start: 2024-10-24 | End: 2024-10-25 | Stop reason: HOSPADM

## 2024-10-24 RX ORDER — HYDRALAZINE HYDROCHLORIDE 20 MG/ML
10 INJECTION INTRAMUSCULAR; INTRAVENOUS EVERY 6 HOURS PRN
Status: DISCONTINUED | OUTPATIENT
Start: 2024-10-24 | End: 2024-10-25 | Stop reason: HOSPADM

## 2024-10-24 RX ORDER — ATORVASTATIN CALCIUM 40 MG/1
80 TABLET, FILM COATED ORAL DAILY
Status: DISCONTINUED | OUTPATIENT
Start: 2024-10-25 | End: 2024-10-25 | Stop reason: HOSPADM

## 2024-10-24 RX ORDER — FUROSEMIDE 20 MG/1
20 TABLET ORAL 2 TIMES DAILY
Status: DISCONTINUED | OUTPATIENT
Start: 2024-10-24 | End: 2024-10-25 | Stop reason: HOSPADM

## 2024-10-24 RX ORDER — POTASSIUM CHLORIDE 1500 MG/1
40 TABLET, EXTENDED RELEASE ORAL PRN
Status: DISCONTINUED | OUTPATIENT
Start: 2024-10-24 | End: 2024-10-25 | Stop reason: HOSPADM

## 2024-10-24 RX ORDER — EZETIMIBE 10 MG/1
10 TABLET ORAL DAILY
Status: DISCONTINUED | OUTPATIENT
Start: 2024-10-25 | End: 2024-10-25 | Stop reason: HOSPADM

## 2024-10-24 RX ORDER — ENOXAPARIN SODIUM 100 MG/ML
40 INJECTION SUBCUTANEOUS DAILY
Status: DISCONTINUED | OUTPATIENT
Start: 2024-10-24 | End: 2024-10-24

## 2024-10-24 RX ORDER — SODIUM CHLORIDE 0.9 % (FLUSH) 0.9 %
5-40 SYRINGE (ML) INJECTION PRN
Status: DISCONTINUED | OUTPATIENT
Start: 2024-10-24 | End: 2024-10-25 | Stop reason: HOSPADM

## 2024-10-24 RX ORDER — HYDRALAZINE HYDROCHLORIDE 25 MG/1
25 TABLET, FILM COATED ORAL ONCE
Status: COMPLETED | OUTPATIENT
Start: 2024-10-24 | End: 2024-10-24

## 2024-10-24 RX ORDER — POLYETHYLENE GLYCOL 3350 17 G/17G
17 POWDER, FOR SOLUTION ORAL DAILY PRN
Status: DISCONTINUED | OUTPATIENT
Start: 2024-10-24 | End: 2024-10-25 | Stop reason: HOSPADM

## 2024-10-24 RX ADMIN — IPRATROPIUM BROMIDE AND ALBUTEROL SULFATE 1 DOSE: 2.5; .5 SOLUTION RESPIRATORY (INHALATION) at 20:18

## 2024-10-24 RX ADMIN — CLOPIDOGREL BISULFATE 75 MG: 75 TABLET ORAL at 23:33

## 2024-10-24 RX ADMIN — APIXABAN 5 MG: 5 TABLET, FILM COATED ORAL at 23:33

## 2024-10-24 RX ADMIN — HYDRALAZINE HYDROCHLORIDE 25 MG: 25 TABLET ORAL at 14:58

## 2024-10-24 RX ADMIN — LISINOPRIL 2.5 MG: 5 TABLET ORAL at 14:58

## 2024-10-24 RX ADMIN — IPRATROPIUM BROMIDE AND ALBUTEROL SULFATE 1 DOSE: 2.5; .5 SOLUTION RESPIRATORY (INHALATION) at 15:06

## 2024-10-24 RX ADMIN — FUROSEMIDE 20 MG: 20 TABLET ORAL at 23:34

## 2024-10-24 RX ADMIN — LISINOPRIL 2.5 MG: 5 TABLET ORAL at 23:33

## 2024-10-24 RX ADMIN — CARVEDILOL 12.5 MG: 6.25 TABLET, FILM COATED ORAL at 23:33

## 2024-10-24 RX ADMIN — FUROSEMIDE 20 MG: 10 INJECTION, SOLUTION INTRAMUSCULAR; INTRAVENOUS at 17:00

## 2024-10-24 RX ADMIN — HYDRALAZINE HYDROCHLORIDE 12.5 MG: 25 TABLET ORAL at 23:33

## 2024-10-24 ASSESSMENT — PAIN - FUNCTIONAL ASSESSMENT: PAIN_FUNCTIONAL_ASSESSMENT: 0-10

## 2024-10-24 NOTE — ED PROVIDER NOTES
EMERGENCY DEPARTMENT HISTORY AND PHYSICAL EXAM      Patient Name: Hilaria Rodriguez  MRN: [id number]  : 1960  Date of Evaluation: 10/24/2024  ED Provider:  Taz Pittman DO    History of Presenting Illness     Chief Complaint:   Chief Complaint   Patient presents with    Shortness of Breath     Some chest tightness.        HPI: Patient is a 64 y.o. male with past medical history of COPD, coronary artery disease status post bypass and ischemic cardiomyopathy with ejection fraction of 25 to 30% status post pacer/defibrillator who is presenting to the emergency department with chief complaint of chest tightness and shortness of breath.  Patient was seen and evaluated the emergency department 4 days ago and diagnosed with pneumonia and discharged home.  At that point time patient was only complaining of a cough and shortness of breath.  Patient has since developed chest tightness which started today.  Patient notes persistent cough and shortness of breath that is worse with exertion and when laying flat.  Patient denies any pain or swelling in his lower extremities.  Patient denies fevers or chills.  Patient denies nausea, vomit abdominal pain, diarrhea, dysuria.          Past History     Past Medical History:   Past Medical History:   Diagnosis Date    Acute cerebrovascular accident (CVA) ([facility]) 2020    Acute on chronic systolic congestive heart failure ([facility]) 2024    Acute ST elevation myocardial infarction (STEMI) ([facility]) 2018    B12 deficiency     CAD (coronary artery disease)     COPD (chronic obstructive pulmonary disease) ([facility]) 2020    GERD (gastroesophageal reflux disease)     Guillain Barré syndrome ([facility])     H/O echocardiogram 13, 6/16/10    7/13-EF 50-55% WNL. 6/10-EF53%, mod LVH, MARCI, RVE, trace PI, mild MR/TR,     Heart murmur     History of echocardiogram 10/07/2020    EF 40%, No changed since previous 2020, Mid inferior hypokinesis noted, Grade I DD, Mod MR, Mild TR,

## 2024-10-24 NOTE — ED NOTES
ED TO INPATIENT SBAR HANDOFF    Patient Name: Hilaria Rodriguez   :  1960  64 y.o.   Preferred Name  Hilaria   Family/Caregiver Present no   Restraints no   C-SSRS: Risk of Suicide: No Risk  Sitter no   Sepsis Risk Score        Situation  Chief Complaint   Patient presents with    Shortness of Breath     Some chest tightness.      Brief Description of Patient's Condition: Patient presented to ED with complaints of shortness of breath and chest tightness. Patient has had hypertension while in ED. Patient is Aox4. Patient has been using urinal at bedside.   Mental Status: oriented  Arrived from: home    Imaging:   XR CHEST PORTABLE   Final Result   Heart and pulmonary findings as described above.            Electronically signed by Ranjit Ch MD        Abnormal labs:   Abnormal Labs Reviewed   CBC WITH AUTO DIFFERENTIAL - Abnormal; Notable for the following components:       Result Value    RBC 4.27 (*)     Hematocrit 41.7 (*)     MCH 32.3 (*)     MPV 12.5 (*)     Platelet, Fluorescence 133 (*)     Neutrophils % 70 (*)     Lymphocytes % 21 (*)     Monocytes % 7 (*)     All other components within normal limits   TROPONIN - Abnormal; Notable for the following components:    Troponin, High Sensitivity 38 (*)     All other components within normal limits   BRAIN NATRIURETIC PEPTIDE - Abnormal; Notable for the following components:    NT Pro-BNP 13,033 (*)     All other components within normal limits   BLOOD GAS, VENOUS - Abnormal; Notable for the following components:    pCO2, [place] 37.6 (*)     PO2, [place] 57.4 (*)     Methemoglobin 0.3 (*)     All other components within normal limits   COMPREHENSIVE METABOLIC PANEL - Abnormal; Notable for the following components:    CO2 20 (*)     Glucose 103 (*)     BUN 31 (*)     Creatinine 1.6 (*)     Est, Glom Filt Rate 43 (*)     Total Protein 6.1 (*)     All other components within normal limits   TROPONIN - Abnormal; Notable for the following components:    Troponin, High  cardiac catheterization 07/12/2013 7/13-EF=55%. PATENT LAD AND RCA STENTS, OM2 50% stenosis, & Normal wall motion.    Stroke (cerebrum) (HCC)     May 2016       Assessment    Vitals:        Vitals:    10/24/24 1621 10/24/24 1700 10/24/24 2000 10/24/24 2018   BP: (!) 169/91 (!) 191/105 (!) 160/91    Pulse: 76  69 74   Resp: 18  25 (!) 34   Temp:       TempSrc:       SpO2: 98%  99% 99%       PO Status: Regular    O2 Flow Rate:        Cardiac Rhythm:  Normal Sinus     Last documented pain medication administered:     NIH Score: NIH       Active LDA's:   Peripheral IV 10/24/24 Right;Dorsal;Posterior Forearm (Active)   Site Assessment Clean, dry & intact 10/24/24 1341   Line Status Blood return noted;Specimen collected;Flushed;Normal saline locked 10/24/24 1341       Pertinent or High Risk Medications/Drips: no   If Yes, please provide details:       Blood Product Administration: no  If Yes, please provide details:     Recommendation    Incomplete orders   Additional Comments:   If any further questions, please call Sending RN at [phone]    Electronically signed by: Electronically signed by Kimmy Steven RN on 10/24/2024 at 8:21 PM

## 2024-10-24 NOTE — H&P
Illness:     Chief Complaint: I could not breathe  Hilaria Rodriguez is a 64 y.o. male with pmh of ischemic cardiomyopathy, COPD, hypertension, hyperlipidemia, A-fib who presents with shortness of breath.  Patient states that his symptoms have been going on for the past couple of days with associated nonproductive cough.  Reports chest tightness but denies active chest pain, nausea vomiting, fever or chills.  Patient states that he was recently seen in the ED and diagnosed with pneumonia and discharged home however his symptoms has not improved so he decided to come back to the hospital.  Workup with labs showed sodium 140, potassium 4.0, BUN 31, creatinine 1.6, WBC 7.1, hemoglobin 13.8, high sensitive troponins 30 8 repeat 38.  Imaging with chest x-ray unremarkable.  Review of Systems:        Pertinent positives and negatives discussed in HPI     Objective:   No intake or output data in the 24 hours ending 10/24/24 1740   Vitals:   Vitals:    10/24/24 1458 10/24/24 1506 10/24/24 1621 10/24/24 1700   BP: (!) 188/122  (!) 169/91 (!) 191/105   Pulse:  77 76    Resp:  18 18    Temp:       TempSrc:       SpO2:  98% 98%        Personally Reviewed Medications Prior to Admission     Prior to Admission medications    Medication Sig Start Date End Date Taking? Authorizing Provider   doxycycline hyclate (VIBRA-TABS) 100 MG tablet Take 1 tablet by mouth 2 times daily for 5 days 10/20/24 10/25/24  Donal Jacobs DO   nitroGLYCERIN (NITROSTAT) 0.4 MG SL tablet Place 1 tablet under the tongue every 5 minutes as needed for Chest pain up to max of 3 total doses. If no relief after 1 dose, call 911. 8/12/24   Red Prado MD   lisinopril (PRINIVIL;ZESTRIL) 2.5 MG tablet Take 1 tablet by mouth daily 7/9/24   Lindsey Dos Santos MD   clopidogrel (PLAVIX) 75 MG tablet Take 1 tablet by mouth daily 6/10/24   Lindsey Dos Santos MD   glimepiride (AMARYL) 2 MG tablet Take 1 tablet by mouth every morning (before breakfast) 6/10/24  12/7/24  Lindsey Dos Santos MD   sertraline (ZOLOFT) 100 MG tablet Take 1 tablet by mouth daily 6/10/24 12/7/24  Lindsey Dos Santos MD   hydrALAZINE (APRESOLINE) 25 MG tablet Take 0.5 tablets by mouth 3 times daily 6/10/24   Lindsey Dos Santos MD   furosemide (LASIX) 40 MG tablet Take 0.5 tablets by mouth 2 times daily Patient takes 40 mg in the morning and then 20 mg in the evening. 6/10/24 8/19/24  Lindsey Dos Santos MD   ezetimibe (ZETIA) 10 MG tablet Take 1 tablet by mouth daily 6/10/24   Lindsey Dos Santos MD   empagliflozin (JARDIANCE) 10 MG tablet Take 1 tablet by mouth daily 6/10/24 6/5/25  Lindsey Dos Santos MD   carvedilol (COREG) 25 MG tablet Take 0.5 tablets by mouth 2 times daily 6/10/24   Lindsey Dos Santos MD   atorvastatin (LIPITOR) 80 MG tablet Take 1 tablet by mouth daily 6/10/24 12/7/24  Lindsey Dos Santos MD   apixaban (ELIQUIS) 5 MG TABS tablet Take 1 tablet by mouth 2 times daily 6/10/24   Lindsey Dos Santos MD   Continuous Blood Gluc Sensor (FREESTYLE JE 3 SENSOR) MISC 1 each by Does not apply route every 14 days 3/8/24   Lindsey Dos Santos MD   Continuous Blood Gluc  (FREESTYLE JE 3 READER) JUANITO 1 each by Does not apply route 4 times daily (before meals and nightly) 1/25/24   Lindsey Dos Santos MD   Blood Glucose Monitoring Suppl (BLOOD GLUCOSE MONITOR SYSTEM) w/Device KIT Use twice per day 1/23/24   Lindsey Dos Santos MD   albuterol sulfate HFA (VENTOLIN HFA) 108 (90 Base) MCG/ACT inhaler Inhale 2 puffs into the lungs every 4 hours as needed for Wheezing 11/4/22   Irish Rivera MD       Physical Exam:    Physical Exam     General: NAD  Eyes: EOMI  ENT: neck supple  Cardiovascular: Regular rate.  Respiratory: Mild wheezing  Gastrointestinal: Soft, non tender  Genitourinary: no suprapubic tenderness  Musculoskeletal: No edema  Skin: warm, dry  Neuro: Alert.  Psych: Mood appropriate.       Past Medical History:   PMHx   Past Medical History:   Diagnosis Date    Acute cerebrovascular accident (CVA)

## 2024-10-25 VITALS
BODY MASS INDEX: 30.29 KG/M2 | RESPIRATION RATE: 14 BRPM | SYSTOLIC BLOOD PRESSURE: 156 MMHG | WEIGHT: 193 LBS | HEART RATE: 60 BPM | HEIGHT: 67 IN | TEMPERATURE: 97.8 F | OXYGEN SATURATION: 95 % | DIASTOLIC BLOOD PRESSURE: 81 MMHG

## 2024-10-25 LAB
ANION GAP SERPL CALCULATED.3IONS-SCNC: 9 MMOL/L (ref 9–17)
BASOPHILS # BLD: 0.06 K/UL
BASOPHILS NFR BLD: 1 % (ref 0–1)
BUN SERPL-MCNC: 31 MG/DL (ref 7–20)
CALCIUM SERPL-MCNC: 8.5 MG/DL (ref 8.3–10.6)
CHLORIDE SERPL-SCNC: 106 MMOL/L (ref 99–110)
CO2 SERPL-SCNC: 24 MMOL/L (ref 21–32)
CREAT SERPL-MCNC: 1.7 MG/DL (ref 0.8–1.3)
EKG ATRIAL RATE: 77 BPM
EKG DIAGNOSIS: NORMAL
EKG P AXIS: 66 DEGREES
EKG P-R INTERVAL: 162 MS
EKG Q-T INTERVAL: 426 MS
EKG QRS DURATION: 128 MS
EKG QTC CALCULATION (BAZETT): 482 MS
EKG R AXIS: 2 DEGREES
EKG T AXIS: 250 DEGREES
EKG VENTRICULAR RATE: 77 BPM
EOSINOPHIL # BLD: 0.17 K/UL
EOSINOPHILS RELATIVE PERCENT: 3 % (ref 0–3)
ERYTHROCYTE [DISTWIDTH] IN BLOOD BY AUTOMATED COUNT: 14 % (ref 11.7–14.9)
GFR, ESTIMATED: 40 ML/MIN/1.73M2
GLUCOSE SERPL-MCNC: 177 MG/DL (ref 74–99)
HCT VFR BLD AUTO: 40.4 % (ref 42–52)
HGB BLD-MCNC: 13.2 G/DL (ref 13.5–18)
IMM GRANULOCYTES # BLD AUTO: 0.03 K/UL
IMM GRANULOCYTES NFR BLD: 1 %
LYMPHOCYTES NFR BLD: 1.31 K/UL
LYMPHOCYTES RELATIVE PERCENT: 21 % (ref 24–44)
MCH RBC QN AUTO: 32 PG (ref 27–31)
MCHC RBC AUTO-ENTMCNC: 32.7 G/DL (ref 32–36)
MCV RBC AUTO: 98.1 FL (ref 78–100)
MONOCYTES NFR BLD: 0.39 K/UL
MONOCYTES NFR BLD: 6 % (ref 0–4)
NEUTROPHILS NFR BLD: 69 % (ref 36–66)
NEUTS SEG NFR BLD: 4.28 K/UL
PLATELET, FLUORESCENCE: 129 K/UL (ref 140–440)
PMV BLD AUTO: 12.2 FL (ref 7.5–11.1)
POTASSIUM SERPL-SCNC: 3.7 MMOL/L (ref 3.5–5.1)
RBC # BLD AUTO: 4.12 M/UL (ref 4.6–6.2)
SODIUM SERPL-SCNC: 139 MMOL/L (ref 136–145)
WBC OTHER # BLD: 6.2 K/UL (ref 4–10.5)

## 2024-10-25 PROCEDURE — 93010 ELECTROCARDIOGRAM REPORT: CPT | Performed by: INTERNAL MEDICINE

## 2024-10-25 PROCEDURE — 96366 THER/PROPH/DIAG IV INF ADDON: CPT

## 2024-10-25 PROCEDURE — G0378 HOSPITAL OBSERVATION PER HR: HCPCS

## 2024-10-25 PROCEDURE — 2580000003 HC RX 258: Performed by: STUDENT IN AN ORGANIZED HEALTH CARE EDUCATION/TRAINING PROGRAM

## 2024-10-25 PROCEDURE — 6370000000 HC RX 637 (ALT 250 FOR IP): Performed by: STUDENT IN AN ORGANIZED HEALTH CARE EDUCATION/TRAINING PROGRAM

## 2024-10-25 PROCEDURE — 96375 TX/PRO/DX INJ NEW DRUG ADDON: CPT

## 2024-10-25 PROCEDURE — 85025 COMPLETE CBC W/AUTO DIFF WBC: CPT

## 2024-10-25 PROCEDURE — 94640 AIRWAY INHALATION TREATMENT: CPT

## 2024-10-25 PROCEDURE — 36415 COLL VENOUS BLD VENIPUNCTURE: CPT

## 2024-10-25 PROCEDURE — 2500000003 HC RX 250 WO HCPCS: Performed by: STUDENT IN AN ORGANIZED HEALTH CARE EDUCATION/TRAINING PROGRAM

## 2024-10-25 PROCEDURE — 94761 N-INVAS EAR/PLS OXIMETRY MLT: CPT

## 2024-10-25 PROCEDURE — 80048 BASIC METABOLIC PNL TOTAL CA: CPT

## 2024-10-25 PROCEDURE — 96365 THER/PROPH/DIAG IV INF INIT: CPT

## 2024-10-25 PROCEDURE — 6360000002 HC RX W HCPCS: Performed by: STUDENT IN AN ORGANIZED HEALTH CARE EDUCATION/TRAINING PROGRAM

## 2024-10-25 RX ORDER — PREDNISONE 20 MG/1
40 TABLET ORAL DAILY
Qty: 10 TABLET | Refills: 0 | Status: SHIPPED | OUTPATIENT
Start: 2024-10-25 | End: 2024-10-30

## 2024-10-25 RX ADMIN — SERTRALINE HYDROCHLORIDE 100 MG: 50 TABLET ORAL at 09:07

## 2024-10-25 RX ADMIN — CARVEDILOL 12.5 MG: 6.25 TABLET, FILM COATED ORAL at 09:07

## 2024-10-25 RX ADMIN — LISINOPRIL 2.5 MG: 5 TABLET ORAL at 09:06

## 2024-10-25 RX ADMIN — DOXYCYCLINE 100 MG: 100 INJECTION, POWDER, LYOPHILIZED, FOR SOLUTION INTRAVENOUS at 06:01

## 2024-10-25 RX ADMIN — METHYLPREDNISOLONE SODIUM SUCCINATE 40 MG: 40 INJECTION INTRAMUSCULAR; INTRAVENOUS at 09:07

## 2024-10-25 RX ADMIN — APIXABAN 5 MG: 5 TABLET, FILM COATED ORAL at 09:07

## 2024-10-25 RX ADMIN — IPRATROPIUM BROMIDE AND ALBUTEROL SULFATE 1 DOSE: 2.5; .5 SOLUTION RESPIRATORY (INHALATION) at 11:03

## 2024-10-25 RX ADMIN — CLOPIDOGREL BISULFATE 75 MG: 75 TABLET ORAL at 09:07

## 2024-10-25 RX ADMIN — EZETIMIBE 10 MG: 10 TABLET ORAL at 09:07

## 2024-10-25 RX ADMIN — ATORVASTATIN CALCIUM 80 MG: 40 TABLET, FILM COATED ORAL at 09:07

## 2024-10-25 RX ADMIN — HYDRALAZINE HYDROCHLORIDE 12.5 MG: 25 TABLET ORAL at 09:06

## 2024-10-25 RX ADMIN — SODIUM CHLORIDE, PRESERVATIVE FREE 10 ML: 5 INJECTION INTRAVENOUS at 10:18

## 2024-10-25 RX ADMIN — FUROSEMIDE 20 MG: 20 TABLET ORAL at 09:07

## 2024-10-25 NOTE — PLAN OF CARE
Problem: Chronic Conditions and Co-morbidities  Goal: Patient's chronic conditions and co-morbidity symptoms are monitored and maintained or improved  10/25/2024 1059 by Vilma Espana, RN  Outcome: Adequate for Discharge  10/25/2024 0249 by Elías Baptiste, RN  Outcome: Progressing  Flowsheets (Taken 10/25/2024 0249)  Care Plan - Patient's Chronic Conditions and Co-Morbidity Symptoms are Monitored and Maintained or Improved:   Collaborate with multidisciplinary team to address chronic and comorbid conditions and prevent exacerbation or deterioration   Monitor and assess patient's chronic conditions and comorbid symptoms for stability, deterioration, or improvement   Update acute care plan with appropriate goals if chronic or comorbid symptoms are exacerbated and prevent overall improvement and discharge     Problem: Discharge Planning  Goal: Discharge to home or other facility with appropriate resources  Outcome: Adequate for Discharge     Problem: Pain  Goal: Verbalizes/displays adequate comfort level or baseline comfort level  Outcome: Adequate for Discharge     Problem: Safety - Adult  Goal: Free from fall injury  Outcome: Adequate for Discharge

## 2024-10-25 NOTE — CARE COORDINATION
Pt lives with spouse in a home.  Pt has a PCP and has insurance to help with healthcare cost.  Pt is independent of his ADLs.  No need identified at this time. CM available if needed.         10/25/24 0907   Service Assessment   Patient Orientation Alert and Oriented   Cognition Alert   History Provided By Patient;Medical Record   Primary Caregiver Self   Support Systems Spouse/Significant Other   Patient's Healthcare Decision Maker is: Legal Next of Kin   PCP Verified by CM Yes   Prior Functional Level Independent in ADLs/IADLs   Current Functional Level Independent in ADLs/IADLs   Can patient return to prior living arrangement Yes   Ability to make needs known: Good   Family able to assist with home care needs: Yes   Would you like for me to discuss the discharge plan with any other family members/significant others, and if so, who? No   Social/Functional History   Lives With Spouse   Discharge Planning   Type of Residence House   Living Arrangements Spouse/Significant Other   Current Services Prior To Admission None   Potential Assistance Needed N/A   Condition of Participation: Discharge Planning   The Patient and/or Patient Representative was provided with a Choice of Provider? Patient   The Patient and/Or Patient Representative agree with the Discharge Plan? Yes   Freedom of Choice list was provided with basic dialogue that supports the patient's individualized plan of care/goals, treatment preferences, and shares the quality data associated with the providers?  Yes

## 2024-10-25 NOTE — DISCHARGE SUMMARY
V2.0  Discharge Summary    Name:  Hilaria Rodriguez /Age/Sex: 1960 (64 y.o. male)   Admit Date: 10/24/2024  Discharge Date: 10/25/24    MRN & CSN:  [id number] & [id number] Encounter Date and Time 10/25/24 10:27 AM EDT    Attending:  Uyen Horner MD Discharging Provider: Uyen Horner MD       Hospital Course:     Brief HPI: Hilaria Rodriguez is a 64 y.o. male with pmh of ischemic cardiomyopathy, COPD, hypertension, hyperlipidemia, A-fib who presents with shortness of breath.  Patient states that his symptoms have been going on for the past couple of days with associated nonproductive cough.  Reports chest tightness but denies active chest pain, nausea vomiting, fever or chills.  Patient states that he was recently seen in the ED and diagnosed with pneumonia and discharged home however his symptoms has not improved so he decided to come back to the hospital.     Brief Problem Based Course:   # Dyspnea secondary to combination of mild COPD and CHF exacerbation  -2D echo 2024 EF 25 to 30%  -Imaging with chest x-ray showed no acute abnormality  -Elevated proBNP  -Received one-time 20 mg IV Lasix in the ED  -Discharge on prednisone and pt advised to continue doxycycline for total five days  -advised to continue his home dose of lasix on discharge     # Hypertensive urgency  -Resume home antihypertensives     # History of cardiomyopathy  -Status post ICD  -2D echo as above  -Continue GDMT     # Elevated troponins  -High sensitive troponin 38, repeat 38  -EKG normal sinus rhythm, LVH  -Denies active chest pain  -Continue to monitor        CKD stage III  -Rreatinine stable  -Renally dose medications     Type 2 diabetes mellitus  -Hold home oral antidiabetics.    SSI/Lantus  Hypoglycemic protocol     Chronic A-fib  -Continue Eliquis and Coreg     CAD  -Continue statins, aspirin and Plavix     History of PAD status post femorofemoral bypass 23      The patient expressed appropriate understanding of, and agreement with  7.1 6.2   HGB 13.8 13.2*     BMP:    Recent Labs     10/24/24  1530 10/25/24  0238    139   K 4.0 3.7    106   CO2 20* 24   BUN 31* 31*   CREATININE 1.6* 1.7*   GLUCOSE 103* 177*     Hepatic:   Recent Labs     10/24/24  1530   AST 23   ALT 24   BILITOT 0.7   ALKPHOS 68     Lipids:   Lab Results   Component Value Date/Time    CHOL 149 06/10/2024 10:21 AM    HDL 38 06/10/2024 10:21 AM    TRIG 87 06/10/2024 10:21 AM     Hemoglobin A1C:   Lab Results   Component Value Date/Time    LABA1C 6.3 03/07/2024 08:19 AM     TSH:   Lab Results   Component Value Date/Time    TSH 0.98 02/04/2020 12:17 PM     Troponin:   Lab Results   Component Value Date/Time    TROPONINT 0.018 03/02/2023 02:47 AM    TROPONINT 0.015 03/01/2023 09:54 PM    TROPONINT 0.013 03/01/2023 01:53 PM     Lactic Acid: No results for input(s): \"LACTA\" in the last 72 hours.  BNP:   Recent Labs     10/24/24  1340   PROBNP 13,033*     UA:  Lab Results   Component Value Date/Time    NITRU NEGATIVE 04/25/2024 12:07 PM    COLORU YELLOW 04/25/2024 12:07 PM    PHUR 5.5 04/25/2024 12:07 PM    WBCUA 1 04/25/2024 12:07 PM    RBCUA 1 04/25/2024 12:07 PM    MUCUS RARE 04/25/2024 12:07 PM    TRICHOMONAS NONE SEEN 04/25/2024 12:07 PM    BACTERIA NEGATIVE 04/25/2024 12:07 PM    CLARITYU CLEAR 04/25/2024 12:07 PM    LEUKOCYTESUR NEGATIVE 04/25/2024 12:07 PM    UROBILINOGEN 0.2 04/25/2024 12:07 PM    BILIRUBINUR NEGATIVE 04/25/2024 12:07 PM    BLOODU TRACE 04/25/2024 12:07 PM    GLUCOSEU 250 04/25/2024 12:07 PM    KETUA NEGATIVE 04/25/2024 12:07 PM     Urine Cultures: No results found for: \"LABURIN\"  Blood Cultures: No results found for: \"BC\"  No results found for: \"BLOODCULT2\"  Organism: No results found for: \"ORG\"    Time Spent Discharging patient 65 minutes    Electronically signed by Uyen Horner MD on 10/25/2024 at 10:27 AM

## 2024-10-25 NOTE — PLAN OF CARE
Problem: Chronic Conditions and Co-morbidities  Goal: Patient's chronic conditions and co-morbidity symptoms are monitored and maintained or improved  Outcome: Progressing  Flowsheets (Taken 10/25/2024 0249)  Care Plan - Patient's Chronic Conditions and Co-Morbidity Symptoms are Monitored and Maintained or Improved:   Collaborate with multidisciplinary team to address chronic and comorbid conditions and prevent exacerbation or deterioration   Monitor and assess patient's chronic conditions and comorbid symptoms for stability, deterioration, or improvement   Update acute care plan with appropriate goals if chronic or comorbid symptoms are exacerbated and prevent overall improvement and discharge

## 2024-10-25 NOTE — PROGRESS NOTES
Outpatient Pharmacy Progress Note for Meds-to-Beds    Total number of Prescriptions Filled: 1    Additional Documentation:  Medication(s) were delivered to the patient's room prior to discharge      Thank you for letting us serve your patients.  [address] [address]    Phone: [phone]    Fax: [phone]

## 2024-10-28 ENCOUNTER — CARE COORDINATION (OUTPATIENT)
Dept: CASE MANAGEMENT | Age: 64
End: 2024-10-28

## 2024-10-28 DIAGNOSIS — I10 ESSENTIAL HYPERTENSION: Primary | ICD-10-CM

## 2024-10-28 DIAGNOSIS — I50.23 ACUTE ON CHRONIC SYSTOLIC CONGESTIVE HEART FAILURE (HCC): Primary | ICD-10-CM

## 2024-10-28 DIAGNOSIS — I50.20 HFREF (HEART FAILURE WITH REDUCED EJECTION FRACTION) (HCC): ICD-10-CM

## 2024-10-28 PROCEDURE — 1111F DSCHRG MED/CURRENT MED MERGE: CPT | Performed by: STUDENT IN AN ORGANIZED HEALTH CARE EDUCATION/TRAINING PROGRAM

## 2024-10-28 NOTE — PROGRESS NOTES
Remote Patient Monitoring Treatment Plan    Received request from ACM/CTN Erica Haywood RN   to order remote patient monitoring for in home monitoring of CHF; Condition managed by Dr. Tawana Andrade ([facility]).  HTN; Condition managed by Dr. Andrade.  and order completed.     Patient will be monitoring blood pressure   pulse ox   weight.      Patient will engage in Remote Patient Monitoring each day to develop the skills necessary for self management.       RPM Care Team Responsibilities:   Alerts will be reviewed daily and addressed within 2-4 hours during operational hours (Monday -Friday 9 am-4 pm)  Alert response and intervention documented in patient medical record  Alert response escalated to PCP per protocol and documented in patient medical record  Patient monitored over approximately  days  Discharge from program based on self-management readiness    See care coordination encounters for additional details.

## 2024-10-29 ENCOUNTER — CARE COORDINATION (OUTPATIENT)
Dept: PRIMARY CARE CLINIC | Age: 64
End: 2024-10-29

## 2024-10-29 NOTE — CARE COORDINATION
Remote Patient Kit Ordering Note      Date/Time:  10/29/2024 10:09 AM      [facility]/family today to notify of RPM kit order; patient/family was unavailable; Left HIPAA compliant voicemail regarding RPM kit.    [] [facility] confirmed patient shipping address  [x] Patient will receive package over the next 1-3 business days. Someone 21 years or older must be present to sign for UPS delivery.  [x] HRS will contact patient within 24 hours, an HRS  will call the patient directly: If the patient does not answer, HRS will follow up with the clinical team notifying them about the unsuccessful attempt to contact the patient. HRS will make three call attempts to the patient.Provide patient with [facility] Virtual install number is: [phone].  [x] LPN will contact patient once equipment is active to welcome them to the program.                                                         [x] Hours of RPM monitoring - Monday-Friday [id number]; encourage patient to get vitals entered by Noon each day to have the alert addressed same day.  [x][facility] mailed RPM Patient flyer to patient.                      LPN made aware the RPM kit has been ordered.

## 2024-10-30 ENCOUNTER — OFFICE VISIT (OUTPATIENT)
Dept: CARDIOLOGY CLINIC | Age: 64
End: 2024-10-30
Payer: COMMERCIAL

## 2024-10-30 VITALS
DIASTOLIC BLOOD PRESSURE: 86 MMHG | BODY MASS INDEX: 30.61 KG/M2 | SYSTOLIC BLOOD PRESSURE: 120 MMHG | OXYGEN SATURATION: 98 % | HEIGHT: 67 IN | WEIGHT: 195 LBS | HEART RATE: 60 BPM

## 2024-10-30 DIAGNOSIS — I25.10 ASCVD (ARTERIOSCLEROTIC CARDIOVASCULAR DISEASE): Primary | ICD-10-CM

## 2024-10-30 DIAGNOSIS — I38 VHD (VALVULAR HEART DISEASE): ICD-10-CM

## 2024-10-30 DIAGNOSIS — I50.20 HFREF (HEART FAILURE WITH REDUCED EJECTION FRACTION) (HCC): ICD-10-CM

## 2024-10-30 PROCEDURE — 3074F SYST BP LT 130 MM HG: CPT | Performed by: NURSE PRACTITIONER

## 2024-10-30 PROCEDURE — 1123F ACP DISCUSS/DSCN MKR DOCD: CPT | Performed by: NURSE PRACTITIONER

## 2024-10-30 PROCEDURE — 3079F DIAST BP 80-89 MM HG: CPT | Performed by: NURSE PRACTITIONER

## 2024-10-30 PROCEDURE — 99214 OFFICE O/P EST MOD 30 MIN: CPT | Performed by: NURSE PRACTITIONER

## 2024-10-30 RX ORDER — CLOPIDOGREL BISULFATE 75 MG/1
75 TABLET ORAL DAILY
Qty: 90 TABLET | Refills: 3 | Status: SHIPPED | OUTPATIENT
Start: 2024-10-30

## 2024-10-30 ASSESSMENT — ENCOUNTER SYMPTOMS
ORTHOPNEA: 0
SHORTNESS OF BREATH: 1

## 2024-10-30 NOTE — PROGRESS NOTES
JE 3 SENSOR) MISC 1 each by Does not apply route every 14 days 8 each 3    Continuous Blood Gluc  (FREESTYLE JE 3 READER) JUANITO 1 each by Does not apply route 4 times daily (before meals and nightly) 1 each 0    Blood Glucose Monitoring Suppl (BLOOD GLUCOSE MONITOR SYSTEM) w/Device KIT Use twice per day 1 kit 0     No current facility-administered medications for this visit.          All pertinent data reviewed and discussed with patient       ASSESSMENT/PLAN:    HFrEF  Recelty in hospital with significant elevated NT proBNP and shortness of breath. Chf with pneumonia  Symptoms improved  Shortness of breath at baseline: continue lasix 40 mg in am and 20 mg in pm  Continue coreg/ Jardiance /hydralazine  ICD in situ    Coronary artery disease  LHC shows patent SVG to OM and LIMA to the LAD.  There is significant native vessel disease.  Left main has  about 30 to 40% distal stenosis  No cp  Continue Plavix       Valvular heart disease  Status post mitral valve clipping 05/2024  Has shortness of breath -was recently in the hospital with CHF and pneumonia.  For now continue present management.  Reevaluate in 1 month.  May need to consider NGA       Tests ordered: None  Follow-up 1 month    Signed:  JERED Sultana CNP, 10/30/2024, 3:29 PM    An electronic signature was used to authenticate this note.    Please note this report has been partially produced using speech recognition software and may contain errors related to that system including errors in grammar, punctuation, and spelling, as well as words and phrases that may be inappropriate. If there are any questions or concerns please feel free to contact the dictating provider for clarification.

## 2024-11-01 ENCOUNTER — OFFICE VISIT (OUTPATIENT)
Dept: FAMILY MEDICINE CLINIC | Age: 64
End: 2024-11-01

## 2024-11-01 VITALS
OXYGEN SATURATION: 99 % | DIASTOLIC BLOOD PRESSURE: 86 MMHG | HEART RATE: 80 BPM | SYSTOLIC BLOOD PRESSURE: 156 MMHG | TEMPERATURE: 97.7 F | WEIGHT: 199.6 LBS | BODY MASS INDEX: 31.26 KG/M2

## 2024-11-01 DIAGNOSIS — Z09 HOSPITAL DISCHARGE FOLLOW-UP: Primary | ICD-10-CM

## 2024-11-01 RX ORDER — PREDNISONE 20 MG/1
20 TABLET ORAL DAILY
Qty: 5 TABLET | Refills: 0 | Status: SHIPPED | OUTPATIENT
Start: 2024-11-01 | End: 2024-11-06

## 2024-11-01 ASSESSMENT — ENCOUNTER SYMPTOMS
ABDOMINAL PAIN: 0
EYE DISCHARGE: 0
NAUSEA: 0
EYE REDNESS: 0
CHEST TIGHTNESS: 1
BACK PAIN: 0
VOMITING: 0
SORE THROAT: 0
COLOR CHANGE: 0
SHORTNESS OF BREATH: 1
WHEEZING: 0
PHOTOPHOBIA: 0
EYE PAIN: 0
CONSTIPATION: 0
BLOOD IN STOOL: 0
DIARRHEA: 0
COUGH: 1
RHINORRHEA: 0

## 2024-11-01 NOTE — PROGRESS NOTES
Hilaria Rodriguez (:  1960) is a 64 y.o. male, Established patient, here for evaluation of the following chief complaint(s):  Follow-Up from Hospital (Hospital follow up- needs a pcp)         Assessment & Plan  1. Chronic Obstructive Pulmonary Disease (COPD) exacerbation.  Their chest x-ray from the ER showed no signs of pneumonia, suggesting a likely exacerbation of COPD. They completed a course of steroids and doxycycline, which initially helped, but symptoms have worsened since finishing the steroids. They continue to use their albuterol rescue inhaler, which provides some relief. A prescription for an additional 5 days of prednisone 20 mg was provided. They were instructed to continue using their albuterol rescue inhaler every 4 hours as needed. If symptoms worsen, they should return for further evaluation.    2. Congestive Heart Failure (CHF) exacerbation.  They were admitted from 10/24/2024 to 10/25/2024 with dyspnea secondary to a combination of COPD and CHF exacerbation. Their prior echo in May 2024 showed an ejection fraction of 25-30%. They were given i.v. Lasix in the emergency department and discharged on prednisone and doxycycline. They were advised to continue home Lasix at discharge. They followed up with cardiology 2 days ago and were advised to continue Plavix and other medications. They were instructed to closely monitor their weight, as it has been trending up, and to follow the cardiology plan if weight gain is observed. Their white blood cell count was within normal range, and kidney function was at baseline. They were also advised to take their blood pressure medication and monitor their blood pressure at home. A list of providers accepting new patients was provided. If they require refills or encounter any issues before their appointment with a new provider, they can return to this clinic.    3. Hypertension.  Their blood pressure was elevated during this visit. They were advised to

## 2024-11-04 ENCOUNTER — CARE COORDINATION (OUTPATIENT)
Dept: CARE COORDINATION | Age: 64
End: 2024-11-04

## 2024-11-04 NOTE — CARE COORDINATION
Date/Time:  11/4/2024 3:40 PM  LPN attempted to reach patient by telephone regarding red alert in remote patient monitoring program. Left HIPPA compliant message requesting a return call. Will attempt to reach patient again.

## 2024-11-05 ENCOUNTER — CARE COORDINATION (OUTPATIENT)
Dept: CARE COORDINATION | Age: 64
End: 2024-11-05

## 2024-11-05 ENCOUNTER — CARE COORDINATION (OUTPATIENT)
Dept: CASE MANAGEMENT | Age: 64
End: 2024-11-05

## 2024-11-05 NOTE — CARE COORDINATION
Care Transitions Note    Follow Up Call     Patient: Hilaria Rodriguez                                   Patient : 1960   MRN: [id number]                             Reason for Admission: Copd Exac, CHF Exac, Htn Urgency   Discharge Date: 10/25/24     RURS: Readmission Risk Score: 16.4  Facility: [facility] 10/24/24-10/25/24    Attempt to reach for Care Transition follow up call unsuccessful. HIPAA compliant message left requesting call back.     Outreach Attempts:   HIPAA compliant voicemail left for patient.   Netccmt message sent.     Follow Up Appointment:   Future Appointments         Provider Specialty Dept Phone    2024 3:20 PM Tawana Andrade MD Cardiology [phone]    2024 10:45 AM Gabe Barnes MD Nephrology [phone]    2025 1:00 PM Aashish Gutierrez MD Cardiothoracic Surgery [phone]    2025 11:00 AM Louisa Bridges MD Internal Medicine [phone]            Plan for follow-up on next business day.  based on severity of symptoms and risk factors. Plan for next call: RPM W/C complete in HRS?, bp/hr/spo2/wt??,  taking prednisone?, cont chf/copd ed; smoker??, new PCP??    Erica Haywood RN

## 2024-11-05 NOTE — CARE COORDINATION
Attempted to reach pt x3 regarding red alert for BP reading of 181/114. Pt also had a high reading yesterday of 161/106. Will notify ACM per protocol.

## 2024-11-05 NOTE — CARE COORDINATION
Date/Time:  11/5/2024 9:21 AM  LPN attempted to reach patient by telephone regarding red alert in remote patient monitoring program. Left HIPAA compliant message requesting a return call. Will attempt to reach patient again.

## 2024-11-06 ENCOUNTER — CARE COORDINATION (OUTPATIENT)
Dept: CARE COORDINATION | Age: 64
End: 2024-11-06

## 2024-11-06 NOTE — CARE COORDINATION
Attempted to reach pt x2 and emergency contact x1, escalated to ACM/CTN per protocol, BP reading of 186/113. Pt was recently seen in office and BP readings were not this elevated, unsure if these readings are accurate as unable to triage process of obtaining BP/needs welcome call

## 2024-11-06 NOTE — CARE COORDINATION
Date/Time:  11/6/2024 9:10 AM  LPN attempted to reach patient by telephone regarding red alert in remote patient monitoring program. Left HIPAA compliant message requesting a return call. Will attempt to reach patient again.

## 2024-11-07 ENCOUNTER — CARE COORDINATION (OUTPATIENT)
Dept: CARE COORDINATION | Age: 64
End: 2024-11-07

## 2024-11-07 ENCOUNTER — CARE COORDINATION (OUTPATIENT)
Dept: CASE MANAGEMENT | Age: 64
End: 2024-11-07

## 2024-11-07 NOTE — CARE COORDINATION
Attempted to reach pt again, no answer, left VM. Escalated to AC as writer has been unable to reach pt to perform welcome call/triage alerts each day

## 2024-11-07 NOTE — CARE COORDINATION
Care Transitions Note    Follow Up Call     Patient: Hilaria Rodriguez                                   Patient : 1960   MRN: [id number]                             Reason for Admission: Copd Exac, CHF Exac, Htn Urgency   Discharge Date: 10/25/24     RURS: Readmission Risk Score: 16.4  Facility: [facility] 10/24/24-10/25/24    Noted to have RPM/HRS alert. /117.     2nd attempt to reach for Care Transition follow up call unsuccessful. HIPAA compliant messages left requesting call back. No call back from [facility] message sent 24. Note routed to SPARKLE Rosado LPN- RPM Team for collaboration.     Outreach Attempts:   HIPAA compliant voicemail left for Patient, Wife.       Follow Up Appointment:   Future Appointments         Provider Specialty Dept Phone    2024 3:20 PM Tawana Andrade MD Cardiology [phone]    2024 10:45 AM Gabe Barnes MD Nephrology [phone]    2025 1:00 PM Aashish Gutierrez MD Cardiothoracic Surgery [phone]    2025 11:00 AM Louisa Bridges MD Internal Medicine [phone]            Plan for follow-up call in 2-5 days based on severity of symptoms and risk factors. Plan for next call: RPM W/C complete in HRS?, bp/hr/spo2/wt??, taking prednisone?, cont chf/copd ed; smoker??, new PCP??     Erica Haywood RN

## 2024-11-07 NOTE — CARE COORDINATION
Date/Time:  11/7/2024 8:59 AM  LPN attempted to reach patient by telephone regarding red alert in remote patient monitoring program. Left HIPAA compliant message requesting a return call. Will attempt to reach patient again.  Attempted to reach emergency contact, no answer, left VM. Writer has attempted to reach pt x7 over the last 4 days.    11/7/2024 9:00 AM  *  RPM Verification RPM Verification and Welcome Call Successful? No, This RPM Nurse has attempted x 3 days to reach out to patient to verify proper patient and equipment without success. Will route note to ACM/CTN to reach out to the patient to verify the correct patient and correct equipment.

## 2024-11-13 ENCOUNTER — TELEPHONE (OUTPATIENT)
Dept: CARDIOLOGY CLINIC | Age: 64
End: 2024-11-13

## 2024-11-13 ENCOUNTER — OFFICE VISIT (OUTPATIENT)
Dept: CARDIOLOGY CLINIC | Age: 64
End: 2024-11-13
Payer: COMMERCIAL

## 2024-11-13 ENCOUNTER — CARE COORDINATION (OUTPATIENT)
Dept: CASE MANAGEMENT | Age: 64
End: 2024-11-13

## 2024-11-13 VITALS
DIASTOLIC BLOOD PRESSURE: 60 MMHG | HEIGHT: 67 IN | BODY MASS INDEX: 29.07 KG/M2 | SYSTOLIC BLOOD PRESSURE: 94 MMHG | HEART RATE: 63 BPM | WEIGHT: 185.2 LBS

## 2024-11-13 DIAGNOSIS — R06.02 SHORTNESS OF BREATH: Primary | ICD-10-CM

## 2024-11-13 PROCEDURE — 3078F DIAST BP <80 MM HG: CPT | Performed by: INTERNAL MEDICINE

## 2024-11-13 PROCEDURE — 3074F SYST BP LT 130 MM HG: CPT | Performed by: INTERNAL MEDICINE

## 2024-11-13 PROCEDURE — 1123F ACP DISCUSS/DSCN MKR DOCD: CPT | Performed by: INTERNAL MEDICINE

## 2024-11-13 PROCEDURE — 99214 OFFICE O/P EST MOD 30 MIN: CPT | Performed by: INTERNAL MEDICINE

## 2024-11-13 NOTE — TELEPHONE ENCOUNTER
Erica  [facility]   Called to report speaking with wife  Today , she is reporting SOB laying down /  Cough / weight gain over the past   Week or so had gone from   192 to today reporting weight 199    Can reach Katja [phone]

## 2024-11-13 NOTE — CARE COORDINATION
Care Transitions Note    Follow Up Call     Patient: Hilaria Rodriguez                                   Patient : 1960   MRN: [id number]                             Reason for Admission: Copd Exac, CHF Exac, Htn Urgency   Discharge Date: 10/25/24     RURS: Readmission Risk Score: 16.4  Facility: [facility] 10/24/24-10/25/24    Patient Current Location:  Home: [address] [place] [address]    Care Transition Nurse contacted by Bhavani Rodriguez, Wife,  telephone. Verified name and  as identifiers.    Additional needs identified to be addressed with provider     See below         Method of communication with provider:  call to [facility]--see below .    Care Summary Note: Bhavani provides the following Patient information as he is resting. Reports Patient w/ increased sob, orthopnea (using recliner), cough w/ cream secretions, increased fatigue. Denies edema, chest pain, ac resp distress. Reports she believes his wt was 199# this morning however unable to check value as RPM equipment paused as team has been unable to reach Patient on multiple attempts for HRS alerts. Last documented EPIC weight 191.6# 24  (see RPM section below). Reports Patient is adhering to low na diet and 2L fluid restriction. Denies need for RD. Reports Patient taking Coreg, Jardiance, Hydralazine, Lasix as directed. Reports he completed Prednisone and using Albuterol inhaler as needed. Bhavani uncertain of recent bp readings. Advised Bhavani I will contact [facility] for further advisment and possible asap appt to address above symptoms. Reports appetite, fluid intake good w/ b&b wnl.  Denies Patient resource needs.     Plan of care updates since last contact:  Review of patient management of conditions/medications: as above     Medication Review:  No changes since last call.     Remote Patient Monitoring:  Offered patient enrollment in the Remote Patient Monitoring (RPM) program for in-home monitoring: Yes, patient enrolled;

## 2024-11-13 NOTE — PROGRESS NOTES
compliant hemoglobin A1c of 6.7        - has ICD  per EP  OptiVol was normal on current ICD check     -HFrEF on beta-blocker that is Coreg, Lasix compliant   Ef BETTER NOW     Mortality from the morbid obesity is very high:  Wt loss advised     Body mass index is 31.79 kg/m².      -CKD stable      BEV MELLO MD    [facility]

## 2024-11-14 ENCOUNTER — CARE COORDINATION (OUTPATIENT)
Dept: CASE MANAGEMENT | Age: 64
End: 2024-11-14

## 2024-11-14 NOTE — CARE COORDINATION
Care Transitions Note    Follow Up Call     Patient: Hilaria Rodriguez                                   Patient : 1960   MRN: [id number]                             Reason for Admission: Copd Exac, CHF Exac, Htn Urgency   Discharge Date: 10/25/24     RURS: Readmission Risk Score: 16.4  Facility: [facility] 10/24/24-10/25/24    Attempted to reach patient for transitions of care follow up.  Unable to reach patient.      Outreach Attempts:   HIPAA compliant voicemail left for Patient, Wife.     Follow Up Appointment:   Future Appointments         Provider Specialty Dept Phone    2024 10:45 AM Gabe Barnes MD Nephrology [phone]    2024 2:20 PM Marlee Morfin APRN - CNP Cardiology [phone]    2025 1:00 PM Aashish Gutierrez MD Cardiothoracic Surgery [phone]    2025 11:00 AM Louisa Bridges MD Internal Medicine [phone]            Plan for follow-up call in 2-5 days based on severity of symptoms and risk factors. Plan for next call:   f/u  recommendations--appt/rx change?, bp/hrspo2, wt??, RPM back on?, chf/copd symptoms??   Erica Haywood RN

## 2024-11-15 ENCOUNTER — TELEPHONE (OUTPATIENT)
Dept: CARDIOLOGY CLINIC | Age: 64
End: 2024-11-15

## 2024-11-15 VITALS
OXYGEN SATURATION: 98 % | BODY MASS INDEX: 28.29 KG/M2 | SYSTOLIC BLOOD PRESSURE: 148 MMHG | WEIGHT: 180.6 LBS | DIASTOLIC BLOOD PRESSURE: 84 MMHG | HEART RATE: 64 BPM

## 2024-11-19 ENCOUNTER — CARE COORDINATION (OUTPATIENT)
Dept: CASE MANAGEMENT | Age: 64
End: 2024-11-19

## 2024-11-19 NOTE — CARE COORDINATION
Care Transitions Note    Follow Up Call     Patient: Hilaria Rodriguez                                   Patient : 1960   MRN: [id number]                             Reason for Admission: Copd Exac, CHF Exac, Htn Urgency   Discharge Date: 10/25/24     RURS: Readmission Risk Score: 16.4  Facility: [facility] 10/24/24-10/25/24       Patient Current Location:  Home: [address] [place] [address]    Care Transition Nurse contacted patient by telephone. Verified name and  as identifiers.    Additional needs identified to be addressed with provider   No needs identified         Method of communication with provider: none.    Care Summary Note: Patient reports doing well. Denies sob, orthopnea, edema, cp, cough, wheezing, ac distress. Noted to be speaking in complete, unlabored sentences. RPM /90 today. Reports taking Lisinopril, Hydralazine as directed. Next Nephrology appt 24.  RPM wt 182.8# today which is down from 194.2# 24 at start of program monitoring. Reports adherence to low na diet and 2L fluid restriction. Next cardiology appt 24. Reports taking Coreg, Jardiance, Lasix as directed.  RPM SPO2 98% ra; not on home O2. Does not folow w/ pulm. New PCP appt not until 25; advised WIC appts prn in meantime. Reports using Albuterol inhaler as needed. Reports he completed Prednisone. Reports appetite, fluid intake good w/ b&b wnl. Denies rx or resource needs.     Plan of care updates since last contact:  Review of patient management of conditions/medications: as above    Medication Review:  No changes since last call.     Remote Patient Monitoring:  Offered patient enrollment in the Remote Patient Monitoring (RPM) program for in-home monitoring: Yes, patient enrolled; current status is activated and monitoring.Reports he received and is using new large bp cuff. Welcome call information reviewed; v/u, denies questions or issues w/ equipment/monitoring. Note routed to SPARKLE Rosado

## 2024-11-20 ENCOUNTER — HOSPITAL ENCOUNTER (OUTPATIENT)
Age: 64
Discharge: HOME | End: 2024-11-20
Payer: COMMERCIAL

## 2024-11-20 ENCOUNTER — CARE COORDINATION (OUTPATIENT)
Dept: CARE COORDINATION | Age: 64
End: 2024-11-20

## 2024-11-20 LAB
ANION GAP SERPL CALCULATED.3IONS-SCNC: 12 MMOL/L (ref 9–17)
BUN SERPL-MCNC: 24 MG/DL (ref 7–20)
CALCIUM SERPL-MCNC: 8.9 MG/DL (ref 8.3–10.6)
CHLORIDE SERPL-SCNC: 104 MMOL/L (ref 99–110)
CO2 SERPL-SCNC: 25 MMOL/L (ref 21–32)
CREAT SERPL-MCNC: 1.8 MG/DL (ref 0.8–1.3)
CREAT UR-MCNC: 105 MG/DL (ref 39–259)
GFR, ESTIMATED: 38 ML/MIN/1.73M2
GLUCOSE SERPL-MCNC: 175 MG/DL (ref 74–99)
POTASSIUM SERPL-SCNC: 4.4 MMOL/L (ref 3.5–5.1)
SODIUM SERPL-SCNC: 141 MMOL/L (ref 136–145)
TOTAL PROTEIN, URINE: 117 MG/DL
URINE TOTAL PROTEIN CREATININE RATIO: 1.11 (ref 0–0.2)

## 2024-11-20 PROCEDURE — 80048 BASIC METABOLIC PNL TOTAL CA: CPT

## 2024-11-20 PROCEDURE — 36415 COLL VENOUS BLD VENIPUNCTURE: CPT

## 2024-11-20 PROCEDURE — 82570 ASSAY OF URINE CREATININE: CPT

## 2024-11-20 PROCEDURE — 84156 ASSAY OF PROTEIN URINE: CPT

## 2024-11-20 NOTE — CARE COORDINATION
Date/Time:  11/20/2024 10:13 AM  LPN attempted to reach patient by telephone regarding red alert in remote patient monitoring program. Left HIPAA compliant message requesting a return call. Will attempt to reach patient again.

## 2024-11-20 NOTE — CARE COORDINATION
3 attempts to reach pt to triage /113, no answer, left VM. Will notify ACM per protocol although she has already spoke w/ pt today and asked he answer RPM calls

## 2024-11-21 ENCOUNTER — CARE COORDINATION (OUTPATIENT)
Dept: CARE COORDINATION | Age: 64
End: 2024-11-21

## 2024-11-21 ENCOUNTER — CARE COORDINATION (OUTPATIENT)
Dept: CASE MANAGEMENT | Age: 64
End: 2024-11-21

## 2024-11-21 NOTE — CARE COORDINATION
Care Transitions Note    Follow Up Call     Patient: Hilaria Rodriguez                                   Patient : 1960   MRN: [id number]                             Reason for Admission: Copd Exac, CHF Exac, Htn Urgency   Discharge Date: 10/25/24     RURS: Readmission Risk Score: 16.4  Facility: [facility] 10/24/24-10/25/24    Patient Current Location:  Home: [address] [place] [address]    Care Transition Nurse contacted by Bhavani, Wife,  telephone. Verified  Patient name and  as identifiers.    Additional needs identified to be addressed with provider   Patient: Hilaria Rodriguez                                   Patient : 1960   MRN: [id number]                             Reason for Admission: Copd Exac, CHF Exac, Htn Urgency   Facility: [facility] 10/24/24-10/25/24    Patient participating in Remote Patient Monitoring (RPM) Program for in home vital sign monitoring.   Noted to have elevated BP and weight gain over past few days (see below).   Reports taking Lisinopril, Hydralazine, Lasix, Jardiance, Coreg as directed.   Reports adherence to low na diet and fluid restriction however does reports had hot dogs last night. Placed RD referral.   Denies sob, cough, orthopnea, edema, cp.     Please further advise Patient.      Thanks!  Erica Haywood RN, [phone]       Method of communication with provider: chart routing JESS JOYNER- Cardiology covering for Dr Andrade--via [employer] awaiting response.     Care Summary Note: Noted to have RPM alerts for wt gain and elevated BP.   Wt 187.2# today which is up from 182.8# 24. /107.  Spoke w/ Jneni, Wife, as Patient sleeping. Discussed wt gain and elevated bp. Denies noted or Patient reported cp, sob, orthopnea, cough, edema or acute distress. Reports Patient is adhering to low na diet, 2L fluid restriction however adds he had hot dogs last evening. Advised cutting back on processed meats, can/box/frozen foods, fast foods, chips,

## 2024-11-21 NOTE — CARE COORDINATION
Date/Time:  11/21/2024 12:20 PM  LPN attempted to reach patient by telephone regarding red alert (/107 and 3.4lb weight gain in 1 day) in remote patient monitoring program. Left HIPAA compliant message requesting a return call. Will attempt to reach patient again.  Of note, writer has attempted to contact pt 12 times since 11/4, writer has not been able to speak to pt or pt wife for any alert

## 2024-11-22 ENCOUNTER — CARE COORDINATION (OUTPATIENT)
Dept: CASE MANAGEMENT | Age: 64
End: 2024-11-22

## 2024-11-22 ENCOUNTER — CARE COORDINATION (OUTPATIENT)
Dept: CARE COORDINATION | Age: 64
End: 2024-11-22

## 2024-11-22 NOTE — CARE COORDINATION
Care Transitions Note    Follow Up Call     Attempted to reach patient for transitions of care follow up.  Unable to reach patient.      Outreach Attempts:   HIPAA compliant voicemail left for patient, spouse/partner .       Follow Up Appointment:   Future Appointments         Provider Specialty Dept Phone    12/11/2024 10:45 AM Gabe Barnes MD Nephrology [phone]    12/19/2024 2:20 PM Marlee Morfin APRN - CNP Cardiology [phone]    2/20/2025 1:00 PM Aashish Gutierrez MD Cardiothoracic Surgery [phone]    4/7/2025 11:00 AM Louisa Bridges MD Internal Medicine [phone]            Plan for follow-up on next business day.  based on severity of symptoms and risk factors. Plan for next call:  weight, diet, orthopnea, sob    Andria Alex

## 2024-11-22 NOTE — CARE COORDINATION
Care Transitions Note    Follow Up Call     Attempted to reach patient for transitions of care follow up.  Unable to reach patient.      Outreach Attempts:   HIPAA compliant voicemail left for patient.         Follow Up Appointment:   Future Appointments         Provider Specialty Dept Phone    12/11/2024 10:45 AM Gabe Barnes MD Nephrology [phone]    12/19/2024 2:20 PM Marlee Morfin APRN - CNP Cardiology [phone]    2/20/2025 1:00 PM Aashish Gutierrez MD Cardiothoracic Surgery [phone]    4/7/2025 11:00 AM Louisa Bridges MD Internal Medicine [phone]            {CTCfollowup:79407} based on severity of symptoms and risk factors.    Andria Alex

## 2024-11-22 NOTE — CARE COORDINATION
RD received referral from Erica BACK:  Referral: CHF, HTN. Please call and mail out education.     RD contacted patient and left voicemail regarding Dietitian referral. Left call back number and will follow up as appropriate.     Ce Cintron RDN, LD  [phone]

## 2024-11-25 ENCOUNTER — CARE COORDINATION (OUTPATIENT)
Dept: CARE COORDINATION | Age: 64
End: 2024-11-25

## 2024-11-25 NOTE — CARE COORDINATION
-Remote Alert Monitoring Note      Date/Time:  2024 9:10 AM  Patient Current Location: [place]  Verified patients name and  as identifiers.    Rpm alert to be reviewed by the provider   red alert  blood pressure reading (173/101)                     LPN contacted patient by telephone regarding red alert received   Background: PT enrolled for CHF and HTN  Refer to 911 immediately if:  Patient unresponsive or unable to provide history  Change in cognition or sudden confusion  Patient unable to respond in complete sentences  Intense chest pain/tightness  Any concern for any clinical emergency  Red Alert: Provider response time of 1 hr required for any red alert requiring intervention  Yellow Alert: Provider response time of 3hr required for any escalated yellow alert  Patient Chief Complaint:  Blood Pressure BP Triage  Are you having any Chest Pain? no   Are you having any Shortness of Breath? no   Do you have a headache or have any vision changes? no   Are you having any numbness or tingling? no   Are you having any other health concerns or issues? no     Clinical Interventions:  Spoke with patient, he reports he had chili with a lot of seasoning, likely a lot of sodium. He reports he has taken his medications today around 0800 this morning. He has no concerning symptoms r/t his BP reading. His BP has been high x3 days. He will recheck his BP around 10-10:30. Will monitor for updated reading      Plan/Follow Up: Will continue to review, monitor and address alerts with follow up based on severity of symptoms and risk factors.  **For any new or worsening symptoms or you are concerned in anyway, please contact your Provider or report to the nearest Emergency Room.**

## 2024-11-25 NOTE — CARE COORDINATION
Date/Time:  11/25/2024 9:04 AM  LPN attempted to reach patient by telephone regarding red alert in remote patient monitoring program. Left HIPAA compliant message requesting a return call. Will attempt to reach patient again.

## 2024-11-25 NOTE — CARE COORDINATION
Contacted Hilaria Rodriguez and left voicemail regarding Dietitian referral. Left call back number and will follow up as appropriate.         Ce Cintron RDN, LD  [phone]

## 2024-11-26 ENCOUNTER — CARE COORDINATION (OUTPATIENT)
Dept: CASE MANAGEMENT | Age: 64
End: 2024-11-26

## 2024-11-26 ENCOUNTER — CARE COORDINATION (OUTPATIENT)
Dept: CARE COORDINATION | Age: 64
End: 2024-11-26

## 2024-11-26 NOTE — CARE COORDINATION
Contacted Hilaria Rodriguez and left voicemail regarding Dietitian referral. Left call back number. RD outreached 11/22/24, 11/25/24 and today 11/26/24- left VM all three outreaches. No additional outreach attempts scheduled at this time. RD will notify CTN. RD will continue to follow/assist with patient return call.       Ce Cintron RDN, LD  [phone]

## 2024-11-26 NOTE — CARE COORDINATION
Care Transitions Note    Follow Up Call     Patient: Hilaria Rodriguez                                   Patient : 1960   MRN: [id number]                             Reason for Admission: Copd Exac, CHF Exac, Htn Urgency   Discharge Date: 10/25/24     RURS: Readmission Risk Score: 16.4  Facility: [facility] 10/24/24-10/25/24    Attempt to reach for Care Transition follow up call unsuccessful. HIPAA compliant message left requesting call back. Was able to speak briefly w/ Bhavani, Wife, who reports Patient doing well--although she is now admitted to hospital; reports she will ask Patient to call this CTN.      Outreach Attempts:   HIPAA compliant voicemail left for patient.   [employer] message sent.     Follow Up Appointment:   Future Appointments         Provider Specialty Dept Phone    2024 10:45 AM Gabe Barnes MD Nephrology [phone]    2024 2:20 PM Marlee Morfin APRN - CNP Cardiology [phone]    2025 1:00 PM Aashish Gutierrez MD Cardiothoracic Surgery [phone]    2025 11:00 AM Louisa Bridges MD Internal Medicine [phone]            Plan for follow-up on next business day.  based on severity of symptoms and risk factors. Plan for next call: final call; hand off to Chana HOOK as RPM enrolled.     Erica Haywood RN

## 2024-11-26 NOTE — CARE COORDINATION
Care Transitions Note    Final Call     Patient: Hilaria Rodriguez                                   Patient : 1960   MRN: [id number]                             Reason for Admission: Copd Exac, CHF Exac, Htn Urgency   Discharge Date: 10/25/24     RURS: Readmission Risk Score: 16.4  Facility: [facility] 10/24/24-10/25/24    Patient Current Location:  Home: [address] [place] [address]    Care Transition Nurse spoke w/ Patient on return call. Verified name and  as identifiers.    Patient graduated from the Care Transitions program on 24.  Patient verbalizes confidence in the ability to self-manage at this time..      Handoff:   RPM and condition management for CHF, HTN, COPD. Note routed to Chana Rhodes ACM and RPM team for collaboration.    Care Summary Note: Patient reports doing very well. Denies sob, cough, cp, wheezing, edema, orthopnea, dizziness, h/a, visual disturbances, ac distress. Noted to be speaking in complete, unlabored sentences. Per RPM today: BP 1166/113 >> 124/74 (recheck), HR 65, SPO2 97% and Wt 185.2# which is down from 187.2# on 24. Advised checking bp 1-2 hrs after morning meds, no talking or movement when checking bp. Reminded to adhere to low na diet (reviewed) and 2L fluid restriction. Reports taking Lisinopril, Hydralazine, Lasix, Jardiance, Coreg as directed. Reminded of 24 Cardiology appt as below. Reports has Albuterol inhaler prn. Reports appetite, fluid intake good w/ b&b wnl. Denies rx reill and resource needs. Agreeable to final CT call and ongoing ACM monitoring as RPM enrolled.     Assessments:  Care Transitions Subsequent and Final Call    Schedule Follow Up Appointment with PCP: Completed  Subsequent and Final Calls  Do you have any ongoing symptoms?: No  Have your medications changed?: No  Do you have any questions related to your medications?: No  Do you currently have any active services?: No  Do you have any needs or concerns that I can

## 2024-11-26 NOTE — CARE COORDINATION
Date/Time:  11/26/2024 9:42 AM  LPN attempted to reach patient by telephone regarding red alert in remote patient monitoring program. Left HIPAA compliant message requesting a return call. Will attempt to reach patient again.

## 2024-11-27 ENCOUNTER — CARE COORDINATION (OUTPATIENT)
Dept: CARE COORDINATION | Age: 64
End: 2024-11-27

## 2024-11-27 NOTE — CARE COORDINATION
ACM received CTN handoff of RPM-enrolled patient. ACM outreach scheduled.    If you have any questions about your procedure, please call the Interventional Radiology department at [phone].   After business hours (5pm) and weekends, call the answering service at [phone] and ask for the Radiologist on call to be paged.     Si tiene Preguntas acerca del procedimiento, por favor llame al departamento de Radiología Intervencional al [phone].   Después de horas de oficina (5 pm) y los fines de semana, llamar al servicio de llamadas al [phone] y pregunte por el Radiologo de alfred.      Interventional Radiology General Nurse Discharge    After general anesthesia or intravenous sedation, for 24 hours or while taking prescription Narcotics:  Limit your activities  Do not drive and operate hazardous machinery  Do not make important personal or business decisions  Do  not drink alcoholic beverages  If you have not urinated within 8 hours after discharge, please contact your surgeon on call.    * Please give a list of your current medications to your Primary Care Provider.  * Please update this list whenever your medications are discontinued, doses are     changed, or new medications (including over-the-counter products) are added.  * Please carry medication information at all times in case of emergency situations.    These are general instructions for a healthy lifestyle:    No smoking/ No tobacco products/ Avoid exposure to second hand smoke  Surgeon General's Warning:  Quitting smoking now greatly reduces serious risk to your health.    Obesity, smoking, and sedentary lifestyle greatly increases your risk for illness  A healthy diet, regular physical exercise & weight monitoring are important for maintaining a healthy lifestyle    You may be retaining fluid if you have a history of heart failure or if you experience any of the following symptoms:  Weight gain of 3 pounds or more overnight or 5 pounds in a week, increased swelling in our hands or feet or shortness of breath

## 2024-12-03 ENCOUNTER — CARE COORDINATION (OUTPATIENT)
Dept: CARE COORDINATION | Age: 64
End: 2024-12-03

## 2024-12-03 NOTE — CARE COORDINATION
Ambulatory Care Coordination Note     12/3/2024 9:42 AM     Patient outreach attempt by this ACM today to perform care management follow up . ACM was unable to reach the patient by telephone today;   left voice message requesting a return phone call to this ACM.     ACM: Chana Rhodes RN     Care Summary Note: ACM attempted to contact patient this morning for CM F/U and also for RPM red alert triage. BP was 189/104 at 0627 this morning. ACM left a voice message requesting a return call. ACM will outreach patient at a later date.     PCP/Specialist follow up:   Future Appointments         Provider Specialty Dept Phone    12/11/2024 10:45 AM Gabe Barnes MD Nephrology [phone]    12/19/2024 2:20 PM Marlee Morfin APRN - CNP Cardiology [phone]    2/20/2025 1:00 PM Aashsih Gutierrez MD Cardiothoracic Surgery [phone]    4/7/2025 11:00 AM Louisa Bridges MD Internal Medicine [phone]            Follow Up:   Plan for next ACM outreach in approximately 1 week to complete:  - CC Protocol assessments  - disease specific assessments  - SDOH assessments  - RPM.

## 2024-12-03 NOTE — CARE COORDINATION
Date/Time:  12/3/2024 2:03 PM  LPN attempted to reach patient by telephone regarding red alert in remote patient monitoring program. Left HIPAA compliant message requesting a return call. Will attempt to reach patient again.

## 2024-12-05 ENCOUNTER — CARE COORDINATION (OUTPATIENT)
Dept: CARE COORDINATION | Age: 64
End: 2024-12-05

## 2024-12-06 ENCOUNTER — CARE COORDINATION (OUTPATIENT)
Dept: CARE COORDINATION | Age: 64
End: 2024-12-06

## 2024-12-06 NOTE — CARE COORDINATION
Ambulatory Care Coordination Note     12/6/2024 4:12 PM     ACM outreach attempt by this ACM today to perform care management follow up . ACM was unable to reach the patient by telephone today;   left voice message requesting a return phone call to this ACM.     ACM: Meseret Jerez RN     Care Summary Note: ACM outreach today for Care Management and due to call from RPM team for High blood pressures.  Left him a message.   lood  Pressure   191/114/76 188/115/79 No Reading(s) 189/104/62 No Reading(s) 154/95/59 189/103/72       PCP/Specialist follow up:   Future Appointments         Provider Specialty Dept Phone    12/11/2024 10:45 AM Gabe Barnes MD Nephrology [phone]    12/19/2024 2:20 PM Marlee Morfin APRN - CNP Cardiology [phone]    2/20/2025 1:00 PM Aashish Gutierrez MD Cardiothoracic Surgery [phone]    4/7/2025 11:00 AM Louisa Bridges MD Internal Medicine [phone]            Follow Up:   Plan for next ACM outreach in approximately 1-2 days  to complete:  Blood pressure issue .

## 2024-12-06 NOTE — CARE COORDINATION
Date/Time:  12/6/2024 9:03 AM  LPN attempted to reach patient by telephone regarding red alert in remote patient monitoring program. Left HIPAA compliant message requesting a return call. Will attempt to reach patient again.

## 2024-12-06 NOTE — CARE COORDINATION
Attempted to reach pt x3, no answer or call back, unable to triage BP of 191/114, sent to CTN/ACM

## 2024-12-09 ENCOUNTER — CARE COORDINATION (OUTPATIENT)
Dept: CARE COORDINATION | Age: 64
End: 2024-12-09

## 2024-12-09 ENCOUNTER — CARE COORDINATION (OUTPATIENT)
Dept: CASE MANAGEMENT | Age: 64
End: 2024-12-09

## 2024-12-09 NOTE — CARE COORDINATION
Date/Time:  12/9/2024 12:42 PM  LPN attempted to reach patient by telephone regarding red alert in remote patient monitoring program. Left HIPAA compliant message requesting a return call. Will attempt to reach patient again.    RPM red alert for high /101  Enrolled in RPM for CHF AND HTN  Call to pt and EC wife Bhavani  Update to ACM

## 2024-12-09 NOTE — CARE COORDINATION
Date/Time:  12/9/2024 9:42 AM  LPN attempted to reach patient by telephone regarding red alert in remote patient monitoring program. Left HIPAA compliant message requesting a return call. Will attempt to reach patient again.    RPM red alert for high /101  Enrolled in RPM for CHF AND HTN  Message sent to tablet  Message sent to patient via Patient Connect Portal for Remote Patient Monitoring     RPM Nurse message Return call requested Hello, Please see an important message from your RPM Team.  We have attempted to reach you to discuss your readings please reach out to us to discuss with the provided number left on your voicemail.     Please do not respond to this message; If you have a question or concern please call your Ambulatory Care Manager or your Primary Care Physician.

## 2024-12-09 NOTE — CARE COORDINATION
Ambulatory Care Coordination Note     12/9/2024 3:12 PM     Patient outreach attempt by this ACM today to perform care management follow up . ACM was unable to reach the patient by telephone today;   left voice message requesting a return phone call to this ACM.  letter mailed requesting patient  to contact this ACM.      ACM: Chana Rhodes RN     Care Summary Note: ACM attempted to contact patient today for follow-up and for RPM follow-up. ACM will make final outreach at a later date.     PCP/Specialist follow up:   Future Appointments         Provider Specialty Dept Phone    12/11/2024 10:45 AM Gabe Barnes MD Nephrology [phone]    12/19/2024 2:20 PM Marlee Morfin APRN - CNP Cardiology [phone]    2/20/2025 1:00 PM Aashish Gutierrez MD Cardiothoracic Surgery [phone]    4/7/2025 11:00 AM Louisa Bridges MD Internal Medicine [phone]            Follow Up:   Plan for next ACM outreach in approximately 1 week to complete:  - CC Protocol assessments  - disease specific assessments  - SDOH assessments  - RPM  - follow-up appointment with Nephrology 12/11/24 .

## 2024-12-10 ENCOUNTER — CARE COORDINATION (OUTPATIENT)
Dept: CASE MANAGEMENT | Age: 64
End: 2024-12-10

## 2024-12-10 NOTE — CARE COORDINATION
Date/Time:  12/10/2024 8:09 AM  LPN attempted to reach patient by telephone regarding red alert in remote patient monitoring program. Left HIPAA compliant message requesting a return call. Will attempt to reach patient again.    RPM red alert for /118  Call to pt and ACM  Message sent to tablet  Message sent to patient via Patient Connect Portal for Remote Patient Monitoring     RPM Nurse message Star, This is Michela  the RPM Nurse  with [facility]. I will be calling you shortly from . Please answer if you are able.

## 2024-12-10 NOTE — CARE COORDINATION
ACM nor RPM triage nurse have been able to contact patient for red alert RPM readings.     ACM sending chart routing to PCP and PCP practice staff to review and attempt to outreach patient.      Vitals      SYSTOLIC DIASTOLIC Pulse   12/3/2024 189 !  104 (H)  65       62    12/5/2024 188 !  115 (H)  82       79    12/6/2024 191 !  114 (H)  77       76    12/7/2024 175 !  97 (H)  65       67    12/8/2024 93  62  59       60    12/9/2024 159 !  101 (H)  66            SpO2 Weight - Scale   12/3/2024 97 %  188 lb 6.4 oz    12/5/2024 98 %  189 lb 3.2 oz    12/6/2024 97 %  187 lb 12.8 oz    12/7/2024 98 %  188 lb 6.4 oz    12/8/2024 95 %  189 lb 9.6 oz    12/9/2024 96 %  188 lb 3.2 oz         Today 12/10/24 at 0640 /118,  pulse 69, SpO2 97%

## 2024-12-10 NOTE — TELEPHONE ENCOUNTER
Per :    I wanted to make you aware of these elevated readings and hope maybe someone from your office could give him a call, as he may answer from the familiar number. If practice staff is able to reach him, could you please advise him to contact me at [phone]. In order to stay active in this RPM program he must answer or at least return calls for triage.         Can you see if you can reach this patient and if so, have them call Chana at the number above? Thanks.

## 2024-12-10 NOTE — CARE COORDINATION
Date/Time:  12/10/2024 11:33 AM  LPN attempted to reach patient by telephone regarding red alert in remote patient monitoring program. Left HIPAA compliant message requesting a return call. Will attempt to reach patient again.    RPM red alert for /118  Second attempt. No answer. No return call.   Update to ACM   ACM notified PCP if high bp readings. Unable to address with pt

## 2024-12-10 NOTE — CARE COORDINATION
[facility] placed call to [facility] (Dr. Barnes - Nephrology) today and spoke with Anna. [facility] advised Anna of patient's RPM program and elevated BP readings. [facility] advised Anna that [facility] sent a staff message to Dr. Barnes with this information today and that AC, PCP staff, RPM staff, have been unable to reach patient for triage. Patient does have an appointment with Dr. Barnes tomorrow at 10:45 AM. Anna will notify Dr. Barnes.    Problem: Physical Therapy Goal  Goal: Physical Therapy Goal  Outcome: Outcome(s) achieved Date Met: 09/05/18  No goals set

## 2024-12-11 ENCOUNTER — TELEPHONE (OUTPATIENT)
Dept: CARDIOLOGY CLINIC | Age: 64
End: 2024-12-11

## 2024-12-11 ENCOUNTER — CARE COORDINATION (OUTPATIENT)
Dept: CASE MANAGEMENT | Age: 64
End: 2024-12-11

## 2024-12-11 NOTE — TELEPHONE ENCOUNTER
Per [facility] Morfin, Dr Andrade received remote monitoring alert for high blood pressure. Patient had OV today with Dr Barnes:    Vitals: /70   Pulse 68   Ht 1.702 m (5' 7\")   Wt 88 kg (194 lb)   BMI 30.38 kg/m²   [unfilled]      BP Readings from Last 3 Encounters:   12/11/24 126/70   11/13/24 94/60   11/13/24 94/60

## 2024-12-11 NOTE — CARE COORDINATION
Remote Patient Monitoring Note      Date/Time:  2024 11:14 AM  Patient Current Location: Home: [address] [place], [place] [id number]  LPN contacted patient by telephone regarding red alert received for blood pressure reading (203/121). Verified patients name and  as identifiers.  Background: enrolled in [facility] for CHF AND HTN  Clinical Interventions: Reviewed and followed up on alerts and treatments-message from   Dr's office after visit today. Pt vitals are as follows  update to ACM     Plan/Follow Up: Will continue to review, monitor and address alerts with follow up based on severity of symptoms and risk factors.

## 2024-12-11 NOTE — CARE COORDINATION
Date/Time:  12/11/2024 8:11 AM  LPN attempted to reach patient by telephone regarding red alert in remote patient monitoring program. Left HIPAA compliant message requesting a return call. Will attempt to reach patient again.    RPM red alert for Bp 203/121  Call to pt and wife.  Unable to reach  update to ACM  Tablet message sent  Pt has an appt today with kidney specialist. Recent metrics included for review  Message sent to patient via Patient Connect Portal for Remote Patient Monitoring     RPM Nurse message Return call requested Hello, Please see an important message from your RPM Team.  We have attempted to reach you to discuss your readings please reach out to us to discuss with the provided number left on your voicemail.     Please do not respond to this message; If you have a question or concern please call your Ambulatory Care Manager or your Primary Care Physician.

## 2024-12-12 ENCOUNTER — CARE COORDINATION (OUTPATIENT)
Dept: CASE MANAGEMENT | Age: 64
End: 2024-12-12

## 2024-12-12 NOTE — CARE COORDINATION
-Remote Alert Monitoring Note      Date/Time:  2024 8:27 AM  Patient Current Location: Home: [address] [id number]  Verified patients name and  as identifiers.    Rpm alert to be reviewed by the provider   red alert  blood pressure reading (193/123)  Vitals Recheck blood pressure reading (179/102)  Additional needs to be addressed by provider: No                   LPN contacted patient by telephone regarding red alert received   Background: enrolled in RPM for CHF AND HTN  Refer to [facility] immediately if:  Patient unresponsive or unable to provide history  Change in cognition or sudden confusion  Patient unable to respond in complete sentences  Intense chest pain/tightness  Any concern for any clinical emergency  Red Alert: Provider response time of 1 hr required for any red alert requiring intervention  Yellow Alert: Provider response time of 3hr required for any escalated yellow alert  Patient Chief Complaint:  Blood Pressure BP Triage  Are you having any Chest Pain? no   Are you having any Shortness of Breath? no   Do you have a headache or have any vision changes? no   Are you having any numbness or tingling? no   Are you having any other health concerns or issues? no  Patient/Caregiver educated on how to properly take a blood pressure. Patient/Caregiver verbalizes understanding.     Clinical Interventions: Reviewed and followed up on alerts and treatments-return call from [facility]. Discussed RED alert for high BP. Pt denies chest pain or dyspnea. No numbness tingling or headache. Reviewed medications. Pt has not taken any of his medications today. Educated on checking BP metrics 1-2 hours after taking medications. Verbalized understanding. Educated on checking BP. Pt rechecked . New reading is 179/102 red alert. Pt will take medications now and recheck Bp in 1-2 hours  Plan/Follow Up: Will continue to review, monitor and address alerts with follow up based on severity of symptoms and risk

## 2024-12-12 NOTE — CARE COORDINATION
Date/Time:  12/12/2024 7:32 AM  LPN attempted to reach patient by telephone regarding red alert in remote patient monitoring program. Left HIPAA compliant message requesting a return call. Will attempt to reach patient again.     Rpm red alert for /123  ENROLLED in RPM for CHF AND HTN  TABLET MESSAGE sent  Message sent to patient via Patient Connect Portal for Remote Patient Monitoring     RPM Nurse message Return call requested Hello, Please see an important message from your RPM Team.  We have attempted to reach you to discuss your readings please reach out to us to discuss with the provided number left on your voicemail.     Please do not respond to this message; If you have a question or concern please call your Ambulatory Care Manager or your Primary Care Physician.

## 2024-12-12 NOTE — CARE COORDINATION
Remote Patient Monitoring Note      Date/Time:  2024 10:14 AM  Patient Current Location: Home: [address] [id number]  LPN contacted patient by telephone regarding red alert received for blood pressure reading (179/102). Verified patients name and  as identifiers.  Background: enrolled in [facility] for CHF AND HTN  Clinical Interventions: Reviewed and followed up on alerts and treatments-pt rechecked BP metrics . New reading 99/67. Green alert. Reminded pt to take medications and wait 1-2 hours before checking BP. Verbalized understanding.     Plan/Follow Up: Will continue to review, monitor and address alerts with follow up based on severity of symptoms and risk factors.

## 2024-12-16 ENCOUNTER — CARE COORDINATION (OUTPATIENT)
Dept: CARE COORDINATION | Age: 64
End: 2024-12-16

## 2024-12-16 NOTE — CARE COORDINATION
Ambulatory Care Coordination Note     12/16/2024 3:24 PM     patient outreach attempt by this ACM today to perform care management follow up . ACM was unable to reach the patient by telephone today;   left voice message requesting a return phone call to this ACM.    ACM placed final outreach attempt to patient today for care management/RPM follow-up. ACM left patient a voicemail requesting a call back by 4 PM today to remain active in the programs.      Patient closed (unable to reach patient) from the High Risk Care Management program on 12/16/2024. No further Ambulatory Care Manager follow up scheduled.

## 2024-12-16 NOTE — CARE COORDINATION
Date/Time:  12/16/2024 12:58 PM  LPN attempted to reach patient by telephone regarding red alert in remote patient monitoring program. Left HIPAA compliant message requesting a return call. Will attempt to reach patient again.

## 2024-12-17 ENCOUNTER — CARE COORDINATION (OUTPATIENT)
Dept: PRIMARY CARE CLINIC | Age: 64
End: 2024-12-17

## 2024-12-17 DIAGNOSIS — I50.20 HFREF (HEART FAILURE WITH REDUCED EJECTION FRACTION) (HCC): ICD-10-CM

## 2024-12-17 DIAGNOSIS — I10 ESSENTIAL HYPERTENSION: Primary | ICD-10-CM

## 2024-12-17 NOTE — CARE COORDINATION
Patient Hilaria Rodriguez  12/17/24     Care Coordination  placed call to patient to arrange RPM kit  through [employer]. Left HIPAA Compliant Message     provided return and how to pack equipment in original packing via the patients voicemail if available and provided call back number should patient have questions.    Patient made aware [employer] will  equipment in 2-4 days.

## 2024-12-17 NOTE — PROGRESS NOTES
Remote Patient Order Discontinued    Received request from Chana Rhodes, RN   to discontinue order for remote patient monitoring of CHF and HTN and order completed.

## 2024-12-31 ENCOUNTER — OFFICE VISIT (OUTPATIENT)
Dept: CARDIOLOGY CLINIC | Age: 64
End: 2024-12-31
Payer: COMMERCIAL

## 2024-12-31 VITALS
HEIGHT: 67 IN | WEIGHT: 192 LBS | HEART RATE: 60 BPM | BODY MASS INDEX: 30.13 KG/M2 | SYSTOLIC BLOOD PRESSURE: 108 MMHG | DIASTOLIC BLOOD PRESSURE: 60 MMHG

## 2024-12-31 DIAGNOSIS — I50.20 HFREF (HEART FAILURE WITH REDUCED EJECTION FRACTION) (HCC): Primary | ICD-10-CM

## 2024-12-31 DIAGNOSIS — I38 VHD (VALVULAR HEART DISEASE): ICD-10-CM

## 2024-12-31 PROCEDURE — 3078F DIAST BP <80 MM HG: CPT | Performed by: NURSE PRACTITIONER

## 2024-12-31 PROCEDURE — 1123F ACP DISCUSS/DSCN MKR DOCD: CPT | Performed by: NURSE PRACTITIONER

## 2024-12-31 PROCEDURE — 3074F SYST BP LT 130 MM HG: CPT | Performed by: NURSE PRACTITIONER

## 2024-12-31 PROCEDURE — 99214 OFFICE O/P EST MOD 30 MIN: CPT | Performed by: NURSE PRACTITIONER

## 2024-12-31 ASSESSMENT — ENCOUNTER SYMPTOMS
ORTHOPNEA: 0
SHORTNESS OF BREATH: 1

## 2025-01-17 DIAGNOSIS — R45.4 IRRITABILITY AND ANGER: ICD-10-CM

## 2025-01-30 ENCOUNTER — TELEPHONE (OUTPATIENT)
Dept: CARDIOTHORACIC SURGERY | Age: 65
End: 2025-01-30

## 2025-02-03 ENCOUNTER — HOSPITAL ENCOUNTER (EMERGENCY)
Age: 65
Discharge: HOME | End: 2025-02-03
Attending: STUDENT IN AN ORGANIZED HEALTH CARE EDUCATION/TRAINING PROGRAM
Payer: MEDICARE

## 2025-02-03 ENCOUNTER — APPOINTMENT (OUTPATIENT)
Dept: GENERAL RADIOLOGY | Age: 65
End: 2025-02-03
Payer: MEDICARE

## 2025-02-03 VITALS
RESPIRATION RATE: 18 BRPM | HEART RATE: 91 BPM | BODY MASS INDEX: 29.03 KG/M2 | TEMPERATURE: 98.2 F | HEIGHT: 67 IN | DIASTOLIC BLOOD PRESSURE: 110 MMHG | OXYGEN SATURATION: 97 % | WEIGHT: 185 LBS | SYSTOLIC BLOOD PRESSURE: 174 MMHG

## 2025-02-03 DIAGNOSIS — J44.1 COPD EXACERBATION (HCC): Primary | ICD-10-CM

## 2025-02-03 DIAGNOSIS — J18.9 PNEUMONIA OF RIGHT MIDDLE LOBE DUE TO INFECTIOUS ORGANISM: ICD-10-CM

## 2025-02-03 LAB
ANION GAP SERPL CALCULATED.3IONS-SCNC: 12 MMOL/L (ref 9–17)
ARTERIAL PATENCY WRIST A: ABNORMAL
BASOPHILS # BLD: 0.06 K/UL
BASOPHILS NFR BLD: 1 % (ref 0–1)
BNP SERPL-MCNC: ABNORMAL PG/ML (ref 0–125)
BODY TEMPERATURE: 37
BUN SERPL-MCNC: 23 MG/DL (ref 7–20)
CALCIUM SERPL-MCNC: 9.9 MG/DL (ref 8.3–10.6)
CHLORIDE SERPL-SCNC: 106 MMOL/L (ref 99–110)
CO2 SERPL-SCNC: 25 MMOL/L (ref 21–32)
COHGB MFR BLD: 1.7 % (ref 0.5–1.5)
CREAT SERPL-MCNC: 1.8 MG/DL (ref 0.8–1.3)
EOSINOPHIL # BLD: 0.14 K/UL
EOSINOPHILS RELATIVE PERCENT: 2 % (ref 0–3)
ERYTHROCYTE [DISTWIDTH] IN BLOOD BY AUTOMATED COUNT: 14.8 % (ref 11.7–14.9)
GFR, ESTIMATED: 39 ML/MIN/1.73M2
GLUCOSE SERPL-MCNC: 197 MG/DL (ref 74–99)
HCO3 VENOUS: 28.8 MMOL/L (ref 22–29)
HCT VFR BLD AUTO: 43.2 % (ref 42–52)
HGB BLD-MCNC: 13.6 G/DL (ref 13.5–18)
IMM GRANULOCYTES # BLD AUTO: 0.01 K/UL
IMM GRANULOCYTES NFR BLD: 0 %
INFLUENZA A BY PCR: NOT DETECTED
INFLUENZA B BY PCR: NOT DETECTED
LYMPHOCYTES NFR BLD: 1.09 K/UL
LYMPHOCYTES RELATIVE PERCENT: 17 % (ref 24–44)
MCH RBC QN AUTO: 31.6 PG (ref 27–31)
MCHC RBC AUTO-ENTMCNC: 31.5 G/DL (ref 32–36)
MCV RBC AUTO: 100.2 FL (ref 78–100)
METHEMOGLOBIN: 0.4 % (ref 0.5–1.5)
MONOCYTES NFR BLD: 0.51 K/UL
MONOCYTES NFR BLD: 8 % (ref 0–4)
NEUTROPHILS NFR BLD: 72 % (ref 36–66)
NEUTS SEG NFR BLD: 4.64 K/UL
OXYHGB MFR BLD: 37.4 %
PCO2 VENOUS: 53.8 MM HG (ref 38–54)
PH VENOUS: 7.35 (ref 7.32–7.43)
PLATELET # BLD AUTO: 177 K/UL (ref 140–440)
PMV BLD AUTO: 11.8 FL (ref 7.5–11.1)
PO2 VENOUS: 23.5 MM HG (ref 23–48)
POSITIVE BASE EXCESS, VEN: 2 MMOL/L (ref 0–3)
POTASSIUM SERPL-SCNC: 4 MMOL/L (ref 3.5–5.1)
RBC # BLD AUTO: 4.31 M/UL (ref 4.6–6.2)
SODIUM SERPL-SCNC: 143 MMOL/L (ref 136–145)
TROPONIN I SERPL HS-MCNC: 54 NG/L (ref 0–22)
TROPONIN I SERPL HS-MCNC: 54 NG/L (ref 0–22)
WBC OTHER # BLD: 6.5 K/UL (ref 4–10.5)

## 2025-02-03 PROCEDURE — 93005 ELECTROCARDIOGRAM TRACING: CPT | Performed by: STUDENT IN AN ORGANIZED HEALTH CARE EDUCATION/TRAINING PROGRAM

## 2025-02-03 PROCEDURE — 6370000000 HC RX 637 (ALT 250 FOR IP): Performed by: STUDENT IN AN ORGANIZED HEALTH CARE EDUCATION/TRAINING PROGRAM

## 2025-02-03 PROCEDURE — 87502 INFLUENZA DNA AMP PROBE: CPT

## 2025-02-03 PROCEDURE — 80048 BASIC METABOLIC PNL TOTAL CA: CPT

## 2025-02-03 PROCEDURE — 84484 ASSAY OF TROPONIN QUANT: CPT

## 2025-02-03 PROCEDURE — 94640 AIRWAY INHALATION TREATMENT: CPT

## 2025-02-03 PROCEDURE — 2500000003 HC RX 250 WO HCPCS: Performed by: STUDENT IN AN ORGANIZED HEALTH CARE EDUCATION/TRAINING PROGRAM

## 2025-02-03 PROCEDURE — 85025 COMPLETE CBC W/AUTO DIFF WBC: CPT

## 2025-02-03 PROCEDURE — 96374 THER/PROPH/DIAG INJ IV PUSH: CPT

## 2025-02-03 PROCEDURE — 6360000002 HC RX W HCPCS: Performed by: STUDENT IN AN ORGANIZED HEALTH CARE EDUCATION/TRAINING PROGRAM

## 2025-02-03 PROCEDURE — 82805 BLOOD GASES W/O2 SATURATION: CPT

## 2025-02-03 PROCEDURE — 71045 X-RAY EXAM CHEST 1 VIEW: CPT

## 2025-02-03 PROCEDURE — 83880 ASSAY OF NATRIURETIC PEPTIDE: CPT

## 2025-02-03 PROCEDURE — 36415 COLL VENOUS BLD VENIPUNCTURE: CPT

## 2025-02-03 PROCEDURE — 99285 EMERGENCY DEPT VISIT HI MDM: CPT

## 2025-02-03 RX ORDER — DOXYCYCLINE HYCLATE 100 MG
100 TABLET ORAL
Status: COMPLETED | OUTPATIENT
Start: 2025-02-03 | End: 2025-02-03

## 2025-02-03 RX ORDER — PREDNISONE 50 MG/1
50 TABLET ORAL DAILY
Qty: 5 TABLET | Refills: 0 | Status: ON HOLD | OUTPATIENT
Start: 2025-02-03 | End: 2025-02-08

## 2025-02-03 RX ORDER — NITROGLYCERIN 0.4 MG/1
0.4 TABLET SUBLINGUAL ONCE
Status: COMPLETED | OUTPATIENT
Start: 2025-02-03 | End: 2025-02-03

## 2025-02-03 RX ORDER — IPRATROPIUM BROMIDE AND ALBUTEROL SULFATE 2.5; .5 MG/3ML; MG/3ML
2 SOLUTION RESPIRATORY (INHALATION)
Status: COMPLETED | OUTPATIENT
Start: 2025-02-03 | End: 2025-02-03

## 2025-02-03 RX ORDER — ASPIRIN 81 MG/1
324 TABLET, CHEWABLE ORAL ONCE
Status: COMPLETED | OUTPATIENT
Start: 2025-02-03 | End: 2025-02-03

## 2025-02-03 RX ORDER — DOXYCYCLINE HYCLATE 100 MG
100 TABLET ORAL 2 TIMES DAILY
Qty: 10 TABLET | Refills: 0 | Status: ON HOLD | OUTPATIENT
Start: 2025-02-03 | End: 2025-02-08

## 2025-02-03 RX ADMIN — NITROGLYCERIN 0.4 MG: 0.4 TABLET SUBLINGUAL at 20:42

## 2025-02-03 RX ADMIN — WATER 125 MG: 1 INJECTION INTRAMUSCULAR; INTRAVENOUS; SUBCUTANEOUS at 18:25

## 2025-02-03 RX ADMIN — IPRATROPIUM BROMIDE AND ALBUTEROL SULFATE 2 DOSE: .5; 2.5 SOLUTION RESPIRATORY (INHALATION) at 19:15

## 2025-02-03 RX ADMIN — ASPIRIN 324 MG: 81 TABLET, CHEWABLE ORAL at 19:54

## 2025-02-03 RX ADMIN — DOXYCYCLINE HYCLATE 100 MG: 100 TABLET, COATED ORAL at 23:28

## 2025-02-03 ASSESSMENT — PAIN - FUNCTIONAL ASSESSMENT: PAIN_FUNCTIONAL_ASSESSMENT: NONE - DENIES PAIN

## 2025-02-03 ASSESSMENT — PAIN SCALES - GENERAL
PAINLEVEL_OUTOF10: 0
PAINLEVEL_OUTOF10: 0

## 2025-02-03 NOTE — ED PROVIDER NOTES
EMERGENCY DEPARTMENT HISTORY AND PHYSICAL EXAM      Patient Name: Hilaria Rodriguez  MRN: [id number]  : 1960  Date of Evaluation: 2/3/2025  ED Provider:  Taz Pittman DO    History of Presenting Illness     Chief Complaint:   Chief Complaint   Patient presents with    Shortness of Breath    Numbness     Bilateral legs       HPI: Patient is a 64 y.o. male with history of coronary artery disease, CVA, and COPD presenting to the emergency department the chief complaint of shortness of breath.  Patient states he has been experiencing increased exertional fatigue over the last week with associated shortness of breath.  Patient denies any chest pain or back pain.  Patient states this does not feel like previous issues he has had with his heart.  Patient reports a cough and has felt congested over the last week.  Patient denies nausea, vomiting, abdominal pain, diarrhea.        Past History     Past Medical History:   Past Medical History:   Diagnosis Date    Acute cerebrovascular accident (CVA) ([facility]) 2020    Acute on chronic systolic congestive heart failure ([facility]) 2024    Acute ST elevation myocardial infarction (STEMI) ([facility]) 2018    B12 deficiency     CAD (coronary artery disease)     COPD (chronic obstructive pulmonary disease) ([facility]) 2020    GERD (gastroesophageal reflux disease)     Guillain Barré syndrome ([facility])     H/O echocardiogram 13, 6/16/10    7/13-EF 50-55% WNL. 6/10-EF53%, mod LVH, MARCI, RVE, trace PI, mild MR/TR,     Heart murmur     History of echocardiogram 10/07/2020    EF 40%, No changed since previous 2020, Mid inferior hypokinesis noted, Grade I DD, Mod MR, Mild TR, Dilatation of the aortic root, No pericardial effusion     History of myocardial infarction     History of nuclear MUGA test 2020    EF 53%, Normal, No ICD needed    History of nuclear stress test 2020    EF 30%, study suggestive of abnormal myocardial perfusion.    History of PTCA

## 2025-02-03 NOTE — ED TRIAGE NOTES
Patient complaining of cough, shortness of breath, bilateral leg numbness for about a week. Denies fevers. Says the cough is almost constant.

## 2025-02-05 ENCOUNTER — TELEPHONE (OUTPATIENT)
Dept: CARDIOTHORACIC SURGERY | Age: 65
End: 2025-02-05

## 2025-02-05 LAB
EKG ATRIAL RATE: 96 BPM
EKG DIAGNOSIS: NORMAL
EKG P AXIS: 71 DEGREES
EKG P-R INTERVAL: 160 MS
EKG Q-T INTERVAL: 390 MS
EKG QRS DURATION: 122 MS
EKG QTC CALCULATION (BAZETT): 492 MS
EKG R AXIS: 0 DEGREES
EKG T AXIS: 261 DEGREES
EKG VENTRICULAR RATE: 96 BPM

## 2025-02-05 PROCEDURE — 93010 ELECTROCARDIOGRAM REPORT: CPT | Performed by: INTERNAL MEDICINE

## 2025-02-06 ENCOUNTER — TELEPHONE (OUTPATIENT)
Dept: CARDIOLOGY CLINIC | Age: 65
End: 2025-02-06

## 2025-02-06 NOTE — TELEPHONE ENCOUNTER
Pt called in to ask for a referral to   [facility] and would like to be contacted if there are any questions.

## 2025-02-06 NOTE — TELEPHONE ENCOUNTER
We rec'vd a letter from [facility] disputing CPT 62652 DOS 4/2/2024.  They are disputing the R&L Cath.  I sent the letter and a copy of the Cath report to Billing to send it to the insurance, as I don't think that I can open the portal website.

## 2025-02-08 ENCOUNTER — HOSPITAL ENCOUNTER (INPATIENT)
Age: 65
LOS: 3 days | Discharge: HOME | DRG: 291 | End: 2025-02-11
Attending: EMERGENCY MEDICINE | Admitting: HOSPITALIST
Payer: MEDICARE

## 2025-02-08 ENCOUNTER — APPOINTMENT (OUTPATIENT)
Dept: GENERAL RADIOLOGY | Age: 65
DRG: 291 | End: 2025-02-08
Payer: MEDICARE

## 2025-02-08 DIAGNOSIS — I50.9 ACUTE ON CHRONIC CONGESTIVE HEART FAILURE, UNSPECIFIED HEART FAILURE TYPE (HCC): ICD-10-CM

## 2025-02-08 DIAGNOSIS — R06.02 SHORTNESS OF BREATH: Primary | ICD-10-CM

## 2025-02-08 DIAGNOSIS — J44.9 CHRONIC OBSTRUCTIVE PULMONARY DISEASE, UNSPECIFIED COPD TYPE (HCC): ICD-10-CM

## 2025-02-08 PROBLEM — J44.1 COPD WITH ACUTE EXACERBATION (HCC): Status: ACTIVE | Noted: 2025-02-08

## 2025-02-08 LAB
ALBUMIN SERPL-MCNC: 3.9 G/DL (ref 3.4–5)
ALBUMIN/GLOB SERPL: 1.9 {RATIO} (ref 1.1–2.2)
ALP SERPL-CCNC: 71 U/L (ref 40–129)
ALT SERPL-CCNC: 27 U/L (ref 10–40)
ANION GAP SERPL CALCULATED.3IONS-SCNC: 11 MMOL/L (ref 9–17)
ARTERIAL PATENCY WRIST A: ABNORMAL
ARTERIAL PATENCY WRIST A: ABNORMAL
AST SERPL-CCNC: 24 U/L (ref 15–37)
B PARAP IS1001 DNA NPH QL NAA+NON-PROBE: NOT DETECTED
B PERT DNA SPEC QL NAA+PROBE: NOT DETECTED
BASOPHILS # BLD: 0.04 K/UL
BASOPHILS NFR BLD: 1 % (ref 0–1)
BILIRUB SERPL-MCNC: 0.6 MG/DL (ref 0–1)
BNP SERPL-MCNC: ABNORMAL PG/ML (ref 0–125)
BODY TEMPERATURE: 37
BODY TEMPERATURE: 37
BUN SERPL-MCNC: 38 MG/DL (ref 7–20)
C PNEUM DNA NPH QL NAA+NON-PROBE: NOT DETECTED
CALCIUM SERPL-MCNC: 8.9 MG/DL (ref 8.3–10.6)
CHLORIDE SERPL-SCNC: 107 MMOL/L (ref 99–110)
CO2 SERPL-SCNC: 25 MMOL/L (ref 21–32)
COHGB MFR BLD: 0.8 % (ref 0.5–1.5)
COHGB MFR BLD: 0.9 % (ref 0.5–1.5)
CREAT SERPL-MCNC: 1.9 MG/DL (ref 0.8–1.3)
EKG ATRIAL RATE: 81 BPM
EKG DIAGNOSIS: NORMAL
EKG P AXIS: 63 DEGREES
EKG P-R INTERVAL: 158 MS
EKG Q-T INTERVAL: 410 MS
EKG QRS DURATION: 122 MS
EKG QTC CALCULATION (BAZETT): 476 MS
EKG R AXIS: 7 DEGREES
EKG T AXIS: 239 DEGREES
EKG VENTRICULAR RATE: 81 BPM
EOSINOPHIL # BLD: 0.17 K/UL
EOSINOPHILS RELATIVE PERCENT: 2 % (ref 0–3)
ERYTHROCYTE [DISTWIDTH] IN BLOOD BY AUTOMATED COUNT: 15.2 % (ref 11.7–14.9)
FLUAV RNA NPH QL NAA+NON-PROBE: NOT DETECTED
FLUBV RNA NPH QL NAA+NON-PROBE: NOT DETECTED
GAS FLOW.O2 O2 DELIVERY SYS: ABNORMAL L/MIN
GFR, ESTIMATED: 36 ML/MIN/1.73M2
GLUCOSE BLD-MCNC: 182 MG/DL (ref 74–99)
GLUCOSE BLD-MCNC: 304 MG/DL (ref 74–99)
GLUCOSE BLD-MCNC: 354 MG/DL (ref 74–99)
GLUCOSE SERPL-MCNC: 158 MG/DL (ref 74–99)
HADV DNA NPH QL NAA+NON-PROBE: NOT DETECTED
HCO3 VENOUS: 26.1 MMOL/L (ref 22–29)
HCO3 VENOUS: 27.9 MMOL/L (ref 22–29)
HCOV 229E RNA NPH QL NAA+NON-PROBE: NOT DETECTED
HCOV HKU1 RNA NPH QL NAA+NON-PROBE: NOT DETECTED
HCOV NL63 RNA NPH QL NAA+NON-PROBE: NOT DETECTED
HCOV OC43 RNA NPH QL NAA+NON-PROBE: NOT DETECTED
HCT VFR BLD AUTO: 43.5 % (ref 42–52)
HGB BLD-MCNC: 13.9 G/DL (ref 13.5–18)
HMPV RNA NPH QL NAA+NON-PROBE: NOT DETECTED
HPIV1 RNA NPH QL NAA+NON-PROBE: NOT DETECTED
HPIV2 RNA NPH QL NAA+NON-PROBE: NOT DETECTED
HPIV3 RNA NPH QL NAA+NON-PROBE: NOT DETECTED
HPIV4 RNA NPH QL NAA+NON-PROBE: NOT DETECTED
IMM GRANULOCYTES # BLD AUTO: 0.04 K/UL
IMM GRANULOCYTES NFR BLD: 1 %
INFLUENZA A BY PCR: NOT DETECTED
INFLUENZA B BY PCR: NOT DETECTED
LYMPHOCYTES NFR BLD: 1.56 K/UL
LYMPHOCYTES RELATIVE PERCENT: 19 % (ref 24–44)
M PNEUMO DNA NPH QL NAA+NON-PROBE: NOT DETECTED
MCH RBC QN AUTO: 32.3 PG (ref 27–31)
MCHC RBC AUTO-ENTMCNC: 32 G/DL (ref 32–36)
MCV RBC AUTO: 100.9 FL (ref 78–100)
METHEMOGLOBIN: 0.5 % (ref 0.5–1.5)
METHEMOGLOBIN: 0.8 % (ref 0.5–1.5)
MONOCYTES NFR BLD: 0.58 K/UL
MONOCYTES NFR BLD: 7 % (ref 0–4)
NEUTROPHILS NFR BLD: 72 % (ref 36–66)
NEUTS SEG NFR BLD: 6.06 K/UL
OXYHGB MFR BLD: 25.7 %
OXYHGB MFR BLD: 29.6 %
PCO2 VENOUS: 45.1 MM HG (ref 38–54)
PCO2 VENOUS: 53.3 MM HG (ref 38–54)
PH VENOUS: 7.34 (ref 7.32–7.43)
PH VENOUS: 7.38 (ref 7.32–7.43)
PLATELET # BLD AUTO: 192 K/UL (ref 140–440)
PMV BLD AUTO: 11.9 FL (ref 7.5–11.1)
PO2 VENOUS: 19.6 MM HG (ref 23–48)
PO2 VENOUS: 22.7 MM HG (ref 23–48)
POSITIVE BASE EXCESS, VEN: 0.6 MMOL/L (ref 0–3)
POSITIVE BASE EXCESS, VEN: 1 MMOL/L (ref 0–3)
POTASSIUM SERPL-SCNC: 4.4 MMOL/L (ref 3.5–5.1)
PROT SERPL-MCNC: 5.9 G/DL (ref 6.4–8.2)
RBC # BLD AUTO: 4.31 M/UL (ref 4.6–6.2)
RSV RNA NPH QL NAA+NON-PROBE: NOT DETECTED
RV+EV RNA NPH QL NAA+NON-PROBE: NOT DETECTED
SARS-COV-2 RDRP RESP QL NAA+PROBE: NOT DETECTED
SARS-COV-2 RNA NPH QL NAA+NON-PROBE: NOT DETECTED
SODIUM SERPL-SCNC: 143 MMOL/L (ref 136–145)
SPECIMEN DESCRIPTION: NORMAL
SPECIMEN DESCRIPTION: NORMAL
TROPONIN I SERPL HS-MCNC: 77 NG/L (ref 0–22)
TROPONIN I SERPL HS-MCNC: 80 NG/L (ref 0–22)
WBC OTHER # BLD: 8.5 K/UL (ref 4–10.5)

## 2025-02-08 PROCEDURE — 6370000000 HC RX 637 (ALT 250 FOR IP): Performed by: NURSE PRACTITIONER

## 2025-02-08 PROCEDURE — 96374 THER/PROPH/DIAG INJ IV PUSH: CPT

## 2025-02-08 PROCEDURE — 99285 EMERGENCY DEPT VISIT HI MDM: CPT

## 2025-02-08 PROCEDURE — 96375 TX/PRO/DX INJ NEW DRUG ADDON: CPT

## 2025-02-08 PROCEDURE — 93010 ELECTROCARDIOGRAM REPORT: CPT | Performed by: INTERNAL MEDICINE

## 2025-02-08 PROCEDURE — 82962 GLUCOSE BLOOD TEST: CPT

## 2025-02-08 PROCEDURE — 2500000003 HC RX 250 WO HCPCS: Performed by: NURSE PRACTITIONER

## 2025-02-08 PROCEDURE — 94640 AIRWAY INHALATION TREATMENT: CPT

## 2025-02-08 PROCEDURE — 6360000002 HC RX W HCPCS: Performed by: NURSE PRACTITIONER

## 2025-02-08 PROCEDURE — 2500000003 HC RX 250 WO HCPCS: Performed by: EMERGENCY MEDICINE

## 2025-02-08 PROCEDURE — 82805 BLOOD GASES W/O2 SATURATION: CPT

## 2025-02-08 PROCEDURE — 6370000000 HC RX 637 (ALT 250 FOR IP): Performed by: INTERNAL MEDICINE

## 2025-02-08 PROCEDURE — 84484 ASSAY OF TROPONIN QUANT: CPT

## 2025-02-08 PROCEDURE — 80053 COMPREHEN METABOLIC PANEL: CPT

## 2025-02-08 PROCEDURE — 0202U NFCT DS 22 TRGT SARS-COV-2: CPT

## 2025-02-08 PROCEDURE — 36415 COLL VENOUS BLD VENIPUNCTURE: CPT

## 2025-02-08 PROCEDURE — 93005 ELECTROCARDIOGRAM TRACING: CPT | Performed by: EMERGENCY MEDICINE

## 2025-02-08 PROCEDURE — 94761 N-INVAS EAR/PLS OXIMETRY MLT: CPT

## 2025-02-08 PROCEDURE — 6360000002 HC RX W HCPCS: Performed by: EMERGENCY MEDICINE

## 2025-02-08 PROCEDURE — 83880 ASSAY OF NATRIURETIC PEPTIDE: CPT

## 2025-02-08 PROCEDURE — 87502 INFLUENZA DNA AMP PROBE: CPT

## 2025-02-08 PROCEDURE — 71045 X-RAY EXAM CHEST 1 VIEW: CPT

## 2025-02-08 PROCEDURE — 85025 COMPLETE CBC W/AUTO DIFF WBC: CPT

## 2025-02-08 PROCEDURE — 1200000000 HC SEMI PRIVATE

## 2025-02-08 PROCEDURE — 87635 SARS-COV-2 COVID-19 AMP PRB: CPT

## 2025-02-08 RX ORDER — HYDRALAZINE HYDROCHLORIDE 25 MG/1
12.5 TABLET, FILM COATED ORAL 3 TIMES DAILY
Status: DISCONTINUED | OUTPATIENT
Start: 2025-02-08 | End: 2025-02-11 | Stop reason: HOSPADM

## 2025-02-08 RX ORDER — LISINOPRIL 5 MG/1
2.5 TABLET ORAL DAILY
Status: DISCONTINUED | OUTPATIENT
Start: 2025-02-08 | End: 2025-02-11 | Stop reason: HOSPADM

## 2025-02-08 RX ORDER — NITROGLYCERIN 0.4 MG/1
0.4 TABLET SUBLINGUAL EVERY 5 MIN PRN
Status: DISCONTINUED | OUTPATIENT
Start: 2025-02-08 | End: 2025-02-11 | Stop reason: HOSPADM

## 2025-02-08 RX ORDER — CARVEDILOL 6.25 MG/1
12.5 TABLET ORAL 2 TIMES DAILY
Status: DISCONTINUED | OUTPATIENT
Start: 2025-02-08 | End: 2025-02-11 | Stop reason: HOSPADM

## 2025-02-08 RX ORDER — FUROSEMIDE 10 MG/ML
40 INJECTION INTRAMUSCULAR; INTRAVENOUS ONCE
Status: COMPLETED | OUTPATIENT
Start: 2025-02-08 | End: 2025-02-08

## 2025-02-08 RX ORDER — SODIUM CHLORIDE 0.9 % (FLUSH) 0.9 %
5-40 SYRINGE (ML) INJECTION PRN
Status: DISCONTINUED | OUTPATIENT
Start: 2025-02-08 | End: 2025-02-11 | Stop reason: HOSPADM

## 2025-02-08 RX ORDER — EZETIMIBE 10 MG/1
10 TABLET ORAL DAILY
Status: DISCONTINUED | OUTPATIENT
Start: 2025-02-08 | End: 2025-02-11 | Stop reason: HOSPADM

## 2025-02-08 RX ORDER — ATORVASTATIN CALCIUM 40 MG/1
80 TABLET, FILM COATED ORAL DAILY
Status: DISCONTINUED | OUTPATIENT
Start: 2025-02-08 | End: 2025-02-11 | Stop reason: HOSPADM

## 2025-02-08 RX ORDER — INSULIN LISPRO 100 [IU]/ML
0-4 INJECTION, SOLUTION INTRAVENOUS; SUBCUTANEOUS
Status: DISCONTINUED | OUTPATIENT
Start: 2025-02-08 | End: 2025-02-09

## 2025-02-08 RX ORDER — ACETAMINOPHEN 325 MG/1
650 TABLET ORAL EVERY 6 HOURS PRN
Status: DISCONTINUED | OUTPATIENT
Start: 2025-02-08 | End: 2025-02-11 | Stop reason: HOSPADM

## 2025-02-08 RX ORDER — ONDANSETRON 4 MG/1
4 TABLET, ORALLY DISINTEGRATING ORAL EVERY 8 HOURS PRN
Status: DISCONTINUED | OUTPATIENT
Start: 2025-02-08 | End: 2025-02-11 | Stop reason: HOSPADM

## 2025-02-08 RX ORDER — IPRATROPIUM BROMIDE AND ALBUTEROL SULFATE 2.5; .5 MG/3ML; MG/3ML
1 SOLUTION RESPIRATORY (INHALATION) ONCE
Status: COMPLETED | OUTPATIENT
Start: 2025-02-08 | End: 2025-02-08

## 2025-02-08 RX ORDER — GLUCAGON 1 MG/ML
1 KIT INJECTION PRN
Status: DISCONTINUED | OUTPATIENT
Start: 2025-02-08 | End: 2025-02-11 | Stop reason: HOSPADM

## 2025-02-08 RX ORDER — PREDNISONE 20 MG/1
40 TABLET ORAL DAILY
Status: DISCONTINUED | OUTPATIENT
Start: 2025-02-10 | End: 2025-02-09

## 2025-02-08 RX ORDER — GUAIFENESIN 600 MG/1
600 TABLET, EXTENDED RELEASE ORAL 2 TIMES DAILY
Status: DISCONTINUED | OUTPATIENT
Start: 2025-02-08 | End: 2025-02-11 | Stop reason: HOSPADM

## 2025-02-08 RX ORDER — ENOXAPARIN SODIUM 100 MG/ML
40 INJECTION SUBCUTANEOUS DAILY
Status: DISCONTINUED | OUTPATIENT
Start: 2025-02-08 | End: 2025-02-11 | Stop reason: HOSPADM

## 2025-02-08 RX ORDER — IPRATROPIUM BROMIDE AND ALBUTEROL SULFATE 2.5; .5 MG/3ML; MG/3ML
1 SOLUTION RESPIRATORY (INHALATION)
Status: DISCONTINUED | OUTPATIENT
Start: 2025-02-08 | End: 2025-02-11 | Stop reason: HOSPADM

## 2025-02-08 RX ORDER — POLYETHYLENE GLYCOL 3350 17 G/17G
17 POWDER, FOR SOLUTION ORAL DAILY PRN
Status: DISCONTINUED | OUTPATIENT
Start: 2025-02-08 | End: 2025-02-11 | Stop reason: HOSPADM

## 2025-02-08 RX ORDER — DEXTROSE MONOHYDRATE 100 MG/ML
INJECTION, SOLUTION INTRAVENOUS CONTINUOUS PRN
Status: DISCONTINUED | OUTPATIENT
Start: 2025-02-08 | End: 2025-02-11 | Stop reason: HOSPADM

## 2025-02-08 RX ORDER — CLOPIDOGREL BISULFATE 75 MG/1
75 TABLET ORAL DAILY
Status: DISCONTINUED | OUTPATIENT
Start: 2025-02-08 | End: 2025-02-11 | Stop reason: HOSPADM

## 2025-02-08 RX ORDER — SODIUM CHLORIDE 0.9 % (FLUSH) 0.9 %
5-40 SYRINGE (ML) INJECTION EVERY 12 HOURS SCHEDULED
Status: DISCONTINUED | OUTPATIENT
Start: 2025-02-08 | End: 2025-02-11 | Stop reason: HOSPADM

## 2025-02-08 RX ORDER — SODIUM CHLORIDE 9 MG/ML
INJECTION, SOLUTION INTRAVENOUS PRN
Status: DISCONTINUED | OUTPATIENT
Start: 2025-02-08 | End: 2025-02-11 | Stop reason: HOSPADM

## 2025-02-08 RX ORDER — ONDANSETRON 2 MG/ML
4 INJECTION INTRAMUSCULAR; INTRAVENOUS EVERY 6 HOURS PRN
Status: DISCONTINUED | OUTPATIENT
Start: 2025-02-08 | End: 2025-02-11 | Stop reason: HOSPADM

## 2025-02-08 RX ORDER — ACETAMINOPHEN 650 MG/1
650 SUPPOSITORY RECTAL EVERY 6 HOURS PRN
Status: DISCONTINUED | OUTPATIENT
Start: 2025-02-08 | End: 2025-02-11 | Stop reason: HOSPADM

## 2025-02-08 RX ORDER — FUROSEMIDE 10 MG/ML
20 INJECTION INTRAMUSCULAR; INTRAVENOUS 2 TIMES DAILY
Status: DISCONTINUED | OUTPATIENT
Start: 2025-02-08 | End: 2025-02-10

## 2025-02-08 RX ADMIN — CARVEDILOL 12.5 MG: 6.25 TABLET, FILM COATED ORAL at 10:16

## 2025-02-08 RX ADMIN — FUROSEMIDE 40 MG: 10 INJECTION, SOLUTION INTRAMUSCULAR; INTRAVENOUS at 07:52

## 2025-02-08 RX ADMIN — FUROSEMIDE 20 MG: 10 INJECTION, SOLUTION INTRAMUSCULAR; INTRAVENOUS at 10:17

## 2025-02-08 RX ADMIN — WATER 40 MG: 1 INJECTION INTRAMUSCULAR; INTRAVENOUS; SUBCUTANEOUS at 21:24

## 2025-02-08 RX ADMIN — EMPAGLIFLOZIN 25 MG: 10 TABLET, FILM COATED ORAL at 10:16

## 2025-02-08 RX ADMIN — LISINOPRIL 2.5 MG: 5 TABLET ORAL at 10:17

## 2025-02-08 RX ADMIN — EZETIMIBE 10 MG: 10 TABLET ORAL at 10:34

## 2025-02-08 RX ADMIN — SODIUM CHLORIDE, PRESERVATIVE FREE 10 ML: 5 INJECTION INTRAVENOUS at 10:18

## 2025-02-08 RX ADMIN — SODIUM CHLORIDE, PRESERVATIVE FREE 10 ML: 5 INJECTION INTRAVENOUS at 20:29

## 2025-02-08 RX ADMIN — SERTRALINE HYDROCHLORIDE 100 MG: 50 TABLET ORAL at 10:17

## 2025-02-08 RX ADMIN — CARVEDILOL 12.5 MG: 6.25 TABLET, FILM COATED ORAL at 20:28

## 2025-02-08 RX ADMIN — IPRATROPIUM BROMIDE AND ALBUTEROL SULFATE 1 DOSE: 2.5; .5 SOLUTION RESPIRATORY (INHALATION) at 21:39

## 2025-02-08 RX ADMIN — GUAIFENESIN 600 MG: 600 TABLET ORAL at 20:27

## 2025-02-08 RX ADMIN — WATER 80 MG: 1 INJECTION INTRAMUSCULAR; INTRAVENOUS; SUBCUTANEOUS at 07:49

## 2025-02-08 RX ADMIN — ENOXAPARIN SODIUM 40 MG: 100 INJECTION SUBCUTANEOUS at 10:18

## 2025-02-08 RX ADMIN — INSULIN LISPRO 1 UNITS: 100 INJECTION, SOLUTION INTRAVENOUS; SUBCUTANEOUS at 13:05

## 2025-02-08 RX ADMIN — HYDRALAZINE HYDROCHLORIDE 12.5 MG: 25 TABLET ORAL at 15:33

## 2025-02-08 RX ADMIN — INSULIN LISPRO 3 UNITS: 100 INJECTION, SOLUTION INTRAVENOUS; SUBCUTANEOUS at 17:58

## 2025-02-08 RX ADMIN — FUROSEMIDE 20 MG: 10 INJECTION, SOLUTION INTRAMUSCULAR; INTRAVENOUS at 20:27

## 2025-02-08 RX ADMIN — HYDRALAZINE HYDROCHLORIDE 12.5 MG: 25 TABLET ORAL at 20:28

## 2025-02-08 RX ADMIN — WATER 40 MG: 1 INJECTION INTRAMUSCULAR; INTRAVENOUS; SUBCUTANEOUS at 15:34

## 2025-02-08 RX ADMIN — GUAIFENESIN 600 MG: 600 TABLET ORAL at 10:17

## 2025-02-08 RX ADMIN — IPRATROPIUM BROMIDE AND ALBUTEROL SULFATE 1 DOSE: 2.5; .5 SOLUTION RESPIRATORY (INHALATION) at 15:41

## 2025-02-08 RX ADMIN — ATORVASTATIN CALCIUM 80 MG: 40 TABLET, FILM COATED ORAL at 10:17

## 2025-02-08 RX ADMIN — INSULIN LISPRO 3 UNITS: 100 INJECTION, SOLUTION INTRAVENOUS; SUBCUTANEOUS at 21:24

## 2025-02-08 RX ADMIN — IPRATROPIUM BROMIDE AND ALBUTEROL SULFATE 1 DOSE: 2.5; .5 SOLUTION RESPIRATORY (INHALATION) at 11:22

## 2025-02-08 RX ADMIN — IPRATROPIUM BROMIDE AND ALBUTEROL SULFATE 1 DOSE: .5; 3 SOLUTION RESPIRATORY (INHALATION) at 06:59

## 2025-02-08 RX ADMIN — CLOPIDOGREL BISULFATE 75 MG: 75 TABLET ORAL at 10:17

## 2025-02-08 ASSESSMENT — PAIN - FUNCTIONAL ASSESSMENT: PAIN_FUNCTIONAL_ASSESSMENT: NONE - DENIES PAIN

## 2025-02-08 ASSESSMENT — PAIN SCALES - GENERAL: PAINLEVEL_OUTOF10: 0

## 2025-02-08 NOTE — ED NOTES
ED TO INPATIENT SBAR HANDOFF    Patient Name: Hilaria Rodriguez   :  1960  64 y.o.   Preferred Name  Hilaria  Family/Caregiver Present no   Restraints no   C-SSRS: Risk of Suicide: No Risk  Sitter no   Sepsis Risk Score        Situation  Chief Complaint   Patient presents with    Shortness of Breath     Has hx of COPD has been having shortness of breath for the past week, here about a week ago for the same and not feeling much better, +cough, non-productive in nature, sl labored resp noted in triage     Brief Description of Patient's Condition:  64 y.o. male that presents with complaint of shortness of breath.  Has history of COPD, seen last week for similar cough and congestion.  Was started on steroids and antibiotics he tells me.  Feels like it is not getting any better.  Has not had any leg swelling.  Notes no chest pain.  No fevers.  He has had some increase sputum production.  Feels like his breathing treatments at home were not working.  Had some increased work of breathing on arrival, when walked back to room 17 felt very winded, was wheezing.   Mental Status: oriented, alert, coherent, logical, thought processes intact, and able to concentrate and follow conversation  Arrived from: home    Imaging:   XR CHEST PORTABLE   Final Result        Abnormal labs:   Abnormal Labs Reviewed   CBC WITH AUTO DIFFERENTIAL - Abnormal; Notable for the following components:       Result Value    RBC 4.31 (*)     .9 (*)     MCH 32.3 (*)     RDW 15.2 (*)     MPV 11.9 (*)     Neutrophils % 72 (*)     Lymphocytes % 19 (*)     Monocytes % 7 (*)     Immature Granulocytes % 1 (*)     All other components within normal limits   COMPREHENSIVE METABOLIC PANEL - Abnormal; Notable for the following components:    Glucose 158 (*)     BUN 38 (*)     Creatinine 1.9 (*)     Est, Glom Filt Rate 36 (*)     Total Protein 5.9 (*)     All other components within normal limits   BRAIN NATRIURETIC PEPTIDE - Abnormal; Notable for the

## 2025-02-08 NOTE — ED PROVIDER NOTES
ICD needed    History of nuclear stress test 06/23/2020    EF 30%, study suggestive of abnormal myocardial perfusion.    History of PTCA 6/03;2/03 6/03-PTCA w/stents x2, 2/03-PTCA w/stent x3    History of PTCA 03/15/2018    LAD EF25%    Hx of cardiovascular stress test 04/25/2018    EF 28% Nuclear scintigraphy demonstartes inferior wall infarct.    Hx of cardiovascular stress test 01/27/2020    Abnormal Study. Large area of inferior & apical-lateral infarct.  No significant reversible ischemia.  Severly reduced LVEF 23%    HX OTHER MEDICAL 03/14/2018    lifevest  d/c 7/2/2018    Hyperlipidemia     Hypertension     Impaired glucose tolerance 06/03/2016    Major depressive disorder, recurrent, mild 10/13/2023    MUGA 07/09/2018    EF 47%    NSTEMI (non-ST elevated myocardial infarction) ([facility]) 03/15/2018    Patient in clinical research study 05/2018    3 year study dalcitripib vs placebo [phone]    Pneumonia due to organism     Renal cyst 05/23/2014    S/P cardiac catheterization 07/12/2013 7/13-EF=55%. PATENT LAD AND RCA STENTS, OM2 50% stenosis, & Normal wall motion.    Stroke (cerebrum) ([facility])     May 2016     Past Surgical History:   Procedure Laterality Date    CARDIAC CATHETERIZATION  07/2013 7/13-EF=55%. PATENT LAD AND RCA STENTS, OM2 50% stenosis, & Normal wall motion.    CARDIAC PACEMAKER PLACEMENT Left 08/31/2023    medtronic dual ICD placement    CARDIAC PROCEDURE N/A 4/2/2024    Left and right heart cath / coronary angiography performed by Tawana Andrade MD at [facility] CARDIAC CATH LAB    CARDIAC PROCEDURE N/A 5/1/2024    Transcatheter mitral valve repair performed by Tawana Andrade MD at [facility] CARDIAC CATH LAB    COLONOSCOPY  08/21/2014    colon polyp, diverticulosis, internal hemorrhoids    CORONARY ANGIOPLASTY  06/2003 6/14/03- PTCA w stents x2 mid LAD, 2/03-PTCA w stents x3, RCA    CORONARY ARTERY BYPASS GRAFT N/A 05/18/2020    CABG CORONARY ARTERY BYPASS x 2, INTRAOPERATIVE NGA, INDUCED

## 2025-02-08 NOTE — FLOWSHEET NOTE
4 Eyes Skin Assessment     NAME:  Hilaria Rodriguez  YOB: 1960  MEDICAL RECORD NUMBER:  [id number]    The patient is being assessed for  Admission    I agree that at least one RN has performed a thorough Head to Toe Skin Assessment on the patient. ALL assessment sites listed below have been assessed.      Areas assessed by both nurses:    Head, Face, Ears, Shoulders, Back, Chest, Arms, Elbows, Hands, Sacrum. Buttock, Coccyx, Ischium, Legs. Feet and Heels, and Under Medical Devices         Does the Patient have a Wound? No noted wound(s) small abrasion to left knuckle.        Mustapha Prevention initiated by RN: No  Wound Care Orders initiated by RN: No    Pressure Injury (Stage 3,4, Unstageable, DTI, NWPT, and Complex wounds) if present, place Wound referral order by RN under : No    New Ostomies, if present place, Ostomy referral order under : No     Nurse 1 eSignature: Electronically signed by Juanita Little RN on 2/8/25 at 12:06 PM EST    **SHARE this note so that the co-signing nurse can place an eSignature**    Nurse 2 eSignature: Electronically signed by Darío Sebastian LPN on 2/8/25 at 12:22 PM EST

## 2025-02-08 NOTE — H&P
tablet Take 1 tablet by mouth every morning (before breakfast) 6/10/24   Lindsey Dos Santos MD   sertraline (ZOLOFT) 100 MG tablet Take 1 tablet by mouth daily 6/10/24   Lindsey Dos Santos MD   hydrALAZINE (APRESOLINE) 25 MG tablet Take 0.5 tablets by mouth 3 times daily 6/10/24   Lindsey Dos Santos MD   furosemide (LASIX) 40 MG tablet Take 0.5 tablets by mouth 2 times daily Patient takes 40 mg in the morning and then 20 mg in the evening. 6/10/24   Lindsey Dos Santos MD   ezetimibe (ZETIA) 10 MG tablet Take 1 tablet by mouth daily 6/10/24   Lindsey Dos Santos MD   carvedilol (COREG) 25 MG tablet Take 0.5 tablets by mouth 2 times daily 6/10/24   Lindsey Dos Santos MD   atorvastatin (LIPITOR) 80 MG tablet Take 1 tablet by mouth daily 6/10/24   Lindsey Dos Santos MD   apixaban (ELIQUIS) 5 MG TABS tablet Take 1 tablet by mouth 2 times daily 6/10/24   Lindsey Dos Santos MD   Continuous Blood Gluc Sensor (FREESTYLE JE 3 SENSOR) MISC 1 each by Does not apply route every 14 days 3/8/24   Lindsey Dos Santos MD   Continuous Blood Gluc  (FREESTYLE JE 3 READER) JUANITO 1 each by Does not apply route 4 times daily (before meals and nightly) 1/25/24   Lindsey Dos Santos MD   Blood Glucose Monitoring Suppl (BLOOD GLUCOSE MONITOR SYSTEM) w/Device KIT Use twice per day 1/23/24   Lindsey Dos Santos MD   albuterol sulfate HFA (VENTOLIN HFA) 108 (90 Base) MCG/ACT inhaler Inhale 2 puffs into the lungs every 4 hours as needed for Wheezing 11/4/22   Irish Rivera MD       Physical Exam:    Physical Exam  Constitutional:       Appearance: Normal appearance.   HENT:      Head: Normocephalic and atraumatic.      Nose: Nose normal.      Mouth/Throat:      Mouth: Mucous membranes are moist.   Eyes:      Extraocular Movements: Extraocular movements intact.      Pupils: Pupils are equal, round, and reactive to light.   Cardiovascular:      Rate and Rhythm: Normal rate.      Pulses: Normal pulses.   Pulmonary:      Breath sounds: Rales present.

## 2025-02-09 LAB
ALBUMIN SERPL-MCNC: 3.4 G/DL (ref 3.4–5)
ALBUMIN/GLOB SERPL: 1.6 {RATIO} (ref 1.1–2.2)
ALP SERPL-CCNC: 68 U/L (ref 40–129)
ALT SERPL-CCNC: 19 U/L (ref 10–40)
ANION GAP SERPL CALCULATED.3IONS-SCNC: 13 MMOL/L (ref 9–17)
AST SERPL-CCNC: 18 U/L (ref 15–37)
BASOPHILS # BLD: 0.01 K/UL
BASOPHILS NFR BLD: 0 % (ref 0–1)
BILIRUB SERPL-MCNC: 0.5 MG/DL (ref 0–1)
BUN SERPL-MCNC: 44 MG/DL (ref 7–20)
CALCIUM SERPL-MCNC: 8.5 MG/DL (ref 8.3–10.6)
CHLORIDE SERPL-SCNC: 105 MMOL/L (ref 99–110)
CO2 SERPL-SCNC: 23 MMOL/L (ref 21–32)
CREAT SERPL-MCNC: 2.1 MG/DL (ref 0.8–1.3)
EOSINOPHIL # BLD: 0 K/UL
EOSINOPHILS RELATIVE PERCENT: 0 % (ref 0–3)
ERYTHROCYTE [DISTWIDTH] IN BLOOD BY AUTOMATED COUNT: 14.7 % (ref 11.7–14.9)
GFR, ESTIMATED: 32 ML/MIN/1.73M2
GLUCOSE BLD-MCNC: 244 MG/DL (ref 74–99)
GLUCOSE BLD-MCNC: 298 MG/DL (ref 74–99)
GLUCOSE BLD-MCNC: 351 MG/DL (ref 74–99)
GLUCOSE BLD-MCNC: 383 MG/DL (ref 74–99)
GLUCOSE SERPL-MCNC: 220 MG/DL (ref 74–99)
HCT VFR BLD AUTO: 40 % (ref 42–52)
HGB BLD-MCNC: 12.8 G/DL (ref 13.5–18)
IMM GRANULOCYTES # BLD AUTO: 0.04 K/UL
IMM GRANULOCYTES NFR BLD: 1 %
LYMPHOCYTES NFR BLD: 0.6 K/UL
LYMPHOCYTES RELATIVE PERCENT: 7 % (ref 24–44)
MCH RBC QN AUTO: 31.3 PG (ref 27–31)
MCHC RBC AUTO-ENTMCNC: 32 G/DL (ref 32–36)
MCV RBC AUTO: 97.8 FL (ref 78–100)
MONOCYTES NFR BLD: 0.14 K/UL
MONOCYTES NFR BLD: 2 % (ref 0–4)
NEUTROPHILS NFR BLD: 91 % (ref 36–66)
NEUTS SEG NFR BLD: 7.61 K/UL
PLATELET # BLD AUTO: 184 K/UL (ref 140–440)
PMV BLD AUTO: 12.2 FL (ref 7.5–11.1)
POTASSIUM SERPL-SCNC: 3.9 MMOL/L (ref 3.5–5.1)
PROT SERPL-MCNC: 5.5 G/DL (ref 6.4–8.2)
RBC # BLD AUTO: 4.09 M/UL (ref 4.6–6.2)
SODIUM SERPL-SCNC: 141 MMOL/L (ref 136–145)
TROPONIN I SERPL HS-MCNC: 57 NG/L (ref 0–22)
TROPONIN I SERPL HS-MCNC: 59 NG/L (ref 0–22)
TSH SERPL DL<=0.05 MIU/L-ACNC: 0.45 UIU/ML (ref 0.27–4.2)
WBC OTHER # BLD: 8.4 K/UL (ref 4–10.5)

## 2025-02-09 PROCEDURE — 2500000003 HC RX 250 WO HCPCS: Performed by: NURSE PRACTITIONER

## 2025-02-09 PROCEDURE — 82962 GLUCOSE BLOOD TEST: CPT

## 2025-02-09 PROCEDURE — 84443 ASSAY THYROID STIM HORMONE: CPT

## 2025-02-09 PROCEDURE — 6370000000 HC RX 637 (ALT 250 FOR IP): Performed by: NURSE PRACTITIONER

## 2025-02-09 PROCEDURE — 1200000000 HC SEMI PRIVATE

## 2025-02-09 PROCEDURE — 85025 COMPLETE CBC W/AUTO DIFF WBC: CPT

## 2025-02-09 PROCEDURE — 94761 N-INVAS EAR/PLS OXIMETRY MLT: CPT

## 2025-02-09 PROCEDURE — 6360000002 HC RX W HCPCS: Performed by: NURSE PRACTITIONER

## 2025-02-09 PROCEDURE — 36415 COLL VENOUS BLD VENIPUNCTURE: CPT

## 2025-02-09 PROCEDURE — 94640 AIRWAY INHALATION TREATMENT: CPT

## 2025-02-09 PROCEDURE — 80053 COMPREHEN METABOLIC PANEL: CPT

## 2025-02-09 PROCEDURE — 84484 ASSAY OF TROPONIN QUANT: CPT

## 2025-02-09 PROCEDURE — APPNB15 APP NON BILLABLE TIME 0-15 MINS

## 2025-02-09 RX ORDER — INSULIN LISPRO 100 [IU]/ML
0.08 INJECTION, SOLUTION INTRAVENOUS; SUBCUTANEOUS
Status: DISCONTINUED | OUTPATIENT
Start: 2025-02-09 | End: 2025-02-11 | Stop reason: HOSPADM

## 2025-02-09 RX ORDER — INSULIN GLARGINE 100 [IU]/ML
15 INJECTION, SOLUTION SUBCUTANEOUS NIGHTLY
Status: DISCONTINUED | OUTPATIENT
Start: 2025-02-09 | End: 2025-02-11 | Stop reason: HOSPADM

## 2025-02-09 RX ORDER — INSULIN LISPRO 100 [IU]/ML
0-8 INJECTION, SOLUTION INTRAVENOUS; SUBCUTANEOUS
Status: DISCONTINUED | OUTPATIENT
Start: 2025-02-09 | End: 2025-02-11 | Stop reason: HOSPADM

## 2025-02-09 RX ORDER — INSULIN GLARGINE 100 [IU]/ML
5 INJECTION, SOLUTION SUBCUTANEOUS NIGHTLY
Status: DISCONTINUED | OUTPATIENT
Start: 2025-02-09 | End: 2025-02-09

## 2025-02-09 RX ORDER — PREDNISONE 20 MG/1
40 TABLET ORAL DAILY
Status: DISCONTINUED | OUTPATIENT
Start: 2025-02-10 | End: 2025-02-11 | Stop reason: HOSPADM

## 2025-02-09 RX ADMIN — WATER 40 MG: 1 INJECTION INTRAMUSCULAR; INTRAVENOUS; SUBCUTANEOUS at 03:43

## 2025-02-09 RX ADMIN — HYDRALAZINE HYDROCHLORIDE 12.5 MG: 25 TABLET ORAL at 08:37

## 2025-02-09 RX ADMIN — WATER 40 MG: 1 INJECTION INTRAMUSCULAR; INTRAVENOUS; SUBCUTANEOUS at 09:40

## 2025-02-09 RX ADMIN — INSULIN LISPRO 7 UNITS: 100 INJECTION, SOLUTION INTRAVENOUS; SUBCUTANEOUS at 18:05

## 2025-02-09 RX ADMIN — IPRATROPIUM BROMIDE AND ALBUTEROL SULFATE 1 DOSE: 2.5; .5 SOLUTION RESPIRATORY (INHALATION) at 15:04

## 2025-02-09 RX ADMIN — INSULIN LISPRO 2 UNITS: 100 INJECTION, SOLUTION INTRAVENOUS; SUBCUTANEOUS at 13:01

## 2025-02-09 RX ADMIN — IPRATROPIUM BROMIDE AND ALBUTEROL SULFATE 1 DOSE: 2.5; .5 SOLUTION RESPIRATORY (INHALATION) at 07:11

## 2025-02-09 RX ADMIN — GUAIFENESIN 600 MG: 600 TABLET ORAL at 20:57

## 2025-02-09 RX ADMIN — ATORVASTATIN CALCIUM 80 MG: 40 TABLET, FILM COATED ORAL at 08:37

## 2025-02-09 RX ADMIN — ENOXAPARIN SODIUM 40 MG: 100 INJECTION SUBCUTANEOUS at 08:38

## 2025-02-09 RX ADMIN — SERTRALINE HYDROCHLORIDE 100 MG: 50 TABLET ORAL at 08:38

## 2025-02-09 RX ADMIN — CARVEDILOL 12.5 MG: 6.25 TABLET, FILM COATED ORAL at 20:57

## 2025-02-09 RX ADMIN — INSULIN LISPRO 4 UNITS: 100 INJECTION, SOLUTION INTRAVENOUS; SUBCUTANEOUS at 18:05

## 2025-02-09 RX ADMIN — EMPAGLIFLOZIN 25 MG: 10 TABLET, FILM COATED ORAL at 08:37

## 2025-02-09 RX ADMIN — EZETIMIBE 10 MG: 10 TABLET ORAL at 08:37

## 2025-02-09 RX ADMIN — SODIUM CHLORIDE, PRESERVATIVE FREE 10 ML: 5 INJECTION INTRAVENOUS at 20:57

## 2025-02-09 RX ADMIN — LISINOPRIL 2.5 MG: 5 TABLET ORAL at 08:38

## 2025-02-09 RX ADMIN — SODIUM CHLORIDE, PRESERVATIVE FREE 10 ML: 5 INJECTION INTRAVENOUS at 08:42

## 2025-02-09 RX ADMIN — CLOPIDOGREL BISULFATE 75 MG: 75 TABLET ORAL at 08:38

## 2025-02-09 RX ADMIN — HYDRALAZINE HYDROCHLORIDE 12.5 MG: 25 TABLET ORAL at 20:57

## 2025-02-09 RX ADMIN — INSULIN LISPRO 1 UNITS: 100 INJECTION, SOLUTION INTRAVENOUS; SUBCUTANEOUS at 08:36

## 2025-02-09 RX ADMIN — GUAIFENESIN 600 MG: 600 TABLET ORAL at 08:38

## 2025-02-09 RX ADMIN — INSULIN GLARGINE 15 UNITS: 100 INJECTION, SOLUTION SUBCUTANEOUS at 21:15

## 2025-02-09 RX ADMIN — HYDRALAZINE HYDROCHLORIDE 12.5 MG: 25 TABLET ORAL at 15:29

## 2025-02-09 RX ADMIN — CARVEDILOL 12.5 MG: 6.25 TABLET, FILM COATED ORAL at 08:37

## 2025-02-09 RX ADMIN — INSULIN LISPRO 8 UNITS: 100 INJECTION, SOLUTION INTRAVENOUS; SUBCUTANEOUS at 21:15

## 2025-02-09 RX ADMIN — FUROSEMIDE 20 MG: 10 INJECTION, SOLUTION INTRAMUSCULAR; INTRAVENOUS at 18:24

## 2025-02-09 RX ADMIN — FUROSEMIDE 20 MG: 10 INJECTION, SOLUTION INTRAMUSCULAR; INTRAVENOUS at 09:40

## 2025-02-09 RX ADMIN — IPRATROPIUM BROMIDE AND ALBUTEROL SULFATE 1 DOSE: 2.5; .5 SOLUTION RESPIRATORY (INHALATION) at 20:07

## 2025-02-10 LAB
ALBUMIN SERPL-MCNC: 3.4 G/DL (ref 3.4–5)
ALBUMIN/GLOB SERPL: 1.7 {RATIO} (ref 1.1–2.2)
ALP SERPL-CCNC: 66 U/L (ref 40–129)
ALT SERPL-CCNC: 16 U/L (ref 10–40)
ANION GAP SERPL CALCULATED.3IONS-SCNC: 12 MMOL/L (ref 9–17)
AST SERPL-CCNC: 13 U/L (ref 15–37)
BASOPHILS # BLD: 0.02 K/UL
BASOPHILS NFR BLD: 0 % (ref 0–1)
BILIRUB SERPL-MCNC: 0.3 MG/DL (ref 0–1)
BUN SERPL-MCNC: 54 MG/DL (ref 7–20)
CALCIUM SERPL-MCNC: 8.4 MG/DL (ref 8.3–10.6)
CHLORIDE SERPL-SCNC: 105 MMOL/L (ref 99–110)
CO2 SERPL-SCNC: 23 MMOL/L (ref 21–32)
CREAT SERPL-MCNC: 2.4 MG/DL (ref 0.8–1.3)
EOSINOPHIL # BLD: 0 K/UL
EOSINOPHILS RELATIVE PERCENT: 0 % (ref 0–3)
ERYTHROCYTE [DISTWIDTH] IN BLOOD BY AUTOMATED COUNT: 14.8 % (ref 11.7–14.9)
GFR, ESTIMATED: 28 ML/MIN/1.73M2
GLUCOSE BLD-MCNC: 137 MG/DL (ref 74–99)
GLUCOSE BLD-MCNC: 186 MG/DL (ref 74–99)
GLUCOSE BLD-MCNC: 242 MG/DL (ref 74–99)
GLUCOSE BLD-MCNC: 307 MG/DL (ref 74–99)
GLUCOSE SERPL-MCNC: 144 MG/DL (ref 74–99)
HCT VFR BLD AUTO: 40.2 % (ref 42–52)
HGB BLD-MCNC: 13 G/DL (ref 13.5–18)
IMM GRANULOCYTES # BLD AUTO: 0.11 K/UL
IMM GRANULOCYTES NFR BLD: 1 %
LYMPHOCYTES NFR BLD: 1.01 K/UL
LYMPHOCYTES RELATIVE PERCENT: 7 % (ref 24–44)
MCH RBC QN AUTO: 31.9 PG (ref 27–31)
MCHC RBC AUTO-ENTMCNC: 32.3 G/DL (ref 32–36)
MCV RBC AUTO: 98.8 FL (ref 78–100)
MONOCYTES NFR BLD: 0.98 K/UL
MONOCYTES NFR BLD: 7 % (ref 0–4)
NEUTROPHILS NFR BLD: 86 % (ref 36–66)
NEUTS SEG NFR BLD: 12.77 K/UL
PLATELET # BLD AUTO: 178 K/UL (ref 140–440)
PMV BLD AUTO: 12.1 FL (ref 7.5–11.1)
POTASSIUM SERPL-SCNC: 3.7 MMOL/L (ref 3.5–5.1)
PROT SERPL-MCNC: 5.3 G/DL (ref 6.4–8.2)
RBC # BLD AUTO: 4.07 M/UL (ref 4.6–6.2)
SODIUM SERPL-SCNC: 140 MMOL/L (ref 136–145)
WBC OTHER # BLD: 14.9 K/UL (ref 4–10.5)

## 2025-02-10 PROCEDURE — 1200000000 HC SEMI PRIVATE

## 2025-02-10 PROCEDURE — 6370000000 HC RX 637 (ALT 250 FOR IP): Performed by: NURSE PRACTITIONER

## 2025-02-10 PROCEDURE — 2500000003 HC RX 250 WO HCPCS: Performed by: NURSE PRACTITIONER

## 2025-02-10 PROCEDURE — 94761 N-INVAS EAR/PLS OXIMETRY MLT: CPT

## 2025-02-10 PROCEDURE — 36415 COLL VENOUS BLD VENIPUNCTURE: CPT

## 2025-02-10 PROCEDURE — 94640 AIRWAY INHALATION TREATMENT: CPT

## 2025-02-10 PROCEDURE — 6360000002 HC RX W HCPCS: Performed by: NURSE PRACTITIONER

## 2025-02-10 PROCEDURE — APPNB15 APP NON BILLABLE TIME 0-15 MINS

## 2025-02-10 PROCEDURE — 80053 COMPREHEN METABOLIC PANEL: CPT

## 2025-02-10 PROCEDURE — 85025 COMPLETE CBC W/AUTO DIFF WBC: CPT

## 2025-02-10 PROCEDURE — 82962 GLUCOSE BLOOD TEST: CPT

## 2025-02-10 RX ORDER — FUROSEMIDE 20 MG/1
20 TABLET ORAL 2 TIMES DAILY
Status: DISCONTINUED | OUTPATIENT
Start: 2025-02-10 | End: 2025-02-10

## 2025-02-10 RX ORDER — FUROSEMIDE 20 MG/1
20 TABLET ORAL DAILY
Status: DISCONTINUED | OUTPATIENT
Start: 2025-02-11 | End: 2025-02-11 | Stop reason: HOSPADM

## 2025-02-10 RX ADMIN — CARVEDILOL 12.5 MG: 6.25 TABLET, FILM COATED ORAL at 08:28

## 2025-02-10 RX ADMIN — INSULIN LISPRO 7 UNITS: 100 INJECTION, SOLUTION INTRAVENOUS; SUBCUTANEOUS at 12:29

## 2025-02-10 RX ADMIN — FUROSEMIDE 20 MG: 20 TABLET ORAL at 08:27

## 2025-02-10 RX ADMIN — IPRATROPIUM BROMIDE AND ALBUTEROL SULFATE 1 DOSE: 2.5; .5 SOLUTION RESPIRATORY (INHALATION) at 19:35

## 2025-02-10 RX ADMIN — SODIUM CHLORIDE, PRESERVATIVE FREE 10 ML: 5 INJECTION INTRAVENOUS at 21:39

## 2025-02-10 RX ADMIN — GUAIFENESIN 600 MG: 600 TABLET ORAL at 08:29

## 2025-02-10 RX ADMIN — INSULIN LISPRO 7 UNITS: 100 INJECTION, SOLUTION INTRAVENOUS; SUBCUTANEOUS at 08:27

## 2025-02-10 RX ADMIN — INSULIN LISPRO 2 UNITS: 100 INJECTION, SOLUTION INTRAVENOUS; SUBCUTANEOUS at 12:29

## 2025-02-10 RX ADMIN — HYDRALAZINE HYDROCHLORIDE 12.5 MG: 25 TABLET ORAL at 13:42

## 2025-02-10 RX ADMIN — SODIUM CHLORIDE, PRESERVATIVE FREE 10 ML: 5 INJECTION INTRAVENOUS at 08:26

## 2025-02-10 RX ADMIN — EZETIMIBE 10 MG: 10 TABLET ORAL at 08:28

## 2025-02-10 RX ADMIN — CARVEDILOL 12.5 MG: 6.25 TABLET, FILM COATED ORAL at 21:23

## 2025-02-10 RX ADMIN — LISINOPRIL 2.5 MG: 5 TABLET ORAL at 08:28

## 2025-02-10 RX ADMIN — ENOXAPARIN SODIUM 40 MG: 100 INJECTION SUBCUTANEOUS at 08:27

## 2025-02-10 RX ADMIN — ATORVASTATIN CALCIUM 80 MG: 40 TABLET, FILM COATED ORAL at 08:28

## 2025-02-10 RX ADMIN — INSULIN GLARGINE 15 UNITS: 100 INJECTION, SOLUTION SUBCUTANEOUS at 21:21

## 2025-02-10 RX ADMIN — IPRATROPIUM BROMIDE AND ALBUTEROL SULFATE 1 DOSE: 2.5; .5 SOLUTION RESPIRATORY (INHALATION) at 15:07

## 2025-02-10 RX ADMIN — IPRATROPIUM BROMIDE AND ALBUTEROL SULFATE 1 DOSE: 2.5; .5 SOLUTION RESPIRATORY (INHALATION) at 10:59

## 2025-02-10 RX ADMIN — INSULIN LISPRO 6 UNITS: 100 INJECTION, SOLUTION INTRAVENOUS; SUBCUTANEOUS at 21:21

## 2025-02-10 RX ADMIN — INSULIN LISPRO 2 UNITS: 100 INJECTION, SOLUTION INTRAVENOUS; SUBCUTANEOUS at 08:26

## 2025-02-10 RX ADMIN — CLOPIDOGREL BISULFATE 75 MG: 75 TABLET ORAL at 08:28

## 2025-02-10 RX ADMIN — HYDRALAZINE HYDROCHLORIDE 12.5 MG: 25 TABLET ORAL at 21:21

## 2025-02-10 RX ADMIN — GUAIFENESIN 600 MG: 600 TABLET ORAL at 21:21

## 2025-02-10 RX ADMIN — SERTRALINE HYDROCHLORIDE 100 MG: 50 TABLET ORAL at 08:28

## 2025-02-10 RX ADMIN — IPRATROPIUM BROMIDE AND ALBUTEROL SULFATE 1 DOSE: 2.5; .5 SOLUTION RESPIRATORY (INHALATION) at 08:34

## 2025-02-10 RX ADMIN — HYDRALAZINE HYDROCHLORIDE 12.5 MG: 25 TABLET ORAL at 08:28

## 2025-02-10 RX ADMIN — PREDNISONE 40 MG: 20 TABLET ORAL at 08:27

## 2025-02-10 RX ADMIN — INSULIN LISPRO 7 UNITS: 100 INJECTION, SOLUTION INTRAVENOUS; SUBCUTANEOUS at 18:23

## 2025-02-10 ASSESSMENT — PAIN SCALES - GENERAL
PAINLEVEL_OUTOF10: 0
PAINLEVEL_OUTOF10: 0

## 2025-02-10 NOTE — DISCHARGE INSTRUCTIONS
Frank, MD Pricilla Hair, COLE TrinhC    Ce Selby, APRN CNP  [address] [place] [address]  [phone]  [employer]Wen[facility] CenterSendtoNews  (does not take insurance/ monthly membership fee for service)     [employer] [facility].  [address] [place] [address]   [phone]    [facility]  [address] / [address], [place] [address]  [phone]  Jennifer Abdul, DO Tejal Carlisle, DO April Abdul MD  [address], [address], [place] [phone]  Chana Berg, KAREN Chun, APRN-CNP  Faisal Swan, APRN-CNP    [address] [facility], [place] [address]  [phone]  COLE NesbittC

## 2025-02-10 NOTE — CARE COORDINATION
02/10/25 1547   Service Assessment   Patient Orientation Alert and Oriented   Cognition Alert   History Provided By Patient;Medical Record   Support Systems Spouse/Significant Other   PCP Verified by CM No  (Placed PCP list in AVS.)   Prior Functional Level Independent in ADLs/IADLs   Current Functional Level Independent in ADLs/IADLs   Can patient return to prior living arrangement Yes   Ability to make needs known: Good   Family able to assist with home care needs: Yes   Financial Resources Medicare  (BCBS Medicare)   Social/Functional History   Lives With Spouse   Type of Home House   Home Layout One level   Home Equipment None   Active  Yes  (wife does not drive, but wife's son able to assist with Transportation.)   Condition of Participation: Discharge Planning   The Patient and/or Patient Representative was provided with a Choice of Provider? Patient   The Patient and/Or Patient Representative agree with the Discharge Plan? Yes     Reviewed chart, discussed in IDR and spoke with pt about discharge needs/plans.  He plans on return home with wife, denied need for any DME or Home care.  Gave him [facility] info for help with transportation if needed in the future.  Plan is home with no needs.  CM available as needed.

## 2025-02-11 VITALS
SYSTOLIC BLOOD PRESSURE: 136 MMHG | OXYGEN SATURATION: 96 % | HEIGHT: 67 IN | WEIGHT: 197.3 LBS | TEMPERATURE: 97.7 F | RESPIRATION RATE: 16 BRPM | DIASTOLIC BLOOD PRESSURE: 85 MMHG | HEART RATE: 68 BPM | BODY MASS INDEX: 30.97 KG/M2

## 2025-02-11 LAB
ALBUMIN SERPL-MCNC: 3.2 G/DL (ref 3.4–5)
ALBUMIN/GLOB SERPL: 1.5 {RATIO} (ref 1.1–2.2)
ALP SERPL-CCNC: 73 U/L (ref 40–129)
ALT SERPL-CCNC: 14 U/L (ref 10–40)
ANION GAP SERPL CALCULATED.3IONS-SCNC: 11 MMOL/L (ref 9–17)
AST SERPL-CCNC: 13 U/L (ref 15–37)
BASOPHILS # BLD: 0.03 K/UL
BASOPHILS NFR BLD: 0 % (ref 0–1)
BILIRUB SERPL-MCNC: 0.3 MG/DL (ref 0–1)
BUN SERPL-MCNC: 52 MG/DL (ref 7–20)
CALCIUM SERPL-MCNC: 8.6 MG/DL (ref 8.3–10.6)
CHLORIDE SERPL-SCNC: 107 MMOL/L (ref 99–110)
CO2 SERPL-SCNC: 21 MMOL/L (ref 21–32)
CREAT SERPL-MCNC: 2.1 MG/DL (ref 0.8–1.3)
EOSINOPHIL # BLD: 0 K/UL
EOSINOPHILS RELATIVE PERCENT: 0 % (ref 0–3)
ERYTHROCYTE [DISTWIDTH] IN BLOOD BY AUTOMATED COUNT: 15.2 % (ref 11.7–14.9)
GFR, ESTIMATED: 31 ML/MIN/1.73M2
GLUCOSE BLD-MCNC: 152 MG/DL (ref 74–99)
GLUCOSE BLD-MCNC: 156 MG/DL (ref 74–99)
GLUCOSE SERPL-MCNC: 217 MG/DL (ref 74–99)
HCT VFR BLD AUTO: 40.3 % (ref 42–52)
HGB BLD-MCNC: 12.9 G/DL (ref 13.5–18)
IMM GRANULOCYTES # BLD AUTO: 0.09 K/UL
IMM GRANULOCYTES NFR BLD: 1 %
LYMPHOCYTES NFR BLD: 1.38 K/UL
LYMPHOCYTES RELATIVE PERCENT: 11 % (ref 24–44)
MCH RBC QN AUTO: 32.1 PG (ref 27–31)
MCHC RBC AUTO-ENTMCNC: 32 G/DL (ref 32–36)
MCV RBC AUTO: 100.2 FL (ref 78–100)
MONOCYTES NFR BLD: 0.75 K/UL
MONOCYTES NFR BLD: 6 % (ref 0–4)
NEUTROPHILS NFR BLD: 82 % (ref 36–66)
NEUTS SEG NFR BLD: 10.18 K/UL
PLATELET # BLD AUTO: 161 K/UL (ref 140–440)
PMV BLD AUTO: 11.9 FL (ref 7.5–11.1)
POTASSIUM SERPL-SCNC: 3.8 MMOL/L (ref 3.5–5.1)
PROT SERPL-MCNC: 5.3 G/DL (ref 6.4–8.2)
RBC # BLD AUTO: 4.02 M/UL (ref 4.6–6.2)
SODIUM SERPL-SCNC: 139 MMOL/L (ref 136–145)
WBC OTHER # BLD: 12.4 K/UL (ref 4–10.5)

## 2025-02-11 PROCEDURE — 36415 COLL VENOUS BLD VENIPUNCTURE: CPT

## 2025-02-11 PROCEDURE — 94640 AIRWAY INHALATION TREATMENT: CPT

## 2025-02-11 PROCEDURE — 85025 COMPLETE CBC W/AUTO DIFF WBC: CPT

## 2025-02-11 PROCEDURE — 6370000000 HC RX 637 (ALT 250 FOR IP): Performed by: NURSE PRACTITIONER

## 2025-02-11 PROCEDURE — 94761 N-INVAS EAR/PLS OXIMETRY MLT: CPT

## 2025-02-11 PROCEDURE — 80053 COMPREHEN METABOLIC PANEL: CPT

## 2025-02-11 PROCEDURE — 82962 GLUCOSE BLOOD TEST: CPT

## 2025-02-11 RX ORDER — PREDNISONE 20 MG/1
40 TABLET ORAL DAILY
Qty: 6 TABLET | Refills: 0 | Status: SHIPPED | OUTPATIENT
Start: 2025-02-11 | End: 2025-02-14

## 2025-02-11 RX ORDER — FUROSEMIDE 20 MG/1
20 TABLET ORAL DAILY
Qty: 60 TABLET | Refills: 3 | Status: SHIPPED | OUTPATIENT
Start: 2025-02-11

## 2025-02-11 RX ADMIN — IPRATROPIUM BROMIDE AND ALBUTEROL SULFATE 1 DOSE: 2.5; .5 SOLUTION RESPIRATORY (INHALATION) at 08:18

## 2025-02-11 RX ADMIN — IPRATROPIUM BROMIDE AND ALBUTEROL SULFATE 1 DOSE: 2.5; .5 SOLUTION RESPIRATORY (INHALATION) at 11:09

## 2025-02-11 NOTE — CONSULTS
0630 Peña catheter removed at this time, patient tolerated peña removal well without any complaints. Patient understands to report to nurse 6hours from this time if she is having any difficulty with voiding.
Nephrology Service Consultation      [address] [facility], [address]  [place] [address]  Phone: [phone]  Office Hours: 8:30AM - 4:30PM  Monday - Friday        MEDICAL DECISION MAKING and Recommendations     -KARUNA from cardiorenal etiology: creat peaked at 2.4  -CKD3: BASELINE CREAt 1.5-1.7  -HTN  -Acute on chronic systolic CHF  -Hypervolemia    Suggest:  -Cr better at 2.1  -On CHF meds, monitor renal fct  -Avoid nephrotoxins as possible  -Ok to dc per renal stdpt since renal fct has improved    Thank you      Patient Active Problem List    Diagnosis Date Noted    Former tobacco use 11/10/2022    Positive for macroalbuminuria 09/09/2022    Hx of colonic polyps 09/08/2022    Hemiparesis affecting right side as late effect of cerebrovascular accident (CVA) ([facility]) 07/07/2016    Essential hypertension 05/24/2016    Cerebrovascular accident (CVA) due to thrombosis of left middle cerebral artery ([facility]) 05/14/2016    Obesity (BMI 30-39.9) 06/26/2015    Controlled type 2 diabetes mellitus with diabetic nephropathy, without long-term current use of insulin ([facility])     History of acute inferior wall myocardial infarction     HFrEF (heart failure with reduced ejection fraction) ([facility]) 03/17/2023    Shortness of breath 03/01/2023    Orthopnea 03/01/2023    Ventral hernia without obstruction or gangrene 11/10/2022    History of Guillain-Albin syndrome 03/08/2019    Adrenal adenoma 05/23/2014    ASCVD (arteriosclerotic cardiovascular disease)     COPD with acute exacerbation ([facility]) 02/08/2025    COPD exacerbation ([facility]) 10/24/2024    CAD (coronary artery disease) 04/01/2024    VHD (valvular heart disease) 04/01/2024    Secondary hyperparathyroidism of renal origin ([facility]) 03/19/2024    Secondary hypercoagulable state ([facility]) 03/07/2024    History of stroke 03/07/2024    KARUNA (acute kidney injury) ([facility]) 02/06/2024    Persistent proteinuria 02/06/2024    Chronic a-fib ([facility]) 01/23/2024    Omental infarction ([facility]) 01/08/2024    
Basophils % 1 0 - 1 %    Immature Granulocytes % 1 (H) 0 %    Neutrophils Absolute 6.06 k/uL    Lymphocytes Absolute 1.56 k/uL    Monocytes Absolute 0.58 k/uL    Eosinophils Absolute 0.17 k/uL    Basophils Absolute 0.04 k/uL    Immature Granulocytes Absolute 0.04 k/uL   Comprehensive Metabolic Panel    Collection Time: 02/08/25  7:01 AM   Result Value Ref Range    Sodium 143 136 - 145 mmol/L    Potassium 4.4 3.5 - 5.1 mmol/L    Chloride 107 99 - 110 mmol/L    CO2 25 21 - 32 mmol/L    Anion Gap 11 9 - 17 mmol/L    Glucose 158 (H) 74 - 99 mg/dL    BUN 38 (H) 7 - 20 mg/dL    Creatinine 1.9 (H) 0.8 - 1.3 mg/dL    Est, Glom Filt Rate 36 (L) >60 mL/min/1.73m2    Calcium 8.9 8.3 - 10.6 mg/dL    Total Protein 5.9 (L) 6.4 - 8.2 g/dL    Albumin 3.9 3.4 - 5.0 g/dL    Albumin/Globulin Ratio 1.9 1.1 - 2.2    Total Bilirubin 0.6 0.0 - 1.0 mg/dL    Alkaline Phosphatase 71 40 - 129 U/L    ALT 27 10 - 40 U/L    AST 24 15 - 37 U/L   Brain Natriuretic Peptide    Collection Time: 02/08/25  7:01 AM   Result Value Ref Range    NT Pro-BNP 14,531 (H) 0 - 125 pg/mL   Troponin    Collection Time: 02/08/25  7:01 AM   Result Value Ref Range    Troponin, High Sensitivity 77 (HH) 0 - 22 ng/L   Blood Gas, Venous    Collection Time: 02/08/25  7:01 AM   Result Value Ref Range    pH, Ashwin 7.336 7.320 - 7.430    pCO2, Ashwin 53.3 38 - 54 mm Hg    PO2, Ashwin 22.7 (L) 23 - 48 mm Hg    HCO3, Venous 27.9 22 - 29 mmol/L    Positive Base Excess, Ashwin 1.0 0 - 3 mmol/L    Oxyhemoglobin 29.6 %    Carboxyhemoglobin 0.8 0.5 - 1.5 %    Methemoglobin 0.5 0.5 - 1.5 %    Pt Temp 37.0     O2 Device/Flow/% ROOM AIR     Terry Test NOT APPLICABLE    Troponin    Collection Time: 02/08/25  7:57 AM   Result Value Ref Range    Troponin, High Sensitivity 80 (HH) 0 - 22 ng/L   COVID-19, Rapid    Collection Time: 02/08/25  7:57 AM    Specimen: Nasopharyngeal Swab   Result Value Ref Range    Specimen Description .NASOPHARYNGEAL SWAB     SARS-CoV-2, Rapid Not Detected Not Detected

## 2025-02-11 NOTE — PROGRESS NOTES
[place] [address]  Phone: [phone]    Fax [phone]                  Tawana Andrade MD, [facility]       Danish Presley MD, [facility]  Fernandez Payne MD, [facility]    MD Sarahi Arriaza MD Tariq Rizvi, MD Bilal Alam, MD Dr. Waseem Sajjad MD Melissa Kellis, APREULALIA Cleary, APREULALIA Hinojosa, APREULALIA Camara, APRN  COLE [facility]root[facility]    CARDIOLOGY  NOTE      Name:  Hilaria Rodriguez /Age/Sex: 1960  (64 y.o. male)   MRN & CSN:  [id number] & [id number] Admission Date/Time: 2025  6:38 AM   Location:  [address] [place] [address]-A PCP: No primary care provider on file.       Hospital Day: 2    - Cardiology consult is for:  CHF      ASSESSMENT/ PLAN:  Acute on chronic heart failure with reduced ejection fraction  Ischemic cardiomyopathy s/p ICD  S/p mitral clip with moderate mitral regurgitation  Tricuspid regurgitation  -Volume overload improving.  -Strict I's and O's and daily weights  -CHF nurse consult.  -Most recent echocardiogram showing EF 20-25% with wall motion abnormalities.  Dilated RV noted.  Biatrial enlargement.  Moderate MR noted s/p MitraClip  -GDMT: Coreg 12.5 mg twice daily, Jardiance 25 mg daily,lisinopril 2.5 mg daily.  Also on hydralazine 12.5 mg 3 times daily  -IV Lasix 20 mg twice daily.  Consider switching to oral tomorrow  Coronary artery disease s/p CABG  Severe peripheral arterial disease s/p right to left femoral bypass  -No claudication or chest pain at this time.  -Continue conservative measures.  -Plavix, Zetia 10 mg daily, Lipitor 80 mg daily  Chronic kidney disease  Type 2 diabetes mellitus          Subjective:  Hilaria is a 64 y.o.year old     Patient has been noncompliant with lifestyle modifications.  Does not check his weight on a daily basis.  Unclear how much weight he has gained.    Reemphasized importance of these lifestyle modifications in congestive 
                                             [place] [address]  Phone: [phone]    Fax [phone]                  Tawana Andrade MD, [facility]       Danish Presley MD, [facility]  Fernandez Payne MD, [facility]    MD Sarahi Arriaza MD Tariq Rizvi, MD Bilal Alam, MD Dr. Waseem Sajjad MD Melissa Kellis, APRN      Vilma Cleary, APREULALIA Hinojosa, APREULALIA Camara, APRN  Donal Coates PA-C    CARDIOLOGY  NOTE      Name:  Hilaria Rodriguez /Age/Sex: 1960  (64 y.o. male)   MRN & CSN:  [id number] & [id number] Admission Date/Time: 2025  6:38 AM   Location:  [address] [place] [address]-A PCP: No primary care provider on file.       Hospital Day: 3    - Cardiology consult is for:  CHF      ASSESSMENT/ PLAN:  Acute on chronic heart failure with reduced ejection fraction  Ischemic cardiomyopathy s/p ICD  S/p mitral clip with moderate mitral regurgitation  Tricuspid regurgitation  -Volume overload improving.  -Strict I's and O's and daily weights  -CHF nurse consult.  -Most recent echocardiogram showing EF 20-25% with wall motion abnormalities.  Dilated RV noted.  Biatrial enlargement.  Moderate MR noted s/p MitraClip  -GDMT: Coreg 12.5 mg twice daily, Jardiance 25 mg daily,lisinopril 2.5 mg daily.  Also on hydralazine 12.5 mg 3 times daily  -Switch to oral Lasix 20 mg daily.  Coronary artery disease s/p CABG  Severe peripheral arterial disease s/p right to left femoral bypass  -No claudication or chest pain at this time.  -Continue conservative measures.  -Plavix, Zetia 10 mg daily, Lipitor 80 mg daily  Chronic kidney disease  Type 2 diabetes mellitus      Cardiology will sign off, please call with any questions.  Patient to follow-up in clinic     Subjective:  Hilaria is a 64 y.o.year old     Shortness of breath and lower extremity edema improved.    Discussed care with primary team  Objective: Temperature:  Current - Temp: 97.5 °F (36.4 °C); Max - 
    I did NOT see the patient. The patient was discussed with the TODD. I was available for questions and consultation as needed.       Increased Cr. Nephro consult.   
    V2.0    [facility] Progress Note      Name:  Hilaria Rodriguez /Age/Sex: 1960  (64 y.o. male)   MRN & CSN:  [id number] & [id number] Encounter Date/Time: 2/10/2025 2:15 PM EST   [facility]:  [facility]-A PCP: No primary care provider on file.     Attending:Belia Abdul MD       Hospital Day: 3    Assessment and Recommendations   Hilaria Rodriguez is a 64 y.o. male who  presents with COPD with acute exacerbation (HCC)    Acute on chronic HFeEF  Ischemic cardiomyopathy s/p ICD   -CAD s/p CABG  -BNP 14,531 on presentation  -Last known EF 20-25 % on echocardiogram of 24.   -CXR showed mild congestion. Small bilateral pleural effusions and bibasilar atelectasis.  -Continue IV Lasix  -Continue GDMT Jardiance, BB, and ACE  -Daily weights  -Strict intake and output output overnight -2500ml  -Change to oral lasix  -Monitor electrolytes,  -Cardiology following   -CHF nurse following   -Dispo: Hopefully home tomorrow if sCr improved/stable       COPD exacerbation  -Stable on room air  -CXR as above-  -VRP negative  -Continue bronchodilators/ICS  -No leukocytosis, lactic acidosis, HDS and will hold antibiotics  -Steroids:   -Complete prednisone course.    Chronic Kidney disease, stage 3    - sCr currently 2.4 and up trending likely r/t IV diuresis   - Baseline 1.6-1.9  - I&Os, weights  - Recheck SCr in AM and monitor trends with permissive diuresis  - Follows with Dr Barnes and consulted for recommendations      DMII with chronic complications and without long term use of insulin.               -Continue glargine.   -Monitor for hypoglycemia due to sliding scale insulin with serial Accu-Cheks.  -Hypoglycemic protocol  -Hold PO medications while inpatient  -Last A1c 6.3 (3/2024)     HLD  -On Zetia and Lipitor     HTN  -Continue Lisinopril and Hydralazine        Disposition:   Current Living situation: Home  Expected Disposition: Home  Estimated D/C: 2-3 days     Diet Carb controlled   DVT Prophylaxis [x] Lovenox, []  Heparin, 
Met with patient and introduced myself as the Heart failure education R.N.   Patient very quiet during education today. 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      Admitting diagnosis- COPD exac  Cardiologist- Raymond  Heart Failure Education Nurse consulted- yes   Ejection fraction 20-25% as of 11/13/2024  Pro BNP- 14,531 on 2/8  Hospital follow up appt- 2/20 with Brenda and 3/3 [facility] appt  Patient informed of appointment and appointment added to AVS  Cardiac rehabilitation referral- discussed with Patient. He voiced interest.    ICD information- has ICD   Patient informed of Heart Failure Clinic at the [facility]- he is [facility] pt   Smoking Cessation information and referral-  Pneumonia vaccine- Guillain BArre contraindicated  PCP-Miguel  Patient has a digital scale? Has scale - will begin weighing self daily again  Transportation- has transportation     Able to obtain medications without difficulty?- Yes- Patient denies difficulty in obtaining or taking medications. Advised not to stop taking a medication without notifying provider.    Heart failure specific Medications-  Lisinopril,  Jardiance, coreg,  Lasix.    Reviewed Heart failure patient education book with patient. Heart failure education included: Type of heart failure, How it is diagnosed, causes, symptoms, medications, diet, daily weights, exercise and fluid control.   Questions answered.      Patient's heart failure medications reviewed and information given on each.     Reviewed the Stop Light Handout with patient and instructed when to call his provider.   Patient was engaged and attentive during education session.    The following handouts were reviewed with patient and patient was given a copy of the following : Heart Failure education booklet, the 'Stop Light' Handout,  a link to the American [employer] Association's Healthier Living with Heart Failure Interactive workbook and a list of heart 
Outpatient Pharmacy Progress Note for Meds-to-Beds    Total number of Prescriptions Filled: 2    Additional Documentation:  Medication(s) delivered to the patient's room prior to discharge by a volunteer      Thank you for letting us serve your patients.  [address] [address]    Phone: [phone]    Fax: [phone]        
LEUKOCYTESUR NEGATIVE 04/25/2024 12:07 PM    UROBILINOGEN 0.2 04/25/2024 12:07 PM    BILIRUBINUR NEGATIVE 04/25/2024 12:07 PM    BLOODU TRACE 04/25/2024 12:07 PM    GLUCOSEU 250 04/25/2024 12:07 PM    KETUA NEGATIVE 04/25/2024 12:07 PM     Urine Cultures: No results found for: \"LABURIN\"  Blood Cultures: No results found for: \"BC\"  No results found for: \"BLOODCULT2\"  Organism: No results found for: \"ORG\"      Electronically signed by JERED Camarillo CNP on 2/9/2025 at 2:15 PM

## 2025-02-12 NOTE — DISCHARGE SUMMARY
V2.0  Discharge Summary    Name:  Hilaria Rodriguez /Age/Sex: 1960 (64 y.o. male)   Admit Date: 2025  Discharge Date: 25    MRN & CSN:  [id number] & [id number] Encounter Date and Time 25 6:47 PM EST    Attending:  No att. providers found Discharging Provider: Sky Fan MD       Hospital Course:     Brief HPI: Hilaria Rodriguez is a 64 y.o. male  with pmh of COPD, DMII, CHF, HTN, and HLD who presents with shortness of breath associated with cough and congestion.  The patient states she was here on Monday for COPD exacerbation and was sent home on Abx and steroids. He states he is not getting better. Patient came to ED because he could not breath. Preliminary labs concerning for CHF. COPD not ruled out. Patient denies fever, chills, hemoptysis, chest pain, abdominal pain, N/V/D/C, or urinary symptoms.     Brief Problem Based Course:     Acute on chronic HFeEF  Ischemic cardiomyopathy s/p ICD   -CAD s/p CABG  -BNP 14,531 on presentation  -Last known EF 20-25 % on echocardiogram of 24.   -CXR showed mild congestion. Small bilateral pleural effusions and bibasilar atelectasis.  -Cardiology consulted   Improved with IV Lasix IV Lasix  -Continue GDMT Jardiance, BB, and ACE  Continue to follow with cardiology monitor fluid intake and daily weights    COPD exacerbation  -Stable on room air  -CXR as above-  -VRP negative  -Continue bronchodilators/ICS  -No leukocytosis, lactic acidosis, HDS and will hold antibiotics  -Steroids:   -Complete prednisone course.     Chronic Kidney disease, stage 3    - sCr currently 2.4 and up trending likely r/t IV diuresis   - Baseline 1.6-1.9  - I&Os, weights  - Recheck SCr in AM and monitor trends with permissive diuresis  - Follows with Dr Barnes and continue to follow as an outpatient     DMII with chronic complications and without long term use of insulin.               -Continue home medications at discharge     HLD  -On Zetia and Lipitor

## 2025-02-19 NOTE — PROGRESS NOTES
2/20/2025      MD Tawana Castillo MD           Re: , Hilaria      Dear Dr. Andrade  Dear Dr. Dos Santos    I had the pleasure of seeing your patient, Hilaria Rodriguez (64 y.o. male) in the office for a 1.5 year follow up after his crossover right to left fem-fem bypass using an 8mm Troup propaten vascular graft on 9/13/2023 for his chronically occluded left common iliac artery. He is feeling well at today's appointment and does not have any complaints. He has only mild leg discomfort with ambulation but no claudication symptoms. His incision are well healed from surgery and he is compliant with his medications.he is curious to see his vascular study results.  He also had a carotid duplex scan and is curious to hear about that result.  He has been compliant with all his medications and he is not smoking.    Current Medications    Current Outpatient Medications:     furosemide (LASIX) 20 MG tablet, Take 1 tablet by mouth daily, Disp: 60 tablet, Rfl: 3    empagliflozin (JARDIANCE) 25 MG tablet, Take 1 tablet by mouth daily, Disp: 90 tablet, Rfl: 3    clopidogrel (PLAVIX) 75 MG tablet, Take 1 tablet by mouth daily, Disp: 90 tablet, Rfl: 3    nitroGLYCERIN (NITROSTAT) 0.4 MG SL tablet, Place 1 tablet under the tongue every 5 minutes as needed for Chest pain up to max of 3 total doses. If no relief after 1 dose, call 911., Disp: 25 tablet, Rfl: 3    lisinopril (PRINIVIL;ZESTRIL) 2.5 MG tablet, Take 1 tablet by mouth daily, Disp: 90 tablet, Rfl: 1    glimepiride (AMARYL) 2 MG tablet, Take 1 tablet by mouth every morning (before breakfast), Disp: 90 tablet, Rfl: 1    sertraline (ZOLOFT) 100 MG tablet, Take 1 tablet by mouth daily, Disp: 90 tablet, Rfl: 1    hydrALAZINE (APRESOLINE) 25 MG tablet, Take 0.5 tablets by mouth 3 times daily, Disp: 90 tablet, Rfl: 3    ezetimibe (ZETIA) 10 MG tablet, Take 1 tablet by mouth daily, Disp: 90 tablet, Rfl: 1    carvedilol (COREG) 25 MG tablet, Take 0.5 tablets by mouth 2

## 2025-02-20 ENCOUNTER — OFFICE VISIT (OUTPATIENT)
Dept: CARDIOTHORACIC SURGERY | Age: 65
End: 2025-02-20

## 2025-02-20 VITALS
HEART RATE: 67 BPM | DIASTOLIC BLOOD PRESSURE: 79 MMHG | HEIGHT: 67 IN | SYSTOLIC BLOOD PRESSURE: 132 MMHG | BODY MASS INDEX: 30.37 KG/M2 | OXYGEN SATURATION: 99 % | WEIGHT: 193.5 LBS

## 2025-02-20 DIAGNOSIS — I73.9 PAD (PERIPHERAL ARTERY DISEASE): Primary | ICD-10-CM

## 2025-03-03 ENCOUNTER — OFFICE VISIT (OUTPATIENT)
Dept: CARDIOLOGY CLINIC | Age: 65
End: 2025-03-03
Payer: MEDICARE

## 2025-03-03 VITALS
HEART RATE: 66 BPM | SYSTOLIC BLOOD PRESSURE: 145 MMHG | OXYGEN SATURATION: 98 % | RESPIRATION RATE: 16 BRPM | BODY MASS INDEX: 30.64 KG/M2 | HEIGHT: 67 IN | DIASTOLIC BLOOD PRESSURE: 95 MMHG | WEIGHT: 195.2 LBS

## 2025-03-03 DIAGNOSIS — I50.20 HFREF (HEART FAILURE WITH REDUCED EJECTION FRACTION) (HCC): Primary | ICD-10-CM

## 2025-03-03 PROCEDURE — 1123F ACP DISCUSS/DSCN MKR DOCD: CPT | Performed by: NURSE PRACTITIONER

## 2025-03-03 PROCEDURE — 3080F DIAST BP >= 90 MM HG: CPT | Performed by: NURSE PRACTITIONER

## 2025-03-03 PROCEDURE — 3077F SYST BP >= 140 MM HG: CPT | Performed by: NURSE PRACTITIONER

## 2025-03-03 PROCEDURE — 99213 OFFICE O/P EST LOW 20 MIN: CPT | Performed by: NURSE PRACTITIONER

## 2025-03-03 ASSESSMENT — ENCOUNTER SYMPTOMS
WHEEZING: 0
SHORTNESS OF BREATH: 1
CHEST TIGHTNESS: 0

## 2025-03-03 NOTE — PROGRESS NOTES
CHF CLINICAL STAFF DOCUMENTATION    Current complaints/issues:   Education provided:     Have you had any Chest Pain? - No    Do you had any Shortness of Breath - No  If Yes - When none    Have you had any dizziness - No  When do you feel dizzy none   How long does it last .none  none     Do you have any edema -  Yes  swelling in  right leg        Are you on fluid restrictions - Yes    Amount - 64 oz     Are you on sodium restrictions - Yes  no added salt- low sodium canned items    Amount - 2 gm    Extra diuretic use -  No     Do you weigh yourself daily? -  most days     Do you feel fatigued - No    Do you have a cough - No    Lung sounds -    Normal - Yes   Abnormal -     Do you have abdominal bloating - No    How is your appetite - good    Do you have difficulty sleeping - No  Able to lie flat? - Yes    Do you have a history of sleep apnea - No  CPAP no    6 min. Walk:  refused - walking distance causes right leg pain   Pre heart rate   Time walked  6 minutes   Distance    Post heart rate

## 2025-03-03 NOTE — PROGRESS NOTES
[facility]      Visit Date: 3/3/2025  Cardiologist:  Dr. Burroughs  Primary Care Physician: Dr. Agrawal primary care provider on file.    Hilaria Rodriguez is a 64 y.o. male who presents today for:  CC:   1. HFrEF (heart failure with reduced ejection fraction) ([facility])        HPI:   Hilaria Rodriguez presents to the office for a new patient visit in the heart failure clinic.  Hilaria has a history of HFrEF, ischemic cardiomyopathy s/p ICD, coronary disease s/p CABG, valvular heart disease s/p MV clip to the A2 P2 (2024), diabetes mellitus type 2, hypertension, hyperlipidemia, COPD, CKD, peripheral artery disease s/p crossover right to left fem-fem bypass( 9/13/2023).       Last hospital admission related to Heart Failure:  02/2025    baseline      NT pro BNP 14, 7531  baseline weight 193 Lbs  Home weight today 195.2    Chief Complaint   Patient presents with    Congestive Heart Failure     3/3/2025 today's visit  Hilaria is being seen today for follow-up hospital.  Recently in the hospital with decompensated heart failure.  Most likely in the setting of nonadherence to a low-sodium diet.  He states his diet could change and he has been eating a little bit more sodium than his typical.  Today he is feeling much better.  Shortness of breath is back to baseline.        Device: Yes   Optivol has returned to normal    Most recent ECHO: 11/13/2024      Left Ventricle: Severely reduced left ventricular systolic function with a visually estimated EF of 20 - 25%. Left ventricle is dilated. Mildly increased wall thickness. Inferior and lateral wall segments are severely hypokinetic    Right Ventricle: Right ventricle is dilated.ICD wire noted in the right side of the heart.    Mitral Valve: Valve repaired by MitraClip XTW(5/2024) with a mean gradient of 2mmHg. Moderate regurgitation. ERO .23cm2    Tricuspid Valve: Moderate regurgitation. Mild Pulmonary Hypertension with a  RVSP of 38 mmHg.    Left Atrium: Left

## 2025-04-02 ENCOUNTER — OFFICE VISIT (OUTPATIENT)
Dept: FAMILY MEDICINE CLINIC | Age: 65
End: 2025-04-02
Payer: MEDICARE

## 2025-04-02 VITALS
SYSTOLIC BLOOD PRESSURE: 130 MMHG | BODY MASS INDEX: 30.53 KG/M2 | HEART RATE: 64 BPM | TEMPERATURE: 98.1 F | OXYGEN SATURATION: 95 % | WEIGHT: 195 LBS | DIASTOLIC BLOOD PRESSURE: 78 MMHG

## 2025-04-02 DIAGNOSIS — J44.1 COPD WITH ACUTE EXACERBATION (HCC): Primary | ICD-10-CM

## 2025-04-02 PROCEDURE — 3078F DIAST BP <80 MM HG: CPT | Performed by: NURSE PRACTITIONER

## 2025-04-02 PROCEDURE — 3075F SYST BP GE 130 - 139MM HG: CPT | Performed by: NURSE PRACTITIONER

## 2025-04-02 PROCEDURE — 1123F ACP DISCUSS/DSCN MKR DOCD: CPT | Performed by: NURSE PRACTITIONER

## 2025-04-02 PROCEDURE — 99213 OFFICE O/P EST LOW 20 MIN: CPT | Performed by: NURSE PRACTITIONER

## 2025-04-02 RX ORDER — FLUTICASONE FUROATE, UMECLIDINIUM BROMIDE AND VILANTEROL TRIFENATATE 100; 62.5; 25 UG/1; UG/1; UG/1
POWDER RESPIRATORY (INHALATION)
COMMUNITY
Start: 2025-03-19

## 2025-04-02 RX ORDER — METHYLPREDNISOLONE 4 MG/1
TABLET ORAL
Qty: 1 KIT | Refills: 0 | Status: SHIPPED | OUTPATIENT
Start: 2025-04-02 | End: 2025-04-07 | Stop reason: ALTCHOICE

## 2025-04-02 RX ORDER — ALBUTEROL SULFATE 1.25 MG/3ML
SOLUTION RESPIRATORY (INHALATION)
COMMUNITY
Start: 2025-03-21

## 2025-04-02 RX ORDER — AZITHROMYCIN 250 MG/1
TABLET, FILM COATED ORAL
Qty: 1 PACKET | Refills: 0 | Status: SHIPPED | OUTPATIENT
Start: 2025-04-02 | End: 2025-04-07 | Stop reason: ALTCHOICE

## 2025-04-02 NOTE — PROGRESS NOTES
Hilaria Rodriguez   64 y.o.  male  1209      Chief Complaint   Patient presents with    Cold Symptoms     Cough chest congestion SOB-started last week per pt         Subjective:  64 y.o.male is here for a follow up. He has the following chronic/acute medical problems:  Patient Active Problem List   Diagnosis    ASCVD (arteriosclerotic cardiovascular disease)    History of PTCA    Hyperlipidemia    History of acute inferior wall myocardial infarction    Chest pain    B12 deficiency    Controlled type 2 diabetes mellitus with diabetic nephropathy, without long-term current use of insulin (HCC)    Fatty liver    Colon, diverticulosis    Renal cyst    Adrenal adenoma    Mixed hyperlipidemia    Obesity (BMI 30-39.9)    Cerebrovascular accident (CVA) due to thrombosis of left middle cerebral artery (HCC)    Hemiparesis of right dominant side as late effect of cerebral infarction (HCC)    Gait disturbance    Essential hypertension    Hemiparesis affecting right side as late effect of cerebrovascular accident (CVA) ([facility])    SOBOE (shortness of breath on exertion)    Gastroesophageal reflux disease    Unstable angina (HCC)    Frequent PVCs    Ischemic cardiomyopathy    History of Guillain-Independence syndrome    Acute pain of left shoulder    Dysarthria due to recent stroke    Aphasia due to acute stroke ([facility])    Complication of coronary artery bypass graft surgery    S/P CABG (coronary artery bypass graft)    ANNA (obstructive sleep apnea)    Hypersomnia    COPD (chronic obstructive pulmonary disease) ([facility])    Irritability and anger    Stable angina pectoris    Noncompliance with medications    Stage 3b chronic kidney disease ([facility])    Class 1 obesity due to excess calories with body mass index (BMI) of 33.0 to 33.9 in adult    Hx of colonic polyps    Positive for macroalbuminuria    Ventral hernia without obstruction or gangrene    Former tobacco use    Shortness of breath    Orthopnea    HFrEF (heart failure with reduced ejection

## 2025-04-07 ENCOUNTER — OFFICE VISIT (OUTPATIENT)
Dept: INTERNAL MEDICINE CLINIC | Age: 65
End: 2025-04-07
Payer: MEDICARE

## 2025-04-07 VITALS
WEIGHT: 195 LBS | HEART RATE: 60 BPM | SYSTOLIC BLOOD PRESSURE: 120 MMHG | OXYGEN SATURATION: 96 % | BODY MASS INDEX: 30.53 KG/M2 | DIASTOLIC BLOOD PRESSURE: 78 MMHG

## 2025-04-07 DIAGNOSIS — E11.21 CONTROLLED TYPE 2 DIABETES MELLITUS WITH DIABETIC NEPHROPATHY, WITHOUT LONG-TERM CURRENT USE OF INSULIN (HCC): Primary | ICD-10-CM

## 2025-04-07 DIAGNOSIS — E55.9 VITAMIN D DEFICIENCY: ICD-10-CM

## 2025-04-07 DIAGNOSIS — Z95.811 PRESENCE OF HEART ASSIST DEVICE (HCC): ICD-10-CM

## 2025-04-07 DIAGNOSIS — F33.1 MAJOR DEPRESSIVE DISORDER, RECURRENT, MODERATE (HCC): ICD-10-CM

## 2025-04-07 DIAGNOSIS — Z12.11 SCREENING FOR COLON CANCER: ICD-10-CM

## 2025-04-07 DIAGNOSIS — I48.20 CHRONIC A-FIB (HCC): ICD-10-CM

## 2025-04-07 DIAGNOSIS — N18.32 STAGE 3B CHRONIC KIDNEY DISEASE (HCC): ICD-10-CM

## 2025-04-07 DIAGNOSIS — E78.5 HYPERLIPIDEMIA, UNSPECIFIED HYPERLIPIDEMIA TYPE: ICD-10-CM

## 2025-04-07 DIAGNOSIS — J44.9 CHRONIC OBSTRUCTIVE PULMONARY DISEASE, UNSPECIFIED COPD TYPE (HCC): ICD-10-CM

## 2025-04-07 DIAGNOSIS — I25.10 ASCVD (ARTERIOSCLEROTIC CARDIOVASCULAR DISEASE): ICD-10-CM

## 2025-04-07 DIAGNOSIS — R45.4 IRRITABILITY AND ANGER: ICD-10-CM

## 2025-04-07 DIAGNOSIS — I10 ESSENTIAL HYPERTENSION: ICD-10-CM

## 2025-04-07 LAB — HBA1C MFR BLD: 6.7 %

## 2025-04-07 PROCEDURE — G2211 COMPLEX E/M VISIT ADD ON: HCPCS | Performed by: FAMILY MEDICINE

## 2025-04-07 PROCEDURE — 1123F ACP DISCUSS/DSCN MKR DOCD: CPT | Performed by: FAMILY MEDICINE

## 2025-04-07 PROCEDURE — 3044F HG A1C LEVEL LT 7.0%: CPT | Performed by: FAMILY MEDICINE

## 2025-04-07 PROCEDURE — 3074F SYST BP LT 130 MM HG: CPT | Performed by: FAMILY MEDICINE

## 2025-04-07 PROCEDURE — 99215 OFFICE O/P EST HI 40 MIN: CPT | Performed by: FAMILY MEDICINE

## 2025-04-07 PROCEDURE — 3078F DIAST BP <80 MM HG: CPT | Performed by: FAMILY MEDICINE

## 2025-04-07 PROCEDURE — 83036 HEMOGLOBIN GLYCOSYLATED A1C: CPT | Performed by: FAMILY MEDICINE

## 2025-04-07 RX ORDER — ACYCLOVIR 800 MG/1
1 TABLET ORAL
Qty: 6 EACH | Refills: 3 | Status: SHIPPED | OUTPATIENT
Start: 2025-04-07

## 2025-04-07 RX ORDER — KETOROLAC TROMETHAMINE 30 MG/ML
INJECTION, SOLUTION INTRAMUSCULAR; INTRAVENOUS
Qty: 1 EACH | Refills: 0 | Status: SHIPPED | OUTPATIENT
Start: 2025-04-07

## 2025-04-07 RX ORDER — SERTRALINE HYDROCHLORIDE 100 MG/1
100 TABLET, FILM COATED ORAL DAILY
Qty: 90 TABLET | Refills: 1 | Status: SHIPPED | OUTPATIENT
Start: 2025-04-07 | End: 2025-10-04

## 2025-04-07 ASSESSMENT — PATIENT HEALTH QUESTIONNAIRE - PHQ9
9. THOUGHTS THAT YOU WOULD BE BETTER OFF DEAD, OR OF HURTING YOURSELF: NOT AT ALL
1. LITTLE INTEREST OR PLEASURE IN DOING THINGS: NOT AT ALL
7. TROUBLE CONCENTRATING ON THINGS, SUCH AS READING THE NEWSPAPER OR WATCHING TELEVISION: NOT AT ALL
10. IF YOU CHECKED OFF ANY PROBLEMS, HOW DIFFICULT HAVE THESE PROBLEMS MADE IT FOR YOU TO DO YOUR WORK, TAKE CARE OF THINGS AT HOME, OR GET ALONG WITH OTHER PEOPLE: NOT DIFFICULT AT ALL
SUM OF ALL RESPONSES TO PHQ QUESTIONS 1-9: 0
6. FEELING BAD ABOUT YOURSELF - OR THAT YOU ARE A FAILURE OR HAVE LET YOURSELF OR YOUR FAMILY DOWN: NOT AT ALL
5. POOR APPETITE OR OVEREATING: NOT AT ALL
4. FEELING TIRED OR HAVING LITTLE ENERGY: NOT AT ALL
8. MOVING OR SPEAKING SO SLOWLY THAT OTHER PEOPLE COULD HAVE NOTICED. OR THE OPPOSITE, BEING SO FIGETY OR RESTLESS THAT YOU HAVE BEEN MOVING AROUND A LOT MORE THAN USUAL: NOT AT ALL
SUM OF ALL RESPONSES TO PHQ QUESTIONS 1-9: 0
3. TROUBLE FALLING OR STAYING ASLEEP: NOT AT ALL
2. FEELING DOWN, DEPRESSED OR HOPELESS: NOT AT ALL

## 2025-04-07 ASSESSMENT — ENCOUNTER SYMPTOMS
CHEST TIGHTNESS: 0
VOMITING: 0
DIARRHEA: 0
SHORTNESS OF BREATH: 0
CONSTIPATION: 0
COLOR CHANGE: 0
SORE THROAT: 0
ABDOMINAL PAIN: 0

## 2025-04-07 NOTE — PROGRESS NOTES
Future    13. Screening for colon cancer  Due for screening.  [facility]          I have spent 45 minutes on this patient encounter.     Patient voiced understanding and agreement with plan.  All questions/concerns were addressed, risks/side effects of medications were reviewed.  Return precautions and after visit summary were provided.  Return in about 8 months (around 12/7/2025). or earlier as needed.      Louisa Bridges MD

## 2025-04-10 ENCOUNTER — HOSPITAL ENCOUNTER (OUTPATIENT)
Dept: LAB | Age: 65
Discharge: HOME | End: 2025-04-10
Payer: MEDICARE

## 2025-04-10 DIAGNOSIS — N28.1 RENAL CYST: ICD-10-CM

## 2025-04-10 DIAGNOSIS — R80.1 PERSISTENT PROTEINURIA: ICD-10-CM

## 2025-04-10 DIAGNOSIS — N18.32 STAGE 3B CHRONIC KIDNEY DISEASE (HCC): ICD-10-CM

## 2025-04-10 DIAGNOSIS — N17.9 AKI (ACUTE KIDNEY INJURY): ICD-10-CM

## 2025-04-10 LAB
ANION GAP SERPL CALCULATED.3IONS-SCNC: 10 MMOL/L (ref 9–17)
BUN SERPL-MCNC: 26 MG/DL (ref 7–20)
CALCIUM SERPL-MCNC: 9 MG/DL (ref 8.3–10.6)
CHLORIDE SERPL-SCNC: 103 MMOL/L (ref 99–110)
CO2 SERPL-SCNC: 26 MMOL/L (ref 21–32)
CREAT SERPL-MCNC: 1.8 MG/DL (ref 0.8–1.3)
CREAT UR-MCNC: 107 MG/DL (ref 39–259)
GFR, ESTIMATED: 39 ML/MIN/1.73M2
GLUCOSE SERPL-MCNC: 168 MG/DL (ref 74–99)
POTASSIUM SERPL-SCNC: 4.4 MMOL/L (ref 3.5–5.1)
PTH-INTACT SERPL-MCNC: 73.3 PG/ML (ref 14–72)
SODIUM SERPL-SCNC: 139 MMOL/L (ref 136–145)
TOTAL PROTEIN, URINE: 213 MG/DL
URINE TOTAL PROTEIN CREATININE RATIO: 1.99 (ref 0–0.2)

## 2025-04-10 PROCEDURE — 84156 ASSAY OF PROTEIN URINE: CPT

## 2025-04-10 PROCEDURE — 83970 ASSAY OF PARATHORMONE: CPT

## 2025-04-10 PROCEDURE — 80048 BASIC METABOLIC PNL TOTAL CA: CPT

## 2025-04-10 PROCEDURE — 82570 ASSAY OF URINE CREATININE: CPT

## 2025-04-11 ENCOUNTER — TELEPHONE (OUTPATIENT)
Dept: GASTROENTEROLOGY | Age: 65
End: 2025-04-11

## 2025-04-18 ENCOUNTER — TELEPHONE (OUTPATIENT)
Dept: GASTROENTEROLOGY | Age: 65
End: 2025-04-18

## 2025-04-25 ENCOUNTER — TELEPHONE (OUTPATIENT)
Dept: GASTROENTEROLOGY | Age: 65
End: 2025-04-25

## 2025-04-29 ENCOUNTER — COMMUNITY OUTREACH (OUTPATIENT)
Dept: FAMILY MEDICINE CLINIC | Age: 65
End: 2025-04-29

## 2025-04-29 ENCOUNTER — HOSPITAL ENCOUNTER (OUTPATIENT)
Dept: CT IMAGING | Age: 65
Discharge: HOME | End: 2025-04-29
Payer: MEDICARE

## 2025-04-29 DIAGNOSIS — I73.9 PAD (PERIPHERAL ARTERY DISEASE): ICD-10-CM

## 2025-04-29 PROCEDURE — 75635 CT ANGIO ABDOMINAL ARTERIES: CPT

## 2025-04-29 PROCEDURE — 6360000004 HC RX CONTRAST MEDICATION: Performed by: PHYSICIAN ASSISTANT

## 2025-04-29 PROCEDURE — 2500000003 HC RX 250 WO HCPCS: Performed by: PHYSICIAN ASSISTANT

## 2025-04-29 RX ORDER — IOPAMIDOL 755 MG/ML
110 INJECTION, SOLUTION INTRAVASCULAR
Status: COMPLETED | OUTPATIENT
Start: 2025-04-29 | End: 2025-04-29

## 2025-04-29 RX ORDER — SODIUM CHLORIDE 9 MG/ML
10 INJECTION, SOLUTION INTRAMUSCULAR; INTRAVENOUS; SUBCUTANEOUS PRN
Status: DISCONTINUED | OUTPATIENT
Start: 2025-04-29 | End: 2025-04-30 | Stop reason: HOSPADM

## 2025-04-29 RX ADMIN — IOPAMIDOL 110 ML: 755 INJECTION, SOLUTION INTRAVENOUS at 09:05

## 2025-04-29 RX ADMIN — SODIUM CHLORIDE, PRESERVATIVE FREE 10 ML: 5 INJECTION INTRAVENOUS at 09:00

## 2025-04-29 NOTE — PROGRESS NOTES
Patient's HM shows they are overdue for Colorectal Screening and hemoglobin a1c.   Care Everywhere and  files searched.  No results to attach to order nor HM updated.     A1c already completed, gastro referral placed 4/7/25, called x3. No return call to schedule.

## 2025-05-07 ENCOUNTER — TELEPHONE (OUTPATIENT)
Dept: CARDIOLOGY CLINIC | Age: 65
End: 2025-05-07

## 2025-05-07 NOTE — PROGRESS NOTES
5/8/2025  Primary cardiologist: Dr. Andrade    CC:   Hilaria  is an established 64 y.o.  male here for a follow up on CMP      SUBJECTIVE/OBJECTIVE:  Hilaria is a 64 y.o. male with a history of ischemic cardiomyopathy, HFrEF,  s/p ICD, coronary artery disease s/p CABG, VHD with MR s/p mitral valve clip,  hypertension, hyperlipidemia, diabetes mellitus, DVT of RLE,  peripheral artery disease s/p crossover right to left fem-fem bypass using an 8mm [place] propaten vascular graft on 9/13/2023, COPD and CKD  In May 2024 he underwent placement of the mitral valve clip to the A2 P2    HPI:  Hilaria states he is feeling pretty good. He denies chest pain or shortness of breath. He notes edema to the right ankle at times. Denies claudication symptoms.     Review of Systems   Constitutional: Negative for diaphoresis and malaise/fatigue.   Cardiovascular:  Positive for leg swelling (ankle). Negative for chest pain, claudication, dyspnea on exertion, irregular heartbeat, near-syncope, orthopnea, palpitations and paroxysmal nocturnal dyspnea.   Respiratory:  Negative for shortness of breath.    Neurological:  Negative for dizziness and light-headedness.       Vitals:    05/08/25 1101   BP: 112/60   BP Site: Left Upper Arm   Patient Position: Sitting   BP Cuff Size: Medium Adult   Pulse: 70   Weight: 88.5 kg (195 lb 3.2 oz)   Height: 1.702 m (5' 7\")     Wt Readings from Last 3 Encounters:   05/08/25 88.5 kg (195 lb 3.2 oz)   04/15/25 88 kg (194 lb)   04/07/25 88.5 kg (195 lb)      Body mass index is 30.57 kg/m².     Physical Exam  Vitals reviewed.   Eyes:      Pupils: Pupils are equal, round, and reactive to light.   Neck:      Vascular: No carotid bruit.   Cardiovascular:      Rate and Rhythm: Normal rate and regular rhythm.      Pulses: Normal pulses.      Heart sounds: Murmur heard.      Diastolic murmur is present with a grade of 2/4.   Pulmonary:      Effort: Pulmonary effort is normal.      Breath sounds: Normal breath sounds.

## 2025-05-08 ENCOUNTER — OFFICE VISIT (OUTPATIENT)
Dept: CARDIOLOGY CLINIC | Age: 65
End: 2025-05-08
Payer: MEDICARE

## 2025-05-08 VITALS
BODY MASS INDEX: 30.64 KG/M2 | WEIGHT: 195.2 LBS | DIASTOLIC BLOOD PRESSURE: 60 MMHG | HEART RATE: 70 BPM | SYSTOLIC BLOOD PRESSURE: 112 MMHG | HEIGHT: 67 IN

## 2025-05-08 DIAGNOSIS — I38 VALVULAR HEART DISEASE: ICD-10-CM

## 2025-05-08 DIAGNOSIS — E78.2 MIXED HYPERLIPIDEMIA: ICD-10-CM

## 2025-05-08 DIAGNOSIS — I50.20 HFREF (HEART FAILURE WITH REDUCED EJECTION FRACTION) (HCC): ICD-10-CM

## 2025-05-08 DIAGNOSIS — I25.5 ISCHEMIC CARDIOMYOPATHY: ICD-10-CM

## 2025-05-08 DIAGNOSIS — I25.10 ASCVD (ARTERIOSCLEROTIC CARDIOVASCULAR DISEASE): Primary | ICD-10-CM

## 2025-05-08 PROBLEM — I20.0 UNSTABLE ANGINA (HCC): Status: RESOLVED | Noted: 2018-03-15 | Resolved: 2025-05-08

## 2025-05-08 PROBLEM — I50.23 ACUTE ON CHRONIC SYSTOLIC CONGESTIVE HEART FAILURE (HCC): Status: RESOLVED | Noted: 2024-01-08 | Resolved: 2025-05-08

## 2025-05-08 PROCEDURE — 3078F DIAST BP <80 MM HG: CPT | Performed by: NURSE PRACTITIONER

## 2025-05-08 PROCEDURE — 3074F SYST BP LT 130 MM HG: CPT | Performed by: NURSE PRACTITIONER

## 2025-05-08 PROCEDURE — 99214 OFFICE O/P EST MOD 30 MIN: CPT | Performed by: NURSE PRACTITIONER

## 2025-05-08 PROCEDURE — 1123F ACP DISCUSS/DSCN MKR DOCD: CPT | Performed by: NURSE PRACTITIONER

## 2025-05-08 ASSESSMENT — ENCOUNTER SYMPTOMS
ORTHOPNEA: 0
SHORTNESS OF BREATH: 0

## 2025-05-08 NOTE — PROGRESS NOTES
CLINICAL STAFF DOCUMENTATION    KELLEN Sultana  1960  1209    Have you had any Chest Pain recently? - No  Have you had any Shortness of Breath - No  Have you had any dizziness - No  Have you had any palpitations recently? - No  Do you have any edema - swelling in No    When did you have your last labs drawn 4/10/2025  What doctor ordered Cameron  Do we have the labs in their chart Yes  Do you need any prescriptions refilled? - No  Do you have a surgery or procedure scheduled in the near future - No  Do use tobacco products? - No  Do you drink alcohol? - No  Do you use any illicit drugs? - No  Caffeine? - Yes  How much caffeine? .2  cups of coffee daily      Check medication list thoroughly!!! AND RECONCILE OUTSIDE MEDICATIONS  If dose has changed change the entire order not just the MG  BE SURE TO ASK PATIENT IF THEY NEED MEDICATION REFILLS  Verify Pharmacy and update if incorrect    Add to every patient's \"wrap up\" the following dot phrase AFTERVISITCARDIOHEARTHOUSE and ensure we explain this to our patients

## 2025-06-10 ENCOUNTER — APPOINTMENT (OUTPATIENT)
Dept: GENERAL RADIOLOGY | Age: 65
End: 2025-06-10
Payer: MEDICARE

## 2025-06-10 ENCOUNTER — HOSPITAL ENCOUNTER (EMERGENCY)
Age: 65
Discharge: HOME | End: 2025-06-10
Attending: EMERGENCY MEDICINE
Payer: MEDICARE

## 2025-06-10 VITALS
RESPIRATION RATE: 22 BRPM | BODY MASS INDEX: 30.29 KG/M2 | WEIGHT: 193 LBS | HEART RATE: 77 BPM | HEIGHT: 67 IN | SYSTOLIC BLOOD PRESSURE: 188 MMHG | OXYGEN SATURATION: 100 % | TEMPERATURE: 97.5 F | DIASTOLIC BLOOD PRESSURE: 115 MMHG

## 2025-06-10 DIAGNOSIS — J44.1 COPD EXACERBATION (HCC): ICD-10-CM

## 2025-06-10 DIAGNOSIS — I50.9 ACUTE ON CHRONIC HEART FAILURE, UNSPECIFIED HEART FAILURE TYPE (HCC): Primary | ICD-10-CM

## 2025-06-10 LAB
ALBUMIN SERPL-MCNC: 3.8 G/DL (ref 3.4–5)
ALBUMIN/GLOB SERPL: 2.1 {RATIO} (ref 1.1–2.2)
ALP SERPL-CCNC: 71 U/L (ref 40–129)
ALT SERPL-CCNC: 21 U/L (ref 10–40)
ANION GAP SERPL CALCULATED.3IONS-SCNC: 12 MMOL/L (ref 9–17)
AST SERPL-CCNC: 23 U/L (ref 15–37)
BASOPHILS # BLD: 0.08 K/UL
BASOPHILS NFR BLD: 1 % (ref 0–1)
BILIRUB SERPL-MCNC: 0.6 MG/DL (ref 0–1)
BNP SERPL-MCNC: 9203 PG/ML (ref 0–125)
BUN SERPL-MCNC: 29 MG/DL (ref 7–20)
CALCIUM SERPL-MCNC: 8.9 MG/DL (ref 8.3–10.6)
CHLORIDE SERPL-SCNC: 109 MMOL/L (ref 99–110)
CO2 SERPL-SCNC: 22 MMOL/L (ref 21–32)
CREAT SERPL-MCNC: 1.8 MG/DL (ref 0.8–1.3)
EOSINOPHIL # BLD: 0.18 K/UL
EOSINOPHILS RELATIVE PERCENT: 3 % (ref 0–3)
ERYTHROCYTE [DISTWIDTH] IN BLOOD BY AUTOMATED COUNT: 15.7 % (ref 11.7–14.9)
GFR, ESTIMATED: 39 ML/MIN/1.73M2
GLUCOSE SERPL-MCNC: 124 MG/DL (ref 74–99)
HCT VFR BLD AUTO: 41.7 % (ref 42–52)
HGB BLD-MCNC: 13.4 G/DL (ref 13.5–18)
IMM GRANULOCYTES # BLD AUTO: 0.01 K/UL
IMM GRANULOCYTES NFR BLD: 0 %
LYMPHOCYTES NFR BLD: 1.34 K/UL
LYMPHOCYTES RELATIVE PERCENT: 21 % (ref 24–44)
MCH RBC QN AUTO: 32.8 PG (ref 27–31)
MCHC RBC AUTO-ENTMCNC: 32.1 G/DL (ref 32–36)
MCV RBC AUTO: 102 FL (ref 78–100)
MONOCYTES NFR BLD: 0.48 K/UL
MONOCYTES NFR BLD: 8 % (ref 0–5)
NEUTROPHILS NFR BLD: 67 % (ref 36–66)
NEUTS SEG NFR BLD: 4.31 K/UL
PLATELET # BLD AUTO: 166 K/UL (ref 140–440)
PMV BLD AUTO: 11.4 FL (ref 7.5–11.1)
POTASSIUM SERPL-SCNC: 4 MMOL/L (ref 3.5–5.1)
PROT SERPL-MCNC: 5.6 G/DL (ref 6.4–8.2)
RBC # BLD AUTO: 4.09 M/UL (ref 4.6–6.2)
SODIUM SERPL-SCNC: 142 MMOL/L (ref 136–145)
TROPONIN I SERPL HS-MCNC: 22 NG/L (ref 0–22)
TROPONIN I SERPL HS-MCNC: 25 NG/L (ref 0–22)
WBC OTHER # BLD: 6.4 K/UL (ref 4–10.5)

## 2025-06-10 PROCEDURE — 71045 X-RAY EXAM CHEST 1 VIEW: CPT

## 2025-06-10 PROCEDURE — 6360000002 HC RX W HCPCS: Performed by: EMERGENCY MEDICINE

## 2025-06-10 PROCEDURE — 6370000000 HC RX 637 (ALT 250 FOR IP): Performed by: EMERGENCY MEDICINE

## 2025-06-10 PROCEDURE — 99285 EMERGENCY DEPT VISIT HI MDM: CPT

## 2025-06-10 PROCEDURE — 85025 COMPLETE CBC W/AUTO DIFF WBC: CPT

## 2025-06-10 PROCEDURE — 94640 AIRWAY INHALATION TREATMENT: CPT

## 2025-06-10 PROCEDURE — 96375 TX/PRO/DX INJ NEW DRUG ADDON: CPT

## 2025-06-10 PROCEDURE — 96374 THER/PROPH/DIAG INJ IV PUSH: CPT

## 2025-06-10 PROCEDURE — 83880 ASSAY OF NATRIURETIC PEPTIDE: CPT

## 2025-06-10 PROCEDURE — 2500000003 HC RX 250 WO HCPCS: Performed by: EMERGENCY MEDICINE

## 2025-06-10 PROCEDURE — 93005 ELECTROCARDIOGRAM TRACING: CPT | Performed by: EMERGENCY MEDICINE

## 2025-06-10 PROCEDURE — 84484 ASSAY OF TROPONIN QUANT: CPT

## 2025-06-10 PROCEDURE — 80053 COMPREHEN METABOLIC PANEL: CPT

## 2025-06-10 RX ORDER — PREDNISONE 50 MG/1
50 TABLET ORAL DAILY
Qty: 5 TABLET | Refills: 0 | Status: SHIPPED | OUTPATIENT
Start: 2025-06-10 | End: 2025-06-15

## 2025-06-10 RX ORDER — IPRATROPIUM BROMIDE AND ALBUTEROL SULFATE 2.5; .5 MG/3ML; MG/3ML
2 SOLUTION RESPIRATORY (INHALATION) ONCE
Status: COMPLETED | OUTPATIENT
Start: 2025-06-10 | End: 2025-06-10

## 2025-06-10 RX ORDER — DOXYCYCLINE HYCLATE 100 MG
100 TABLET ORAL 2 TIMES DAILY
Qty: 14 TABLET | Refills: 0 | Status: SHIPPED | OUTPATIENT
Start: 2025-06-10 | End: 2025-06-17

## 2025-06-10 RX ORDER — CARVEDILOL 6.25 MG/1
12.5 TABLET ORAL ONCE
Status: COMPLETED | OUTPATIENT
Start: 2025-06-10 | End: 2025-06-10

## 2025-06-10 RX ORDER — FUROSEMIDE 10 MG/ML
40 INJECTION INTRAMUSCULAR; INTRAVENOUS ONCE
Status: COMPLETED | OUTPATIENT
Start: 2025-06-10 | End: 2025-06-10

## 2025-06-10 RX ORDER — HYDRALAZINE HYDROCHLORIDE 25 MG/1
12.5 TABLET, FILM COATED ORAL ONCE
Status: COMPLETED | OUTPATIENT
Start: 2025-06-10 | End: 2025-06-10

## 2025-06-10 RX ORDER — LISINOPRIL 5 MG/1
2.5 TABLET ORAL ONCE
Status: COMPLETED | OUTPATIENT
Start: 2025-06-10 | End: 2025-06-10

## 2025-06-10 RX ADMIN — METHYLPREDNISOLONE SODIUM SUCCINATE 40 MG: 40 INJECTION, POWDER, LYOPHILIZED, FOR SOLUTION INTRAMUSCULAR; INTRAVENOUS at 08:34

## 2025-06-10 RX ADMIN — LISINOPRIL 2.5 MG: 5 TABLET ORAL at 07:37

## 2025-06-10 RX ADMIN — FUROSEMIDE 40 MG: 10 INJECTION, SOLUTION INTRAMUSCULAR; INTRAVENOUS at 07:29

## 2025-06-10 RX ADMIN — HYDRALAZINE HYDROCHLORIDE 12.5 MG: 25 TABLET ORAL at 07:37

## 2025-06-10 RX ADMIN — IPRATROPIUM BROMIDE AND ALBUTEROL SULFATE 2 DOSE: .5; 2.5 SOLUTION RESPIRATORY (INHALATION) at 09:07

## 2025-06-10 RX ADMIN — CARVEDILOL 12.5 MG: 6.25 TABLET, FILM COATED ORAL at 07:37

## 2025-06-10 ASSESSMENT — PAIN - FUNCTIONAL ASSESSMENT
PAIN_FUNCTIONAL_ASSESSMENT: 0-10
PAIN_FUNCTIONAL_ASSESSMENT: NONE - DENIES PAIN

## 2025-06-10 ASSESSMENT — PAIN SCALES - GENERAL: PAINLEVEL_OUTOF10: 0

## 2025-06-10 NOTE — ED PROVIDER NOTES
[id number]  [phone]    Schedule an appointment as soon as possible for a visit       Tawana Andrade MD  [facility] [address]  [phone]    Schedule an appointment as soon as possible for a visit       your pulmonologist    On 6/11/2025  as scheduled    Disposition medications (if applicable):  Discharge Medication List as of 6/10/2025 10:29 AM        START taking these medications    Details   predniSONE (DELTASONE) 50 MG tablet Take 1 tablet by mouth daily for 5 days, Disp-5 tablet, R-0Normal      doxycycline hyclate (VIBRA-TABS) 100 MG tablet Take 1 tablet by mouth 2 times daily for 7 days, Disp-14 tablet, R-0Normal           ED Provider Disposition Time  DISPOSITION Decision To Discharge 06/10/2025 10:27:00 AM   DISPOSITION CONDITION Stable           Comment: Please note this report has been produced using speech recognition software and may contain errors related to that system including errors in grammar, punctuation, and spelling, as well as words and phrases that may be inappropriate.  Efforts were made to edit the dictations.        Ludy Kebede MD  06/10/25 0410

## 2025-06-10 NOTE — ED TRIAGE NOTES
Patient presents with c/o COPD exacerbation x1 week, nonproductive cough, wheezing and water retention with BLE edema. Patient denies chest pain.

## 2025-06-11 LAB
EKG ATRIAL RATE: 77 BPM
EKG DIAGNOSIS: NORMAL
EKG P AXIS: 47 DEGREES
EKG P-R INTERVAL: 166 MS
EKG Q-T INTERVAL: 414 MS
EKG QRS DURATION: 100 MS
EKG QTC CALCULATION (BAZETT): 468 MS
EKG R AXIS: -9 DEGREES
EKG T AXIS: 232 DEGREES
EKG VENTRICULAR RATE: 77 BPM

## 2025-06-11 PROCEDURE — 93010 ELECTROCARDIOGRAM REPORT: CPT | Performed by: INTERNAL MEDICINE

## 2025-06-13 ENCOUNTER — CARE COORDINATION (OUTPATIENT)
Dept: CARE COORDINATION | Age: 65
End: 2025-06-13

## 2025-06-13 NOTE — CARE COORDINATION
Ambulatory Care Coordination Note     6/13/2025 11:21 AM     Patient outreach attempt by this ACM today to offer care management services. ACM was unable to reach the patient by telephone today;   left voice message requesting a return phone call to this ACM.     ACM: Chana Rhodes RN     Care Summary Note: ACM attempted to contact patient today for ED Follow-up and to offer care management services. ACM will outreach patient at a later date.     PCP/Specialist follow up:   Future Appointments         Provider Specialty Dept Phone    7/24/2025 11:00 AM Gabe Barnes MD Nephrology [phone]    8/18/2025 10:00 AM Marlee Morfin APRN - CNP Cardiology [phone]    8/21/2025 1:00 PM Aashish Gutierrez MD Cardiothoracic Surgery [phone]    11/10/2025 11:30 AM [facility] [place] [facility] Cardiology [phone]    12/3/2025 3:40 PM Tawana Andrade MD Cardiology [phone]    12/8/2025 10:20 AM Louisa Bridges MD Internal Medicine [phone]            Follow Up:   Plan for next ACM outreach in approximately 1-2 days  to complete:  - outreach attempt to offer care management services.

## 2025-06-16 ENCOUNTER — CARE COORDINATION (OUTPATIENT)
Dept: CARE COORDINATION | Age: 65
End: 2025-06-16

## 2025-06-16 NOTE — CARE COORDINATION
Ambulatory Care Coordination Note     6/16/2025 9:45 AM     Patient outreach attempt by this ACM today to offer care management services. ACM was unable to reach the patient by telephone today;   left voice message requesting a return phone call to this ACM.  letter mailed requesting patient  to contact this ACM.      ACM: Chana Rhodes RN     Care Summary Note: ACM attempted to contact patient today to offer care management services. ACM will make final outreach attempt at a later date.     PCP/Specialist follow up:   Future Appointments         Provider Specialty Dept Phone    7/24/2025 11:00 AM Gabe Barnes MD Nephrology [phone]    8/18/2025 10:00 AM Marlee Morfin APRN - CNP Cardiology [phone]    8/21/2025 1:00 PM Aashish Gutierrez MD Cardiothoracic Surgery [phone]    11/10/2025 11:30 AM HE[facility] [place] [facility] Cardiology [phone]    12/3/2025 3:40 PM Tawana Andrade MD Cardiology [phone]    12/8/2025 10:20 AM Louisa Bridges MD Internal Medicine [phone]            Follow Up:   Plan for next ACM outreach in approximately 1 week to complete:  - outreach attempt to offer care management services.

## 2025-06-17 ENCOUNTER — APPOINTMENT (OUTPATIENT)
Dept: GENERAL RADIOLOGY | Age: 65
End: 2025-06-17
Payer: MEDICARE

## 2025-06-17 ENCOUNTER — HOSPITAL ENCOUNTER (EMERGENCY)
Age: 65
Discharge: HOME | End: 2025-06-17
Attending: EMERGENCY MEDICINE
Payer: MEDICARE

## 2025-06-17 VITALS
DIASTOLIC BLOOD PRESSURE: 89 MMHG | BODY MASS INDEX: 30.85 KG/M2 | RESPIRATION RATE: 17 BRPM | OXYGEN SATURATION: 100 % | SYSTOLIC BLOOD PRESSURE: 170 MMHG | TEMPERATURE: 97.2 F | HEART RATE: 62 BPM | WEIGHT: 197 LBS

## 2025-06-17 DIAGNOSIS — S66.509A INJURY OF TENDON OF INTRINSIC MUSCLE OF FINGER: Primary | ICD-10-CM

## 2025-06-17 PROCEDURE — 73130 X-RAY EXAM OF HAND: CPT

## 2025-06-17 PROCEDURE — 99283 EMERGENCY DEPT VISIT LOW MDM: CPT

## 2025-06-17 ASSESSMENT — PAIN DESCRIPTION - ORIENTATION
ORIENTATION: LEFT
ORIENTATION: LEFT

## 2025-06-17 ASSESSMENT — PAIN DESCRIPTION - DESCRIPTORS
DESCRIPTORS: ACHING
DESCRIPTORS: TEARING

## 2025-06-17 ASSESSMENT — PAIN - FUNCTIONAL ASSESSMENT
PAIN_FUNCTIONAL_ASSESSMENT: ACTIVITIES ARE NOT PREVENTED
PAIN_FUNCTIONAL_ASSESSMENT: 0-10

## 2025-06-17 ASSESSMENT — PAIN DESCRIPTION - LOCATION
LOCATION: FINGER (COMMENT WHICH ONE)
LOCATION: HAND

## 2025-06-17 ASSESSMENT — PAIN SCALES - GENERAL
PAINLEVEL_OUTOF10: 6
PAINLEVEL_OUTOF10: 7

## 2025-06-17 ASSESSMENT — PAIN DESCRIPTION - PAIN TYPE: TYPE: ACUTE PAIN

## 2025-06-17 ASSESSMENT — PAIN DESCRIPTION - ONSET: ONSET: ON-GOING

## 2025-06-17 ASSESSMENT — PAIN DESCRIPTION - FREQUENCY: FREQUENCY: CONTINUOUS

## 2025-06-17 NOTE — ED TRIAGE NOTES
Pt reports injured left hand last Friday, reports decreased mobility in left middle finger and continued pain

## 2025-06-17 NOTE — ED PROVIDER NOTES
Emergency Department Encounter    Patient: Hilaria Rodriguez  MRN: [id number]  : 1960  Date of Evaluation: 2025  ED Provider:  Ludy Kebede MD    Triage Chief Complaint:   No chief complaint on file.    [place]:  Hilaria Rodriguez is a 64 y.o. male that presents with concern for left finger injury.  He was flicking something with his third finger on Friday, felt something pop, has not been able to fully extend it since then.  Hurts at the knuckle, felt like it was a little bit swollen.  Denies other injury.  No wound, no redness    ROS - see HPI, below listed is current ROS at time of my eval:  4 systems reviewed and negative except as above.     Past Medical History:   Diagnosis Date    Acute cerebrovascular accident (CVA) ([facility]) 2020    Acute on chronic systolic congestive heart failure ([facility]) 2024    Acute ST elevation myocardial infarction (STEMI) ([facility]ly [facility]) 2018    B12 deficiency     CAD (coronary artery disease)     COPD (chronic obstructive pulmonary disease) ([facility]) 2020    GERD (gastroesophageal reflux disease)     Guillain Barré syndrome     H/O echocardiogram 13, 6/16/10    7/13-EF 50-55% WNL. 6/10-EF53%, mod LVH, MARCI, RVE, trace PI, mild MR/TR,     Heart murmur     History of echocardiogram 10/07/2020    EF 40%, No changed since previous 2020, Mid inferior hypokinesis noted, Grade I DD, Mod MR, Mild TR, Dilatation of the aortic root, No pericardial effusion     History of myocardial infarction     History of nuclear MUGA test 2020    EF 53%, Normal, No ICD needed    History of nuclear stress test 2020    EF 30%, study suggestive of abnormal myocardial perfusion.    History of PTCA ;-PTCA w/stents x2, -PTCA w/stent x3    History of PTCA 03/15/2018    LAD EF25%    Hx of cardiovascular stress test 2018    EF 28% Nuclear scintigraphy demonstartes inferior wall infarct.    Hx of cardiovascular stress test 2020    Abnormal Study.

## 2025-06-24 ENCOUNTER — CARE COORDINATION (OUTPATIENT)
Dept: CARE COORDINATION | Age: 65
End: 2025-06-24

## 2025-06-24 NOTE — CARE COORDINATION
Ambulatory Care Coordination Note     2025 10:56 AM     Patient Current Location:  Home: [address] [place], [place] [id number]     This patient was received as a referral from [employer] health report .    ACM contacted the patient by telephone. Verified name and  with patient as identifiers. Provided introduction to self, and explanation of the ACM role.   Patient accepted care management services at this time.          ACM: Chana Rhodes RN     Challenges to be reviewed by the provider   Additional needs identified to be addressed with provider No  None         Method of communication with provider: none.    Utilization: Initial Call - Discharge Date: 25   Discharge Facility: [facility]  Reason for ED Visit: Decreased mobility & pain in left middle finger after injury on 25  Visit Diagnosis: Injury of tendon of intrinsic muscle of finger    Number of ED visits in the last 6 months: 3      Do you have any ongoing symptoms? Yes, my symptoms have improved.   Current symptoms: Finger is still painful.    Did you call your PCP prior to going to the ED? No, did not call the PCP office.     Review of Discharge Instructions:   [x] AVS discharge instructions  [x] Right Care, Right Place, Right Time document  [] Medication changes  [x] Follow up appointments  [x] Referral follow up     Care Summary Note: ACM placed call to patient today to offer care management services. Patient was has been in the ED twice in the last 2 weeks. He was in the [facility] ED on 6/10/25 for CHF & COPD exacerbation. He reports he completed the antibiotic & steroid Rx's from this ED visit and is feeling better and back to baseline. He injured his finger on 25 and had decreased mobility and pain so he went to the [facility] ED on 25. He did receive a referral to Dr. Mckinney and states he has an appointment with him on 25. Patient reports finger pain/mobility has not changed.     ACM reviewed

## 2025-07-08 ENCOUNTER — CARE COORDINATION (OUTPATIENT)
Dept: CARE COORDINATION | Age: 65
End: 2025-07-08

## 2025-07-08 NOTE — CARE COORDINATION
Ambulatory Care Coordination Note     2025 4:17 PM     Patient Current Location:  Home: [address] [place] [id number]     ACM contacted the patient by telephone. Verified name and  with patient as identifiers.         ACM: Chana Rhodes RN     Challenges to be reviewed by the provider   Additional needs identified to be addressed with provider No  None         Method of communication with provider: none.    Utilization: Patient has not had any utilization since our last call.     Care Summary Note: ACM placed call to patient today for care management follow-up. Patient reports he is doing well and denies any symptoms or concerns to report to provider at this time.     Patient did complete 25 OV with Dr. Mckinney. Patient has his finger in a splint for 6-8 weeks before the provider considers surgery. Patient reports his finger is feeling better being in the splint.     HTN, CHF, COPD & DM assessments completed. Patient does not have a BP cuff at home. Patient does have a scale at home and weighs himself periodically. He states his weight typically ranges between 190-195 lbs. He does not have a pulse oximeter. He does monitor his BG with a Lainey 3 CGM. Patient denies any s/sx of HTN, CHF, or COPD exacerbation. Patient reports he did have hypoglycemic episode yesterday and his blood sugar was down to 55 in the middle of the day. Patient states he typically eats breakfast, has a snack for lunch, and his wife cooks a big dinner. ACM educated patient on importance of eating regular meals to keep blood sugar stable. Patient is not in insulin. He takes PO medications for his DM. His DM is managed by his PCP. He states he gets hypoglycemic once or twice a month usually. ACM encouraged patient to notify provider if it happens more than twice monthly for evaluation of medications. Patient verbalized understanding.     Patient declined RPM enrollment at this time. He states he was enrolled

## 2025-07-28 ENCOUNTER — TELEPHONE (OUTPATIENT)
Dept: CARDIOLOGY CLINIC | Age: 65
End: 2025-07-28

## 2025-07-28 RX ORDER — BLOOD PRESSURE TEST KIT
1 KIT MISCELLANEOUS DAILY
Qty: 1 KIT | Refills: 0 | Status: SHIPPED | OUTPATIENT
Start: 2025-07-28

## 2025-07-28 NOTE — TELEPHONE ENCOUNTER
Teressa Larsen NP at [facility] left VM stating that she was visiting pt and took BP.  /120 R arm  170/100 L arm  Pt had not taken BP medications. NP instructed pt to take BP meds. Once BP meds were taken, BP was assessed again- R arm 190/120   NP called 911. Chris took BP and it was 210/110. SOBOE that he feels like is due to COPD. Wt 193#. Lungs diminished. Teressa Larsen NP states pt is now at hospital. Pt does not have a BP machine. Asking if we are able to order for pt.

## 2025-07-29 NOTE — TELEPHONE ENCOUNTER
Spoke with pt who stated he refused the squad taking him to ED last night as he cares for his wife and no one would be able to look after her. Pt states his BP ended up coming down last night. Still has no access to taking BP. Notified pt that BP machine was ordered and sent to [facility]. Pt verbalized understanding. Scheduled pt with nurse/MA visit for BP check 7/30 11:20am.

## 2025-07-30 ENCOUNTER — CLINICAL SUPPORT (OUTPATIENT)
Dept: CARDIOLOGY CLINIC | Age: 65
End: 2025-07-30
Payer: MEDICARE

## 2025-07-30 VITALS — DIASTOLIC BLOOD PRESSURE: 68 MMHG | SYSTOLIC BLOOD PRESSURE: 126 MMHG | HEART RATE: 64 BPM

## 2025-07-30 DIAGNOSIS — I10 ESSENTIAL HYPERTENSION: Primary | ICD-10-CM

## 2025-07-30 PROCEDURE — 1123F ACP DISCUSS/DSCN MKR DOCD: CPT | Performed by: NURSE PRACTITIONER

## 2025-07-30 PROCEDURE — 99213 OFFICE O/P EST LOW 20 MIN: CPT | Performed by: NURSE PRACTITIONER

## 2025-07-30 PROCEDURE — 3074F SYST BP LT 130 MM HG: CPT | Performed by: NURSE PRACTITIONER

## 2025-07-30 PROCEDURE — 1159F MED LIST DOCD IN RCRD: CPT | Performed by: NURSE PRACTITIONER

## 2025-07-30 PROCEDURE — 3078F DIAST BP <80 MM HG: CPT | Performed by: NURSE PRACTITIONER

## 2025-07-30 NOTE — PATIENT INSTRUCTIONS
Thank you for allowing us to care for you today!   We want to ensure we can follow your treatment plan and we strive to give you the best outcomes and experience possible.   If you ever have a life threatening emergency and call 911 - for an ambulance (EMS)  REMEMBER  Our providers can only care for you at:   [facility] or [facility]   Even if you have someone take you or you drive yourself we can only care for you in a [facility] facility. Our providers are not setup at the other healthcare locations!    PLEASE CALL OUR OFFICE DURING NORMAL BUSINESS HOURS  Monday through Friday 8 am to 5 pm  AFTER HOURS the physician on-call cannot help with scheduling, rescheduling, procedure instruction questions or any type of medication need or issue.  [facility] P:[phone] - [facility] P:[phone] - [facility] P:[phone]      If you receive a survey:  We would appreciate you taking the time to share your experience concerning your provider visit in the office.    These surveys are confidential!  We are eager to improve and are counting on you to share your feedback so we can ensure you get the best care possible.

## 2025-07-30 NOTE — PROGRESS NOTES
Hilaria Rodriguez is here for a BP check  Or he states his blood pressure has been running high at home. 180/120  Has also been up to 190/120        BP Readings from Last 3 Encounters:   07/30/25 126/68   06/17/25 (!) 170/89   06/10/25 (!) 188/115     Pulse Readings from Last 3 Encounters:   07/30/25 64   06/17/25 62   06/10/25 77     Wt Readings from Last 3 Encounters:   06/17/25 89.4 kg (197 lb)   06/10/25 87.5 kg (193 lb)   05/08/25 88.5 kg (195 lb 3.2 oz)           Assessment and Plan:   Hilaria Rodriguez is currently on hydralazine 12.5 mg 3 times daily  Is SBP > 140 he is to take 25 mg of hydralazine   He is to track his blood pressure and bring readings in and we will further adjust medications as warranted

## 2025-07-31 ENCOUNTER — CARE COORDINATION (OUTPATIENT)
Dept: CASE MANAGEMENT | Age: 65
End: 2025-07-31

## 2025-07-31 NOTE — CARE COORDINATION
Ambulatory Care Coordination Note     7/31/2025 4:07 PM     ACM outreach attempt by this ACM today to perform care management follow up . ACM was unable to reach the patient by telephone today;   left voice message requesting a return phone call to this ACM.     ACM: Hayley Alcala RN     Care Summary Note: Called patient for follow up with ACM program. Left message.    PCP/Specialist follow up:   Future Appointments         Provider Specialty Dept Phone    8/18/2025 10:00 AM Marlee Morfin APRN - CNP Cardiology [phone]    8/20/2025 1:10 PM Aashish Gutierrez MD Cardiothoracic Surgery [phone]    9/9/2025 11:45 AM Gabe Barnes MD Nephrology [phone]    11/10/2025 11:30 AM [facility] [place] [facility] Cardiology [phone]    12/3/2025 3:40 PM Tawana Andrade MD Cardiology [phone]    12/8/2025 10:20 AM Louisa Bridges MD Internal Medicine [phone]            Follow Up:   Plan for next ACM outreach in approximately 1 week to complete:  - CC Protocol assessments  - disease specific assessments.      Hayley Alcala RN,BSN  Care Transition Nurse  [phone]

## 2025-08-06 ENCOUNTER — CARE COORDINATION (OUTPATIENT)
Dept: CARE COORDINATION | Age: 65
End: 2025-08-06

## 2025-08-06 ASSESSMENT — SOCIAL DETERMINANTS OF HEALTH (SDOH)
IN A TYPICAL WEEK, HOW MANY TIMES DO YOU TALK ON THE PHONE WITH FAMILY, FRIENDS, OR NEIGHBORS?: MORE THAN THREE TIMES A WEEK
HOW OFTEN DO YOU ATTEND CHURCH OR RELIGIOUS SERVICES?: NEVER
DO YOU BELONG TO ANY CLUBS OR ORGANIZATIONS SUCH AS CHURCH GROUPS UNIONS, FRATERNAL OR ATHLETIC GROUPS, OR SCHOOL GROUPS?: NO
HOW OFTEN DO YOU ATTENT MEETINGS OF THE CLUB OR ORGANIZATION YOU BELONG TO?: NEVER
HOW OFTEN DO YOU GET TOGETHER WITH FRIENDS OR RELATIVES?: THREE TIMES A WEEK

## 2025-08-08 ENCOUNTER — CARE COORDINATION (OUTPATIENT)
Dept: CARE COORDINATION | Age: 65
End: 2025-08-08

## 2025-08-11 ENCOUNTER — CARE COORDINATION (OUTPATIENT)
Dept: CARE COORDINATION | Age: 65
End: 2025-08-11

## 2025-08-14 ENCOUNTER — CARE COORDINATION (OUTPATIENT)
Dept: CARE COORDINATION | Age: 65
End: 2025-08-14

## 2025-08-18 ENCOUNTER — CARE COORDINATION (OUTPATIENT)
Dept: CARE COORDINATION | Age: 65
End: 2025-08-18

## 2025-08-18 ENCOUNTER — OFFICE VISIT (OUTPATIENT)
Dept: CARDIOLOGY CLINIC | Age: 65
End: 2025-08-18
Payer: MEDICARE

## 2025-08-18 VITALS
HEIGHT: 67 IN | BODY MASS INDEX: 30.85 KG/M2 | HEART RATE: 60 BPM | SYSTOLIC BLOOD PRESSURE: 90 MMHG | DIASTOLIC BLOOD PRESSURE: 60 MMHG

## 2025-08-18 DIAGNOSIS — I50.20 HFREF (HEART FAILURE WITH REDUCED EJECTION FRACTION) (HCC): Primary | ICD-10-CM

## 2025-08-18 PROCEDURE — 1123F ACP DISCUSS/DSCN MKR DOCD: CPT | Performed by: NURSE PRACTITIONER

## 2025-08-18 PROCEDURE — 3074F SYST BP LT 130 MM HG: CPT | Performed by: NURSE PRACTITIONER

## 2025-08-18 PROCEDURE — 3078F DIAST BP <80 MM HG: CPT | Performed by: NURSE PRACTITIONER

## 2025-08-18 PROCEDURE — 99213 OFFICE O/P EST LOW 20 MIN: CPT | Performed by: NURSE PRACTITIONER

## 2025-08-18 ASSESSMENT — ENCOUNTER SYMPTOMS
CONSTIPATION: 1
WHEEZING: 0
SHORTNESS OF BREATH: 0
CHEST TIGHTNESS: 0

## 2025-08-20 ENCOUNTER — OFFICE VISIT (OUTPATIENT)
Dept: CARDIOTHORACIC SURGERY | Age: 65
End: 2025-08-20
Payer: MEDICARE

## 2025-08-20 VITALS
OXYGEN SATURATION: 97 % | DIASTOLIC BLOOD PRESSURE: 52 MMHG | WEIGHT: 188.13 LBS | SYSTOLIC BLOOD PRESSURE: 80 MMHG | BODY MASS INDEX: 29.53 KG/M2 | HEART RATE: 59 BPM | HEIGHT: 67 IN

## 2025-08-20 DIAGNOSIS — I73.9 PAD (PERIPHERAL ARTERY DISEASE): Primary | ICD-10-CM

## 2025-08-20 PROCEDURE — 1159F MED LIST DOCD IN RCRD: CPT | Performed by: THORACIC SURGERY (CARDIOTHORACIC VASCULAR SURGERY)

## 2025-08-20 PROCEDURE — 1126F AMNT PAIN NOTED NONE PRSNT: CPT | Performed by: THORACIC SURGERY (CARDIOTHORACIC VASCULAR SURGERY)

## 2025-08-20 PROCEDURE — 1123F ACP DISCUSS/DSCN MKR DOCD: CPT | Performed by: THORACIC SURGERY (CARDIOTHORACIC VASCULAR SURGERY)

## 2025-08-20 PROCEDURE — 99214 OFFICE O/P EST MOD 30 MIN: CPT | Performed by: THORACIC SURGERY (CARDIOTHORACIC VASCULAR SURGERY)

## 2025-08-20 PROCEDURE — 3074F SYST BP LT 130 MM HG: CPT | Performed by: THORACIC SURGERY (CARDIOTHORACIC VASCULAR SURGERY)

## 2025-08-20 PROCEDURE — 3078F DIAST BP <80 MM HG: CPT | Performed by: THORACIC SURGERY (CARDIOTHORACIC VASCULAR SURGERY)

## 2025-08-27 PROCEDURE — 93295 DEV INTERROG REMOTE 1/2/MLT: CPT | Performed by: INTERNAL MEDICINE

## 2025-08-27 PROCEDURE — 93296 REM INTERROG EVL PM/IDS: CPT | Performed by: INTERNAL MEDICINE

## 2025-09-02 ENCOUNTER — CARE COORDINATION (OUTPATIENT)
Dept: CARE COORDINATION | Age: 65
End: 2025-09-02

## 2025-09-03 ENCOUNTER — CARE COORDINATION (OUTPATIENT)
Dept: CARE COORDINATION | Age: 65
End: 2025-09-03

## (undated) DEVICE — CONTAINER,SPECIMEN,OR STERILE,4OZ: Brand: MEDLINE

## (undated) DEVICE — SUTURE ABSORBABLE MONOFILAMENT 2-0 SH 6 IN STRATAFIX SPRL SXPP1B415

## (undated) DEVICE — GOWN,SIRUS,POLYRNF,BRTHSLV,XLN/XL,20/CS: Brand: MEDLINE

## (undated) DEVICE — BLOOD TRANSFUSION FILTER: Brand: HAEMONETICS

## (undated) DEVICE — CANNULA ART 20FR NVENT 3 8IN CONN EOPA 3D

## (undated) DEVICE — ADHESIVE SKIN CLSR 0.7ML TOP DERMBND ADV

## (undated) DEVICE — Device

## (undated) DEVICE — SUTURE VCRL SZ 3-0 L36IN ABSRB UD L36MM CT-1 1/2 CIR J944H

## (undated) DEVICE — SUTURE SURGLON SZ 1 L30IN NONABSORBABLE BLK NO NDL NYL PRE 8886191971

## (undated) DEVICE — X-RAY DETECTABLE SPONGES,12 PLY: Brand: VISTEC

## (undated) DEVICE — SOLUTION PREP PAINT POV IOD FOR SKIN MUCOUS MEM

## (undated) DEVICE — GAUZE,SPONGE,8"X4",12PLY,XRAY,STRL,LF: Brand: MEDLINE

## (undated) DEVICE — TOWEL,OR,DSP,ST,WHITE,DLX,XR,4/PK,20PK/C: Brand: MEDLINE

## (undated) DEVICE — BLANKET THER AD W24XL60IN FAB COVERING SUP SFT ULT THN LTWT

## (undated) DEVICE — SWAN-GANZ TRUE SIZE THERMODULTION CATHETER, 5F: Brand: SWAN-GANZ TRUE SIZE

## (undated) DEVICE — CATHETER ANGIO 4FR L100CM S STL NYL LCB 3 SEG BRAID SFT

## (undated) DEVICE — CATHETER ANGIO 4FR L100CM S STL NYL JL5 3 SEG BRAID SFT

## (undated) DEVICE — APPLICATOR PREP 26ML 0.7% IOD POVACRYLEX 74% ISO ALC ST

## (undated) DEVICE — GOWN,SIRUS,FABRNF,RAGLAN,XL,ST,28/CS: Brand: MEDLINE

## (undated) DEVICE — CANNULA SEAL

## (undated) DEVICE — INTRODUCER SHTH THN WALLED 5 FRX10 CM 22 GA ANGLED RAIN SHTH

## (undated) DEVICE — SYRINGE IRRIG 60ML SFT PLIABLE BLB EZ TO GRP 1 HND USE W/

## (undated) DEVICE — FOGARTY - HYDRAGRIP SURGICAL - CLAMP INSERTS: Brand: FOGARTY SOFTJAW

## (undated) DEVICE — YANKAUER,FLEXIBLE HANDLE,REGLR CAPACITY: Brand: MEDLINE INDUSTRIES, INC.

## (undated) DEVICE — LOOP VES W25MM THK1MM MAXI RED SIL FLD REPELLENT 100 PER

## (undated) DEVICE — SUTURE PROL SZ 6-0 L30IN NONABSORBABLE BLU L13MM RB-2 1/2 8711H

## (undated) DEVICE — DRAPE HUSH SLUSH 2.0 112CM X 168CM

## (undated) DEVICE — CLIP LIG M BLU TI HRT SHP WIRE HORZ 600 PER BX

## (undated) DEVICE — COUNTER NDL 30 COUNT FOAM STRP SGL MAG

## (undated) DEVICE — TOTAL TRAY, 16FR 10ML SIL FOLEY, URN: Brand: MEDLINE

## (undated) DEVICE — PINNACLE R/O II INTRODUCER SHEATH WITH RADIOPAQUE MARKER: Brand: PINNACLE

## (undated) DEVICE — Z INACTIVE USE 2641837 CLIP LIG M BLU TI HRT SHP WIRE HORZ 600 PER BX

## (undated) DEVICE — BLADE ES L2.75IN ELASTOMERIC COAT DURABLE BEND UPTO 90DEG

## (undated) DEVICE — CANNULA PERF L5.5IN DIA9FR AORT ROOT AG STD TIP W/ VENT LN

## (undated) DEVICE — SUTURE NRLN SZ 1 L18IN NONABSORBABLE BLK L36MM CT-1 1/2 CIR C520D

## (undated) DEVICE — CHECK-FLO PERFORMER INTRODUCER: Brand: PERFORMER

## (undated) DEVICE — GLOVE ORANGE PI 7   MSG9070

## (undated) DEVICE — RADIFOCUS OPTITORQUE ANGIOGRAPHIC CATHETER: Brand: OPTITORQUE

## (undated) DEVICE — TAPE MED W1/8XL30IN WHT POLY

## (undated) DEVICE — MARKER SURG SKIN UTIL REGULAR/FINE 2 TIP W/ RUL AND 9 LBL

## (undated) DEVICE — BAG AUTOTRNS 600ML BLD SGL CHMBR 3 PRT PLAS DEHP PVC

## (undated) DEVICE — ELECTRODE ES AD CRDLSS PT RET REM POLYHESIVE

## (undated) DEVICE — SUTURE VCRL SZ 1 L27IN ABSRB VLT L26MM CT-2 1/2 CIR J335H

## (undated) DEVICE — BAND COMPR L24CM REG CLR PLAS HEMSTAT EXT HK AND LOOP RETEN

## (undated) DEVICE — SUTURE ABSORBABLE BRAIDED 2-0 CT-1 27 IN UD VICRYL J259H

## (undated) DEVICE — SOLUTION IV IRRIG WATER 1000ML POUR BRL 2F7114

## (undated) DEVICE — REDUCER: Brand: ENDOWRIST

## (undated) DEVICE — TOWEL,OR,DSP,ST,BLUE,STD,6/PK,12PK/CS: Brand: MEDLINE

## (undated) DEVICE — INTENDED FOR TISSUE SEPARATION, AND OTHER PROCEDURES THAT REQUIRE A SHARP SURGICAL BLADE TO PUNCTURE OR CUT.: Brand: BARD-PARKER ® STAINLESS STEEL BLADES

## (undated) DEVICE — CATHETER IV 18GA L1.16IN OD1.27-1.3462MM ID0.9398-1.016MM

## (undated) DEVICE — DILATOR: Brand: COOK

## (undated) DEVICE — ANGIOGRAPHY KIT CUST MANIFOLD

## (undated) DEVICE — CATHETER DIAG AD 4FR L110CM 145DEG COR RED HYDRPHLC NYL

## (undated) DEVICE — DRAIN,WOUND,ROUND,24FR,5/16",FULL-FLUTED: Brand: MEDLINE

## (undated) DEVICE — SYRINGE MED 20ML STD CLR PLAS LUERLOCK TIP N CTRL DISP

## (undated) DEVICE — GLOVE SURG SZ 65 THK91MIL LTX FREE SYN POLYISOPRENE

## (undated) DEVICE — COLUMN DRAPE

## (undated) DEVICE — ARM DRAPE

## (undated) DEVICE — ELECTRODE ES L2.75IN S STL INSUL BLDE W/ SL EDGE

## (undated) DEVICE — ROTATING SURGICAL PUNCHES, 1 PER POUCH: Brand: A&E MEDICAL / ROTATING SURGICAL PUNCHES

## (undated) DEVICE — PERCLOSE™ PROSTYLE™ SUTURE-MEDIATED CLOSURE AND REPAIR SYSTEM: Brand: PERCLOSE™ PROSTYLE™

## (undated) DEVICE — SUTURE BOOT ASSORTED COLORS XRAY DETECTABLE

## (undated) DEVICE — NEEDLE HYPO 25GA L1.5IN BLU POLYPR HUB S STL REG BVL STR

## (undated) DEVICE — SYRINGE 20ML LL S/C 50

## (undated) DEVICE — GUIDEWIRE VASC L75CM DIA0.035IN TIP L7CM PTFE COAT S STL

## (undated) DEVICE — BLADE ES ELASTOMERIC COAT INSUL DURABLE BEND UPTO 90DEG

## (undated) DEVICE — 3M™ IOBAN™ 2 ANTIMICROBIAL INCISE DRAPE 6648EZ: Brand: IOBAN™ 2

## (undated) DEVICE — GLOVE SURG SZ 6 THK91MIL LTX FREE SYN POLYISOPRENE ANTI

## (undated) DEVICE — Z DUP USE 2257490 ADHESIVE SKIN CLSRE 036ML TPCL 2CTL CNCRLTE HIGH VSCSTY DRMB

## (undated) DEVICE — CANNULA PERF L1.8MM TIP L1MM S STL SHFT BLB SHP TIP FEM

## (undated) DEVICE — NEEDLE INSUF L120MM DIA2MM DISP FOR PNEUMOPERI ENDOPATH

## (undated) DEVICE — COVER,MAYO STAND,STERILE: Brand: MEDLINE

## (undated) DEVICE — GLOVE SURG SZ 75 CRM LTX FREE POLYISOPRENE POLYMER BEAD ANTI

## (undated) DEVICE — GLOVE SURG SZ 7 L12IN FNGR THK79MIL GRN LTX FREE

## (undated) DEVICE — DRAPE,EXTREMITY,89X128,STERILE: Brand: MEDLINE

## (undated) DEVICE — BLADE SAW STRNM 10X35X0.6MM

## (undated) DEVICE — GLOVE SURG SZ 8 L12IN THK75MIL DK GRN LTX FREE

## (undated) DEVICE — AGENT HEMOSTATIC SURGIFLOW MATRIX KIT W/THROMBIN

## (undated) DEVICE — GOWN,ECLIPSE,POLYRNF,BRTHSLV,L,30/CS: Brand: MEDLINE

## (undated) DEVICE — LAPAROSCOPIC TROCAR SLEEVE/SINGLE USE: Brand: KII® OPTICAL ACCESS SYSTEM

## (undated) DEVICE — 3M™ STERI-DRAPE™ INSTRUMENT POUCH 1018: Brand: STERI-DRAPE™

## (undated) DEVICE — SPONGE LAP W18XL18IN WHT COT 4 PLY FLD STRUNG RADPQ DISP ST 2 PER PACK

## (undated) DEVICE — TUBING SUCT 9 11FR L475IN RIG SHFT MINI SUC TIP DLP

## (undated) DEVICE — CONNECTOR DRNGE W3/8-0.5XH3/16XL3/16IN 2:1 SIL CARD STR

## (undated) DEVICE — WIRE .035 3MM J TIP 260CM

## (undated) DEVICE — Device: Brand: VIRTUOSAPH PLUS WITH RADIAL INDICATION

## (undated) DEVICE — DECANTER FLD 9IN ST BG FOR ASEP TRNSF OF FLD

## (undated) DEVICE — TOTAL TRAY, DB, 100% SILI FOLEY, 16FR 10: Brand: MEDLINE

## (undated) DEVICE — CANNULA PERF L15IN DIA29FR VEN 3 STG THN WALL DSGN W  VENT

## (undated) DEVICE — TUBING, SUCTION, 9/32" X 10', STRAIGHT: Brand: MEDLINE

## (undated) DEVICE — CHLORAPREP 26ML ORANGE

## (undated) DEVICE — SEAL

## (undated) DEVICE — SUTURE PERMAHAND SZ 0 L30IN NONABSORBABLE BLK SILK UNIDIR SA86G

## (undated) DEVICE — AMD ANTIMICROBIAL NON-ADHERENT PAD,0.2% POLYHEXAMETHYLENE BIGUANIDE HCI (PHMB): Brand: TELFA

## (undated) DEVICE — GLOVE SURG SZ 7 CRM LTX FREE POLYISOPRENE POLYMER BEAD ANTI

## (undated) DEVICE — GOWN,ECLIPSE,POLYRNF,BRTHSLV,XL,30/CS: Brand: MEDLINE

## (undated) DEVICE — DRAIN SURG SGL COLL PT TB FOR ATS BG OASIS

## (undated) DEVICE — PACK SURG LAP CHOLE

## (undated) DEVICE — CORD,CAUTERY,BIPOLAR,STERILE: Brand: MEDLINE

## (undated) DEVICE — CLEANER,CAUTERY TIP,2X2",STERILE: Brand: MEDLINE

## (undated) DEVICE — CATHETER DIAG AD 4FR L100CM STD NYL JUDKINS R 4 TRULUMEN

## (undated) DEVICE — TIP COVER ACCESSORY

## (undated) DEVICE — PINNACLE INTRODUCER SHEATH: Brand: PINNACLE

## (undated) DEVICE — SUTURE PROL SZ 6-0 L18IN NONABSORBABLE BLU L13MM C-1 3/8 8718H

## (undated) DEVICE — SYSTEM VLV REP MITRACLIP G4 BUNDLE USE W/ SGC0701 STEERABLE GUIDE CATH

## (undated) DEVICE — CONNECTOR PIPE 3/8X1/2IN REDUC STR

## (undated) DEVICE — CLIP SM RED INTERN HMOCLP TITAN LIGATING

## (undated) DEVICE — SYRINGE MED 30ML STD CLR PLAS LUERLOCK TIP N CTRL DISP

## (undated) DEVICE — SUTURE STRATAFIX SPRL SZ 2-0 L9IN ABSRB VLT MH L36MM 1/2 SXPD2B408

## (undated) DEVICE — GOWN,SIRUS,FABRNF,RAGLAN,L,ST,30/CS: Brand: MEDLINE

## (undated) DEVICE — LINER,SEMI-RIGID,3000CC,50EA/CS: Brand: MEDLINE

## (undated) DEVICE — SUTURE COAT VCRL SZ 4-0 L18IN ABSRB UD L19MM PS-2 1/2 CIR J496G

## (undated) DEVICE — 3M™ IOBAN™ 2 ANTIMICROBIAL INCISE DRAPE 6650EZ: Brand: IOBAN™ 2

## (undated) DEVICE — SUTURE ETHIB EXCL X BR GRN V-7 DA 2-0 30 PX977 PX977H

## (undated) DEVICE — NEEDLE HYPO 20GA L1.5IN YEL POLYPR HUB S STL REG BVL STR

## (undated) DEVICE — GLOVE ORANGE PI 7 1/2   MSG9075

## (undated) DEVICE — SUTURE VCRL 2-0 L36IN ABSRB UD CTX L48MM 1/2 CIR TAPERPOINT J979H

## (undated) DEVICE — SUTURE S STL SZ 5 L18IN NONABSORBABLE SIL V-40 L48MM 1/2 M650G

## (undated) DEVICE — BLADE SURG 11 RIB BK DESIGN LCK LNG HNDL CARBON STL SAFLOK

## (undated) DEVICE — SUTURE MCRYL SZ 4-0 L18IN ABSRB UD L19MM PS-2 3/8 CIR PRIM Y496G

## (undated) DEVICE — KIT CVC AD 7FR L20CM POLYUR BLU FLEXTIP ANTIMIC MULTILUMEN

## (undated) DEVICE — PENCIL ES CRD L10FT HND SWCHING ROCK SWCH W/ EDGE COAT BLDE

## (undated) DEVICE — EXCEL 10FT (3.05 M) INSUFFLATION TUBING SET WITH 0.1 MICRON FILTER: Brand: EXCEL

## (undated) DEVICE — BLADE CLIPPER GEN PURP NS

## (undated) DEVICE — SOLUTION IV 1000ML 0.9% SOD CHL FOR IRRIG PLAS CONT

## (undated) DEVICE — SHEET,DRAPE,53X77,STERILE: Brand: MEDLINE

## (undated) DEVICE — ELECTRODE ES L4IN PTFE INSUL BLDE W/ SFTY SL DISP EDGE

## (undated) DEVICE — DRAPE,SPLIT,BILATERAL,STERILE: Brand: MEDLINE

## (undated) DEVICE — SUTURE PERMAHAND SZ 2-0 L17X18IN NONABSORBABLE BLK SILK SA65H

## (undated) DEVICE — GLOVE SURG SZ 75 L12IN FNGR THK79MIL GRN LTX FREE

## (undated) DEVICE — CATHETER ANGIO 4FR L100CM S STL NYL AL1 3 SEG BRAID SFT

## (undated) DEVICE — OPEN HEART PACK: Brand: MEDLINE INDUSTRIES, INC.

## (undated) DEVICE — DRAPE,ABDOMINAL,MAJOR,STERILE: Brand: MEDLINE

## (undated) DEVICE — DECANTER BAG 9": Brand: MEDLINE INDUSTRIES, INC.

## (undated) DEVICE — SUTURE PROL SZ 5-0 L36IN NONABSORBABLE BLU L13MM C-1 3/8 8720H

## (undated) DEVICE — SUTURE VCRL SZ 4-0 L27IN ABSRB VLT L26MM SH 1/2 CIR J315H

## (undated) DEVICE — SUTURE MCRYL SZ 3-0 L27IN ABSRB UD L24MM PS-1 3/8 CIR PRIM Y936H

## (undated) DEVICE — SENSOR OXMTR SM AD DISP FOR INVOS SYS

## (undated) DEVICE — GLOVE ORANGE PI 8   MSG9080

## (undated) DEVICE — BLADE OPHTH GRN ROUNDED TIP 1 SIDE SHRP GRINDLESS MINI-BLDE

## (undated) DEVICE — PLEDGET VASC W3/16XL3/8IN THK1/16IN PTFE SFT

## (undated) DEVICE — DEVICE RESUS AD TB L40IN SELF INFL MASK TEXT BG DBL SWVL EL

## (undated) DEVICE — AGENT HEMSTAT W2XL14IN OXIDIZED REGENERATED CELOS ABSRB FOR

## (undated) DEVICE — BLADELESS OBTURATOR: Brand: WECK VISTA

## (undated) DEVICE — BAG TRNSF AUTOLGS SUCT AND ANTICOAG LN AUTOLOG

## (undated) DEVICE — TUBING, SUCTION, 3/16" X 10', STRAIGHT: Brand: MEDLINE

## (undated) DEVICE — SUTURE VCRL SZ 2-0 L27IN ABSRB UD L26MM CT-2 1/2 CIR J269H

## (undated) DEVICE — MPS® DELIVERY SET W/ARREST AGENT AND ADDITIVE CASSETTES, HEAT EXCHANGER & 10 FT. DELIVERY TUBING: Brand: MPS

## (undated) DEVICE — SUTURE 1 STRATAFIX SYMMETRIC PDS + 30CM CT-1 SXPP1A435

## (undated) DEVICE — SUTURE NONABSORBABLE MONOFILAMENT 6-0 C-1 4X18 IN PROLENE M8718

## (undated) DEVICE — MESH HERN DIA4.5IN CIR W/ ECHO PS POS SYS VENTRALIGHT ST
Type: IMPLANTABLE DEVICE | Site: ABDOMEN | Status: NON-FUNCTIONAL
Removed: 2022-12-08

## (undated) DEVICE — 6 FOOT DISPOSABLE EXTENSION CABLE WITH SAFE CONNECT / SCREW-DOWN

## (undated) DEVICE — 1 X VERSACROSS LARGE ACCESS TRANSSEPTAL DILATOR (INCLUDING 1 X J-TIP MECHANICAL GUIDEWIRE); 1 X VERSACROSS RF WIRE (INCLUDING 1 X CONNECTOR CABLE (SINGLE USE)); 1 X DISPERSIVE ELECTRODE: Brand: VERSACROSS LARGE ACCESS SOLUTION

## (undated) DEVICE — GUIDEWIRE VASC L260CM DIA0.035IN RAD 3MM J TIP L7CM PTFE

## (undated) DEVICE — KIT INTRO 8.5FR L4IN PERC POLYUR SHTH RADPQ W/ INTEGR

## (undated) DEVICE — SUTURE PROL SZ 5-0 L18IN NONABSORBABLE BLU C-1 L13MM 3/8 8717H

## (undated) DEVICE — NEEDLE ANGIO L1IN DIA21GA 1 THN WALL SMOOTH STD HUB

## (undated) DEVICE — Z INACTIVE USE 2540311 LEAD PACE L475MM CHN A OR V MYOCARDIAL STEROID ELUT SIL

## (undated) DEVICE — SHEET, T, LAPAROTOMY, STERILE: Brand: MEDLINE

## (undated) DEVICE — SWAN-GANZ THERMODILUTION CATHETER: Brand: SWAN-GANZ

## (undated) DEVICE — KIT MICROINTRODUCER 4FR ECHOGENIC NDL L7CM 21GA STIFF COAX

## (undated) DEVICE — SUTURE PROL 7-0 L18IN NONABSORBABLE BLU L9.3MM BV-1 3/8 CIR M8701

## (undated) DEVICE — DRAPE,UTILITY,XL,4/PK,STERILE: Brand: MEDLINE

## (undated) DEVICE — LOOP,VESSEL,MINI,BLUE,2/PK,STERILE: Brand: MEDLINE

## (undated) DEVICE — YANKAUER,BULB TIP,W/O VENT,RIGID,STERILE: Brand: MEDLINE

## (undated) DEVICE — DRESSING TRNSPAR W5XL4.5IN FLM SHT SEMIPERMEABLE WIND

## (undated) DEVICE — TUBING INSUFFLATOR HEAT HI FLO SET PNEUMOCLEAR